# Patient Record
Sex: FEMALE | Race: BLACK OR AFRICAN AMERICAN | NOT HISPANIC OR LATINO
[De-identification: names, ages, dates, MRNs, and addresses within clinical notes are randomized per-mention and may not be internally consistent; named-entity substitution may affect disease eponyms.]

---

## 2018-07-10 ENCOUNTER — APPOINTMENT (OUTPATIENT)
Dept: VASCULAR SURGERY | Facility: CLINIC | Age: 73
End: 2018-07-10
Payer: MEDICARE

## 2018-07-10 VITALS
DIASTOLIC BLOOD PRESSURE: 86 MMHG | SYSTOLIC BLOOD PRESSURE: 132 MMHG | BODY MASS INDEX: 32.1 KG/M2 | HEART RATE: 76 BPM | WEIGHT: 188 LBS | TEMPERATURE: 98.5 F | HEIGHT: 64 IN

## 2018-07-10 DIAGNOSIS — N18.5 CHRONIC KIDNEY DISEASE, STAGE 5: ICD-10-CM

## 2018-07-10 PROCEDURE — 99203 OFFICE O/P NEW LOW 30 MIN: CPT

## 2018-08-21 ENCOUNTER — OUTPATIENT (OUTPATIENT)
Dept: OUTPATIENT SERVICES | Facility: HOSPITAL | Age: 73
LOS: 1 days | End: 2018-08-21
Payer: COMMERCIAL

## 2018-08-21 VITALS
WEIGHT: 186.07 LBS | OXYGEN SATURATION: 96 % | DIASTOLIC BLOOD PRESSURE: 75 MMHG | SYSTOLIC BLOOD PRESSURE: 140 MMHG | TEMPERATURE: 98 F | HEIGHT: 63 IN | RESPIRATION RATE: 16 BRPM | HEART RATE: 71 BPM

## 2018-08-21 DIAGNOSIS — Z98.49 CATARACT EXTRACTION STATUS, UNSPECIFIED EYE: Chronic | ICD-10-CM

## 2018-08-21 DIAGNOSIS — N18.6 END STAGE RENAL DISEASE: ICD-10-CM

## 2018-08-21 DIAGNOSIS — Z98.89 OTHER SPECIFIED POSTPROCEDURAL STATES: Chronic | ICD-10-CM

## 2018-08-21 DIAGNOSIS — Z90.710 ACQUIRED ABSENCE OF BOTH CERVIX AND UTERUS: Chronic | ICD-10-CM

## 2018-08-21 DIAGNOSIS — Z96.659 PRESENCE OF UNSPECIFIED ARTIFICIAL KNEE JOINT: Chronic | ICD-10-CM

## 2018-08-21 DIAGNOSIS — Z98.890 OTHER SPECIFIED POSTPROCEDURAL STATES: Chronic | ICD-10-CM

## 2018-08-21 DIAGNOSIS — Z01.818 ENCOUNTER FOR OTHER PREPROCEDURAL EXAMINATION: ICD-10-CM

## 2018-08-21 LAB
ALBUMIN SERPL ELPH-MCNC: 3.3 G/DL — SIGNIFICANT CHANGE UP (ref 3.3–5)
ALP SERPL-CCNC: 112 U/L — SIGNIFICANT CHANGE UP (ref 30–120)
ALT FLD-CCNC: 20 U/L DA — SIGNIFICANT CHANGE UP (ref 10–60)
ANION GAP SERPL CALC-SCNC: 7 MMOL/L — SIGNIFICANT CHANGE UP (ref 5–17)
APTT BLD: 32.3 SEC — SIGNIFICANT CHANGE UP (ref 27.5–37.4)
AST SERPL-CCNC: 20 U/L — SIGNIFICANT CHANGE UP (ref 10–40)
BILIRUB SERPL-MCNC: 0.2 MG/DL — SIGNIFICANT CHANGE UP (ref 0.2–1.2)
BLD GP AB SCN SERPL QL: SIGNIFICANT CHANGE UP
BUN SERPL-MCNC: 53 MG/DL — HIGH (ref 7–23)
CALCIUM SERPL-MCNC: 9.1 MG/DL — SIGNIFICANT CHANGE UP (ref 8.4–10.5)
CHLORIDE SERPL-SCNC: 104 MMOL/L — SIGNIFICANT CHANGE UP (ref 96–108)
CO2 SERPL-SCNC: 32 MMOL/L — HIGH (ref 22–31)
CREAT SERPL-MCNC: 4.57 MG/DL — HIGH (ref 0.5–1.3)
GLUCOSE SERPL-MCNC: 86 MG/DL — SIGNIFICANT CHANGE UP (ref 70–99)
HBA1C BLD-MCNC: 5.8 % — HIGH (ref 4–5.6)
HCT VFR BLD CALC: 22 % — LOW (ref 34.5–45)
HGB BLD-MCNC: 7 G/DL — CRITICAL LOW (ref 11.5–15.5)
INR BLD: 0.95 RATIO — SIGNIFICANT CHANGE UP (ref 0.88–1.16)
MCHC RBC-ENTMCNC: 28.6 PG — SIGNIFICANT CHANGE UP (ref 27–34)
MCHC RBC-ENTMCNC: 31.8 GM/DL — LOW (ref 32–36)
MCV RBC AUTO: 89.8 FL — SIGNIFICANT CHANGE UP (ref 80–100)
NRBC # BLD: 0 /100 WBCS — SIGNIFICANT CHANGE UP (ref 0–0)
PLATELET # BLD AUTO: 194 K/UL — SIGNIFICANT CHANGE UP (ref 150–400)
POTASSIUM SERPL-MCNC: 4 MMOL/L — SIGNIFICANT CHANGE UP (ref 3.5–5.3)
POTASSIUM SERPL-SCNC: 4 MMOL/L — SIGNIFICANT CHANGE UP (ref 3.5–5.3)
PROT SERPL-MCNC: 8.2 G/DL — SIGNIFICANT CHANGE UP (ref 6–8.3)
PROTHROM AB SERPL-ACNC: 10.3 SEC — SIGNIFICANT CHANGE UP (ref 9.8–12.7)
RBC # BLD: 2.45 M/UL — LOW (ref 3.8–5.2)
RBC # FLD: 16.2 % — HIGH (ref 10.3–14.5)
SODIUM SERPL-SCNC: 143 MMOL/L — SIGNIFICANT CHANGE UP (ref 135–145)
WBC # BLD: 7.42 K/UL — SIGNIFICANT CHANGE UP (ref 3.8–10.5)
WBC # FLD AUTO: 7.42 K/UL — SIGNIFICANT CHANGE UP (ref 3.8–10.5)

## 2018-08-21 PROCEDURE — 93010 ELECTROCARDIOGRAM REPORT: CPT

## 2018-08-21 PROCEDURE — 86850 RBC ANTIBODY SCREEN: CPT

## 2018-08-21 PROCEDURE — G0463: CPT

## 2018-08-21 PROCEDURE — 36415 COLL VENOUS BLD VENIPUNCTURE: CPT

## 2018-08-21 PROCEDURE — 80053 COMPREHEN METABOLIC PANEL: CPT

## 2018-08-21 PROCEDURE — 83036 HEMOGLOBIN GLYCOSYLATED A1C: CPT

## 2018-08-21 PROCEDURE — 85027 COMPLETE CBC AUTOMATED: CPT

## 2018-08-21 PROCEDURE — 86900 BLOOD TYPING SEROLOGIC ABO: CPT

## 2018-08-21 PROCEDURE — 85730 THROMBOPLASTIN TIME PARTIAL: CPT

## 2018-08-21 PROCEDURE — 86901 BLOOD TYPING SEROLOGIC RH(D): CPT

## 2018-08-21 PROCEDURE — 93005 ELECTROCARDIOGRAM TRACING: CPT

## 2018-08-21 PROCEDURE — 85610 PROTHROMBIN TIME: CPT

## 2018-08-21 NOTE — H&P PST ADULT - PSH
History of carpal tunnel surgery of left wrist    S/P     S/P cataract extraction  bilateral,   S/P hysterectomy    S/P rotator cuff surgery  bilateral 's  S/P total knee replacement  bilateral, left , right

## 2018-08-21 NOTE — H&P PST ADULT - PROBLEM SELECTOR PLAN 1
"Laparoscopy and placement of P.D. catherter req PA" on 8/24/18  Diagnostic testing performed   pending medical and cardiac clearance  Patient and daughter were given opportunity to ask questions and the pre op instructions were reviewed and signed  Contact info given; best wishes offered.

## 2018-08-21 NOTE — H&P PST ADULT - ADDITIONAL PE
Shiley catheter right upper chest wall, covered by dressing  received dialysis 8/20/18, scheduled 8/22/18

## 2018-08-21 NOTE — PROVIDER CONTACT NOTE (OTHER) - SITUATION
Critical lab result of Hgb 7.0 reported from lab. Notified Dr. Lyons and Dr. Martínez of result and faxed to both doctors with request for comment in clearance. Patient does state history of anemia.

## 2018-08-21 NOTE — H&P PST ADULT - PMH
Anemia    BMI 33.0-33.9,adult    Bronchial asthma    Chronic kidney disease    Diabetes mellitus    Dyslipidemia    ESRD (end stage renal disease) on dialysis    HTN (hypertension)    Hypercholesterolemia    Obesity (BMI 30.0-34.9)    Pedal edema    Sleep apnea    Trigeminal neuralgia    Vertigo

## 2018-08-21 NOTE — H&P PST ADULT - HISTORY OF PRESENT ILLNESS
74 yo black female presents to [facility] for Laparoscopy and placement of PD (peritoneal dialysis) catheter" on 8/24/18 with Orlando Lyons MD.  Accompanied by daughter today.

## 2018-08-21 NOTE — H&P PST ADULT - NEGATIVE ENMT SYMPTOMS
no nasal obstruction/no recurrent cold sores/no abnormal taste sensation/no gum bleeding/no dry mouth/no dysphagia/no throat pain/no nasal congestion/no hearing difficulty/no nose bleeds/no ear pain/no tinnitus/no sinus symptoms/no vertigo/no nasal discharge/no post-nasal discharge

## 2018-08-27 ENCOUNTER — INPATIENT (INPATIENT)
Facility: HOSPITAL | Age: 73
LOS: 3 days | Discharge: HOME HEALTH | DRG: 811 | End: 2018-08-31
Attending: INTERNAL MEDICINE | Admitting: INTERNAL MEDICINE
Payer: COMMERCIAL

## 2018-08-27 VITALS
DIASTOLIC BLOOD PRESSURE: 63 MMHG | SYSTOLIC BLOOD PRESSURE: 154 MMHG | RESPIRATION RATE: 16 BRPM | OXYGEN SATURATION: 99 % | TEMPERATURE: 99 F | HEART RATE: 71 BPM

## 2018-08-27 DIAGNOSIS — Z98.49 CATARACT EXTRACTION STATUS, UNSPECIFIED EYE: Chronic | ICD-10-CM

## 2018-08-27 DIAGNOSIS — Z98.89 OTHER SPECIFIED POSTPROCEDURAL STATES: Chronic | ICD-10-CM

## 2018-08-27 DIAGNOSIS — Z90.710 ACQUIRED ABSENCE OF BOTH CERVIX AND UTERUS: Chronic | ICD-10-CM

## 2018-08-27 DIAGNOSIS — Z96.659 PRESENCE OF UNSPECIFIED ARTIFICIAL KNEE JOINT: Chronic | ICD-10-CM

## 2018-08-27 DIAGNOSIS — Z98.890 OTHER SPECIFIED POSTPROCEDURAL STATES: Chronic | ICD-10-CM

## 2018-08-27 LAB
ALBUMIN SERPL ELPH-MCNC: 4.1 G/DL — SIGNIFICANT CHANGE UP (ref 3.3–5)
ALP SERPL-CCNC: 104 U/L — SIGNIFICANT CHANGE UP (ref 40–120)
ALT FLD-CCNC: 13 U/L — SIGNIFICANT CHANGE UP (ref 10–45)
ANION GAP SERPL CALC-SCNC: 16 MMOL/L — SIGNIFICANT CHANGE UP (ref 5–17)
APTT BLD: 29.8 SEC — SIGNIFICANT CHANGE UP (ref 27.5–37.4)
AST SERPL-CCNC: 21 U/L — SIGNIFICANT CHANGE UP (ref 10–40)
BASE EXCESS BLDV CALC-SCNC: 9.9 MMOL/L — HIGH (ref -2–2)
BASOPHILS # BLD AUTO: 0.1 K/UL — SIGNIFICANT CHANGE UP (ref 0–0.2)
BASOPHILS NFR BLD AUTO: 0.9 % — SIGNIFICANT CHANGE UP (ref 0–2)
BILIRUB SERPL-MCNC: 0.3 MG/DL — SIGNIFICANT CHANGE UP (ref 0.2–1.2)
BLD GP AB SCN SERPL QL: NEGATIVE — SIGNIFICANT CHANGE UP
BUN SERPL-MCNC: 15 MG/DL — SIGNIFICANT CHANGE UP (ref 7–23)
CALCIUM SERPL-MCNC: 9.1 MG/DL — SIGNIFICANT CHANGE UP (ref 8.4–10.5)
CHLORIDE SERPL-SCNC: 91 MMOL/L — LOW (ref 96–108)
CO2 BLDV-SCNC: 36 MMOL/L — HIGH (ref 22–30)
CO2 SERPL-SCNC: 30 MMOL/L — SIGNIFICANT CHANGE UP (ref 22–31)
CREAT SERPL-MCNC: 2.57 MG/DL — HIGH (ref 0.5–1.3)
EOSINOPHIL # BLD AUTO: 0.1 K/UL — SIGNIFICANT CHANGE UP (ref 0–0.5)
EOSINOPHIL NFR BLD AUTO: 1.5 % — SIGNIFICANT CHANGE UP (ref 0–6)
GAS PNL BLDV: SIGNIFICANT CHANGE UP
GLUCOSE SERPL-MCNC: 188 MG/DL — HIGH (ref 70–99)
HCO3 BLDV-SCNC: 35 MMOL/L — HIGH (ref 21–29)
HCT VFR BLD CALC: 23.6 % — LOW (ref 34.5–45)
HGB BLD-MCNC: 7.7 G/DL — LOW (ref 11.5–15.5)
INR BLD: 1.06 RATIO — SIGNIFICANT CHANGE UP (ref 0.88–1.16)
LYMPHOCYTES # BLD AUTO: 1.6 K/UL — SIGNIFICANT CHANGE UP (ref 1–3.3)
LYMPHOCYTES # BLD AUTO: 16 % — SIGNIFICANT CHANGE UP (ref 13–44)
MACROCYTES BLD QL: SLIGHT — SIGNIFICANT CHANGE UP
MCHC RBC-ENTMCNC: 28.6 PG — SIGNIFICANT CHANGE UP (ref 27–34)
MCHC RBC-ENTMCNC: 32.5 GM/DL — SIGNIFICANT CHANGE UP (ref 32–36)
MCV RBC AUTO: 88 FL — SIGNIFICANT CHANGE UP (ref 80–100)
MICROCYTES BLD QL: SLIGHT — SIGNIFICANT CHANGE UP
MONOCYTES # BLD AUTO: 0.9 K/UL — SIGNIFICANT CHANGE UP (ref 0–0.9)
MONOCYTES NFR BLD AUTO: 9.2 % — SIGNIFICANT CHANGE UP (ref 2–14)
NEUTROPHILS # BLD AUTO: 7.1 K/UL — SIGNIFICANT CHANGE UP (ref 1.8–7.4)
NEUTROPHILS NFR BLD AUTO: 72.4 % — SIGNIFICANT CHANGE UP (ref 43–77)
PCO2 BLDV: 53 MMHG — HIGH (ref 35–50)
PH BLDV: 7.43 — SIGNIFICANT CHANGE UP (ref 7.35–7.45)
PLAT MORPH BLD: NORMAL — SIGNIFICANT CHANGE UP
PLATELET # BLD AUTO: 192 K/UL — SIGNIFICANT CHANGE UP (ref 150–400)
PO2 BLDV: 44 MMHG — SIGNIFICANT CHANGE UP (ref 25–45)
POLYCHROMASIA BLD QL SMEAR: SLIGHT — SIGNIFICANT CHANGE UP
POTASSIUM SERPL-MCNC: 3.2 MMOL/L — LOW (ref 3.5–5.3)
POTASSIUM SERPL-SCNC: 3.2 MMOL/L — LOW (ref 3.5–5.3)
PROT SERPL-MCNC: 8.1 G/DL — SIGNIFICANT CHANGE UP (ref 6–8.3)
PROTHROM AB SERPL-ACNC: 11.5 SEC — SIGNIFICANT CHANGE UP (ref 9.8–12.7)
RBC # BLD: 2.68 M/UL — LOW (ref 3.8–5.2)
RBC # FLD: 16.5 % — HIGH (ref 10.3–14.5)
RBC BLD AUTO: ABNORMAL
RH IG SCN BLD-IMP: NEGATIVE — SIGNIFICANT CHANGE UP
RH IG SCN BLD-IMP: NEGATIVE — SIGNIFICANT CHANGE UP
SAO2 % BLDV: 76 % — SIGNIFICANT CHANGE UP (ref 67–88)
SCHISTOCYTES BLD QL AUTO: SLIGHT — SIGNIFICANT CHANGE UP
SODIUM SERPL-SCNC: 137 MMOL/L — SIGNIFICANT CHANGE UP (ref 135–145)
SPHEROCYTES BLD QL SMEAR: SLIGHT — SIGNIFICANT CHANGE UP
WBC # BLD: 9.8 K/UL — SIGNIFICANT CHANGE UP (ref 3.8–10.5)
WBC # FLD AUTO: 9.8 K/UL — SIGNIFICANT CHANGE UP (ref 3.8–10.5)

## 2018-08-27 PROCEDURE — 93010 ELECTROCARDIOGRAM REPORT: CPT

## 2018-08-27 PROCEDURE — 99285 EMERGENCY DEPT VISIT HI MDM: CPT | Mod: 25

## 2018-08-27 NOTE — ED ADULT NURSE NOTE - OBJECTIVE STATEMENT
73 year old female presented to ED from PMD with c/o of low H&H. As per daughter, pt H&H dropped from 10 to 6, referred to ED. Pt asymptomatic, denies dizziness/weakness. Pt has shiley, hectorves dialysis MWF. Last dialysis today. Pt denies CP, SOB, nausea/vomiting, numbness/tingling, fever, cough, chills, dizziness, headache, blurred vision. Pt a&ox3, lung sounds clear, heart rate regular, EKG performed handed to MD, abdomen soft nontender nondistended to palp. Skin intact. IV in left AC 20G and patent. Pt currently resting in bed with side rails up for safety, daughter at bedside. Will continue to monitor and assess while offering support and reassurance.

## 2018-08-27 NOTE — ED ADULT NURSE NOTE - CHPI ED NUR SYMPTOMS NEG
no vomiting/no pain/no tingling/no weakness/no chills/no dizziness/no decreased eating/drinking/no fever/no nausea

## 2018-08-27 NOTE — ED PROVIDER NOTE - MEDICAL DECISION MAKING DETAILS
73F with generalized weakness, fatigue, shortness of breath/ palpitations, new to HD, possible 2/2 to dialysis, will need cardiac w/u, trops stable, will admit for echo and tele monitoring

## 2018-08-27 NOTE — ED PROVIDER NOTE - PROGRESS NOTE DETAILS
Le PGY2: Patient accepted under hospitalist Dr Shailesh Galindo, given unable to reach Dr Mehta, Dr Galindo accepted and will call Dr Mehta in the morning to confirm admission of patient- unable to change admitting MD in sunrise, admitting office called to correct admitting physician.

## 2018-08-27 NOTE — ED PROVIDER NOTE - ATTENDING CONTRIBUTION TO CARE
73F ESRD with ESRD MWF dialysis, s/p right chest hemodialysis port, presents with anemia noted on dialysis bloodwork, with H&H to 6.5. +gen weakness and fatigue x 2 days. also noted cp, sob, and palpitations one week ago but since resolved. none since that time. does have hx of chronic anemia. denies f/c, n/v/d, abd pain, melena, hematochezia, dysuria, hematuria.    PE: Chronically ill appearing but in NAD, NCAT, MMM, Trachea midline, Normal conjunctiva, lungs with bibasilar rales, S1/S2 RRR, Normal perfusion, 2+ radial pulses bilat, Abdomen Soft, NTND, No rebound/guarding, + minial LE edema, non-pitting, No deformity of extremities, No rashes,  No focal motor or sensory deficits.    VS withotu sig abnormality. Pt appears comfortable. Anemia likely 2/2 worsening of chronic anemia. Is symptomatic. EKG without acute ischemic changes. Plan for labs, xr, t and s, transfuse if necessary, admit. - Guillermo Baez MD

## 2018-08-27 NOTE — ED PROVIDER NOTE - PHYSICAL EXAMINATION
Head: NCAT  ENT: Airway patent, moist mucous membranes, nasal passageways clear, no pharyngeal erythema or exudates, uvula midline, no cervical lymphadenopathy  Cardiac: Normal rate, normal rhythm, no murmurs/rubs/gallops appreciated  Respiratory: Lungs CTA B/L  Gastrointestinal: +BS, Abdomen soft, nontender, nondistended, no rebound, no guarding, no organomegaly   MSK: No gross abnormalities, FROM of all four extremities, no edema  HEME: Extremities warm, pulses intact and symmetrical in all four extremities  Skin: No rashes, no lesions  Neuro: No gross neurologic deficits, CN II-XII intact, no facial asymmetry, no dysarthria, dysdiadochokinesia, strength equal in all four extremities, no gait abnormality    *bibasilar crackles. Gen: chronically ill appearing female, well nourished, on 2L O2, NAD   Head: NCAT  ENT: Airway patent, dry mucous membranes  Cardiac: Normal rate, normal rhythm, + systolic murmur  Respiratory: *bibasilar crackles  Gastrointestinal: +BS, Abdomen soft, nontender, nondistended, no rebound, no guarding, no organomegaly   MSK: No gross abnormalities, FROM of all four extremities, + nonpitting b/l LE edema   HEME: Extremities warm, pulses intact and symmetrical in all four extremities  Skin: No rashes, no lesions  Neuro: No gross neurologic deficits

## 2018-08-27 NOTE — ED ADULT NURSE NOTE - NSIMPLEMENTINTERV_GEN_ALL_ED
Implemented All Universal Safety Interventions:  [place] to call system. Call bell, personal items and telephone within reach. Instruct patient to call for assistance. Room bathroom lighting operational. Non-slip footwear when patient is off stretcher. Physically safe environment: no spills, clutter or unnecessary equipment. Stretcher in lowest position, wheels locked, appropriate side rails in place.

## 2018-08-27 NOTE — ED PROVIDER NOTE - OBJECTIVE STATEMENT
73F ESRD with right chest hemodialysis port, dialysis MWF, presenting from dialysis with low H&H to 6.74, hx of cardiomegaly, notes palpitations, sob, chest pain one week ago, and generalized fatigue, no fevers/chills/abd pain/ hematochexia 73F ESRD with right chest hemodialysis port, dialysis MWF, presenting from dialysis with low H&H to 6.5, hx of cardiomegaly, notes palpitations, sob, chest pain one week ago, and generalized fatigue, worsened x 2 days, no fevers/chills/abd pain/ hematochezia/ dysuria/ increased frequency. New to HD 8/20/18 per outpt notes, had cardiology w/u a few weeks ago. on lasix and carvedilol at home

## 2018-08-28 DIAGNOSIS — E78.00 PURE HYPERCHOLESTEROLEMIA, UNSPECIFIED: ICD-10-CM

## 2018-08-28 DIAGNOSIS — E66.9 OBESITY, UNSPECIFIED: ICD-10-CM

## 2018-08-28 DIAGNOSIS — Z29.9 ENCOUNTER FOR PROPHYLACTIC MEASURES, UNSPECIFIED: ICD-10-CM

## 2018-08-28 DIAGNOSIS — R74.8 ABNORMAL LEVELS OF OTHER SERUM ENZYMES: ICD-10-CM

## 2018-08-28 DIAGNOSIS — D64.9 ANEMIA, UNSPECIFIED: ICD-10-CM

## 2018-08-28 DIAGNOSIS — J18.1 LOBAR PNEUMONIA, UNSPECIFIED ORGANISM: ICD-10-CM

## 2018-08-28 DIAGNOSIS — N25.0 RENAL OSTEODYSTROPHY: ICD-10-CM

## 2018-08-28 DIAGNOSIS — E11.9 TYPE 2 DIABETES MELLITUS WITHOUT COMPLICATIONS: ICD-10-CM

## 2018-08-28 DIAGNOSIS — I10 ESSENTIAL (PRIMARY) HYPERTENSION: ICD-10-CM

## 2018-08-28 DIAGNOSIS — N18.6 END STAGE RENAL DISEASE: ICD-10-CM

## 2018-08-28 DIAGNOSIS — R00.2 PALPITATIONS: ICD-10-CM

## 2018-08-28 LAB
ALBUMIN SERPL ELPH-MCNC: 3.8 G/DL — SIGNIFICANT CHANGE UP (ref 3.3–5)
ALP SERPL-CCNC: 95 U/L — SIGNIFICANT CHANGE UP (ref 40–120)
ALT FLD-CCNC: 13 U/L — SIGNIFICANT CHANGE UP (ref 10–45)
ANION GAP SERPL CALC-SCNC: 12 MMOL/L — SIGNIFICANT CHANGE UP (ref 5–17)
AST SERPL-CCNC: 19 U/L — SIGNIFICANT CHANGE UP (ref 10–40)
BASOPHILS # BLD AUTO: 0.01 K/UL — SIGNIFICANT CHANGE UP (ref 0–0.2)
BASOPHILS NFR BLD AUTO: 0.1 % — SIGNIFICANT CHANGE UP (ref 0–2)
BILIRUB SERPL-MCNC: 0.3 MG/DL — SIGNIFICANT CHANGE UP (ref 0.2–1.2)
BUN SERPL-MCNC: 18 MG/DL — SIGNIFICANT CHANGE UP (ref 7–23)
CALCIUM SERPL-MCNC: 8.9 MG/DL — SIGNIFICANT CHANGE UP (ref 8.4–10.5)
CHLORIDE SERPL-SCNC: 96 MMOL/L — SIGNIFICANT CHANGE UP (ref 96–108)
CO2 SERPL-SCNC: 30 MMOL/L — SIGNIFICANT CHANGE UP (ref 22–31)
CREAT SERPL-MCNC: 3.09 MG/DL — HIGH (ref 0.5–1.3)
EOSINOPHIL # BLD AUTO: 0.04 K/UL — SIGNIFICANT CHANGE UP (ref 0–0.5)
EOSINOPHIL NFR BLD AUTO: 0.6 % — SIGNIFICANT CHANGE UP (ref 0–6)
GLUCOSE SERPL-MCNC: 142 MG/DL — HIGH (ref 70–99)
HCT VFR BLD CALC: 19.7 % — CRITICAL LOW (ref 34.5–45)
HCT VFR BLD CALC: 28.7 % — LOW (ref 34.5–45)
HGB BLD-MCNC: 6 G/DL — CRITICAL LOW (ref 11.5–15.5)
HGB BLD-MCNC: 9.4 G/DL — LOW (ref 11.5–15.5)
IMM GRANULOCYTES NFR BLD AUTO: 1.2 % — SIGNIFICANT CHANGE UP (ref 0–1.5)
LYMPHOCYTES # BLD AUTO: 1.25 K/UL — SIGNIFICANT CHANGE UP (ref 1–3.3)
LYMPHOCYTES # BLD AUTO: 18.5 % — SIGNIFICANT CHANGE UP (ref 13–44)
MAGNESIUM SERPL-MCNC: 2.2 MG/DL — SIGNIFICANT CHANGE UP (ref 1.6–2.6)
MCHC RBC-ENTMCNC: 27.5 PG — SIGNIFICANT CHANGE UP (ref 27–34)
MCHC RBC-ENTMCNC: 29 PG — SIGNIFICANT CHANGE UP (ref 27–34)
MCHC RBC-ENTMCNC: 30.5 GM/DL — LOW (ref 32–36)
MCHC RBC-ENTMCNC: 32.9 GM/DL — SIGNIFICANT CHANGE UP (ref 32–36)
MCV RBC AUTO: 88.4 FL — SIGNIFICANT CHANGE UP (ref 80–100)
MCV RBC AUTO: 90.4 FL — SIGNIFICANT CHANGE UP (ref 80–100)
MONOCYTES # BLD AUTO: 1.01 K/UL — HIGH (ref 0–0.9)
MONOCYTES NFR BLD AUTO: 15 % — HIGH (ref 2–14)
NEUTROPHILS # BLD AUTO: 4.36 K/UL — SIGNIFICANT CHANGE UP (ref 1.8–7.4)
NEUTROPHILS NFR BLD AUTO: 64.6 % — SIGNIFICANT CHANGE UP (ref 43–77)
PHOSPHATE SERPL-MCNC: 2.9 MG/DL — SIGNIFICANT CHANGE UP (ref 2.5–4.5)
PLATELET # BLD AUTO: 148 K/UL — LOW (ref 150–400)
PLATELET # BLD AUTO: 161 K/UL — SIGNIFICANT CHANGE UP (ref 150–400)
POTASSIUM SERPL-MCNC: 3.6 MMOL/L — SIGNIFICANT CHANGE UP (ref 3.5–5.3)
POTASSIUM SERPL-SCNC: 3.6 MMOL/L — SIGNIFICANT CHANGE UP (ref 3.5–5.3)
PROT SERPL-MCNC: 7.2 G/DL — SIGNIFICANT CHANGE UP (ref 6–8.3)
RBC # BLD: 2.18 M/UL — LOW (ref 3.8–5.2)
RBC # BLD: 3.24 M/UL — LOW (ref 3.8–5.2)
RBC # FLD: 15.3 % — HIGH (ref 10.3–14.5)
RBC # FLD: 17 % — HIGH (ref 10.3–14.5)
SODIUM SERPL-SCNC: 138 MMOL/L — SIGNIFICANT CHANGE UP (ref 135–145)
TROPONIN T, HIGH SENSITIVITY RESULT: 64 NG/L — HIGH (ref 0–51)
TROPONIN T, HIGH SENSITIVITY RESULT: 64 NG/L — HIGH (ref 0–51)
WBC # BLD: 6.75 K/UL — SIGNIFICANT CHANGE UP (ref 3.8–10.5)
WBC # BLD: 8.2 K/UL — SIGNIFICANT CHANGE UP (ref 3.8–10.5)
WBC # FLD AUTO: 6.75 K/UL — SIGNIFICANT CHANGE UP (ref 3.8–10.5)
WBC # FLD AUTO: 8.2 K/UL — SIGNIFICANT CHANGE UP (ref 3.8–10.5)

## 2018-08-28 PROCEDURE — 99223 1ST HOSP IP/OBS HIGH 75: CPT

## 2018-08-28 PROCEDURE — 71046 X-RAY EXAM CHEST 2 VIEWS: CPT | Mod: 26

## 2018-08-28 RX ORDER — ATORVASTATIN CALCIUM 80 MG/1
80 TABLET, FILM COATED ORAL AT BEDTIME
Qty: 0 | Refills: 0 | Status: DISCONTINUED | OUTPATIENT
Start: 2018-08-28 | End: 2018-08-31

## 2018-08-28 RX ORDER — HYDRALAZINE HCL 50 MG
100 TABLET ORAL THREE TIMES A DAY
Qty: 0 | Refills: 0 | Status: DISCONTINUED | OUTPATIENT
Start: 2018-08-28 | End: 2018-08-31

## 2018-08-28 RX ORDER — CARVEDILOL PHOSPHATE 80 MG/1
25 CAPSULE, EXTENDED RELEASE ORAL EVERY 12 HOURS
Qty: 0 | Refills: 0 | Status: DISCONTINUED | OUTPATIENT
Start: 2018-08-28 | End: 2018-08-31

## 2018-08-28 RX ORDER — IPRATROPIUM/ALBUTEROL SULFATE 18-103MCG
3 AEROSOL WITH ADAPTER (GRAM) INHALATION ONCE
Qty: 0 | Refills: 0 | Status: COMPLETED | OUTPATIENT
Start: 2018-08-28 | End: 2018-08-28

## 2018-08-28 RX ORDER — DEXTROSE 50 % IN WATER 50 %
12.5 SYRINGE (ML) INTRAVENOUS ONCE
Qty: 0 | Refills: 0 | Status: DISCONTINUED | OUTPATIENT
Start: 2018-08-28 | End: 2018-08-31

## 2018-08-28 RX ORDER — ERYTHROPOIETIN 10000 [IU]/ML
20000 INJECTION, SOLUTION INTRAVENOUS; SUBCUTANEOUS ONCE
Qty: 0 | Refills: 0 | Status: COMPLETED | OUTPATIENT
Start: 2018-08-28 | End: 2018-08-28

## 2018-08-28 RX ORDER — DEXTROSE 50 % IN WATER 50 %
25 SYRINGE (ML) INTRAVENOUS ONCE
Qty: 0 | Refills: 0 | Status: DISCONTINUED | OUTPATIENT
Start: 2018-08-28 | End: 2018-08-31

## 2018-08-28 RX ORDER — INSULIN GLARGINE 100 [IU]/ML
15 INJECTION, SOLUTION SUBCUTANEOUS AT BEDTIME
Qty: 0 | Refills: 0 | Status: DISCONTINUED | OUTPATIENT
Start: 2018-08-28 | End: 2018-08-31

## 2018-08-28 RX ORDER — FUROSEMIDE 40 MG
1 TABLET ORAL
Qty: 0 | Refills: 0 | COMMUNITY

## 2018-08-28 RX ORDER — INSULIN GLARGINE 100 [IU]/ML
0 INJECTION, SOLUTION SUBCUTANEOUS
Qty: 0 | Refills: 0 | COMMUNITY

## 2018-08-28 RX ORDER — POTASSIUM CHLORIDE 20 MEQ
20 PACKET (EA) ORAL ONCE
Qty: 0 | Refills: 0 | Status: COMPLETED | OUTPATIENT
Start: 2018-08-28 | End: 2018-08-28

## 2018-08-28 RX ORDER — CALCITRIOL 0.5 UG/1
0.25 CAPSULE ORAL
Qty: 0 | Refills: 0 | Status: DISCONTINUED | OUTPATIENT
Start: 2018-08-28 | End: 2018-08-31

## 2018-08-28 RX ORDER — ASPIRIN/CALCIUM CARB/MAGNESIUM 324 MG
81 TABLET ORAL DAILY
Qty: 0 | Refills: 0 | Status: DISCONTINUED | OUTPATIENT
Start: 2018-08-28 | End: 2018-08-31

## 2018-08-28 RX ORDER — INSULIN LISPRO 100/ML
VIAL (ML) SUBCUTANEOUS
Qty: 0 | Refills: 0 | Status: DISCONTINUED | OUTPATIENT
Start: 2018-08-28 | End: 2018-08-31

## 2018-08-28 RX ORDER — FUROSEMIDE 40 MG
40 TABLET ORAL ONCE
Qty: 0 | Refills: 0 | Status: COMPLETED | OUTPATIENT
Start: 2018-08-28 | End: 2018-08-28

## 2018-08-28 RX ORDER — DEXTROSE 50 % IN WATER 50 %
15 SYRINGE (ML) INTRAVENOUS ONCE
Qty: 0 | Refills: 0 | Status: DISCONTINUED | OUTPATIENT
Start: 2018-08-28 | End: 2018-08-31

## 2018-08-28 RX ORDER — IRON SUCROSE 20 MG/ML
100 INJECTION, SOLUTION INTRAVENOUS
Qty: 0 | Refills: 0 | COMMUNITY

## 2018-08-28 RX ORDER — SODIUM CHLORIDE 9 MG/ML
1000 INJECTION, SOLUTION INTRAVENOUS
Qty: 0 | Refills: 0 | Status: DISCONTINUED | OUTPATIENT
Start: 2018-08-28 | End: 2018-08-31

## 2018-08-28 RX ORDER — METHOXY POLYETHYLENE GLYCOL-EPOETIN BETA 30 UG/.3ML
225 INJECTION, SOLUTION INTRAVENOUS
Qty: 0 | Refills: 0 | COMMUNITY

## 2018-08-28 RX ORDER — FUROSEMIDE 40 MG
80 TABLET ORAL DAILY
Qty: 0 | Refills: 0 | Status: DISCONTINUED | OUTPATIENT
Start: 2018-08-28 | End: 2018-08-31

## 2018-08-28 RX ORDER — GLUCAGON INJECTION, SOLUTION 0.5 MG/.1ML
1 INJECTION, SOLUTION SUBCUTANEOUS ONCE
Qty: 0 | Refills: 0 | Status: DISCONTINUED | OUTPATIENT
Start: 2018-08-28 | End: 2018-08-31

## 2018-08-28 RX ORDER — FERRIC CITRATE 210 MG/1
1 TABLET, COATED ORAL
Qty: 0 | Refills: 0 | COMMUNITY

## 2018-08-28 RX ORDER — NIFEDIPINE 30 MG
90 TABLET, EXTENDED RELEASE 24 HR ORAL DAILY
Qty: 0 | Refills: 0 | Status: DISCONTINUED | OUTPATIENT
Start: 2018-08-28 | End: 2018-08-31

## 2018-08-28 RX ORDER — BENZOYL PEROXIDE MICRONIZED 5.8 %
1 TOWELETTE (EA) TOPICAL
Qty: 0 | Refills: 0 | COMMUNITY

## 2018-08-28 RX ORDER — SIMVASTATIN 20 MG/1
1 TABLET, FILM COATED ORAL
Qty: 0 | Refills: 0 | COMMUNITY

## 2018-08-28 RX ORDER — FUROSEMIDE 40 MG
40 TABLET ORAL EVERY 24 HOURS
Qty: 0 | Refills: 0 | Status: DISCONTINUED | OUTPATIENT
Start: 2018-08-28 | End: 2018-08-31

## 2018-08-28 RX ADMIN — Medication 100 MILLIGRAM(S): at 12:59

## 2018-08-28 RX ADMIN — ERYTHROPOIETIN 20000 UNIT(S): 10000 INJECTION, SOLUTION INTRAVENOUS; SUBCUTANEOUS at 18:54

## 2018-08-28 RX ADMIN — Medication 100 MILLIGRAM(S): at 22:46

## 2018-08-28 RX ADMIN — Medication 90 MILLIGRAM(S): at 11:16

## 2018-08-28 RX ADMIN — Medication 81 MILLIGRAM(S): at 11:16

## 2018-08-28 RX ADMIN — Medication 20 MILLIEQUIVALENT(S): at 07:21

## 2018-08-28 RX ADMIN — Medication 1 TABLET(S): at 11:16

## 2018-08-28 RX ADMIN — Medication 3 MILLILITER(S): at 23:21

## 2018-08-28 RX ADMIN — INSULIN GLARGINE 15 UNIT(S): 100 INJECTION, SOLUTION SUBCUTANEOUS at 22:45

## 2018-08-28 RX ADMIN — ATORVASTATIN CALCIUM 80 MILLIGRAM(S): 80 TABLET, FILM COATED ORAL at 22:45

## 2018-08-28 RX ADMIN — Medication 1: at 12:58

## 2018-08-28 RX ADMIN — CARVEDILOL PHOSPHATE 25 MILLIGRAM(S): 80 CAPSULE, EXTENDED RELEASE ORAL at 22:45

## 2018-08-28 RX ADMIN — Medication 40 MILLIGRAM(S): at 12:57

## 2018-08-28 RX ADMIN — Medication 80 MILLIGRAM(S): at 11:16

## 2018-08-28 NOTE — H&P ADULT - PROBLEM SELECTOR PLAN 4
Decrease home lantus to 15 units nightly  - add sliding scale insulin and fingerstick monitoring gets HD MWF  - renal consult for HD  - monitor BMP daily  - continue daily lasix for now as patient still makes urine

## 2018-08-28 NOTE — CONSULT NOTE ADULT - PROBLEM SELECTOR RECOMMENDATION 2
Started Epo 20k TIW with HD  Transfusions as per primary team  Please send Ferritin, Iron and TSAT, may require Iron loading IV

## 2018-08-28 NOTE — H&P ADULT - ASSESSMENT
73f hx HTN, DM, ESRD on HD awaitign PD cathter placement,  sent in by Dr. Lyons after being found to have low Hb

## 2018-08-28 NOTE — PATIENT PROFILE ADULT. - PSH
S/P     S/P cataract extraction  both eyes  S/P hysterectomy    S/P rotator cuff surgery  bilateral  S/P total knee replacement  both knees

## 2018-08-28 NOTE — H&P ADULT - NSHPSOCIALHISTORY_GEN_ALL_CORE
nonsmoker, nondrinker, no drugs, lives at home by herself, functionally independent, has family that visits periodically

## 2018-08-28 NOTE — CONSULT NOTE ADULT - PROBLEM SELECTOR RECOMMENDATION 9
Consent in chart  Due for HD tomorrow and regular schedule MWF  Lytes OK  Avoid nephrotoxins  Dose meds for ESRD  Renal diet

## 2018-08-28 NOTE — PATIENT PROFILE ADULT. - PMH
Bronchial asthma    Chronic kidney disease    Diabetes mellitus    HTN (hypertension)    Hypercholesterolemia    Trigeminal neuralgia    Vertigo

## 2018-08-28 NOTE — H&P ADULT - PROBLEM SELECTOR PLAN 1
Likely anemia of chronic disease from ESRD vs. doubt acute blood loss anemia as no signs of bleeding on exam, pt hemodynamically stable  - monitor CBC qday, transfuse for Hb <7  - recommend renal consult for management of ESRD associated anemia

## 2018-08-28 NOTE — H&P ADULT - PROBLEM SELECTOR PLAN 6
Continue crestor to lipitor via therapeutic interchange Continue coreg 25mg po bid, hydralazine 100mg TID, nifedipine 90mg po daily with hold parameters

## 2018-08-28 NOTE — H&P ADULT - PROBLEM SELECTOR PLAN 5
Continue coreg 25mg po bid, hydralazine 100mg TID, nifedipine 90mg po daily with hold parameters Decrease home lantus to 15 units nightly  - add sliding scale insulin and fingerstick monitoring

## 2018-08-28 NOTE — H&P ADULT - HISTORY OF PRESENT ILLNESS
73f hx ESRD on HD via tunneled cath awaiting PD catheter placement, DM, HTN presenting to ED after OP labs revealed low Hb 6's. Pt reports having received aranesp in the past, which was later stopped as her Hb was in the 11-14 range, and then trialing EPO once her Hb began to drop again, but this was also stopped as it was felt to be ineffectual. Pt reports symptoms of palpitations, sob and fatigue associated with having lower hb counts. Otherwise, no aches/pains, no fevers/chills, no hematochezia/melena, no n/v or hematemesis. No significant bruising noted. She was undergoing evaluation by Dr. Lyons for PD catheter placement, which is now being held due to her anemia.     In ED: 98.7, 71, 154/63, 16, 99RA. Given KCL 20meq po x 1.

## 2018-08-28 NOTE — H&P ADULT - NSHPLABSRESULTS_GEN_ALL_CORE
.  LABS:                         6.0    6.75  )-----------( 148      ( 28 Aug 2018 07:27 )             19.7     08-28    138  |  96  |  18  ----------------------------<  142<H>  3.6   |  30  |  3.09<H>    Ca    8.9      28 Aug 2018 05:54  Phos  2.9     08-28  Mg     2.2     08-28    TPro  7.2  /  Alb  3.8  /  TBili  0.3  /  DBili  x   /  AST  19  /  ALT  13  /  AlkPhos  95  08-28    PT/INR - ( 27 Aug 2018 22:28 )   PT: 11.5 sec;   INR: 1.06 ratio         PTT - ( 27 Aug 2018 22:28 )  PTT:29.8 sec    Serum Pro-Brain Natriuretic Peptide: 2260 pg/mL (08-27 @ 22:28)        CXR personally reviewed: clear lungs, R catheter in place  EKG personally reviewed: NSR with known RBBB

## 2018-08-28 NOTE — H&P ADULT - PROBLEM SELECTOR PLAN 2
Likely 2/2 severe anemia vs. doubt primary cardiac etiology. Asymptomatic at present  - continue management of anemia as above

## 2018-08-28 NOTE — CONSULT NOTE ADULT - ATTENDING COMMENTS
Thank you for this consult    For any question, call:  Cell # [phone]  Pager # [phone]  Callback # [phone]

## 2018-08-28 NOTE — H&P ADULT - PROBLEM SELECTOR PLAN 8
- hsq  - diet: dash/tlc/consistent carb  - dispo: medical floor Recommend diet and lifestyle counseling

## 2018-08-28 NOTE — H&P ADULT - PROBLEM SELECTOR PLAN 3
gets HD MWF  - renal consult for HD  - monitor BMP daily  - continue daily lasix for now as patient still makes urine Elevated possibly 2/2 demand ischemia given anemia, all in the setting of poor clearance from ESRD. Doubt active MI  - may stop trending troponin  - would obtain stat EKG if patient experiences chest pain

## 2018-08-29 ENCOUNTER — TRANSCRIPTION ENCOUNTER (OUTPATIENT)
Age: 73
End: 2018-08-29

## 2018-08-29 DIAGNOSIS — J06.9 ACUTE UPPER RESPIRATORY INFECTION, UNSPECIFIED: ICD-10-CM

## 2018-08-29 LAB
ALBUMIN SERPL ELPH-MCNC: 4 G/DL — SIGNIFICANT CHANGE UP (ref 3.3–5)
ALP SERPL-CCNC: 101 U/L — SIGNIFICANT CHANGE UP (ref 40–120)
ALT FLD-CCNC: 14 U/L — SIGNIFICANT CHANGE UP (ref 10–45)
ANION GAP SERPL CALC-SCNC: 14 MMOL/L — SIGNIFICANT CHANGE UP (ref 5–17)
AST SERPL-CCNC: 20 U/L — SIGNIFICANT CHANGE UP (ref 10–40)
BILIRUB SERPL-MCNC: 0.3 MG/DL — SIGNIFICANT CHANGE UP (ref 0.2–1.2)
BUN SERPL-MCNC: 14 MG/DL — SIGNIFICANT CHANGE UP (ref 7–23)
CALCIUM SERPL-MCNC: 9.4 MG/DL — SIGNIFICANT CHANGE UP (ref 8.4–10.5)
CHLORIDE SERPL-SCNC: 97 MMOL/L — SIGNIFICANT CHANGE UP (ref 96–108)
CO2 SERPL-SCNC: 27 MMOL/L — SIGNIFICANT CHANGE UP (ref 22–31)
CREAT SERPL-MCNC: 2.84 MG/DL — HIGH (ref 0.5–1.3)
FERRITIN SERPL-MCNC: 314 NG/ML — HIGH (ref 15–150)
FOLATE SERPL-MCNC: 19.2 NG/ML — SIGNIFICANT CHANGE UP
GLUCOSE SERPL-MCNC: 147 MG/DL — HIGH (ref 70–99)
HCT VFR BLD CALC: 29.3 % — LOW (ref 34.5–45)
HGB BLD-MCNC: 8.8 G/DL — LOW (ref 11.5–15.5)
IRON SATN MFR SERPL: 16 % — SIGNIFICANT CHANGE UP (ref 14–50)
IRON SATN MFR SERPL: 35 UG/DL — SIGNIFICANT CHANGE UP (ref 30–160)
MAGNESIUM SERPL-MCNC: 2.1 MG/DL — SIGNIFICANT CHANGE UP (ref 1.6–2.6)
MCHC RBC-ENTMCNC: 27.6 PG — SIGNIFICANT CHANGE UP (ref 27–34)
MCHC RBC-ENTMCNC: 30 GM/DL — LOW (ref 32–36)
MCV RBC AUTO: 91.8 FL — SIGNIFICANT CHANGE UP (ref 80–100)
NRBC # BLD: 6 /100 WBCS — HIGH (ref 0–0)
PHOSPHATE SERPL-MCNC: 2.6 MG/DL — SIGNIFICANT CHANGE UP (ref 2.5–4.5)
PLATELET # BLD AUTO: 204 K/UL — SIGNIFICANT CHANGE UP (ref 150–400)
POTASSIUM SERPL-MCNC: 3.6 MMOL/L — SIGNIFICANT CHANGE UP (ref 3.5–5.3)
POTASSIUM SERPL-SCNC: 3.6 MMOL/L — SIGNIFICANT CHANGE UP (ref 3.5–5.3)
PROT SERPL-MCNC: 8.4 G/DL — HIGH (ref 6–8.3)
RBC # BLD: 3.19 M/UL — LOW (ref 3.8–5.2)
RBC # FLD: 16.6 % — HIGH (ref 10.3–14.5)
SODIUM SERPL-SCNC: 138 MMOL/L — SIGNIFICANT CHANGE UP (ref 135–145)
TIBC SERPL-MCNC: 225 UG/DL — SIGNIFICANT CHANGE UP (ref 220–430)
UIBC SERPL-MCNC: 190 UG/DL — SIGNIFICANT CHANGE UP (ref 110–370)
WBC # BLD: 8.2 K/UL — SIGNIFICANT CHANGE UP (ref 3.8–10.5)
WBC # FLD AUTO: 8.2 K/UL — SIGNIFICANT CHANGE UP (ref 3.8–10.5)

## 2018-08-29 RX ORDER — IPRATROPIUM/ALBUTEROL SULFATE 18-103MCG
3 AEROSOL WITH ADAPTER (GRAM) INHALATION ONCE
Qty: 0 | Refills: 0 | Status: COMPLETED | OUTPATIENT
Start: 2018-08-29 | End: 2018-08-29

## 2018-08-29 RX ORDER — FLUTICASONE PROPIONATE 50 MCG
1 SPRAY, SUSPENSION NASAL
Qty: 0 | Refills: 0 | Status: DISCONTINUED | OUTPATIENT
Start: 2018-08-29 | End: 2018-08-31

## 2018-08-29 RX ORDER — LORATADINE 10 MG/1
10 TABLET ORAL DAILY
Qty: 0 | Refills: 0 | Status: DISCONTINUED | OUTPATIENT
Start: 2018-08-29 | End: 2018-08-31

## 2018-08-29 RX ORDER — FUROSEMIDE 40 MG
40 TABLET ORAL ONCE
Qty: 0 | Refills: 0 | Status: COMPLETED | OUTPATIENT
Start: 2018-08-29 | End: 2018-08-29

## 2018-08-29 RX ADMIN — Medication 81 MILLIGRAM(S): at 12:35

## 2018-08-29 RX ADMIN — Medication 90 MILLIGRAM(S): at 09:07

## 2018-08-29 RX ADMIN — INSULIN GLARGINE 15 UNIT(S): 100 INJECTION, SOLUTION SUBCUTANEOUS at 21:55

## 2018-08-29 RX ADMIN — Medication 100 MILLIGRAM(S): at 14:23

## 2018-08-29 RX ADMIN — CALCITRIOL 0.25 MICROGRAM(S): 0.5 CAPSULE ORAL at 12:35

## 2018-08-29 RX ADMIN — CARVEDILOL PHOSPHATE 25 MILLIGRAM(S): 80 CAPSULE, EXTENDED RELEASE ORAL at 06:12

## 2018-08-29 RX ADMIN — ATORVASTATIN CALCIUM 80 MILLIGRAM(S): 80 TABLET, FILM COATED ORAL at 21:50

## 2018-08-29 RX ADMIN — Medication 40 MILLIGRAM(S): at 09:51

## 2018-08-29 RX ADMIN — Medication 40 MILLIGRAM(S): at 18:20

## 2018-08-29 RX ADMIN — Medication 1: at 21:50

## 2018-08-29 RX ADMIN — CARVEDILOL PHOSPHATE 25 MILLIGRAM(S): 80 CAPSULE, EXTENDED RELEASE ORAL at 18:20

## 2018-08-29 RX ADMIN — LORATADINE 10 MILLIGRAM(S): 10 TABLET ORAL at 21:50

## 2018-08-29 RX ADMIN — Medication 100 MILLIGRAM(S): at 21:50

## 2018-08-29 RX ADMIN — Medication 1 TABLET(S): at 12:35

## 2018-08-29 RX ADMIN — Medication 100 MILLIGRAM(S): at 06:11

## 2018-08-29 RX ADMIN — Medication 1: at 12:34

## 2018-08-29 RX ADMIN — Medication 3 MILLILITER(S): at 12:35

## 2018-08-29 RX ADMIN — Medication 1 SPRAY(S): at 21:49

## 2018-08-29 NOTE — DISCHARGE NOTE ADULT - HOME CARE AGENCY
[facility] [phone]. assessment .evaluation  for home P.T and hha., teaching rn to contact you in 1 to 2 days. f/u with provided number  if needed.

## 2018-08-29 NOTE — DISCHARGE NOTE ADULT - MEDICATION SUMMARY - MEDICATIONS TO STOP TAKING
I will STOP taking the medications listed below when I get home from the hospital:    Crestor 20 mg oral tablet  -- 1 tab(s) by mouth once a day (at bedtime)    Auryxia 210 mg oral tablet  -- 1 tab(s) by mouth 2 times a day    Mircera  -- 225 microgram(s) injectable every 2 weeks

## 2018-08-29 NOTE — DISCHARGE NOTE ADULT - ADDITIONAL INSTRUCTIONS
follow up with primary care physician within one week after discharge  follow up with cardiology Dr. Thomas - appointment on 9/7 @ 10:30 AM  follow up with nephrology & dialysis center (M/W/F) as directed

## 2018-08-29 NOTE — CONSULT NOTE ADULT - ASSESSMENT
73f hx ESRD on HD via tunneled cath awaiting PD catheter placement, DM, HTN presenting to ED after OP labs revealed low Hb 6's.  Receives HD at [facility]. On review of outpatient chart the patient had poor response to DEE, only last week was started on high dose Mircera. In chart review never observed patient's hemoglobin to be higher than 8, her impression must be a misunderstanding.    Problems:  ESRd on HD  Anemia of CKD  BMD of CKD  HTN  DM
73f hx ESRD on HD via tunneled cath awaiting PD catheter placement, DM, HTN presenting to ED after OP labs revealed low Hb 6's    1. anemia -- at least ACD and had not been responsive to aranesp. On high dose Mircera  -- ferritin adequate, 314  -- folate adequate, hapto neg  -- complete anemia w/u with B12, SPEP, ABRAM  -- DEE per renal  -- monitor CBC, transfuse to keep hgb >7 in the meantime while waiting for Mircera response    2. ESRD -- per renal    Plan d/w pt and primary team. Will follow, thank you for the opportunity to participate in Ms Mallory's care. Pls call with questions, [phone]

## 2018-08-29 NOTE — DISCHARGE NOTE ADULT - PATIENT PORTAL LINK FT
You can access the [facility], offered by [facility], by registering with the following website: [email or web address]

## 2018-08-29 NOTE — DISCHARGE NOTE ADULT - HOSPITAL COURSE
73f hx ESRD on HD via tunneled cath awaiting PD catheter placement, DM, HTN presenting to ED after OP labs revealed low Hb 6's. Pt reports having received aranesp in the past, which was later stopped as her Hb was in the 11-14 range, and then trialing EPO once her Hb began to drop again, but this was also stopped as it was felt to be ineffectual. Pt reports symptoms of palpitations, sob and fatigue associated with having lower hb counts. Otherwise, no aches/pains, no fevers/chills, no hematochezia/melena, no n/v or hematemesis. No significant bruising noted. She was undergoing evaluation by Dr. Lyons for PD catheter placement, which is now being held due to her anemia.  Likely anemia of chronic disease from ESRD vs. doubt acute blood loss anemia as no signs of bleeding on exam, pt hemodynamically stable. Pt received 1 unit PRBC. Pt seen by nephrology anemia much improved S/P transfusion. On high dose Epo. Regular schedule MWF. HD yesterday, will do HD Tomorrow, and Friday to regularize her HD schedule.  Elevated troponin possibly 2/2 demand ischemia given anemia, all in the setting of poor clearance from ESRD. Doubt active MI. Pt seen by hematology for anemia reports at least ACD and had not been responsive to aranesp. On high dose Mircera ferritin adequate, 314, folate adequate, hapto neg.

## 2018-08-29 NOTE — CHART NOTE - NSCHARTNOTEFT_GEN_A_CORE
Called by RN that pt had a Temp of 100.1F [Rectally] AT 0600AM. Pt was evaluated. Denies CP/SOB/Palpitations/ Diaphoresis, HA/Dizziness/ Blurry vsision/ syncope. fever/ chills, Abdominal Pain/N/V/D/C. Pt is a 73f hx ESRD on HD via tunneled cath awaiting PD catheter placement, DM, HTN presenting to ED after OP labs revealed low Hb 6's. Pt reports having received aranesp in the past, which was later stopped as her Hb was in the 11-14 range, and then trialing EPO once her Hb began to drop again, but this was also stopped as it was felt to be ineffectual. Pt reports symptoms of palpitations, sob and fatigue associated with having lower hb counts. Otherwise, no aches/pains, no fevers/chills, no hematochezia/melena, no n/v or hematemesis. No significant bruising noted. She was undergoing evaluation by Dr. Lyons for PD catheter placement, which is now being held due to her anemia. Pt admitted with Anemia 2/2 CKD vs Acute Blood Loss.     - f/u UA.   - f/u BCX x2.   - CXR; No focal consolidation.  - Monitor Temp.   - Will endorse to primary team in am.     RABIA. BING WU  # [id number]  Medicine PA.

## 2018-08-29 NOTE — DISCHARGE NOTE ADULT - REASON FOR ADMISSION
Low hb Low hb - due to end stage renal disease requiring Procrit and Venofer w/ hemodialysis - received blood transfusion and stable

## 2018-08-29 NOTE — CONSULT NOTE ADULT - SUBJECTIVE AND OBJECTIVE BOX
[facility] - Janice / Nathan S /Levy/ JACY Castroan/ S Becker/ Stephon Filemon / MALCOLM Njeru  -------------------------------------------------------------------------------------------------------  The patient seen and examined today.  HPI:  73f hx ESRD on HD via tunneled cath awaiting PD catheter placement, DM, HTN presenting to ED after OP labs revealed low Hb 6's. Pt reports having received aranesp in the past, which was later stopped as her Hb was in the 11-14 range, and then trialing EPO once her Hb began to drop again, but this was also stopped as it was felt to be ineffectual. Pt reports symptoms of palpitations, sob and fatigue associated with having lower hb counts. Otherwise, no aches/pains, no fevers/chills, no hematochezia/melena, no n/v or hematemesis. No significant bruising noted. She was undergoing evaluation by Dr. Lyons for PD catheter placement, which is now being held due to her anemia.     In ED: 98.7, 71, 154/63, 16, 99RA. Given KCL 20meq po x 1. (28 Aug 2018 09:54)    Receives HD at [facility]. On review of outpatient chart the patient had poor response to DEE, only last week was started on high dose Mircera. In chart review never observed patient's hemoglobin to be higher than 8, her impression must be a misunderstanding    PAST MEDICAL & SURGICAL HISTORY:  Obesity (BMI 30.0-34.9)  BMI 33.0-33.9,adult  Anemia  Dyslipidemia  Sleep apnea  Pedal edema  ESRD (end stage renal disease) on dialysis  Trigeminal neuralgia  Vertigo  Bronchial asthma  Chronic kidney disease  Diabetes mellitus  Hypercholesterolemia  HTN (hypertension)  History of carpal tunnel surgery of left wrist:   S/P cataract extraction: bilateral,   S/P total knee replacement: bilateral, left , right   S/P hysterectomy:   S/P :   S/P rotator cuff surgery: bilateral &#x27;s    Allergies :- IV Contrast (Anaphylaxis)  shellfish (Hives; Rash)  shrimp (Hives)  smoke; coughing (Other)    Home Medications Reviewed  Hospital Medications:   MEDICATIONS  (STANDING):  aspirin enteric coated 81 milliGRAM(s) Oral daily  atorvastatin 80 milliGRAM(s) Oral at bedtime  calcitriol   Capsule 0.25 MICROGram(s) Oral <User Schedule>  carvedilol 25 milliGRAM(s) Oral every 12 hours  dextrose 5%. 1000 milliLiter(s) (50 mL/Hr) IV Continuous <Continuous>  dextrose 50% Injectable 12.5 Gram(s) IV Push once  dextrose 50% Injectable 25 Gram(s) IV Push once  dextrose 50% Injectable 25 Gram(s) IV Push once  epoetin shannon Injectable 55554 Unit(s) IV Push once  furosemide    Tablet 80 milliGRAM(s) Oral daily  furosemide    Tablet 40 milliGRAM(s) Oral every 24 hours  hydrALAZINE 100 milliGRAM(s) Oral three times a day  insulin glargine Injectable (LANTUS) 15 Unit(s) SubCutaneous at bedtime  insulin lispro (HumaLOG) corrective regimen sliding scale   SubCutaneous Before meals and at bedtime  multivitamin 1 Tablet(s) Oral daily  NIFEdipine XL 90 milliGRAM(s) Oral daily    SOCIAL HISTORY:  Denies ETOh,Smoking,   FAMILY HISTORY:  No pertinent family history in first degree relatives      REVIEW OF SYSTEMS:  CONSTITUTIONAL: No weakness, fevers or chills  EYES/ENT: No visual changes;  No vertigo or throat pain   NECK: No pain or stiffness  RESPIRATORY: No cough, wheezing, hemoptysis; No shortness of breath  CARDIOVASCULAR: No chest pain or palpitations.  GASTROINTESTINAL: No abdominal or epigastric pain. No nausea, vomiting, or hematemesis; No diarrhea or constipation. No melena or hematochezia.  GENITOURINARY: No dysuria, frequency, foamy urine, urinary urgency, incontinence or hematuria  NEUROLOGICAL: No numbness or weakness  SKIN: No itching, burning, rashes, or lesions   VASCULAR: No bilateral lower extremity edema.   All other review of systems is negative unless indicated above.    VITALS:  T(F): 97.9 (18 @ 12:56), Max: 99.2 (18 @ 20:00)  HR: 64 (18 @ 12:56)  BP: 161/81 (18 @ 12:56)  RR: 20 (18 @ 12:56)  SpO2: 98% (18 @ 12:56)  Wt(kg): --        PHYSICAL EXAM:  Constitutional: NAD  HEENT: anicteric sclera, oropharynx clear, MMM  Neck: supple.   Respiratory: Bilateral equal breath sounds , no wheezes, no crackles  Cardiovascular: S1, S2, Regular, Murmur present.  Gastrointestinal: Bowel Sound present, soft, NT/ND  Extremities: No cyanosis or clubbing. No peripheral edema  Neurological: Alert and oriented x 3, no focal deficits  Psychiatric: Normal mood, normal affect  : No CVA tenderness. No cottrell.   Skin: No rashes    Data:      138  |  96  |  18  ----------------------------<  142<H>  3.6   |  30  |  3.09<H>    Ca    8.9      28 Aug 2018 05:54  Phos  2.9       Mg     2.2         TPro  7.2  /  Alb  3.8  /  TBili  0.3  /  DBili      /  AST  19  /  ALT  13  /  AlkPhos  95      Creatinine Trend: 3.09 <--, 2.57 <--                        6.0    6.75  )-----------( 148      ( 28 Aug 2018 07:27 )             19.7     Urine Studies:
CHIEF COMPLAINT:Patient is a 73y old  Female who presents with a chief complaint of Low hb (29 Aug 2018 19:18)      HISTORY OF PRESENT ILLNESS:HPI:  73f hx ESRD on HD via tunneled cath awaiting PD catheter placement, DM, HTN presenting to ED after OP labs revealed low Hb 6's. Pt reports having received aranesp in the past, which was later stopped as her Hb was in the 11-14 range, and then trialing EPO once her Hb began to drop again, but this was also stopped as it was felt to be ineffectual. Pt reports symptoms of palpitations, sob and fatigue associated with having lower hb counts. Otherwise, no aches/pains, no fevers/chills, no hematochezia/melena, no n/v or hematemesis. No significant bruising noted. She was undergoing evaluation by Dr. Lyons for PD catheter placement, which is now being held due to her anemia.     In ED: 98.7, 71, 154/63, 16, 99RA. Given KCL 20meq po x 1. (28 Aug 2018 09:54)      PAST MEDICAL & SURGICAL HISTORY:  Obesity (BMI 30.0-34.9)  BMI 33.0-33.9,adult  Anemia  Dyslipidemia  Sleep apnea  Pedal edema  ESRD (end stage renal disease) on dialysis  Trigeminal neuralgia  Vertigo  Bronchial asthma  Chronic kidney disease  Diabetes mellitus  Hypercholesterolemia  HTN (hypertension)  History of carpal tunnel surgery of left wrist:   S/P cataract extraction: bilateral,   S/P total knee replacement: bilateral, left 2006, right   S/P hysterectomy:   S/P :   S/P rotator cuff surgery: bilateral &#x27;s          MEDICATIONS:  aspirin enteric coated 81 milliGRAM(s) Oral daily  carvedilol 25 milliGRAM(s) Oral every 12 hours  furosemide    Tablet 80 milliGRAM(s) Oral daily  furosemide    Tablet 40 milliGRAM(s) Oral every 24 hours  hydrALAZINE 100 milliGRAM(s) Oral three times a day  NIFEdipine XL 90 milliGRAM(s) Oral daily      loratadine 10 milliGRAM(s) Oral daily        atorvastatin 80 milliGRAM(s) Oral at bedtime  dextrose 40% Gel 15 Gram(s) Oral once PRN  dextrose 50% Injectable 12.5 Gram(s) IV Push once  dextrose 50% Injectable 25 Gram(s) IV Push once  dextrose 50% Injectable 25 Gram(s) IV Push once  glucagon  Injectable 1 milliGRAM(s) IntraMuscular once PRN  insulin glargine Injectable (LANTUS) 15 Unit(s) SubCutaneous at bedtime  insulin lispro (HumaLOG) corrective regimen sliding scale   SubCutaneous Before meals and at bedtime    calcitriol   Capsule 0.25 MICROGram(s) Oral <User Schedule>  dextrose 5%. 1000 milliLiter(s) IV Continuous <Continuous>  fluticasone propionate 50 MICROgram(s)/spray Nasal Spray 1 Spray(s) Both Nostrils two times a day  multivitamin 1 Tablet(s) Oral daily      FAMILY HISTORY:  No pertinent family history in first degree relatives      Non-contributory    SOCIAL HISTORY:    not a smoker    Allergies    IV Contrast (Anaphylaxis)  shellfish (Hives; Rash)  shrimp (Hives)  smoke; coughing (Other)    Intolerances    	    REVIEW OF SYSTEMS:  CONSTITUTIONAL: No fever  EYES: No eye pain, visual disturbances, or discharge  ENMT:  No difficulty hearing, tinnitus  NECK: No pain or stiffness  RESPIRATORY: No cough, wheezing, +SOB  CARDIOVASCULAR: No chest pain, + palpitations, no passing out, dizziness, or leg swelling  GASTROINTESTINAL:  No nausea, vomiting, diarrhea or constipation. No melena.  GENITOURINARY: No dysuria, hematuria  NEUROLOGICAL: No stroke like symptoms  SKIN: No burning or lesions   LYMPH Nodes: No enlarged glands  ENDOCRINE: No heat or cold intolerance  MUSCULOSKELETAL: No joint pain or swelling  PSYCHIATRIC: No  anxiety, mood swings  HEME/LYMPH: No bleeding gums  ALLERY AND IMMUNOLOGIC: No hives or eczema	    All other ROS negative    PHYSICAL EXAM:  T(C): 37.2 (18 @ 21:11), Max: 37.9 (18 @ 05:18)  HR: 77 (18 @ 21:11) (70 - 85)  BP: 141/76 (18 @ 21:11) (119/67 - 144/78)  RR: 18 (18 @ 21:11) (18 - 18)  SpO2: 97% (18 @ 21:11) (97% - 98%)  Wt(kg): --  I&O's Summary    28 Aug 2018 07:01  -  29 Aug 2018 07:00  --------------------------------------------------------  IN: 1380 mL / OUT: 2900 mL / NET: -1520 mL    29 Aug 2018 07:  -  29 Aug 2018 22:33  --------------------------------------------------------  IN: 360 mL / OUT: 400 mL / NET: -40 mL         Physical Exam:  · Constitutional	detailed exam	  · Constitutional Details	well-nourished; no distress	  · Eyes	EOMI; PERRL; no drainage or redness	  · ENMT	No oral lesions; no gross abnormalities	  · Neck	No bruits; no thyromegaly or nodules	  · Back	No deformity or limitation of movement	  · Respiratory	detailed exam	  · Respiratory Comments	+trace bibasilar crackles	  · Cardiovascular	detailed exam	  · Cardiovascular Comments	+loud crescendo/decrescendo murmur in aortic region	  · Gastrointestinal	Soft, non-tender, no hepatosplenomegaly, normal bowel sounds	  · Extremities	detailed exam	  · Extremities Details	no cyanosis; no pedal edema	  · Neurological	detailed exam	  · Neurological Details	alert and oriented x 3	  · Cranial Nerve	grossly intact	  · Motor	nonfocal	  · Sensory	nonfocal	  · Musculoskeletal	No joint pain, swelling or deformity; no limitation of movement	  · Psychiatric	Affect and characteristics of appearance, verbalizations, behaviors are appropriate	                              8.8    8.20  )-----------( 204      ( 29 Aug 2018 09:08 )             29.3         138  |  97  |  14  ----------------------------<  147<H>  3.6   |  27  |  2.84<H>    Ca    9.4      29 Aug 2018 07:20  Phos  2.6       Mg     2.1         TPro  8.4<H>  /  Alb  4.0  /  TBili  0.3  /  DBili  x   /  AST  20  /  ALT  14  /  AlkPhos  101            EKG: nsr rbbb  Radiology:    < from: Xray Chest 2 Views PA/Lat (18 @ 01:43) >  FINDINGS:  The heart is enlarged. Right-sided IJ approach central line with the tip   in the SVC.    No focal area of consolidation. No pneumothorax or pleural effusions.   Multilevel degenerative changes of the spine.    IMPRESSION: No focal area of consolidation.      Assessment and Plan:   · Assessment		  73f hx HTN, DM, ESRD on HD awaiting PD cathter placement,  sent in for symptomatic anemia     Problem/Plan - 1:  ·  Problem: Anemia.  Plan: symptomatic anemia requiring prbc transfusion  improving h/h and improving s/s    Problem/Plan - 2:  ·  Problem: Palpitations.  Plan: improved symptoms  likely 2/2 symptomatic anemia.     Problem/Plan - 3:  ·  Problem: Elevated troponin.  Plan: likely demand ischemia 2/2 symptomatic anemia and underlying esrd.   Outpt TTE and stress test after correction of anemia    Problem/Plan - 4:  ·  Problem: ESRD (end stage renal disease) on dialysis.  Plan: c/w hd per nephro recs  social work for arranging outpt HD  renal diet.     Problem/Plan - 5:  ·  Problem: Diabetes mellitus.  Plan: ihss, lantus.     Problem/Plan - 6:  Problem: HTN (hypertension). Plan: Continue coreg 25mg po bid, hydralazine 100mg TID, nifedipine 90mg po daily with hold parameters.    Problem/Plan - 7:  ·  Problem: Hypercholesterolemia.  Plan: Continue crestor to lipitor via therapeutic interchange.     Problem/Plan - 8:  ·  Problem: Obesity (BMI 30.0-34.9).  Plan: Recommend diet and lifestyle counseling.     Problem/Plan - 9:  ·  Problem: Prophylactic measure.  Plan: protonix  scd's b/l.         Stefan Thomas DO [facility]  [phone]
Reason for consult: anemia    HPI:  73f hx ESRD on HD via tunneled cath awaiting PD catheter placement, DM, HTN presenting to ED after OP labs revealed low Hb 6's. Pt reports having received aranesp in the past, which was later stopped as her Hb was in the 11-14 range, and then trialing EPO once her Hb began to drop again, but this was also stopped as it was felt to be ineffective. She received PRBC transfusion with appropriate improvement in hgb, which has remained stable. Currently still reports SOB and GOEL, no bleeding.      PAST MEDICAL & SURGICAL HISTORY:  Obesity (BMI 30.0-34.9)  BMI 33.0-33.9,adult  Anemia  Dyslipidemia  Sleep apnea  Pedal edema  ESRD (end stage renal disease) on dialysis  Trigeminal neuralgia  Vertigo  Bronchial asthma  Chronic kidney disease  Diabetes mellitus  Hypercholesterolemia  HTN (hypertension)  History of carpal tunnel surgery of left wrist:   S/P cataract extraction: bilateral,   S/P total knee replacement: bilateral, left , right   S/P hysterectomy:   S/P :   S/P rotator cuff surgery: bilateral &#x27;s      FAMILY HISTORY:  No pertinent family history in first degree relatives      Alochol: Denied  Smoking: Nonsmoker  Drug Use: Denied  Marital Status:         Allergies    IV Contrast (Anaphylaxis)  shellfish (Hives; Rash)  shrimp (Hives)  smoke; coughing (Other)    Intolerances        MEDICATIONS  (STANDING):  aspirin enteric coated 81 milliGRAM(s) Oral daily  atorvastatin 80 milliGRAM(s) Oral at bedtime  calcitriol   Capsule 0.25 MICROGram(s) Oral <User Schedule>  carvedilol 25 milliGRAM(s) Oral every 12 hours  dextrose 5%. 1000 milliLiter(s) (50 mL/Hr) IV Continuous <Continuous>  dextrose 50% Injectable 12.5 Gram(s) IV Push once  dextrose 50% Injectable 25 Gram(s) IV Push once  dextrose 50% Injectable 25 Gram(s) IV Push once  fluticasone propionate 50 MICROgram(s)/spray Nasal Spray 1 Spray(s) Both Nostrils two times a day  furosemide    Tablet 80 milliGRAM(s) Oral daily  furosemide    Tablet 40 milliGRAM(s) Oral every 24 hours  hydrALAZINE 100 milliGRAM(s) Oral three times a day  insulin glargine Injectable (LANTUS) 15 Unit(s) SubCutaneous at bedtime  insulin lispro (HumaLOG) corrective regimen sliding scale   SubCutaneous Before meals and at bedtime  loratadine 10 milliGRAM(s) Oral daily  multivitamin 1 Tablet(s) Oral daily  NIFEdipine XL 90 milliGRAM(s) Oral daily    MEDICATIONS  (PRN):  dextrose 40% Gel 15 Gram(s) Oral once PRN Blood Glucose LESS THAN 70 milliGRAM(s)/deciliter  glucagon  Injectable 1 milliGRAM(s) IntraMuscular once PRN Glucose LESS THAN 70 milligrams/deciliter      ROS  No fever, sweats, chills  No epistaxis, HA, sore throat  No CP, cough, sputum  No n/v/d, abd pain, melena, hematochezia  No edema  No rash  No anxiety  No back pain, joint pain  No bleeding, bruising  No dysuria, hematuria    T(C): 36.8 (18 @ 11:50), Max: 37.9 (18 @ 05:18)  HR: 85 (18 @ 18:16) (70 - 85)  BP: 136/67 (18 @ 18:16) (119/67 - 156/80)  RR: 18 (18 @ 11:50) (18 - 18)  SpO2: 98% (18 @ 11:50) (97% - 98%)  Wt(kg): --    PE  NAD  Awake, alert  Anicteric, MMM  RRR  CTAB  Abd soft, NT, ND  No c/c/e  No rash grossly  FROM                          8.8    8.20  )-----------( 204      ( 29 Aug 2018 09:08 )             29.3           138  |  97  |  14  ----------------------------<  147<H>  3.6   |  27  |  2.84<H>    Ca    9.4      29 Aug 2018 07:20  Phos  2.6       Mg     2.1         TPro  8.4<H>  /  Alb  4.0  /  TBili  0.3  /  DBili  x   /  AST  20  /  ALT  14  /  AlkPhos  101

## 2018-08-29 NOTE — DISCHARGE NOTE ADULT - MEDICATION SUMMARY - MEDICATIONS TO CHANGE
I will SWITCH the dose or number of times a day I take the medications listed below when I get home from the hospital:    Lantus 100 units/mL subcutaneous solution  -- 20 unit(s) subcutaneous once a day (at bedtime)    Venofer 20 mg/mL intravenous solution  -- 100 milligram(s) intravenous , As Needed based on level

## 2018-08-29 NOTE — DISCHARGE NOTE ADULT - MEDICATION SUMMARY - MEDICATIONS TO TAKE
I will START or STAY ON the medications listed below when I get home from the hospital:    aspirin 81 mg oral tablet  -- 1 tab(s) by mouth once a day  -- Indication: For cardiac protection    insulin glargine  -- 15 unit(s) subcutaneous once a day (at bedtime)  -- Indication: For Diabetes mellitus    loratadine 10 mg oral tablet  -- 1 tab(s) by mouth once a day  -- Indication: For nasal congestion    atorvastatin 80 mg oral tablet  -- 1 tab(s) by mouth once a day (at bedtime)  -- Indication: For Hypercholesterolemia    Uloric 40 mg oral tablet  -- 1 tab(s) by mouth once a day  -- Indication: For URIc acid management    carvedilol 25 mg oral tablet  -- 1 tab(s) by mouth 2 times a day  -- Indication: For HTN (hypertension)    Proventil HFA 90 mcg/inh inhalation aerosol  -- 2 puff(s) inhaled 4 times a day, As Needed -for shortness of breath and/or wheezing   -- For inhalation only.  It is very important that you take or use this exactly as directed.  Do not skip doses or discontinue unless directed by your doctor.  Obtain medical advice before taking any non-prescription drugs as some may affect the action of this medication.  Shake well before use.    -- Indication: For bronchodilator w/ asthma    NIFEdipine 90 mg oral tablet, extended release  -- 1 tab(s) by mouth once a day  -- Indication: For HTN (hypertension)    Lasix 40 mg oral tablet  -- 2 tablets (80 mg) by mouth in the morning and 1 tablet (40 mg) in the evening  -- Indication: For Diuretic    epoetin shannon  -- 41117 unit(s) intravenous 3 times a week w/ hemodialysis  -- Indication: For ESRD (end stage renal disease) on dialysis & anemia    iron sucrose 20 mg/mL intravenous solution  -- 5 milliliter(s) = 100 mg intravenous 3 times a week with hemodialysis x 8 more doses  -- Indication: For Anemia    fluticasone 50 mcg/inh nasal spray  -- 1 spray(s) into nose 2 times a day  -- Indication: For nasal congestion    hydrALAZINE 100 mg oral tablet  -- 1 tab(s) by mouth 3 times a day  -- Indication: For HTN (hypertension)    multivitamin  -- 1 tab(s) by mouth once a day  -- Indication: For vitamin supplement    calcitriol 0.25 mcg oral capsule  -- 1 cap(s) by mouth 3 times a week on days of dialysis (Mon, Wed, Fri)  -- Indication: For ESRD (end stage renal disease) on dialysis & calcium managaement

## 2018-08-29 NOTE — DISCHARGE NOTE ADULT - CARE PLAN
Principal Discharge DX:	Symptomatic anemia  Goal:	well managed anemia  Assessment and plan of treatment:	Venofer 100 mg IV w/ hemodialysis with 8 more doses  Procrit 20,000 IV on dialysis days  monitor CBC  follow up with cardiology appointment as directed  Secondary Diagnosis:	Diabetes mellitus  Assessment and plan of treatment:	HgA1C this admission was 5.8%  Make sure you get your HgA1c checked every three months.  If you take insulin, check your blood glucose before meals and at bedtime.  It's important not to skip any meals.  Keep a log of your blood glucose results and always take it with you to your doctor appointments.  Keep a list of your current medications including injectables and over the counter medications and bring this medication list with you to all your doctor appointments.  If you have not seen your opthalmologist this year call for appointment.  Check your feet daily for redness, sores, or openings. Do not self treat. If no improvement in two days call your primary care physician for an appointment.  Low blood sugar (hypoglycemia) is a blood sugar below 70mg/dl. Check your blood sugar if you feel signs/symptoms of hypoglycemia. If your blood sugar is below 70 take 15 grams of carbohydrates (ex 4 oz of apple juice, 3-4 glucosr tablets, or 4-6 oz of regular soda) wait 15 minutes and repeat blood sugar to make sure it comes up above 70.  If your blood sugar is above 70 and you are due for a meal, have a meal.  If you are not due for a meal have a snack.  This snack helps keeps your blood sugar at a safe range.  Secondary Diagnosis:	ESRD (end stage renal disease) on dialysis  Assessment and plan of treatment:	Avoid taking (NSAIDs) - (ex: Ibuprofen, Advil, Celebrex, Naprosyn)  Avoid taking any nephrotoxic agents (can harm kidneys) - Intravenous contrast for diagnostic testing, combination cold medications.  Have all medications adjusted for your renal function by your Health Care Provider.  Blood pressure control is important.  Take all medication as prescribed.  HD on Mon/Wed/Friday - using R chest Perma catheter  Secondary Diagnosis:	URI (upper respiratory infection)  Assessment and plan of treatment:	use Claritin, Flonase & bronchodilator as directed

## 2018-08-29 NOTE — DISCHARGE NOTE ADULT - CARE PROVIDER_API CALL
Stefan Thomas (DO), Cardiovascular Disease; Internal Medicine; Nuclear Cardiology  [address] [address]  Phone: [phone]  Fax: [phone]    Jose De Jesus Howard), Internal Medicine; Nephrology  [address] [place] [address]  Phone: [phone]  Fax: [phone]

## 2018-08-29 NOTE — DISCHARGE NOTE ADULT - PLAN OF CARE
well managed anemia Venofer 100 mg IV w/ hemodialysis with 8 more doses  Procrit 20,000 IV on dialysis days  monitor CBC  follow up with cardiology appointment as directed HgA1C this admission was 5.8%  Make sure you get your HgA1c checked every three months.  If you take insulin, check your blood glucose before meals and at bedtime.  It's important not to skip any meals.  Keep a log of your blood glucose results and always take it with you to your doctor appointments.  Keep a list of your current medications including injectables and over the counter medications and bring this medication list with you to all your doctor appointments.  If you have not seen your opthalmologist this year call for appointment.  Check your feet daily for redness, sores, or openings. Do not self treat. If no improvement in two days call your primary care physician for an appointment.  Low blood sugar (hypoglycemia) is a blood sugar below 70mg/dl. Check your blood sugar if you feel signs/symptoms of hypoglycemia. If your blood sugar is below 70 take 15 grams of carbohydrates (ex 4 oz of apple juice, 3-4 glucosr tablets, or 4-6 oz of regular soda) wait 15 minutes and repeat blood sugar to make sure it comes up above 70.  If your blood sugar is above 70 and you are due for a meal, have a meal.  If you are not due for a meal have a snack.  This snack helps keeps your blood sugar at a safe range. Avoid taking (NSAIDs) - (ex: Ibuprofen, Advil, Celebrex, Naprosyn)  Avoid taking any nephrotoxic agents (can harm kidneys) - Intravenous contrast for diagnostic testing, combination cold medications.  Have all medications adjusted for your renal function by your Health Care Provider.  Blood pressure control is important.  Take all medication as prescribed.  HD on Mon/Wed/Friday - using R chest Perma catheter use Claritin, Flonase & bronchodilator as directed

## 2018-08-30 LAB
% ALBUMIN: 47.9 % — SIGNIFICANT CHANGE UP
% ALPHA 1: 6.5 % — SIGNIFICANT CHANGE UP
% ALPHA 2: 12.5 % — SIGNIFICANT CHANGE UP
% BETA: 12.8 % — SIGNIFICANT CHANGE UP
% GAMMA: 20.3 % — SIGNIFICANT CHANGE UP
ALBUMIN SERPL ELPH-MCNC: 3.4 G/DL — LOW (ref 3.6–5.5)
ALBUMIN/GLOB SERPL ELPH: 0.9 RATIO — SIGNIFICANT CHANGE UP
ALPHA1 GLOB SERPL ELPH-MCNC: 0.5 G/DL — HIGH (ref 0.1–0.4)
ALPHA2 GLOB SERPL ELPH-MCNC: 0.9 G/DL — SIGNIFICANT CHANGE UP (ref 0.5–1)
ANION GAP SERPL CALC-SCNC: 17 MMOL/L — SIGNIFICANT CHANGE UP (ref 5–17)
B-GLOBULIN SERPL ELPH-MCNC: 0.9 G/DL — SIGNIFICANT CHANGE UP (ref 0.5–1)
BUN SERPL-MCNC: 31 MG/DL — HIGH (ref 7–23)
CALCIUM SERPL-MCNC: 9.2 MG/DL — SIGNIFICANT CHANGE UP (ref 8.4–10.5)
CHLORIDE SERPL-SCNC: 94 MMOL/L — LOW (ref 96–108)
CO2 SERPL-SCNC: 23 MMOL/L — SIGNIFICANT CHANGE UP (ref 22–31)
CREAT SERPL-MCNC: 4.7 MG/DL — HIGH (ref 0.5–1.3)
GAMMA GLOBULIN: 1.4 G/DL — SIGNIFICANT CHANGE UP (ref 0.6–1.6)
GLUCOSE SERPL-MCNC: 185 MG/DL — HIGH (ref 70–99)
HCT VFR BLD CALC: 26.5 % — LOW (ref 34.5–45)
HGB BLD-MCNC: 8.1 G/DL — LOW (ref 11.5–15.5)
INTERPRETATION SERPL IFE-IMP: SIGNIFICANT CHANGE UP
MCHC RBC-ENTMCNC: 27.6 PG — SIGNIFICANT CHANGE UP (ref 27–34)
MCHC RBC-ENTMCNC: 30.6 GM/DL — LOW (ref 32–36)
MCV RBC AUTO: 90.4 FL — SIGNIFICANT CHANGE UP (ref 80–100)
NRBC # BLD: 4 /100 WBCS — HIGH (ref 0–0)
PLATELET # BLD AUTO: 187 K/UL — SIGNIFICANT CHANGE UP (ref 150–400)
POTASSIUM SERPL-MCNC: 3.4 MMOL/L — LOW (ref 3.5–5.3)
POTASSIUM SERPL-SCNC: 3.4 MMOL/L — LOW (ref 3.5–5.3)
PROT PATTERN SERPL ELPH-IMP: SIGNIFICANT CHANGE UP
PROT SERPL-MCNC: 7 G/DL — SIGNIFICANT CHANGE UP (ref 6–8.3)
PROT SERPL-MCNC: 7 G/DL — SIGNIFICANT CHANGE UP (ref 6–8.3)
RBC # BLD: 2.93 M/UL — LOW (ref 3.8–5.2)
RBC # FLD: 17 % — HIGH (ref 10.3–14.5)
SODIUM SERPL-SCNC: 134 MMOL/L — LOW (ref 135–145)
VIT B12 SERPL-MCNC: >2000 PG/ML — HIGH (ref 232–1245)
WBC # BLD: 9.78 K/UL — SIGNIFICANT CHANGE UP (ref 3.8–10.5)
WBC # FLD AUTO: 9.78 K/UL — SIGNIFICANT CHANGE UP (ref 3.8–10.5)

## 2018-08-30 PROCEDURE — 93306 TTE W/DOPPLER COMPLETE: CPT | Mod: 26

## 2018-08-30 RX ORDER — INSULIN GLARGINE 100 [IU]/ML
14 INJECTION, SOLUTION SUBCUTANEOUS
Qty: 0 | Refills: 0 | DISCHARGE
Start: 2018-08-30

## 2018-08-30 RX ORDER — LORATADINE 10 MG/1
1 TABLET ORAL
Qty: 30 | Refills: 0 | OUTPATIENT
Start: 2018-08-30 | End: 2018-09-28

## 2018-08-30 RX ORDER — IRON SUCROSE 20 MG/ML
5 INJECTION, SOLUTION INTRAVENOUS
Qty: 0 | Refills: 0 | COMMUNITY
Start: 2018-08-30

## 2018-08-30 RX ORDER — ERYTHROPOIETIN 10000 [IU]/ML
20000 INJECTION, SOLUTION INTRAVENOUS; SUBCUTANEOUS
Qty: 0 | Refills: 0 | Status: DISCONTINUED | OUTPATIENT
Start: 2018-08-30 | End: 2018-08-31

## 2018-08-30 RX ORDER — ALBUTEROL 90 UG/1
2 AEROSOL, METERED ORAL
Qty: 1 | Refills: 0 | OUTPATIENT
Start: 2018-08-30 | End: 2018-09-28

## 2018-08-30 RX ORDER — IRON SUCROSE 20 MG/ML
100 INJECTION, SOLUTION INTRAVENOUS
Qty: 0 | Refills: 0 | Status: DISCONTINUED | OUTPATIENT
Start: 2018-08-31 | End: 2018-08-31

## 2018-08-30 RX ORDER — FUROSEMIDE 40 MG
40 TABLET ORAL ONCE
Qty: 0 | Refills: 0 | Status: COMPLETED | OUTPATIENT
Start: 2018-08-30 | End: 2018-08-30

## 2018-08-30 RX ORDER — FLUTICASONE PROPIONATE 50 MCG
1 SPRAY, SUSPENSION NASAL
Qty: 1 | Refills: 0 | OUTPATIENT
Start: 2018-08-30 | End: 2018-09-28

## 2018-08-30 RX ORDER — INSULIN GLARGINE 100 [IU]/ML
20 INJECTION, SOLUTION SUBCUTANEOUS
Qty: 0 | Refills: 0 | COMMUNITY

## 2018-08-30 RX ORDER — IRON SUCROSE 20 MG/ML
100 INJECTION, SOLUTION INTRAVENOUS ONCE
Qty: 0 | Refills: 0 | Status: COMPLETED | OUTPATIENT
Start: 2018-08-30 | End: 2018-08-30

## 2018-08-30 RX ORDER — IPRATROPIUM/ALBUTEROL SULFATE 18-103MCG
3 AEROSOL WITH ADAPTER (GRAM) INHALATION EVERY 6 HOURS
Qty: 0 | Refills: 0 | Status: DISCONTINUED | OUTPATIENT
Start: 2018-08-30 | End: 2018-08-31

## 2018-08-30 RX ORDER — ATORVASTATIN CALCIUM 80 MG/1
1 TABLET, FILM COATED ORAL
Qty: 30 | Refills: 0 | OUTPATIENT
Start: 2018-08-30 | End: 2018-09-28

## 2018-08-30 RX ORDER — ERYTHROPOIETIN 10000 [IU]/ML
20000 INJECTION, SOLUTION INTRAVENOUS; SUBCUTANEOUS
Qty: 0 | Refills: 0 | DISCHARGE
Start: 2018-08-30

## 2018-08-30 RX ORDER — INSULIN GLARGINE 100 [IU]/ML
15 INJECTION, SOLUTION SUBCUTANEOUS
Qty: 0 | Refills: 0 | COMMUNITY
Start: 2018-08-30

## 2018-08-30 RX ORDER — ROSUVASTATIN CALCIUM 5 MG/1
1 TABLET ORAL
Qty: 0 | Refills: 0 | COMMUNITY

## 2018-08-30 RX ADMIN — CARVEDILOL PHOSPHATE 25 MILLIGRAM(S): 80 CAPSULE, EXTENDED RELEASE ORAL at 06:16

## 2018-08-30 RX ADMIN — LORATADINE 10 MILLIGRAM(S): 10 TABLET ORAL at 12:13

## 2018-08-30 RX ADMIN — Medication 3 MILLILITER(S): at 18:52

## 2018-08-30 RX ADMIN — Medication 3: at 21:51

## 2018-08-30 RX ADMIN — Medication 3 MILLILITER(S): at 04:31

## 2018-08-30 RX ADMIN — ATORVASTATIN CALCIUM 80 MILLIGRAM(S): 80 TABLET, FILM COATED ORAL at 21:50

## 2018-08-30 RX ADMIN — Medication 1 TABLET(S): at 12:13

## 2018-08-30 RX ADMIN — Medication 81 MILLIGRAM(S): at 12:13

## 2018-08-30 RX ADMIN — INSULIN GLARGINE 15 UNIT(S): 100 INJECTION, SOLUTION SUBCUTANEOUS at 21:50

## 2018-08-30 RX ADMIN — Medication 40 MILLIGRAM(S): at 08:31

## 2018-08-30 RX ADMIN — Medication 90 MILLIGRAM(S): at 06:16

## 2018-08-30 RX ADMIN — Medication 1: at 12:12

## 2018-08-30 RX ADMIN — Medication 1 SPRAY(S): at 18:52

## 2018-08-30 RX ADMIN — Medication 100 MILLIGRAM(S): at 06:16

## 2018-08-30 RX ADMIN — Medication 100 MILLIGRAM(S): at 21:50

## 2018-08-30 RX ADMIN — Medication 1 SPRAY(S): at 08:31

## 2018-08-30 NOTE — PHYSICAL THERAPY INITIAL EVALUATION ADULT - ADDITIONAL COMMENTS
As per chart review, Pt lives alone in a private house with 8 steps to negotiate to enter. Pt amb w/ a SC but has a RW at home. Pt lives alone in a private house with 8 steps to negotiate w/ 2 HR's to enter. Pt amb w/ a SC. Pt has a shower chair.

## 2018-08-30 NOTE — PHYSICAL THERAPY INITIAL EVALUATION ADULT - PERTINENT HX OF CURRENT PROBLEM, REHAB EVAL
73f hx ESRD on HD via tunneled cath awaiting PD catheter placement, DM, HTN presenting to ED after OP labs revealed low Hb 6's. Pt reports symptoms of palpitations, sob and fatigue associated with having lower hb counts. PD catheter placement is being held secondary to her anemia.

## 2018-08-30 NOTE — PHYSICAL THERAPY INITIAL EVALUATION ADULT - GAIT TRAINING, PT EVAL
GOAL: Patient will ambulate 100 feet independently with RW. pt will be able to negotiate 8 steps w/ 1 HR independently.

## 2018-08-31 ENCOUNTER — APPOINTMENT (OUTPATIENT)
Dept: VASCULAR SURGERY | Facility: HOSPITAL | Age: 73
End: 2018-08-31

## 2018-08-31 VITALS
DIASTOLIC BLOOD PRESSURE: 70 MMHG | HEART RATE: 84 BPM | TEMPERATURE: 98 F | RESPIRATION RATE: 18 BRPM | OXYGEN SATURATION: 97 % | SYSTOLIC BLOOD PRESSURE: 120 MMHG

## 2018-08-31 LAB
HCT VFR BLD CALC: 25.6 % — LOW (ref 34.5–45)
HGB BLD-MCNC: 8.3 G/DL — LOW (ref 11.5–15.5)
MAGNESIUM SERPL-MCNC: 2.1 MG/DL — SIGNIFICANT CHANGE UP (ref 1.6–2.6)
MCHC RBC-ENTMCNC: 28.6 PG — SIGNIFICANT CHANGE UP (ref 27–34)
MCHC RBC-ENTMCNC: 32.3 GM/DL — SIGNIFICANT CHANGE UP (ref 32–36)
MCV RBC AUTO: 88.6 FL — SIGNIFICANT CHANGE UP (ref 80–100)
PHOSPHATE SERPL-MCNC: 2.6 MG/DL — SIGNIFICANT CHANGE UP (ref 2.5–4.5)
PLATELET # BLD AUTO: 181 K/UL — SIGNIFICANT CHANGE UP (ref 150–400)
RAPID RVP RESULT: DETECTED
RBC # BLD: 2.89 M/UL — LOW (ref 3.8–5.2)
RBC # FLD: 16 % — HIGH (ref 10.3–14.5)
RSV RNA SPEC QL NAA+PROBE: DETECTED
WBC # BLD: 8.7 K/UL — SIGNIFICANT CHANGE UP (ref 3.8–10.5)
WBC # FLD AUTO: 8.7 K/UL — SIGNIFICANT CHANGE UP (ref 3.8–10.5)

## 2018-08-31 PROCEDURE — 82746 ASSAY OF FOLIC ACID SERUM: CPT

## 2018-08-31 PROCEDURE — 86923 COMPATIBILITY TEST ELECTRIC: CPT

## 2018-08-31 PROCEDURE — 85610 PROTHROMBIN TIME: CPT

## 2018-08-31 PROCEDURE — 93005 ELECTROCARDIOGRAM TRACING: CPT

## 2018-08-31 PROCEDURE — 84100 ASSAY OF PHOSPHORUS: CPT

## 2018-08-31 PROCEDURE — 85027 COMPLETE CBC AUTOMATED: CPT

## 2018-08-31 PROCEDURE — 87486 CHLMYD PNEUM DNA AMP PROBE: CPT

## 2018-08-31 PROCEDURE — 86900 BLOOD TYPING SEROLOGIC ABO: CPT

## 2018-08-31 PROCEDURE — 83010 ASSAY OF HAPTOGLOBIN QUANT: CPT

## 2018-08-31 PROCEDURE — 99261: CPT

## 2018-08-31 PROCEDURE — 97161 PT EVAL LOW COMPLEX 20 MIN: CPT

## 2018-08-31 PROCEDURE — 85652 RBC SED RATE AUTOMATED: CPT

## 2018-08-31 PROCEDURE — 36430 TRANSFUSION BLD/BLD COMPNT: CPT

## 2018-08-31 PROCEDURE — 83880 ASSAY OF NATRIURETIC PEPTIDE: CPT

## 2018-08-31 PROCEDURE — 87798 DETECT AGENT NOS DNA AMP: CPT

## 2018-08-31 PROCEDURE — 84155 ASSAY OF PROTEIN SERUM: CPT

## 2018-08-31 PROCEDURE — 82728 ASSAY OF FERRITIN: CPT

## 2018-08-31 PROCEDURE — P9016: CPT

## 2018-08-31 PROCEDURE — 36415 COLL VENOUS BLD VENIPUNCTURE: CPT

## 2018-08-31 PROCEDURE — 71046 X-RAY EXAM CHEST 2 VIEWS: CPT

## 2018-08-31 PROCEDURE — 84466 ASSAY OF TRANSFERRIN: CPT

## 2018-08-31 PROCEDURE — 94640 AIRWAY INHALATION TREATMENT: CPT

## 2018-08-31 PROCEDURE — 99285 EMERGENCY DEPT VISIT HI MDM: CPT | Mod: 25

## 2018-08-31 PROCEDURE — 86850 RBC ANTIBODY SCREEN: CPT

## 2018-08-31 PROCEDURE — 84484 ASSAY OF TROPONIN QUANT: CPT

## 2018-08-31 PROCEDURE — 82803 BLOOD GASES ANY COMBINATION: CPT

## 2018-08-31 PROCEDURE — 82607 VITAMIN B-12: CPT

## 2018-08-31 PROCEDURE — 93306 TTE W/DOPPLER COMPLETE: CPT

## 2018-08-31 PROCEDURE — 80048 BASIC METABOLIC PNL TOTAL CA: CPT

## 2018-08-31 PROCEDURE — 87633 RESP VIRUS 12-25 TARGETS: CPT

## 2018-08-31 PROCEDURE — 85730 THROMBOPLASTIN TIME PARTIAL: CPT

## 2018-08-31 PROCEDURE — 86901 BLOOD TYPING SEROLOGIC RH(D): CPT

## 2018-08-31 PROCEDURE — 83550 IRON BINDING TEST: CPT

## 2018-08-31 PROCEDURE — 86334 IMMUNOFIX E-PHORESIS SERUM: CPT

## 2018-08-31 PROCEDURE — 80053 COMPREHEN METABOLIC PANEL: CPT

## 2018-08-31 PROCEDURE — 82962 GLUCOSE BLOOD TEST: CPT

## 2018-08-31 PROCEDURE — 84165 PROTEIN E-PHORESIS SERUM: CPT

## 2018-08-31 PROCEDURE — 87040 BLOOD CULTURE FOR BACTERIA: CPT

## 2018-08-31 PROCEDURE — 87581 M.PNEUMON DNA AMP PROBE: CPT

## 2018-08-31 PROCEDURE — 83735 ASSAY OF MAGNESIUM: CPT

## 2018-08-31 PROCEDURE — 85045 AUTOMATED RETICULOCYTE COUNT: CPT

## 2018-08-31 RX ADMIN — ERYTHROPOIETIN 20000 UNIT(S): 10000 INJECTION, SOLUTION INTRAVENOUS; SUBCUTANEOUS at 13:59

## 2018-08-31 RX ADMIN — CALCITRIOL 0.25 MICROGRAM(S): 0.5 CAPSULE ORAL at 11:56

## 2018-08-31 RX ADMIN — Medication 1 SPRAY(S): at 18:15

## 2018-08-31 RX ADMIN — IRON SUCROSE 210 MILLIGRAM(S): 20 INJECTION, SOLUTION INTRAVENOUS at 14:00

## 2018-08-31 RX ADMIN — Medication 1 SPRAY(S): at 06:03

## 2018-08-31 RX ADMIN — Medication 100 MILLIGRAM(S): at 06:03

## 2018-08-31 RX ADMIN — IRON SUCROSE 210 MILLIGRAM(S): 20 INJECTION, SOLUTION INTRAVENOUS at 00:04

## 2018-08-31 RX ADMIN — CARVEDILOL PHOSPHATE 25 MILLIGRAM(S): 80 CAPSULE, EXTENDED RELEASE ORAL at 06:03

## 2018-08-31 RX ADMIN — Medication 81 MILLIGRAM(S): at 11:56

## 2018-08-31 RX ADMIN — LORATADINE 10 MILLIGRAM(S): 10 TABLET ORAL at 11:56

## 2018-08-31 RX ADMIN — Medication 1 TABLET(S): at 11:56

## 2018-08-31 NOTE — PROGRESS NOTE ADULT - PROBLEM SELECTOR PLAN 2
Much improved S/P transfusion  CBC QD  On high dose Epo
Much improved S/P transfusion  CBC QD  On high dose Epo
improved
improved symptoms  2/2 symptomatic anemia
Much improved S/P transfusion  CBC QD  On high dose Epo
improved symptoms  likely 2/2 symptomatic anemia

## 2018-08-31 NOTE — PROGRESS NOTE ADULT - PROBLEM SELECTOR PLAN 5
ihss, lanarelis
UF in HD, continue current regimen
UF in HD, continue current regimen
ihss, lanarelis
ihss, lanarelis
UF in HD, continue current regimen

## 2018-08-31 NOTE — PROGRESS NOTE ADULT - PROBLEM SELECTOR PROBLEM 5
Diabetes mellitus
HTN (hypertension)

## 2018-08-31 NOTE — PROGRESS NOTE ADULT - ASSESSMENT
73f hx ESRD on HD via tunneled cath awaiting PD catheter placement, DM, HTN presenting to ED after OP labs revealed low Hb 6's.  Receives HD at [facility]. On review of outpatient chart the patient had poor response to DEE, only last week was started on high dose Mircera. In chart review never observed patient's hemoglobin to be higher than 8, her impression must be a misunderstanding.    Problems:  ESRd on HD  Anemia of CKD  BMD of CKD  HTN  DM
73f hx ESRD on HD via tunneled cath awaiting PD catheter placement, DM, HTN presenting to ED after OP labs revealed low Hb 6's    1. anemia -- at least ACD and had not been responsive to aranesp. On high dose Mircera  -- ferritin adequate, 314  -- folate adequate, hapto neg  -- unremarkable B12, SPEP, ABRAM  -- DEE per renal  -- monitor CBC, transfuse to keep hgb >7 in the meantime while waiting for Mircera response    2. ESRD -- per renal    Plan d/w pt. Will follow, thank you for the opportunity to participate in Ms Mallory's care. Pls call with questions, [phone]
73f hx ESRD on HD via tunneled cath awaiting PD catheter placement, DM, HTN presenting to ED after OP labs revealed low Hb 6's.  Receives HD at [facility]. On review of outpatient chart the patient had poor response to DEE, only last week was started on high dose Mircera. In chart review never observed patient's hemoglobin to be higher than 8, her impression must be a misunderstanding.    Problems:  ESRd on HD  Anemia of CKD  BMD of CKD  HTN  DM
73f hx ESRD on HD via tunneled cath awaiting PD catheter placement, DM, HTN presenting to ED after OP labs revealed low Hb 6's.  Receives HD at [facility]. On review of outpatient chart the patient had poor response to DEE, only last week was started on high dose Mircera. In chart review never observed patient's hemoglobin to be higher than 8, her impression must be a misunderstanding.    Problems:  ESRd on HD  Anemia of CKD  BMD of CKD  HTN  DM
73f hx HTN, DM, ESRD on HD awaitign PD cathter placement,  sent in for symptomatic anemia

## 2018-08-31 NOTE — PROGRESS NOTE ADULT - PROBLEM SELECTOR PROBLEM 6
URI (upper respiratory infection)
HTN (hypertension)
URI (upper respiratory infection)
URI (upper respiratory infection)

## 2018-08-31 NOTE — PROGRESS NOTE ADULT - PROBLEM SELECTOR PLAN 6
Per primary team
Continue coreg 25mg po bid, hydralazine 100mg TID, nifedipine 90mg po daily with hold parameters
Per primary team
Per primary team

## 2018-08-31 NOTE — PROGRESS NOTE ADULT - PROBLEM SELECTOR PROBLEM 1
ESRD (end stage renal disease) on dialysis
Anemia
Anemia
ESRD (end stage renal disease) on dialysis
ESRD (end stage renal disease) on dialysis
Anemia

## 2018-08-31 NOTE — PROGRESS NOTE ADULT - PROBLEM SELECTOR PLAN 4
c/w hd per nephro recs  social work for arranging outpt HD  renal diet
Per primary team
Per primary team
c/w hd per nephro recs  social work for arranging outpt HD  renal diet
c/w hd per nephro recs  social work for arranging outpt HD  renal diet
Per primary team

## 2018-08-31 NOTE — PROGRESS NOTE ADULT - PROVIDER SPECIALTY LIST ADULT
Cardiology
Cardiology
Heme/Onc
Internal Medicine
Internal Medicine
Nephrology
Internal Medicine

## 2018-08-31 NOTE — PROGRESS NOTE ADULT - PROBLEM SELECTOR PROBLEM 3
Elevated troponin
Renal osteodystrophy

## 2018-08-31 NOTE — PROGRESS NOTE ADULT - PROBLEM SELECTOR PROBLEM 4
ESRD (end stage renal disease) on dialysis
Diabetes mellitus
Diabetes mellitus
ESRD (end stage renal disease) on dialysis
ESRD (end stage renal disease) on dialysis
Diabetes mellitus

## 2018-08-31 NOTE — PROGRESS NOTE ADULT - PROBLEM SELECTOR PLAN 1
Regular schedule MWF  HD today and tomorrow, SOB may be due to fluid overload  Fluid restriction of 1.2L QD for now  Per cardio patient will have Echocardiogram  Consider repeat CXR of chest though clear on exam  Renal diet  Avoid nephrotoxins  dose meds for ESRd
Regular schedule MWF  HD yesterday, will do HD Tomorrow, and Friday to regularize her HD schedule  Renal diet  Avoid nephrotoxins  dose meds for ESRd
improved h/h and s/s,   pt and patient's daughter agree to discharge today.   24 hour notice   pt is medically cleared for safe discharge home today
improved h/h and s/s, f/u cbc today, d/c home after hd if hgb > 7, outpt f/u,
Regular schedule MWF  HD today , SOB much improved and patient agreeable for DC  Fluid restriction of 1.2L QD for now  Renal diet  Avoid nephrotoxins  dose meds for ESRd
symptomatic anemia requiring prbc transfusion  improving h/h and improving s/s  f/u anemia panel  d/c planning tmrw home

## 2018-08-31 NOTE — PROGRESS NOTE ADULT - PROBLEM SELECTOR PLAN 8
Recommend diet and lifestyle counseling

## 2018-08-31 NOTE — PROGRESS NOTE ADULT - SUBJECTIVE AND OBJECTIVE BOX
[facility] - Janice / Nathan LOUIE /Levy/ JACY Taylor/ JACY Becker/ Stephon Colbert / MALCOLM Njrhiannau  ---------------------------------------------------------------------------------------------------------------  seen and examined today for ESRD on HD  Interval : Patient's SOB much improved today  VITALS:  T(F): 98.7 (08-31-18 @ 11:37), Max: 98.7 (08-31-18 @ 11:37)  HR: 68 (08-31-18 @ 11:37)  BP: 107/58 (08-31-18 @ 11:37)  RR: 18 (08-31-18 @ 11:37)  SpO2: 99% (08-31-18 @ 11:37)  Wt(kg): --    08-30 @ 07:01  -  08-31 @ 07:00  --------------------------------------------------------  IN: 0 mL / OUT: 1500 mL / NET: -1500 mL      Physical Exam :-  Constitutional: NAD  Neck: Supple.  Respiratory: Bilateral equal breath sounds,   Cardiovascular: S1, S2 normal,   Gastrointestinal: Bowel Sounds present, soft, non tender.  Extremities: no Edema Feet  Neurological: Alert and Oriented x 3, no focal deficits  Psychiatric: Normal mood, normal affect  Data:-  Allergies :   IV Contrast (Anaphylaxis)  shellfish (Hives; Rash)  shrimp (Hives)  smoke; coughing (Other)    Hospital Medications:   MEDICATIONS  (STANDING):  aspirin enteric coated 81 milliGRAM(s) Oral daily  atorvastatin 80 milliGRAM(s) Oral at bedtime  calcitriol   Capsule 0.25 MICROGram(s) Oral <User Schedule>  carvedilol 25 milliGRAM(s) Oral every 12 hours  dextrose 5%. 1000 milliLiter(s) (50 mL/Hr) IV Continuous <Continuous>  dextrose 50% Injectable 12.5 Gram(s) IV Push once  dextrose 50% Injectable 25 Gram(s) IV Push once  dextrose 50% Injectable 25 Gram(s) IV Push once  epoetin shannon Injectable 59123 Unit(s) IV Push <User Schedule>  fluticasone propionate 50 MICROgram(s)/spray Nasal Spray 1 Spray(s) Both Nostrils two times a day  furosemide    Tablet 80 milliGRAM(s) Oral daily  furosemide    Tablet 40 milliGRAM(s) Oral every 24 hours  hydrALAZINE 100 milliGRAM(s) Oral three times a day  insulin glargine Injectable (LANTUS) 15 Unit(s) SubCutaneous at bedtime  insulin lispro (HumaLOG) corrective regimen sliding scale   SubCutaneous Before meals and at bedtime  iron sucrose IVPB 100 milliGRAM(s) IV Intermittent <User Schedule>  loratadine 10 milliGRAM(s) Oral daily  multivitamin 1 Tablet(s) Oral daily  NIFEdipine XL 90 milliGRAM(s) Oral daily    08-31    134<L>  |  93<L>  |  27<H>  ----------------------------<  82  3.6   |  26  |  3.96<H>    Ca    9.3      31 Aug 2018 06:16  Phos  2.6     08-31  Mg     2.1     08-31    TPro  7.0  /  Alb  3.4<L>  /  TBili      /  DBili      /  AST      /  ALT      /  AlkPhos      08-30    Creatinine Trend: 3.96 <--, 4.70 <--, 2.84 <--, 3.09 <--, 2.57 <--                        8.3    8.7   )-----------( 181      ( 31 Aug 2018 06:16 )             25.6
[facility] [place] [facility] - Janice / Nathan LOUIE /Levy/ JACY Taylor/ JACY Becker/ Stephon Colbert / MALCOLM Koou  ---------------------------------------------------------------------------------------------------------------  seen and examined today for ESRD on HD  Interval : Feels nasal congestion, sore throat and malaise  VITALS:  T(F): 98.2 (08-29-18 @ 11:50), Max: 100.3 (08-29-18 @ 05:18)  HR: 76 (08-29-18 @ 11:50)  BP: 119/67 (08-29-18 @ 11:50)  RR: 18 (08-29-18 @ 11:50)  SpO2: 98% (08-29-18 @ 11:50)  Wt(kg): --    08-28 @ 07:01  -  08-29 @ 07:00  --------------------------------------------------------  IN: 1380 mL / OUT: 2900 mL / NET: -1520 mL      Physical Exam :-  Constitutional: NAD  Neck: Supple.  Respiratory: Bilateral equal breath sounds, no Crackles present.  Cardiovascular: S1, S2 normal,  Gastrointestinal: Bowel Sounds present, soft, non tender.  Extremities: no Edema Feet  Neurological: Alert and Oriented x 3, no focal deficits  Psychiatric: Normal mood, normal affect  Data:-  Allergies :   IV Contrast (Anaphylaxis)  shellfish (Hives; Rash)  shrimp (Hives)  smoke; coughing (Other)    Hospital Medications:   MEDICATIONS  (STANDING):  ALBUTerol/ipratropium for Nebulization. 3 milliLiter(s) Nebulizer once  aspirin enteric coated 81 milliGRAM(s) Oral daily  atorvastatin 80 milliGRAM(s) Oral at bedtime  calcitriol   Capsule 0.25 MICROGram(s) Oral <User Schedule>  carvedilol 25 milliGRAM(s) Oral every 12 hours  dextrose 5%. 1000 milliLiter(s) (50 mL/Hr) IV Continuous <Continuous>  dextrose 50% Injectable 12.5 Gram(s) IV Push once  dextrose 50% Injectable 25 Gram(s) IV Push once  dextrose 50% Injectable 25 Gram(s) IV Push once  furosemide    Tablet 80 milliGRAM(s) Oral daily  furosemide    Tablet 40 milliGRAM(s) Oral every 24 hours  hydrALAZINE 100 milliGRAM(s) Oral three times a day  insulin glargine Injectable (LANTUS) 15 Unit(s) SubCutaneous at bedtime  insulin lispro (HumaLOG) corrective regimen sliding scale   SubCutaneous Before meals and at bedtime  multivitamin 1 Tablet(s) Oral daily  NIFEdipine XL 90 milliGRAM(s) Oral daily    08-29    138  |  97  |  14  ----------------------------<  147<H>  3.6   |  27  |  2.84<H>    Ca    9.4      29 Aug 2018 07:20  Phos  2.6     08-29  Mg     2.1     08-29    TPro  8.4<H>  /  Alb  4.0  /  TBili  0.3  /  DBili      /  AST  20  /  ALT  14  /  AlkPhos  101  08-29    Creatinine Trend: 2.84 <--, 3.09 <--, 2.57 <--                        9.4    8.2   )-----------( 161      ( 28 Aug 2018 16:31 )             28.7
[facility] [place] [facility] - Janice / Nathan LOUIE /Levy/ JACY Taylor/ JACY Becker/ Stephon Colbert / MALCOLM Njrhiannau  ---------------------------------------------------------------------------------------------------------------  seen and examined today for ESRd on HD  Interval : today patient reports worsening SOB  VITALS:  T(F): 98 (08-30-18 @ 13:35), Max: 98.9 (08-29-18 @ 21:11)  HR: 95 (08-30-18 @ 13:35)  BP: 137/73 (08-30-18 @ 13:35)  RR: 18 (08-30-18 @ 13:35)  SpO2: 96% (08-30-18 @ 13:35)  Wt(kg): --    08-29 @ 07:01  -  08-30 @ 07:00  --------------------------------------------------------  IN: 600 mL / OUT: 450 mL / NET: 150 mL      Physical Exam :-  Constitutional: NAD  Neck: Supple.  Respiratory: Bilateral equal breath sounds, no Crackles present.  Cardiovascular: S1, S2 normal  Gastrointestinal: Bowel Sounds present, soft, non tender.  Extremities: no Edema Feet  Neurological: Alert and Oriented x 3, no focal deficits  Psychiatric: Normal mood, normal affect  Data:-  Allergies :   IV Contrast (Anaphylaxis)  shellfish (Hives; Rash)  shrimp (Hives)  smoke; coughing (Other)    Hospital Medications:   MEDICATIONS  (STANDING):  aspirin enteric coated 81 milliGRAM(s) Oral daily  atorvastatin 80 milliGRAM(s) Oral at bedtime  calcitriol   Capsule 0.25 MICROGram(s) Oral <User Schedule>  carvedilol 25 milliGRAM(s) Oral every 12 hours  dextrose 5%. 1000 milliLiter(s) (50 mL/Hr) IV Continuous <Continuous>  dextrose 50% Injectable 12.5 Gram(s) IV Push once  dextrose 50% Injectable 25 Gram(s) IV Push once  dextrose 50% Injectable 25 Gram(s) IV Push once  fluticasone propionate 50 MICROgram(s)/spray Nasal Spray 1 Spray(s) Both Nostrils two times a day  furosemide    Tablet 80 milliGRAM(s) Oral daily  furosemide    Tablet 40 milliGRAM(s) Oral every 24 hours  hydrALAZINE 100 milliGRAM(s) Oral three times a day  insulin glargine Injectable (LANTUS) 15 Unit(s) SubCutaneous at bedtime  insulin lispro (HumaLOG) corrective regimen sliding scale   SubCutaneous Before meals and at bedtime  loratadine 10 milliGRAM(s) Oral daily  multivitamin 1 Tablet(s) Oral daily  NIFEdipine XL 90 milliGRAM(s) Oral daily    08-30    134<L>  |  94<L>  |  31<H>  ----------------------------<  185<H>  3.4<L>   |  23  |  4.70<H>    Ca    9.2      30 Aug 2018 05:56  Phos  2.6     08-29  Mg     2.1     08-29    TPro  7.0  /  Alb      /  TBili      /  DBili      /  AST      /  ALT      /  AlkPhos      08-30    Creatinine Trend: 4.70 <--, 2.84 <--, 3.09 <--, 2.57 <--                        8.1    9.78  )-----------( 187      ( 30 Aug 2018 07:58 )             26.5
Patient is a 73y old  Female who presents with a chief complaint of Low hb - due to end stage renal disease requiring Procrit and Venofer w/ hemodialysis - received blood transfusion and stable (29 Aug 2018 19:18)    	  MEDICATIONS:  carvedilol 25 milliGRAM(s) Oral every 12 hours  furosemide    Tablet 80 milliGRAM(s) Oral daily  furosemide    Tablet 40 milliGRAM(s) Oral every 24 hours  hydrALAZINE 100 milliGRAM(s) Oral three times a day  NIFEdipine XL 90 milliGRAM(s) Oral daily        PHYSICAL EXAM:  T(C): 36.7 (08-31-18 @ 17:10), Max: 37.1 (08-31-18 @ 11:37)  HR: 65 (08-31-18 @ 17:10) (65 - 83)  BP: 136/77 (08-31-18 @ 17:10) (93/53 - 136/77)  RR: 18 (08-31-18 @ 17:10) (17 - 18)  SpO2: 98% (08-31-18 @ 17:10) (96% - 99%)  Wt(kg): --  I&O's Summary    30 Aug 2018 07:01  -  31 Aug 2018 07:00  --------------------------------------------------------  IN: 0 mL / OUT: 1500 mL / NET: -1500 mL    31 Aug 2018 07:01  -  31 Aug 2018 17:53  --------------------------------------------------------  IN: 480 mL / OUT: 1500 mL / NET: -1020 mL          Appearance: Normal	  HEENT:    PERRL, EOMI	  Cardiovascular:  S1 S2, No JVD  Respiratory: Lungs clear to auscultation	  Psychiatry: Alert  Gastrointestinal:  Soft, Non-tender, + BS	  Skin: No rashes, No cyanosis  Extremities:  No edema of LE                                8.3    8.7   )-----------( 181      ( 31 Aug 2018 06:16 )             25.6     08-31    134<L>  |  93<L>  |  27<H>  ----------------------------<  82  3.6   |  26  |  3.96<H>    Ca    9.3      31 Aug 2018 06:16  Phos  2.6     08-31  Mg     2.1     08-31    TPro  7.0  /  Alb  3.4<L>  /  TBili  x   /  DBili  x   /  AST  x   /  ALT  x   /  AlkPhos  x   08-30        Labs personally reviewed      Assessment and Plan:   · Assessment		  73f hx HTN, DM, ESRD on HD awaiting PD cathter placement,  sent in for symptomatic anemia     Problem/Plan - 1:  ·  Problem: Anemia.  Plan: symptomatic anemia requiring prbc transfusion  improving h/h and improving s/s    Problem/Plan - 2:  ·  Problem: Palpitations.  Plan: improved symptoms  likely 2/2 symptomatic anemia.     Problem/Plan - 3:  ·  Problem: Elevated troponin.  Plan: likely demand ischemia 2/2 symptomatic anemia and underlying esrd.   TTE unremarkable, explained that stress test in setting of anemia would be inaccurate and she should followup after correction of anemia in 1-2 months for outpt stress test    Problem/Plan - 4:  ·  Problem: ESRD (end stage renal disease) on dialysis.  Plan: c/w hd per nephro recs  renal diet.     Problem/Plan - 5:  ·  Problem: Diabetes mellitus.  Plan: ihss, lantus.     Problem/Plan - 6:  Problem: HTN (hypertension). Plan: Continue coreg 25mg po bid, hydralazine 100mg TID, nifedipine 90mg po daily with hold parameters.    Problem/Plan - 7:  ·  Problem: Hypercholesterolemia.  Plan: Continue crestor to lipitor via therapeutic interchange.     Problem/Plan - 8:  ·  Problem: Obesity (BMI 30.0-34.9).  Plan: Recommend diet and lifestyle counseling.     Problem/Plan - 9:  ·  Problem: Prophylactic measure.  Plan: protonix          Stefan Thomas DO [facility]  Cardiovascular Medicine  [phone]
Patient is a 73y old  Female who presents with a chief complaint of Low hb - due to end stage renal disease requiring Procrit and Venofer w/ hemodialysis - received blood transfusion and stable (29 Aug 2018 19:18)      INTERVAL HISTORY: no events  	  MEDICATIONS:  carvedilol 25 milliGRAM(s) Oral every 12 hours  furosemide    Tablet 80 milliGRAM(s) Oral daily  furosemide    Tablet 40 milliGRAM(s) Oral every 24 hours  hydrALAZINE 100 milliGRAM(s) Oral three times a day  NIFEdipine XL 90 milliGRAM(s) Oral daily        PHYSICAL EXAM:  T(C): 36.6 (08-30-18 @ 20:41), Max: 36.9 (08-30-18 @ 17:30)  HR: 78 (08-30-18 @ 20:41) (66 - 95)  BP: 128/70 (08-30-18 @ 20:41) (93/53 - 144/73)  RR: 18 (08-30-18 @ 20:41) (17 - 19)  SpO2: 96% (08-30-18 @ 20:41) (95% - 100%)  Wt(kg): --  I&O's Summary    29 Aug 2018 07:01  -  30 Aug 2018 07:00  --------------------------------------------------------  IN: 600 mL / OUT: 450 mL / NET: 150 mL    30 Aug 2018 07:01  -  30 Aug 2018 22:27  --------------------------------------------------------  IN: 0 mL / OUT: 1500 mL / NET: -1500 mL          Appearance: Normal	  HEENT:    PERRL, EOMI	  Cardiovascular:  S1 S2, No JVD  Respiratory: Lungs clear to auscultation	  Psychiatry: Alert  Gastrointestinal:  Soft, Non-tender, + BS	  Skin: No rashes, No cyanosis  Extremities:  No edema of LE                                8.1    9.78  )-----------( 187      ( 30 Aug 2018 07:58 )             26.5     08-30    134<L>  |  94<L>  |  31<H>  ----------------------------<  185<H>  3.4<L>   |  23  |  4.70<H>    Ca    9.2      30 Aug 2018 05:56  Phos  2.6     08-29  Mg     2.1     08-29    TPro  7.0  /  Alb  3.4<L>  /  TBili  x   /  DBili  x   /  AST  x   /  ALT  x   /  AlkPhos  x   08-30        Labs personally reviewed      Assessment and Plan:   · Assessment		  73f hx HTN, DM, ESRD on HD awaiting PD cathter placement,  sent in for symptomatic anemia     Problem/Plan - 1:  ·  Problem: Anemia.  Plan: symptomatic anemia requiring prbc transfusion  improving h/h and improving s/s    Problem/Plan - 2:  ·  Problem: Palpitations.  Plan: improved symptoms  likely 2/2 symptomatic anemia.     Problem/Plan - 3:  ·  Problem: Elevated troponin.  Plan: likely demand ischemia 2/2 symptomatic anemia and underlying esrd.   Pt requests inpt TTE as shell have hard time arranging as outpt, explained that stress test in setting of anemia would be inaccurate and she should followup after correction of anemia in 1-2 months for outpt stress test    Problem/Plan - 4:  ·  Problem: ESRD (end stage renal disease) on dialysis.  Plan: c/w hd per nephro recs  renal diet.     Problem/Plan - 5:  ·  Problem: Diabetes mellitus.  Plan: ihss, lantus.     Problem/Plan - 6:  Problem: HTN (hypertension). Plan: Continue coreg 25mg po bid, hydralazine 100mg TID, nifedipine 90mg po daily with hold parameters.    Problem/Plan - 7:  ·  Problem: Hypercholesterolemia.  Plan: Continue crestor to lipitor via therapeutic interchange.     Problem/Plan - 8:  ·  Problem: Obesity (BMI 30.0-34.9).  Plan: Recommend diet and lifestyle counseling.     Problem/Plan - 9:  ·  Problem: Prophylactic measure.  Plan: protonix  scd's b/l.         Stefan Thomas DO [facility]  Cardiovascular Medicine  [phone]
Patient seen and examined at bedside  No acute events noted overnight  Case discussed with medical team    HPI:  73f hx ESRD on HD via tunneled cath awaiting PD catheter placement, DM, HTN presenting to ED after OP labs revealed low Hb 6's. Pt reports having received aranesp in the past, which was later stopped as her Hb was in the 11-14 range, and then trialing EPO once her Hb began to drop again, but this was also stopped as it was felt to be ineffectual. Pt reports symptoms of palpitations, sob and fatigue associated with having lower hb counts. Otherwise, no aches/pains, no fevers/chills, no hematochezia/melena, no n/v or hematemesis. No significant bruising noted. She was undergoing evaluation by Dr. Lyons for PD catheter placement, which is now being held due to her anemia.     In ED: 98.7, 71, 154/63, 16, 99RA. Given KCL 20meq po x 1. (28 Aug 2018 09:54)      PAST MEDICAL & SURGICAL HISTORY:  Obesity (BMI 30.0-34.9)  BMI 33.0-33.9,adult  Anemia  Dyslipidemia  Sleep apnea  Pedal edema  ESRD (end stage renal disease) on dialysis  Trigeminal neuralgia  Vertigo  Bronchial asthma  Chronic kidney disease  Diabetes mellitus  Hypercholesterolemia  HTN (hypertension)  History of carpal tunnel surgery of left wrist:   S/P cataract extraction: bilateral, 2005  S/P total knee replacement: bilateral, left 2006, right   S/P hysterectomy:   S/P :   S/P rotator cuff surgery: bilateral &#x27;s      IV Contrast (Anaphylaxis)  shellfish (Hives; Rash)  shrimp (Hives)  smoke; coughing (Other)       MEDICATIONS  (STANDING):  aspirin enteric coated 81 milliGRAM(s) Oral daily  atorvastatin 80 milliGRAM(s) Oral at bedtime  calcitriol   Capsule 0.25 MICROGram(s) Oral <User Schedule>  carvedilol 25 milliGRAM(s) Oral every 12 hours  dextrose 5%. 1000 milliLiter(s) (50 mL/Hr) IV Continuous <Continuous>  dextrose 50% Injectable 12.5 Gram(s) IV Push once  dextrose 50% Injectable 25 Gram(s) IV Push once  dextrose 50% Injectable 25 Gram(s) IV Push once  epoetin shannon Injectable 62981 Unit(s) IV Push <User Schedule>  fluticasone propionate 50 MICROgram(s)/spray Nasal Spray 1 Spray(s) Both Nostrils two times a day  furosemide    Tablet 80 milliGRAM(s) Oral daily  furosemide    Tablet 40 milliGRAM(s) Oral every 24 hours  hydrALAZINE 100 milliGRAM(s) Oral three times a day  insulin glargine Injectable (LANTUS) 15 Unit(s) SubCutaneous at bedtime  insulin lispro (HumaLOG) corrective regimen sliding scale   SubCutaneous Before meals and at bedtime  iron sucrose IVPB 100 milliGRAM(s) IV Intermittent <User Schedule>  loratadine 10 milliGRAM(s) Oral daily  multivitamin 1 Tablet(s) Oral daily  NIFEdipine XL 90 milliGRAM(s) Oral daily    MEDICATIONS  (PRN):  ALBUTerol/ipratropium for Nebulization 3 milliLiter(s) Nebulizer every 6 hours PRN Shortness of Breath and/or Wheezing  dextrose 40% Gel 15 Gram(s) Oral once PRN Blood Glucose LESS THAN 70 milliGRAM(s)/deciliter  glucagon  Injectable 1 milliGRAM(s) IntraMuscular once PRN Glucose LESS THAN 70 milligrams/deciliter      REVIEW OF SYSTEMS:  CONSTITUTIONAL: (+) malaise.   EYES: No acute change in vision   ENT:  No tinnitus  NECK: No stiffness  RESPIRATORY: No hemoptysis  CARDIOVASCULAR: No chest pain, palpitations, syncope  GASTROINTESTINAL: No hematemesis, diarrhea, melena, or hematochezia.  GENITOURINARY: No hematuria  NEUROLOGICAL: No headaches  LYMPH Nodes: No enlarged glands  ENDOCRINE: No heat or cold intolerance	    T(C): 36.7 (18 @ 04:39), Max: 36.9 (18 @ 17:30)  HR: 66 (18 @ 10:18) (66 - 95)  BP: 105/62 (18 @ 10:18) (93/53 - 144/73)  RR: 18 (18 @ 04:39) (17 - 19)  SpO2: 96% (18 @ 10:18) (95% - 100%)    PHYSICAL EXAMINATION:   Constitutional: WD, NAD  HEENT: NC, AT  Neck:  Supple  Respiratory:  Adequate airflow b/l. Not using accessory muscles of respiration.  Cardiovascular:  S1 & S2 intact, no R/G, 2+ radial pulses b/l  Gastrointestinal: Soft, NT, ND, normoactive b.s., no organomegaly/RT/rigidity  Extremities: WWP  Neurological:  Alert and awake.  No acute focal motor deficits. Crude sensation intact.     Labs and imaging reviewed    LABS:                        8.3    8.7   )-----------( 181      ( 31 Aug 2018 06:16 )             25.6     08-31    134<L>  |  93<L>  |  27<H>  ----------------------------<  82  3.6   |  26  |  3.96<H>    Ca    9.3      31 Aug 2018 06:16  Phos  2.6       Mg     2.1         TPro  7.0  /  Alb  3.4<L>  /  TBili  x   /  DBili  x   /  AST  x   /  ALT  x   /  AlkPhos  x               CAPILLARY BLOOD GLUCOSE      POCT Blood Glucose.: 92 mg/dL (31 Aug 2018 07:51)  POCT Blood Glucose.: 272 mg/dL (30 Aug 2018 20:58)  POCT Blood Glucose.: 125 mg/dL (30 Aug 2018 18:33)  POCT Blood Glucose.: 176 mg/dL (30 Aug 2018 11:58)        LIVER FUNCTIONS - ( 30 Aug 2018 08:00 )  Alb: 3.4 g/dL / Pro: 7.0 g/dL / ALK PHOS: x     / ALT: x     / AST: x     / GGT: x               RADIOLOGY & ADDITIONAL STUDIES:
Patient seen and examined at bedside  No acute events noted overnight  Case discussed with medical team    HPI:  73f hx ESRD on HD via tunneled cath awaiting PD catheter placement, DM, HTN presenting to ED after OP labs revealed low Hb 6's. Pt reports having received aranesp in the past, which was later stopped as her Hb was in the 11-14 range, and then trialing EPO once her Hb began to drop again, but this was also stopped as it was felt to be ineffectual. Pt reports symptoms of palpitations, sob and fatigue associated with having lower hb counts. Otherwise, no aches/pains, no fevers/chills, no hematochezia/melena, no n/v or hematemesis. No significant bruising noted. She was undergoing evaluation by Dr. Lyons for PD catheter placement, which is now being held due to her anemia.     In ED: 98.7, 71, 154/63, 16, 99RA. Given KCL 20meq po x 1. (28 Aug 2018 09:54)      PAST MEDICAL & SURGICAL HISTORY:  Obesity (BMI 30.0-34.9)  BMI 33.0-33.9,adult  Anemia  Dyslipidemia  Sleep apnea  Pedal edema  ESRD (end stage renal disease) on dialysis  Trigeminal neuralgia  Vertigo  Bronchial asthma  Chronic kidney disease  Diabetes mellitus  Hypercholesterolemia  HTN (hypertension)  History of carpal tunnel surgery of left wrist:   S/P cataract extraction: bilateral, 2005  S/P total knee replacement: bilateral, left 2006, right   S/P hysterectomy:   S/P :   S/P rotator cuff surgery: bilateral &#x27;s      IV Contrast (Anaphylaxis)  shellfish (Hives; Rash)  shrimp (Hives)  smoke; coughing (Other)       MEDICATIONS  (STANDING):  aspirin enteric coated 81 milliGRAM(s) Oral daily  atorvastatin 80 milliGRAM(s) Oral at bedtime  calcitriol   Capsule 0.25 MICROGram(s) Oral <User Schedule>  carvedilol 25 milliGRAM(s) Oral every 12 hours  dextrose 5%. 1000 milliLiter(s) (50 mL/Hr) IV Continuous <Continuous>  dextrose 50% Injectable 12.5 Gram(s) IV Push once  dextrose 50% Injectable 25 Gram(s) IV Push once  dextrose 50% Injectable 25 Gram(s) IV Push once  fluticasone propionate 50 MICROgram(s)/spray Nasal Spray 1 Spray(s) Both Nostrils two times a day  furosemide    Tablet 80 milliGRAM(s) Oral daily  furosemide    Tablet 40 milliGRAM(s) Oral every 24 hours  hydrALAZINE 100 milliGRAM(s) Oral three times a day  insulin glargine Injectable (LANTUS) 15 Unit(s) SubCutaneous at bedtime  insulin lispro (HumaLOG) corrective regimen sliding scale   SubCutaneous Before meals and at bedtime  loratadine 10 milliGRAM(s) Oral daily  multivitamin 1 Tablet(s) Oral daily  NIFEdipine XL 90 milliGRAM(s) Oral daily    MEDICATIONS  (PRN):  ALBUTerol/ipratropium for Nebulization 3 milliLiter(s) Nebulizer every 6 hours PRN Shortness of Breath and/or Wheezing  dextrose 40% Gel 15 Gram(s) Oral once PRN Blood Glucose LESS THAN 70 milliGRAM(s)/deciliter  glucagon  Injectable 1 milliGRAM(s) IntraMuscular once PRN Glucose LESS THAN 70 milligrams/deciliter      REVIEW OF SYSTEMS:  CONSTITUTIONAL: (+) malaise.   EYES: No acute change in vision   ENT:  No tinnitus  NECK: No stiffness  RESPIRATORY: No hemoptysis  CARDIOVASCULAR: No chest pain, palpitations, syncope  GASTROINTESTINAL: No hematemesis, diarrhea, melena, or hematochezia.  GENITOURINARY: No hematuria  NEUROLOGICAL: No headaches  LYMPH Nodes: No enlarged glands  ENDOCRINE: No heat or cold intolerance	    T(C): 36.7 (18 @ 04:46), Max: 37.2 (18 @ 21:11)  HR: 78 (18 @ 08:41) (68 - 85)  BP: 110/64 (18 @ 08:29) (110/64 - 141/76)  RR: 18 (18 @ 04:46) (18 - 18)  SpO2: 96% (18 @ 08:41) (96% - 98%)    PHYSICAL EXAMINATION:   Constitutional: WD, NAD  HEENT: NC, AT  Neck:  Supple  Respiratory:  Adequate airflow b/l. Not using accessory muscles of respiration.  Cardiovascular:  S1 & S2 intact, no R/G, 2+ radial pulses b/l  Gastrointestinal: Soft, NT, ND, normoactive b.s., no organomegaly/RT/rigidity  Extremities: WWP  Neurological:  Alert and awake.  No acute focal motor deficits. Crude sensation intact.     Labs and imaging reviewed    LABS:                        8.8    8.20  )-----------( 204      ( 29 Aug 2018 09:08 )             29.3     08    134<L>  |  94<L>  |  31<H>  ----------------------------<  185<H>  3.4<L>   |  23  |  4.70<H>    Ca    9.2      30 Aug 2018 05:56  Phos  2.6       Mg     2.1         TPro  7.0  /  Alb  x   /  TBili  x   /  DBili  x   /  AST  x   /  ALT  x   /  AlkPhos  x               CAPILLARY BLOOD GLUCOSE      POCT Blood Glucose.: 142 mg/dL (30 Aug 2018 08:04)  POCT Blood Glucose.: 188 mg/dL (29 Aug 2018 21:24)  POCT Blood Glucose.: 131 mg/dL (29 Aug 2018 16:37)  POCT Blood Glucose.: 184 mg/dL (29 Aug 2018 11:58)        LIVER FUNCTIONS - ( 30 Aug 2018 08:00 )  Alb: x     / Pro: 7.0 g/dL / ALK PHOS: x     / ALT: x     / AST: x     / GGT: x               RADIOLOGY & ADDITIONAL STUDIES:
Pt seen, no new complaints.     MEDICATIONS  (STANDING):  aspirin enteric coated 81 milliGRAM(s) Oral daily  atorvastatin 80 milliGRAM(s) Oral at bedtime  calcitriol   Capsule 0.25 MICROGram(s) Oral <User Schedule>  carvedilol 25 milliGRAM(s) Oral every 12 hours  dextrose 5%. 1000 milliLiter(s) (50 mL/Hr) IV Continuous <Continuous>  dextrose 50% Injectable 12.5 Gram(s) IV Push once  dextrose 50% Injectable 25 Gram(s) IV Push once  dextrose 50% Injectable 25 Gram(s) IV Push once  epoetin shannon Injectable 48753 Unit(s) IV Push <User Schedule>  fluticasone propionate 50 MICROgram(s)/spray Nasal Spray 1 Spray(s) Both Nostrils two times a day  furosemide    Tablet 80 milliGRAM(s) Oral daily  furosemide    Tablet 40 milliGRAM(s) Oral every 24 hours  hydrALAZINE 100 milliGRAM(s) Oral three times a day  insulin glargine Injectable (LANTUS) 15 Unit(s) SubCutaneous at bedtime  insulin lispro (HumaLOG) corrective regimen sliding scale   SubCutaneous Before meals and at bedtime  iron sucrose IVPB 100 milliGRAM(s) IV Intermittent <User Schedule>  loratadine 10 milliGRAM(s) Oral daily  multivitamin 1 Tablet(s) Oral daily  NIFEdipine XL 90 milliGRAM(s) Oral daily    MEDICATIONS  (PRN):  ALBUTerol/ipratropium for Nebulization 3 milliLiter(s) Nebulizer every 6 hours PRN Shortness of Breath and/or Wheezing  dextrose 40% Gel 15 Gram(s) Oral once PRN Blood Glucose LESS THAN 70 milliGRAM(s)/deciliter  glucagon  Injectable 1 milliGRAM(s) IntraMuscular once PRN Glucose LESS THAN 70 milligrams/deciliter      ROS  limited 2/2 participation, no pain, no SOB/CP/n/v/d/abd pain. No edema.    Vital Signs Last 24 Hrs  T(C): 36.7 (31 Aug 2018 04:39), Max: 36.9 (30 Aug 2018 17:30)  T(F): 98 (31 Aug 2018 04:39), Max: 98.4 (30 Aug 2018 17:30)  HR: 70 (31 Aug 2018 09:03) (66 - 95)  BP: 99/63 (31 Aug 2018 09:03) (93/53 - 144/73)  BP(mean): --  RR: 18 (31 Aug 2018 04:39) (17 - 19)  SpO2: 98% (31 Aug 2018 04:39) (95% - 100%)    PE  NAD  Awake, alert  Anicteric, MMM  RRR  CTAB ant chest  Abd soft, NT, ND  No c/c/e  No rash grossly  FROM                          8.3    8.7   )-----------( 181      ( 31 Aug 2018 06:16 )             25.6       08-31    134<L>  |  93<L>  |  27<H>  ----------------------------<  82  3.6   |  26  |  3.96<H>    Ca    9.3      31 Aug 2018 06:16  Phos  2.6     08-31  Mg     2.1     08-31    TPro  7.0  /  Alb  3.4<L>  /  TBili  x   /  DBili  x   /  AST  x   /  ALT  x   /  AlkPhos  x   08-30
Patient seen and examined at bedside  No acute events noted overnight  Case discussed with medical team    HPI:  73f hx ESRD on HD via tunneled cath awaiting PD catheter placement, DM, HTN presenting to ED after OP labs revealed low Hb 6's. Pt reports having received aranesp in the past, which was later stopped as her Hb was in the 11-14 range, and then trialing EPO once her Hb began to drop again, but this was also stopped as it was felt to be ineffectual. Pt reports symptoms of palpitations, sob and fatigue associated with having lower hb counts. Otherwise, no aches/pains, no fevers/chills, no hematochezia/melena, no n/v or hematemesis. No significant bruising noted. She was undergoing evaluation by Dr. Lyons for PD catheter placement, which is now being held due to her anemia.     In ED: 98.7, 71, 154/63, 16, 99RA. Given KCL 20meq po x 1. (28 Aug 2018 09:54)      PAST MEDICAL & SURGICAL HISTORY:  Obesity (BMI 30.0-34.9)  BMI 33.0-33.9,adult  Anemia  Dyslipidemia  Sleep apnea  Pedal edema  ESRD (end stage renal disease) on dialysis  Trigeminal neuralgia  Vertigo  Bronchial asthma  Chronic kidney disease  Diabetes mellitus  Hypercholesterolemia  HTN (hypertension)  History of carpal tunnel surgery of left wrist:   S/P cataract extraction: bilateral, 2005  S/P total knee replacement: bilateral, left 2006, right   S/P hysterectomy:   S/P :   S/P rotator cuff surgery: bilateral &#x27;s      IV Contrast (Anaphylaxis)  shellfish (Hives; Rash)  shrimp (Hives)  smoke; coughing (Other)       MEDICATIONS  (STANDING):  aspirin enteric coated 81 milliGRAM(s) Oral daily  atorvastatin 80 milliGRAM(s) Oral at bedtime  calcitriol   Capsule 0.25 MICROGram(s) Oral <User Schedule>  carvedilol 25 milliGRAM(s) Oral every 12 hours  dextrose 5%. 1000 milliLiter(s) (50 mL/Hr) IV Continuous <Continuous>  dextrose 50% Injectable 12.5 Gram(s) IV Push once  dextrose 50% Injectable 25 Gram(s) IV Push once  dextrose 50% Injectable 25 Gram(s) IV Push once  furosemide    Tablet 80 milliGRAM(s) Oral daily  furosemide    Tablet 40 milliGRAM(s) Oral every 24 hours  hydrALAZINE 100 milliGRAM(s) Oral three times a day  insulin glargine Injectable (LANTUS) 15 Unit(s) SubCutaneous at bedtime  insulin lispro (HumaLOG) corrective regimen sliding scale   SubCutaneous Before meals and at bedtime  multivitamin 1 Tablet(s) Oral daily  NIFEdipine XL 90 milliGRAM(s) Oral daily    MEDICATIONS  (PRN):  dextrose 40% Gel 15 Gram(s) Oral once PRN Blood Glucose LESS THAN 70 milliGRAM(s)/deciliter  glucagon  Injectable 1 milliGRAM(s) IntraMuscular once PRN Glucose LESS THAN 70 milligrams/deciliter      REVIEW OF SYSTEMS:  CONSTITUTIONAL: (+) malaise.   EYES: No acute change in vision   ENT:  No tinnitus  NECK: No stiffness  RESPIRATORY: improving drew. No hemoptysis  CARDIOVASCULAR: No chest pain, palpitations, syncope  GASTROINTESTINAL: No hematemesis, diarrhea, melena, or hematochezia.  GENITOURINARY: No hematuria  NEUROLOGICAL: No headaches  LYMPH Nodes: No enlarged glands  ENDOCRINE: No heat or cold intolerance	    T(C): 36.7 (18 @ 09:05), Max: 37.9 (18 @ 05:18)  HR: 71 (18 @ 09:05) (64 - 78)  BP: 137/71 (18 @ 09:05) (137/71 - 177/85)  RR: 18 (18 @ 09:05) (18 - 20)  SpO2: 97% (18 @ 09:05) (97% - 98%)    PHYSICAL EXAMINATION:   Constitutional: WD, NAD  HEENT: NC, AT  Neck:  Supple  Respiratory:  Adequate airflow b/l. Not using accessory muscles of respiration.  Cardiovascular:  S1 & S2 intact, no R/G, 2+ radial pulses b/l  Gastrointestinal: Soft, NT, ND, normoactive b.s., no organomegaly/RT/rigidity  Extremities: WWP  Neurological:  Alert and awake.  No acute focal motor deficits. Crude sensation intact.     Labs and imaging reviewed    LABS:                        9.4    8.2   )-----------( 161      ( 28 Aug 2018 16:31 )             28.7     08-    138  |  97  |  14  ----------------------------<  147<H>  3.6   |  27  |  2.84<H>    Ca    9.4      29 Aug 2018 07:20  Phos  2.6       Mg     2.1         TPro  8.4<H>  /  Alb  4.0  /  TBili  0.3  /  DBili  x   /  AST  20  /  ALT  14  /  AlkPhos  101          PT/INR - ( 27 Aug 2018 22:28 )   PT: 11.5 sec;   INR: 1.06 ratio         PTT - ( 27 Aug 2018 22:28 )  PTT:29.8 sec    CAPILLARY BLOOD GLUCOSE      POCT Blood Glucose.: 116 mg/dL (29 Aug 2018 08:20)  POCT Blood Glucose.: 100 mg/dL (28 Aug 2018 21:50)  POCT Blood Glucose.: 126 mg/dL (28 Aug 2018 16:48)  POCT Blood Glucose.: 152 mg/dL (28 Aug 2018 12:39)        LIVER FUNCTIONS - ( 29 Aug 2018 07:20 )  Alb: 4.0 g/dL / Pro: 8.4 g/dL / ALK PHOS: 101 U/L / ALT: 14 U/L / AST: 20 U/L / GGT: x               RADIOLOGY & ADDITIONAL STUDIES:

## 2018-08-31 NOTE — PROGRESS NOTE ADULT - PROBLEM SELECTOR PLAN 7
Continue crestor to lipitor via therapeutic interchange

## 2018-08-31 NOTE — PROGRESS NOTE ADULT - ATTENDING COMMENTS
For any question, call:  Cell # [phone]  Pager # [phone]  Callback # [phone]
For any question, call:  Cell # [phone]  Pager # [phone]  Callback # [phone]
For any question, call:  Cell # [phone]  Pager # [phone]  Callback # [phone]

## 2018-09-01 NOTE — PROVIDER CONTACT NOTE (CRITICAL VALUE NOTIFICATION) - ACTION/TREATMENT ORDERED:
Will continue to monitor. PA stated he followed up with provider and OK to discharge.
NP aware. Will follow up on further orders. Will continue to monitor patient.

## 2018-09-01 NOTE — CHART NOTE - NSCHARTNOTEFT_GEN_A_CORE
Called by RN that pt had positive RSV results. Pt was evaluated. Denies SOB/Palpitations/ cough /runny nose, fever/ chills, sore throat.     Vital Signs Last 24 Hrs  T(C): 36.8 (31 Aug 2018 21:22), Max: 37.1 (31 Aug 2018 11:37)  T(F): 98.3 (31 Aug 2018 21:22), Max: 98.7 (31 Aug 2018 11:37)  HR: 84 (31 Aug 2018 21:22) (65 - 85)  BP: 120/70 (31 Aug 2018 21:22) (99/63 - 136/77)  BP(mean): --  RR: 18 (31 Aug 2018 21:22) (17 - 18)  SpO2: 97% (31 Aug 2018 21:22) (96% - 99%)    - No Leukocytosis.   - Pt remains Afebrile.   - c/w Jeannine Soto.   - Spoke to Dr. Mehta, cleared for discharge, f/u as outpatient.     BANDAR SMART PA-C.   # [id number]  Medicine PA.

## 2018-09-01 NOTE — PROVIDER CONTACT NOTE (CRITICAL VALUE NOTIFICATION) - BACKGROUND
Dx: SOB, Palpitations        symptomatic Anemia 2/2 ESRD - received 1 unit PRBC  	ESRD w/ outpatient HD on M/W/F & R sided Perma cath

## 2018-09-03 LAB
CULTURE RESULTS: SIGNIFICANT CHANGE UP
CULTURE RESULTS: SIGNIFICANT CHANGE UP
SPECIMEN SOURCE: SIGNIFICANT CHANGE UP
SPECIMEN SOURCE: SIGNIFICANT CHANGE UP

## 2018-09-06 ENCOUNTER — INPATIENT (INPATIENT)
Facility: HOSPITAL | Age: 73
LOS: 1 days | Discharge: HOME | DRG: 152 | End: 2018-09-08
Attending: INTERNAL MEDICINE | Admitting: INTERNAL MEDICINE
Payer: COMMERCIAL

## 2018-09-06 VITALS
RESPIRATION RATE: 20 BRPM | TEMPERATURE: 98 F | OXYGEN SATURATION: 100 % | SYSTOLIC BLOOD PRESSURE: 157 MMHG | DIASTOLIC BLOOD PRESSURE: 79 MMHG | HEART RATE: 65 BPM

## 2018-09-06 DIAGNOSIS — Z98.89 OTHER SPECIFIED POSTPROCEDURAL STATES: Chronic | ICD-10-CM

## 2018-09-06 DIAGNOSIS — R06.00 DYSPNEA, UNSPECIFIED: ICD-10-CM

## 2018-09-06 DIAGNOSIS — N18.6 END STAGE RENAL DISEASE: ICD-10-CM

## 2018-09-06 DIAGNOSIS — D64.9 ANEMIA, UNSPECIFIED: ICD-10-CM

## 2018-09-06 DIAGNOSIS — Z98.49 CATARACT EXTRACTION STATUS, UNSPECIFIED EYE: Chronic | ICD-10-CM

## 2018-09-06 DIAGNOSIS — R74.8 ABNORMAL LEVELS OF OTHER SERUM ENZYMES: ICD-10-CM

## 2018-09-06 DIAGNOSIS — J40 BRONCHITIS, NOT SPECIFIED AS ACUTE OR CHRONIC: ICD-10-CM

## 2018-09-06 DIAGNOSIS — E78.5 HYPERLIPIDEMIA, UNSPECIFIED: ICD-10-CM

## 2018-09-06 DIAGNOSIS — Z98.890 OTHER SPECIFIED POSTPROCEDURAL STATES: Chronic | ICD-10-CM

## 2018-09-06 DIAGNOSIS — Z29.9 ENCOUNTER FOR PROPHYLACTIC MEASURES, UNSPECIFIED: ICD-10-CM

## 2018-09-06 DIAGNOSIS — Z90.710 ACQUIRED ABSENCE OF BOTH CERVIX AND UTERUS: Chronic | ICD-10-CM

## 2018-09-06 DIAGNOSIS — E11.8 TYPE 2 DIABETES MELLITUS WITH UNSPECIFIED COMPLICATIONS: ICD-10-CM

## 2018-09-06 DIAGNOSIS — G47.30 SLEEP APNEA, UNSPECIFIED: ICD-10-CM

## 2018-09-06 DIAGNOSIS — Z96.659 PRESENCE OF UNSPECIFIED ARTIFICIAL KNEE JOINT: Chronic | ICD-10-CM

## 2018-09-06 DIAGNOSIS — I10 ESSENTIAL (PRIMARY) HYPERTENSION: ICD-10-CM

## 2018-09-06 LAB
ALBUMIN SERPL ELPH-MCNC: 4.7 G/DL — SIGNIFICANT CHANGE UP (ref 3.3–5)
ALP SERPL-CCNC: 82 U/L — SIGNIFICANT CHANGE UP (ref 40–120)
ALT FLD-CCNC: 9 U/L — LOW (ref 10–45)
ANION GAP SERPL CALC-SCNC: 13 MMOL/L — SIGNIFICANT CHANGE UP (ref 5–17)
AST SERPL-CCNC: 17 U/L — SIGNIFICANT CHANGE UP (ref 10–40)
BASOPHILS NFR BLD AUTO: 0 % — SIGNIFICANT CHANGE UP (ref 0–2)
BILIRUB SERPL-MCNC: 0.2 MG/DL — SIGNIFICANT CHANGE UP (ref 0.2–1.2)
BUN SERPL-MCNC: 18 MG/DL — SIGNIFICANT CHANGE UP (ref 7–23)
CALCIUM SERPL-MCNC: 9.3 MG/DL — SIGNIFICANT CHANGE UP (ref 8.4–10.5)
CHLORIDE SERPL-SCNC: 88 MMOL/L — LOW (ref 96–108)
CO2 SERPL-SCNC: 30 MMOL/L — SIGNIFICANT CHANGE UP (ref 22–31)
CREAT SERPL-MCNC: 3.86 MG/DL — HIGH (ref 0.5–1.3)
EOSINOPHIL NFR BLD AUTO: 0 % — SIGNIFICANT CHANGE UP (ref 0–6)
GLUCOSE SERPL-MCNC: 167 MG/DL — HIGH (ref 70–99)
HCT VFR BLD CALC: 25.8 % — LOW (ref 34.5–45)
HGB BLD-MCNC: 8.4 G/DL — LOW (ref 11.5–15.5)
LYMPHOCYTES # BLD AUTO: 25 % — SIGNIFICANT CHANGE UP (ref 13–44)
MCHC RBC-ENTMCNC: 28.7 PG — SIGNIFICANT CHANGE UP (ref 27–34)
MCHC RBC-ENTMCNC: 32.5 GM/DL — SIGNIFICANT CHANGE UP (ref 32–36)
MCV RBC AUTO: 88.3 FL — SIGNIFICANT CHANGE UP (ref 80–100)
MONOCYTES NFR BLD AUTO: 10 % — SIGNIFICANT CHANGE UP (ref 2–14)
NEUTROPHILS NFR BLD AUTO: 63 % — SIGNIFICANT CHANGE UP (ref 43–77)
NT-PROBNP SERPL-SCNC: 3595 PG/ML — HIGH (ref 0–300)
PLATELET # BLD AUTO: 241 K/UL — SIGNIFICANT CHANGE UP (ref 150–400)
POTASSIUM SERPL-MCNC: 3.4 MMOL/L — LOW (ref 3.5–5.3)
POTASSIUM SERPL-SCNC: 3.4 MMOL/L — LOW (ref 3.5–5.3)
PROT SERPL-MCNC: 7.2 G/DL — SIGNIFICANT CHANGE UP (ref 6–8.3)
RAPID RVP RESULT: SIGNIFICANT CHANGE UP
RBC # BLD: 2.92 M/UL — LOW (ref 3.8–5.2)
RBC # FLD: 17 % — HIGH (ref 10.3–14.5)
SODIUM SERPL-SCNC: 131 MMOL/L — LOW (ref 135–145)
TROPONIN T, HIGH SENSITIVITY RESULT: 74 NG/L — HIGH (ref 0–51)
TROPONIN T, HIGH SENSITIVITY RESULT: 74 NG/L — HIGH (ref 0–51)
WBC # BLD: 7.8 K/UL — SIGNIFICANT CHANGE UP (ref 3.8–10.5)
WBC # FLD AUTO: 7.8 K/UL — SIGNIFICANT CHANGE UP (ref 3.8–10.5)

## 2018-09-06 PROCEDURE — 99223 1ST HOSP IP/OBS HIGH 75: CPT

## 2018-09-06 PROCEDURE — 71250 CT THORAX DX C-: CPT | Mod: 26

## 2018-09-06 PROCEDURE — 71046 X-RAY EXAM CHEST 2 VIEWS: CPT | Mod: 26

## 2018-09-06 PROCEDURE — 99285 EMERGENCY DEPT VISIT HI MDM: CPT

## 2018-09-06 RX ORDER — CARVEDILOL PHOSPHATE 80 MG/1
25 CAPSULE, EXTENDED RELEASE ORAL EVERY 12 HOURS
Qty: 0 | Refills: 0 | Status: DISCONTINUED | OUTPATIENT
Start: 2018-09-06 | End: 2018-09-08

## 2018-09-06 RX ORDER — DEXTROSE 50 % IN WATER 50 %
15 SYRINGE (ML) INTRAVENOUS ONCE
Qty: 0 | Refills: 0 | Status: DISCONTINUED | OUTPATIENT
Start: 2018-09-06 | End: 2018-09-08

## 2018-09-06 RX ORDER — FUROSEMIDE 40 MG
80 TABLET ORAL DAILY
Qty: 0 | Refills: 0 | Status: DISCONTINUED | OUTPATIENT
Start: 2018-09-06 | End: 2018-09-08

## 2018-09-06 RX ORDER — NIFEDIPINE 30 MG
90 TABLET, EXTENDED RELEASE 24 HR ORAL DAILY
Qty: 0 | Refills: 0 | Status: DISCONTINUED | OUTPATIENT
Start: 2018-09-06 | End: 2018-09-08

## 2018-09-06 RX ORDER — FLUTICASONE PROPIONATE 50 MCG
1 SPRAY, SUSPENSION NASAL
Qty: 0 | Refills: 0 | Status: DISCONTINUED | OUTPATIENT
Start: 2018-09-06 | End: 2018-09-08

## 2018-09-06 RX ORDER — SODIUM CHLORIDE 9 MG/ML
1000 INJECTION, SOLUTION INTRAVENOUS
Qty: 0 | Refills: 0 | Status: DISCONTINUED | OUTPATIENT
Start: 2018-09-06 | End: 2018-09-08

## 2018-09-06 RX ORDER — DEXTROSE 50 % IN WATER 50 %
25 SYRINGE (ML) INTRAVENOUS ONCE
Qty: 0 | Refills: 0 | Status: DISCONTINUED | OUTPATIENT
Start: 2018-09-06 | End: 2018-09-08

## 2018-09-06 RX ORDER — GLUCAGON INJECTION, SOLUTION 0.5 MG/.1ML
1 INJECTION, SOLUTION SUBCUTANEOUS ONCE
Qty: 0 | Refills: 0 | Status: DISCONTINUED | OUTPATIENT
Start: 2018-09-06 | End: 2018-09-08

## 2018-09-06 RX ORDER — LORATADINE 10 MG/1
10 TABLET ORAL DAILY
Qty: 0 | Refills: 0 | Status: DISCONTINUED | OUTPATIENT
Start: 2018-09-06 | End: 2018-09-08

## 2018-09-06 RX ORDER — DEXTROSE 50 % IN WATER 50 %
12.5 SYRINGE (ML) INTRAVENOUS ONCE
Qty: 0 | Refills: 0 | Status: DISCONTINUED | OUTPATIENT
Start: 2018-09-06 | End: 2018-09-08

## 2018-09-06 RX ORDER — ATORVASTATIN CALCIUM 80 MG/1
80 TABLET, FILM COATED ORAL AT BEDTIME
Qty: 0 | Refills: 0 | Status: DISCONTINUED | OUTPATIENT
Start: 2018-09-06 | End: 2018-09-08

## 2018-09-06 RX ORDER — INFLUENZA VIRUS VACCINE 15; 15; 15; 15 UG/.5ML; UG/.5ML; UG/.5ML; UG/.5ML
0.5 SUSPENSION INTRAMUSCULAR ONCE
Qty: 0 | Refills: 0 | Status: DISCONTINUED | OUTPATIENT
Start: 2018-09-06 | End: 2018-09-08

## 2018-09-06 RX ORDER — IPRATROPIUM/ALBUTEROL SULFATE 18-103MCG
3 AEROSOL WITH ADAPTER (GRAM) INHALATION EVERY 6 HOURS
Qty: 0 | Refills: 0 | Status: DISCONTINUED | OUTPATIENT
Start: 2018-09-06 | End: 2018-09-08

## 2018-09-06 RX ORDER — ASPIRIN/CALCIUM CARB/MAGNESIUM 324 MG
81 TABLET ORAL DAILY
Qty: 0 | Refills: 0 | Status: DISCONTINUED | OUTPATIENT
Start: 2018-09-06 | End: 2018-09-08

## 2018-09-06 RX ORDER — INSULIN LISPRO 100/ML
VIAL (ML) SUBCUTANEOUS
Qty: 0 | Refills: 0 | Status: DISCONTINUED | OUTPATIENT
Start: 2018-09-06 | End: 2018-09-08

## 2018-09-06 RX ORDER — HYDRALAZINE HCL 50 MG
100 TABLET ORAL THREE TIMES A DAY
Qty: 0 | Refills: 0 | Status: DISCONTINUED | OUTPATIENT
Start: 2018-09-06 | End: 2018-09-08

## 2018-09-06 RX ORDER — ALBUTEROL 90 UG/1
2.5 AEROSOL, METERED ORAL ONCE
Qty: 0 | Refills: 0 | Status: COMPLETED | OUTPATIENT
Start: 2018-09-06 | End: 2018-09-06

## 2018-09-06 RX ORDER — CALCITRIOL 0.5 UG/1
0.25 CAPSULE ORAL
Qty: 0 | Refills: 0 | Status: DISCONTINUED | OUTPATIENT
Start: 2018-09-06 | End: 2018-09-08

## 2018-09-06 RX ORDER — INSULIN GLARGINE 100 [IU]/ML
15 INJECTION, SOLUTION SUBCUTANEOUS AT BEDTIME
Qty: 0 | Refills: 0 | Status: DISCONTINUED | OUTPATIENT
Start: 2018-09-06 | End: 2018-09-08

## 2018-09-06 RX ADMIN — ATORVASTATIN CALCIUM 80 MILLIGRAM(S): 80 TABLET, FILM COATED ORAL at 23:20

## 2018-09-06 RX ADMIN — CARVEDILOL PHOSPHATE 25 MILLIGRAM(S): 80 CAPSULE, EXTENDED RELEASE ORAL at 23:21

## 2018-09-06 RX ADMIN — Medication 100 MILLIGRAM(S): at 23:20

## 2018-09-06 RX ADMIN — INSULIN GLARGINE 15 UNIT(S): 100 INJECTION, SOLUTION SUBCUTANEOUS at 23:21

## 2018-09-06 RX ADMIN — ALBUTEROL 2.5 MILLIGRAM(S): 90 AEROSOL, METERED ORAL at 16:13

## 2018-09-06 RX ADMIN — Medication 81 MILLIGRAM(S): at 23:20

## 2018-09-06 RX ADMIN — Medication 1 TABLET(S): at 23:21

## 2018-09-06 NOTE — ED ADULT NURSE REASSESSMENT NOTE - NS ED NURSE REASSESS COMMENT FT1
Pt remains a&oX4, resting comfortably in bed in no apparent distress with family at bedside. Pt still reporting difficulty breathing, pt breathing spontaneously and unlabored, SpO2 of 100% RA, pt provided with albuterol treatment. Safety maintained

## 2018-09-06 NOTE — H&P ADULT - ASSESSMENT
73F with h/o  HTN, DM, ESRD on HD awaiting PD cathter placement, recent admission for symptomatic anemia , complicated by RSV bronchitis who presents with unresolved shortness of breath and URI symptoms.

## 2018-09-06 NOTE — ED PROVIDER NOTE - OBJECTIVE STATEMENT
72 y/o F w pmhx of obesity, anemia sleep apnea, dyslipidemia, pedal edema, ESRD, trigeminal neuralgia, vertigo, bronchial asthma, chronic kidney disease, DM, hypercholesterolemia, HTN, and pshx of carpal tunnel, cataract extraction, total knee replacement, hysterectomy, c- section and rotator cuff surgery presents with worsening, ongoing SOB x2 weeks with chills, runny nose, sore-throat and diaphoresis x 1 week. Also notes right sided chest heaviness, described as squeezing sensation, and lightheadedness, not related to SOB. Pt states she noted onset of SOB, went to nephrologist and found low hemoglobin, was put on dialysis on August 24th. SOB persisted, was admitted for transfusion here and dc'd 6 days ago. Pt states she was not feeling well upon dc and upon going home. Echo of heart was done, no remarkable findings. No hx of PE or leg clots. Pt gets dialysis Mon ,Wed, Friday, says she got it yesterday. Denies fever, abd pain, dysuria, constipation, nausea/ diarrhea/ emesis. No sick contact. Allergic to IV contrast- anaphylaxis. Endorses aspiring 81 mg. NKDA.   PCP: Maruicio Mejía [phone]   Nephrologist. Dr. Stephon Colbert, : [phone]

## 2018-09-06 NOTE — H&P ADULT - NSHPLABSRESULTS_GEN_ALL_CORE
Labs reviewed : no leukocytosis, anemia, mild hyponatremia, mild hypokalemia, mildly elevated bnp, elevated troponin    CXR  personally reviewed : clear lungs    EKG personally reviewed : NSR, no acute ischemic changes

## 2018-09-06 NOTE — ED ADULT NURSE NOTE - NSIMPLEMENTINTERV_GEN_ALL_ED
Implemented All Fall Risk Interventions:  [place] to call system. Call bell, personal items and telephone within reach. Instruct patient to call for assistance. Room bathroom lighting operational. Non-slip footwear when patient is off stretcher. Physically safe environment: no spills, clutter or unnecessary equipment. Stretcher in lowest position, wheels locked, appropriate side rails in place. Provide visual cue, wrist band, yellow gown, etc. Monitor gait and stability. Monitor for mental status changes and reorient to person, place, and time. Review medications for side effects contributing to fall risk. Reinforce activity limits and safety measures with patient and family.

## 2018-09-06 NOTE — H&P ADULT - PROBLEM SELECTOR PLAN 4
- with HD on M/W/F ; started 2 weeks ago  - pt had HD yesterday ; she does not appear to be volume overloaded  - no indication for emergent dialysis  - Renal consulted for routine HD  - monitor BMP

## 2018-09-06 NOTE — H&P ADULT - PROBLEM SELECTOR PLAN 5
- in the setting of poor clearance in ESRD  - doubt ACS ; no CP, no EKG changes  - trend trop x 1  - check stat EKG if pt c/o CP

## 2018-09-06 NOTE — H&P ADULT - PROBLEM SELECTOR PLAN 1
- likely 2/2 RSV infection  - pt reassured  - Duonebs prn  -supportive care  - O2 supplementation as needed

## 2018-09-06 NOTE — ED PROVIDER NOTE - PROGRESS NOTE DETAILS
Dr. Abel Howard came to see the pt. Discussed pt's management plan. Pt is symptomatic, feels SOB. No signs of bacterial pneumonia, water retention, or CHF. RSV bronchitis is likely the cause of symtoms. Pt has multiple risk factors, pt is not ready to go home. Refuses the choice of discharge due to SOB. Pt demands admission. Discussed today's our findings with Dr. Mehta. Pt has trace b/l lower leg pitting edema so dry side control (body weight) by HD might be helpful for her symptoms but main cause of the upper respiratory symtoms is due to the bronchitis (RSV). Dr. Mehta agrees with admission.

## 2018-09-06 NOTE — ED PROVIDER NOTE - MEDICAL DECISION MAKING DETAILS
72 y/o F w pmhx of obesity, anemia sleep apnea, dyslipidemia, pedal edema, ESRD, trigeminal neuralgia, vertigo, bronchial asthma, chronic kidney disease, DM, hypercholesterolemia, HTN, and pshx of carpal tunnel, cataract extraction, total knee replacement, hysterectomy, c- section and rotator cuff surgery. Likely viral illness vs pneumonia vs fluid overload vs acs, pt recently admitted for anemia, found to be rsv positive at that time, will follow up labs, ct chest to r/o hospital acquired pneumonia, reassess. most likely readmit given multiple risk factors. 74 y/o F w pmhx of obesity, anemia sleep apnea, dyslipidemia, pedal edema, ESRD, trigeminal neuralgia, vertigo, bronchial asthma, chronic kidney disease, DM, hypercholesterolemia, HTN, and pshx of carpal tunnel, cataract extraction, total knee replacement, hysterectomy, c- section and rotator cuff surgery. Likely viral illness vs pneumonia vs fluid overload vs acs, pt recently admitted for anemia, found to be rsv positive at that time, will follow up labs, ct chest to r/o hospital acquired pneumonia, reassess. most likely readmit given multiple risk factors.  PGY1/MD Esthela. Pt's given the result of today's findings and has been told outpatient management is appropriate. Risk of hospital acquired infection is explained to the pt but still requests admission.

## 2018-09-06 NOTE — H&P ADULT - NSHPOUTPATIENTPROVIDERS_GEN_ALL_CORE
PCP : Mauricio Mejía [phone]   Nephrologist. Dr. Stephon Colbert, : [phone] PCP : Mauricio Mejía [phone]   Nephrologist. Dr. Stephon Colbert, : [phone]

## 2018-09-06 NOTE — H&P ADULT - NSHPSOCIALHISTORY_GEN_ALL_CORE
Lives with alone ; family checks in on her periodically  Retired nurse  Denies tobacco/Etoh/Illicit drug use

## 2018-09-06 NOTE — H&P ADULT - HISTORY OF PRESENT ILLNESS
73F with h/o ESRD on HD via tunneled cath awaiting PD catheter placement ( HD is M/W/F), DM, HTN who presents with c/o dyspnea x 1 week. She reports sob at rest and on exertion. Pt reports associated URI symptoms ( cough, runny nose). She also c/o associated diaphoresis. She denies CP, palpitations, fever/chills, dizziness. Pt reports that dyspnea improves with albuterol nebs.   Of note pt was recently admitted to [facility] ( 8/28 - 8/31) for symptomatic anemia for she was transfused with 1 unit PRBC. Hospital course was complicated by testing RVP positve for RSV for which she received supportive care. Pt reports that she has remained dyspneic since discharge. 73F with h/o ESRD on HD via tunneled cath awaiting PD catheter placement ( HD is M/W/F), DM, HTN who presents with c/o dyspnea x 1 week. She reports sob at rest and on exertion. Pt reports associated URI symptoms ( cough, runny nose). She also c/o associated diaphoresis. She denies CP, palpitations, fever/chills, dizziness. Pt reports that dyspnea improves with albuterol nebs.   Of note pt was recently admitted to [facility] ( 8/28 - 8/31) for symptomatic anemia for she was transfused with 1 unit PRBC. Hospital course was complicated by testing RVP positve for RSV for which she received supportive care. Pt reports that she has remained dyspneic since discharge.    ED COURSE  VS : 157/79  65 20 O2 100% on room air T 98.4F  Labs : wbc 7.8  h/h 8.4/25.8  plt 241  Na 131 K 3.4  Trop 74  Cr 3.86  bnp 3595  Treatment : Albuterol neb x1

## 2018-09-06 NOTE — ED ADULT NURSE NOTE - OBJECTIVE STATEMENT
73y Female PMH htn, hld, DM, ESRD with dialysis catheter to R. upper chest wall (dialysis MWF) presents to the ED c/o SOB. Pt reports SOB/diff breathing X2 weeks associated with intermittent chills, runny nose and sore throat for the last week. Pt states she just started dialysis last month and that the dialysis catheter was placed 8/17 and that since then she has had pressure feeling to R. upper chest. Pt states that in August she was having SOB, saw nephrologist who found low hemogl          worsening, ongoing SOB x2 weeks with chills, runny nose, sore-throat and diaphoresis x 1 week. Also notes right sided chest heaviness, described as squeezing sensation, and lightheadedness, not related to SOB. Pt states she noted onset of SOB, went to nephrologist and found low hemoglobin, was put on dialysis on August 24th. SOB persisted, was admitted for transfusion here and dc'd 6 days ago. Pt states she was not feeling well upon dc and upon going home. Echo of heart was done, no remarkable findings. No hx of PE or leg clots. Pt gets dialysis Mon ,Wed, Friday, says she got it yesterday. Denies fever, abd pain, dysuria, constipation, nausea/ diarrhea/ emesis. No sick contact. Allergic to IV contrast- anaphylaxis. Endorses aspiring 81 mg. NKDA. 73y Female PMH htn, hld, DM, ESRD with dialysis catheter to R. upper chest wall (dialysis MWF) presents to the ED via EMS c/o SOB. Pt reports SOB/diff breathing X2 weeks associated with intermittent chills, runny nose and sore throat for the last week. Pt states she just started dialysis last month and that the dialysis catheter was placed 8/17 and that since then she has had pressure feeling to R. upper chest. Pt states that in August she was having SOB, saw nephrologist who found low hemoglobin and was admitted here for blood transfusion and was discharged last Friday. Pt states she did not feel like she was ready to go home and was still congested and had SOB but states she was discharged because her "lungs were clear." Pt presents a&oX3, well in appearance, non-diaphoretic, no work of breathing noted, breathing spontaneously and unlabored, lung sounds clear bilaterally, SpO2 of 100% RA, skin warm dry and intact, cap refill <2 seconds, +peripheral pulses X4, denies ha, dizziness, CP, n/v, fever, sick contact, abd pain, dysuria, hematuria. MD at bedside for eval. safety maintained. 73y Female PMH htn, hld, obesity, DM, ESRD with dialysis catheter to R. upper chest wall (dialysis MWF) presents to the ED via EMS c/o SOB. Pt reports SOB/diff breathing X2 weeks associated with intermittent chills, runny nose and sore throat for the last week. Pt states she just started dialysis last month and that the dialysis catheter was placed 8/17 and that since then she has had pressure feeling to R. upper chest. Pt states that in August she was having SOB, saw nephrologist who found low hemoglobin and was admitted here for blood transfusion and was discharged last Friday. Pt states she did not feel like she was ready to go home and was still congested and had SOB but states she was discharged because her "lungs were clear." Pt presents a&oX3, well in appearance, non-diaphoretic, no work of breathing noted, breathing spontaneously and unlabored, lung sounds clear bilaterally, SpO2 of 100% RA, swelling noted to bilateral legs (pt states that is baseline), skin warm dry and intact, cap refill <2 seconds, +peripheral pulses X4, denies ha, dizziness, CP, n/v, fever, sick contact, abd pain, dysuria, hematuria. MD at bedside for eval. safety maintained.

## 2018-09-07 DIAGNOSIS — R91.8 OTHER NONSPECIFIC ABNORMAL FINDING OF LUNG FIELD: ICD-10-CM

## 2018-09-07 DIAGNOSIS — N18.9 CHRONIC KIDNEY DISEASE, UNSPECIFIED: ICD-10-CM

## 2018-09-07 DIAGNOSIS — J06.9 ACUTE UPPER RESPIRATORY INFECTION, UNSPECIFIED: ICD-10-CM

## 2018-09-07 PROCEDURE — 93970 EXTREMITY STUDY: CPT | Mod: 26

## 2018-09-07 RX ORDER — BUDESONIDE AND FORMOTEROL FUMARATE DIHYDRATE 160; 4.5 UG/1; UG/1
2 AEROSOL RESPIRATORY (INHALATION)
Qty: 0 | Refills: 0 | Status: DISCONTINUED | OUTPATIENT
Start: 2018-09-07 | End: 2018-09-08

## 2018-09-07 RX ORDER — HEPARIN SODIUM 5000 [USP'U]/ML
5000 INJECTION INTRAVENOUS; SUBCUTANEOUS EVERY 12 HOURS
Qty: 0 | Refills: 0 | Status: DISCONTINUED | OUTPATIENT
Start: 2018-09-07 | End: 2018-09-08

## 2018-09-07 RX ORDER — OXYMETAZOLINE HYDROCHLORIDE 0.5 MG/ML
1 SPRAY NASAL EVERY 12 HOURS
Qty: 0 | Refills: 0 | Status: DISCONTINUED | OUTPATIENT
Start: 2018-09-07 | End: 2018-09-08

## 2018-09-07 RX ORDER — ERYTHROPOIETIN 10000 [IU]/ML
10000 INJECTION, SOLUTION INTRAVENOUS; SUBCUTANEOUS
Qty: 0 | Refills: 0 | Status: DISCONTINUED | OUTPATIENT
Start: 2018-09-07 | End: 2018-09-08

## 2018-09-07 RX ADMIN — LORATADINE 10 MILLIGRAM(S): 10 TABLET ORAL at 11:09

## 2018-09-07 RX ADMIN — Medication 81 MILLIGRAM(S): at 11:09

## 2018-09-07 RX ADMIN — BUDESONIDE AND FORMOTEROL FUMARATE DIHYDRATE 2 PUFF(S): 160; 4.5 AEROSOL RESPIRATORY (INHALATION) at 17:18

## 2018-09-07 RX ADMIN — Medication 1 TABLET(S): at 11:09

## 2018-09-07 RX ADMIN — INSULIN GLARGINE 15 UNIT(S): 100 INJECTION, SOLUTION SUBCUTANEOUS at 23:26

## 2018-09-07 RX ADMIN — CALCITRIOL 0.25 MICROGRAM(S): 0.5 CAPSULE ORAL at 08:41

## 2018-09-07 RX ADMIN — Medication 1 SPRAY(S): at 00:44

## 2018-09-07 RX ADMIN — ERYTHROPOIETIN 10000 UNIT(S): 10000 INJECTION, SOLUTION INTRAVENOUS; SUBCUTANEOUS at 20:20

## 2018-09-07 RX ADMIN — ATORVASTATIN CALCIUM 80 MILLIGRAM(S): 80 TABLET, FILM COATED ORAL at 23:25

## 2018-09-07 RX ADMIN — Medication 100 MILLIGRAM(S): at 05:55

## 2018-09-07 RX ADMIN — HEPARIN SODIUM 5000 UNIT(S): 5000 INJECTION INTRAVENOUS; SUBCUTANEOUS at 17:17

## 2018-09-07 RX ADMIN — Medication 80 MILLIGRAM(S): at 05:55

## 2018-09-07 RX ADMIN — Medication 90 MILLIGRAM(S): at 05:55

## 2018-09-07 RX ADMIN — CARVEDILOL PHOSPHATE 25 MILLIGRAM(S): 80 CAPSULE, EXTENDED RELEASE ORAL at 23:26

## 2018-09-07 RX ADMIN — Medication 1 SPRAY(S): at 05:54

## 2018-09-07 RX ADMIN — Medication 1 SPRAY(S): at 17:17

## 2018-09-07 RX ADMIN — Medication 100 MILLIGRAM(S): at 23:26

## 2018-09-07 NOTE — CONSULT NOTE ADULT - ASSESSMENT
73F with h/o ESRD on HD via tunneled cath awaiting PD catheter placement ( HD is M/W/F), DM, HTN who presents with c/o dyspnea with recent RSV infection

## 2018-09-07 NOTE — CONSULT NOTE ADULT - SUBJECTIVE AND OBJECTIVE BOX
Patient is a 73y old  Female who presents with a chief complaint of shortness of breath (07 Sep 2018 08:09)      HPI:  73F with h/o ESRD on HD via tunneled cath awaiting PD catheter placement ( HD is M/W/F), DM, HTN who presents with c/o dyspnea x 1 week. She reports sob at rest and on exertion. Pt reports associated URI symptoms ( cough, runny nose). She also c/o associated diaphoresis. She denies CP, palpitations, fever/chills, dizziness. Pt reports that dyspnea improves with albuterol nebs.   Of note pt was recently admitted to [facility] (  - ) for symptomatic anemia for she was transfused with 1 unit PRBC. Hospital course was complicated by testing RVP positve for RSV for which she received supportive care. Pt reports that she has remained dyspneic since discharge.   Above noted: pt has SOB as well as DUC for last two months: She was recently transfused with blood for anemia and was discharged: She says she has copd, which was diagnosed 20 years ago by a physician but she was not using any inhalers and she never smoked: She was recently diagnosed with RSV   infection too: She has nasal congestions and is unable to breath though her nose: She has sleep apnea and has been on CPAP at home: She says she also has asthma but it was not active and was not taking any medications for that!  ED COURSE  VS : 157/79  65 20 O2 100% on room air T 98.4F  Labs : wbc 7.8  h/h 8.4/25.8  plt 241  Na 131 K 3.4  Trop 74  Cr 3.86  bnp 3595  Treatment : Albuterol neb x1 (06 Sep 2018 16:59)      ?FOLLOWING PRESENT  [ x] Hx of PE/DVT, [ y] Hx COPD, [y] Hx of Asthma, [ x] Hx of Hospitalization, [ y]  Hx of BiPAP/CPAP use, [y Hx of JARVIS    Allergies    IV Contrast (Anaphylaxis)  shellfish (Hives; Rash)  shrimp (Hives)  smoke; coughing (Other)    Intolerances        PAST MEDICAL & SURGICAL HISTORY:  Obesity (BMI 30.0-34.9)  BMI 33.0-33.9,adult  Anemia  Dyslipidemia  Sleep apnea  Pedal edema  ESRD (end stage renal disease) on dialysis  Trigeminal neuralgia  Vertigo  Bronchial asthma  Chronic kidney disease  Diabetes mellitus  Hypercholesterolemia  HTN (hypertension)  History of carpal tunnel surgery of left wrist:   S/P cataract extraction: bilateral, 2005  S/P total knee replacement: bilateral, left 2006, right   S/P hysterectomy:   S/P :   S/P rotator cuff surgery: bilateral &#x27;s      FAMILY HISTORY:  No pertinent family history in first degree relatives      Social History: [ x ] TOBACCO                  [x] ETOH                                 [  x] IVDA/DRUGS    REVIEW OF SYSTEMS      General:	x    Skin/Breast:x  	  Ophthalmologic:x  	  ENMT:	nasal congestion    Respiratory and Thorax: SOB  	  Cardiovascular:	x    Gastrointestinal:	x    Genitourinary:	x    Musculoskeletal:	x    Neurological:	x    Psychiatric:	x    Hematology/Lymphatics:	x    Endocrine:	x    Allergic/Immunologic:	x    MEDICATIONS  (STANDING):  aspirin enteric coated 81 milliGRAM(s) Oral daily  atorvastatin 80 milliGRAM(s) Oral at bedtime  calcitriol   Capsule 0.25 MICROGram(s) Oral <User Schedule>  carvedilol 25 milliGRAM(s) Oral every 12 hours  dextrose 5%. 1000 milliLiter(s) (50 mL/Hr) IV Continuous <Continuous>  dextrose 50% Injectable 12.5 Gram(s) IV Push once  dextrose 50% Injectable 25 Gram(s) IV Push once  dextrose 50% Injectable 25 Gram(s) IV Push once  fluticasone propionate 50 MICROgram(s)/spray Nasal Spray 1 Spray(s) Both Nostrils two times a day  furosemide    Tablet 80 milliGRAM(s) Oral daily  hydrALAZINE 100 milliGRAM(s) Oral three times a day  influenza   Vaccine 0.5 milliLiter(s) IntraMuscular once  insulin glargine Injectable (LANTUS) 15 Unit(s) SubCutaneous at bedtime  insulin lispro (HumaLOG) corrective regimen sliding scale   SubCutaneous three times a day before meals  loratadine 10 milliGRAM(s) Oral daily  multivitamin 1 Tablet(s) Oral daily  NIFEdipine XL 90 milliGRAM(s) Oral daily    MEDICATIONS  (PRN):  ALBUTerol/ipratropium for Nebulization 3 milliLiter(s) Nebulizer every 6 hours PRN Shortness of Breath and/or Wheezing  dextrose 40% Gel 15 Gram(s) Oral once PRN Blood Glucose LESS THAN 70 milliGRAM(s)/deciliter  glucagon  Injectable 1 milliGRAM(s) IntraMuscular once PRN Glucose LESS THAN 70 milligrams/deciliter       Vital Signs Last 24 Hrs  T(C): 36.9 (07 Sep 2018 04:31), Max: 37.2 (06 Sep 2018 14:32)  T(F): 98.5 (07 Sep 2018 04:31), Max: 99 (06 Sep 2018 14:32)  HR: 68 (07 Sep 2018 08:37) (60 - 79)  BP: 114/68 (07 Sep 2018 08:37) (114/68 - 165/82)  BP(mean): 109 (06 Sep 2018 16:59) (109 - 109)  RR: 18 (07 Sep 2018 04:31) (18 - 20)  SpO2: 100% (07 Sep 2018 04:31) (98% - 100%)        I&O's Summary    07 Sep 2018 07:01  -  07 Sep 2018 09:11  --------------------------------------------------------  IN: 240 mL / OUT: 0 mL / NET: 240 mL        Physical Exam:   GENERAL: NAD, well-groomed, well-developed  HEENT: DOROTHEA/   Atraumatic, Normocephalic  ENMT: No tonsillar erythema, exudates, or enlargement; Moist mucous membranes, Good dentition, No lesions  NECK: Supple, No JVD, Normal thyroid  CHEST/LUNG: Clear to auscultation bilaterally  CVS: Regular rate and rhythm; No murmurs, rubs, or gallops  GI: : Soft, Nontender, Nondistended; Bowel sounds present  NERVOUS SYSTEM:  Alert & Oriented X3  EXTREMITIES:- edema  LYMPH: No lymphadenopathy noted  SKIN: No rashes or lesions  ENDOCRINOLOGY: No Thyromegaly  PSYCH: Appropriate    Labs:                              8.4    7.8   )-----------( 241      ( 06 Sep 2018 14:21 )             25.8     09-06    131<L>  |  88<L>  |  18  ----------------------------<  167<H>  3.4<L>   |  30  |  3.86<H>    Ca    9.3      06 Sep 2018 14:21    TPro  7.2  /  Alb  4.7  /  TBili  0.2  /  DBili  x   /  AST  17  /  ALT  9<L>  /  AlkPhos  82      CAPILLARY BLOOD GLUCOSE      POCT Blood Glucose.: 114 mg/dL (07 Sep 2018 07:50)  POCT Blood Glucose.: 187 mg/dL (06 Sep 2018 23:16)    LIVER FUNCTIONS - ( 06 Sep 2018 14:21 )  Alb: 4.7 g/dL / Pro: 7.2 g/dL / ALK PHOS: 82 U/L / ALT: 9 U/L / AST: 17 U/L / GGT: x               D DImer  Serum Pro-Brain Natriuretic Peptide: 3595 pg/mL ( @ 14:21)      Studies  Chest X-RAY  CT SCAN Chest   CT Abdomen  Venous Dopplers: LE:   Others        < from: CT Chest No Cont (18 @ 16:57) >  Aortic valve leaflet calcification. Ascending aortic aneurysm measuring   4.7 cm. The aortic arch is dilated measuring 2.8 cm. The descending aorta   is normal in caliber.    The main pulmonary artery is markedly dilated measuring 4.4 cm.    UPPER ABDOMEN: Aortic atherosclerosis. Calcified granulomas within the   liver. Nodular thickening of bilateral abdominal glands.    BONES AND SOFT TISSUES: No aggressive osseous lesion. Spinal degenerative   changes. Advanced degenerative changes of bilateral shoulders right   greater than the left.    LOWER NECK: Unremarkable.    IMPRESSION:     1.  No consolation or pulmonary edema.    2.  2-6 mm nodules within the right middle lobe and right lower lobe as   described above. Recommend a follow-up CT in 6-12 months to determine   stability.    3.  Aortic valve leaflet calcification. Ascending aortic aneurysm   measuring 4.7 cm.    4.  Coronary atherosclerosis.    5.  The main pulmonary artery is markedly dilated representing pulmonary   hypertension.          Rapid RVP Result: [employer]: The [employer] RVP Rapid uses polymerase chain reaction (PCR) and melt  curve analysis to screen for adenovirus; coronavirus HKU1, NL63, 229E,  OC43; human metapneumovirus (hMPV); human enterovirus/rhinovirus  (Entero/RV); influenza A; influenza A/H1;influenza A/H3; influenza  A/H1-2009; influenza B; parainfluenza viruses 1, 2, 3, 4; respiratory  syncytial virus; Bordetella pertussis; Mycoplasma pneumoniae; and  Chlamydophila pneumoniae. (18 @ 15:35Marcia JORGE M.D., ATTENDING RADIOLOGIST  This document has been electronically signed. Sep  7 2018  8:37AM    < end of copied text >      Rapid RVP Result: [facility]: The Film[employer] RVP Rapid uses polymerase chain reaction (PCR) and melt  curve analysis to screen for adenovirus; coronavirus HKU1, NL63, 229E,  OC43; human metapneumovirus (hMPV); human enterovirus/rhinovirus  (Entero/RV); influenza A; influenza A/H1;influenza A/H3; influenza  A/H1-2009; influenza B; parainfluenza viruses 1, 2, 3, 4; respiratory  syncytial virus; Bordetella pertussis; Mycoplasma pneumoniae; and  Chlamydophila pneumoniae. (18 @ 15:35)      < from: Transthoracic Echocardiogram (18 @ 19:18) >  Aortic Root: 3 cm.  Left Atrium: Mildly dilated left atrium.  LA volume index =  40 cc/m2.  Left Ventricle: Hyperdynamic left ventricular systolic  function. No regional wall motion abnormalities. Mild  concentric left ventricular hypertrophy. Mild diastolic  dysfunction (Stage I).  Right Heart: Normal right atrium. Normal right ventricular  size and function. Normal tricuspid valve. Mild tricuspid  regurgitation. Pulmonic valve not well visualized, probably  normal. Minimal pulmonic regurgitation.  Pericardium/Pleura: Normal pericardium with no pericardial  effusion.  Hemodynamic: Estimated right ventricular systolic pressure  equals 25.36 - 29.36 mm Hg, assuming right atrial pressure  equals 6 - 10 mm Hg, consistent with normal pulmonary  hypertension.  ------------------------------------------------------------------------  Conclusions:  1. Normal mitral valve. Minimal mitral regurgitation.  2. Calcified trileaflet aortic valve with normal opening.  Mild aortic regurgitation.  3.Mild concentric left ventricular hypertrophy.  4. Hyperdynamic left ventricular systolic function. No  regional wall motion abnormalities.  5. Mild diastolic dysfunction (Stage I).  6. Normal right ventricular size and function.  *** Compared with echocardiogram of 3/11/2016, results are  similar.  ------------------------------------------------------------------------  Confirmed on  2018 - 21:41:38 by Adis Thompson M.D.    < end of copied text >    < from: CT Chest No Cont (18 @ 16:57) >  AIRWAYS, LUNGS, PLEURA: Patent central airways. No bronchial wall   thickening or bronchiectasis.    < end of copied text >

## 2018-09-07 NOTE — CONSULT NOTE ADULT - PROBLEM SELECTOR RECOMMENDATION 9
The dyspnea seems multifactorial: She has a diagnosis of COPD, but no evidence on ct chest as well as she never smoked: She has not been wheezing: She recently had RSV infection and has significant nasal congestion: She is already on flonase: Would add afrin for a few days as well as start Symbicort for any broncho reactivity: Consider a trial of steroids if she is not improving: Her echo is noted too: She has diastolic dysfunction, but there is no evidence of active heart failure: Check her venous dopplers.

## 2018-09-07 NOTE — CONSULT NOTE ADULT - SUBJECTIVE AND OBJECTIVE BOX
[facility] Consult Note Nephrology - CONSULTATION NOTE    73F with h/o ESRD on HD via tunneled cath awaiting PD catheter placement ( HD is M/W/F), DM, HTN who presents with c/o dyspnea x 1 week. She reports sob at rest and on exertion. Pt reports associated URI symptoms ( cough, runny nose). She also c/o associated diaphoresis. She denies CP, palpitations, fever/chills, dizziness. Pt reports that dyspnea improves with albuterol nebs.   Of note pt was recently admitted to [facility] (  - ) for symptomatic anemia for she was transfused with 1 unit PRBC. Hospital course was complicated by testing RVP positve for RSV for which she received supportive care. Pt reports that she has remained dyspneic since discharge.    ED COURSE  VS : 157/79  65 20 O2 100% on room air T 98.4F  Labs : wbc 7.8  h/h 8.4/25.8  plt 241  Na 131 K 3.4  Trop 74  Cr 3.86  bnp 3595  Treatment : Albuterol neb x1    Pts HD schedule is [facility]wf via right PC. Has some shortness of breath with last hd was wednesday  denies any n/v/d/c/cp    PAST MEDICAL & SURGICAL HISTORY:  Obesity (BMI 30.0-34.9)  BMI 33.0-33.9,adult  Anemia  Dyslipidemia  Sleep apnea  Pedal edema  ESRD (end stage renal disease) on dialysis  Trigeminal neuralgia  Vertigo  Bronchial asthma  Chronic kidney disease  Diabetes mellitus  Hypercholesterolemia  HTN (hypertension)  History of carpal tunnel surgery of left wrist:   S/P cataract extraction: bilateral,   S/P total knee replacement: bilateral, left , right   S/P hysterectomy:   S/P :   S/P rotator cuff surgery: bilateral &#x27;s    IV Contrast (Anaphylaxis)  shellfish (Hives; Rash)  shrimp (Hives)  smoke; coughing (Other)    Home Medications Reviewed  Hospital Medications:   MEDICATIONS  (STANDING):  aspirin enteric coated 81 milliGRAM(s) Oral daily  atorvastatin 80 milliGRAM(s) Oral at bedtime  buDESOnide 160 MICROgram(s)/formoterol 4.5 MICROgram(s) Inhaler 2 Puff(s) Inhalation two times a day  calcitriol   Capsule 0.25 MICROGram(s) Oral <User Schedule>  carvedilol 25 milliGRAM(s) Oral every 12 hours  dextrose 5%. 1000 milliLiter(s) (50 mL/Hr) IV Continuous <Continuous>  dextrose 50% Injectable 12.5 Gram(s) IV Push once  dextrose 50% Injectable 25 Gram(s) IV Push once  dextrose 50% Injectable 25 Gram(s) IV Push once  fluticasone propionate 50 MICROgram(s)/spray Nasal Spray 1 Spray(s) Both Nostrils two times a day  furosemide    Tablet 80 milliGRAM(s) Oral daily  heparin  Injectable 5000 Unit(s) SubCutaneous every 12 hours  hydrALAZINE 100 milliGRAM(s) Oral three times a day  influenza   Vaccine 0.5 milliLiter(s) IntraMuscular once  insulin glargine Injectable (LANTUS) 15 Unit(s) SubCutaneous at bedtime  insulin lispro (HumaLOG) corrective regimen sliding scale   SubCutaneous three times a day before meals  loratadine 10 milliGRAM(s) Oral daily  multivitamin 1 Tablet(s) Oral daily  NIFEdipine XL 90 milliGRAM(s) Oral daily  oxymetazoline 0.05% Nasal Spray 1 Spray(s) Both Nostrils every 12 hours    SOCIAL HISTORY:  Denies ETOh,Smoking,   FAMILY HISTORY:  No pertinent family history in first degree relatives    REVIEW OF SYSTEMS:  CONSTITUTIONAL: No weakness, fevers or chills  EYES/ENT: No visual changes;  No vertigo or throat pain   NECK: No pain or stiffness  RESPIRATORY: +shortness of breath  CARDIOVASCULAR: No chest pain or palpitations.  GASTROINTESTINAL: No abdominal or epigastric pain. No nausea, vomiting, or hematemesis; No diarrhea or constipation. No melena or hematochezia.  GENITOURINARY: No dysuria, frequency, foamy urine, urinary urgency, incontinence or hematuria  NEUROLOGICAL: No numbness or weakness  SKIN: No itching, burning, rashes, or lesions   VASCULAR: No bilateral lower extremity edema.   All other review of systems is negative unless indicated above.    VITALS:  T(F): 98.6 (18 @ 11:22), Max: 99 (18 @ 14:32)  HR: 70 (18 @ 11:39)  BP: 142/96 (18 @ 11:22)  RR: 18 (18 @ 11:22)  SpO2: 99% (18 @ 11:39)  Wt(kg): --     @ 07:01  -   @ 12:05  --------------------------------------------------------  IN: 240 mL / OUT: 0 mL / NET: 240 mL        PHYSICAL EXAM:  Constitutional: NAD  HEENT: anicteric sclera, oropharynx clear, MMM  Neck: No JVD  Respiratory:b/l rhonchi  Cardiovascular: S1, S2, RRR  Gastrointestinal: BS+, soft, NT/ND  Extremities: No cyanosis or clubbing. No peripheral edema  Neurological: A/O x 3, no focal deficits  Psychiatric: Normal mood, normal affect  : No CVA tenderness. No cottrell.   Skin: No rashes  Vascular Access: RIght IJ PC    LABS:      131<L>  |  88<L>  |  18  ----------------------------<  167<H>  3.4<L>   |  30  |  3.86<H>    Ca    9.3      06 Sep 2018 14:21    TPro  7.2  /  Alb  4.7  /  TBili  0.2  /  DBili      /  AST  17  /  ALT  9<L>  /  AlkPhos  82      Creatinine Trend: 3.86 <--                        8.4    7.8   )-----------( 241      ( 06 Sep 2018 14:21 )             25.8     Urine Studies:      RADIOLOGY & ADDITIONAL STUDIES:

## 2018-09-07 NOTE — CONSULT NOTE ADULT - SUBJECTIVE AND OBJECTIVE BOX
CHIEF COMPLAINT:Patient is a 73y old  Female who presents with a chief complaint of shortness of breath (07 Sep 2018 12:05)      HISTORY OF PRESENT ILLNESS:HPI:  73F with h/o ESRD on HD via tunneled cath awaiting PD catheter placement ( HD is M/W/F), DM, HTN who presents with c/o dyspnea x 1 week. She reports sob at rest and on exertion. Pt reports associated URI symptoms ( cough, runny nose). She also c/o associated diaphoresis. She denies CP, palpitations, fever/chills, dizziness. Pt reports that dyspnea improves with albuterol nebs.   Of note pt was recently admitted to [facility] (  - ) for symptomatic anemia for she was transfused with 1 unit PRBC. Hospital course was complicated by testing RVP positve for RSV for which she received supportive care. Pt reports that she has remained dyspneic since discharge.    ED COURSE  VS : 157/79  65 20 O2 100% on room air T 98.4F  Labs : wbc 7.8  h/h 8.4/25.8  plt 241  Na 131 K 3.4  Trop 74  Cr 3.86  bnp 3595  Treatment : Albuterol neb x1 (06 Sep 2018 16:59)      PAST MEDICAL & SURGICAL HISTORY:  Obesity (BMI 30.0-34.9)  BMI 33.0-33.9,adult  Anemia  Dyslipidemia  Sleep apnea  Pedal edema  ESRD (end stage renal disease) on dialysis  Trigeminal neuralgia  Vertigo  Bronchial asthma  Chronic kidney disease  Diabetes mellitus  Hypercholesterolemia  HTN (hypertension)  History of carpal tunnel surgery of left wrist:   S/P cataract extraction: bilateral,   S/P total knee replacement: bilateral, left , right   S/P hysterectomy:   S/P :   S/P rotator cuff surgery: bilateral &#x27;s          MEDICATIONS:  aspirin enteric coated 81 milliGRAM(s) Oral daily  carvedilol 25 milliGRAM(s) Oral every 12 hours  furosemide    Tablet 80 milliGRAM(s) Oral daily  heparin  Injectable 5000 Unit(s) SubCutaneous every 12 hours  hydrALAZINE 100 milliGRAM(s) Oral three times a day  NIFEdipine XL 90 milliGRAM(s) Oral daily      ALBUTerol/ipratropium for Nebulization 3 milliLiter(s) Nebulizer every 6 hours PRN  buDESOnide 160 MICROgram(s)/formoterol 4.5 MICROgram(s) Inhaler 2 Puff(s) Inhalation two times a day  loratadine 10 milliGRAM(s) Oral daily        atorvastatin 80 milliGRAM(s) Oral at bedtime  dextrose 40% Gel 15 Gram(s) Oral once PRN  dextrose 50% Injectable 12.5 Gram(s) IV Push once  dextrose 50% Injectable 25 Gram(s) IV Push once  dextrose 50% Injectable 25 Gram(s) IV Push once  glucagon  Injectable 1 milliGRAM(s) IntraMuscular once PRN  insulin glargine Injectable (LANTUS) 15 Unit(s) SubCutaneous at bedtime  insulin lispro (HumaLOG) corrective regimen sliding scale   SubCutaneous three times a day before meals    calcitriol   Capsule 0.25 MICROGram(s) Oral <User Schedule>  dextrose 5%. 1000 milliLiter(s) IV Continuous <Continuous>  epoetin shannon Injectable 08200 Unit(s) IV Push <User Schedule>  fluticasone propionate 50 MICROgram(s)/spray Nasal Spray 1 Spray(s) Both Nostrils two times a day  influenza   Vaccine 0.5 milliLiter(s) IntraMuscular once  multivitamin 1 Tablet(s) Oral daily  oxymetazoline 0.05% Nasal Spray 1 Spray(s) Both Nostrils every 12 hours      FAMILY HISTORY:  No pertinent family history in first degree relatives      Non-contributory    SOCIAL HISTORY:    [ ] Tobacco  [ ] Drugs  [ ] Alcohol    Allergies    IV Contrast (Anaphylaxis)  shellfish (Hives; Rash)  shrimp (Hives)  smoke; coughing (Other)    Intolerances    	    REVIEW OF SYSTEMS:  CONSTITUTIONAL: No fever  EYES: No eye pain, visual disturbances, or discharge  ENMT:  No difficulty hearing, tinnitus  NECK: No pain or stiffness  RESPIRATORY: No cough, wheezing,  CARDIOVASCULAR: No chest pain, palpitations, passing out, dizziness, or leg swelling  GASTROINTESTINAL:  No nausea, vomiting, diarrhea or constipation. No melena.  GENITOURINARY: No dysuria, hematuria  NEUROLOGICAL: No stroke like symptoms  SKIN: No burning or lesions   LYMPH Nodes: No enlarged glands  ENDOCRINE: No heat or cold intolerance  MUSCULOSKELETAL: No joint pain or swelling  PSYCHIATRIC: No  anxiety, mood swings  HEME/LYMPH: No bleeding gums  ALLERY AND IMMUNOLOGIC: No hives or eczema	    All other ROS negative    PHYSICAL EXAM:  T(C): 37 (18 @ 11:22), Max: 37.1 (18 @ 21:42)  HR: 70 (18 @ 11:39) (64 - 79)  BP: 142/96 (18 @ 11:22) (114/68 - 161/77)  RR: 18 (18 @ 11:22) (18 - 19)  SpO2: 99% (18 @ 11:39) (98% - 100%)  Wt(kg): --  I&O's Summary    07 Sep 2018 07:  -  07 Sep 2018 18:53  --------------------------------------------------------  IN: 480 mL / OUT: 0 mL / NET: 480 mL        Appearance: Normal	  HEENT:   Normal oral mucosa, EOMI	  Cardiovascular: Normal S1 S2, No JVD, No murmurs  Respiratory: Lungs clear to auscultation	  Psychiatry: Alert, Mood & affect appropriate  Gastrointestinal:  Soft, Non-tender, + BS	  Skin: No rashes, No ecchymoses, No cyanosis	  Neurologic: Non-focal  Extremities:  No clubbing, cyanosis or edema  Vascular: Peripheral pulses palpable  bilaterally  	    	  	  CARDIAC MARKERS:                                  8.4    7.8   )-----------( 241      ( 06 Sep 2018 14:21 )             25.8         131<L>  |  88<L>  |  18  ----------------------------<  167<H>  3.4<L>   |  30  |  3.86<H>    Ca    9.3      06 Sep 2018 14:21    TPro  7.2  /  Alb  4.7  /  TBili  0.2  /  DBili  x   /  AST  17  /  ALT  9<L>  /  AlkPhos  82            EKG:  Radiology:      Assessment /Plan:         Stefan Thomas DO [facility]  [facility]  [phone] CHIEF COMPLAINT:Patient is a 73y old  Female who presents with a chief complaint of shortness of breath (07 Sep 2018 12:05)      HISTORY OF PRESENT ILLNESS:HPI:  73F with h/o ESRD on HD via tunneled cath awaiting PD catheter placement ( HD is M/W/F), DM, HTN who presents with c/o dyspnea x 1 week. She reports sob at rest and on exertion. Pt reports associated URI symptoms ( cough, runny nose). She also c/o associated diaphoresis. She denies CP, palpitations, fever/chills, dizziness. Pt reports that dyspnea improves with albuterol nebs.   Of note pt was recently admitted to [facility] (  - ) for symptomatic anemia for she was transfused with 1 unit PRBC. Hospital course was complicated by testing RVP positve for RSV for which she received supportive care. Pt reports that she has remained dyspneic since discharge.    ED COURSE  VS : 157/79  65 20 O2 100% on room air T 98.4F  Labs : wbc 7.8  h/h 8.4/25.8  plt 241  Na 131 K 3.4  Trop 74  Cr 3.86  bnp 3595  Treatment : Albuterol neb x1 (06 Sep 2018 16:59)      PAST MEDICAL & SURGICAL HISTORY:  Obesity (BMI 30.0-34.9)  BMI 33.0-33.9,adult  Anemia  Dyslipidemia  Sleep apnea  Pedal edema  ESRD (end stage renal disease) on dialysis  Trigeminal neuralgia  Vertigo  Bronchial asthma  Chronic kidney disease  Diabetes mellitus  Hypercholesterolemia  HTN (hypertension)  History of carpal tunnel surgery of left wrist:   S/P cataract extraction: bilateral,   S/P total knee replacement: bilateral, left , right   S/P hysterectomy:   S/P :   S/P rotator cuff surgery: bilateral &#x27;s          MEDICATIONS:  aspirin enteric coated 81 milliGRAM(s) Oral daily  carvedilol 25 milliGRAM(s) Oral every 12 hours  furosemide    Tablet 80 milliGRAM(s) Oral daily  heparin  Injectable 5000 Unit(s) SubCutaneous every 12 hours  hydrALAZINE 100 milliGRAM(s) Oral three times a day  NIFEdipine XL 90 milliGRAM(s) Oral daily      ALBUTerol/ipratropium for Nebulization 3 milliLiter(s) Nebulizer every 6 hours PRN  buDESOnide 160 MICROgram(s)/formoterol 4.5 MICROgram(s) Inhaler 2 Puff(s) Inhalation two times a day  loratadine 10 milliGRAM(s) Oral daily        atorvastatin 80 milliGRAM(s) Oral at bedtime  dextrose 40% Gel 15 Gram(s) Oral once PRN  dextrose 50% Injectable 12.5 Gram(s) IV Push once  dextrose 50% Injectable 25 Gram(s) IV Push once  dextrose 50% Injectable 25 Gram(s) IV Push once  glucagon  Injectable 1 milliGRAM(s) IntraMuscular once PRN  insulin glargine Injectable (LANTUS) 15 Unit(s) SubCutaneous at bedtime  insulin lispro (HumaLOG) corrective regimen sliding scale   SubCutaneous three times a day before meals    calcitriol   Capsule 0.25 MICROGram(s) Oral <User Schedule>  dextrose 5%. 1000 milliLiter(s) IV Continuous <Continuous>  epoetin shannon Injectable 40139 Unit(s) IV Push <User Schedule>  fluticasone propionate 50 MICROgram(s)/spray Nasal Spray 1 Spray(s) Both Nostrils two times a day  influenza   Vaccine 0.5 milliLiter(s) IntraMuscular once  multivitamin 1 Tablet(s) Oral daily  oxymetazoline 0.05% Nasal Spray 1 Spray(s) Both Nostrils every 12 hours      FAMILY HISTORY:  No pertinent family history in first degree relatives      Non-contributory    SOCIAL HISTORY:    not a smoker    Allergies    IV Contrast (Anaphylaxis)  shellfish (Hives; Rash)  shrimp (Hives)  smoke; coughing (Other)    Intolerances    	   Review of Systems:  · Negative General Symptoms	no fever; no chills	  · General Symptoms	malaise; fatigue	  · Negative Respiratory and Thorax Symptoms	no wheezing; no hemoptysis	  · Respiratory and Thorax Symptoms	dyspnea  cough 	  · Negative Cardiovascular Symptoms	no chest pain; no palpitations; no orthopnea; no paroxysmal nocturnal dyspnea	  · Cardiovascular Symptoms	dyspnea on exertion; peripheral edema	  · Negative Gastrointestinal Symptoms	no nausea; no vomiting; no abdominal pain	  · Negative General Genitourinary Symptoms	no hematuria; no renal colic; no urinary hesitancy	  · Negative Musculoskeletal Symptoms	no arthritis; no joint swelling	  · Negative Neurological Symptoms	no weakness; no paresthesias	  · Negative Psychiatric Symptoms	no depression; no anxiety	  · Negative Hematology Symptoms	no gum bleeding; no nose bleeding	  · Negative Endocrine Symptoms	no cold intolerance; no heat intolerance	  	    All other ROS negative    PHYSICAL EXAM:  T(C): 37 (18 @ 11:22), Max: 37.1 (18 @ 21:42)  HR: 70 (18 @ 11:39) (64 - 79)  BP: 142/96 (18 @ 11:22) (114/68 - 161/77)  RR: 18 (18 @ 11:22) (18 - 19)  SpO2: 99% (18 @ 11:39) (98% - 100%)  Wt(kg): --  I&O's Summary    07 Sep 2018 07:01  -  07 Sep 2018 18:53  --------------------------------------------------------  IN: 480 mL / OUT: 0 mL / NET: 480 mL         Physical Exam:  · Constitutional	detailed exam	  · Constitutional Details	well-nourished; no distress	  · Respiratory	detailed exam	  · Respiratory Details	clear to auscultation bilaterally; no wheezes	  · Cardiovascular	detailed exam	  · Cardiovascular Details	regular rate and rhythm  no rub  no murmur 	  · Gastrointestinal	detailed exam	  · GI Normal	soft; nontender; no distention; no masses palpable; bowel sounds normal	  · Extremities	detailed exam 	  · Extremities Details	pedal edema	  · Pedal Edema Severity	1+	  · Pedal Edema Type	pitting	  · Neurological	detailed exam	  · Neurological Details	alert and oriented x 3	  · Skin	detailed exam	  · Skin Details	warm and dry	  · Musculoskeletal	detailed exam	  · Musculoskeletal Details	ROM intact; normal strength	    	  	  CARDIAC MARKERS:                                  8.4    7.8   )-----------( 241      ( 06 Sep 2018 14:21 )             25.8         131<L>  |  88<L>  |  18  ----------------------------<  167<H>  3.4<L>   |  30  |  3.86<H>    Ca    9.3      06 Sep 2018 14:21    TPro  7.2  /  Alb  4.7  /  TBili  0.2  /  DBili  x   /  AST  17  /  ALT  9<L>  /  AlkPhos  82            EKG: nsr rbbb  Radiology:    < from: CT Chest No Cont (18 @ 16:57) >  1.  No consolation or pulmonary edema.    2.  2-6 mm nodules within the right middle lobe and right lower lobe as   described above. Recommend a follow-up CT in 6-12 months to determine   stability.    3.  Aortic valve leaflet calcification. Ascending aortic aneurysm   measuring 4.7 cm.    4.  Coronary atherosclerosis.    5.  The main pulmonary artery is markedly dilated representing pulmonary   hypertension.    < end of copied text >    Assessment and Plan:    Assessment:  · Assessment		  73F with h/o  HTN, DM, ESRD on HD t, recent admission for symptomatic anemia , complicated by RSV bronchitis who presents with unresolved shortness of breath and URI symptoms.       Problem/Plan - 1:  ·  Problem: Bronchitis.  Plan: - likely 2/2 RSV infection  - pt reassured  - Duonebs prn  -supportive care  - O2 supplementation as needed.      Problem/Plan - 2:  ·  Problem: Dyspnea, unspecified type.  Plan: - likely 2/2 RSV bronchitis  - CXR is unremarkable  - Chest CT with no pulm edema   - c/w Duonebs prn.      Problem/Plan - 3:  ·  Problem: Anemia, unspecified type.  Plan: - likely anemia of chronic dxs  - gets iron sucrose on HD days  - Renal f/up.      Problem/Plan - 4:  ·  Problem: ESRD (end stage renal disease) on dialysis.  Plan: - with HD on // ; started 2 weeks ago  - pt had HD yesterday ; she does not appear to be volume overloaded  - no indication for emergent dialysis  - Renal consulted for routine HD  - monitor BMP.      Problem/Plan - 5:  ·  Problem: Troponin level elevated.  Plan: - in the setting of poor clearance in ESRD  - doubt ACS ; no CP, no EKG changes  - trend trop x 1  - check stat EKG if pt c/o CP.      Problem/Plan - 6:  Problem: Type 2 diabetes mellitus with complication, with long-term current use of insulin. Plan: - c/w home medication Lantus 15U qbedtime  - ARTUR  - FS monitoring qac qhs.     Problem/Plan - 7:  ·  Problem: Essential hypertension.  Plan: - c/w coreg, hydralazine, nifedipine xl ( with hold parameters).      Problem/Plan - 8:  ·  Problem: Dyslipidemia.  Plan: - c/w Lipitor.      Problem/Plan - 9:  ·  Problem: Sleep apnea, unspecified type.  Plan: - c/w CPAP at night.      Problem/Plan - 10:  Problem: Coronary atherosclerosis on Chest CT . Plan; - discussed with pt will need outpt stress test once acute issues/bronchitis resolves        Stefan Thomas DO [facility]  [facility]  [phone]

## 2018-09-08 ENCOUNTER — TRANSCRIPTION ENCOUNTER (OUTPATIENT)
Age: 73
End: 2018-09-08

## 2018-09-08 VITALS
SYSTOLIC BLOOD PRESSURE: 127 MMHG | OXYGEN SATURATION: 99 % | RESPIRATION RATE: 18 BRPM | DIASTOLIC BLOOD PRESSURE: 66 MMHG | HEART RATE: 70 BPM

## 2018-09-08 LAB
ANION GAP SERPL CALC-SCNC: 14 MMOL/L — SIGNIFICANT CHANGE UP (ref 5–17)
BUN SERPL-MCNC: 18 MG/DL — SIGNIFICANT CHANGE UP (ref 7–23)
CALCIUM SERPL-MCNC: 9 MG/DL — SIGNIFICANT CHANGE UP (ref 8.4–10.5)
CHLORIDE SERPL-SCNC: 96 MMOL/L — SIGNIFICANT CHANGE UP (ref 96–108)
CO2 SERPL-SCNC: 25 MMOL/L — SIGNIFICANT CHANGE UP (ref 22–31)
CREAT SERPL-MCNC: 3.55 MG/DL — HIGH (ref 0.5–1.3)
GLUCOSE SERPL-MCNC: 185 MG/DL — HIGH (ref 70–99)
HCT VFR BLD CALC: 26.1 % — LOW (ref 34.5–45)
HGB BLD-MCNC: 8.1 G/DL — LOW (ref 11.5–15.5)
MAGNESIUM SERPL-MCNC: 2.2 MG/DL — SIGNIFICANT CHANGE UP (ref 1.6–2.6)
MCHC RBC-ENTMCNC: 27.8 PG — SIGNIFICANT CHANGE UP (ref 27–34)
MCHC RBC-ENTMCNC: 31 GM/DL — LOW (ref 32–36)
MCV RBC AUTO: 89.7 FL — SIGNIFICANT CHANGE UP (ref 80–100)
NRBC # BLD: 6 /100 WBCS — HIGH (ref 0–0)
PHOSPHATE SERPL-MCNC: 2.4 MG/DL — LOW (ref 2.5–4.5)
PLATELET # BLD AUTO: 255 K/UL — SIGNIFICANT CHANGE UP (ref 150–400)
POTASSIUM SERPL-MCNC: 4.5 MMOL/L — SIGNIFICANT CHANGE UP (ref 3.5–5.3)
POTASSIUM SERPL-SCNC: 4.5 MMOL/L — SIGNIFICANT CHANGE UP (ref 3.5–5.3)
RBC # BLD: 2.91 M/UL — LOW (ref 3.8–5.2)
RBC # FLD: 17.5 % — HIGH (ref 10.3–14.5)
SODIUM SERPL-SCNC: 135 MMOL/L — SIGNIFICANT CHANGE UP (ref 135–145)
WBC # BLD: 8.15 K/UL — SIGNIFICANT CHANGE UP (ref 3.8–10.5)
WBC # FLD AUTO: 8.15 K/UL — SIGNIFICANT CHANGE UP (ref 3.8–10.5)

## 2018-09-08 PROCEDURE — 85027 COMPLETE CBC AUTOMATED: CPT

## 2018-09-08 PROCEDURE — 80048 BASIC METABOLIC PNL TOTAL CA: CPT

## 2018-09-08 PROCEDURE — 84484 ASSAY OF TROPONIN QUANT: CPT

## 2018-09-08 PROCEDURE — 71046 X-RAY EXAM CHEST 2 VIEWS: CPT

## 2018-09-08 PROCEDURE — 93970 EXTREMITY STUDY: CPT

## 2018-09-08 PROCEDURE — 87798 DETECT AGENT NOS DNA AMP: CPT

## 2018-09-08 PROCEDURE — 94660 CPAP INITIATION&MGMT: CPT

## 2018-09-08 PROCEDURE — 97161 PT EVAL LOW COMPLEX 20 MIN: CPT

## 2018-09-08 PROCEDURE — 87486 CHLMYD PNEUM DNA AMP PROBE: CPT

## 2018-09-08 PROCEDURE — 83735 ASSAY OF MAGNESIUM: CPT

## 2018-09-08 PROCEDURE — 99261: CPT

## 2018-09-08 PROCEDURE — 82962 GLUCOSE BLOOD TEST: CPT

## 2018-09-08 PROCEDURE — 99285 EMERGENCY DEPT VISIT HI MDM: CPT | Mod: 25

## 2018-09-08 PROCEDURE — 93005 ELECTROCARDIOGRAM TRACING: CPT

## 2018-09-08 PROCEDURE — 87633 RESP VIRUS 12-25 TARGETS: CPT

## 2018-09-08 PROCEDURE — 94640 AIRWAY INHALATION TREATMENT: CPT

## 2018-09-08 PROCEDURE — 84100 ASSAY OF PHOSPHORUS: CPT

## 2018-09-08 PROCEDURE — 80053 COMPREHEN METABOLIC PANEL: CPT

## 2018-09-08 PROCEDURE — 83880 ASSAY OF NATRIURETIC PEPTIDE: CPT

## 2018-09-08 PROCEDURE — 87581 M.PNEUMON DNA AMP PROBE: CPT

## 2018-09-08 PROCEDURE — 71250 CT THORAX DX C-: CPT

## 2018-09-08 RX ORDER — OXYMETAZOLINE HYDROCHLORIDE 0.5 MG/ML
1 SPRAY NASAL
Qty: 1 | Refills: 0 | OUTPATIENT
Start: 2018-09-08 | End: 2018-09-12

## 2018-09-08 RX ORDER — BUDESONIDE AND FORMOTEROL FUMARATE DIHYDRATE 160; 4.5 UG/1; UG/1
2 AEROSOL RESPIRATORY (INHALATION)
Qty: 1 | Refills: 0 | OUTPATIENT
Start: 2018-09-08 | End: 2018-10-07

## 2018-09-08 RX ADMIN — Medication 1 SPRAY(S): at 06:36

## 2018-09-08 RX ADMIN — BUDESONIDE AND FORMOTEROL FUMARATE DIHYDRATE 2 PUFF(S): 160; 4.5 AEROSOL RESPIRATORY (INHALATION) at 06:36

## 2018-09-08 RX ADMIN — Medication 81 MILLIGRAM(S): at 11:39

## 2018-09-08 RX ADMIN — CARVEDILOL PHOSPHATE 25 MILLIGRAM(S): 80 CAPSULE, EXTENDED RELEASE ORAL at 14:38

## 2018-09-08 RX ADMIN — Medication 80 MILLIGRAM(S): at 06:37

## 2018-09-08 RX ADMIN — Medication 1 TABLET(S): at 11:39

## 2018-09-08 RX ADMIN — HEPARIN SODIUM 5000 UNIT(S): 5000 INJECTION INTRAVENOUS; SUBCUTANEOUS at 05:48

## 2018-09-08 RX ADMIN — Medication 100 MILLIGRAM(S): at 14:38

## 2018-09-08 RX ADMIN — LORATADINE 10 MILLIGRAM(S): 10 TABLET ORAL at 11:39

## 2018-09-08 NOTE — DISCHARGE NOTE ADULT - CARE PROVIDER_API CALL
Alfonzo Martínez), Internal Medicine  [address] [place] [address]  Phone: [phone]  Fax: [phone] Alfonzo Martínez), Internal Medicine  [address] [place] [address]  Phone: [phone]  Fax: [phone]    Andi Evans), Internal Medicine; Nephrology  [facility] [place] [address]  Phone: [phone]  Fax: [phone]

## 2018-09-08 NOTE — DISCHARGE NOTE ADULT - CARE PLAN
Principal Discharge DX:	Bronchitis  Secondary Diagnosis:	Anemia due to chronic kidney disease  Secondary Diagnosis:	Dyspnea, unspecified type  Secondary Diagnosis:	ESRD (end stage renal disease) on dialysis  Secondary Diagnosis:	Pulmonary nodules/lesions, multiple  Secondary Diagnosis:	Troponin level elevated  Secondary Diagnosis:	Sleep apnea, unspecified type Principal Discharge DX:	Bronchitis  Goal:	s/p course of inhalers / resolving  Assessment and plan of treatment:	Take your medications as directed  Follow up as an out-pt  Call day after discharge to make an appointment with your PMD    Bring your discharge instructions and your medication list ot all follow up appointments   Follow up with dialysis  Secondary Diagnosis:	Anemia due to chronic kidney disease  Goal:	stable  Assessment and plan of treatment:	Continue with Epogen   Follow up with nephrology  Secondary Diagnosis:	Dyspnea, unspecified type  Goal:	resolving  Assessment and plan of treatment:	Take your medications as prescribed  Secondary Diagnosis:	ESRD (end stage renal disease) on dialysis  Goal:	stable  Assessment and plan of treatment:	Follow your dialysis schedule  Secondary Diagnosis:	Pulmonary nodules/lesions, multiple  Goal:	seen and followed by pulm.  Assessment and plan of treatment:	Follow up as an out-pt  Secondary Diagnosis:	Troponin level elevated  Goal:	labs trended - poor clearance in ESRD  Assessment and plan of treatment:	Follow up as an out-pt  Secondary Diagnosis:	Sleep apnea, unspecified type  Goal:	stable Principal Discharge DX:	Bronchitis  Goal:	s/p course of inhalers / resolving  Assessment and plan of treatment:	Take your medications as directed  Follow up as an out-pt  Call day after discharge to make an appointment with your PMD    Bring your discharge instructions and your medication list ot all follow up appointments   Follow up with dialysis  Secondary Diagnosis:	Anemia due to chronic kidney disease  Goal:	stable  Assessment and plan of treatment:	Continue with Epogen   Follow up with nephrology  Secondary Diagnosis:	Dyspnea, unspecified type  Goal:	resolving  Assessment and plan of treatment:	Take your medications as prescribed  Secondary Diagnosis:	ESRD (end stage renal disease) on dialysis  Goal:	stable  Assessment and plan of treatment:	Follow your dialysis schedule  Secondary Diagnosis:	Pulmonary nodules/lesions, multiple  Goal:	seen and followed by pulm.  Assessment and plan of treatment:	Follow up as an out-pt  Secondary Diagnosis:	Troponin level elevated  Goal:	labs trended - poor clearance in ESRD  Assessment and plan of treatment:	Follow up as an out-pt  Secondary Diagnosis:	Type 2 diabetes mellitus with complication, with long-term current use of insulin  Goal:	FS q ac and hs / Insulin Principal Discharge DX:	Bronchitis  Goal:	s/p course of inhalers / resolving  Assessment and plan of treatment:	Take your medications as directed  Follow up as an out-pt  Call day after discharge to make an appointment with your PMD    Bring your discharge instructions and your medication list ot all follow up appointments   Follow up with dialysis  Secondary Diagnosis:	Anemia due to chronic kidney disease  Goal:	stable  Assessment and plan of treatment:	Continue with Epogen   Follow up with nephrology  Secondary Diagnosis:	Dyspnea, unspecified type  Goal:	resolving  Assessment and plan of treatment:	Take your medications as prescribed  Secondary Diagnosis:	ESRD (end stage renal disease) on dialysis  Goal:	stable  Assessment and plan of treatment:	Follow your dialysis schedule  Secondary Diagnosis:	Pulmonary nodules/lesions, multiple  Goal:	seen and followed by pulm.  Assessment and plan of treatment:	Follow up as an out-pt  Secondary Diagnosis:	Troponin level elevated  Goal:	labs trended - poor clearance in ESRD  Assessment and plan of treatment:	Follow up as an out-pt  Secondary Diagnosis:	Type 2 diabetes mellitus with complication, with long-term current use of insulin  Goal:	FS q ac and hs / Insulin  Assessment and plan of treatment:	Continue with your home meds   FS q ac and hs  Consistent carbohydrate diet

## 2018-09-08 NOTE — PROGRESS NOTE ADULT - PROBLEM SELECTOR PLAN 4
- with HD on M/W/F ; started 2 weeks ago  - pt had HD yesterday ; she does not appear to be volume overloaded  - no indication for emergent dialysis  - Renal consulted for routine HD  - monitor BMP
HD per nephro recs  renal diet
on HD

## 2018-09-08 NOTE — PROGRESS NOTE ADULT - PROBLEM SELECTOR PLAN 2
Hg below goal, cont lakia with HD.
improved
small: outpt follow up
ct chest (-) infection or acute pathology, (+) pulm nodules. Pt educated on her condition and agrees to repeat CT Chest as outpt in 6 months.   -c/w supportive care for viral urti   -tele to r/o cardiac arrhythmias

## 2018-09-08 NOTE — PROGRESS NOTE ADULT - PROBLEM SELECTOR PROBLEM 1
Dyspnea, unspecified type
ESRD (end stage renal disease) on dialysis
Viral upper respiratory tract infection
Viral upper respiratory tract infection

## 2018-09-08 NOTE — DISCHARGE NOTE ADULT - MEDICATION SUMMARY - MEDICATIONS TO TAKE
I will START or STAY ON the medications listed below when I get home from the hospital:    aspirin 81 mg oral tablet  -- 1 tab(s) by mouth once a day  -- Indication: For heart     insulin glargine  -- 15 unit(s) subcutaneous once a day (at bedtime)  -- Indication: For DM     loratadine 10 mg oral tablet  -- 1 tab(s) by mouth once a day  -- Indication: For Anti hisatmine  - allergy     atorvastatin 80 mg oral tablet  -- 1 tab(s) by mouth once a day (at bedtime)  -- Indication: For cholesterol     Uloric 40 mg oral tablet  -- 1 tab(s) by mouth once a day  -- Indication: For uric acid reducer     carvedilol 25 mg oral tablet  -- 1 tab(s) by mouth 2 times a day  -- Indication: For heart     budesonide-formoterol 160 mcg-4.5 mcg/inh inhalation aerosol  -- 2 puff(s) inhaled 2 times a day   -- Indication: For Bronchodilator      Proventil HFA 90 mcg/inh inhalation aerosol  -- 2 puff(s) inhaled 4 times a day, As Needed -for shortness of breath and/or wheezing   -- For inhalation only.  It is very important that you take or use this exactly as directed.  Do not skip doses or discontinue unless directed by your doctor.  Obtain medical advice before taking any non-prescription drugs as some may affect the action of this medication.  Shake well before use.    -- Indication: For Bronchodilator      NIFEdipine 90 mg oral tablet, extended release  -- 1 tab(s) by mouth once a day  -- Indication: For heart    Lasix 40 mg oral tablet  -- 2 tablets (80 mg) by mouth in the morning and 1 tablet (40 mg) in the evening  -- Indication: For Diuretic     epoetin shannon  -- 06377 unit(s) intravenous 3 times a week w/ hemodialysis  -- Indication: For Anemia     fluticasone 50 mcg/inh nasal spray  -- 1 spray(s) into nose 2 times a day  -- Indication: For nasal topical     oxymetazoline 0.05% nasal spray  -- 1 application into nose 2 times a day   -- Indication: For nasal topical     hydrALAZINE 100 mg oral tablet  -- 1 tab(s) by mouth 3 times a day  -- Indication: For HTN      multivitamin  -- 1 tab(s) by mouth once a day  -- Indication: For Supplement     calcitriol 0.25 mcg oral capsule  -- 1 cap(s) by mouth 3 times a week on days of dialysis (Mon, Wed, Fri)  -- Indication: For Supplement

## 2018-09-08 NOTE — DISCHARGE NOTE ADULT - PLAN OF CARE
s/p course of inhalers / resolving Take your medications as directed  Follow up as an out-pt  Call day after discharge to make an appointment with your PMD    Bring your discharge instructions and your medication list ot all follow up appointments   Follow up with dialysis stable Continue with Epogen   Follow up with nephrology resolving Take your medications as prescribed Follow your dialysis schedule seen and followed by pulm. Follow up as an out-pt labs trended - poor clearance in ESRD FS q ac and hs / Insulin Continue with your home meds   FS q ac and hs  Consistent carbohydrate diet

## 2018-09-08 NOTE — PROGRESS NOTE ADULT - PROBLEM SELECTOR PROBLEM 6
Type 2 diabetes mellitus with complication, with long-term current use of insulin
Essential hypertension
Type 2 diabetes mellitus with complication, with long-term current use of insulin

## 2018-09-08 NOTE — PROGRESS NOTE ADULT - ASSESSMENT
1. Normocytic Anemia sec to CKD    -- H/H stable since discharge 8/31  -- w/u last admission showed adequate Iron, No B12/Folate Def, No Hemolysis and No monoclonal gammopathy  -- Venofer given w HD  -- Mircera Q 2 weeks. Need to find out dose and date of last administration  -- transfuse PRN Hgb < 7 or < 8 if Sx    2. Dyspnea    -- CT Chest No Effusions or Infiltrate (prelim)  -- cont Duoneb  -- management per Medicine    3. ESRD    -- HD per renal    Trivictor hugo Wood MD  [phone]
73F with h/o  HTN, DM, ESRD on HD awaiting PD cathter placement, recent admission for symptomatic anemia , complicated by RSV bronchitis who presents with shortness of breath
73F with h/o  HTN, DM, ESRD on HD awaiting PD cathter placement, recent admission for symptomatic anemia , complicated by RSV bronchitis who presents with unresolved shortness of breath and URI symptoms.
73F with h/o ESRD on HD via tunneled cath awaiting PD catheter placement ( HD is M/W/F), DM, HTN who presents with c/o dyspnea with recent RSV infection
73F with h/o  HTN, DM, ESRD on HD awaiting PD cathter placement, recent admission for symptomatic anemia , complicated by RSV bronchitis who presents with shortness of breath

## 2018-09-08 NOTE — DISCHARGE NOTE ADULT - HOME CARE AGENCY
[facility] - [phone] - Registered Nurse will contact you to arrange initial visit.  Physical Therapy to follow.

## 2018-09-08 NOTE — PROGRESS NOTE ADULT - PROBLEM SELECTOR PLAN 6
- c/w home medication Lantus 15U qbedtime  - ARTUR  - FS monitoring qac qhs  - dm education
Blood pressure si stable
dm rx   dm education

## 2018-09-08 NOTE — DISCHARGE NOTE ADULT - ADDITIONAL INSTRUCTIONS
Take your medications as directed  Follow up as an out-pt  Call day after discharge to make an appointment with your PMD    Bring your discharge instructions and your medication list ot all follow up appointments   Follow up with dialysis schedule

## 2018-09-08 NOTE — PROGRESS NOTE ADULT - SUBJECTIVE AND OBJECTIVE BOX
HPI:    73F with h/o ESRD on HD via tunneled cath awaiting PD catheter placement ( HD is M/W/F), DM, HTN who presented with c/o dyspnea x 1 week. She reports sob at rest and on exertion. Pt reports associated URI symptoms ( cough, runny nose). She also c/o associated diaphoresis. She denies CP, palpitations, fever/chills, dizziness. Pt reports that dyspnea improves with albuterol nebs.   Of note pt was recently admitted to [facility] (  - ) for symptomatic anemia for she was transfused with 1 unit PRBC. Hospital course was complicated by testing RVP positve for RSV for which she received supportive care. Pt reports that she has remained dyspneic since discharge.    Pt was seen last admission by my associate Dr. Junior for anemia. Outpt labs had showen Hb 6's. Pt reports having received aranesp in the past, which was later stopped as her Hb was in the 11-14 range, and then  EPO once her Hb began to drop again, but this was also stopped as it was felt to be ineffective. She was started on high dose Mircera.     On admission Hgb was 8.4 which is stable (was 8.3 on d/c ). Anemia w/u last admission showed adequate Iron, No B12/Folate Def, No Hemolysis and No monoclonal gammopathy        PAST MEDICAL & SURGICAL HISTORY:  Obesity (BMI 30.0-34.9)  BMI 33.0-33.9,adult  Anemia  Dyslipidemia  Sleep apnea  Pedal edema  ESRD (end stage renal disease) on dialysis  Trigeminal neuralgia  Vertigo  Bronchial asthma  Chronic kidney disease  Diabetes mellitus  Hypercholesterolemia  HTN (hypertension)  History of carpal tunnel surgery of left wrist:   S/P cataract extraction: bilateral,   S/P total knee replacement: bilateral, left 2006, right   S/P hysterectomy:   S/P :   S/P rotator cuff surgery: bilateral &#x27;s      ROS:  Negative except for: SOB, Fatigue    MEDICATIONS  (STANDING):  aspirin enteric coated 81 milliGRAM(s) Oral daily  atorvastatin 80 milliGRAM(s) Oral at bedtime  calcitriol   Capsule 0.25 MICROGram(s) Oral <User Schedule>  carvedilol 25 milliGRAM(s) Oral every 12 hours  dextrose 5%. 1000 milliLiter(s) (50 mL/Hr) IV Continuous <Continuous>  dextrose 50% Injectable 12.5 Gram(s) IV Push once  dextrose 50% Injectable 25 Gram(s) IV Push once  dextrose 50% Injectable 25 Gram(s) IV Push once  fluticasone propionate 50 MICROgram(s)/spray Nasal Spray 1 Spray(s) Both Nostrils two times a day  furosemide    Tablet 80 milliGRAM(s) Oral daily  hydrALAZINE 100 milliGRAM(s) Oral three times a day  influenza   Vaccine 0.5 milliLiter(s) IntraMuscular once  insulin glargine Injectable (LANTUS) 15 Unit(s) SubCutaneous at bedtime  insulin lispro (HumaLOG) corrective regimen sliding scale   SubCutaneous three times a day before meals  loratadine 10 milliGRAM(s) Oral daily  multivitamin 1 Tablet(s) Oral daily  NIFEdipine XL 90 milliGRAM(s) Oral daily    MEDICATIONS  (PRN):  ALBUTerol/ipratropium for Nebulization 3 milliLiter(s) Nebulizer every 6 hours PRN Shortness of Breath and/or Wheezing  dextrose 40% Gel 15 Gram(s) Oral once PRN Blood Glucose LESS THAN 70 milliGRAM(s)/deciliter  glucagon  Injectable 1 milliGRAM(s) IntraMuscular once PRN Glucose LESS THAN 70 milligrams/deciliter      Allergies    IV Contrast (Anaphylaxis)  shellfish (Hives; Rash)  shrimp (Hives)  smoke; coughing (Other)    Intolerances        Vital Signs Last 24 Hrs  T(C): 36.9 (07 Sep 2018 04:31), Max: 37.2 (06 Sep 2018 14:32)  T(F): 98.5 (07 Sep 2018 04:31), Max: 99 (06 Sep 2018 14:32)  HR: 64 (07 Sep 2018 04:31) (60 - 79)  BP: 137/69 (07 Sep 2018 04:31) (137/69 - 165/82)  BP(mean): 109 (06 Sep 2018 16:59) (109 - 109)  RR: 18 (07 Sep 2018 04:31) (18 - 20)  SpO2: 100% (07 Sep 2018 04:31) (98% - 100%)    PHYSICAL EXAM  General: adult in NAD  HEENT: clear oropharynx, anicteric sclera, pink conjunctiva  Neck: supple  CV: normal S1/S2 with no murmur rubs or gallops  Lungs: positive air movement b/l ant lungs,clear to auscultation, no wheezes, no rales  Abdomen: soft non-tender non-distended, no hepatosplenomegaly  Ext: no clubbing cyanosis or edema  Skin: no rashes and no petechiae  Neuro: alert and oriented X 4, no focal deficits  LABS:                          8.4    7.8   )-----------( 241      ( 06 Sep 2018 14:21 )             25.8     Serial CBC's   @ 14:21  Hct-25.8 / Hgb-8.4 / Plat-241 / RBC-2.92 / WBC-7.8                131<L>  |  88<L>  |  18  ----------------------------<  167<H>  3.4<L>   |  30  |  3.86<H>    Ca    9.3      06 Sep 2018 14:21    TPro  7.2  /  Alb  4.7  /  TBili  0.2  /  DBili  x   /  AST  17  /  ALT  9<L>  /  AlkPhos  82            Hemoglobin: 8.4 g/dL ( @ 14:21)  Hematocrit: 25.8 % ( @ 14:21)            RADIOLOGY & ADDITIONAL STUDIES:
Patient is a 73y old  Female who presents with a chief complaint of shortness of breath (08 Sep 2018 08:42)      Any change in ROS: pt is dong better: she is walking around on the floor and says her breathing is little better today      MEDICATIONS  (STANDING):  aspirin enteric coated 81 milliGRAM(s) Oral daily  atorvastatin 80 milliGRAM(s) Oral at bedtime  buDESOnide 160 MICROgram(s)/formoterol 4.5 MICROgram(s) Inhaler 2 Puff(s) Inhalation two times a day  calcitriol   Capsule 0.25 MICROGram(s) Oral <User Schedule>  carvedilol 25 milliGRAM(s) Oral every 12 hours  dextrose 5%. 1000 milliLiter(s) (50 mL/Hr) IV Continuous <Continuous>  dextrose 50% Injectable 12.5 Gram(s) IV Push once  dextrose 50% Injectable 25 Gram(s) IV Push once  dextrose 50% Injectable 25 Gram(s) IV Push once  epoetin shannon Injectable 41741 Unit(s) IV Push <User Schedule>  fluticasone propionate 50 MICROgram(s)/spray Nasal Spray 1 Spray(s) Both Nostrils two times a day  furosemide    Tablet 80 milliGRAM(s) Oral daily  heparin  Injectable 5000 Unit(s) SubCutaneous every 12 hours  hydrALAZINE 100 milliGRAM(s) Oral three times a day  influenza   Vaccine 0.5 milliLiter(s) IntraMuscular once  insulin glargine Injectable (LANTUS) 15 Unit(s) SubCutaneous at bedtime  insulin lispro (HumaLOG) corrective regimen sliding scale   SubCutaneous three times a day before meals  loratadine 10 milliGRAM(s) Oral daily  multivitamin 1 Tablet(s) Oral daily  NIFEdipine XL 90 milliGRAM(s) Oral daily  oxymetazoline 0.05% Nasal Spray 1 Spray(s) Both Nostrils every 12 hours    MEDICATIONS  (PRN):  ALBUTerol/ipratropium for Nebulization 3 milliLiter(s) Nebulizer every 6 hours PRN Shortness of Breath and/or Wheezing  dextrose 40% Gel 15 Gram(s) Oral once PRN Blood Glucose LESS THAN 70 milliGRAM(s)/deciliter  glucagon  Injectable 1 milliGRAM(s) IntraMuscular once PRN Glucose LESS THAN 70 milligrams/deciliter    Vital Signs Last 24 Hrs  T(C): 37.2 (08 Sep 2018 11:23), Max: 37.2 (08 Sep 2018 04:52)  T(F): 98.9 (08 Sep 2018 11:23), Max: 98.9 (08 Sep 2018 04:52)  HR: 68 (08 Sep 2018 11:23) (68 - 88)  BP: 115/70 (08 Sep 2018 11:23) (98/64 - 142/85)  BP(mean): --  RR: 18 (08 Sep 2018 11:23) (18 - 18)  SpO2: 99% (08 Sep 2018 11:23) (97% - 100%)    I&O's Summary    07 Sep 2018 07:01  -  08 Sep 2018 07:00  --------------------------------------------------------  IN: 2245 mL / OUT: 3300 mL / NET: -1055 mL    08 Sep 2018 07:01  -  08 Sep 2018 11:44  --------------------------------------------------------  IN: 240 mL / OUT: 0 mL / NET: 240 mL          Physical Exam:   GENERAL: NAD, well-groomed, well-developed  HEENT: DOROTHEA/   Atraumatic, Normocephalic  ENMT: No tonsillar erythema, exudates, or enlargement; Moist mucous membranes, Good dentition, No lesions  NECK: Supple, No JVD, Normal thyroid  CHEST/LUNG: Clear to auscultaion  CVS: Regular rate and rhythm; No murmurs, rubs, or gallops  GI: : Soft, Nontender, Nondistended; Bowel sounds present  NERVOUS SYSTEM:  Alert & Oriented X3  EXTREMITIES:  2+ Peripheral Pulses, No clubbing, cyanosis, or edema  LYMPH: No lymphadenopathy noted  SKIN: No rashes or lesions  ENDOCRINOLOGY: No Thyromegaly  PSYCH: Appropriate    Labs:                              8.4    7.8   )-----------( 241      ( 06 Sep 2018 14:21 )             25.8     09-08    135  |  96  |  18  ----------------------------<  185<H>  4.5   |  25  |  3.55<H>  09-06    131<L>  |  88<L>  |  18  ----------------------------<  167<H>  3.4<L>   |  30  |  3.86<H>    Ca    9.0      08 Sep 2018 06:22  Ca    9.3      06 Sep 2018 14:21  Phos  2.4     09-08  Mg     2.2     09-08    TPro  7.2  /  Alb  4.7  /  TBili  0.2  /  DBili  x   /  AST  17  /  ALT  9<L>  /  AlkPhos  82  09-06    CAPILLARY BLOOD GLUCOSE      POCT Blood Glucose.: 147 mg/dL (08 Sep 2018 07:42)  POCT Blood Glucose.: 138 mg/dL (07 Sep 2018 23:20)  POCT Blood Glucose.: 148 mg/dL (07 Sep 2018 16:41)  POCT Blood Glucose.: 147 mg/dL (07 Sep 2018 11:54)      LIVER FUNCTIONS - ( 06 Sep 2018 14:21 )  Alb: 4.7 g/dL / Pro: 7.2 g/dL / ALK PHOS: 82 U/L / ALT: 9 U/L / AST: 17 U/L / GGT: x               Serum Pro-Brain Natriuretic Peptide: 3595 pg/mL (09-06 @ 14:21)        RECENT CULTURES:        RESPIRATORY CULTURES:      < from: [facility] Duplex Lower Ext Vein Scan, Bilat (09.07.18 @ 15:50) >  EXAM:  DUPLEX SCAN EXT VEINS LOWER BI                            PROCEDURE DATE:  09/07/2018            INTERPRETATION:  CLINICAL INFORMATION: 73-year-old with end-stage renal   disease and shortness of breath with leg swelling. Assess for DVT    COMPARISON: None available.    TECHNIQUE: Duplex sonography of the BILATERAL LOWER extremities with   color and spectral Doppler, with and without compression.      FINDINGS:    There is normal compressibility of the bilateral common femoral, femoral   and popliteal veins. No calf vein thrombosis is detected.    Doppler examination shows normal spontaneous and phasic flow.    IMPRESSION:     No evidence of bilateral lower extremity deep venous thrombosis.            < from: CT Chest No Cont (09.06.18 @ 16:57) >  HEART AND VASCULATURE: Four-chamber dilatation of the heart. No   pericardial effusion. Multivessel calcific coronary atherosclerosis.    Aortic valve leaflet calcification. Ascending aortic aneurysm measuring   4.7 cm. The aortic arch is dilated measuring 2.8 cm. The descending aorta   is normal in caliber.    The main pulmonary artery is markedly dilated measuring 4.4 cm.    UPPER ABDOMEN: Aortic atherosclerosis. Calcified granulomas within the   liver. Nodular thickening of bilateral abdominal glands.    BONES AND SOFT TISSUES: No aggressive osseous lesion. Spinal degenerative   changes. Advanced degenerative changes of bilateral shoulders right   greater than the left.    LOWER NECK: Unremarkable.    IMPRESSION:     1.  No consolation or pulmonary edema.    2.  2-6 mm nodules within the right middle lobe and right lower lobe as   described above. Recommend a follow-up CT in 6-12 months to determine   stability.    3.  Aortic valve leaflet calcification. Ascending aortic aneurysm   measuring 4.7 cm.    4.  Coronary atherosclerosis.    5.  The main pulmonary artery is markedly dilated representing pulmonary   hypertension.                    NIMA JORGE M.D., ATTENDING RADIOLOGIST  This document has been electronically signed. Sep  7 2018  8:37AM    < end of copied text >              KERVIN WATTS M.D.,ATTENDING RADIOLOGIST  This document has been electronically signed. Sep  7 2018  4:41PM    < end of copied text >      Studies  Chest X-RAY  CT SCAN Chest   Venous Dopplers: LE:   CT Abdomen  Others
Patient seen and examined at bedside  No acute events noted overnight  Case discussed with medical team    HPI:  73F with h/o ESRD on HD via tunneled cath awaiting PD catheter placement ( HD is M/W/F), DM, HTN who presents with c/o dyspnea x 1 week. She reports sob at rest and on exertion. Pt reports associated URI symptoms ( cough, runny nose). She also c/o associated diaphoresis. She denies CP, palpitations, fever/chills, dizziness. Pt reports that dyspnea improves with albuterol nebs.   Of note pt was recently admitted to [facility] (  - ) for symptomatic anemia for she was transfused with 1 unit PRBC. Hospital course was complicated by testing RVP positve for RSV for which she received supportive care. Pt reports that she has remained dyspneic since discharge.    ED COURSE  VS : 157/79  65 20 O2 100% on room air T 98.4F  Labs : wbc 7.8  h/h 8.4/25.8  plt 241  Na 131 K 3.4  Trop 74  Cr 3.86  bnp 3595  Treatment : Albuterol neb x1 (06 Sep 2018 16:59)      PAST MEDICAL & SURGICAL HISTORY:  Obesity (BMI 30.0-34.9)  BMI 33.0-33.9,adult  Anemia  Dyslipidemia  Sleep apnea  Pedal edema  ESRD (end stage renal disease) on dialysis  Trigeminal neuralgia  Vertigo  Bronchial asthma  Chronic kidney disease  Diabetes mellitus  Hypercholesterolemia  HTN (hypertension)  History of carpal tunnel surgery of left wrist:   S/P cataract extraction: bilateral,   S/P total knee replacement: bilateral, left 2006, right   S/P hysterectomy:   S/P :   S/P rotator cuff surgery: bilateral &#x27;s      IV Contrast (Anaphylaxis)  shellfish (Hives; Rash)  shrimp (Hives)  smoke; coughing (Other)       MEDICATIONS  (STANDING):  aspirin enteric coated 81 milliGRAM(s) Oral daily  atorvastatin 80 milliGRAM(s) Oral at bedtime  buDESOnide 160 MICROgram(s)/formoterol 4.5 MICROgram(s) Inhaler 2 Puff(s) Inhalation two times a day  calcitriol   Capsule 0.25 MICROGram(s) Oral <User Schedule>  carvedilol 25 milliGRAM(s) Oral every 12 hours  dextrose 5%. 1000 milliLiter(s) (50 mL/Hr) IV Continuous <Continuous>  dextrose 50% Injectable 12.5 Gram(s) IV Push once  dextrose 50% Injectable 25 Gram(s) IV Push once  dextrose 50% Injectable 25 Gram(s) IV Push once  epoetin shannon Injectable 58260 Unit(s) IV Push <User Schedule>  fluticasone propionate 50 MICROgram(s)/spray Nasal Spray 1 Spray(s) Both Nostrils two times a day  furosemide    Tablet 80 milliGRAM(s) Oral daily  heparin  Injectable 5000 Unit(s) SubCutaneous every 12 hours  hydrALAZINE 100 milliGRAM(s) Oral three times a day  influenza   Vaccine 0.5 milliLiter(s) IntraMuscular once  insulin glargine Injectable (LANTUS) 15 Unit(s) SubCutaneous at bedtime  insulin lispro (HumaLOG) corrective regimen sliding scale   SubCutaneous three times a day before meals  loratadine 10 milliGRAM(s) Oral daily  multivitamin 1 Tablet(s) Oral daily  NIFEdipine XL 90 milliGRAM(s) Oral daily  oxymetazoline 0.05% Nasal Spray 1 Spray(s) Both Nostrils every 12 hours    MEDICATIONS  (PRN):  ALBUTerol/ipratropium for Nebulization 3 milliLiter(s) Nebulizer every 6 hours PRN Shortness of Breath and/or Wheezing  dextrose 40% Gel 15 Gram(s) Oral once PRN Blood Glucose LESS THAN 70 milliGRAM(s)/deciliter  glucagon  Injectable 1 milliGRAM(s) IntraMuscular once PRN Glucose LESS THAN 70 milligrams/deciliter      REVIEW OF SYSTEMS:  CONSTITUTIONAL: (+) malaise.   EYES: No acute change in vision   ENT:  No tinnitus  NECK: No stiffness  RESPIRATORY: No hemoptysis  CARDIOVASCULAR: No chest pain, palpitations, syncope  GASTROINTESTINAL: No hematemesis, diarrhea, melena, or hematochezia.  GENITOURINARY: No hematuria  NEUROLOGICAL: No headaches  LYMPH Nodes: No enlarged glands  ENDOCRINE: No heat or cold intolerance	    T(C): 37.2 (18 @ 04:52), Max: 37.2 (18 @ 04:52)  HR: 82 (18 @ 06:34) (67 - 88)  BP: 113/71 (18 @ 06:34) (98/64 - 142/96)  RR: 18 (18 @ 04:52) (18 - 18)  SpO2: 97% (18 @ 04:52) (97% - 100%)    PHYSICAL EXAMINATION:   Constitutional: WD, NAD  HEENT: NC, AT  Neck:  Supple  Respiratory:  Adequate airflow b/l. Not using accessory muscles of respiration.  Cardiovascular:  S1 & S2 intact, no R/G, 2+ radial pulses b/l  Gastrointestinal: Soft, NT, ND, normoactive b.s., no organomegaly/RT/rigidity  Extremities: WWP  Neurological:  Alert and awake.  No acute focal motor deficits. Crude sensation intact.     Labs and imaging reviewed    LABS:                        8.4    7.8   )-----------( 241      ( 06 Sep 2018 14:21 )             25.8         135  |  96  |  18  ----------------------------<  185<H>  4.5   |  25  |  3.55<H>    Ca    9.0      08 Sep 2018 06:22  Phos  2.4       Mg     2.2         TPro  7.2  /  Alb  4.7  /  TBili  0.2  /  DBili  x   /  AST  17  /  ALT  9<L>  /  AlkPhos  82              CAPILLARY BLOOD GLUCOSE      POCT Blood Glucose.: 147 mg/dL (08 Sep 2018 07:42)  POCT Blood Glucose.: 138 mg/dL (07 Sep 2018 23:20)  POCT Blood Glucose.: 148 mg/dL (07 Sep 2018 16:41)  POCT Blood Glucose.: 147 mg/dL (07 Sep 2018 11:54)        LIVER FUNCTIONS - ( 06 Sep 2018 14:21 )  Alb: 4.7 g/dL / Pro: 7.2 g/dL / ALK PHOS: 82 U/L / ALT: 9 U/L / AST: 17 U/L / GGT: x               RADIOLOGY & ADDITIONAL STUDIES:
Pt seen and examined at bedside  Feeling better this morning. Denies cough or wheezing. Slept well.     Allergies:  IV Contrast (Anaphylaxis)  shellfish (Hives; Rash)  shrimp (Hives)  smoke; coughing (Other)    Hospital Medications:   MEDICATIONS  (STANDING):  aspirin enteric coated 81 milliGRAM(s) Oral daily  atorvastatin 80 milliGRAM(s) Oral at bedtime  buDESOnide 160 MICROgram(s)/formoterol 4.5 MICROgram(s) Inhaler 2 Puff(s) Inhalation two times a day  calcitriol   Capsule 0.25 MICROGram(s) Oral <User Schedule>  carvedilol 25 milliGRAM(s) Oral every 12 hours  dextrose 5%. 1000 milliLiter(s) (50 mL/Hr) IV Continuous <Continuous>  dextrose 50% Injectable 12.5 Gram(s) IV Push once  dextrose 50% Injectable 25 Gram(s) IV Push once  dextrose 50% Injectable 25 Gram(s) IV Push once  epoetin shannon Injectable 57806 Unit(s) IV Push <User Schedule>  fluticasone propionate 50 MICROgram(s)/spray Nasal Spray 1 Spray(s) Both Nostrils two times a day  furosemide    Tablet 80 milliGRAM(s) Oral daily  heparin  Injectable 5000 Unit(s) SubCutaneous every 12 hours  hydrALAZINE 100 milliGRAM(s) Oral three times a day  influenza   Vaccine 0.5 milliLiter(s) IntraMuscular once  insulin glargine Injectable (LANTUS) 15 Unit(s) SubCutaneous at bedtime  insulin lispro (HumaLOG) corrective regimen sliding scale   SubCutaneous three times a day before meals  loratadine 10 milliGRAM(s) Oral daily  multivitamin 1 Tablet(s) Oral daily  NIFEdipine XL 90 milliGRAM(s) Oral daily  oxymetazoline 0.05% Nasal Spray 1 Spray(s) Both Nostrils every 12 hours      VITALS:  T(F): 98.9 (09-08-18 @ 11:23), Max: 98.9 (09-08-18 @ 04:52)  HR: 68 (09-08-18 @ 11:23)  BP: 115/70 (09-08-18 @ 11:23)  RR: 18 (09-08-18 @ 11:23)  SpO2: 99% (09-08-18 @ 11:23)  Wt(kg): --    09-07 @ 07:01  -  09-08 @ 07:00  --------------------------------------------------------  IN: 2245 mL / OUT: 3300 mL / NET: -1055 mL    09-08 @ 07:01  -  09-08 @ 11:57  --------------------------------------------------------  IN: 240 mL / OUT: 0 mL / NET: 240 mL    PHYSICAL EXAM:  Constitutional: NAD  HEENT: anicteric sclera, oropharynx clear, MMM  Neck: No JVD  Respiratory: CTAB, no wheezes, rales or rhonchi  Cardiovascular: S1, S2, RRR  Gastrointestinal: BS+, soft, NT/ND  Extremities: No cyanosis or clubbing. No peripheral edema  Neurological: A/O x 3, no focal deficits  Psychiatric: Normal mood, normal affect  : No CVA tenderness. No cottrell.   Skin: No rashes  Vascular Access: RIJ Tunneled cath     LABS:  09-08    135  |  96  |  18  ----------------------------<  185<H>  4.5   |  25  |  3.55<H>    Ca    9.0      08 Sep 2018 06:22  Phos  2.4     09-08  Mg     2.2     09-08    TPro  7.2  /  Alb  4.7  /  TBili  0.2  /  DBili      /  AST  17  /  ALT  9<L>  /  AlkPhos  82  09-06    Creatinine Trend: 3.55 <--, 3.86 <--                        8.1    8.15  )-----------( 255      ( 08 Sep 2018 08:31 )             26.1     Urine Studies:      RADIOLOGY & ADDITIONAL STUDIES:
Patient seen and examined at bedside  Case discussed with medical team    HPI:  73F with h/o ESRD on HD via tunneled cath awaiting PD catheter placement ( HD is M/W/F), DM, HTN who presents with c/o dyspnea     PAST MEDICAL & SURGICAL HISTORY:  Obesity (BMI 30.0-34.9)  BMI 33.0-33.9,adult  Anemia  Dyslipidemia  Sleep apnea  Pedal edema  ESRD (end stage renal disease) on dialysis  Trigeminal neuralgia  Vertigo  Bronchial asthma  Chronic kidney disease  Diabetes mellitus  Hypercholesterolemia  HTN (hypertension)  History of carpal tunnel surgery of left wrist:   S/P cataract extraction: bilateral,   S/P total knee replacement: bilateral, left , right   S/P hysterectomy:   S/P :   S/P rotator cuff surgery: bilateral &#x27;s      IV Contrast (Anaphylaxis)  shellfish (Hives; Rash)  shrimp (Hives)  smoke; coughing (Other)       MEDICATIONS  (STANDING):  aspirin enteric coated 81 milliGRAM(s) Oral daily  atorvastatin 80 milliGRAM(s) Oral at bedtime  buDESOnide 160 MICROgram(s)/formoterol 4.5 MICROgram(s) Inhaler 2 Puff(s) Inhalation two times a day  calcitriol   Capsule 0.25 MICROGram(s) Oral <User Schedule>  carvedilol 25 milliGRAM(s) Oral every 12 hours  dextrose 5%. 1000 milliLiter(s) (50 mL/Hr) IV Continuous <Continuous>  dextrose 50% Injectable 12.5 Gram(s) IV Push once  dextrose 50% Injectable 25 Gram(s) IV Push once  dextrose 50% Injectable 25 Gram(s) IV Push once  fluticasone propionate 50 MICROgram(s)/spray Nasal Spray 1 Spray(s) Both Nostrils two times a day  furosemide    Tablet 80 milliGRAM(s) Oral daily  heparin  Injectable 5000 Unit(s) SubCutaneous every 12 hours  hydrALAZINE 100 milliGRAM(s) Oral three times a day  influenza   Vaccine 0.5 milliLiter(s) IntraMuscular once  insulin glargine Injectable (LANTUS) 15 Unit(s) SubCutaneous at bedtime  insulin lispro (HumaLOG) corrective regimen sliding scale   SubCutaneous three times a day before meals  loratadine 10 milliGRAM(s) Oral daily  multivitamin 1 Tablet(s) Oral daily  NIFEdipine XL 90 milliGRAM(s) Oral daily  oxymetazoline 0.05% Nasal Spray 1 Spray(s) Both Nostrils every 12 hours    MEDICATIONS  (PRN):  ALBUTerol/ipratropium for Nebulization 3 milliLiter(s) Nebulizer every 6 hours PRN Shortness of Breath and/or Wheezing  dextrose 40% Gel 15 Gram(s) Oral once PRN Blood Glucose LESS THAN 70 milliGRAM(s)/deciliter  glucagon  Injectable 1 milliGRAM(s) IntraMuscular once PRN Glucose LESS THAN 70 milligrams/deciliter      REVIEW OF SYSTEMS:  CONSTITUTIONAL: (+) malaise.   EYES: No acute change in vision   ENT:  No tinnitus  NECK: No stiffness  RESPIRATORY: drew. No hemoptysis  CARDIOVASCULAR: No chest pain, palpitations, syncope  GASTROINTESTINAL: No hematemesis, diarrhea, melena, or hematochezia.  GENITOURINARY: No hematuria  NEUROLOGICAL: No headaches  LYMPH Nodes: No enlarged glands  ENDOCRINE: No heat or cold intolerance	    T(C): 37 (18 @ 11:22), Max: 37.2 (18 @ 14:32)  HR: 70 (18 @ 11:39) (60 - 79)  BP: 142/96 (18 @ 11:22) (114/68 - 165/82)  RR: 18 (18 @ 11:22) (18 - 20)  SpO2: 99% (18 @ 11:39) (98% - 100%)    PHYSICAL EXAMINATION:   Constitutional: WD, NAD  HEENT: nasal congestion. NC, AT  Neck:  Supple  Respiratory:  Adequate airflow b/l. Not using accessory muscles of respiration.  Cardiovascular:  S1 & S2 intact, no R/G, 2+ radial pulses b/l  Gastrointestinal: Soft, NT, ND, normoactive b.s., no organomegaly/RT/rigidity  Extremities: pedal edema. WWP  Neurological:  Alert and awake.  No acute focal motor deficits. Crude sensation intact.     Labs and imaging reviewed    LABS:                        8.4    7.8   )-----------( 241      ( 06 Sep 2018 14:21 )             25.8     09-    131<L>  |  88<L>  |  18  ----------------------------<  167<H>  3.4<L>   |  30  |  3.86<H>    Ca    9.3      06 Sep 2018 14:21    TPro  7.2  /  Alb  4.7  /  TBili  0.2  /  DBili  x   /  AST  17  /  ALT  9<L>  /  AlkPhos  82              CAPILLARY BLOOD GLUCOSE      POCT Blood Glucose.: 147 mg/dL (07 Sep 2018 11:54)  POCT Blood Glucose.: 114 mg/dL (07 Sep 2018 07:50)  POCT Blood Glucose.: 187 mg/dL (06 Sep 2018 23:16)        LIVER FUNCTIONS - ( 06 Sep 2018 14:21 )  Alb: 4.7 g/dL / Pro: 7.2 g/dL / ALK PHOS: 82 U/L / ALT: 9 U/L / AST: 17 U/L / GGT: x               RADIOLOGY & ADDITIONAL STUDIES:

## 2018-09-08 NOTE — DISCHARGE NOTE ADULT - SECONDARY DIAGNOSIS.
Anemia due to chronic kidney disease Dyspnea, unspecified type ESRD (end stage renal disease) on dialysis Pulmonary nodules/lesions, multiple Troponin level elevated Sleep apnea, unspecified type Type 2 diabetes mellitus with complication, with long-term current use of insulin

## 2018-09-08 NOTE — PROGRESS NOTE ADULT - PROBLEM SELECTOR PLAN 1
Maintenance HD MWF.   Pt tolerated HD yesterday well, no acute issues.   Electrolytes and volume status acceptable.
improving. Pt's dyspnea improving.  Pt is medically clearaed and optimized for safe d/c.  Pt agrees to discharge home as she's feeling much improved.   all medical consultant recs/management appreciated
she says she is feeing better: Would cont current treatment: Jett BD as well as afrin
supportive symptomatic care  c/w pulm rx  all medical consultant recs/management appreciated

## 2018-09-08 NOTE — PROGRESS NOTE ADULT - PROBLEM SELECTOR PLAN 5
- in the setting of poor clearance in ESRD  - doubt ACS ; no active CP, no EKG changes  - trend trop x 1  - check stat EKG if pt c/o CP  - additional recs per cardiology
- in the setting of poor clearance in ESRD  no active symptoms
controlled

## 2018-09-08 NOTE — PROGRESS NOTE ADULT - PROBLEM SELECTOR PROBLEM 5
Troponin level elevated
Troponin level elevated
Type 2 diabetes mellitus with complication, with long-term current use of insulin

## 2018-09-08 NOTE — PROGRESS NOTE ADULT - PROBLEM SELECTOR PROBLEM 2
Anemia due to chronic kidney disease
Dyspnea, unspecified type
Pulmonary nodules/lesions, multiple
Dyspnea, unspecified type

## 2018-09-08 NOTE — DISCHARGE NOTE ADULT - HOSPITAL COURSE
This is a 73F with h/o  HTN, DM, ESRD on HD awaiting PD cathter placement, recent admission for symptomatic anemia , complicated by RSV bronchitis who presents to [facility] ED on 9/6 with shortness of breath     Viral upper respiratory tract infection - improving. Pt's dyspnea improving.  Pt is medically clearaed and optimized for safe d/c.  Pt agrees to discharge home as she's feeling much improved.     Dyspnea improved.     Anemia stable.     ESRD (end stage renal disease) on dialysis.    HD per nephro recs / renal diet.     Troponin level elevated. - in the setting of poor clearance in ESRD  no active symptoms.     Type 2 diabetes mellitus with complication, with long-term current use of insulin.  dm rx / dm education.    Essential hypertension - c/w coreg, hydralazine, nifedipine xl ( with hold parameters).     Dyslipidemia. - c/w Lipitor.     Sleep apnea, unspecified type. - c/w CPAP at night.     Hypokalemia - improved

## 2018-09-08 NOTE — DISCHARGE NOTE ADULT - CARE PROVIDERS DIRECT ADDRESSES
,[email or web address]

## 2018-11-07 NOTE — CONSULT NOTE ADULT - PROBLEM SELECTOR RECOMMENDATION 2
Ortho paged   2 mm in RML as well as 6 mm in RLL: She is not a smoker: Can be followed up as an outpatient:

## 2018-11-21 ENCOUNTER — OUTPATIENT (OUTPATIENT)
Dept: OUTPATIENT SERVICES | Facility: HOSPITAL | Age: 73
LOS: 1 days | End: 2018-11-21
Payer: COMMERCIAL

## 2018-11-21 VITALS
TEMPERATURE: 98 F | WEIGHT: 184.09 LBS | HEIGHT: 64 IN | RESPIRATION RATE: 18 BRPM | SYSTOLIC BLOOD PRESSURE: 113 MMHG | HEART RATE: 74 BPM | DIASTOLIC BLOOD PRESSURE: 62 MMHG | OXYGEN SATURATION: 96 %

## 2018-11-21 DIAGNOSIS — Z98.890 OTHER SPECIFIED POSTPROCEDURAL STATES: Chronic | ICD-10-CM

## 2018-11-21 DIAGNOSIS — Z98.89 OTHER SPECIFIED POSTPROCEDURAL STATES: Chronic | ICD-10-CM

## 2018-11-21 DIAGNOSIS — Z01.818 ENCOUNTER FOR OTHER PREPROCEDURAL EXAMINATION: ICD-10-CM

## 2018-11-21 DIAGNOSIS — Z90.710 ACQUIRED ABSENCE OF BOTH CERVIX AND UTERUS: Chronic | ICD-10-CM

## 2018-11-21 DIAGNOSIS — N18.6 END STAGE RENAL DISEASE: ICD-10-CM

## 2018-11-21 DIAGNOSIS — Z96.659 PRESENCE OF UNSPECIFIED ARTIFICIAL KNEE JOINT: Chronic | ICD-10-CM

## 2018-11-21 DIAGNOSIS — Z98.49 CATARACT EXTRACTION STATUS, UNSPECIFIED EYE: Chronic | ICD-10-CM

## 2018-11-21 PROCEDURE — G0463: CPT

## 2018-11-21 PROCEDURE — 87641 MR-STAPH DNA AMP PROBE: CPT

## 2018-11-21 PROCEDURE — 87640 STAPH A DNA AMP PROBE: CPT

## 2018-11-21 RX ORDER — CHLORHEXIDINE GLUCONATE 213 G/1000ML
1 SOLUTION TOPICAL DAILY
Qty: 0 | Refills: 0 | Status: DISCONTINUED | OUTPATIENT
Start: 2018-12-04 | End: 2018-12-12

## 2018-11-21 RX ORDER — SODIUM CHLORIDE 9 MG/ML
3 INJECTION INTRAMUSCULAR; INTRAVENOUS; SUBCUTANEOUS EVERY 8 HOURS
Qty: 0 | Refills: 0 | Status: DISCONTINUED | OUTPATIENT
Start: 2018-12-04 | End: 2018-12-12

## 2018-11-21 NOTE — H&P PST ADULT - HISTORY OF PRESENT ILLNESS
73year old female with significant pmhx presents today for presurgical testing for scheduled left arm brachiocephalic AV Fistula on 12/4/18

## 2018-11-21 NOTE — H&P PST ADULT - MUSCULOSKELETAL
details… detailed exam Right shoulder/no calf tenderness/decreased ROM due to pain/diminished strength

## 2018-11-21 NOTE — H&P PST ADULT - PMH
Anemia    BMI 33.0-33.9,adult    Bronchial asthma    Carpal tunnel syndrome    Chronic kidney disease    Diabetes mellitus    Dyslipidemia    ESRD (end stage renal disease) on dialysis    Gout    HTN (hypertension)    Hypercholesterolemia    Obesity (BMI 30.0-34.9)    Pedal edema    Sleep apnea    Trigeminal neuralgia    Vertigo

## 2018-11-22 LAB
MRSA PCR RESULT.: SIGNIFICANT CHANGE UP
S AUREUS DNA NOSE QL NAA+PROBE: SIGNIFICANT CHANGE UP

## 2018-12-03 ENCOUNTER — TRANSCRIPTION ENCOUNTER (OUTPATIENT)
Age: 73
End: 2018-12-03

## 2018-12-04 ENCOUNTER — OUTPATIENT (OUTPATIENT)
Dept: OUTPATIENT SERVICES | Facility: HOSPITAL | Age: 73
LOS: 1 days | End: 2018-12-04
Payer: COMMERCIAL

## 2018-12-04 VITALS
SYSTOLIC BLOOD PRESSURE: 128 MMHG | OXYGEN SATURATION: 100 % | RESPIRATION RATE: 14 BRPM | TEMPERATURE: 98 F | DIASTOLIC BLOOD PRESSURE: 72 MMHG | HEIGHT: 64 IN | HEART RATE: 76 BPM | WEIGHT: 184.09 LBS

## 2018-12-04 VITALS
DIASTOLIC BLOOD PRESSURE: 75 MMHG | RESPIRATION RATE: 17 BRPM | HEART RATE: 68 BPM | OXYGEN SATURATION: 98 % | SYSTOLIC BLOOD PRESSURE: 117 MMHG

## 2018-12-04 DIAGNOSIS — Z98.89 OTHER SPECIFIED POSTPROCEDURAL STATES: Chronic | ICD-10-CM

## 2018-12-04 DIAGNOSIS — N18.6 END STAGE RENAL DISEASE: ICD-10-CM

## 2018-12-04 DIAGNOSIS — Z98.49 CATARACT EXTRACTION STATUS, UNSPECIFIED EYE: Chronic | ICD-10-CM

## 2018-12-04 DIAGNOSIS — Z98.890 OTHER SPECIFIED POSTPROCEDURAL STATES: Chronic | ICD-10-CM

## 2018-12-04 DIAGNOSIS — Z01.818 ENCOUNTER FOR OTHER PREPROCEDURAL EXAMINATION: ICD-10-CM

## 2018-12-04 DIAGNOSIS — Z90.710 ACQUIRED ABSENCE OF BOTH CERVIX AND UTERUS: Chronic | ICD-10-CM

## 2018-12-04 DIAGNOSIS — Z96.659 PRESENCE OF UNSPECIFIED ARTIFICIAL KNEE JOINT: Chronic | ICD-10-CM

## 2018-12-04 LAB
ANION GAP SERPL CALC-SCNC: 11 MMOL/L — SIGNIFICANT CHANGE UP (ref 5–17)
BUN SERPL-MCNC: 25 MG/DL — HIGH (ref 7–18)
CALCIUM SERPL-MCNC: 9.2 MG/DL — SIGNIFICANT CHANGE UP (ref 8.4–10.5)
CHLORIDE SERPL-SCNC: 101 MMOL/L — SIGNIFICANT CHANGE UP (ref 96–108)
CO2 SERPL-SCNC: 26 MMOL/L — SIGNIFICANT CHANGE UP (ref 22–31)
CREAT SERPL-MCNC: 5.63 MG/DL — HIGH (ref 0.5–1.3)
GLUCOSE BLDC GLUCOMTR-MCNC: 104 MG/DL — HIGH (ref 70–99)
GLUCOSE BLDC GLUCOMTR-MCNC: 127 MG/DL — HIGH (ref 70–99)
GLUCOSE SERPL-MCNC: 105 MG/DL — HIGH (ref 70–99)
POTASSIUM SERPL-MCNC: 3.8 MMOL/L — SIGNIFICANT CHANGE UP (ref 3.5–5.3)
POTASSIUM SERPL-SCNC: 3.8 MMOL/L — SIGNIFICANT CHANGE UP (ref 3.5–5.3)
SODIUM SERPL-SCNC: 138 MMOL/L — SIGNIFICANT CHANGE UP (ref 135–145)

## 2018-12-04 PROCEDURE — 80048 BASIC METABOLIC PNL TOTAL CA: CPT

## 2018-12-04 PROCEDURE — 82962 GLUCOSE BLOOD TEST: CPT

## 2018-12-04 PROCEDURE — 36830 ARTERY-VEIN NONAUTOGRAFT: CPT | Mod: LT

## 2018-12-04 RX ORDER — SODIUM CHLORIDE 9 MG/ML
1000 INJECTION, SOLUTION INTRAVENOUS
Qty: 0 | Refills: 0 | Status: DISCONTINUED | OUTPATIENT
Start: 2018-12-04 | End: 2018-12-04

## 2018-12-04 RX ORDER — ACETAMINOPHEN 500 MG
1000 TABLET ORAL ONCE
Qty: 0 | Refills: 0 | Status: DISCONTINUED | OUTPATIENT
Start: 2018-12-04 | End: 2018-12-04

## 2018-12-04 RX ORDER — ACETAMINOPHEN 500 MG
2 TABLET ORAL
Qty: 40 | Refills: 0 | OUTPATIENT
Start: 2018-12-04 | End: 2018-12-08

## 2018-12-04 RX ORDER — TRAMADOL HYDROCHLORIDE 50 MG/1
50 TABLET ORAL EVERY 4 HOURS
Qty: 0 | Refills: 0 | Status: DISCONTINUED | OUTPATIENT
Start: 2018-12-04 | End: 2018-12-04

## 2018-12-04 RX ORDER — FENTANYL CITRATE 50 UG/ML
25 INJECTION INTRAVENOUS
Qty: 0 | Refills: 0 | Status: DISCONTINUED | OUTPATIENT
Start: 2018-12-04 | End: 2018-12-04

## 2018-12-04 RX ORDER — TRAMADOL HYDROCHLORIDE 50 MG/1
1 TABLET ORAL
Qty: 18 | Refills: 0 | OUTPATIENT
Start: 2018-12-04 | End: 2018-12-06

## 2018-12-04 RX ORDER — ACETAMINOPHEN 500 MG
650 TABLET ORAL EVERY 6 HOURS
Qty: 0 | Refills: 0 | Status: DISCONTINUED | OUTPATIENT
Start: 2018-12-04 | End: 2018-12-12

## 2018-12-04 RX ADMIN — SODIUM CHLORIDE 100 MILLILITER(S): 9 INJECTION, SOLUTION INTRAVENOUS at 14:36

## 2018-12-04 RX ADMIN — SODIUM CHLORIDE 3 MILLILITER(S): 9 INJECTION INTRAMUSCULAR; INTRAVENOUS; SUBCUTANEOUS at 14:35

## 2018-12-04 NOTE — BRIEF OPERATIVE NOTE - PROCEDURE
<<-----Click on this checkbox to enter Procedure Arteriovenous anastomosis of upper extremity  12/04/2018  left  Active  [id number]

## 2018-12-04 NOTE — ASU DISCHARGE PLAN (ADULT/PEDIATRIC). - MEDICATION SUMMARY - MEDICATIONS TO TAKE
I will START or STAY ON the medications listed below when I get home from the hospital:    aspirin 81 mg oral tablet  -- 1 tab(s) by mouth once a day  -- Indication: For As indicated by PCP    Tylenol 325 mg oral tablet  -- 2 tab(s) by mouth every 6 hours, As needed, Mild Pain (1 - 3), Moderate Pain (4 - 6) MDD:8 tablets  -- Indication: For post-operative pain    traMADol 50 mg oral tablet  -- 1 tab(s) by mouth every 4 hours, As needed, Severe Pain (7 - 10) MDD:6 tablets  -- Indication: For post-operative pain    insulin glargine  -- 15 unit(s) subcutaneous once a day (at bedtime)  -- Indication: For As indicated by PCP    simvastatin 40 mg oral tablet  -- 1 tab(s) by mouth once a day (at bedtime)  -- Indication: For As indicated by PCP    Uloric 40 mg oral tablet  -- 1 tab(s) by mouth once a day  -- Indication: For As indicated by PCP    carvedilol 25 mg oral tablet  -- 1 tab(s) by mouth 2 times a day  -- Indication: For As indicated by PCP    Lasix 40 mg oral tablet  -- 2 tablets (80 mg) by mouth in the morning and 1 tablet (40 mg) in the evening  -- Indication: For As indicated by PCP    epoetin shannon  -- 33308 unit(s) intravenous 3 times a week w/ hemodialysis  -- Indication: For As indicated by PCP    fluticasone 50 mcg/inh nasal spray  -- 1 spray(s) into nose 2 times a day  -- Indication: For As indicated by PCP    calcitriol 0.25 mcg oral capsule  -- 1 cap(s) by mouth 3 times a week on days of dialysis (Mon, Wed, Fri)  -- Indication: For As indicated by PCP

## 2018-12-04 NOTE — ASU DISCHARGE PLAN (ADULT/PEDIATRIC). - PATIENT BELONGING
patient's belongings returned
Skin normal color for race, warm, dry and intact. No evidence of rash.

## 2018-12-28 ENCOUNTER — TRANSCRIPTION ENCOUNTER (OUTPATIENT)
Age: 73
End: 2018-12-28

## 2019-03-16 PROBLEM — M10.9 GOUT, UNSPECIFIED: Chronic | Status: ACTIVE | Noted: 2018-11-21

## 2019-03-21 ENCOUNTER — OUTPATIENT (OUTPATIENT)
Dept: OUTPATIENT SERVICES | Facility: HOSPITAL | Age: 74
LOS: 1 days | End: 2019-03-21
Payer: COMMERCIAL

## 2019-03-21 VITALS
DIASTOLIC BLOOD PRESSURE: 70 MMHG | WEIGHT: 177.47 LBS | TEMPERATURE: 97 F | SYSTOLIC BLOOD PRESSURE: 112 MMHG | OXYGEN SATURATION: 99 % | HEART RATE: 91 BPM | RESPIRATION RATE: 18 BRPM | HEIGHT: 64 IN

## 2019-03-21 DIAGNOSIS — E11.9 TYPE 2 DIABETES MELLITUS WITHOUT COMPLICATIONS: ICD-10-CM

## 2019-03-21 DIAGNOSIS — G56.02 CARPAL TUNNEL SYNDROME, LEFT UPPER LIMB: ICD-10-CM

## 2019-03-21 DIAGNOSIS — G47.33 OBSTRUCTIVE SLEEP APNEA (ADULT) (PEDIATRIC): ICD-10-CM

## 2019-03-21 DIAGNOSIS — N18.6 END STAGE RENAL DISEASE: ICD-10-CM

## 2019-03-21 DIAGNOSIS — M24.232 DISORDER OF LIGAMENT, LEFT WRIST: ICD-10-CM

## 2019-03-21 DIAGNOSIS — Z98.89 OTHER SPECIFIED POSTPROCEDURAL STATES: Chronic | ICD-10-CM

## 2019-03-21 DIAGNOSIS — Z90.710 ACQUIRED ABSENCE OF BOTH CERVIX AND UTERUS: Chronic | ICD-10-CM

## 2019-03-21 DIAGNOSIS — E78.5 HYPERLIPIDEMIA, UNSPECIFIED: ICD-10-CM

## 2019-03-21 DIAGNOSIS — Z96.659 PRESENCE OF UNSPECIFIED ARTIFICIAL KNEE JOINT: Chronic | ICD-10-CM

## 2019-03-21 DIAGNOSIS — Z98.49 CATARACT EXTRACTION STATUS, UNSPECIFIED EYE: Chronic | ICD-10-CM

## 2019-03-21 DIAGNOSIS — Z01.818 ENCOUNTER FOR OTHER PREPROCEDURAL EXAMINATION: ICD-10-CM

## 2019-03-21 DIAGNOSIS — Z98.890 OTHER SPECIFIED POSTPROCEDURAL STATES: Chronic | ICD-10-CM

## 2019-03-21 DIAGNOSIS — J45.909 UNSPECIFIED ASTHMA, UNCOMPLICATED: ICD-10-CM

## 2019-03-21 DIAGNOSIS — I10 ESSENTIAL (PRIMARY) HYPERTENSION: ICD-10-CM

## 2019-03-21 LAB
MRSA PCR RESULT.: SIGNIFICANT CHANGE UP
S AUREUS DNA NOSE QL NAA+PROBE: SIGNIFICANT CHANGE UP

## 2019-03-21 PROCEDURE — 87640 STAPH A DNA AMP PROBE: CPT

## 2019-03-21 PROCEDURE — G0463: CPT

## 2019-03-21 RX ORDER — CARVEDILOL PHOSPHATE 80 MG/1
1 CAPSULE, EXTENDED RELEASE ORAL
Qty: 0 | Refills: 0 | COMMUNITY

## 2019-03-21 RX ORDER — CALCITRIOL 0.5 UG/1
1 CAPSULE ORAL
Qty: 0 | Refills: 0 | COMMUNITY

## 2019-03-21 RX ORDER — SODIUM CHLORIDE 9 MG/ML
3 INJECTION INTRAMUSCULAR; INTRAVENOUS; SUBCUTANEOUS EVERY 8 HOURS
Qty: 0 | Refills: 0 | Status: DISCONTINUED | OUTPATIENT
Start: 2019-04-02 | End: 2019-04-10

## 2019-03-21 RX ORDER — FUROSEMIDE 40 MG
0 TABLET ORAL
Qty: 0 | Refills: 0 | COMMUNITY

## 2019-03-21 RX ORDER — CHLORHEXIDINE GLUCONATE 213 G/1000ML
1 SOLUTION TOPICAL ONCE
Qty: 0 | Refills: 0 | Status: DISCONTINUED | OUTPATIENT
Start: 2019-03-21 | End: 2019-03-29

## 2019-03-21 RX ORDER — SIMVASTATIN 20 MG/1
1 TABLET, FILM COATED ORAL
Qty: 0 | Refills: 0 | COMMUNITY

## 2019-03-21 NOTE — H&P PST ADULT - HISTORY OF PRESENT ILLNESS
73 yr old female with PMH of HTN, HLD, asthma, ESRD on Hemodialysis 3 times a week via right chest Perma catheter, PSH of left arm basilic vein transposition presents with c/o fistula malfunctioning. Pt is being dialyzed presently via right chest Perm Catheter. Pt for left arm basilic vein transposition on 4/2/2019. 73 yr old female with PMH of HTN, HLD, asthma, DM, JARVIS on CPAP, gout, chronic right shoulder pain, ESRD on Hemodialysis 3 times a week via right chest Permacath, PSH of left arm brachiocephalic AV fistula creation on 12/4/2018 presents with c/o fistula malfunctioning. Pt is being dialyzed presently via right chest Perm Catheter. Pt for left arm basilic vein transposition on 4/2/2019. 73 yr old female with PMH of HTN, HLD, asthma, DM, JARVIS on CPAP, gout, chronic right shoulder pain, ESRD on Hemodialysis 3 times a week via right chest Permacath, PSH of left arm brachiocephalic AV fistula creation on 12/4/2018 presents with c/o fistula malfunctioning. Pt is being dialyzed presently via right chest Permacath. Pt for left arm basilic vein transposition on 4/2/2019.

## 2019-03-21 NOTE — H&P PST ADULT - NS MD HP INPLANTS MED DEV
right chest perm catheter/Artificial joint/Lens implant/Vascular access device Lens implant/Vascular access device/Artificial joint/right chest perm catheter; both knees replaced; bilateral cataract extracted Artificial joint/Lens implant/Vascular access device/right chest Permacath; both knees replaced; bilateral cataract extracted

## 2019-03-21 NOTE — H&P PST ADULT - NSICDXPASTSURGICALHX_GEN_ALL_CORE_FT
PAST SURGICAL HISTORY:  History of carpal tunnel surgery of left wrist 2008    S/P arteriovenous (AV) fistula creation left upper forearm cephalic vein brachial artery AV fistula creation on 2018    S/P      S/P cataract extraction bilateral,     S/P hysterectomy 1978    S/P rotator cuff surgery bilateral 's    S/P total knee replacement bilateral, left , right  PAST SURGICAL HISTORY:  History of arthroscopy of left shoulder x 3    History of arthroscopy of right shoulder x 3    History of carpal tunnel surgery of left wrist     S/P arteriovenous (AV) fistula creation left upper forearm cephalic vein brachial artery AV fistula creation on 2018    S/P  1975    S/P cataract extraction bilateral, 2005    S/P hysterectomy 1978    S/P rotator cuff surgery bilateral 's    S/P total knee replacement bilateral, left , right

## 2019-03-21 NOTE — H&P PST ADULT - RS GEN PE MLT RESP DETAILS PC
no chest wall tenderness/no intercostal retractions/clear to auscultation bilaterally/respirations non-labored/breath sounds equal/good air movement/no rhonchi/normal/no rales/airway patent/no subcutaneous emphysema/no wheezes

## 2019-03-21 NOTE — H&P PST ADULT - CARDIOVASCULAR COMMENTS
HTN, Hypotension after dialysis HTN, Hypotension after dialysis-stopped taking antihypertensive meds

## 2019-03-21 NOTE — H&P PST ADULT - VENOUS THROMBOEMBOLISM CURRENT STATUS
(1) other risk factor (includes escalating BMI, pack-years of smoking, diabetes requiring insulin, chemotherapy, female gender and length of surgery)/(2) central venous access (1) swollen legs (current)/(1) other risk factor (includes escalating BMI, pack-years of smoking, diabetes requiring insulin, chemotherapy, female gender and length of surgery)/(2) central venous access

## 2019-03-21 NOTE — H&P PST ADULT - GENITOURINARY COMMENTS
ESRD on HD 3 times a week on Mondays, Wednesdays and Fridays via right chest perm catheter ESRD on HD 3 times a week on Mondays, Wednesdays and Fridays via right chest permacath ESRD on HD 3 times a week on Mondays, Wednesdays and Fridays via right chest PermCath

## 2019-03-21 NOTE — H&P PST ADULT - ASSESSMENT
73 yr old female with PMH of HTN, HLD, asthma, DM, JARVIS on CPAP, gout, chronic right shoulder pain, ESRD on Hemodialysis 3 times a week via right chest Permacath, PSH of left arm brachiocephalic AV fistula creation on 12/4/2018 presents with c/o fistula malfunctioning. Pt is being dialyzed presently via right chest Perm Catheter. Pt for left arm basilic vein transposition on 4/2/2019.

## 2019-03-21 NOTE — H&P PST ADULT - NSICDXPASTMEDICALHX_GEN_ALL_CORE_FT
PAST MEDICAL HISTORY:  Anemia     BMI 33.0-33.9,adult     Bronchial asthma     Carpal tunnel syndrome     Chronic kidney disease     Diabetes mellitus     Dyslipidemia     ESRD (end stage renal disease) on dialysis     Gout     HTN (hypertension)     Hypercholesterolemia     Obesity (BMI 30.0-34.9)     Pedal edema     Sleep apnea     Trigeminal neuralgia     Vertigo PAST MEDICAL HISTORY:  Anemia     BMI 33.0-33.9,adult     Bronchial asthma     Carpal tunnel syndrome     Chronic kidney disease     Chronic right shoulder pain     Diabetes mellitus     Dyslipidemia     ESRD (end stage renal disease) on dialysis     Gout     HTN (hypertension)     Hypercholesterolemia     Obesity (BMI 30.0-34.9)     Pedal edema     Sleep apnea     Trigeminal neuralgia     Vertigo

## 2019-03-21 NOTE — H&P PST ADULT - LOCATION OF BACK PAIN
shoulder R h/o right shoulder arthroscopy x 3-will need shoulder replacement soon as per pt/shoulder R

## 2019-03-21 NOTE — H&P PST ADULT - NSICDXPROBLEM_GEN_ALL_CORE_FT
PROBLEM DIAGNOSES  Problem: Asthma  Assessment and Plan: continue use of inhalers and use on day of surgery. Follow-up with PCP postop for asthmatic management.    Problem: JARVIS on CPAP  Assessment and Plan: Continue use of CPAP. Perioperative pulmonary precautions.    Problem: DM (diabetes mellitus)  Assessment and Plan: Continue insulin and hold on day of surgery. Perioperative glucose monitoring and cover as needed. Follow-up with PCP postop for diabetic management.    Problem: ESRD on hemodialysis  Assessment and Plan: Pt on dialysis 3 times a week on Mondays, Wednesdays and Fridays. Stat BMP on day of surgery. Pt to be dialyzed the day before surgery.

## 2019-03-21 NOTE — H&P PST ADULT - NEGATIVE GASTROINTESTINAL SYMPTOMS
no constipation/no flatulence/no diarrhea/no change in bowel habits/no vomiting/no abdominal pain/no nausea

## 2019-03-21 NOTE — H&P PST ADULT - NEGATIVE ALLERGY TYPES
no reactions to insect bites/no indoor environmental allergies/no outdoor environmental allergies/no reactions to animals

## 2019-03-21 NOTE — H&P PST ADULT - MUSCULOSKELETAL
details… detailed exam left knee/decreased ROM due to pain decreased ROM due to pain/left knee and right shoulder

## 2019-04-01 ENCOUNTER — TRANSCRIPTION ENCOUNTER (OUTPATIENT)
Age: 74
End: 2019-04-01

## 2019-04-01 RX ORDER — CHLORHEXIDINE GLUCONATE 213 G/1000ML
1 SOLUTION TOPICAL ONCE
Qty: 0 | Refills: 0 | Status: COMPLETED | OUTPATIENT
Start: 2019-04-02 | End: 2019-04-02

## 2019-04-02 ENCOUNTER — OUTPATIENT (OUTPATIENT)
Dept: OUTPATIENT SERVICES | Facility: HOSPITAL | Age: 74
LOS: 1 days | End: 2019-04-02
Payer: COMMERCIAL

## 2019-04-02 VITALS
WEIGHT: 177.47 LBS | DIASTOLIC BLOOD PRESSURE: 67 MMHG | SYSTOLIC BLOOD PRESSURE: 122 MMHG | OXYGEN SATURATION: 98 % | HEIGHT: 64 IN | RESPIRATION RATE: 16 BRPM | TEMPERATURE: 98 F | HEART RATE: 98 BPM

## 2019-04-02 VITALS
OXYGEN SATURATION: 99 % | DIASTOLIC BLOOD PRESSURE: 73 MMHG | RESPIRATION RATE: 16 BRPM | HEART RATE: 86 BPM | TEMPERATURE: 98 F | SYSTOLIC BLOOD PRESSURE: 121 MMHG

## 2019-04-02 DIAGNOSIS — Z98.890 OTHER SPECIFIED POSTPROCEDURAL STATES: Chronic | ICD-10-CM

## 2019-04-02 DIAGNOSIS — Z98.89 OTHER SPECIFIED POSTPROCEDURAL STATES: Chronic | ICD-10-CM

## 2019-04-02 DIAGNOSIS — N18.6 END STAGE RENAL DISEASE: ICD-10-CM

## 2019-04-02 DIAGNOSIS — Z96.659 PRESENCE OF UNSPECIFIED ARTIFICIAL KNEE JOINT: Chronic | ICD-10-CM

## 2019-04-02 DIAGNOSIS — Z90.710 ACQUIRED ABSENCE OF BOTH CERVIX AND UTERUS: Chronic | ICD-10-CM

## 2019-04-02 DIAGNOSIS — Z98.49 CATARACT EXTRACTION STATUS, UNSPECIFIED EYE: Chronic | ICD-10-CM

## 2019-04-02 DIAGNOSIS — Z01.818 ENCOUNTER FOR OTHER PREPROCEDURAL EXAMINATION: ICD-10-CM

## 2019-04-02 LAB
ANION GAP SERPL CALC-SCNC: 9 MMOL/L — SIGNIFICANT CHANGE UP (ref 5–17)
BUN SERPL-MCNC: 30 MG/DL — HIGH (ref 7–18)
CALCIUM SERPL-MCNC: 8.9 MG/DL — SIGNIFICANT CHANGE UP (ref 8.4–10.5)
CHLORIDE SERPL-SCNC: 98 MMOL/L — SIGNIFICANT CHANGE UP (ref 96–108)
CO2 SERPL-SCNC: 28 MMOL/L — SIGNIFICANT CHANGE UP (ref 22–31)
CREAT SERPL-MCNC: 5.91 MG/DL — HIGH (ref 0.5–1.3)
GLUCOSE BLDC GLUCOMTR-MCNC: 115 MG/DL — HIGH (ref 70–99)
GLUCOSE BLDC GLUCOMTR-MCNC: 135 MG/DL — HIGH (ref 70–99)
GLUCOSE SERPL-MCNC: 126 MG/DL — HIGH (ref 70–99)
POTASSIUM SERPL-MCNC: 4.4 MMOL/L — SIGNIFICANT CHANGE UP (ref 3.5–5.3)
POTASSIUM SERPL-SCNC: 4.4 MMOL/L — SIGNIFICANT CHANGE UP (ref 3.5–5.3)
SODIUM SERPL-SCNC: 135 MMOL/L — SIGNIFICANT CHANGE UP (ref 135–145)

## 2019-04-02 PROCEDURE — 80048 BASIC METABOLIC PNL TOTAL CA: CPT

## 2019-04-02 PROCEDURE — 93005 ELECTROCARDIOGRAM TRACING: CPT

## 2019-04-02 PROCEDURE — 82962 GLUCOSE BLOOD TEST: CPT

## 2019-04-02 PROCEDURE — 15736 MUSCLE-SKIN GRAFT ARM: CPT | Mod: LT

## 2019-04-02 PROCEDURE — 36415 COLL VENOUS BLD VENIPUNCTURE: CPT

## 2019-04-02 PROCEDURE — 36832 AV FISTULA REVISION OPEN: CPT | Mod: LT

## 2019-04-02 RX ORDER — OXYCODONE AND ACETAMINOPHEN 5; 325 MG/1; MG/1
1 TABLET ORAL ONCE
Qty: 0 | Refills: 0 | Status: DISCONTINUED | OUTPATIENT
Start: 2019-04-02 | End: 2019-04-10

## 2019-04-02 RX ADMIN — CHLORHEXIDINE GLUCONATE 1 APPLICATION(S): 213 SOLUTION TOPICAL at 07:03

## 2019-04-02 RX ADMIN — SODIUM CHLORIDE 3 MILLILITER(S): 9 INJECTION INTRAMUSCULAR; INTRAVENOUS; SUBCUTANEOUS at 06:53

## 2019-04-02 NOTE — ASU DISCHARGE PLAN (ADULT/PEDIATRIC) - CALL YOUR DOCTOR IF YOU HAVE ANY OF THE FOLLOWING:
Wound/Surgical Site with redness, or foul smelling discharge or pus/Bleeding that does not stop/Swelling that gets worse/Fever greater than (need to indicate Fahrenheit or Celsius)/Pain not relieved by Medications Bleeding that does not stop/Wound/Surgical Site with redness, or foul smelling discharge or pus/Numbness, tingling, color or temperature change to extremity/Fever greater than (need to indicate Fahrenheit or Celsius)/Swelling that gets worse/Pain not relieved by Medications

## 2019-04-02 NOTE — ASU DISCHARGE PLAN (ADULT/PEDIATRIC) - CARE PROVIDER_API CALL
Jose C Britton (MD)  Surgery; Thoracic Surgery; Vascular Surgery  [address] [facility], [facility] [address]  [place] [address]  Phone: [phone]  Fax: [phone]  Follow Up Time: 2 weeks

## 2019-05-02 NOTE — DISCHARGE NOTE ADULT - PATIENT PORTAL LINK FT
You can access the [facility] Portal, offered by [facility], by registering with the following website: [email or web address] - Home Metoprolol and Nifedipine with hold parameters

## 2019-05-23 ENCOUNTER — EMERGENCY (EMERGENCY)
Facility: HOSPITAL | Age: 74
LOS: 1 days | Discharge: HOME | End: 2019-05-23
Attending: EMERGENCY MEDICINE
Payer: COMMERCIAL

## 2019-05-23 VITALS
SYSTOLIC BLOOD PRESSURE: 115 MMHG | RESPIRATION RATE: 18 BRPM | OXYGEN SATURATION: 98 % | TEMPERATURE: 98 F | DIASTOLIC BLOOD PRESSURE: 74 MMHG | HEART RATE: 84 BPM

## 2019-05-23 VITALS
RESPIRATION RATE: 16 BRPM | SYSTOLIC BLOOD PRESSURE: 133 MMHG | DIASTOLIC BLOOD PRESSURE: 70 MMHG | OXYGEN SATURATION: 100 % | HEART RATE: 75 BPM

## 2019-05-23 DIAGNOSIS — Z98.89 OTHER SPECIFIED POSTPROCEDURAL STATES: Chronic | ICD-10-CM

## 2019-05-23 DIAGNOSIS — Z98.49 CATARACT EXTRACTION STATUS, UNSPECIFIED EYE: Chronic | ICD-10-CM

## 2019-05-23 DIAGNOSIS — Z96.659 PRESENCE OF UNSPECIFIED ARTIFICIAL KNEE JOINT: Chronic | ICD-10-CM

## 2019-05-23 DIAGNOSIS — Z98.890 OTHER SPECIFIED POSTPROCEDURAL STATES: Chronic | ICD-10-CM

## 2019-05-23 DIAGNOSIS — Z90.710 ACQUIRED ABSENCE OF BOTH CERVIX AND UTERUS: Chronic | ICD-10-CM

## 2019-05-23 PROCEDURE — 99283 EMERGENCY DEPT VISIT LOW MDM: CPT

## 2019-05-23 PROCEDURE — 99282 EMERGENCY DEPT VISIT SF MDM: CPT | Mod: 25

## 2019-05-23 NOTE — ED PROVIDER NOTE - PSH
History of arthroscopy of left shoulder  x 3  History of arthroscopy of right shoulder  x 3  History of carpal tunnel surgery of left wrist    S/P arteriovenous (AV) fistula creation  left upper forearm cephalic vein brachial artery AV fistula creation on 2018  S/P   1975  S/P cataract extraction  bilateral, 2005  S/P hysterectomy  1978  S/P rotator cuff surgery  bilateral 's  S/P total knee replacement  bilateral, left , right

## 2019-05-23 NOTE — ED PROVIDER NOTE - PMH
Anemia    BMI 33.0-33.9,adult    Bronchial asthma    Carpal tunnel syndrome    Chronic kidney disease    Chronic right shoulder pain    Diabetes mellitus    Dyslipidemia    ESRD (end stage renal disease) on dialysis    Gout    HTN (hypertension)    Hypercholesterolemia    Obesity (BMI 30.0-34.9)    Pedal edema    Sleep apnea    Trigeminal neuralgia    Vertigo

## 2019-05-23 NOTE — ED PROVIDER NOTE - PHYSICAL EXAMINATION
*Gen: NAD, AAO*3  *HEENT: NC/AT, MMM, airway patent, trachea midline  *CV: RRR, S1/S2 present, no murmurs  *Resp: no respiratory distress, LCTAB, no wheezing  *Abd: non-distended, soft N/Tx4, no guarding or rigidity  *Neuro: no focal neuro deficits, moving all limbs appropriately  *Extremities: no gross deformity  *Skin: no rashes, + no active bleeding from Permacath site  ~ Melony Almendarez M.D. *Gen: NAD, AAO*3  *HEENT: NC/AT, MMM, airway patent, trachea midline  *CV: RRR, S1/S2 present, no murmurs  *Resp: no respiratory distress, LCTAB, no wheezing  *Abd: non-distended, soft N/Tx4, no guarding or rigidity  *Neuro: no focal neuro deficits, moving all limbs appropriately  *Extremities: no gross deformity  *Skin: no rashes, + no active bleeding from right tunneled Permacath site  ~ Melony Almendarez M.D.

## 2019-05-23 NOTE — ED PROVIDER NOTE - ATTENDING CONTRIBUTION TO CARE
Patient serially evaluated throughout emergency department course. There was no acute deterioration up to this time in the department. Patient has demonstrated marked clinical improvement, feels better at this time according to emergency department team. Agree with goals/plan of emergency department care as described in electronic medical record, including diagnostics, therapeutics and consultation as clinically warranted. Will discharge home with close outpatient followup with primary care physician/provider and specialist if necessary. Patient educated on concerning signs and features to return to the emergency department including but not limited to: repeat bleeding, fever, chills, redness at the catheter site, fatigue or any concerns at all   No immediate life threatening issues present on history or clinical exam. Patient is a safe disposition home, has capacity and insight into their condition, is ambulatory in the Emergency Department with no further questions and will follow up with their doctor(s) this week. Patient understands anticipatory guidance and was given strict return and follow up precautions. The patient has been informed of all concerning signs and symptoms to return to Emergency Department, the necessity to follow up with the PMD/Clinic/follow up provided within 2-3 days was explained, and the patient reports understanding of above with capacity and insight.

## 2019-05-23 NOTE — ED PROVIDER NOTE - NSFOLLOWUPINSTRUCTIONS_ED_ALL_ED_FT
Follow-up with your Vascular Surgeon within 1 - 3 hours.  Please return to the Emergency Department immediately for any new, worsening or concerning symptoms.

## 2019-05-23 NOTE — ED ADULT NURSE REASSESSMENT NOTE - NS ED NURSE REASSESS COMMENT FT1
0700 Report taken from change of shift RN. Pt awaiting dispo. Will watch perma cath for another 20 mins as per MD to see if bleeding remains subsided.

## 2019-05-23 NOTE — ED PROVIDER NOTE - OBJECTIVE STATEMENT
73 year-old female with history of HTN, HLD, asthma, DM, JARVIS on CPAP, gout, chronic right shoulder pain, ESRD on HD (M/W/F) via right chest Permacath, malfunctioning left arm brachiocephalic AV fistula creation on 12/4/2018 presents to the Emergency Department for bleeding from Permacath site. 73 year-old female with history of HTN, HLD, asthma, DM, JARVIS on CPAP, gout, chronic right shoulder pain, ESRD on HD (M/W/F) via right chest Permacath, left arm brachiocephalic AV fistula creation on 12/4/2018 presents to the Emergency Department for bleeding from Permacath site.  Noticed it this AM.  Patient last had dialysis yesterday; full session - takes ASA daily and heparin only with dialysis.  No fevers, chills, nausea, vomiting, chest pain, shortness of breath, lightheadedness.

## 2019-05-23 NOTE — ED PROVIDER NOTE - CLINICAL SUMMARY MEDICAL DECISION MAKING FREE TEXT BOX
73 year-old female with history of HTN, HLD, asthma, DM, JARVIS on CPAP, gout, chronic right shoulder pain, ESRD on HD (M/W/F) via right chest Permacath, left arm brachiocephalic AV fistula creation on 12/4/2018 presents to the Emergency Department for bleeding from Permacath site - dressing changed and patient stable for outpatient mangement. 73 year-old female with history of HTN, HLD, asthma, DM, JARVIS on CPAP, gout, chronic right shoulder pain, ESRD on HD (M/W/F) via right chest Permacath, left arm brachiocephalic AV fistula creation on 12/4/2018 presents to the Emergency Department for bleeding from Permacath site - dressing changed and patient stable for outpatient management.

## 2019-05-23 NOTE — ED ADULT NURSE NOTE - OBJECTIVE STATEMENT
73 year old female presents to the ED from home via EMS for bleeding from R chestwall permacath site noticed when pt. woke up and felt her shirt was wet. PMH ESRD on Hemodialysis - MWF (had full dialysis treatment yesterday), malfunctioning left arm brachiocephalic AV fistula, HTN, HLD, asthma, DM, JARVIS on CPAP, gout, chronic right shoulder pain. EMS placed abd bandages on bleeding site and wrapped it with cling to apply pressure while transporting to ED. on assessment, site is no longer bleeding. Patient denies trauma to area, dizziness, lightheadedness, SOB, LOC, nausea, vomiting, diarrhea, bleeding from anywhere else. MD at bedside plans to redress site. Patient undressed and placed into gown, call bell in hand and side rails up with bed in lowest position for safety. blanket provided. Comfort and safety provided.

## 2019-06-22 ENCOUNTER — EMERGENCY (EMERGENCY)
Facility: HOSPITAL | Age: 74
LOS: 1 days | Discharge: HOME | End: 2019-06-22
Attending: EMERGENCY MEDICINE
Payer: COMMERCIAL

## 2019-06-22 VITALS
OXYGEN SATURATION: 98 % | SYSTOLIC BLOOD PRESSURE: 125 MMHG | HEART RATE: 79 BPM | TEMPERATURE: 98 F | DIASTOLIC BLOOD PRESSURE: 79 MMHG | RESPIRATION RATE: 16 BRPM

## 2019-06-22 VITALS
SYSTOLIC BLOOD PRESSURE: 133 MMHG | WEIGHT: 149.91 LBS | OXYGEN SATURATION: 98 % | DIASTOLIC BLOOD PRESSURE: 72 MMHG | HEART RATE: 100 BPM | RESPIRATION RATE: 18 BRPM | TEMPERATURE: 98 F | HEIGHT: 64 IN

## 2019-06-22 DIAGNOSIS — Z90.710 ACQUIRED ABSENCE OF BOTH CERVIX AND UTERUS: Chronic | ICD-10-CM

## 2019-06-22 DIAGNOSIS — Z98.890 OTHER SPECIFIED POSTPROCEDURAL STATES: Chronic | ICD-10-CM

## 2019-06-22 DIAGNOSIS — Z98.89 OTHER SPECIFIED POSTPROCEDURAL STATES: Chronic | ICD-10-CM

## 2019-06-22 DIAGNOSIS — Z98.49 CATARACT EXTRACTION STATUS, UNSPECIFIED EYE: Chronic | ICD-10-CM

## 2019-06-22 DIAGNOSIS — Z96.659 PRESENCE OF UNSPECIFIED ARTIFICIAL KNEE JOINT: Chronic | ICD-10-CM

## 2019-06-22 PROCEDURE — 93005 ELECTROCARDIOGRAM TRACING: CPT

## 2019-06-22 PROCEDURE — 99283 EMERGENCY DEPT VISIT LOW MDM: CPT

## 2019-06-22 PROCEDURE — 73030 X-RAY EXAM OF SHOULDER: CPT | Mod: 26,LT

## 2019-06-22 PROCEDURE — 73030 X-RAY EXAM OF SHOULDER: CPT

## 2019-06-22 PROCEDURE — 93010 ELECTROCARDIOGRAM REPORT: CPT

## 2019-06-22 PROCEDURE — 99284 EMERGENCY DEPT VISIT MOD MDM: CPT | Mod: 25

## 2019-06-22 RX ORDER — OXYCODONE AND ACETAMINOPHEN 5; 325 MG/1; MG/1
1 TABLET ORAL ONCE
Refills: 0 | Status: DISCONTINUED | OUTPATIENT
Start: 2019-06-22 | End: 2019-06-22

## 2019-06-22 RX ORDER — OXYCODONE HYDROCHLORIDE 5 MG/1
1 TABLET ORAL
Qty: 4 | Refills: 0
Start: 2019-06-22 | End: 2019-06-23

## 2019-06-22 RX ORDER — LIDOCAINE 4 G/100G
1 CREAM TOPICAL ONCE
Refills: 0 | Status: COMPLETED | OUTPATIENT
Start: 2019-06-22 | End: 2019-06-22

## 2019-06-22 RX ORDER — LIDOCAINE 4 G/100G
1 CREAM TOPICAL
Qty: 3 | Refills: 0
Start: 2019-06-22 | End: 2019-06-24

## 2019-06-22 RX ADMIN — OXYCODONE AND ACETAMINOPHEN 1 TABLET(S): 5; 325 TABLET ORAL at 12:30

## 2019-06-22 RX ADMIN — LIDOCAINE 1 PATCH: 4 CREAM TOPICAL at 11:37

## 2019-06-22 RX ADMIN — OXYCODONE AND ACETAMINOPHEN 1 TABLET(S): 5; 325 TABLET ORAL at 11:38

## 2019-06-22 NOTE — ED PROVIDER NOTE - NSFOLLOWUPCLINICS_GEN_ALL_ED_FT
[facility]  Orthopedic Surgery  [address] [address]  Phone: [phone]  Fax:   Follow Up Time:

## 2019-06-22 NOTE — ED PROVIDER NOTE - NSFOLLOWUPINSTRUCTIONS_ED_ALL_ED_FT
Musculoskeletal Pain    WHAT YOU NEED TO KNOW:    Muscle pain can be dull, achy, or sharp. You may have pain and tenderness to the touch as well. It can occur anywhere on your body and is often brought on by exercise. Muscle pain may occur from an injury, such as a sprain, tendonitis, or bone fracture. Muscle pain can also be the result of medical conditions, such as polymyositis, fibromyalgia, and connective tissue disorders.     DISCHARGE INSTRUCTIONS:    Self care:     Rest as directed and avoid activity that causes pain. You may be able to return to normal activity when you can move without pain. Follow directions for rest and activity. You are at risk for injury for 3 weeks after your symptoms go away.       Ice your painful muscle area to decrease pain and swelling. Use an ice pack, or put ice in a plastic bag and cover it with a towel. Always put a cloth between the ice and your skin. Apply the ice as often as directed for the first 24 to 48 hours.       Compression with a splint, brace, or elastic bandage helps decrease pain and swelling. This may be needed for muscle pain in arms or legs. A splint, brace, or bandage will also help protect the painful area when you move around.       Elevate a painful arm or leg to reduce swelling and pain. Elevate your limb while you are sitting or lying down. Prop a painful leg on pillows to keep it above the level of your heart.    Medicines:     NSAIDs help decrease swelling and pain or fever. This medicine is available with or without a doctor's order. NSAIDs can cause stomach bleeding or kidney problems in certain people. If you take blood thinner medicine, always ask your healthcare provider if NSAIDs are safe for you. Always read the medicine label and follow directions.      Acetaminophen is used to decrease pain. It is available without a doctor's order. Ask your healthcare provider how much to take and when to take it. Follow directions. Acetaminophen can cause liver damage if not taken correctly. Do not take more than one medicine that contains acetaminophen unless directed.       Muscle relaxers help relax your muscles to decrease pain and muscle spasms.       Steroids may be given to decrease redness, pain, and swelling.      Take your medicine as directed. Contact your healthcare provider if you think your medicine is not helping or if you have side effects. Tell him if you are allergic to any medicine. Keep a list of the medicines, vitamins, and herbs you take. Include the amounts, and when and why you take them. Bring the list or the pill bottles to follow-up visits. Carry your medicine list with you in case of an emergency.    Follow up with your healthcare provider as directed: You may need more tests to help healthcare providers find the cause of your muscle pain. You may need physical therapy to learn muscle strengthening exercises. Write down your questions so you remember to ask them during your visits.     Contact your healthcare provider if:     You have a fever.       You have trouble sleeping because of your pain.       Your painful area becomes more tender, red, and warm to the touch.       You have decreased movement of the painful area.       You have questions or concerns about your condition or care.    Return to the emergency department if:     You have increased severe pain when you move the painful muscle area.       You lose feeling in your painful muscle area.       You have new or worse swelling in the painful area. Your skin may feel tight.       You have increased muscle pain or pain that does not improve with treatment.

## 2019-06-22 NOTE — ED PROVIDER NOTE - ATTENDING CONTRIBUTION TO CARE
I have seen and evaluated this patient with the resident.   I agree with the findings  unless other wise stated.  I have made appropriate changes in documentations where needed, After my face to face bedside evaluation, I am further  notinyo F pmhx HTN HLD DM ESRD on dialysis p/w CC L shoulder pain, ongoing x2 weeks. Worse with movement. Recently improved after using cold pack and lidocaine patch, however worsened again over the past few days. Denies fever chills CP SOB n/v/d abd pain back pain numbness/tingling/weakness.  This 75yo F pmhx htn hld dm esrd on dialysis p/w L shoulder pain - extremely c/w musculoskeletal pain - will xray shoulder, neg for acute pathology  screening EKG, pain control and reassess. I have seen and evaluated this patient with the resident.   I agree with the findings  unless other wise stated.  I have made appropriate changes in documentations where needed, After my face to face bedside evaluation, I am further  notinyo F pmhx HTN HLD DM ESRD on dialysis p/w CC L shoulder pain, ongoing x2 weeks. Worse with movement. Recently improved after using cold pack and lidocaine patch, however worsened again over the past few days. Denies fever chills CP SOB n/v/d abd pain back pain numbness/tingling/weakness.  This 75yo F pmhx htn hld dm esrd on dialysis p/w L shoulder pain - extremely c/w musculoskeletal pain - will xray shoulder, neg for acute pathology  screening EKG has RSR and RBBB unchanged , pain control and PMD f/u --Carrillo

## 2019-06-22 NOTE — ED PROVIDER NOTE - OBJECTIVE STATEMENT
75yo F pmhx HTN HLD DM ESRD on dialysis p/w CC L shoulder pain, ongoing x2 weeks. Worse with movement. Recently improved after using cold pack and lidocaine patch, however worsened again over the past few days. Denies fever chills CP SOB n/v/d abd pain back pain numbness/tingling/weakness.

## 2019-06-22 NOTE — ED PROVIDER NOTE - MUSCULOSKELETAL MINIMAL EXAM
atraumatic/L shoulder ROM limited in flexion 2/2 pain, shoulder diffusely tender to palpation over deltoid muscle.

## 2019-06-22 NOTE — ED PROVIDER NOTE - CLINICAL SUMMARY MEDICAL DECISION MAKING FREE TEXT BOX
73yo F pmhx htn hld dm esrd on dialysis p/w L shoulder pain - extremely c/w musculoskeletal pain - will xray shoulder, screening EKG, pain control and reassess.

## 2019-06-22 NOTE — ED ADULT NURSE NOTE - NSIMPLEMENTINTERV_GEN_ALL_ED
Implemented All Fall Risk Interventions:  [place] to call system. Call bell, personal items and telephone within reach. Instruct patient to call for assistance. Room bathroom lighting operational. Non-slip footwear when patient is off stretcher. Physically safe environment: no spills, clutter or unnecessary equipment. Stretcher in lowest position, wheels locked, appropriate side rails in place. Provide visual cue, wrist band, yellow gown, etc. Monitor gait and stability. Monitor for mental status changes and reorient to person, place, and time. Review medications for side effects contributing to fall risk. Reinforce activity limits and safety measures with patient and family.

## 2019-06-22 NOTE — ED ADULT NURSE NOTE - OBJECTIVE STATEMENT
74 year old A&Ox3 female presents to ED in wheelchair complaining of left shoulder pain x 3 weeks, denies trauma, falls. 74 year old A&Ox3 female presents to ED in wheelchair complaining of left shoulder pain x 3 weeks, denies trauma, falls. + fistula noted. No obvious external deformity noted. Denies CP, SOB, n/v/d, fevers, chills, abdominal pain, urinary symptoms, numbness, tingling in upper and lower extremities, HA, blurry vision. Updated on plan of care.

## 2019-06-22 NOTE — ED ADULT TRIAGE NOTE - CHIEF COMPLAINT QUOTE
l shoulder pain  c/o bilat shoulder pain increased pain upon movement. l shoulder pain  c/o bilat shoulder pain increased pain upon movement.  x 15 days On dialysis

## 2019-10-01 NOTE — ASU PATIENT PROFILE, ADULT - PRO INTERPRETER NEED 2
Patient is aware she needs an order. Patient is seeing her primary care doctor tomorrow and is going to request an order so we can get her scheduled.    ----- Message from Winsome Newton sent at 10/1/2019 10:10 AM CDT -----  Contact: self  Patient requesting to only schedule a hearing test per patient       Patient contact is [phone] (lobq)        English

## 2020-01-06 NOTE — CONSULT NOTE ADULT - PROBLEM/RECOMMENDATION-4
Next appt 12/22/20  Last appt 12/17/19    Refill request for  metoPROLOL succinate (TOPROL-XL) 100 MG 24 hr tablet 90 tablet 3 12/23/2019     Sig - Route: Take 1 tablet by mouth daily. - Oral    Sent to pharmacy as: Metoprolol Succinate  MG Oral Tablet Extended Release 24 Hour    Class: Eprescribe    E-Prescribing Status: Receipt confirmed by pharmacy (12/23/2019  2:16 PM CST)      topiramate (TOPAMAX) 50 MG tablet 180 tablet 3 12/23/2019     Sig - Route: Take 1 tablet by mouth 2 times daily. - Oral    Sent to pharmacy as: Topiramate 50 MG Oral Tablet    Class: Eprescribe    E-Prescribing Status: Receipt confirmed by pharmacy (12/23/2019  2:26 PM CST)        Scripts refilled for #90 and 3 refills to requested pharmacy.      DISPLAY PLAN FREE TEXT

## 2020-02-14 ENCOUNTER — EMERGENCY (EMERGENCY)
Facility: HOSPITAL | Age: 75
LOS: 1 days | Discharge: HOME | End: 2020-02-14
Attending: EMERGENCY MEDICINE
Payer: MEDICARE

## 2020-02-14 VITALS
HEART RATE: 90 BPM | HEIGHT: 64 IN | RESPIRATION RATE: 18 BRPM | DIASTOLIC BLOOD PRESSURE: 77 MMHG | WEIGHT: 176.37 LBS | OXYGEN SATURATION: 98 % | TEMPERATURE: 98 F | SYSTOLIC BLOOD PRESSURE: 122 MMHG

## 2020-02-14 DIAGNOSIS — Z98.890 OTHER SPECIFIED POSTPROCEDURAL STATES: Chronic | ICD-10-CM

## 2020-02-14 DIAGNOSIS — Z98.89 OTHER SPECIFIED POSTPROCEDURAL STATES: Chronic | ICD-10-CM

## 2020-02-14 DIAGNOSIS — Z90.710 ACQUIRED ABSENCE OF BOTH CERVIX AND UTERUS: Chronic | ICD-10-CM

## 2020-02-14 DIAGNOSIS — Z96.659 PRESENCE OF UNSPECIFIED ARTIFICIAL KNEE JOINT: Chronic | ICD-10-CM

## 2020-02-14 DIAGNOSIS — Z98.49 CATARACT EXTRACTION STATUS, UNSPECIFIED EYE: Chronic | ICD-10-CM

## 2020-02-14 PROCEDURE — 93010 ELECTROCARDIOGRAM REPORT: CPT

## 2020-02-14 PROCEDURE — 99285 EMERGENCY DEPT VISIT HI MDM: CPT

## 2020-02-15 ENCOUNTER — TRANSCRIPTION ENCOUNTER (OUTPATIENT)
Age: 75
End: 2020-02-15

## 2020-02-15 VITALS
DIASTOLIC BLOOD PRESSURE: 89 MMHG | HEART RATE: 80 BPM | TEMPERATURE: 98 F | RESPIRATION RATE: 18 BRPM | SYSTOLIC BLOOD PRESSURE: 104 MMHG | OXYGEN SATURATION: 100 %

## 2020-02-15 LAB
ALBUMIN SERPL ELPH-MCNC: 3.8 G/DL — SIGNIFICANT CHANGE UP (ref 3.3–5)
ALP SERPL-CCNC: 141 U/L — HIGH (ref 40–120)
ALT FLD-CCNC: 21 U/L — SIGNIFICANT CHANGE UP (ref 10–45)
ANION GAP SERPL CALC-SCNC: 13 MMOL/L — SIGNIFICANT CHANGE UP (ref 5–17)
AST SERPL-CCNC: 45 U/L — HIGH (ref 10–40)
BASE EXCESS BLDV CALC-SCNC: 4.9 MMOL/L — HIGH (ref -2–2)
BASOPHILS # BLD AUTO: 0.02 K/UL — SIGNIFICANT CHANGE UP (ref 0–0.2)
BASOPHILS NFR BLD AUTO: 0.2 % — SIGNIFICANT CHANGE UP (ref 0–2)
BILIRUB SERPL-MCNC: 0.3 MG/DL — SIGNIFICANT CHANGE UP (ref 0.2–1.2)
BUN SERPL-MCNC: 18 MG/DL — SIGNIFICANT CHANGE UP (ref 7–23)
CA-I SERPL-SCNC: 1.15 MMOL/L — SIGNIFICANT CHANGE UP (ref 1.12–1.3)
CALCIUM SERPL-MCNC: 9.7 MG/DL — SIGNIFICANT CHANGE UP (ref 8.4–10.5)
CHLORIDE BLDV-SCNC: 96 MMOL/L — SIGNIFICANT CHANGE UP (ref 96–108)
CHLORIDE SERPL-SCNC: 90 MMOL/L — LOW (ref 96–108)
CO2 BLDV-SCNC: 32 MMOL/L — HIGH (ref 22–30)
CO2 SERPL-SCNC: 25 MMOL/L — SIGNIFICANT CHANGE UP (ref 22–31)
CREAT SERPL-MCNC: 3.99 MG/DL — HIGH (ref 0.5–1.3)
EOSINOPHIL # BLD AUTO: 0.02 K/UL — SIGNIFICANT CHANGE UP (ref 0–0.5)
EOSINOPHIL NFR BLD AUTO: 0.2 % — SIGNIFICANT CHANGE UP (ref 0–6)
GAS PNL BLDV: 131 MMOL/L — LOW (ref 135–145)
GAS PNL BLDV: SIGNIFICANT CHANGE UP
GAS PNL BLDV: SIGNIFICANT CHANGE UP
GLUCOSE BLDV-MCNC: 271 MG/DL — HIGH (ref 70–99)
GLUCOSE SERPL-MCNC: 181 MG/DL — HIGH (ref 70–99)
HCO3 BLDV-SCNC: 30 MMOL/L — HIGH (ref 21–29)
HCT VFR BLD CALC: 37.8 % — SIGNIFICANT CHANGE UP (ref 34.5–45)
HCT VFR BLDA CALC: 35 % — LOW (ref 39–50)
HGB BLD CALC-MCNC: 11.2 G/DL — LOW (ref 11.5–15.5)
HGB BLD-MCNC: 11.5 G/DL — SIGNIFICANT CHANGE UP (ref 11.5–15.5)
IMM GRANULOCYTES NFR BLD AUTO: 1.2 % — SIGNIFICANT CHANGE UP (ref 0–1.5)
LACTATE BLDV-MCNC: 2.1 MMOL/L — HIGH (ref 0.7–2)
LACTATE BLDV-MCNC: 3.3 MMOL/L — HIGH (ref 0.7–2)
LYMPHOCYTES # BLD AUTO: 0.67 K/UL — LOW (ref 1–3.3)
LYMPHOCYTES # BLD AUTO: 7.9 % — LOW (ref 13–44)
MAGNESIUM SERPL-MCNC: 2.3 MG/DL — SIGNIFICANT CHANGE UP (ref 1.6–2.6)
MCHC RBC-ENTMCNC: 29.3 PG — SIGNIFICANT CHANGE UP (ref 27–34)
MCHC RBC-ENTMCNC: 30.4 GM/DL — LOW (ref 32–36)
MCV RBC AUTO: 96.4 FL — SIGNIFICANT CHANGE UP (ref 80–100)
MONOCYTES # BLD AUTO: 0.55 K/UL — SIGNIFICANT CHANGE UP (ref 0–0.9)
MONOCYTES NFR BLD AUTO: 6.4 % — SIGNIFICANT CHANGE UP (ref 2–14)
NEUTROPHILS # BLD AUTO: 7.17 K/UL — SIGNIFICANT CHANGE UP (ref 1.8–7.4)
NEUTROPHILS NFR BLD AUTO: 84.1 % — HIGH (ref 43–77)
NRBC # BLD: 0 /100 WBCS — SIGNIFICANT CHANGE UP (ref 0–0)
PCO2 BLDV: 50 MMHG — SIGNIFICANT CHANGE UP (ref 35–50)
PH BLDV: 7.4 — SIGNIFICANT CHANGE UP (ref 7.35–7.45)
PHOSPHATE SERPL-MCNC: 3 MG/DL — SIGNIFICANT CHANGE UP (ref 2.5–4.5)
PLATELET # BLD AUTO: 139 K/UL — LOW (ref 150–400)
PO2 BLDV: 46 MMHG — HIGH (ref 25–45)
POTASSIUM BLDV-SCNC: 4.7 MMOL/L — SIGNIFICANT CHANGE UP (ref 3.5–5.3)
POTASSIUM SERPL-MCNC: 4.9 MMOL/L — SIGNIFICANT CHANGE UP (ref 3.5–5.3)
POTASSIUM SERPL-MCNC: 5.6 MMOL/L — HIGH (ref 3.5–5.3)
POTASSIUM SERPL-MCNC: 6.8 MMOL/L — CRITICAL HIGH (ref 3.5–5.3)
POTASSIUM SERPL-SCNC: 4.9 MMOL/L — SIGNIFICANT CHANGE UP (ref 3.5–5.3)
POTASSIUM SERPL-SCNC: 5.6 MMOL/L — HIGH (ref 3.5–5.3)
POTASSIUM SERPL-SCNC: 6.8 MMOL/L — CRITICAL HIGH (ref 3.5–5.3)
PROT SERPL-MCNC: 8.1 G/DL — SIGNIFICANT CHANGE UP (ref 6–8.3)
RBC # BLD: 3.92 M/UL — SIGNIFICANT CHANGE UP (ref 3.8–5.2)
RBC # FLD: 19.3 % — HIGH (ref 10.3–14.5)
SAO2 % BLDV: 76 % — SIGNIFICANT CHANGE UP (ref 67–88)
SODIUM SERPL-SCNC: 128 MMOL/L — LOW (ref 135–145)
TROPONIN T, HIGH SENSITIVITY RESULT: 112 NG/L — HIGH (ref 0–51)
TROPONIN T, HIGH SENSITIVITY RESULT: 121 NG/L — HIGH (ref 0–51)
WBC # BLD: 8.53 K/UL — SIGNIFICANT CHANGE UP (ref 3.8–10.5)
WBC # FLD AUTO: 8.53 K/UL — SIGNIFICANT CHANGE UP (ref 3.8–10.5)

## 2020-02-15 PROCEDURE — 85014 HEMATOCRIT: CPT

## 2020-02-15 PROCEDURE — 84100 ASSAY OF PHOSPHORUS: CPT

## 2020-02-15 PROCEDURE — 83735 ASSAY OF MAGNESIUM: CPT

## 2020-02-15 PROCEDURE — 96360 HYDRATION IV INFUSION INIT: CPT

## 2020-02-15 PROCEDURE — 85027 COMPLETE CBC AUTOMATED: CPT

## 2020-02-15 PROCEDURE — 82947 ASSAY GLUCOSE BLOOD QUANT: CPT

## 2020-02-15 PROCEDURE — 82435 ASSAY OF BLOOD CHLORIDE: CPT

## 2020-02-15 PROCEDURE — 84132 ASSAY OF SERUM POTASSIUM: CPT

## 2020-02-15 PROCEDURE — 82803 BLOOD GASES ANY COMBINATION: CPT

## 2020-02-15 PROCEDURE — 99284 EMERGENCY DEPT VISIT MOD MDM: CPT | Mod: 25

## 2020-02-15 PROCEDURE — 84484 ASSAY OF TROPONIN QUANT: CPT

## 2020-02-15 PROCEDURE — 93005 ELECTROCARDIOGRAM TRACING: CPT

## 2020-02-15 PROCEDURE — 71045 X-RAY EXAM CHEST 1 VIEW: CPT | Mod: 26

## 2020-02-15 PROCEDURE — 80053 COMPREHEN METABOLIC PANEL: CPT

## 2020-02-15 PROCEDURE — 82330 ASSAY OF CALCIUM: CPT

## 2020-02-15 PROCEDURE — 71045 X-RAY EXAM CHEST 1 VIEW: CPT

## 2020-02-15 PROCEDURE — 83605 ASSAY OF LACTIC ACID: CPT

## 2020-02-15 PROCEDURE — 84295 ASSAY OF SERUM SODIUM: CPT

## 2020-02-15 RX ORDER — SODIUM CHLORIDE 9 MG/ML
500 INJECTION, SOLUTION INTRAVENOUS ONCE
Refills: 0 | Status: COMPLETED | OUTPATIENT
Start: 2020-02-15 | End: 2020-02-15

## 2020-02-15 RX ORDER — INFLUENZA VIRUS VACCINE 15; 15; 15; 15 UG/.5ML; UG/.5ML; UG/.5ML; UG/.5ML
0.5 SUSPENSION INTRAMUSCULAR ONCE
Refills: 0 | Status: DISCONTINUED | OUTPATIENT
Start: 2020-02-15 | End: 2020-02-15

## 2020-02-15 RX ADMIN — SODIUM CHLORIDE 500 MILLILITER(S): 9 INJECTION, SOLUTION INTRAVENOUS at 02:06

## 2020-02-15 RX ADMIN — SODIUM CHLORIDE 500 MILLILITER(S): 9 INJECTION, SOLUTION INTRAVENOUS at 03:33

## 2020-02-15 RX ADMIN — SODIUM CHLORIDE 500 MILLILITER(S): 9 INJECTION, SOLUTION INTRAVENOUS at 05:35

## 2020-02-15 NOTE — ED ADULT NURSE NOTE - OBJECTIVE STATEMENT
75 YO female with PMH ESRD on HD MWF with left AV fistula, sleep apnea on CPAP, gout, carpal tunnel syndrome, chronic right shoulder pain, obesity, HLD, EDEMA, vertigo, asthma, COPD, DM, HTN, sp multiple orthopedic surgeries, via EMS from dialysis presenting with complaints of weakness. As per patient, after HD today, pt experienced palpitations, and generalized weakness, as well as left shoulder pain. Upon arrival to [facility] ED, pt reports feeling improved. Pt states that after HD, pt reports feeling symptomatic hypotension, pt states that HD typically removes 2.5L. Upon arrival to [facility] ED, Pt denies chest pain, shortness of breath, visual disturbances, numbness/tingling, fever, chills, diaphoresis, headache, nausea, vomiting, constipation, diarrhea, or urinary symptoms.   Pt Axox4, gross neuro intact, PERRL 3 mm. Lungs clear throughout bilateral. S1S2 heard. Left upper arm AV fistula +thrills, +bruit. Abdomen soft, non-tender, non-distended. Skin warm, dry, and intact. Pt placed in position of comfort. Pt educated on call bell system and provided call bell. Bed in lowest position, wheels locked, appropriate side rails raised. Pt denies needs at this time. Fall risk precautions in place.

## 2020-02-15 NOTE — ED PROVIDER NOTE - PROGRESS NOTE DETAILS
Attending MD Fried.  Pt signed out to me in stable condition pending Trop, 73 yo female ESRD on dialysis M/W/F, during dialysis episode of SOB, palpitations and L shoulder pain.  She's had these sxs prev, took more than dry weight, hydrated, asymptomatic on arr. first 2 trops hemolyzed, eval for 20% change. Flavia Oliveira, Resident: patient now feeling well, eating and drinking, BP and HR stable. labs came back with less than 20% change in trop. will dc home with PMD follow up.

## 2020-02-15 NOTE — ED ADULT NURSE REASSESSMENT NOTE - NS ED NURSE REASSESS COMMENT FT1
repeat VBG to be sent after 500mL LR bolus completion to check for lactate level. Lights darkened, PO fluids provided, hand hygiene completed. All belongings, including cane, within patient's reach. Pt denies palpitations, chest pain, shortness of breath, visual disturbances, numbness/tingling, fever, chills, diaphoresis, headache, nausea, vomiting, constipation, diarrhea, or urinary symptoms. Pt placed in position of comfort. Pt educated on call bell system and provided call bell. Bed in lowest position, wheels locked, appropriate side rails raised. Pt denies needs at this time.

## 2020-02-15 NOTE — ED PROVIDER NOTE - CLINICAL SUMMARY MEDICAL DECISION MAKING FREE TEXT BOX
Janee PGY3: 74F hx of ESRD HD MWF last HD today, usually gets transient low BP after HD, had episode today where felt more lightheaded, dizzy after HD, a/w episode of palpitations and L shoulder pain, sx improved upon ED arrival usually takes off 2.5 L fluid, also had episode of SOB which is persisting. exam vss non toxic appearing, likely vasovagal/syncopal episode in setting of fluid loss in HD will check labs cxr reassess

## 2020-02-15 NOTE — ED PROVIDER NOTE - NSFOLLOWUPINSTRUCTIONS_ED_ALL_ED_FT
Rest, drink plenty of fluids.  Advance activity as tolerated.  Continue all previously prescribed medications as directed.  Follow up with your primary care physician in 48-72 hours- bring copies of your results.  Return to the ER for worsening or persistent symptoms, and/or ANY NEW OR CONCERNING SYMPTOMS. If you have issues obtaining follow up, please call: [phone] to obtain a doctor or specialist who takes your insurance in your area.

## 2020-02-15 NOTE — ED ADULT NURSE REASSESSMENT NOTE - NS ED NURSE REASSESS COMMENT FT1
Pt tolerated orthostatic blood pressure, Pt denies chest pain, shortness of breath, visual disturbances, numbness/tingling, fever, chills, diaphoresis, headache, nausea, vomiting, constipation, diarrhea, or urinary symptoms. Pt placed in position of comfort. Pt educated on call bell system and provided call bell. Bed in lowest position, wheels locked, appropriate side rails raised. Pt denies needs at this time.

## 2020-02-15 NOTE — ED PROVIDER NOTE - ATTENDING CONTRIBUTION TO CARE
74y F hx of HTN, ESRD on HD MWF, obesity, DM, here from HD with c/o palpitations, L shoulder pain, SOB occurring during HD. States she was at tail end of HD when started to develop sx. Has happened before. Finished full session. States took off 2.5 today. Goal dry weight 80kg. Went in at 81.9kg. Currently no L shoulder or palpitations. Still feeling sightly SOB. No hypoxia. No tachycardia.   Gen: WNWD NAD  HEENT: NCAT  EOMI normal pharynx dry mm   Neck: supple  CV: RRR, no murmur  Lung: CTA BL  Abd: +BS soft NTND  Ext: wwp, palp pulses, FROMx4, no cce, LUE AVF w palp thrill   Neuro: CN grossly intact, sensation intact, motor 5/5 throughout  Likely sx from fluid exchange in HD. Low suspicion for cardiac. Will check labs, EKG for arrhythmia, electrolyte abn. Can check orthostatics and consider gentle hydration.

## 2020-02-15 NOTE — ED ADULT NURSE REASSESSMENT NOTE - NS ED NURSE REASSESS COMMENT FT1
Potassium resulted elevated, with hemolysis. Pt placed on cardiac monitor to evaluate for heart rhythm changes related to electrolyte changes. Repeat potassium of serum to be sent after 500mL of LR completion. Pt denies palpitations, chest pain, shortness of breath, visual disturbances, numbness/tingling, fever, chills, diaphoresis, headache, nausea, vomiting, constipation, diarrhea, or urinary symptoms.

## 2020-02-15 NOTE — ED ADULT NURSE NOTE - ED STAT RN HANDOFF DETAILS
Handoff report provided to Joann AZEVEDO. Understands pmh, medications given and plan of care for patient. Patient in stable condition, vital signs updated, has no complaints at this time and has been updated on care plan. Explained to patient that it is change of shift and new nurse is taking over, pt verbalized understanding.

## 2020-02-15 NOTE — ED ADULT NURSE NOTE - NS ED NURSE DC INFO COMPLEXITY
Patient asked questions/Moderate: Comprehensive teaching/Verbalized Understanding Verbalized Understanding/Simple: Patient demonstrates quick and easy understanding

## 2020-02-15 NOTE — DISCHARGE NOTE NURSING/CASE MANAGEMENT/SOCIAL WORK - PATIENT PORTAL LINK FT
You can access the FollowMyHealth Patient Portal offered by [facility] by registering at the following website: [email or web address]. By joining [facility]’s FollowMyHealth portal, you will also be able to view your health information using other applications (apps) compatible with our system.

## 2020-02-15 NOTE — ED PROVIDER NOTE - OBJECTIVE STATEMENT
74F hx of ESRD HD MWF last HD today, usually gets transient low BP after HD, had episode today where felt more lightheaded, dizzy after HD, a/w episode of palpitations and L shoulder pain, sx improved upon ED arrival usually takes off 2.5 L fluid, also had episode of SOB which is persisting. Denies n/v/f/c/. Denies headache, Denies chest palpitations, abdominal pain. Denies hematuria, hematochezia, BRBPR, tarry stools, diarrhea, constipation.

## 2020-02-15 NOTE — ED PROVIDER NOTE - PATIENT PORTAL LINK FT
You can access the FollowMyHealth Patient Portal offered by [facility] by registering at the following website: [email or web address]. By joining [facility]’s FollowMyHealth portal, you will also be able to view your health information using other applications (apps) compatible with our system.

## 2020-02-15 NOTE — ED ADULT NURSE REASSESSMENT NOTE - NS ED NURSE REASSESS COMMENT FT1
Report received from JOLLY Escamilla   Pt is in no current distress. Comfort and safety provided. Will continue to monitor. Awaiting lab results for dispo. A&Ox4. Denies palpitations, dizziness.

## 2020-07-31 NOTE — DISCHARGE NOTE ADULT - PRINCIPAL DIAGNOSIS
----- Message from Elizabeth Cooley sent at 7/31/2020  7:20 AM CDT -----  Regarding: MAMMO ORDER  Patient is scheduled for a mammogram on 08/07/2020.    Would you please put an order in [facility] for a Mammo Screening Phillip Bilateral ([id number]) with a diagnosis of Encounter for Screening Mammo (Z12.31). Also please choose Perform Add'l Diag Tests and/or Interventional Proc Per Radiologist so we can do any further testing needed.    Thank you for your help!        Bronchitis

## 2020-08-17 ENCOUNTER — OUTPATIENT (OUTPATIENT)
Dept: OUTPATIENT SERVICES | Facility: HOSPITAL | Age: 75
LOS: 1 days | End: 2020-08-17
Payer: MEDICARE

## 2020-08-17 VITALS
WEIGHT: 181 LBS | HEART RATE: 73 BPM | RESPIRATION RATE: 16 BRPM | HEIGHT: 64 IN | SYSTOLIC BLOOD PRESSURE: 138 MMHG | DIASTOLIC BLOOD PRESSURE: 78 MMHG | OXYGEN SATURATION: 100 % | TEMPERATURE: 97 F

## 2020-08-17 DIAGNOSIS — M24.232 DISORDER OF LIGAMENT, LEFT WRIST: ICD-10-CM

## 2020-08-17 DIAGNOSIS — Z98.89 OTHER SPECIFIED POSTPROCEDURAL STATES: Chronic | ICD-10-CM

## 2020-08-17 DIAGNOSIS — Z98.49 CATARACT EXTRACTION STATUS, UNSPECIFIED EYE: Chronic | ICD-10-CM

## 2020-08-17 DIAGNOSIS — Z98.890 OTHER SPECIFIED POSTPROCEDURAL STATES: Chronic | ICD-10-CM

## 2020-08-17 DIAGNOSIS — Z96.659 PRESENCE OF UNSPECIFIED ARTIFICIAL KNEE JOINT: Chronic | ICD-10-CM

## 2020-08-17 DIAGNOSIS — G56.02 CARPAL TUNNEL SYNDROME, LEFT UPPER LIMB: ICD-10-CM

## 2020-08-17 DIAGNOSIS — Z90.710 ACQUIRED ABSENCE OF BOTH CERVIX AND UTERUS: Chronic | ICD-10-CM

## 2020-08-17 PROCEDURE — G0463: CPT

## 2020-08-17 RX ORDER — ASPIRIN/CALCIUM CARB/MAGNESIUM 324 MG
1 TABLET ORAL
Qty: 0 | Refills: 0 | DISCHARGE

## 2020-08-17 RX ORDER — FLUTICASONE PROPIONATE 50 MCG
1 SPRAY, SUSPENSION NASAL
Qty: 0 | Refills: 0 | DISCHARGE

## 2020-08-17 RX ORDER — FERRIC CITRATE 210 MG/1
1 TABLET, COATED ORAL
Qty: 0 | Refills: 0 | DISCHARGE

## 2020-08-17 RX ORDER — INSULIN ASPART 100 [IU]/ML
8 INJECTION, SOLUTION SUBCUTANEOUS
Qty: 0 | Refills: 0 | DISCHARGE

## 2020-08-17 RX ORDER — FEBUXOSTAT 40 MG/1
1 TABLET ORAL
Qty: 0 | Refills: 0 | DISCHARGE

## 2020-08-17 RX ORDER — FUROSEMIDE 40 MG
2 TABLET ORAL
Qty: 0 | Refills: 0 | DISCHARGE

## 2020-08-17 RX ORDER — ACETAMINOPHEN 500 MG
2 TABLET ORAL
Qty: 0 | Refills: 0 | DISCHARGE

## 2020-08-17 NOTE — H&P PST ADULT - MUSCULOSKELETAL COMMENTS
c/o left knee pain due to h/o left total knee replacement c/o left knee pain due to h/o left total knee replacement, left wrist carpal tunnel, disorder of ligaments left wrist

## 2020-08-17 NOTE — H&P PST ADULT - EXTREMITIES COMMENTS
do not use left arm, left arm AV fistula positive thrill, left wrist brace , tenderness to palpation to wrist and limited ROM

## 2020-08-17 NOTE — H&P PST ADULT - NEGATIVE OPHTHALMOLOGIC SYMPTOMS
no blurred vision L/no blurred vision R no blurred vision L/no blurred vision R/no lacrimation L/no lacrimation R/no photophobia/no diplopia

## 2020-08-17 NOTE — H&P PST ADULT - ASSESSMENT
75  yr old female with PMH of HTN, HLD, DM, JARVIS on CPAP, gout, chronic right shoulder pain, ESRD on Hemodialysis 3 times a week via left AV fistula PSH of left arm brachiocephalic AV fistula creation on 12/4/2018 , left arm basilic vein transposition on 4/2/2019 presents with carpal tunnel syndrome ,left upper limb and disorder of ligament ,left wrist. Patient is scheduled for left carpal tunnel release and neurectomy anterior and posterior interosseous on 8/19/2020.   Problem: End stage renal disease  Pt to have HD on Tuesday before surgery, called HD center and arranged to patient HD on Tuesday before sx instead Wednesday as usual  Problem:JARVIS on CPAP  JARVIS on CPAP 11 cm H2O , unknown place of sleep test and report, pt told to bring CPAP machine for sx , pt verbalized understanding  Problem: DMt type 2  Continue antidiabetic meds and hold on day of surgery. Night before surgery tale Lantus 26 units as usual. Perioperative glucose monitoring and cover as needed. Follow-up with PCP for diabetic management  Problem:HLD  Instructed pt to continue Atorvastatin. Follow-up with PCP postop for lipid management  Problem: HTN  Continue antihypertensive meds and take with sips of water on day of surgery.  Take Nifedipine as usual ( as per pt , she is on Nifedipine and bring Furosemide to hospital and check BP with nurse and in case if needed take it if BP still high, since after HD patient has BP tendency to have low as per pt . Cleared by PCP. Follow-up with PCP postop for management.  Problem:  carpal tunnel syndrome ,left upper limb ,  disorder of ligament ,left wrist  Patient is scheduled for left carpal tunnel release and neurectomy anterior and posterior interosseous on 8/19/2020. 75  yr old female with PMH of HTN, HLD, DM, JARVIS on CPAP, gout, chronic right shoulder pain, ESRD on Hemodialysis 3 times a week via left AV fistula PSH of left arm brachiocephalic AV fistula creation on 12/4/2018 , left arm basilic vein transposition on 4/2/2019 presents with carpal tunnel syndrome ,left upper limb and disorder of ligament ,left wrist. Patient is scheduled for left carpal tunnel release and neurectomy anterior and posterior interosseous on 8/19/2020.   Problem: End stage renal disease  Pt to have HD on Tuesday before surgery, called HD center and arranged to patient HD on Tuesday before sx instead Wednesday as usual  Problem:JAVRIS on CPAP  JARVIS on CPAP 11 cm H2O , unknown place of sleep test and report, pt told to bring CPAP machine for sx , pt verbalized understanding  Problem: DMt type 2  Continue antidiabetic meds and hold on day of surgery. Night before surgery tale Lantus 26 units as usual. Perioperative glucose monitoring and cover as needed. Follow-up with PCP for diabetic management  Problem:HLD  Instructed pt to continue Rosuvastatin. Follow-up with PCP postop for lipid management  Problem: HTN  Continue antihypertensive meds and take with sips of water on day of surgery.  Take Nifedipine as usual ( as per pt , she is on Nifedipine and bring Furosemide to hospital and check BP with nurse and in case if needed take it if BP still high, since after HD patient has BP tendency to have low as per pt . Cleared by PCP. Follow-up with PCP postop for management.  Problem:  carpal tunnel syndrome ,left upper limb ,  disorder of ligament ,left wrist  Patient is scheduled for left carpal tunnel release and neurectomy anterior and posterior interosseous on 8/19/2020.

## 2020-08-17 NOTE — H&P PST ADULT - NSICDXPASTSURGICALHX_GEN_ALL_CORE_FT
PAST SURGICAL HISTORY:  History of arthroscopy of left shoulder x 3    History of arthroscopy of right shoulder x 3    History of carpal tunnel surgery of left wrist     S/P arteriovenous (AV) fistula creation left upper forearm cephalic vein brachial artery AV fistula creation on 2018    S/P  1975    S/P cataract extraction bilateral, 2005    S/P hysterectomy 1978    S/P rotator cuff surgery bilateral 's    S/P total knee replacement bilateral, left , right  PAST SURGICAL HISTORY:  History of arthroscopy of left shoulder x 3    History of arthroscopy of right shoulder x 3    History of carpal tunnel surgery of left wrist     History of vascular access device , removed 2020    S/P arteriovenous (AV) fistula creation left upper forearm cephalic vein brachial artery AV fistula creation on 2018, left arm basilic vein transposition on 2019.      S/P      S/P cataract extraction bilateral, 2005    S/P hysterectomy 1978    S/P rotator cuff surgery bilateral 's    S/P total knee replacement bilateral, left , right

## 2020-08-17 NOTE — H&P PST ADULT - HISTORY OF PRESENT ILLNESS
73 yr old female with PMH of HTN, HLD, asthma, DM, JARVIS on CPAP, gout, chronic right shoulder pain, ESRD on Hemodialysis 3 times a week via right chest Permacath, PSH of left arm brachiocephalic AV fistula creation on 12/4/2018 presents with c/o fistula malfunctioning. Pt is being dialyzed presently via right chest Permacath. Pt for left arm basilic vein transposition on 4/2/2019. 75  yr old female with PMH of HTN, HLD, DM, JARVIS on CPAP, gout, chronic right shoulder pain, ESRD on Hemodialysis 3 times a week via left AV fistula PSH of left arm brachiocephalic AV fistula creation on 12/4/2018 , left arm basilic vein transposition on 4/2/2019 presents with carpal tunnel syndrome ,left upper limb and disorder of ligament ,left wrist. Patient is scheduled for left carpal tunnel release and neurectomy anterior and posterior interosseous on 8/19/2020.

## 2020-08-17 NOTE — H&P PST ADULT - NSICDXPASTMEDICALHX_GEN_ALL_CORE_FT
PAST MEDICAL HISTORY:  Anemia     BMI 33.0-33.9,adult     Bronchial asthma     Carpal tunnel syndrome     Chronic kidney disease     Chronic right shoulder pain     Diabetes mellitus     Dyslipidemia     ESRD (end stage renal disease) on dialysis     Gout     HTN (hypertension)     Hypercholesterolemia     Obesity (BMI 30.0-34.9)     Pedal edema     Sleep apnea     Trigeminal neuralgia     Vertigo PAST MEDICAL HISTORY:  Anemia     Bronchial asthma hx of    Carpal tunnel syndrome left    Chronic right shoulder pain     Diabetes mellitus     Disorder of ligament of wrist, left     Dyslipidemia     ESRD (end stage renal disease) on dialysis started HD 2018    Gout     HTN (hypertension)     Hypercholesterolemia     Pedal edema     Sleep apnea on CPAP    Trigeminal neuralgia hx of    Vertigo

## 2020-08-17 NOTE — H&P PST ADULT - NEGATIVE GASTROINTESTINAL SYMPTOMS
no diarrhea/no abdominal pain/no vomiting/no constipation/no nausea/no change in bowel habits/no flatulence

## 2020-08-17 NOTE — H&P PST ADULT - NEGATIVE GENERAL GENITOURINARY SYMPTOMS
normal urinary frequency/no dysuria/no urinary hesitancy no flank pain L/no dysuria/normal urinary frequency/no urinary hesitancy/no urine discoloration/no hematuria/no renal colic/no flank pain R

## 2020-08-17 NOTE — H&P PST ADULT - MUSCULOSKELETAL
detailed exam decreased ROM due to pain/left knee and right shoulder details… decreased ROM due to pain/left knee, left wrist and right shoulder

## 2020-08-17 NOTE — H&P PST ADULT - GENITOURINARY COMMENTS
ESRD on HD 3 times a week on Mondays, Wednesdays and Fridays via right chest PermCath ESRD on HD 3 times a week on Mondays, Wednesdays and Fridays via left arm  AV fistula

## 2020-08-17 NOTE — H&P PST ADULT - NEGATIVE CARDIOVASCULAR SYMPTOMS
no dyspnea on exertion/no chest pain/no orthopnea/no peripheral edema/no palpitations/no claudication/no paroxysmal nocturnal dyspnea

## 2020-08-18 ENCOUNTER — TRANSCRIPTION ENCOUNTER (OUTPATIENT)
Age: 75
End: 2020-08-18

## 2020-08-19 ENCOUNTER — OUTPATIENT (OUTPATIENT)
Dept: OUTPATIENT SERVICES | Facility: HOSPITAL | Age: 75
LOS: 1 days | End: 2020-08-19
Payer: MEDICARE

## 2020-08-19 ENCOUNTER — RESULT REVIEW (OUTPATIENT)
Age: 75
End: 2020-08-19

## 2020-08-19 VITALS
SYSTOLIC BLOOD PRESSURE: 116 MMHG | OXYGEN SATURATION: 100 % | HEART RATE: 90 BPM | DIASTOLIC BLOOD PRESSURE: 67 MMHG | TEMPERATURE: 98 F | RESPIRATION RATE: 16 BRPM

## 2020-08-19 VITALS
HEART RATE: 79 BPM | DIASTOLIC BLOOD PRESSURE: 71 MMHG | SYSTOLIC BLOOD PRESSURE: 97 MMHG | RESPIRATION RATE: 17 BRPM | HEIGHT: 63 IN | WEIGHT: 176.37 LBS | TEMPERATURE: 98 F | OXYGEN SATURATION: 100 %

## 2020-08-19 DIAGNOSIS — Z98.890 OTHER SPECIFIED POSTPROCEDURAL STATES: Chronic | ICD-10-CM

## 2020-08-19 DIAGNOSIS — Z96.659 PRESENCE OF UNSPECIFIED ARTIFICIAL KNEE JOINT: Chronic | ICD-10-CM

## 2020-08-19 DIAGNOSIS — M24.232 DISORDER OF LIGAMENT, LEFT WRIST: ICD-10-CM

## 2020-08-19 DIAGNOSIS — Z98.89 OTHER SPECIFIED POSTPROCEDURAL STATES: Chronic | ICD-10-CM

## 2020-08-19 DIAGNOSIS — G56.02 CARPAL TUNNEL SYNDROME, LEFT UPPER LIMB: ICD-10-CM

## 2020-08-19 DIAGNOSIS — Z90.710 ACQUIRED ABSENCE OF BOTH CERVIX AND UTERUS: Chronic | ICD-10-CM

## 2020-08-19 DIAGNOSIS — Z98.49 CATARACT EXTRACTION STATUS, UNSPECIFIED EYE: Chronic | ICD-10-CM

## 2020-08-19 LAB
ANION GAP SERPL CALC-SCNC: 6 MMOL/L — SIGNIFICANT CHANGE UP (ref 5–17)
BUN SERPL-MCNC: 17 MG/DL — SIGNIFICANT CHANGE UP (ref 7–18)
CALCIUM SERPL-MCNC: 10.1 MG/DL — SIGNIFICANT CHANGE UP (ref 8.4–10.5)
CHLORIDE SERPL-SCNC: 98 MMOL/L — SIGNIFICANT CHANGE UP (ref 96–108)
CO2 SERPL-SCNC: 29 MMOL/L — SIGNIFICANT CHANGE UP (ref 22–31)
CREAT SERPL-MCNC: 4.28 MG/DL — HIGH (ref 0.5–1.3)
GLUCOSE BLDC GLUCOMTR-MCNC: 149 MG/DL — HIGH (ref 70–99)
GLUCOSE SERPL-MCNC: 151 MG/DL — HIGH (ref 70–99)
POTASSIUM SERPL-MCNC: 3.8 MMOL/L — SIGNIFICANT CHANGE UP (ref 3.5–5.3)
POTASSIUM SERPL-SCNC: 3.8 MMOL/L — SIGNIFICANT CHANGE UP (ref 3.5–5.3)
SODIUM SERPL-SCNC: 133 MMOL/L — LOW (ref 135–145)

## 2020-08-19 PROCEDURE — 80048 BASIC METABOLIC PNL TOTAL CA: CPT

## 2020-08-19 PROCEDURE — 82962 GLUCOSE BLOOD TEST: CPT

## 2020-08-19 PROCEDURE — 36415 COLL VENOUS BLD VENIPUNCTURE: CPT

## 2020-08-19 PROCEDURE — 88305 TISSUE EXAM BY PATHOLOGIST: CPT | Mod: 26

## 2020-08-19 PROCEDURE — 88305 TISSUE EXAM BY PATHOLOGIST: CPT

## 2020-08-19 PROCEDURE — 64772 INCISION OF SPINAL NERVE: CPT | Mod: LT

## 2020-08-19 PROCEDURE — 64721 CARPAL TUNNEL SURGERY: CPT | Mod: LT

## 2020-08-19 PROCEDURE — 25109 EXCISE TENDON FOREARM/WRIST: CPT | Mod: LT

## 2020-08-19 RX ORDER — CHLORHEXIDINE GLUCONATE 213 G/1000ML
1 SOLUTION TOPICAL ONCE
Refills: 0 | Status: COMPLETED | OUTPATIENT
Start: 2020-08-19 | End: 2020-08-19

## 2020-08-19 RX ORDER — ACETAMINOPHEN 500 MG
975 TABLET ORAL ONCE
Refills: 0 | Status: COMPLETED | OUTPATIENT
Start: 2020-08-19 | End: 2020-08-19

## 2020-08-19 RX ORDER — FENTANYL CITRATE 50 UG/ML
25 INJECTION INTRAVENOUS
Refills: 0 | Status: DISCONTINUED | OUTPATIENT
Start: 2020-08-19 | End: 2020-08-19

## 2020-08-19 RX ORDER — ONDANSETRON 8 MG/1
4 TABLET, FILM COATED ORAL ONCE
Refills: 0 | Status: DISCONTINUED | OUTPATIENT
Start: 2020-08-19 | End: 2020-08-19

## 2020-08-19 RX ORDER — SODIUM CHLORIDE 9 MG/ML
3 INJECTION INTRAMUSCULAR; INTRAVENOUS; SUBCUTANEOUS EVERY 8 HOURS
Refills: 0 | Status: DISCONTINUED | OUTPATIENT
Start: 2020-08-19 | End: 2020-08-19

## 2020-08-19 RX ORDER — ACETAMINOPHEN WITH CODEINE 300MG-30MG
2 TABLET ORAL
Qty: 10 | Refills: 0
Start: 2020-08-19

## 2020-08-19 RX ORDER — FENTANYL CITRATE 50 UG/ML
50 INJECTION INTRAVENOUS
Refills: 0 | Status: DISCONTINUED | OUTPATIENT
Start: 2020-08-19 | End: 2020-08-19

## 2020-08-19 RX ORDER — OXYCODONE AND ACETAMINOPHEN 5; 325 MG/1; MG/1
1 TABLET ORAL EVERY 4 HOURS
Refills: 0 | Status: DISCONTINUED | OUTPATIENT
Start: 2020-08-19 | End: 2020-08-19

## 2020-08-19 RX ORDER — SODIUM CHLORIDE 9 MG/ML
1000 INJECTION INTRAMUSCULAR; INTRAVENOUS; SUBCUTANEOUS
Refills: 0 | Status: DISCONTINUED | OUTPATIENT
Start: 2020-08-19 | End: 2020-08-27

## 2020-08-19 RX ADMIN — SODIUM CHLORIDE 100 MILLILITER(S): 9 INJECTION INTRAMUSCULAR; INTRAVENOUS; SUBCUTANEOUS at 10:43

## 2020-08-19 RX ADMIN — Medication 975 MILLIGRAM(S): at 07:44

## 2020-08-19 RX ADMIN — CHLORHEXIDINE GLUCONATE 1 APPLICATION(S): 213 SOLUTION TOPICAL at 07:45

## 2020-08-19 NOTE — ASU DISCHARGE PLAN (ADULT/PEDIATRIC) - CARE PROVIDER_API CALL
Jacques Becker  ORTHOPAEDIC SURGERY  [address] [address] [place] [id number]  Phone: [phone]  Fax: [phone]  Established Patient  Follow Up Time: 1 week

## 2020-08-19 NOTE — ASU PREOP CHECKLIST - BOWEL PREP
2019 Qualified Clinical Data Registry (for Quality Improvement)     Postoperative nausea/vomiting risk protocol (Adult = 18 yrs and Pediatric 3-17 yrs)- (430 and 463)  General inhalation anesthetic (NOT TIVA) with PONV risk factors: Yes  Provision of anti-emetic therapy with at least 2 different classes of agents: Yes   Patient DID NOT receive anti-emetic therapy and reason is documented in Medical Record:  N/A    Multimodal Pain Management- (AQI59)  Patient undergoing Elective Surgery (i.e. Outpatient, or ASC, or Prescheduled Surgery prior to Hospital Admission): No  Use of Multimodal Pain Management, two or more drugs and/or interventions, NOT including systemic opioids: N/A  Exception: Documented allergy to multiple classes of analgesics: N/A    PACU assessment of acute postoperative pain prior to Anesthesia Care End- Applies to Patients Age = 18- (ABG7)  Initial PACU pain score is which of the following: < 7/10  Patient unable to report pain score: N/A    Post-anesthetic transfer of care checklist/protocol to PACU/ICU- (426 and 427)  Upon conclusion of case, patient transferred to which of the following locations: PACU/Non-ICU  Use of transfer checklist/protocol: Yes  Exclusion: Service Performed in Patient Hospital Room (and thus did not require transfer): N/A    PACU Reintubation- (AQI31)  General anesthesia requiring endotracheal intubation (ETT) along with subsequent extubation in OR or PACU: No  Required reintubation in the PACU: N/A  Extubation was a planned trial documented in the medical record prior to removal of the original airway device: N/A    Unplanned admission to ICU related to anesthesia service up through end of PACU care- (MD51)  Unplanned admission to ICU (not initially anticipated at anesthesia start time): Yes         
n/a

## 2020-08-21 LAB — SURGICAL PATHOLOGY STUDY: SIGNIFICANT CHANGE UP

## 2020-09-06 ENCOUNTER — APPOINTMENT (OUTPATIENT)
Dept: DISASTER EMERGENCY | Facility: CLINIC | Age: 75
End: 2020-09-06

## 2020-09-06 DIAGNOSIS — Z01.818 ENCOUNTER FOR OTHER PREPROCEDURAL EXAMINATION: ICD-10-CM

## 2020-09-07 ENCOUNTER — TRANSCRIPTION ENCOUNTER (OUTPATIENT)
Age: 75
End: 2020-09-07

## 2020-09-07 LAB — SARS-COV-2 N GENE NPH QL NAA+PROBE: NOT DETECTED

## 2020-09-08 ENCOUNTER — TRANSCRIPTION ENCOUNTER (OUTPATIENT)
Age: 75
End: 2020-09-08

## 2020-09-08 RX ORDER — SODIUM CHLORIDE 9 MG/ML
3 INJECTION INTRAMUSCULAR; INTRAVENOUS; SUBCUTANEOUS EVERY 8 HOURS
Refills: 0 | Status: DISCONTINUED | OUTPATIENT
Start: 2020-09-09 | End: 2020-09-16

## 2020-09-09 ENCOUNTER — OUTPATIENT (OUTPATIENT)
Dept: OUTPATIENT SERVICES | Facility: HOSPITAL | Age: 75
LOS: 1 days | End: 2020-09-09
Payer: MEDICARE

## 2020-09-09 VITALS
TEMPERATURE: 98 F | WEIGHT: 179.9 LBS | HEART RATE: 83 BPM | OXYGEN SATURATION: 100 % | DIASTOLIC BLOOD PRESSURE: 85 MMHG | HEIGHT: 64 IN | SYSTOLIC BLOOD PRESSURE: 113 MMHG | RESPIRATION RATE: 19 BRPM

## 2020-09-09 VITALS
SYSTOLIC BLOOD PRESSURE: 128 MMHG | RESPIRATION RATE: 16 BRPM | HEART RATE: 67 BPM | TEMPERATURE: 98 F | OXYGEN SATURATION: 100 % | DIASTOLIC BLOOD PRESSURE: 43 MMHG

## 2020-09-09 DIAGNOSIS — Z98.890 OTHER SPECIFIED POSTPROCEDURAL STATES: Chronic | ICD-10-CM

## 2020-09-09 DIAGNOSIS — Z98.89 OTHER SPECIFIED POSTPROCEDURAL STATES: Chronic | ICD-10-CM

## 2020-09-09 DIAGNOSIS — Z98.49 CATARACT EXTRACTION STATUS, UNSPECIFIED EYE: Chronic | ICD-10-CM

## 2020-09-09 DIAGNOSIS — M24.231 DISORDER OF LIGAMENT, RIGHT WRIST: ICD-10-CM

## 2020-09-09 DIAGNOSIS — Z90.710 ACQUIRED ABSENCE OF BOTH CERVIX AND UTERUS: Chronic | ICD-10-CM

## 2020-09-09 DIAGNOSIS — Z96.659 PRESENCE OF UNSPECIFIED ARTIFICIAL KNEE JOINT: Chronic | ICD-10-CM

## 2020-09-09 DIAGNOSIS — M24.232 DISORDER OF LIGAMENT, LEFT WRIST: ICD-10-CM

## 2020-09-09 LAB
ANION GAP SERPL CALC-SCNC: 5 MMOL/L — SIGNIFICANT CHANGE UP (ref 5–17)
BUN SERPL-MCNC: 12 MG/DL — SIGNIFICANT CHANGE UP (ref 7–18)
CALCIUM SERPL-MCNC: 9.8 MG/DL — SIGNIFICANT CHANGE UP (ref 8.4–10.5)
CHLORIDE SERPL-SCNC: 100 MMOL/L — SIGNIFICANT CHANGE UP (ref 96–108)
CO2 SERPL-SCNC: 30 MMOL/L — SIGNIFICANT CHANGE UP (ref 22–31)
CREAT SERPL-MCNC: 4.17 MG/DL — HIGH (ref 0.5–1.3)
GLUCOSE BLDC GLUCOMTR-MCNC: 103 MG/DL — HIGH (ref 70–99)
GLUCOSE SERPL-MCNC: 99 MG/DL — SIGNIFICANT CHANGE UP (ref 70–99)
POTASSIUM SERPL-MCNC: 5.1 MMOL/L — SIGNIFICANT CHANGE UP (ref 3.5–5.3)
POTASSIUM SERPL-SCNC: 5.1 MMOL/L — SIGNIFICANT CHANGE UP (ref 3.5–5.3)
SODIUM SERPL-SCNC: 135 MMOL/L — SIGNIFICANT CHANGE UP (ref 135–145)

## 2020-09-09 PROCEDURE — 80048 BASIC METABOLIC PNL TOTAL CA: CPT

## 2020-09-09 PROCEDURE — 36415 COLL VENOUS BLD VENIPUNCTURE: CPT

## 2020-09-09 PROCEDURE — 64721 CARPAL TUNNEL SURGERY: CPT | Mod: RT

## 2020-09-09 PROCEDURE — 82962 GLUCOSE BLOOD TEST: CPT

## 2020-09-09 RX ORDER — ACETAMINOPHEN 500 MG
975 TABLET ORAL ONCE
Refills: 0 | Status: COMPLETED | OUTPATIENT
Start: 2020-09-09 | End: 2020-09-09

## 2020-09-09 RX ORDER — ONDANSETRON 8 MG/1
4 TABLET, FILM COATED ORAL EVERY 6 HOURS
Refills: 0 | Status: DISCONTINUED | OUTPATIENT
Start: 2020-09-09 | End: 2020-09-16

## 2020-09-09 RX ORDER — ACETAMINOPHEN 500 MG
975 TABLET ORAL EVERY 6 HOURS
Refills: 0 | Status: DISCONTINUED | OUTPATIENT
Start: 2020-09-09 | End: 2020-09-16

## 2020-09-09 RX ORDER — ACETAMINOPHEN WITH CODEINE 300MG-30MG
2 TABLET ORAL
Qty: 10 | Refills: 0
Start: 2020-09-09

## 2020-09-09 RX ORDER — OXYCODONE AND ACETAMINOPHEN 5; 325 MG/1; MG/1
1 TABLET ORAL EVERY 4 HOURS
Refills: 0 | Status: DISCONTINUED | OUTPATIENT
Start: 2020-09-09 | End: 2020-09-09

## 2020-09-09 RX ADMIN — Medication 975 MILLIGRAM(S): at 09:14

## 2020-09-09 RX ADMIN — SODIUM CHLORIDE 3 MILLILITER(S): 9 INJECTION INTRAMUSCULAR; INTRAVENOUS; SUBCUTANEOUS at 09:12

## 2020-09-09 NOTE — H&P PST ADULT - MUSCULOSKELETAL COMMENTS
c/o left knee pain due to h/o left total knee replacement, left wrist carpal tunnel, disorder of ligaments left wrist right wrist, left knee due to pain

## 2020-09-09 NOTE — ASU DISCHARGE PLAN (ADULT/PEDIATRIC) - CALL YOUR DOCTOR IF YOU HAVE ANY OF THE FOLLOWING:
Wound/Surgical Site with redness, or foul smelling discharge or pus/Swelling that gets worse/Fever greater than (need to indicate Fahrenheit or Celsius)/Numbness, tingling, color or temperature change to extremity/Pain not relieved by Medications/Bleeding that does not stop/Nausea and vomiting that does not stop

## 2020-09-09 NOTE — H&P PST ADULT - NSICDXPASTMEDICALHX_GEN_ALL_CORE_FT
PAST MEDICAL HISTORY:  Anemia     Bronchial asthma hx of    Carpal tunnel syndrome left    Chronic right shoulder pain     Diabetes mellitus     Disorder of ligament of wrist, left     Dyslipidemia     ESRD (end stage renal disease) on dialysis started HD 2018    Gout     HTN (hypertension)     Hypercholesterolemia     Pedal edema     Sleep apnea on CPAP    Trigeminal neuralgia hx of    Vertigo

## 2020-09-09 NOTE — H&P PST ADULT - NSICDXPASTSURGICALHX_GEN_ALL_CORE_FT
PAST SURGICAL HISTORY:  History of arthroscopy of left shoulder x 3    History of arthroscopy of right shoulder x 3    History of carpal tunnel surgery of left wrist     History of vascular access device , removed 2020    S/P arteriovenous (AV) fistula creation left upper forearm cephalic vein brachial artery AV fistula creation on 2018, left arm basilic vein transposition on 2019.      S/P      S/P cataract extraction bilateral, 2005    S/P hysterectomy 1978    S/P rotator cuff surgery bilateral 's    S/P total knee replacement bilateral, left , right

## 2020-09-09 NOTE — ASU DISCHARGE PLAN (ADULT/PEDIATRIC) - CARE PROVIDER_API CALL
Jacques Becker  ORTHOPAEDIC SURGERY  [address] [place] [address] [address]  [place] [address]  Phone: [phone]  Fax: [phone]  Follow Up Time:

## 2020-09-09 NOTE — H&P PST ADULT - NSICDXPROBLEM_GEN_ALL_CORE_FT
PROBLEM DIAGNOSES  Problem: Disorder of ligament, right wrist  Assessment and Plan: patient is scheduled for right carpal tunnel release on 9/9/2020

## 2020-09-09 NOTE — H&P PST ADULT - MUSCULOSKELETAL
details… detailed exam normal strength/no joint warmth/no calf tenderness/decreased ROM/ROM intact/normal/no joint swelling/no joint erythema

## 2020-09-09 NOTE — H&P PST ADULT - ASSESSMENT
75  yr old female with PMH of HTN, HLD, DM, JARVIS on CPAP, gout, chronic right shoulder pain, ESRD on Hemodialysis 3 times a week via left AV fistula PSH of left arm brachiocephalic AV fistula creation on 12/4/2018 , left arm basilic vein transposition on 4/2/2019 presented for right carpal tunnel release on 9/9/2020

## 2020-09-09 NOTE — H&P PST ADULT - NEGATIVE CARDIOVASCULAR SYMPTOMS
no orthopnea/no dyspnea on exertion/no palpitations/no paroxysmal nocturnal dyspnea/no claudication/no peripheral edema/no chest pain

## 2020-09-09 NOTE — H&P PST ADULT - NEGATIVE GASTROINTESTINAL SYMPTOMS
no nausea/no vomiting/no constipation/no change in bowel habits/no flatulence/no abdominal pain/no diarrhea

## 2020-09-09 NOTE — H&P PST ADULT - NEGATIVE BREAST SYMPTOMS
no breast tenderness L/no breast lump L/no breast lump R/no breast tenderness R/no nipple discharge R/no nipple discharge L

## 2020-09-09 NOTE — H&P PST ADULT - NEUROLOGICAL DETAILS
sensation intact/normal strength/responds to pain/alert and oriented x 3/responds to verbal commands/deep reflexes intact

## 2020-09-09 NOTE — ASU PATIENT PROFILE, ADULT - PMH
Anemia    Bronchial asthma  hx of  Carpal tunnel syndrome  left  Chronic right shoulder pain    Diabetes mellitus    Disorder of ligament of wrist, left    Dyslipidemia    ESRD (end stage renal disease) on dialysis  started HD 2018  Gout    HTN (hypertension)    Hypercholesterolemia    Pedal edema    Sleep apnea  on CPAP  Trigeminal neuralgia  hx of  Vertigo

## 2020-09-09 NOTE — H&P PST ADULT - NEGATIVE OPHTHALMOLOGIC SYMPTOMS
no diplopia/no blurred vision L/no lacrimation L/no lacrimation R/no blurred vision R/no photophobia

## 2020-09-09 NOTE — H&P PST ADULT - NEGATIVE GENERAL GENITOURINARY SYMPTOMS
no flank pain L/normal urinary frequency/no hematuria/no urine discoloration/no dysuria/no urinary hesitancy/no renal colic/no flank pain R

## 2020-09-09 NOTE — ASU PATIENT PROFILE, ADULT - PSH
History of arthroscopy of left shoulder  x 3  History of arthroscopy of right shoulder  x 3  History of carpal tunnel surgery of left wrist    History of vascular access device  , removed 2020  S/P arteriovenous (AV) fistula creation  left upper forearm cephalic vein brachial artery AV fistula creation on 2018, left arm basilic vein transposition on 2019.    S/P     S/P cataract extraction  bilateral,   S/P hysterectomy    S/P rotator cuff surgery  bilateral 's  S/P total knee replacement  bilateral, left , right

## 2020-09-10 ENCOUNTER — EMERGENCY (EMERGENCY)
Facility: HOSPITAL | Age: 75
LOS: 1 days | Discharge: HOME | End: 2020-09-10
Attending: EMERGENCY MEDICINE
Payer: MEDICARE

## 2020-09-10 VITALS
OXYGEN SATURATION: 97 % | RESPIRATION RATE: 18 BRPM | TEMPERATURE: 98 F | DIASTOLIC BLOOD PRESSURE: 83 MMHG | HEIGHT: 63 IN | SYSTOLIC BLOOD PRESSURE: 129 MMHG | HEART RATE: 92 BPM

## 2020-09-10 DIAGNOSIS — Z98.89 OTHER SPECIFIED POSTPROCEDURAL STATES: Chronic | ICD-10-CM

## 2020-09-10 DIAGNOSIS — Z98.890 OTHER SPECIFIED POSTPROCEDURAL STATES: Chronic | ICD-10-CM

## 2020-09-10 DIAGNOSIS — Z96.659 PRESENCE OF UNSPECIFIED ARTIFICIAL KNEE JOINT: Chronic | ICD-10-CM

## 2020-09-10 DIAGNOSIS — Z90.710 ACQUIRED ABSENCE OF BOTH CERVIX AND UTERUS: Chronic | ICD-10-CM

## 2020-09-10 DIAGNOSIS — Z98.49 CATARACT EXTRACTION STATUS, UNSPECIFIED EYE: Chronic | ICD-10-CM

## 2020-09-10 PROCEDURE — 99284 EMERGENCY DEPT VISIT MOD MDM: CPT

## 2020-09-10 NOTE — ED ADULT TRIAGE NOTE - CHIEF COMPLAINT QUOTE
abd pain nausea sp rt carpal tunner repair  lft arm shunt  daughter vik [phone] abd pain nausea sp rt carpal tunner repair per ems took 2 tylenol w/ codeine on empty stomache  lft arm shunt  daughter vik [phone]

## 2020-09-11 VITALS
SYSTOLIC BLOOD PRESSURE: 133 MMHG | OXYGEN SATURATION: 100 % | HEART RATE: 80 BPM | DIASTOLIC BLOOD PRESSURE: 72 MMHG | TEMPERATURE: 98 F | RESPIRATION RATE: 18 BRPM

## 2020-09-11 LAB
ALBUMIN SERPL ELPH-MCNC: 4.3 G/DL — SIGNIFICANT CHANGE UP (ref 3.3–5)
ALP SERPL-CCNC: 170 U/L — HIGH (ref 40–120)
ALT FLD-CCNC: <5 U/L — LOW (ref 10–45)
ANION GAP SERPL CALC-SCNC: 17 MMOL/L — SIGNIFICANT CHANGE UP (ref 5–17)
AST SERPL-CCNC: 24 U/L — SIGNIFICANT CHANGE UP (ref 10–40)
BASE EXCESS BLDV CALC-SCNC: 6.1 MMOL/L — HIGH (ref -2–2)
BILIRUB SERPL-MCNC: 0.2 MG/DL — SIGNIFICANT CHANGE UP (ref 0.2–1.2)
BLD GP AB SCN SERPL QL: NEGATIVE — SIGNIFICANT CHANGE UP
BUN SERPL-MCNC: 32 MG/DL — HIGH (ref 7–23)
CA-I SERPL-SCNC: 1.23 MMOL/L — SIGNIFICANT CHANGE UP (ref 1.12–1.3)
CALCIUM SERPL-MCNC: 10.3 MG/DL — SIGNIFICANT CHANGE UP (ref 8.4–10.5)
CHLORIDE BLDV-SCNC: 97 MMOL/L — SIGNIFICANT CHANGE UP (ref 96–108)
CHLORIDE SERPL-SCNC: 94 MMOL/L — LOW (ref 96–108)
CO2 BLDV-SCNC: 33 MMOL/L — HIGH (ref 22–30)
CO2 SERPL-SCNC: 26 MMOL/L — SIGNIFICANT CHANGE UP (ref 22–31)
CREAT SERPL-MCNC: 7.53 MG/DL — HIGH (ref 0.5–1.3)
GAS PNL BLDV: 138 MMOL/L — SIGNIFICANT CHANGE UP (ref 135–145)
GAS PNL BLDV: SIGNIFICANT CHANGE UP
GAS PNL BLDV: SIGNIFICANT CHANGE UP
GLUCOSE BLDC GLUCOMTR-MCNC: 89 MG/DL — SIGNIFICANT CHANGE UP (ref 70–99)
GLUCOSE BLDV-MCNC: 127 MG/DL — HIGH (ref 70–99)
GLUCOSE SERPL-MCNC: 123 MG/DL — HIGH (ref 70–99)
HCO3 BLDV-SCNC: 32 MMOL/L — HIGH (ref 21–29)
HCT VFR BLD CALC: 33.8 % — LOW (ref 34.5–45)
HCT VFR BLDA CALC: 33 % — LOW (ref 39–50)
HGB BLD CALC-MCNC: 10.8 G/DL — LOW (ref 11.5–15.5)
HGB BLD-MCNC: 10.4 G/DL — LOW (ref 11.5–15.5)
LACTATE BLDV-MCNC: 1.2 MMOL/L — SIGNIFICANT CHANGE UP (ref 0.7–2)
MCHC RBC-ENTMCNC: 29.3 PG — SIGNIFICANT CHANGE UP (ref 27–34)
MCHC RBC-ENTMCNC: 30.8 GM/DL — LOW (ref 32–36)
MCV RBC AUTO: 95.2 FL — SIGNIFICANT CHANGE UP (ref 80–100)
NRBC # BLD: 0 /100 WBCS — SIGNIFICANT CHANGE UP (ref 0–0)
OTHER CELLS CSF MANUAL: 13 ML/DL — LOW (ref 16–22)
PCO2 BLDV: 52 MMHG — HIGH (ref 35–50)
PH BLDV: 7.4 — SIGNIFICANT CHANGE UP (ref 7.35–7.45)
PLATELET # BLD AUTO: 187 K/UL — SIGNIFICANT CHANGE UP (ref 150–400)
PO2 BLDV: 54 MMHG — HIGH (ref 25–45)
POTASSIUM BLDV-SCNC: 4.3 MMOL/L — SIGNIFICANT CHANGE UP (ref 3.5–5.3)
POTASSIUM SERPL-MCNC: 4.4 MMOL/L — SIGNIFICANT CHANGE UP (ref 3.5–5.3)
POTASSIUM SERPL-SCNC: 4.4 MMOL/L — SIGNIFICANT CHANGE UP (ref 3.5–5.3)
PROT SERPL-MCNC: 7.8 G/DL — SIGNIFICANT CHANGE UP (ref 6–8.3)
RBC # BLD: 3.55 M/UL — LOW (ref 3.8–5.2)
RBC # FLD: 20 % — HIGH (ref 10.3–14.5)
RH IG SCN BLD-IMP: NEGATIVE — SIGNIFICANT CHANGE UP
SAO2 % BLDV: 86 % — SIGNIFICANT CHANGE UP (ref 67–88)
SODIUM SERPL-SCNC: 137 MMOL/L — SIGNIFICANT CHANGE UP (ref 135–145)
WBC # BLD: 8.19 K/UL — SIGNIFICANT CHANGE UP (ref 3.8–10.5)
WBC # FLD AUTO: 8.19 K/UL — SIGNIFICANT CHANGE UP (ref 3.8–10.5)

## 2020-09-11 PROCEDURE — 82435 ASSAY OF BLOOD CHLORIDE: CPT

## 2020-09-11 PROCEDURE — 84295 ASSAY OF SERUM SODIUM: CPT

## 2020-09-11 PROCEDURE — 85027 COMPLETE CBC AUTOMATED: CPT

## 2020-09-11 PROCEDURE — 83605 ASSAY OF LACTIC ACID: CPT

## 2020-09-11 PROCEDURE — 82947 ASSAY GLUCOSE BLOOD QUANT: CPT

## 2020-09-11 PROCEDURE — 82330 ASSAY OF CALCIUM: CPT

## 2020-09-11 PROCEDURE — 84132 ASSAY OF SERUM POTASSIUM: CPT

## 2020-09-11 PROCEDURE — 80053 COMPREHEN METABOLIC PANEL: CPT

## 2020-09-11 PROCEDURE — 85018 HEMOGLOBIN: CPT

## 2020-09-11 PROCEDURE — 99284 EMERGENCY DEPT VISIT MOD MDM: CPT

## 2020-09-11 PROCEDURE — 82803 BLOOD GASES ANY COMBINATION: CPT

## 2020-09-11 PROCEDURE — 85014 HEMATOCRIT: CPT

## 2020-09-11 RX ORDER — OXYCODONE HYDROCHLORIDE 5 MG/1
1 TABLET ORAL
Qty: 12 | Refills: 0
Start: 2020-09-11 | End: 2020-09-13

## 2020-09-11 NOTE — ED PROVIDER NOTE - NSFOLLOWUPINSTRUCTIONS_ED_ALL_ED_FT
You were seen and evaluated today for nausea and vomiting likely caused by the use of aceaminophen-codeine. This medication is known to cause nausea and vomiting, especially in patients with Chronic Kidney Disease. Our work-up today included Blood work, the results of which have been explained to you and provided separately. Please keep a copy of these records to present to your doctor in future visits.   - Please contact your surgeon within the next 24-48hrs for a follow-up appointment  - You were prescribed Oxycodone to your pharmacy. Please take as directed on bottle and only as needed, if Tylenol is not sufficient.   - Please refrain from taking any more of your prescribed Acetaminophen-Codeine as this medication can worsen your nausea  - DO NOT TAKE BOTH ACETAMINOPHEN-CODEINE AND OXYCODONE as the interaction can be potentially life threatening.     Please return to the Emergency Department should you experience any of the following:  - Persistent or intractable nausea and vomiting  - New or worsening abdominal pain  - Fever, unexplained weight loss, night sweats  - Any new concerning symptoms

## 2020-09-11 NOTE — ED ADULT NURSE NOTE - OBJECTIVE STATEMENT
PT 75 year old female, A/O x3. PT came in through triage due to N/V. PMH- HTN, DM, PAULINE. PSH- Carpel Tunnel surgery. PT states she received carpel tunnel surgery yesterday at [facility] and complaining of arm pain. PT stated she came into ED today due to abdominal pain, nausea and one episode of non-bloody vomiting. Symptoms resolved and PT states "my children wanted me to come". Fistula left upper arm- receives dialysis MWF. Skin- surgical incision on left palm and on lateral side of left lower arm. PT 75 year old female, A/O x3. PT came in through triage due to N/V. PMH- HTN, DM, PAULINE. PSH- Carpel Tunnel surgery. PT states she received carpel tunnel surgery yesterday at [facility] and complaining of arm pain. PT stated she came into ED today due to abdominal pain, nausea, SOB and one episode of non-bloody vomiting. Symptoms resolved and PT states "my children wanted me to come". Fistula left upper arm- receives dialysis MWF. Skin- surgical incision on left palm and on lateral side of left lower arm, right lower arm in ace bandages from carpel tunnel surgery from yesterday. ABd. soft, nontender, nondistended. Denies SOB, chest pain, fevers, chills, cough, numbness/ tingling, dizziness/ lightheadedness, dysuria. Interventions- side rails up, blankets provided.

## 2020-09-11 NOTE — ED PROVIDER NOTE - ATTENDING CONTRIBUTION TO CARE
Attending Statement (LAURA Brannon MD):    HPI: 74y/o F h/o ESRD on HD (MWF), HTN, HLD, DM, and s/p R carpal tunnel release surgery yesterday; presenting after episode of vomiting x 1 at home following taking tylenol/codine prescription; nausea since resolved; began near immediately after taking pain medication; no abdominal pain, no diarrhea, no fever/chills.    Review of Systems:  -General: no fever or chills  -ENT: no congestion, no difficulty swallowing  -Pulmonary: no cough, no shortness of breath  -Cardiac: no chest pain, no palpitations  -Gastrointestinal: no abdominal pain, + nausea, +  vomiting, and no diarrhea.  -Genitourinary: no blood or pain with urination  -Musculoskeletal: no back or neck pain; R hand/wrist pain  -Skin: no rashes  -Endocrine: + h/o diabetes  -Neurologic: No focal weakness or numbness    All else negative unless otherwise specified elsewhere in this note.    PSH/PMH as noted above    On Physical Exam:  General: well appearing, in NAD, speaking clearly in full sentences and without difficulty; cooperative with exam  HEENT: PERRL, MMM  Neck: no neck tenderness, no nuchal rigidity  Cardiac: normal s1, s2; RRR; no MGR  Lungs: CTABL  Abdomen: soft nontender/nondistended  : no bladder tenderness or distension  Skin: intact, no rash  Extremities: RUE: in cast/postsurgical bandage; no surrounding erythema; fingers warm/well-perfused with intact sensation and cap refill <2sec, able to fully flex/extend fingers.  Neuro: no gross neurologic deficits    MDM: vomiting likely 2/2 medications; labs reviewed: cbc no significant leukocytosis or anemia, cmp with no acute actionable findings; is tolerating PO and feels well; stable for dc.

## 2020-09-11 NOTE — ED PROVIDER NOTE - PROGRESS NOTE DETAILS
Fernie Loredo MD: Pt reports no nausea. Tolerated PO challenge well. We explained that the nausea is likely 2/2 the codeine ingestion and advised patient to refrain from taking any more. Alternative pain medication has been prescribed and we gave thorough instruction to patient on how to take the oxy. Pt voiced her understanding as well as her desire to return home. Pt also voiced agreement to not take any more codeine and to return to the ED should she develop new or worsening nausea as well as any abd pain.

## 2020-09-11 NOTE — ED ADULT NURSE NOTE - NSIMPLEMENTINTERV_GEN_ALL_ED
Implemented All Universal Safety Interventions:  [place] to call system. Call bell, personal items and telephone within reach. Instruct patient to call for assistance. Room bathroom lighting operational. Non-slip footwear when patient is off stretcher. Physically safe environment: no spills, clutter or unnecessary equipment. Stretcher in lowest position, wheels locked, appropriate side rails in place.

## 2020-09-11 NOTE — ED PROVIDER NOTE - CLINICAL SUMMARY MEDICAL DECISION MAKING FREE TEXT BOX
[age]yo F w ESRD on HD MWF presents to ED POD 1 for carpal tunnel release with nausea and vomiting after taking tylenol/codeine. N/V has since resolved and pt has no other complaints. We will do basic labs, emesis likely 2/2 codeine in the setting of ESRD. If labs normal and patient can tolerate PO challenge, will D/C home for normal follow up with surgery.

## 2020-09-11 NOTE — ED PROVIDER NOTE - PATIENT PORTAL LINK FT
You can access the FollowMyHealth Patient Portal offered by [facility] by registering at the following website: [email or web address]. By joining [employer]’s FollowMyHealth portal, you will also be able to view your health information using other applications (apps) compatible with our system.

## 2020-09-11 NOTE — ED PROVIDER NOTE - MUSCULOSKELETAL, MLM
Surgical wound on R wrist/forearm, dressing clean and dry. neurovascularly intact distally. Spine appears normal, range of motion is not limited, no muscle or joint tenderness

## 2020-09-11 NOTE — ED ADULT NURSE REASSESSMENT NOTE - NS ED NURSE REASSESS COMMENT FT1
PT provided viky mckenna and ginger ale for PO challenge- will reassess.
PT states "I am ready to go home". Nausea resolved and not complaining of right arm pain at surgical site.

## 2020-09-11 NOTE — ED PROVIDER NOTE - OBJECTIVE STATEMENT
74yo F h/o HTN, HLD, DMII, JARVIS on CPAP, gout, chronic right shoulder pain, ESRD on HD MWF via left AV fistula presents to ED for nausea with 3 episodes of vomiting at home. Pt recently underwent carpal tunnel release surgery by Dr. Jacques Becker with carpal tunnel syndrome 9/9/20, afterwhich the pt has had poor pain control at home. Was prescribed Acetaminophen-codeine 300mg which provides only moderate relief. Today patient was at home with pain when she became nauseous had 3 episodes of NB/NB vomiting after which nausea resolved and has not recurred. Currently the pt complains of only moderate pain in R hand at surgical wound site.

## 2020-09-11 NOTE — ED ADULT NURSE NOTE - CHIEF COMPLAINT QUOTE
abd pain nausea sp rt carpal tunner repair per ems took 2 tylenol w/ codeine on empty stomache  lft arm shunt  daughter vik [phone]

## 2020-12-10 ENCOUNTER — EMERGENCY (EMERGENCY)
Facility: HOSPITAL | Age: 75
LOS: 1 days | Discharge: HOME | End: 2020-12-10
Attending: EMERGENCY MEDICINE
Payer: MEDICARE

## 2020-12-10 VITALS
OXYGEN SATURATION: 99 % | HEART RATE: 76 BPM | HEIGHT: 63 IN | TEMPERATURE: 97 F | SYSTOLIC BLOOD PRESSURE: 138 MMHG | DIASTOLIC BLOOD PRESSURE: 82 MMHG

## 2020-12-10 VITALS
DIASTOLIC BLOOD PRESSURE: 74 MMHG | RESPIRATION RATE: 18 BRPM | SYSTOLIC BLOOD PRESSURE: 139 MMHG | HEART RATE: 72 BPM | TEMPERATURE: 98 F | OXYGEN SATURATION: 99 %

## 2020-12-10 DIAGNOSIS — Z98.890 OTHER SPECIFIED POSTPROCEDURAL STATES: Chronic | ICD-10-CM

## 2020-12-10 DIAGNOSIS — Z96.659 PRESENCE OF UNSPECIFIED ARTIFICIAL KNEE JOINT: Chronic | ICD-10-CM

## 2020-12-10 DIAGNOSIS — Z98.89 OTHER SPECIFIED POSTPROCEDURAL STATES: Chronic | ICD-10-CM

## 2020-12-10 DIAGNOSIS — Z98.49 CATARACT EXTRACTION STATUS, UNSPECIFIED EYE: Chronic | ICD-10-CM

## 2020-12-10 DIAGNOSIS — Z90.710 ACQUIRED ABSENCE OF BOTH CERVIX AND UTERUS: Chronic | ICD-10-CM

## 2020-12-10 LAB
ALBUMIN SERPL ELPH-MCNC: 4 G/DL — SIGNIFICANT CHANGE UP (ref 3.3–5)
ALP SERPL-CCNC: 154 U/L — HIGH (ref 40–120)
ALT FLD-CCNC: 20 U/L — SIGNIFICANT CHANGE UP (ref 10–45)
ANION GAP SERPL CALC-SCNC: 19 MMOL/L — HIGH (ref 5–17)
APTT BLD: 27.3 SEC — LOW (ref 27.5–35.5)
AST SERPL-CCNC: 33 U/L — SIGNIFICANT CHANGE UP (ref 10–40)
BASOPHILS # BLD AUTO: 0.02 K/UL — SIGNIFICANT CHANGE UP (ref 0–0.2)
BASOPHILS NFR BLD AUTO: 0.2 % — SIGNIFICANT CHANGE UP (ref 0–2)
BILIRUB SERPL-MCNC: 0.3 MG/DL — SIGNIFICANT CHANGE UP (ref 0.2–1.2)
BUN SERPL-MCNC: 21 MG/DL — SIGNIFICANT CHANGE UP (ref 7–23)
CALCIUM SERPL-MCNC: 9.5 MG/DL — SIGNIFICANT CHANGE UP (ref 8.4–10.5)
CHLORIDE SERPL-SCNC: 99 MMOL/L — SIGNIFICANT CHANGE UP (ref 96–108)
CO2 SERPL-SCNC: 18 MMOL/L — LOW (ref 22–31)
CREAT SERPL-MCNC: 4.4 MG/DL — HIGH (ref 0.5–1.3)
EOSINOPHIL # BLD AUTO: 0.01 K/UL — SIGNIFICANT CHANGE UP (ref 0–0.5)
EOSINOPHIL NFR BLD AUTO: 0.1 % — SIGNIFICANT CHANGE UP (ref 0–6)
GLUCOSE SERPL-MCNC: 175 MG/DL — HIGH (ref 70–99)
HCT VFR BLD CALC: 34.4 % — LOW (ref 34.5–45)
HGB BLD-MCNC: 10.7 G/DL — LOW (ref 11.5–15.5)
IMM GRANULOCYTES NFR BLD AUTO: 0.9 % — SIGNIFICANT CHANGE UP (ref 0–1.5)
INR BLD: 0.97 RATIO — SIGNIFICANT CHANGE UP (ref 0.88–1.16)
LYMPHOCYTES # BLD AUTO: 0.93 K/UL — LOW (ref 1–3.3)
LYMPHOCYTES # BLD AUTO: 11 % — LOW (ref 13–44)
MCHC RBC-ENTMCNC: 30.6 PG — SIGNIFICANT CHANGE UP (ref 27–34)
MCHC RBC-ENTMCNC: 31.1 GM/DL — LOW (ref 32–36)
MCV RBC AUTO: 98.3 FL — SIGNIFICANT CHANGE UP (ref 80–100)
MONOCYTES # BLD AUTO: 0.65 K/UL — SIGNIFICANT CHANGE UP (ref 0–0.9)
MONOCYTES NFR BLD AUTO: 7.7 % — SIGNIFICANT CHANGE UP (ref 2–14)
NEUTROPHILS # BLD AUTO: 6.76 K/UL — SIGNIFICANT CHANGE UP (ref 1.8–7.4)
NEUTROPHILS NFR BLD AUTO: 80.1 % — HIGH (ref 43–77)
NRBC # BLD: 0 /100 WBCS — SIGNIFICANT CHANGE UP (ref 0–0)
PLATELET # BLD AUTO: 124 K/UL — LOW (ref 150–400)
POTASSIUM SERPL-MCNC: 5.6 MMOL/L — HIGH (ref 3.5–5.3)
POTASSIUM SERPL-SCNC: 5.6 MMOL/L — HIGH (ref 3.5–5.3)
PROT SERPL-MCNC: 7.9 G/DL — SIGNIFICANT CHANGE UP (ref 6–8.3)
PROTHROM AB SERPL-ACNC: 11.6 SEC — SIGNIFICANT CHANGE UP (ref 10.6–13.6)
RBC # BLD: 3.5 M/UL — LOW (ref 3.8–5.2)
RBC # FLD: 17.4 % — HIGH (ref 10.3–14.5)
SODIUM SERPL-SCNC: 136 MMOL/L — SIGNIFICANT CHANGE UP (ref 135–145)
TROPONIN T, HIGH SENSITIVITY RESULT: 83 NG/L — HIGH (ref 0–51)
WBC # BLD: 8.45 K/UL — SIGNIFICANT CHANGE UP (ref 3.8–10.5)
WBC # FLD AUTO: 8.45 K/UL — SIGNIFICANT CHANGE UP (ref 3.8–10.5)

## 2020-12-10 PROCEDURE — 99284 EMERGENCY DEPT VISIT MOD MDM: CPT | Mod: 25

## 2020-12-10 PROCEDURE — 85610 PROTHROMBIN TIME: CPT

## 2020-12-10 PROCEDURE — 71045 X-RAY EXAM CHEST 1 VIEW: CPT | Mod: 26

## 2020-12-10 PROCEDURE — 82550 ASSAY OF CK (CPK): CPT

## 2020-12-10 PROCEDURE — 80053 COMPREHEN METABOLIC PANEL: CPT

## 2020-12-10 PROCEDURE — 93005 ELECTROCARDIOGRAM TRACING: CPT

## 2020-12-10 PROCEDURE — 82553 CREATINE MB FRACTION: CPT

## 2020-12-10 PROCEDURE — 71045 X-RAY EXAM CHEST 1 VIEW: CPT

## 2020-12-10 PROCEDURE — 85025 COMPLETE CBC W/AUTO DIFF WBC: CPT

## 2020-12-10 PROCEDURE — 93010 ELECTROCARDIOGRAM REPORT: CPT

## 2020-12-10 PROCEDURE — 85730 THROMBOPLASTIN TIME PARTIAL: CPT

## 2020-12-10 PROCEDURE — 99285 EMERGENCY DEPT VISIT HI MDM: CPT

## 2020-12-10 PROCEDURE — 84484 ASSAY OF TROPONIN QUANT: CPT

## 2020-12-10 RX ORDER — ONDANSETRON 8 MG/1
4 TABLET, FILM COATED ORAL ONCE
Refills: 0 | Status: COMPLETED | OUTPATIENT
Start: 2020-12-10 | End: 2020-12-10

## 2020-12-10 NOTE — ED ADULT NURSE NOTE - NSIMPLEMENTINTERV_GEN_ALL_ED
Implemented All Fall Risk Interventions:  [place] to call system. Call bell, personal items and telephone within reach. Instruct patient to call for assistance. Room bathroom lighting operational. Non-slip footwear when patient is off stretcher. Physically safe environment: no spills, clutter or unnecessary equipment. Stretcher in lowest position, wheels locked, appropriate side rails in place. Provide visual cue, wrist band, yellow gown, etc. Monitor gait and stability. Monitor for mental status changes and reorient to person, place, and time. Review medications for side effects contributing to fall risk. Reinforce activity limits and safety measures with patient and family.

## 2020-12-10 NOTE — ED PROVIDER NOTE - ATTENDING CONTRIBUTION TO CARE
Pt presents from dialysis after experiencing dizziness, sob, sharp cp, nausea, in the setting of drop of BP during dialysis. symptoms much improved by arrival in ED, only slightly nauseous. pt is well appearing here, normal vitals other than slightly elevated BP, no signs of fluid overload clinically. labs done to r/o acute metabolic derrangment, acute cardiac or pulmonary pathology. low suspcion for ACS - ekg, enzymes at pt's baseline. no infectious symptoms or findings. Multiple diagnoses were considered during patient's evaluation today. While exact etiology of symptoms is unclear, there appears to be no emergent process today that would require further emergent or inpatient management. Pt is safe for dc with outpt f/u and return instructions if symptoms worsen.

## 2020-12-10 NOTE — ED PROVIDER NOTE - OBJECTIVE STATEMENT
74 yo F with ESRD on HD M,W, Fr, htn, hld and dm presents with lightheadedness, palpitations, sob, and nausea while getting dyalisis 4 hrs ago. Reports getting 3 hrs of HD (removed 2.5L)  when her BP dropped, felt those symptoms, was given fluids and ox and her symptoms mostly improved, but still feels nauseous. No abdominal pain, chest pain or diaphoresis. Denies fever or cough.

## 2020-12-10 NOTE — ED PROVIDER NOTE - CLINICAL SUMMARY MEDICAL DECISION MAKING FREE TEXT BOX
74 yo F with ESRD on HD M,W, Fr, htn, hld and dm presents with lightheadedness, palpitations, sob, and nausea while getting dyalisis 4 hrs ago. VS WNL. Likely related to hypotension 2.2 to excessive fluid removal. However, given risk factors, will evaluate for ACS. Will do cardiac work up, anti-emetics and reassess

## 2020-12-10 NOTE — ED ADULT NURSE NOTE - OBJECTIVE STATEMENT
75y F arrived to the ED BIBEMS c/o dizziness, nauseous s/p dialysis. Pt PMH end stage renal disease receives dialysis MWF, Pt states " I was receiving dialysis around 6:30 and alittle while into it I started to get dizzy and nauseous. They continued the diaylsis and stopped when there was 45 min left. this has happened in the past and I usually get better with oxygen but I still feel dizzy." Pt endorses dizziness at present. Pt denies chest pain, SOB, HA, fever/chills, urinary symptoms, hematuria, numbness, tinging. Peripheral pulses present b/l. Skin warm, dry and pink. Pt placed in position of comfort. Pt educated on call bell system and provided call bell. Bed in lowest position, wheels locked, appropriate side rails raised. Pt denies needs at this time. 75y F arrived to the ED via EMS c/o dizziness, nauseous s/p dialysis. Pt PMH end stage renal disease receives dialysis MWF, Pt states " I was receiving dialysis around 6:30 and alittle while into it I started to get dizzy and nauseous. They continued the diaylsis and stopped when there was 45 min left. this has happened in the past and I usually get better with oxygen but I still feel dizzy." Pt endorses dizziness at present. Pt denies chest pain, SOB, HA, fever/chills, urinary symptoms, hematuria, numbness, tinging. Peripheral pulses present b/l. Skin warm, dry and pink. Pt placed in position of comfort. Pt educated on call bell system and provided call bell. Bed in lowest position, wheels locked, appropriate side rails raised. Pt denies needs at this time. 75y F arrived to the ED via EMS c/o dizziness, nauseous s/p dialysis. Pt PMH end stage renal disease receives dialysis M/W/F , Pt states " I was receiving dialysis around 6:30 and alittle while into it I started to get dizzy and nauseous. They continued the dialysis and stopped when there was 45 min left. this has happened in the past and I usually get better with oxygen but I still feel dizzy." Pt endorses dizziness at present. Pt denies chest pain, SOB, HA, fever/chills, urinary symptoms, hematuria, numbness, tinging. Peripheral pulses present b/l. Skin warm, dry and pink. Pt placed in position of comfort. Pt educated on call bell system and provided call bell. Bed in lowest position, wheels locked, appropriate side rails raised. Pt denies needs at this time.

## 2020-12-10 NOTE — ED PROVIDER NOTE - PATIENT PORTAL LINK FT
You can access the FollowMyHealth Patient Portal offered by [facility] by registering at the following website: [email or web address]. By joining [employer]’s FollowMyHealth portal, you will also be able to view your health information using other applications (apps) compatible with our system.

## 2020-12-10 NOTE — ED PROVIDER NOTE - PROGRESS NOTE DETAILS
Patient reassessed. Reports symptoms have been improved. Denies any chest pain or SOB. CKBM WNL. Patient agrees to follow up with PCP and nephrologist.

## 2020-12-10 NOTE — ED PROVIDER NOTE - NS ED ROS FT
GENERAL: No fever or chills  EYES: no change in vision  HEENT: no trouble swallowing or speaking  CARDIAC: see hpi  PULMONARY: see hpi  GI: no abdominal pain, nausea, vomiting, diarrhea, or constipation   : No changes in urination  SKIN: no rashes  NEURO: no headache, numbness, or weakness.  MSK: No joint pain

## 2021-02-27 NOTE — PATIENT PROFILE ADULT. - AS SC BRADEN MOBILITY
Full labs as per orders, pt transferred to CT and then transferred to ICU.
No interventions aniket.
(3) slightly limited

## 2021-04-27 NOTE — ASU DISCHARGE PLAN (ADULT/PEDIATRIC) - DISCHARGE PLAN IS COMPLETE AND GIVEN TO PATIENT
Called patient at home number no answer no voice mail to leave message. Call cell phone no answer and voice mail is full. Unable to inform patient of scheduled CT. : Yes

## 2021-06-24 ENCOUNTER — EMERGENCY (EMERGENCY)
Facility: HOSPITAL | Age: 76
LOS: 0 days | Discharge: HOME | End: 2021-06-24
Attending: STUDENT IN AN ORGANIZED HEALTH CARE EDUCATION/TRAINING PROGRAM
Payer: MEDICARE

## 2021-06-24 VITALS
OXYGEN SATURATION: 100 % | RESPIRATION RATE: 15 BRPM | HEART RATE: 86 BPM | DIASTOLIC BLOOD PRESSURE: 60 MMHG | SYSTOLIC BLOOD PRESSURE: 120 MMHG

## 2021-06-24 VITALS — WEIGHT: 174.17 LBS | HEIGHT: 63 IN

## 2021-06-24 DIAGNOSIS — Z98.49 CATARACT EXTRACTION STATUS, UNSPECIFIED EYE: Chronic | ICD-10-CM

## 2021-06-24 DIAGNOSIS — R11.10 VOMITING, UNSPECIFIED: ICD-10-CM

## 2021-06-24 DIAGNOSIS — Z90.710 ACQUIRED ABSENCE OF BOTH CERVIX AND UTERUS: Chronic | ICD-10-CM

## 2021-06-24 DIAGNOSIS — Z98.890 OTHER SPECIFIED POSTPROCEDURAL STATES: Chronic | ICD-10-CM

## 2021-06-24 DIAGNOSIS — E11.9 TYPE 2 DIABETES MELLITUS WITHOUT COMPLICATIONS: ICD-10-CM

## 2021-06-24 DIAGNOSIS — R55 SYNCOPE AND COLLAPSE: ICD-10-CM

## 2021-06-24 DIAGNOSIS — Z91.041 RADIOGRAPHIC DYE ALLERGY STATUS: ICD-10-CM

## 2021-06-24 DIAGNOSIS — Z96.659 PRESENCE OF UNSPECIFIED ARTIFICIAL KNEE JOINT: Chronic | ICD-10-CM

## 2021-06-24 DIAGNOSIS — Z99.89 DEPENDENCE ON OTHER ENABLING MACHINES AND DEVICES: ICD-10-CM

## 2021-06-24 DIAGNOSIS — D64.9 ANEMIA, UNSPECIFIED: ICD-10-CM

## 2021-06-24 DIAGNOSIS — Z98.89 OTHER SPECIFIED POSTPROCEDURAL STATES: Chronic | ICD-10-CM

## 2021-06-24 DIAGNOSIS — I95.89 OTHER HYPOTENSION: ICD-10-CM

## 2021-06-24 DIAGNOSIS — G47.30 SLEEP APNEA, UNSPECIFIED: ICD-10-CM

## 2021-06-24 DIAGNOSIS — N18.6 END STAGE RENAL DISEASE: ICD-10-CM

## 2021-06-24 DIAGNOSIS — J45.909 UNSPECIFIED ASTHMA, UNCOMPLICATED: ICD-10-CM

## 2021-06-24 DIAGNOSIS — Z79.4 LONG TERM (CURRENT) USE OF INSULIN: ICD-10-CM

## 2021-06-24 DIAGNOSIS — Z91.013 ALLERGY TO SEAFOOD: ICD-10-CM

## 2021-06-24 DIAGNOSIS — Z91.09 OTHER ALLERGY STATUS, OTHER THAN TO DRUGS AND BIOLOGICAL SUBSTANCES: ICD-10-CM

## 2021-06-24 DIAGNOSIS — I12.0 HYPERTENSIVE CHRONIC KIDNEY DISEASE WITH STAGE 5 CHRONIC KIDNEY DISEASE OR END STAGE RENAL DISEASE: ICD-10-CM

## 2021-06-24 DIAGNOSIS — I95.9 HYPOTENSION, UNSPECIFIED: ICD-10-CM

## 2021-06-24 DIAGNOSIS — E78.00 PURE HYPERCHOLESTEROLEMIA, UNSPECIFIED: ICD-10-CM

## 2021-06-24 DIAGNOSIS — Z96.653 PRESENCE OF ARTIFICIAL KNEE JOINT, BILATERAL: ICD-10-CM

## 2021-06-24 DIAGNOSIS — Z99.2 DEPENDENCE ON RENAL DIALYSIS: ICD-10-CM

## 2021-06-24 DIAGNOSIS — Z90.710 ACQUIRED ABSENCE OF BOTH CERVIX AND UTERUS: ICD-10-CM

## 2021-06-24 DIAGNOSIS — E11.22 TYPE 2 DIABETES MELLITUS WITH DIABETIC CHRONIC KIDNEY DISEASE: ICD-10-CM

## 2021-06-24 DIAGNOSIS — G56.02 CARPAL TUNNEL SYNDROME, LEFT UPPER LIMB: ICD-10-CM

## 2021-06-24 LAB
ALBUMIN SERPL ELPH-MCNC: 3.5 G/DL — SIGNIFICANT CHANGE UP (ref 3.3–5)
ALP SERPL-CCNC: 171 U/L — HIGH (ref 40–120)
ALT FLD-CCNC: 21 U/L — SIGNIFICANT CHANGE UP (ref 12–78)
ANION GAP SERPL CALC-SCNC: 10 MMOL/L — SIGNIFICANT CHANGE UP (ref 5–17)
ANISOCYTOSIS BLD QL: SLIGHT — SIGNIFICANT CHANGE UP
AST SERPL-CCNC: 33 U/L — SIGNIFICANT CHANGE UP (ref 15–37)
BASOPHILS # BLD AUTO: 0 K/UL — SIGNIFICANT CHANGE UP (ref 0–0.2)
BASOPHILS NFR BLD AUTO: 0 % — SIGNIFICANT CHANGE UP (ref 0–2)
BILIRUB SERPL-MCNC: 0.4 MG/DL — SIGNIFICANT CHANGE UP (ref 0.2–1.2)
BUN SERPL-MCNC: 21 MG/DL — SIGNIFICANT CHANGE UP (ref 7–23)
CALCIUM SERPL-MCNC: 9.5 MG/DL — SIGNIFICANT CHANGE UP (ref 8.5–10.1)
CHLORIDE SERPL-SCNC: 98 MMOL/L — SIGNIFICANT CHANGE UP (ref 96–108)
CO2 SERPL-SCNC: 27 MMOL/L — SIGNIFICANT CHANGE UP (ref 22–31)
CREAT SERPL-MCNC: 3.91 MG/DL — HIGH (ref 0.5–1.3)
EOSINOPHIL # BLD AUTO: 0 K/UL — SIGNIFICANT CHANGE UP (ref 0–0.5)
EOSINOPHIL NFR BLD AUTO: 0 % — SIGNIFICANT CHANGE UP (ref 0–6)
GLUCOSE SERPL-MCNC: 173 MG/DL — HIGH (ref 70–99)
HCT VFR BLD CALC: 35.1 % — SIGNIFICANT CHANGE UP (ref 34.5–45)
HGB BLD-MCNC: 10.8 G/DL — LOW (ref 11.5–15.5)
HYPOCHROMIA BLD QL: SLIGHT — SIGNIFICANT CHANGE UP
LACTATE SERPL-SCNC: 2 MMOL/L — SIGNIFICANT CHANGE UP (ref 0.7–2)
LG PLATELETS BLD QL AUTO: SLIGHT — SIGNIFICANT CHANGE UP
LYMPHOCYTES # BLD AUTO: 2.32 K/UL — SIGNIFICANT CHANGE UP (ref 1–3.3)
LYMPHOCYTES # BLD AUTO: 23 % — SIGNIFICANT CHANGE UP (ref 13–44)
MACROCYTES BLD QL: SLIGHT — SIGNIFICANT CHANGE UP
MANUAL SMEAR VERIFICATION: SIGNIFICANT CHANGE UP
MCHC RBC-ENTMCNC: 29.5 PG — SIGNIFICANT CHANGE UP (ref 27–34)
MCHC RBC-ENTMCNC: 30.8 GM/DL — LOW (ref 32–36)
MCV RBC AUTO: 95.9 FL — SIGNIFICANT CHANGE UP (ref 80–100)
MICROCYTES BLD QL: SLIGHT — SIGNIFICANT CHANGE UP
MONOCYTES # BLD AUTO: 0.81 K/UL — SIGNIFICANT CHANGE UP (ref 0–0.9)
MONOCYTES NFR BLD AUTO: 8 % — SIGNIFICANT CHANGE UP (ref 2–14)
NEUTROPHILS # BLD AUTO: 6.87 K/UL — SIGNIFICANT CHANGE UP (ref 1.8–7.4)
NEUTROPHILS NFR BLD AUTO: 66 % — SIGNIFICANT CHANGE UP (ref 43–77)
NEUTS BAND # BLD: 2 % — SIGNIFICANT CHANGE UP (ref 0–8)
NRBC # BLD: 0 /100 — SIGNIFICANT CHANGE UP (ref 0–0)
NRBC # BLD: SIGNIFICANT CHANGE UP /100 WBCS (ref 0–0)
NT-PROBNP SERPL-SCNC: 1777 PG/ML — HIGH (ref 0–450)
OVALOCYTES BLD QL SMEAR: SLIGHT — SIGNIFICANT CHANGE UP
PLAT MORPH BLD: NORMAL — SIGNIFICANT CHANGE UP
PLATELET # BLD AUTO: 188 K/UL — SIGNIFICANT CHANGE UP (ref 150–400)
POTASSIUM SERPL-MCNC: 3.7 MMOL/L — SIGNIFICANT CHANGE UP (ref 3.5–5.3)
POTASSIUM SERPL-SCNC: 3.7 MMOL/L — SIGNIFICANT CHANGE UP (ref 3.5–5.3)
PROT SERPL-MCNC: 9.1 GM/DL — HIGH (ref 6–8.3)
RBC # BLD: 3.66 M/UL — LOW (ref 3.8–5.2)
RBC # FLD: 18.6 % — HIGH (ref 10.3–14.5)
RBC BLD AUTO: SIGNIFICANT CHANGE UP
SODIUM SERPL-SCNC: 135 MMOL/L — SIGNIFICANT CHANGE UP (ref 135–145)
STOMATOCYTES BLD QL SMEAR: SLIGHT — SIGNIFICANT CHANGE UP
TROPONIN I SERPL-MCNC: 0.02 NG/ML — SIGNIFICANT CHANGE UP (ref 0.01–0.04)
VARIANT LYMPHS # BLD: 1 % — SIGNIFICANT CHANGE UP (ref 0–6)
WBC # BLD: 10.1 K/UL — SIGNIFICANT CHANGE UP (ref 3.8–10.5)
WBC # FLD AUTO: 10.1 K/UL — SIGNIFICANT CHANGE UP (ref 3.8–10.5)

## 2021-06-24 PROCEDURE — 70450 CT HEAD/BRAIN W/O DYE: CPT | Mod: 26,MC

## 2021-06-24 PROCEDURE — 71045 X-RAY EXAM CHEST 1 VIEW: CPT | Mod: 26

## 2021-06-24 PROCEDURE — 99285 EMERGENCY DEPT VISIT HI MDM: CPT

## 2021-06-24 PROCEDURE — 93010 ELECTROCARDIOGRAM REPORT: CPT

## 2021-06-24 RX ORDER — SODIUM CHLORIDE 9 MG/ML
500 INJECTION INTRAMUSCULAR; INTRAVENOUS; SUBCUTANEOUS ONCE
Refills: 0 | Status: COMPLETED | OUTPATIENT
Start: 2021-06-24 | End: 2021-06-24

## 2021-06-24 RX ADMIN — SODIUM CHLORIDE 500 MILLILITER(S): 9 INJECTION INTRAMUSCULAR; INTRAVENOUS; SUBCUTANEOUS at 00:48

## 2021-06-24 NOTE — ED PROVIDER NOTE - OBJECTIVE STATEMENT
77 y/o F with a PMHx of DM, HTN, Gout, Anemia, Asthma, Hypercholesterolemia and ESRD was BIBEMS to the ED c/o syncope and 1 episode of emesis s/p completing dialysis today. Patient was leaving access a ride when he synopsized. EMS noted Patient's BP to be 70/40 in the RT arm. Left arm with fistula. Currently in the ED, Patient is A&Ox3 without any complaints. 77 y/o F with a PMHx of DM, HTN, Gout, Anemia, Asthma, Hypercholesterolemia and ESRD (M,W,F) was BIBEMS to the ED c/o syncope and 1 episode of emesis s/p completing dialysis today. Patient reports she was feeling dizzy after dialysis today. Patient noted that she was feeling cold and asked to increase temperature of fluids. Patient reports that she typhically needs oxygen 2 hours into dialysis. On leaving dialysis today Patient's oral temperature was 93-94F.     PMH: As above. PSH: Bilateral knee and shoulder. Meds as listed. Allergies as listed.        Patient was leaving access a ride when he synopsized. EMS noted Patient's BP to be 70/40 in the RT arm. Left arm with fistula. Currently in the ED, Patient is A&Ox3 without any complaints. 77 y/o F with a PMHx of DM, HTN, Gout, Anemia, Asthma, Hypercholesterolemia and ESRD (M,W,F) was BIBEMS to the ED c/o syncope and 1 episode of emesis s/p completing dialysis today. Patient was leaving access-a-ride when she synopsized. EMS noted Patient's BP to be 70/40 in the RT arm. Left arm with fistula. Currently in the ED, Patient is A&Ox3 without any complaints. Patient reports she was feeling dizzy after completing dialysis today. Patient noted that she was feeling cold during treatment and had asked to increase temperature of fluids. On leaving dialysis today patient reports her oral temperature was 93-94F. Patient reports that she typically needs oxygen 2 hours into dialysis. ROS otherwise neg: Denies fever, CP, SOB, cough, abd pain, back pain, nausea, diarrhea, LE pain / swelling.     PMH: As above. PSH: Bilateral knee and shoulder. Meds as listed. Allergies as listed.

## 2021-06-24 NOTE — ED ADULT NURSE NOTE - OBJECTIVE STATEMENT
BIBA for syncopal episode post dialysis, pt fouind down at home hypotensive. Pt is now axo4, BP in the 90;s, NSR on montir. Denies lightheadness and dizziness, denies pain.

## 2021-06-24 NOTE — ED PROVIDER NOTE - PHYSICAL EXAMINATION
GEN: Awake, alert, interactive, NAD.  HEAD AND NECK: NC/AT. Airway patent. Neck supple.   EYES:  Clear b/l. EOMI. PERRL.   ENT: Moist mucus membranes.   CARDIAC: Regular rate, regular rhythm. No evident pedal edema.    RESP/CHEST: Normal respiratory effort with no use of accessory muscles or retractions. Clear throughout on auscultation.  ABD: soft, non-distended, non-tender. No rebound, no guarding.   BACK: No midline spinal TTP. No CVAT.   EXTREMITIES: Moving all extremities with no apparent deformities. +fistula on LT arm  SKIN: Warm, dry, intact normal color. No rash.   NEURO: AOx3, CN II-XII grossly intact, no focal deficits.   PSYCH: Appropriate mood and affect. GEN: Awake, alert, interactive, NAD.  HEAD AND NECK: NC/AT. Airway patent. Neck supple.   EYES:  Clear b/l. EOMI. PERRL.   ENT: Moist mucus membranes.   CARDIAC: Regular rate, regular rhythm. No evident pedal edema.    RESP/CHEST: Normal respiratory effort with no use of accessory muscles or retractions. Clear throughout on auscultation.  ABD: Soft, non-distended, non-tender. No rebound, no guarding.   BACK: No midline spinal TTP. No CVAT.   EXTREMITIES: Moving all extremities with no apparent deformities. +fistula on LT arm.  SKIN: Warm, dry, intact normal color. No rash.   NEURO: AOx3, CN II-XII grossly intact, no focal deficits.   PSYCH: Appropriate mood and affect.

## 2021-06-24 NOTE — ED PROVIDER NOTE - CLINICAL SUMMARY MEDICAL DECISION MAKING FREE TEXT BOX
75 y/o F with a PMH of DM and ESRD presenting with syncope  after completing dialysis. Patient hypotensive on ED arrival. Give IVF, obtain CBC, CMP, Troponin, UA, UC, CXR, BMP, Mag, CT brain and reevaluate. 75 y/o F with a PMH of DM and ESRD on HD presenting with syncope after completing dialysis this evening. Patient hypotensive on ED arrival. Give gentle IVF, obtain CBC, CMP, lactate, Troponin, BNP, CXR, ECG, CT brain and reevaluate. CBC, CMP, lactate w/o significant abnormalities, c/w pt hx ESRD. CT brain w/o acute pathology. ECG w/ RBBB, LVH, + PVCs. Trop neg, BNP + elevated, but decreased from prior values. CXR w/o acute pathology. On re-eval, BP WNL, NC d/c'd, PVCs decreased on monitor. Clinical presentation most c/w post-dialysis reaction. Pt well appearing, asymptomatic. Shared decision making with patient regarding admission versus discharge home with close outpatient follow up. Patient requests discharge home. Pt is stable for discharge home. Signs and symptoms that should prompt immediate return to ED discussed with patient.  She expresses understanding and is in agreement with this plan.

## 2021-06-24 NOTE — ED PROVIDER NOTE - PATIENT PORTAL LINK FT
You can access the FollowMyHealth Patient Portal offered by [facility] by registering at the following website: [email or web address]. By joining [employer]’s FollowMyHealth portal, you will also be able to view your health information using other applications (apps) compatible with our system.

## 2021-09-21 ENCOUNTER — EMERGENCY (EMERGENCY)
Facility: HOSPITAL | Age: 76
LOS: 1 days | Discharge: HOME | End: 2021-09-21
Attending: EMERGENCY MEDICINE | Admitting: EMERGENCY MEDICINE
Payer: MEDICARE

## 2021-09-21 VITALS
SYSTOLIC BLOOD PRESSURE: 146 MMHG | TEMPERATURE: 98 F | HEIGHT: 63 IN | RESPIRATION RATE: 18 BRPM | DIASTOLIC BLOOD PRESSURE: 78 MMHG | OXYGEN SATURATION: 100 % | HEART RATE: 96 BPM

## 2021-09-21 VITALS
RESPIRATION RATE: 20 BRPM | DIASTOLIC BLOOD PRESSURE: 100 MMHG | OXYGEN SATURATION: 100 % | HEART RATE: 85 BPM | SYSTOLIC BLOOD PRESSURE: 141 MMHG | TEMPERATURE: 98 F

## 2021-09-21 DIAGNOSIS — Z98.49 CATARACT EXTRACTION STATUS, UNSPECIFIED EYE: Chronic | ICD-10-CM

## 2021-09-21 DIAGNOSIS — Z98.890 OTHER SPECIFIED POSTPROCEDURAL STATES: Chronic | ICD-10-CM

## 2021-09-21 DIAGNOSIS — Z96.659 PRESENCE OF UNSPECIFIED ARTIFICIAL KNEE JOINT: Chronic | ICD-10-CM

## 2021-09-21 DIAGNOSIS — Z98.89 OTHER SPECIFIED POSTPROCEDURAL STATES: Chronic | ICD-10-CM

## 2021-09-21 DIAGNOSIS — Z90.710 ACQUIRED ABSENCE OF BOTH CERVIX AND UTERUS: Chronic | ICD-10-CM

## 2021-09-21 LAB
ALBUMIN SERPL ELPH-MCNC: 4 G/DL — SIGNIFICANT CHANGE UP (ref 3.3–5)
ALP SERPL-CCNC: 128 U/L — HIGH (ref 40–120)
ALT FLD-CCNC: 25 U/L — SIGNIFICANT CHANGE UP (ref 4–33)
ANION GAP SERPL CALC-SCNC: 14 MMOL/L — SIGNIFICANT CHANGE UP (ref 7–14)
AST SERPL-CCNC: 43 U/L — HIGH (ref 4–32)
BASE EXCESS BLDV CALC-SCNC: 2.9 MMOL/L — SIGNIFICANT CHANGE UP (ref -2–3)
BASOPHILS # BLD AUTO: 0.03 K/UL — SIGNIFICANT CHANGE UP (ref 0–0.2)
BASOPHILS NFR BLD AUTO: 0.3 % — SIGNIFICANT CHANGE UP (ref 0–2)
BILIRUB SERPL-MCNC: <0.2 MG/DL — SIGNIFICANT CHANGE UP (ref 0.2–1.2)
BUN SERPL-MCNC: 27 MG/DL — HIGH (ref 7–23)
CA-I SERPL-SCNC: 1.28 MMOL/L — SIGNIFICANT CHANGE UP (ref 1.15–1.33)
CALCIUM SERPL-MCNC: 10.3 MG/DL — SIGNIFICANT CHANGE UP (ref 8.4–10.5)
CHLORIDE BLDV-SCNC: 95 MMOL/L — LOW (ref 96–108)
CHLORIDE SERPL-SCNC: 93 MMOL/L — LOW (ref 98–107)
CO2 BLDV-SCNC: 30.4 MMOL/L — HIGH (ref 22–26)
CO2 SERPL-SCNC: 25 MMOL/L — SIGNIFICANT CHANGE UP (ref 22–31)
CREAT SERPL-MCNC: 5.58 MG/DL — HIGH (ref 0.5–1.3)
EOSINOPHIL # BLD AUTO: 0.03 K/UL — SIGNIFICANT CHANGE UP (ref 0–0.5)
EOSINOPHIL NFR BLD AUTO: 0.3 % — SIGNIFICANT CHANGE UP (ref 0–6)
GAS PNL BLDV: 131 MMOL/L — LOW (ref 136–145)
GAS PNL BLDV: SIGNIFICANT CHANGE UP
GLUCOSE BLDV-MCNC: 124 MG/DL — HIGH (ref 70–99)
GLUCOSE SERPL-MCNC: 136 MG/DL — HIGH (ref 70–99)
HCO3 BLDV-SCNC: 29 MMOL/L — SIGNIFICANT CHANGE UP (ref 22–29)
HCT VFR BLD CALC: 34.1 % — LOW (ref 34.5–45)
HCT VFR BLDA CALC: 33 % — LOW (ref 34.5–46.5)
HGB BLD CALC-MCNC: 10.9 G/DL — LOW (ref 11.5–15.5)
HGB BLD-MCNC: 10.7 G/DL — LOW (ref 11.5–15.5)
IANC: 6.06 K/UL — SIGNIFICANT CHANGE UP (ref 1.5–8.5)
IMM GRANULOCYTES NFR BLD AUTO: 3.4 % — HIGH (ref 0–1.5)
LACTATE BLDV-MCNC: 2.2 MMOL/L — HIGH (ref 0.5–2)
LYMPHOCYTES # BLD AUTO: 1.61 K/UL — SIGNIFICANT CHANGE UP (ref 1–3.3)
LYMPHOCYTES # BLD AUTO: 18.3 % — SIGNIFICANT CHANGE UP (ref 13–44)
MAGNESIUM SERPL-MCNC: 2.6 MG/DL — SIGNIFICANT CHANGE UP (ref 1.6–2.6)
MCHC RBC-ENTMCNC: 29.3 PG — SIGNIFICANT CHANGE UP (ref 27–34)
MCHC RBC-ENTMCNC: 31.4 GM/DL — LOW (ref 32–36)
MCV RBC AUTO: 93.4 FL — SIGNIFICANT CHANGE UP (ref 80–100)
MONOCYTES # BLD AUTO: 0.77 K/UL — SIGNIFICANT CHANGE UP (ref 0–0.9)
MONOCYTES NFR BLD AUTO: 8.8 % — SIGNIFICANT CHANGE UP (ref 2–14)
NEUTROPHILS # BLD AUTO: 6.06 K/UL — SIGNIFICANT CHANGE UP (ref 1.8–7.4)
NEUTROPHILS NFR BLD AUTO: 68.9 % — SIGNIFICANT CHANGE UP (ref 43–77)
NRBC # BLD: 0 /100 WBCS — SIGNIFICANT CHANGE UP
NRBC # FLD: 0 K/UL — SIGNIFICANT CHANGE UP
PCO2 BLDV: 50 MMHG — HIGH (ref 39–42)
PH BLDV: 7.37 — SIGNIFICANT CHANGE UP (ref 7.32–7.43)
PHOSPHATE SERPL-MCNC: 4.1 MG/DL — SIGNIFICANT CHANGE UP (ref 2.5–4.5)
PLATELET # BLD AUTO: 188 K/UL — SIGNIFICANT CHANGE UP (ref 150–400)
PO2 BLDV: 51 MMHG — SIGNIFICANT CHANGE UP
POTASSIUM BLDV-SCNC: 4.1 MMOL/L — SIGNIFICANT CHANGE UP (ref 3.5–5.1)
POTASSIUM SERPL-MCNC: 5 MMOL/L — SIGNIFICANT CHANGE UP (ref 3.5–5.3)
POTASSIUM SERPL-SCNC: 5 MMOL/L — SIGNIFICANT CHANGE UP (ref 3.5–5.3)
PROT SERPL-MCNC: 8 G/DL — SIGNIFICANT CHANGE UP (ref 6–8.3)
RBC # BLD: 3.65 M/UL — LOW (ref 3.8–5.2)
RBC # FLD: 19.9 % — HIGH (ref 10.3–14.5)
SAO2 % BLDV: 82.7 % — SIGNIFICANT CHANGE UP
SARS-COV-2 RNA SPEC QL NAA+PROBE: SIGNIFICANT CHANGE UP
SODIUM SERPL-SCNC: 132 MMOL/L — LOW (ref 135–145)
WBC # BLD: 8.8 K/UL — SIGNIFICANT CHANGE UP (ref 3.8–10.5)
WBC # FLD AUTO: 8.8 K/UL — SIGNIFICANT CHANGE UP (ref 3.8–10.5)

## 2021-09-21 PROCEDURE — 99285 EMERGENCY DEPT VISIT HI MDM: CPT

## 2021-09-21 RX ORDER — ACETAMINOPHEN 500 MG
650 TABLET ORAL ONCE
Refills: 0 | Status: COMPLETED | OUTPATIENT
Start: 2021-09-21 | End: 2021-09-21

## 2021-09-21 RX ORDER — KETOROLAC TROMETHAMINE 30 MG/ML
15 SYRINGE (ML) INJECTION ONCE
Refills: 0 | Status: DISCONTINUED | OUTPATIENT
Start: 2021-09-21 | End: 2021-09-21

## 2021-09-21 RX ORDER — LIDOCAINE 4 G/100G
1 CREAM TOPICAL ONCE
Refills: 0 | Status: COMPLETED | OUTPATIENT
Start: 2021-09-21 | End: 2021-09-21

## 2021-09-21 RX ORDER — DIAZEPAM 5 MG
2 TABLET ORAL ONCE
Refills: 0 | Status: DISCONTINUED | OUTPATIENT
Start: 2021-09-21 | End: 2021-09-21

## 2021-09-21 RX ADMIN — Medication 650 MILLIGRAM(S): at 17:43

## 2021-09-21 RX ADMIN — Medication 2 MILLIGRAM(S): at 17:43

## 2021-09-21 RX ADMIN — LIDOCAINE 1 PATCH: 4 CREAM TOPICAL at 17:40

## 2021-09-21 RX ADMIN — Medication 15 MILLIGRAM(S): at 17:43

## 2021-09-21 NOTE — ED PROVIDER NOTE - PATIENT PORTAL LINK FT
You can access the FollowMyHealth Patient Portal offered by [facility] by registering at the following website: [email or web address]. By joining [employer]’s FollowMyHealth portal, you will also be able to view your health information using other applications (apps) compatible with our system.

## 2021-09-21 NOTE — ED PROVIDER NOTE - OBJECTIVE STATEMENT
77 yo F with a pmhx of ESRD on HD MWF, DM, HTN, Gout, Anemia, Asthma, Hypercholesterolemia BIBEMS presents to the ED with neck pain and generalized weakness that started yesterday. Her neck pain is localized over her R neck and radiates to her occiput. Her pain is worsen with movement of the head and palpation. She admits to associated nausea. She denies any photophobia. No FND, no numbness or tingling. no vision changes. She states that she started feeling symptoms after her dialysis session yesterday. She denies any other symptoms at bedside. 77 yo F with a pmhx of ESRD on HD MWF, DM, HTN, Gout, Anemia, Asthma, Hypercholesterolemia BIBEMS presents to the ED with neck pain and generalized weakness that started yesterday. Her neck pain is localized over her R neck and radiates to her occiput. Her pain is worsen with movement of the head and palpation. She admits to associated nausea. She denies any photophobia. No focal neuro deficits, no numbness or tingling. no vision changes. She states that she started feeling symptoms after her dialysis session yesterday. She denies any other symptoms at bedside.

## 2021-09-21 NOTE — ED ADULT NURSE NOTE - OBJECTIVE STATEMENT
Chase RN  Pt received to room 11 AAO x 3 c/o right side neck pain since June and nausea and weakness x 2 weeks. IV placed and pt breathing with ease on room air. Pt has left AV fistula noted and received dialysis yesterday. Report given to primary RN.

## 2021-09-21 NOTE — CONSULT NOTE ADULT - ASSESSMENT
76F with h/o ESRD on HD MWF at [facility] HD center via left upper arm avf  , DM, HTN here for neck pain      1) ESRD on HD  Routine HD days->MWF  Access: Left upper ext avf  Last HD yesterday  Next HD planned for Wednesday unless bmp suggests otherwise ( inpt vs outpt) --> depending if pt gets admitted  Consent OBtained  f/u BMP      2)Anemia in CKD  CHeck cbc  trend hgb  lakia with HD as needed      Dr Taylor  [phone]

## 2021-09-21 NOTE — CONSULT NOTE ADULT - SUBJECTIVE AND OBJECTIVE BOX
[facility] Consult Note Nephrology - CONSULTATION NOTE    76F with h/o ESRD on HD MWF at [facility] via left upper arm avf  , DM, HTN here for neck pain.  Pt was last dialyzed last night  During the end of her session complained of neck pain so she decided to come to the ED  Neck pain investigation underway by ED  Denies any fevers or sob or blurry vision    PAST MEDICAL & SURGICAL HISTORY:  HTN (hypertension)    Hypercholesterolemia    Diabetes mellitus    Bronchial asthma  hx of    Vertigo    Trigeminal neuralgia  hx of    ESRD (end stage renal disease) on dialysis  started HD     Pedal edema    Sleep apnea  on CPAP    Dyslipidemia    Anemia    Gout    Carpal tunnel syndrome  left    Chronic right shoulder pain    Disorder of ligament of wrist, left    S/P rotator cuff surgery  bilateral &#x27;s    S/P       S/P hysterectomy      S/P total knee replacement  bilateral, left , right     S/P cataract extraction  bilateral, 2005    History of carpal tunnel surgery of left wrist      S/P arteriovenous (AV) fistula creation  left upper forearm cephalic vein brachial artery AV fistula creation on 2018, left arm basilic vein transposition on 2019.      History of arthroscopy of left shoulder  x 3    History of arthroscopy of right shoulder  x 3    History of vascular access device  , removed 2020      IV Contrast (Anaphylaxis)  shellfish (Hives; Rash)  shrimp (Hives)  smoke; coughing (Other)    Home Medications Reviewed  Hospital Medications:   MEDICATIONS  (STANDING):    SOCIAL HISTORY:  Denies ETOh,Smoking,   FAMILY HISTORY:  No pertinent family history in first degree relatives      REVIEW OF SYSTEMS:  CONSTITUTIONAL: + neck pain  EYES/ENT: No visual changes;  No vertigo or throat pain   NECK: No pain or stiffness  RESPIRATORY: No cough, wheezing, hemoptysis; No shortness of breath  CARDIOVASCULAR: No chest pain or palpitations.  GASTROINTESTINAL: No abdominal or epigastric pain. No nausea, vomiting, or hematemesis; No diarrhea or constipation. No melena or hematochezia.  GENITOURINARY: No dysuria, frequency, foamy urine, urinary urgency, incontinence or hematuria  NEUROLOGICAL: No numbness or weakness  SKIN: No itching, burning, rashes, or lesions   VASCULAR: No bilateral lower extremity edema.   All other review of systems is negative unless indicated above.    VITALS:  T(F): 98.1 (21 @ 13:34), Max: 98.1 (21 @ 13:34)  HR: 96 (21 @ 13:34)  BP: 146/78 (21 @ 13:34)  RR: 18 (21 @ 13:34)  SpO2: 100% (21 @ 13:34)  Wt(kg): --    Height (cm): 160 ( @ 13:34)  PHYSICAL EXAM:  Constitutional: distress 2/2 neck pain  HEENT: anicteric sclera, oropharynx clear, MMM  Neck: No JVD  Respiratory: CTAB, no wheezes, rales or rhonchi  Cardiovascular: S1, S2, RRR  Gastrointestinal: BS+, soft, NT/ND  Extremities: No cyanosis or clubbing. No peripheral edema  Neurological: A/O x 3, no focal deficits  Psychiatric: Normal mood, normal affect  : No CVA tenderness. No cottrell.   Skin: No rashes  Vascular Access:left upper ext avf + thrill    LABS:    pending    Creatinine Trend:     Urine Studies:      RADIOLOGY & ADDITIONAL STUDIES:

## 2021-09-21 NOTE — ED PROVIDER NOTE - CLINICAL SUMMARY MEDICAL DECISION MAKING FREE TEXT BOX
77 yo F with multiple pmhx presents to the ED with neck pain. Neck pain is over R paraspinal region with radiation of pain. reproducible tenderness over the R paraspinal region. neuro exam is benign. VSS. pain is likely MSK in nature. admits to generalized weakness, will order labs to r/o lyte abnormality. will order labs, med, reassess 77 yo F with multiple pmhx presents to the ED with neck pain. Neck pain is over R paraspinal region with radiation of pain. reproducible tenderness over the R paraspinal region. neuro exam is benign. VSS. pain is likely MSK in nature. admits to generalized weakness, will order labs to r/o lyte abnormality. will order labs, med, reassess; low utility for imaging in this case, as low suspicion for fracture or vascular catastrophe

## 2021-09-21 NOTE — ED PROVIDER NOTE - CONDITION AT DISCHARGE:
Subjective   Mannie Valdivia Christen Mclaughlin DO  Authorized by: Christen Mclaughlin DO   Comparison: not compared with previous ECG   Rhythm: sinus rhythm  Rate: normal  Conduction: conduction normal  ST Segments: ST segments normal  QRS axis: normal    Clinical impression: normal ECG              Satisfactory

## 2021-09-21 NOTE — ED ADULT TRIAGE NOTE - CHIEF COMPLAINT QUOTE
pt coming from home, pt c/o nausea, weakness and malaise x 3 weeks.  pt also reports head and neck pain.  pt hypertensive on scene

## 2021-09-21 NOTE — ED PROVIDER NOTE - ATTENDING CONTRIBUTION TO CARE
Dr. Blackman: 77 yo female with ESRD on HD MWF (completed all but last 30 minutes of session yesterday, due to neck pain), DM, HTN, gout, anemia, asthma, HLD, in ED with complaint of right-sided neck pain and fatigue with nausea.  Symptoms began yesterday at end of pt's HD session.  No CP/SOB, focal weakness or abdominal pain.  On exam pt chronically-ill appearing and complaining of neck pain, heart RRR, lungs CTAB, abd NTND, extremities without swelling, strength 5/5 in all extremities and skin without rash.  Midline cervical/thoracic/lumbosacral spine without bony TTP or step off.  +right paracervical neck muscle (?SCM) with spasm and associated TTP, worsening with neck movement

## 2021-09-21 NOTE — ED PROVIDER NOTE - NSFOLLOWUPINSTRUCTIONS_ED_ALL_ED_FT
-You were seen in the Emergency Department (ED) for neck pain. Lab and imaging results, if performed, were discussed with you along with your discharge diagnosis.    FOLLOW-UP:  -Please follow up with your PMD if symptoms return or for any concerning matter pertaining to your Neck pain.  -If you do not have a PMD, please call [phone] to find one convenient for you or call our clinic at [phone].    MEDICATIONS:  -Continue all other prescribed medicine, IF ANY, as per your primary care doctor's (PMD) recommendations.    PAIN CONTROL:  -Please take over the counter Tylenol (also known as acetaminophen) 650mg every 6 hours or Ibuprofen (also known as motrin, advil) 600mg every 8 hour for your pain, IF ANY, unless you are not supposed to for any reason.  -Rest, stay hydrated with plenty of fluids (drink at least 2 Liters or 64 Ounces of water each day UNLESS you are supposed to restrict fluids or ANY reason.    RETURN PRECAUTIONS:  -Please return to the Emergency Department if you experience ANY new or concerning symptoms, such as, but not limited to: worsening pain, large amount of bleeding, passing out, fever >100.F, shaking chills, inability to see or new double vision, chest pain, difficulty breathing, diffuse abdominal pain, unable to eat or drink, continuous vomiting or diarrhea, unable to move or feel part of your body

## 2021-09-21 NOTE — ED PROVIDER NOTE - PROGRESS NOTE DETAILS
Patient reassess at this time., pain has improved from arrival. mildly tender to palpation over R paraspinal region. patient is no longer nauseous. Lab work demonstrated elevated creatinine with mildly high K, consistent with ESRD. no increased wbc. other labs unremarkable. will d/c patient and have follow up with pmd.

## 2021-09-21 NOTE — ED PROVIDER NOTE - PHYSICAL EXAMINATION
GENERAL: no acute distress, non-toxic appearing  HEAD: normocephalic, atraumatic    HEENT: normal conjunctiva, oral mucosa moist, neck supple, tenderness to palpation over the R paraspinal region    CARDIAC: regular rate and rhythm, normal S1 and S2,  no appreciable murmurs  PULM: clear to ascultation bilaterally, no crackles, rales, rhonchi, or wheezing  GI: abdomen nondistended, soft, nontender, no guarding or rebound tenderness  : no CVA tenderness, no suprapubic tenderness    NEURO: CN2-12 grossly intact, A&Ox4, MS +5/5 in UE and LE BL, finger to nose smooth and rapid, gross sensation intact in UE and LE BL, negative pronator drift    MSK: no visible deformities, no peripheral edema, calf tenderness/redness/swelling  SKIN: no visible rashes, dry, well-perfused  PSYCH: appropriate mood and affect GENERAL: no acute distress, non-toxic appearing  HEAD: normocephalic, atraumatic    HEENT: normal conjunctiva, oral mucosa moist, neck supple, tenderness to palpation over the R paraspinal region    CARDIAC: regular rate and rhythm, normal S1 and S2,  no appreciable murmurs  PULM: clear to ascultation bilaterally, no crackles, rales, rhonchi, or wheezing  GI: abdomen nondistended, soft, nontender, no guarding or rebound tenderness  : no CVA tenderness, no suprapubic tenderness    NEURO: CN2-12 grossly intact, A&Ox4, MS +5/5 in UE and LE BL, finger to nose smooth and rapid, gross sensation intact in UE and LE BL, negative pronator drift    MSK: no visible deformities, no peripheral edema, or calf tenderness/redness/swelling  SKIN: no visible rashes, dry, well-perfused  PSYCH: appropriate mood and affect    +right paracervical neck muscle (?SCM) with spasm and associated TTP, worsening with neck movement

## 2021-09-21 NOTE — ED ADULT NURSE REASSESSMENT NOTE - NS ED NURSE REASSESS COMMENT FT1
Er pt AOX 3 with right side neck spasm and body aches x few days got worse today, denies dizziness, no SOB, medicated with some relief. pt had some dinner tolerated well.   pt is D/c home. with instructions.  Claudette Brooks RN

## 2021-09-22 ENCOUNTER — INPATIENT (INPATIENT)
Facility: HOSPITAL | Age: 76
LOS: 15 days | Discharge: HOME | End: 2021-10-08
Attending: INTERNAL MEDICINE | Admitting: INTERNAL MEDICINE
Payer: MEDICARE

## 2021-09-22 VITALS
DIASTOLIC BLOOD PRESSURE: 72 MMHG | OXYGEN SATURATION: 100 % | HEART RATE: 98 BPM | SYSTOLIC BLOOD PRESSURE: 129 MMHG | RESPIRATION RATE: 18 BRPM | TEMPERATURE: 98 F | HEIGHT: 63 IN

## 2021-09-22 DIAGNOSIS — Z98.890 OTHER SPECIFIED POSTPROCEDURAL STATES: Chronic | ICD-10-CM

## 2021-09-22 DIAGNOSIS — Z96.659 PRESENCE OF UNSPECIFIED ARTIFICIAL KNEE JOINT: Chronic | ICD-10-CM

## 2021-09-22 DIAGNOSIS — R53.1 WEAKNESS: ICD-10-CM

## 2021-09-22 DIAGNOSIS — Z98.89 OTHER SPECIFIED POSTPROCEDURAL STATES: Chronic | ICD-10-CM

## 2021-09-22 DIAGNOSIS — N18.6 END STAGE RENAL DISEASE: ICD-10-CM

## 2021-09-22 DIAGNOSIS — Z90.710 ACQUIRED ABSENCE OF BOTH CERVIX AND UTERUS: Chronic | ICD-10-CM

## 2021-09-22 DIAGNOSIS — Z98.49 CATARACT EXTRACTION STATUS, UNSPECIFIED EYE: Chronic | ICD-10-CM

## 2021-09-22 LAB
ALBUMIN SERPL ELPH-MCNC: 3.9 G/DL — SIGNIFICANT CHANGE UP (ref 3.3–5)
ALP SERPL-CCNC: 124 U/L — HIGH (ref 40–120)
ALT FLD-CCNC: 26 U/L — SIGNIFICANT CHANGE UP (ref 4–33)
ANION GAP SERPL CALC-SCNC: 18 MMOL/L — HIGH (ref 7–14)
AST SERPL-CCNC: 47 U/L — HIGH (ref 4–32)
BASOPHILS # BLD AUTO: 0.04 K/UL — SIGNIFICANT CHANGE UP (ref 0–0.2)
BASOPHILS NFR BLD AUTO: 0.5 % — SIGNIFICANT CHANGE UP (ref 0–2)
BILIRUB SERPL-MCNC: 0.2 MG/DL — SIGNIFICANT CHANGE UP (ref 0.2–1.2)
BLOOD GAS VENOUS COMPREHENSIVE RESULT: SIGNIFICANT CHANGE UP
BUN SERPL-MCNC: 41 MG/DL — HIGH (ref 7–23)
CALCIUM SERPL-MCNC: 10 MG/DL — SIGNIFICANT CHANGE UP (ref 8.4–10.5)
CHLORIDE SERPL-SCNC: 94 MMOL/L — LOW (ref 98–107)
CO2 SERPL-SCNC: 20 MMOL/L — LOW (ref 22–31)
CREAT SERPL-MCNC: 7.99 MG/DL — HIGH (ref 0.5–1.3)
EOSINOPHIL # BLD AUTO: 0.04 K/UL — SIGNIFICANT CHANGE UP (ref 0–0.5)
EOSINOPHIL NFR BLD AUTO: 0.5 % — SIGNIFICANT CHANGE UP (ref 0–6)
GLUCOSE SERPL-MCNC: 125 MG/DL — HIGH (ref 70–99)
HCT VFR BLD CALC: 35.3 % — SIGNIFICANT CHANGE UP (ref 34.5–45)
HGB BLD-MCNC: 11 G/DL — LOW (ref 11.5–15.5)
IANC: 5.42 K/UL — SIGNIFICANT CHANGE UP (ref 1.5–8.5)
IMM GRANULOCYTES NFR BLD AUTO: 3.2 % — HIGH (ref 0–1.5)
LYMPHOCYTES # BLD AUTO: 1.8 K/UL — SIGNIFICANT CHANGE UP (ref 1–3.3)
LYMPHOCYTES # BLD AUTO: 21.2 % — SIGNIFICANT CHANGE UP (ref 13–44)
MAGNESIUM SERPL-MCNC: 2.8 MG/DL — HIGH (ref 1.6–2.6)
MCHC RBC-ENTMCNC: 29.2 PG — SIGNIFICANT CHANGE UP (ref 27–34)
MCHC RBC-ENTMCNC: 31.2 GM/DL — LOW (ref 32–36)
MCV RBC AUTO: 93.6 FL — SIGNIFICANT CHANGE UP (ref 80–100)
MONOCYTES # BLD AUTO: 0.93 K/UL — HIGH (ref 0–0.9)
MONOCYTES NFR BLD AUTO: 10.9 % — SIGNIFICANT CHANGE UP (ref 2–14)
NEUTROPHILS # BLD AUTO: 5.42 K/UL — SIGNIFICANT CHANGE UP (ref 1.8–7.4)
NEUTROPHILS NFR BLD AUTO: 63.7 % — SIGNIFICANT CHANGE UP (ref 43–77)
NRBC # BLD: 0 /100 WBCS — SIGNIFICANT CHANGE UP
NRBC # FLD: 0.03 K/UL — HIGH
PHOSPHATE SERPL-MCNC: 4.3 MG/DL — SIGNIFICANT CHANGE UP (ref 2.5–4.5)
PLATELET # BLD AUTO: 197 K/UL — SIGNIFICANT CHANGE UP (ref 150–400)
POTASSIUM SERPL-MCNC: 5.3 MMOL/L — SIGNIFICANT CHANGE UP (ref 3.5–5.3)
POTASSIUM SERPL-SCNC: 5.3 MMOL/L — SIGNIFICANT CHANGE UP (ref 3.5–5.3)
PROT SERPL-MCNC: 8.2 G/DL — SIGNIFICANT CHANGE UP (ref 6–8.3)
RBC # BLD: 3.77 M/UL — LOW (ref 3.8–5.2)
RBC # FLD: 19.9 % — HIGH (ref 10.3–14.5)
SARS-COV-2 RNA SPEC QL NAA+PROBE: SIGNIFICANT CHANGE UP
SODIUM SERPL-SCNC: 132 MMOL/L — LOW (ref 135–145)
TROPONIN T, HIGH SENSITIVITY RESULT: 110 NG/L — CRITICAL HIGH
TROPONIN T, HIGH SENSITIVITY RESULT: 124 NG/L — CRITICAL HIGH
TSH SERPL-MCNC: 1.8 UIU/ML — SIGNIFICANT CHANGE UP (ref 0.27–4.2)
WBC # BLD: 8.5 K/UL — SIGNIFICANT CHANGE UP (ref 3.8–10.5)
WBC # FLD AUTO: 8.5 K/UL — SIGNIFICANT CHANGE UP (ref 3.8–10.5)

## 2021-09-22 PROCEDURE — 93010 ELECTROCARDIOGRAM REPORT: CPT

## 2021-09-22 PROCEDURE — 99285 EMERGENCY DEPT VISIT HI MDM: CPT | Mod: 25

## 2021-09-22 PROCEDURE — 71045 X-RAY EXAM CHEST 1 VIEW: CPT | Mod: 26

## 2021-09-22 PROCEDURE — 99223 1ST HOSP IP/OBS HIGH 75: CPT

## 2021-09-22 RX ORDER — ONDANSETRON 8 MG/1
4 TABLET, FILM COATED ORAL EVERY 8 HOURS
Refills: 0 | Status: DISCONTINUED | OUTPATIENT
Start: 2021-09-22 | End: 2021-10-08

## 2021-09-22 RX ORDER — ACETAMINOPHEN 500 MG
650 TABLET ORAL ONCE
Refills: 0 | Status: COMPLETED | OUTPATIENT
Start: 2021-09-22 | End: 2021-09-22

## 2021-09-22 RX ORDER — DIAZEPAM 5 MG
5 TABLET ORAL ONCE
Refills: 0 | Status: DISCONTINUED | OUTPATIENT
Start: 2021-09-22 | End: 2021-09-22

## 2021-09-22 RX ADMIN — Medication 5 MILLIGRAM(S): at 16:34

## 2021-09-22 RX ADMIN — Medication 650 MILLIGRAM(S): at 16:34

## 2021-09-22 NOTE — H&P ADULT - PROBLEM SELECTOR PLAN 1
- periodic, and chiefly associated with hemodialysis therapy.  Has nausea without vomiting.  Not requiring antihypertensive meds since starting HD therapy  - also with nausea and decreased oral intake related to same  - electrolytes without any severe dyscrasia   - TSH = 1.80 (within acceptable range)  - ECG = NSR at 87 bpm, QTc = 498, Trop = 124 --> 110.  CXR w/o overt findings, COVID-19 PCR negative  - lactate initially 2.2 (now 1.1)  - f/u repeat lab-work  - antiemetic.  Encourage oral intake ( PO intake due to nausea over the recent days)  - fall precautions

## 2021-09-22 NOTE — H&P ADULT - PROBLEM SELECTOR PLAN 6
- glucose = 125 on CMP  - on Lantus 26 units HS  - inadequate oral intake for over recent days and has not taken insulin x 2 days  - prescribing Lantus 15 units HS for now; evaluate for increase back to baseline  - L-ISS per FS for now  - consistent carb diet  - f/u fructosamine level in the AM (ordered) - sodium = 132  - in the setting of kidney disease (on HD therapy)  - also with sign of being fluid overloaded (D/L LE edema)  - f/u trend w/ repeat lab-work

## 2021-09-22 NOTE — CONSULT NOTE ADULT - SUBJECTIVE AND OBJECTIVE BOX
[facility] - S Janice / Nathan S /D Levy/ JACY Taylor/ JACY Becker/ Stephon Colbert / M Hananeu/ O Anita  service -[phone], office [phone]  ---------------------------------------------------------------------------------------------------------------    Patient is a 76y Female whom presented to the hospital with   Denied recent NSAID use/Abx use/iv contrast studies.    Patient seen and examined    PAST MEDICAL & SURGICAL HISTORY:  HTN (hypertension)    Hypercholesterolemia    Diabetes mellitus    Bronchial asthma  hx of    Vertigo    Trigeminal neuralgia  hx of    ESRD (end stage renal disease) on dialysis  started HD     Pedal edema    Sleep apnea  on CPAP    Dyslipidemia    Anemia    Gout    Carpal tunnel syndrome  left    Chronic right shoulder pain    Disorder of ligament of wrist, left    S/P rotator cuff surgery  bilateral &#x27;s    S/P       S/P hysterectomy      S/P total knee replacement  bilateral, left 2006, right 2008    S/P cataract extraction  bilateral, 2005    History of carpal tunnel surgery of left wrist      S/P arteriovenous (AV) fistula creation  left upper forearm cephalic vein brachial artery AV fistula creation on 2018, left arm basilic vein transposition on 2019.      History of arthroscopy of left shoulder  x 3    History of arthroscopy of right shoulder  x 3    History of vascular access device  , removed 2020        Allergies: IV Contrast (Anaphylaxis)  shellfish (Hives; Rash)  shrimp (Hives)  smoke; coughing (Other)    Home Medications Reviewed  Hospital Medications:   MEDICATIONS  (STANDING):    SOCIAL HISTORY:  Denies ETOh,Smoking, illicit drug use  FAMILY HISTORY:  No pertinent family history in first degree relatives        REVIEW OF SYSTEMS:  CONSTITUTIONAL: No weakness, fevers or chills  EYES/ENT: No visual changes;  No vertigo or throat pain   NECK: No pain or stiffness  RESPIRATORY: No cough, wheezing, hemoptysis; No shortness of breath  CARDIOVASCULAR: No chest pain or palpitations.  GASTROINTESTINAL: No abdominal or epigastric pain. No nausea, vomiting, or hematemesis; No diarrhea or constipation. No melena or hematochezia.  GENITOURINARY: No dysuria, frequency, foamy urine, urinary urgency, incontinence or hematuria  NEUROLOGICAL: No numbness or weakness  SKIN: No itching, burning, rashes, or lesions   VASCULAR: No bilateral lower extremity edema.   All other review of systems is negative unless indicated above.    VITALS:  T(F): 98 (21 @ 17:21), Max: 98.1 (21 @ 19:34)  HR: 90 (21 @ 17:21)  BP: 124/70 (21 @ 17:21)  RR: 17 (21 @ :21)  SpO2: 100% (21 @ :21)  Wt(kg): --    Height (cm): 160 ( @ 13:35)    PHYSICAL EXAM:  Constitutional: NAD  HEENT: anicteric sclera, oropharynx clear, MMM  Neck: No JVD  Respiratory: CTAB, no wheezes, rales or rhonchi  Cardiovascular: S1, S2, RRR  Gastrointestinal: BS+, soft, NT/ND  Extremities: No cyanosis or clubbing. No peripheral edema  Neurological: A/O x 3, no focal deficits  Psychiatric: Normal mood, normal affect  : No CVA tenderness. No cottrell.   Skin: No rashes  Vascular Access:    LABS:      132<L>  |  94<L>  |  41<H>  ----------------------------<  125<H>  5.3   |  20<L>  |  7.99<H>    Ca    10.0      22 Sep 2021 16:47  Phos  4.3       Mg     2.80         TPro  8.2  /  Alb  3.9  /  TBili  0.2  /  DBili      /  AST  47<H>  /  ALT  26  /  AlkPhos  124<H>      Creatinine Trend: 7.99 <--, 5.58 <--                        11.0   8.50  )-----------( 197      ( 22 Sep 2021 16:47 )             35.3     Urine Studies:        RADIOLOGY & ADDITIONAL STUDIES:                 [facility] - S Janice / Nathan S /D Levy/ S Claudia/ S Rosita/ Stephon Colbert / M Hananeu/ O Anita  service -[phone], office [phone]  ---------------------------------------------------------------------------------------------------------------  Patient seen and examined in ER    76yF with a pmhx of ESRD on HD MWF (from [facility], well known to me), DM, HTN, Gout, Anemia, Asthma, HLD presenting with neck pain and generalized weakness. Pt reports generalized weakness for the past few days with associated nausea. Pt reports right sided neck pain x months, states she had tried multiple medications without relief. Pt reports palpitations this morning. Denies chest pain, shortness of breath, numbness/tingling, abd pain, vomiting, diarrhea, headache, dizziness, near syncope, fever/chills or any other concerns. Renal consulted for ESRD Mx. Pt was evaluated in the ED yesterday with similar complaints. Pt had last dialysis Monday, cut short 20min 2/2 pain, due for dialysis today. denied sob/cp.     PAST MEDICAL & SURGICAL HISTORY:  HTN (hypertension)    Hypercholesterolemia    Diabetes mellitus    Bronchial asthma  hx of    Vertigo    Trigeminal neuralgia  hx of    ESRD (end stage renal disease) on dialysis  started HD     Pedal edema    Sleep apnea  on CPAP    Dyslipidemia    Anemia    Gout    Carpal tunnel syndrome  left    Chronic right shoulder pain    Disorder of ligament of wrist, left    S/P rotator cuff surgery  bilateral &#x27;s    S/P       S/P hysterectomy      S/P total knee replacement  bilateral, left 2006, right 2008    S/P cataract extraction  bilateral, 2005    History of carpal tunnel surgery of left wrist      S/P arteriovenous (AV) fistula creation  left upper forearm cephalic vein brachial artery AV fistula creation on 2018, left arm basilic vein transposition on 2019.      History of arthroscopy of left shoulder  x 3    History of arthroscopy of right shoulder  x 3    History of vascular access device  2018, removed 2020    Allergies: IV Contrast (Anaphylaxis)  shellfish (Hives; Rash)  shrimp (Hives)  smoke; coughing (Other)    Home Medications Reviewed  Hospital Medications:   MEDICATIONS  (STANDING):    SOCIAL HISTORY:  Denies ETOh,Smoking, illicit drug use  FAMILY HISTORY:  No pertinent family history in first degree relatives    REVIEW OF SYSTEMS:  CONSTITUTIONAL: gen weakness +, no fevers or chills  EYES/ENT: No visual changes;  No vertigo or throat pain   NECK: + pain   RESPIRATORY: No cough, wheezing, hemoptysis; No shortness of breath  CARDIOVASCULAR: No chest pain or palpitations.  GASTROINTESTINAL: No abdominal or epigastric pain. No nausea, vomiting, or hematemesis; No diarrhea or constipation. No melena or hematochezia.  NEUROLOGICAL: No numbness or weakness  SKIN: No itching, burning, rashes, or lesions   VASCULAR: No bilateral lower extremity edema.   All other review of systems is negative unless indicated above.    VITALS:  T(F): 98 (21 @ 17:21), Max: 98.1 (21 @ 19:34)  HR: 90 (21 @ 17:21)  BP: 124/70 (21 @ 17:21)  RR: 17 (21 @ 17:21)  SpO2: 100% (21 @ 17:21)  Wt(kg): --    Height (cm): 160 ( @ 13:35)    PHYSICAL EXAM:  Constitutional: NAD  HEENT: anicteric sclera  Neck: No JVD  Respiratory: CTAB, no wheezes, rales or rhonchi  Cardiovascular: S1, S2, RRR  Gastrointestinal: BS+, soft, NT/ND  Extremities: No peripheral edema  Neurological: A/O x 3  Psychiatric: Normal mood, normal affect  : no cottrell.   Vascular Access: AVF+thrill     LABS:      132<L>  |  94<L>  |  41<H>  ----------------------------<  125<H>  5.3   |  20<L>  |  7.99<H>    Ca    10.0      22 Sep 2021 16:47  Phos  4.3       Mg     2.80         TPro  8.2  /  Alb  3.9  /  TBili  0.2  /  DBili      /  AST  47<H>  /  ALT  26  /  AlkPhos  124<H>      Creatinine Trend: 7.99 <--, 5.58 <--                        11.0   8.50  )-----------( 197      ( 22 Sep 2021 16:47 )             35.3     Urine Studies:    RADIOLOGY & ADDITIONAL STUDIES:    < from: Xray Chest 1 View- PORTABLE-Urgent (Xray Chest 1 View- PORTABLE-Urgent .) (21 @ 17:33) >  INTERPRETATION:  no focal consolidation      < end of copied text >

## 2021-09-22 NOTE — CONSULT NOTE ADULT - ASSESSMENT
76yF with a pmhx of ESRD on HD MWF (from [facility], well known to me), DM, HTN, Gout, Anemia, Asthma, HLD presenting with neck pain and generalized weakness. Renal consulted for ESRD Mx.    1) ESRD on HD-  Routine HD days-- MWF at [facility]  Access: Left upper ext AVF  K, vol- acceptable    Informed consent for hd obtained from pt  no indication for HD tonight. pt prefers routine hd tomorrow  scheduled for HD tomorrow AM w/2k bath, net UF 1kg as tolerated by bp, low bath temp   s/p HD earlier today, Rx sheet reviewed, net UF 428ml, tolerated well. uneventful  dose all meds for ESRD  renal diet. fluid restriction 1.2L/day    2) Anemia in CKD-  Hgb at goal  no DEE for now    3) Hypertension-controlled  bp stable    plan for admit for pain control, monitoring and hd  will closely follow up   poc d/w ER attending, pt, HD RN  labs, rad, chart reviewed  pl call for q's  Nephrology   cell [phone]  office [phone]  ans serv- [phone]

## 2021-09-22 NOTE — ED PROVIDER NOTE - ATTENDING CONTRIBUTION TO CARE
Attending Statement: I have reviewed and agree with all pertinent clinical information, including history and physical exam and agree with treatment plan of the PA, except as noted.  76yF with a pmhx of ESRD on HD MWF (last dialysis Monday), DM, HTN, Gout, Anemia, Asthma, HLD  from home co neck pain since June. Located on the left side of neck, "it hurts" nonradiating, no trauma. Denies any chest pain or SOB. Denies nausea or vomit. States "feel very weak" "cant move"  Missed HD today due to "too much weakness" Seen in ED a day ago for same complaint.  Vital signs noted. sitting up AO3 no distress. supple neck. no rash no vesicles. nl  bl hands. nl sensation. left hand w splint. soft nontender abdomen. no  rebound. no guarding. no sign of trauma. no CVAT no calf tenderness   plan labs, ekg, cxr, tele, pain med, tba

## 2021-09-22 NOTE — ED PROVIDER NOTE - OBJECTIVE STATEMENT
76yF with a pmhx of ESRD on HD MWF (last dialysis Monday), DM, HTN, Gout, Anemia, Asthma, Hypercholesterolemia presenting with neck pain and generalized weakness. Pt reports generalized weakness for the past few days with associated nausea, states she feels she can't tolerate more than an hour of dialysis? Pt reports right sided neck pain x months. 76yF with a pmhx of ESRD on HD MWF (last dialysis Monday), DM, HTN, Gout, Anemia, Asthma, HLD presenting with neck pain and generalized weakness. Pt reports generalized weakness for the past few days with associated nausea, states she feels she can't tolerate more than an hour of dialysis? Pt reports right sided neck pain x months. 76yF with a pmhx of ESRD on HD MWF (last dialysis Monday), DM, HTN, Gout, Anemia, Asthma, HLD presenting with neck pain and generalized weakness. Pt reports generalized weakness for the past few days with associated nausea, states she feels she can't tolerate more than an hour of dialysis? Pt reports right sided neck pain x months, states she had tried multiple medications without relief. Pt reports palpitations this morning. Denies chest pain, shortness of breath, numbness/tingling, abd pain, vomiting, diarrhea, headache, dizziness, near syncope, fever/chills or any other concerns. Pt was seen in the ED yesterday with similar complaints. Pt due for dialysis today.

## 2021-09-22 NOTE — H&P ADULT - NSHPSOCIALHISTORY_GEN_ALL_CORE
SOCIAL HISTORY:    Marital Status:  (  )    (  ) Single        (  )        (  )        (  )   Occupation:   Lives with:        (  ) Alone       (  ) Spouse      (  ) Children        (  ) Parents           (  ) Other    No history of smoking  No history of alcohol abuse  No history of illegal drug use SOCIAL HISTORY:    Marital Status:  (  )    (  ) Single        (  )        (  )        ( x )   Lives with:        ( x ) Alone       (  ) Spouse      (  ) Children        (  ) Parents           (  ) Other  Has 3 children  Occupation:     Retired Registered Nurse/Midwife    No history of smoking  No history of alcohol abuse  No history of illegal drug use

## 2021-09-22 NOTE — ED PROVIDER NOTE - CLINICAL SUMMARY MEDICAL DECISION MAKING FREE TEXT BOX
Anal fistula    History of cataract surgery    S/P partial lobectomy of lung  Right
76yF with a pmhx of ESRD on HD MWF (last dialysis Monday), DM, HTN, Gout, Anemia, Asthma, HLD presenting with neck pain, generalized weakness, nausea and palpitations. On exam pt is well appearing, +tenderness to right paracervical region, no c-spine tenderness. Concern for muscle spasm, electrolyte abnormality, lower suspicion ACS (given weakness and nausea). Plan: cbc/cmp/trop/tsh/vbg, CXR, EKG, trial valium/tylenol.

## 2021-09-22 NOTE — H&P ADULT - PROBLEM SELECTOR PLAN 4
- in the setting of obesity  - requiring CPAP  - CPAP of 4, 30% FiO2 prescribed  - HOB elevation  - follow pulse oximetry  - f/u TSH level - in the setting of obesity  - requiring CPAP  - CPAP of 4, 30% FiO2 prescribed  - HOB elevation  - follow pulse oximetry  - TSH = 1.80

## 2021-09-22 NOTE — H&P ADULT - PROBLEM SELECTOR PLAN 3
- present since ~ 06/2021  - has had X-ray and was seen by PMD and neurologist, and reports diagnosis of multiple spasms of the area  - compounded by intermittent trigeminal neuralgia; on Tegretol 200 mg BID PRN  - may benefit from muscle relaxant - ?Flexeril  - may benefit from further Neurology eval  - warm compress to the R-neck area (20 minutes on  and 40 minutes off)

## 2021-09-22 NOTE — H&P ADULT - ASSESSMENT
[ x ]  Lab studies reviewed  [ x ]  Radiology reviewed  [ x ]  Old records reviewed    76 year old female, with past history significant for Anemia, Type-2 DM, ESRD (HD M/W/F), HTN, HLD, Sleep apnea (CPAP), Trigeminal neuralgia, Carpal tunnel syndrome and Gout, presented to he ED secondary to neck pain and generalized weakness.  Vital signs upon ED presentation as follows: BP = 129/72, HR = 98, RR = 19, T = 36.6 C (97.9 F), O2 Sat = 100% on RA.  Diagnosed with Generalized Weakness, Neck Pain, and Nausea.  Admitted for further evaluation/management.

## 2021-09-22 NOTE — H&P ADULT - NSHPPHYSICALEXAM_GEN_ALL_CORE
Vital Signs Last 24 Hrs  T(C): 36.9 (23 Sep 2021 01:32), Max: 36.9 (23 Sep 2021 01:32)  T(F): 98.4 (23 Sep 2021 01:32), Max: 98.4 (23 Sep 2021 01:32)  HR: 77 (23 Sep 2021 01:32) (76 - 98)  BP: 127/63 (23 Sep 2021 01:32) (122/68 - 129/72)  BP(mean): --  RR: 16 (23 Sep 2021 01:32) (16 - 18)  SpO2: 100% (23 Sep 2021 01:32) (100% - 100%)    ================================================================= Vital Signs Last 24 Hrs  T(C): 36.9 (23 Sep 2021 01:32), Max: 36.9 (23 Sep 2021 01:32)  T(F): 98.4 (23 Sep 2021 01:32), Max: 98.4 (23 Sep 2021 01:32)  HR: 77 (23 Sep 2021 01:32) (76 - 98)  BP: 127/63 (23 Sep 2021 01:32) (122/68 - 129/72)  BP(mean): --  RR: 16 (23 Sep 2021 01:32) (16 - 18)  SpO2: 100% (23 Sep 2021 01:32) (100% - 100%)    =================================================================    PHYSICAL EXAMINATION:    APPEARANCE: Adequately groomed, adequately nourished.  NAD	  HEENT: Moist oral mucosa, PERRL, EOMI	  LYMPHATIC: No lymphadenopathy appreciated  CARDIOVASCULAR: (+) S1 S2.  No JVD.  No murmurs.  No edema  RESPIRATORY: No wheezing, rhonchi, crackles appreciated  GASTROINTESTINAL:  Soft, Non-tender, (+) BS  GENITOURINARY: No suprapubic tenderness.  No CVA tenderness B/L  SKIN: No rashes. No ecchymoses.  No cyanosis  PSYCHIATRIC: A&O x 3.  Mood & affect appropriate to situation  NEUROLOGICAL: Non-focal, BLUE x 4 against gravity  EXTREMITIES: Normal range of motion.  No clubbing, cyanosis or edema  VASCULAR: Peripheral pulses palpable Vital Signs Last 24 Hrs  T(C): 36.9 (23 Sep 2021 01:32), Max: 36.9 (23 Sep 2021 01:32)  T(F): 98.4 (23 Sep 2021 01:32), Max: 98.4 (23 Sep 2021 01:32)  HR: 77 (23 Sep 2021 01:32) (76 - 98)  BP: 127/63 (23 Sep 2021 01:32) (122/68 - 129/72)  BP(mean): --  RR: 16 (23 Sep 2021 01:32) (16 - 18)  SpO2: 100% (23 Sep 2021 01:32) (100% - 100%)    =================================================================    PHYSICAL EXAMINATION:    APPEARANCE: Adequately groomed, overweight female, lying propped up in stretcher in NAD	  HEENT: Moist oral mucosa, PERRL, EOMI	  LYMPHATIC: No lymphadenopathy appreciated  CARDIOVASCULAR: (+) S1 S2.  No JVD.  (+) systolic murmur.  (+) edema of B/L legs  RESPIRATORY: Decreased sounds at the bases.  No wheezing, rhonchi, crackles appreciated  GASTROINTESTINAL:  Soft, Non-tender, (+) BS  GENITOURINARY: No suprapubic tenderness.  No CVA tenderness B/L  SKIN: No rashes. No ecchymoses.  No cyanosis  PSYCHIATRIC: A&O x 3.  Mood & affect appropriate to situation  NEUROLOGICAL: Non-focal, BLUE x 4 against gravity  EXTREMITIES: Normal range of motion.  No clubbing, cyanosis or edema  VASCULAR: (+) thrill and bruit at L-arm AV fistula area.  Radial pulses palpable

## 2021-09-22 NOTE — H&P ADULT - PROBLEM SELECTOR PLAN 7
- glucose = 125 on CMP  - on Lantus 26 units HS  - inadequate oral intake for over recent days and has not taken insulin x 2 days  - prescribing Lantus 15 units HS for now; evaluate for increase back to baseline  - L-ISS per FS for now  - consistent carb diet  - f/u fructosamine level in the AM (ordered)

## 2021-09-22 NOTE — ED PROVIDER NOTE - CARE PLAN
1 Principal Discharge DX:	Generalized weakness  Secondary Diagnosis:	Neck pain  Secondary Diagnosis:	Nausea

## 2021-09-22 NOTE — ED PROVIDER NOTE - PROGRESS NOTE DETAILS
MONICO Lynch: Pts daughter Elba called, states today her mother had palpitations which was new for her. Concerns mothers hx of nausea, weakness, and neck pain x months. MONICO Lynch: Spoke with pts nephrologist, likely admission MONICO Lynch: Pt seen by nephrology in ED, will admit for dialysis tomorrow, repeat trop sent, pt does continue to have neck pain, minimal relief. Spoke with  who accepts pt (no need for tele, discussed trop 124, pt has some palpitations this AM), text paged MAR, pt agrees with admission

## 2021-09-22 NOTE — H&P ADULT - NSICDXPASTMEDICALHX_GEN_ALL_CORE_FT
PAST MEDICAL HISTORY:  Anemia     Bronchial asthma hx of    Carpal tunnel syndrome left    Chronic right shoulder pain     Diabetes mellitus     Disorder of ligament of wrist, left     Dyslipidemia     ESRD (end stage renal disease) on dialysis started HD 2018 ~ M/W/F    Gout     HTN (hypertension)     Hypercholesterolemia     Mild diastolic dysfunction ~ Stage I    Pedal edema     Sleep apnea on CPAP    Trigeminal neuralgia hx of    Vertigo      PAST MEDICAL HISTORY:  Anemia     Bronchial asthma hx of    Carpal tunnel syndrome left    Chronic right shoulder pain     Diabetes mellitus     Disorder of ligament of wrist, left     Dyslipidemia     ESRD (end stage renal disease) on dialysis started HD 2018 ~ M/W/F    Gout     HTN (hypertension)     Hypercholesterolemia     Mild diastolic dysfunction ~ Stage I    Pedal edema     RSV bronchitis     Sleep apnea on CPAP    Trigeminal neuralgia hx of    Vertigo

## 2021-09-22 NOTE — H&P ADULT - HISTORY OF PRESENT ILLNESS
76 year old female, with past history significant for Anemia, Type-2 DM, ESRD (HD M/W/F), HTN, HLD, Sleep apnea (CPAP), Trigeminal neuralgia, Carpal tunnel syndrome and Gout, presented to he ED secondary to neck pain and generalized weakness.  Seen and evaluated at bedside;    Vital signs upon ED presentation as follows: BP = 129/72, HR = 98, RR = 19, T = 36.6 C (97.9 F), O2 Sat = 100% on RA.  Diagnosed with Generalized Weakness, Neck Pain, and Nausea.  Prescribed Tylenol 650 mg and Valium 5 mg PO in the ED. 76 year old female, with past history significant for Anemia, Type-2 DM, ESRD (HD M/W/F), HTN, HLD, Sleep apnea (CPAP), Trigeminal neuralgia, Carpal tunnel syndrome and Gout, presented to he ED secondary to neck pain and generalized weakness.  Seen and evaluated at bedside; NAD.  Patient relates persistent pain of the right neck since June 2021.  Underwent X-ray of the area.  Reports being told that this is due to "multiple spasms." Seen by PMD, and Neurologist and underwent "advanced" physical therapy - without relief.  Not adequately palliated with analgesics.  Comments on severe headache associated with trigeminal neuralgia; takes Tegretol infrequently.  Experiences palpitations and elevated blood pressures during severe pain periods.  Recalls that during recent severe pain approximately 3 weeks ago eyes were significantly erythematous.  Has suffered with nausea for the past ~ 2 weeks, which has resulted in trepidation relative to eating; thinks that this may be medication s/e (especially Sevelamer, which is taken after meals TID).  No associated epigastric or abdominal pain.  Has had some abdominal discomfort at times, which is relieved after bowel movement.  Generalized weakness appears to be related to the hemodialysis therapy, per patient's reports.  States no longer being hypertensive since starting hemodialysis; SBP ranging 90 at times.      Vital signs upon ED presentation as follows: BP = 129/72, HR = 98, RR = 19, T = 36.6 C (97.9 F), O2 Sat = 100% on RA.  Diagnosed with Generalized Weakness, Neck Pain, and Nausea.  Prescribed Tylenol 650 mg and Valium 5 mg PO in the ED. 76 year old female, with past history significant for Anemia, Type-2 DM, ESRD (HD M/W/F), HTN, HLD, Sleep apnea (CPAP), Trigeminal neuralgia, Carpal tunnel syndrome and Gout, presented to he ED secondary to neck pain and generalized weakness.  Seen and evaluated at bedside; NAD.  Patient relates persistent pain of the right neck since June 2021.  Underwent X-ray of the area.  Reports being told that this is due to "multiple spasms." Seen by PMD, and Neurologist and underwent "advanced" physical therapy - without relief.  Not adequately palliated with analgesics.  Comments on severe headache associated with trigeminal neuralgia; takes Tegretol infrequently.  Experiences palpitations and elevated blood pressures during severe pain periods.  Recalls that during recent severe pain approximately 3 weeks ago eyes were significantly erythematous.  Has suffered with nausea for the past ~ 2 weeks, which has resulted in trepidation relative to eating; thinks that this may be medication s/e (especially Sevelamer, which is taken after meals TID).  No associated epigastric or abdominal pain.  Has had some abdominal discomfort at times, which is relieved after bowel movement.  Generalized weakness appears to be related to the hemodialysis therapy, per patient's reports.  States no longer being hypertensive since starting hemodialysis; SBP ranging 90 at times.  No report of fever, chills, dizziness, chest pain, shortness of breath.  Has B/L lower extremities edema,     Vital signs upon ED presentation as follows: BP = 129/72, HR = 98, RR = 19, T = 36.6 C (97.9 F), O2 Sat = 100% on RA.  Diagnosed with Generalized Weakness, Neck Pain, and Nausea.  Prescribed Tylenol 650 mg and Valium 5 mg PO in the ED.

## 2021-09-22 NOTE — H&P ADULT - PROBLEM SELECTOR PLAN 2
- currently w/ B/L LE edema.  No shortness of breath or signs of distress.  No crackles  - trop = 124 --> 110.  ECG as above.  Potassium = 5.3 (moderately hemolyzed)  - patient reports intolerance to > 2 hours of HD therapy  - already seen by Nephrology team (appreciated); for HD therapy today.  Pt known to current nephrologist  - continues on Sevelamer 1200 mg TID, Nephro-neeraj   - f/u post HD lab-work (scheduled 4 hours after therapy)  - HOB elevated  - on renal diet

## 2021-09-22 NOTE — ED ADULT TRIAGE NOTE - CHIEF COMPLAINT QUOTE
Arrives from home reports neck pain. Was seen here yesterday for same complaint but states pain has not gone away.

## 2021-09-22 NOTE — H&P ADULT - NSHPREVIEWOFSYSTEMS_GEN_ALL_CORE
REVIEW OF SYSTEMS:    CONSTITUTIONAL: No weakness, fever, chills.  (+) sweating at the neck areas periodically.  Generalized weakness at times related to HD therapy  EYES/ENT: No visual changes.  No dysphagia  NECK: Pain of the right neck - intense at times.  No pain or stiffness  RESPIRATORY: No cough or hemoptysis.  No shortness of breath  CARDIOVASCULAR: No chest pain or palpitation.  Swelling of the lower extremities due to need for hemodialysis.  GASTROINTESTINAL: No epigastric or abdominal pain. (+) nausea w/o vomiting.  No diarrhea or constipation. No melena or hematochezia.  GENITOURINARY: No dysuria, frequency or hematuria  MUSCULOSKELETAL: No joint pain, swelling, decreased ROM, erythema, warmth  NEUROLOGICAL: No numbness or weakness  PSYCHIATRY: No anxiety, or depression.  SKIN: No itching, burning, rashes, or lesions   All other review of systems is negative unless indicated above. REVIEW OF SYSTEMS:    CONSTITUTIONAL: No weakness, fever, chills.  (+) sweating at the neck areas periodically.  Generalized weakness at times - chiefly related to HD therapy  EYES/ENT: No visual changes.  No dysphagia  NECK: Pain of the right neck - intense at times.  No pain or stiffness  RESPIRATORY: No cough or hemoptysis.  No shortness of breath  CARDIOVASCULAR: No chest pain or palpitation.  Swelling of the lower extremities due to need for hemodialysis.  GASTROINTESTINAL: No epigastric or abdominal pain. (+) nausea w/o vomiting.  No diarrhea or constipation presently, but has intermittent constipation. No melena or hematochezia.  GENITOURINARY: Anuric.  No associated painful discomfort  MUSCULOSKELETAL: Swelling of B/L lower extremities.  Pain of R-neck w some decreased ROM, erythema, warmth  NEUROLOGICAL: No numbness or weakness  PSYCHIATRY: No anxiety, or depression.  SKIN: No issues with AV fistula.  No itching, burning, rashes, or lesions   All other review of systems is negative unless indicated above.

## 2021-09-22 NOTE — H&P ADULT - NSHPLABSRESULTS_GEN_ALL_CORE
LAB-WORK/STUDIES:                          11.0   8.50  )-----------( 197      ( 22 Sep 2021 16:47 )             35.3     22 Sep 2021 16:47    132    |  94     |  41     ----------------------------<  125    5.3     |  20     |  7.99     Ca    10.0       22 Sep 2021 16:47  Phos  4.3       22 Sep 2021 16:47  Mg     2.80      22 Sep 2021 16:47    TPro  8.2    /  Alb  3.9    /  TBili  0.2    /  DBili  x      /  AST  47     /  ALT  26     /  AlkPhos  124    22 Sep 2021 16:47    LIVER FUNCTIONS - ( 22 Sep 2021 16:47 )  Alb: 3.9 g/dL / Pro: 8.2 g/dL / ALK PHOS: 124 U/L / ALT: 26 U/L / AST: 47 U/L / GGT: x             CAPILLARY BLOOD GLUCOSE    ========================================================        ======================================================== LAB-WORK/STUDIES:                          11.0   8.50  )-----------( 197      ( 22 Sep 2021 16:47 )             35.3     22 Sep 2021 16:47    132    |  94     |  41     ----------------------------<  125    5.3     |  20     |  7.99     Ca    10.0       22 Sep 2021 16:47  Phos  4.3       22 Sep 2021 16:47  Mg     2.80      22 Sep 2021 16:47    TPro  8.2    /  Alb  3.9    /  TBili  0.2    /  DBili  x      /  AST  47     /  ALT  26     /  AlkPhos  124    22 Sep 2021 16:47    LIVER FUNCTIONS - ( 22 Sep 2021 16:47 )  Alb: 3.9 g/dL / Pro: 8.2 g/dL / ALK PHOS: 124 U/L / ALT: 26 U/L / AST: 47 U/L / GGT: x             CAPILLARY BLOOD GLUCOSE    ========================================================    - ECG = NSR at 87 bpm, QTc = 498, RBB, LAD (no sig changes from prior study)    ========================================================

## 2021-09-23 DIAGNOSIS — E87.1 HYPO-OSMOLALITY AND HYPONATREMIA: ICD-10-CM

## 2021-09-23 DIAGNOSIS — G47.30 SLEEP APNEA, UNSPECIFIED: ICD-10-CM

## 2021-09-23 DIAGNOSIS — R60.0 LOCALIZED EDEMA: ICD-10-CM

## 2021-09-23 DIAGNOSIS — E11.9 TYPE 2 DIABETES MELLITUS WITHOUT COMPLICATIONS: ICD-10-CM

## 2021-09-23 DIAGNOSIS — M54.2 CERVICALGIA: ICD-10-CM

## 2021-09-23 LAB
24R-OH-CALCIDIOL SERPL-MCNC: 27.2 NG/ML — LOW (ref 30–80)
ALBUMIN SERPL ELPH-MCNC: 3.9 G/DL — SIGNIFICANT CHANGE UP (ref 3.3–5)
ALP SERPL-CCNC: 118 U/L — SIGNIFICANT CHANGE UP (ref 40–120)
ALT FLD-CCNC: 18 U/L — SIGNIFICANT CHANGE UP (ref 4–33)
ANION GAP SERPL CALC-SCNC: 17 MMOL/L — HIGH (ref 7–14)
AST SERPL-CCNC: 20 U/L — SIGNIFICANT CHANGE UP (ref 4–32)
BILIRUB SERPL-MCNC: 0.2 MG/DL — SIGNIFICANT CHANGE UP (ref 0.2–1.2)
BUN SERPL-MCNC: 52 MG/DL — HIGH (ref 7–23)
CALCIUM SERPL-MCNC: 9.9 MG/DL — SIGNIFICANT CHANGE UP (ref 8.4–10.5)
CHLORIDE SERPL-SCNC: 89 MMOL/L — LOW (ref 98–107)
CO2 SERPL-SCNC: 25 MMOL/L — SIGNIFICANT CHANGE UP (ref 22–31)
COVID-19 SPIKE DOMAIN AB INTERP: POSITIVE
COVID-19 SPIKE DOMAIN ANTIBODY RESULT: 119 U/ML — HIGH
CREAT SERPL-MCNC: 9.21 MG/DL — HIGH (ref 0.5–1.3)
DIALYSIS INSTRUMENT RESULT - HEPATITIS B SURFACE ANTIGEN: NEGATIVE — SIGNIFICANT CHANGE UP
FOLATE SERPL-MCNC: 20 NG/ML — HIGH (ref 3.1–17.5)
FRUCTOSAMINE SERPL-MCNC: 358 UMOL/L — HIGH (ref 205–285)
GLUCOSE BLDC GLUCOMTR-MCNC: 103 MG/DL — HIGH (ref 70–99)
GLUCOSE BLDC GLUCOMTR-MCNC: 161 MG/DL — HIGH (ref 70–99)
GLUCOSE BLDC GLUCOMTR-MCNC: 199 MG/DL — HIGH (ref 70–99)
GLUCOSE BLDC GLUCOMTR-MCNC: 216 MG/DL — HIGH (ref 70–99)
GLUCOSE SERPL-MCNC: 187 MG/DL — HIGH (ref 70–99)
HCT VFR BLD CALC: 30.9 % — LOW (ref 34.5–45)
HGB BLD-MCNC: 10.1 G/DL — LOW (ref 11.5–15.5)
MAGNESIUM SERPL-MCNC: 2.7 MG/DL — HIGH (ref 1.6–2.6)
MCHC RBC-ENTMCNC: 30 PG — SIGNIFICANT CHANGE UP (ref 27–34)
MCHC RBC-ENTMCNC: 32.7 GM/DL — SIGNIFICANT CHANGE UP (ref 32–36)
MCV RBC AUTO: 91.7 FL — SIGNIFICANT CHANGE UP (ref 80–100)
MRSA PCR RESULT.: SIGNIFICANT CHANGE UP
NRBC # BLD: 0 /100 WBCS — SIGNIFICANT CHANGE UP
NRBC # FLD: 0.03 K/UL — HIGH
NT-PROBNP SERPL-SCNC: 3331 PG/ML — HIGH
PHOSPHATE SERPL-MCNC: 4.7 MG/DL — HIGH (ref 2.5–4.5)
PLATELET # BLD AUTO: 186 K/UL — SIGNIFICANT CHANGE UP (ref 150–400)
POTASSIUM SERPL-MCNC: 4.1 MMOL/L — SIGNIFICANT CHANGE UP (ref 3.5–5.3)
POTASSIUM SERPL-SCNC: 4.1 MMOL/L — SIGNIFICANT CHANGE UP (ref 3.5–5.3)
PROT SERPL-MCNC: 7.3 G/DL — SIGNIFICANT CHANGE UP (ref 6–8.3)
RBC # BLD: 3.37 M/UL — LOW (ref 3.8–5.2)
RBC # FLD: 20 % — HIGH (ref 10.3–14.5)
S AUREUS DNA NOSE QL NAA+PROBE: SIGNIFICANT CHANGE UP
SARS-COV-2 IGG+IGM SERPL QL IA: 119 U/ML — HIGH
SARS-COV-2 IGG+IGM SERPL QL IA: POSITIVE
SODIUM SERPL-SCNC: 131 MMOL/L — LOW (ref 135–145)
TROPONIN T, HIGH SENSITIVITY RESULT: 119 NG/L — CRITICAL HIGH
VIT B12 SERPL-MCNC: 2000 PG/ML — HIGH (ref 200–900)
WBC # BLD: 7.05 K/UL — SIGNIFICANT CHANGE UP (ref 3.8–10.5)
WBC # FLD AUTO: 7.05 K/UL — SIGNIFICANT CHANGE UP (ref 3.8–10.5)

## 2021-09-23 RX ORDER — HEPARIN SODIUM 5000 [USP'U]/ML
5000 INJECTION INTRAVENOUS; SUBCUTANEOUS EVERY 8 HOURS
Refills: 0 | Status: DISCONTINUED | OUTPATIENT
Start: 2021-09-23 | End: 2021-10-08

## 2021-09-23 RX ORDER — SODIUM CHLORIDE 9 MG/ML
1000 INJECTION, SOLUTION INTRAVENOUS
Refills: 0 | Status: DISCONTINUED | OUTPATIENT
Start: 2021-09-23 | End: 2021-10-08

## 2021-09-23 RX ORDER — DEXTROSE 50 % IN WATER 50 %
15 SYRINGE (ML) INTRAVENOUS ONCE
Refills: 0 | Status: DISCONTINUED | OUTPATIENT
Start: 2021-09-23 | End: 2021-10-08

## 2021-09-23 RX ORDER — OXYCODONE HYDROCHLORIDE 5 MG/1
5 TABLET ORAL EVERY 8 HOURS
Refills: 0 | Status: DISCONTINUED | OUTPATIENT
Start: 2021-09-23 | End: 2021-09-30

## 2021-09-23 RX ORDER — GLUCAGON INJECTION, SOLUTION 0.5 MG/.1ML
1 INJECTION, SOLUTION SUBCUTANEOUS ONCE
Refills: 0 | Status: DISCONTINUED | OUTPATIENT
Start: 2021-09-23 | End: 2021-10-08

## 2021-09-23 RX ORDER — ACETAMINOPHEN 500 MG
650 TABLET ORAL EVERY 6 HOURS
Refills: 0 | Status: DISCONTINUED | OUTPATIENT
Start: 2021-09-23 | End: 2021-10-08

## 2021-09-23 RX ORDER — CHLORHEXIDINE GLUCONATE 213 G/1000ML
1 SOLUTION TOPICAL DAILY
Refills: 0 | Status: DISCONTINUED | OUTPATIENT
Start: 2021-09-23 | End: 2021-10-08

## 2021-09-23 RX ORDER — INSULIN GLARGINE 100 [IU]/ML
15 INJECTION, SOLUTION SUBCUTANEOUS AT BEDTIME
Refills: 0 | Status: DISCONTINUED | OUTPATIENT
Start: 2021-09-23 | End: 2021-10-08

## 2021-09-23 RX ORDER — ALLOPURINOL 300 MG
100 TABLET ORAL DAILY
Refills: 0 | Status: DISCONTINUED | OUTPATIENT
Start: 2021-09-23 | End: 2021-10-08

## 2021-09-23 RX ORDER — INFLUENZA VIRUS VACCINE 15; 15; 15; 15 UG/.5ML; UG/.5ML; UG/.5ML; UG/.5ML
0.5 SUSPENSION INTRAMUSCULAR ONCE
Refills: 0 | Status: DISCONTINUED | OUTPATIENT
Start: 2021-09-23 | End: 2021-09-23

## 2021-09-23 RX ORDER — CYCLOBENZAPRINE HYDROCHLORIDE 10 MG/1
10 TABLET, FILM COATED ORAL THREE TIMES A DAY
Refills: 0 | Status: DISCONTINUED | OUTPATIENT
Start: 2021-09-23 | End: 2021-10-03

## 2021-09-23 RX ORDER — INSULIN LISPRO 100/ML
VIAL (ML) SUBCUTANEOUS AT BEDTIME
Refills: 0 | Status: DISCONTINUED | OUTPATIENT
Start: 2021-09-23 | End: 2021-10-08

## 2021-09-23 RX ORDER — FUROSEMIDE 40 MG
2 TABLET ORAL
Qty: 0 | Refills: 0 | DISCHARGE

## 2021-09-23 RX ORDER — DEXTROSE 50 % IN WATER 50 %
12.5 SYRINGE (ML) INTRAVENOUS ONCE
Refills: 0 | Status: DISCONTINUED | OUTPATIENT
Start: 2021-09-23 | End: 2021-10-08

## 2021-09-23 RX ORDER — ROSUVASTATIN CALCIUM 5 MG/1
1 TABLET ORAL
Qty: 0 | Refills: 0 | DISCHARGE

## 2021-09-23 RX ORDER — CARBAMAZEPINE 200 MG
200 TABLET ORAL
Refills: 0 | Status: DISCONTINUED | OUTPATIENT
Start: 2021-09-23 | End: 2021-09-26

## 2021-09-23 RX ORDER — ATORVASTATIN CALCIUM 80 MG/1
20 TABLET, FILM COATED ORAL AT BEDTIME
Refills: 0 | Status: DISCONTINUED | OUTPATIENT
Start: 2021-09-23 | End: 2021-10-08

## 2021-09-23 RX ORDER — DEXTROSE 50 % IN WATER 50 %
25 SYRINGE (ML) INTRAVENOUS ONCE
Refills: 0 | Status: DISCONTINUED | OUTPATIENT
Start: 2021-09-23 | End: 2021-10-08

## 2021-09-23 RX ORDER — CYCLOBENZAPRINE HYDROCHLORIDE 10 MG/1
10 TABLET, FILM COATED ORAL ONCE
Refills: 0 | Status: COMPLETED | OUTPATIENT
Start: 2021-09-23 | End: 2021-09-23

## 2021-09-23 RX ORDER — SEVELAMER CARBONATE 2400 MG/1
1600 POWDER, FOR SUSPENSION ORAL
Refills: 0 | Status: DISCONTINUED | OUTPATIENT
Start: 2021-09-23 | End: 2021-10-08

## 2021-09-23 RX ORDER — INSULIN LISPRO 100/ML
VIAL (ML) SUBCUTANEOUS
Refills: 0 | Status: DISCONTINUED | OUTPATIENT
Start: 2021-09-23 | End: 2021-10-08

## 2021-09-23 RX ORDER — INFLUENZA VIRUS VACCINE 15; 15; 15; 15 UG/.5ML; UG/.5ML; UG/.5ML; UG/.5ML
0.7 SUSPENSION INTRAMUSCULAR ONCE
Refills: 0 | Status: DISCONTINUED | OUTPATIENT
Start: 2021-09-23 | End: 2021-10-08

## 2021-09-23 RX ORDER — NIFEDIPINE 30 MG
1 TABLET, EXTENDED RELEASE 24 HR ORAL
Qty: 0 | Refills: 0 | DISCHARGE

## 2021-09-23 RX ADMIN — Medication 1 DROP(S): at 21:23

## 2021-09-23 RX ADMIN — Medication 2: at 16:36

## 2021-09-23 RX ADMIN — HEPARIN SODIUM 5000 UNIT(S): 5000 INJECTION INTRAVENOUS; SUBCUTANEOUS at 21:22

## 2021-09-23 RX ADMIN — Medication 650 MILLIGRAM(S): at 11:44

## 2021-09-23 RX ADMIN — HEPARIN SODIUM 5000 UNIT(S): 5000 INJECTION INTRAVENOUS; SUBCUTANEOUS at 15:14

## 2021-09-23 RX ADMIN — Medication 650 MILLIGRAM(S): at 12:16

## 2021-09-23 RX ADMIN — OXYCODONE HYDROCHLORIDE 5 MILLIGRAM(S): 5 TABLET ORAL at 16:20

## 2021-09-23 RX ADMIN — CHLORHEXIDINE GLUCONATE 1 APPLICATION(S): 213 SOLUTION TOPICAL at 07:02

## 2021-09-23 RX ADMIN — Medication 1 DROP(S): at 07:02

## 2021-09-23 RX ADMIN — Medication 1 TABLET(S): at 13:43

## 2021-09-23 RX ADMIN — HEPARIN SODIUM 5000 UNIT(S): 5000 INJECTION INTRAVENOUS; SUBCUTANEOUS at 07:02

## 2021-09-23 RX ADMIN — Medication 1: at 07:37

## 2021-09-23 RX ADMIN — INSULIN GLARGINE 15 UNIT(S): 100 INJECTION, SOLUTION SUBCUTANEOUS at 21:23

## 2021-09-23 RX ADMIN — OXYCODONE HYDROCHLORIDE 5 MILLIGRAM(S): 5 TABLET ORAL at 15:27

## 2021-09-23 RX ADMIN — SEVELAMER CARBONATE 1600 MILLIGRAM(S): 2400 POWDER, FOR SUSPENSION ORAL at 07:03

## 2021-09-23 RX ADMIN — ATORVASTATIN CALCIUM 20 MILLIGRAM(S): 80 TABLET, FILM COATED ORAL at 21:22

## 2021-09-23 RX ADMIN — SEVELAMER CARBONATE 1600 MILLIGRAM(S): 2400 POWDER, FOR SUSPENSION ORAL at 17:28

## 2021-09-23 RX ADMIN — CYCLOBENZAPRINE HYDROCHLORIDE 10 MILLIGRAM(S): 10 TABLET, FILM COATED ORAL at 15:12

## 2021-09-23 RX ADMIN — SEVELAMER CARBONATE 1600 MILLIGRAM(S): 2400 POWDER, FOR SUSPENSION ORAL at 13:44

## 2021-09-23 NOTE — PROGRESS NOTE ADULT - SUBJECTIVE AND OBJECTIVE BOX
[facility] - DALY Camacho / DALY Evans / FREIDA Lockett/ DALY Taylor/ DALY Becker/ YELENA Colbert / MARGARITA Kearns / JAY Howard  ---------------------------------------------------------------------------------------------------------------  seen and examined today for ESRD  Interval : NAD  VITALS:  T(F): 97.9 (09-23-21 @ 13:01), Max: 98.8 (09-23-21 @ 08:45)  HR: 80 (09-23-21 @ 13:01)  BP: 140/70 (09-23-21 @ 13:01)  RR: 16 (09-23-21 @ 13:01)  SpO2: 97% (09-23-21 @ 08:45)  Wt(kg): --    09-23 @ 07:01  -  09-23 @ 14:06  --------------------------------------------------------  IN: 900 mL / OUT: 2400 mL / NET: -1500 mL      Physical Exam :-  Constitutional: NAD  Neck: muscle cramp noted at base of skull and right side of neck  Respiratory: Bilateral equal breath sounds,  Cardiovascular: S1, S2 normal,  Gastrointestinal: Bowel Sounds present, soft, non tender.  Extremities: No edema  Neurological: Alert and Oriented x 3, no focal deficits  Psychiatric: Normal mood, normal affect  Data:-  Allergies :   IV Contrast (Anaphylaxis)  shellfish (Hives; Rash)  shrimp (Hives)  smoke; coughing (Other)    Hospital Medications:   MEDICATIONS  (STANDING):  allopurinol 100 milliGRAM(s) Oral daily  artificial tears (preservative free) Ophthalmic Solution 1 Drop(s) Both EYES three times a day  atorvastatin 20 milliGRAM(s) Oral at bedtime  chlorhexidine 2% Cloths 1 Application(s) Topical daily  cyclobenzaprine 10 milliGRAM(s) Oral once  dextrose 40% Gel 15 Gram(s) Oral once  dextrose 5%. 1000 milliLiter(s) (50 mL/Hr) IV Continuous <Continuous>  dextrose 5%. 1000 milliLiter(s) (100 mL/Hr) IV Continuous <Continuous>  dextrose 50% Injectable 25 Gram(s) IV Push once  dextrose 50% Injectable 12.5 Gram(s) IV Push once  dextrose 50% Injectable 25 Gram(s) IV Push once  glucagon  Injectable 1 milliGRAM(s) IntraMuscular once  heparin   Injectable 5000 Unit(s) SubCutaneous every 8 hours  influenza  Vaccine (HIGH DOSE) 0.7 milliLiter(s) IntraMuscular once  insulin glargine Injectable (LANTUS) 15 Unit(s) SubCutaneous at bedtime  insulin lispro (ADMELOG) corrective regimen sliding scale   SubCutaneous three times a day before meals  insulin lispro (ADMELOG) corrective regimen sliding scale   SubCutaneous at bedtime  Nephro-neeraj 1 Tablet(s) Oral daily  sevelamer carbonate 1600 milliGRAM(s) Oral three times a day with meals    09-23    131<L>  |  89<L>  |  52<H>  ----------------------------<  187<H>  4.1   |  25  |  9.21<H>    Ca    9.9      23 Sep 2021 09:42  Phos  4.7     09-23  Mg     2.70     09-23    TPro  7.3  /  Alb  3.9  /  TBili  0.2  /  DBili      /  AST  20  /  ALT  18  /  AlkPhos  118  09-23    Creatinine Trend: 9.21 <--, 7.99 <--, 5.58 <--                        10.1   7.05  )-----------( 186      ( 23 Sep 2021 09:42 )             30.9

## 2021-09-23 NOTE — PROGRESS NOTE ADULT - SUBJECTIVE AND OBJECTIVE BOX
Patient is a 76y old  Female who presents with a chief complaint of Generalized weakness, Neck pain, and Nausea (23 Sep 2021 14:05)      SUBJECTIVE / OVERNIGHT EVENTS:    Events noted.  CONSTITUTIONAL: No fever,  or fatigue  RESPIRATORY: No cough, wheezing,  No shortness of breath  CARDIOVASCULAR: No chest pain, palpitations  GASTROINTESTINAL: No abdominal or epigastric pain.   NEUROLOGICAL: No headaches,     MEDICATIONS  (STANDING):  allopurinol 100 milliGRAM(s) Oral daily  artificial tears (preservative free) Ophthalmic Solution 1 Drop(s) Both EYES three times a day  atorvastatin 20 milliGRAM(s) Oral at bedtime  chlorhexidine 2% Cloths 1 Application(s) Topical daily  dextrose 40% Gel 15 Gram(s) Oral once  dextrose 5%. 1000 milliLiter(s) (50 mL/Hr) IV Continuous <Continuous>  dextrose 5%. 1000 milliLiter(s) (100 mL/Hr) IV Continuous <Continuous>  dextrose 50% Injectable 25 Gram(s) IV Push once  dextrose 50% Injectable 12.5 Gram(s) IV Push once  dextrose 50% Injectable 25 Gram(s) IV Push once  glucagon  Injectable 1 milliGRAM(s) IntraMuscular once  heparin   Injectable 5000 Unit(s) SubCutaneous every 8 hours  influenza  Vaccine (HIGH DOSE) 0.7 milliLiter(s) IntraMuscular once  insulin glargine Injectable (LANTUS) 15 Unit(s) SubCutaneous at bedtime  insulin lispro (ADMELOG) corrective regimen sliding scale   SubCutaneous three times a day before meals  insulin lispro (ADMELOG) corrective regimen sliding scale   SubCutaneous at bedtime  Nephro-neeraj 1 Tablet(s) Oral daily  sevelamer carbonate 1600 milliGRAM(s) Oral three times a day with meals    MEDICATIONS  (PRN):  acetaminophen   Tablet .. 650 milliGRAM(s) Oral every 6 hours PRN Temp greater or equal to 38C (100.4F), Mild Pain (1 - 3), Moderate Pain (4 - 6)  carBAMazepine 200 milliGRAM(s) Oral two times a day PRN Pain related to Trigeminal Neuralgia  cyclobenzaprine 10 milliGRAM(s) Oral three times a day PRN Muscle Spasm  ondansetron Injectable 4 milliGRAM(s) IV Push every 8 hours PRN Nausea and/or Vomiting  oxyCODONE    IR 5 milliGRAM(s) Oral every 8 hours PRN Severe Pain (7 - 10)        CAPILLARY BLOOD GLUCOSE      POCT Blood Glucose.: 199 mg/dL (23 Sep 2021 21:12)  POCT Blood Glucose.: 216 mg/dL (23 Sep 2021 16:30)  POCT Blood Glucose.: 103 mg/dL (23 Sep 2021 12:13)  POCT Blood Glucose.: 161 mg/dL (23 Sep 2021 07:19)    I&O's Summary    23 Sep 2021 07:01  -  23 Sep 2021 23:42  --------------------------------------------------------  IN: 900 mL / OUT: 2400 mL / NET: -1500 mL        T(C): 36.5 (09-23-21 @ 20:49), Max: 37.1 (09-23-21 @ 08:45)  HR: 84 (09-23-21 @ 20:49) (71 - 95)  BP: 108/52 (09-23-21 @ 20:49) (108/52 - 142/71)  RR: 18 (09-23-21 @ 20:49) (16 - 18)  SpO2: 100% (09-23-21 @ 20:49) (96% - 100%)    PHYSICAL EXAM:    NECK: Supple, No JVD  CHEST/LUNG: Clear to auscultation bilaterally; No wheezing.  HEART: Regular rate and rhythm; No murmurs, rubs, or gallops  ABDOMEN: Soft, Nontender, Nondistended; Bowel sounds present  EXTREMITIES:   No edema  NEUROLOGY: AAO X 3      LABS:                        10.1   7.05  )-----------( 186      ( 23 Sep 2021 09:42 )             30.9     09-23    131<L>  |  89<L>  |  52<H>  ----------------------------<  187<H>  4.1   |  25  |  9.21<H>    Ca    9.9      23 Sep 2021 09:42  Phos  4.7     09-23  Mg     2.70     09-23    TPro  7.3  /  Alb  3.9  /  TBili  0.2  /  DBili  x   /  AST  20  /  ALT  18  /  AlkPhos  118  09-23            CAPILLARY BLOOD GLUCOSE      POCT Blood Glucose.: 199 mg/dL (23 Sep 2021 21:12)  POCT Blood Glucose.: 216 mg/dL (23 Sep 2021 16:30)  POCT Blood Glucose.: 103 mg/dL (23 Sep 2021 12:13)  POCT Blood Glucose.: 161 mg/dL (23 Sep 2021 07:19)        RADIOLOGY & ADDITIONAL TESTS:    Imaging Personally Reviewed:    Consultant(s) Notes Reviewed:      Care Discussed with Consultants/Other Providers:    Mike Sanchez MD, CMD, FACP    [address] [place] [address]  Office Tel: [phone]  Cell: [phone]

## 2021-09-24 LAB
ALBUMIN SERPL ELPH-MCNC: 3.7 G/DL — SIGNIFICANT CHANGE UP (ref 3.3–5)
ALP SERPL-CCNC: 114 U/L — SIGNIFICANT CHANGE UP (ref 40–120)
ALT FLD-CCNC: 20 U/L — SIGNIFICANT CHANGE UP (ref 4–33)
ANION GAP SERPL CALC-SCNC: 15 MMOL/L — HIGH (ref 7–14)
AST SERPL-CCNC: 20 U/L — SIGNIFICANT CHANGE UP (ref 4–32)
BASOPHILS # BLD AUTO: 0.04 K/UL — SIGNIFICANT CHANGE UP (ref 0–0.2)
BASOPHILS NFR BLD AUTO: 0.8 % — SIGNIFICANT CHANGE UP (ref 0–2)
BILIRUB SERPL-MCNC: 0.2 MG/DL — SIGNIFICANT CHANGE UP (ref 0.2–1.2)
BUN SERPL-MCNC: 26 MG/DL — HIGH (ref 7–23)
CALCIUM SERPL-MCNC: 9.6 MG/DL — SIGNIFICANT CHANGE UP (ref 8.4–10.5)
CHLORIDE SERPL-SCNC: 94 MMOL/L — LOW (ref 98–107)
CO2 SERPL-SCNC: 24 MMOL/L — SIGNIFICANT CHANGE UP (ref 22–31)
CREAT SERPL-MCNC: 6.55 MG/DL — HIGH (ref 0.5–1.3)
EOSINOPHIL # BLD AUTO: 0.09 K/UL — SIGNIFICANT CHANGE UP (ref 0–0.5)
EOSINOPHIL NFR BLD AUTO: 1.7 % — SIGNIFICANT CHANGE UP (ref 0–6)
GLUCOSE BLDC GLUCOMTR-MCNC: 108 MG/DL — HIGH (ref 70–99)
GLUCOSE BLDC GLUCOMTR-MCNC: 137 MG/DL — HIGH (ref 70–99)
GLUCOSE BLDC GLUCOMTR-MCNC: 159 MG/DL — HIGH (ref 70–99)
GLUCOSE BLDC GLUCOMTR-MCNC: 167 MG/DL — HIGH (ref 70–99)
GLUCOSE BLDC GLUCOMTR-MCNC: 178 MG/DL — HIGH (ref 70–99)
GLUCOSE BLDC GLUCOMTR-MCNC: 96 MG/DL — SIGNIFICANT CHANGE UP (ref 70–99)
GLUCOSE BLDC GLUCOMTR-MCNC: 98 MG/DL — SIGNIFICANT CHANGE UP (ref 70–99)
GLUCOSE SERPL-MCNC: 110 MG/DL — HIGH (ref 70–99)
HCT VFR BLD CALC: 33.9 % — LOW (ref 34.5–45)
HGB BLD-MCNC: 10.6 G/DL — LOW (ref 11.5–15.5)
IANC: 2.76 K/UL — SIGNIFICANT CHANGE UP (ref 1.5–8.5)
IMM GRANULOCYTES NFR BLD AUTO: 2.3 % — HIGH (ref 0–1.5)
LYMPHOCYTES # BLD AUTO: 1.52 K/UL — SIGNIFICANT CHANGE UP (ref 1–3.3)
LYMPHOCYTES # BLD AUTO: 28.7 % — SIGNIFICANT CHANGE UP (ref 13–44)
MAGNESIUM SERPL-MCNC: 2.4 MG/DL — SIGNIFICANT CHANGE UP (ref 1.6–2.6)
MCHC RBC-ENTMCNC: 29.8 PG — SIGNIFICANT CHANGE UP (ref 27–34)
MCHC RBC-ENTMCNC: 31.3 GM/DL — LOW (ref 32–36)
MCV RBC AUTO: 95.2 FL — SIGNIFICANT CHANGE UP (ref 80–100)
MONOCYTES # BLD AUTO: 0.77 K/UL — SIGNIFICANT CHANGE UP (ref 0–0.9)
MONOCYTES NFR BLD AUTO: 14.5 % — HIGH (ref 2–14)
NEUTROPHILS # BLD AUTO: 2.76 K/UL — SIGNIFICANT CHANGE UP (ref 1.8–7.4)
NEUTROPHILS NFR BLD AUTO: 52 % — SIGNIFICANT CHANGE UP (ref 43–77)
NRBC # BLD: 0 /100 WBCS — SIGNIFICANT CHANGE UP
NRBC # FLD: 0.03 K/UL — HIGH
PHOSPHATE SERPL-MCNC: 4.1 MG/DL — SIGNIFICANT CHANGE UP (ref 2.5–4.5)
PLATELET # BLD AUTO: 204 K/UL — SIGNIFICANT CHANGE UP (ref 150–400)
POTASSIUM SERPL-MCNC: 4.2 MMOL/L — SIGNIFICANT CHANGE UP (ref 3.5–5.3)
POTASSIUM SERPL-SCNC: 4.2 MMOL/L — SIGNIFICANT CHANGE UP (ref 3.5–5.3)
PROT SERPL-MCNC: 7.5 G/DL — SIGNIFICANT CHANGE UP (ref 6–8.3)
RBC # BLD: 3.56 M/UL — LOW (ref 3.8–5.2)
RBC # FLD: 20.2 % — HIGH (ref 10.3–14.5)
SODIUM SERPL-SCNC: 133 MMOL/L — LOW (ref 135–145)
WBC # BLD: 5.3 K/UL — SIGNIFICANT CHANGE UP (ref 3.8–10.5)
WBC # FLD AUTO: 5.3 K/UL — SIGNIFICANT CHANGE UP (ref 3.8–10.5)

## 2021-09-24 RX ORDER — KETOROLAC TROMETHAMINE 30 MG/ML
30 SYRINGE (ML) INJECTION ONCE
Refills: 0 | Status: DISCONTINUED | OUTPATIENT
Start: 2021-09-24 | End: 2021-09-24

## 2021-09-24 RX ORDER — KETOROLAC TROMETHAMINE 30 MG/ML
30 SYRINGE (ML) INJECTION EVERY 6 HOURS
Refills: 0 | Status: DISCONTINUED | OUTPATIENT
Start: 2021-09-24 | End: 2021-09-29

## 2021-09-24 RX ADMIN — HEPARIN SODIUM 5000 UNIT(S): 5000 INJECTION INTRAVENOUS; SUBCUTANEOUS at 05:32

## 2021-09-24 RX ADMIN — OXYCODONE HYDROCHLORIDE 5 MILLIGRAM(S): 5 TABLET ORAL at 23:25

## 2021-09-24 RX ADMIN — Medication 30 MILLIGRAM(S): at 17:27

## 2021-09-24 RX ADMIN — Medication 30 MILLIGRAM(S): at 18:00

## 2021-09-24 RX ADMIN — SEVELAMER CARBONATE 1600 MILLIGRAM(S): 2400 POWDER, FOR SUSPENSION ORAL at 17:27

## 2021-09-24 RX ADMIN — Medication 100 MILLIGRAM(S): at 12:10

## 2021-09-24 RX ADMIN — INSULIN GLARGINE 15 UNIT(S): 100 INJECTION, SOLUTION SUBCUTANEOUS at 23:03

## 2021-09-24 RX ADMIN — ATORVASTATIN CALCIUM 20 MILLIGRAM(S): 80 TABLET, FILM COATED ORAL at 22:35

## 2021-09-24 RX ADMIN — Medication 1 DROP(S): at 13:26

## 2021-09-24 RX ADMIN — Medication 1: at 12:08

## 2021-09-24 RX ADMIN — CHLORHEXIDINE GLUCONATE 1 APPLICATION(S): 213 SOLUTION TOPICAL at 12:08

## 2021-09-24 RX ADMIN — OXYCODONE HYDROCHLORIDE 5 MILLIGRAM(S): 5 TABLET ORAL at 08:57

## 2021-09-24 RX ADMIN — SEVELAMER CARBONATE 1600 MILLIGRAM(S): 2400 POWDER, FOR SUSPENSION ORAL at 07:47

## 2021-09-24 RX ADMIN — Medication 1 TABLET(S): at 12:09

## 2021-09-24 RX ADMIN — Medication 1 DROP(S): at 22:36

## 2021-09-24 RX ADMIN — SEVELAMER CARBONATE 1600 MILLIGRAM(S): 2400 POWDER, FOR SUSPENSION ORAL at 12:09

## 2021-09-24 RX ADMIN — OXYCODONE HYDROCHLORIDE 5 MILLIGRAM(S): 5 TABLET ORAL at 09:57

## 2021-09-24 RX ADMIN — HEPARIN SODIUM 5000 UNIT(S): 5000 INJECTION INTRAVENOUS; SUBCUTANEOUS at 13:26

## 2021-09-24 RX ADMIN — HEPARIN SODIUM 5000 UNIT(S): 5000 INJECTION INTRAVENOUS; SUBCUTANEOUS at 22:36

## 2021-09-24 RX ADMIN — Medication 1 DROP(S): at 05:33

## 2021-09-24 RX ADMIN — OXYCODONE HYDROCHLORIDE 5 MILLIGRAM(S): 5 TABLET ORAL at 22:35

## 2021-09-24 RX ADMIN — Medication 200 MILLIGRAM(S): at 12:10

## 2021-09-24 RX ADMIN — CYCLOBENZAPRINE HYDROCHLORIDE 10 MILLIGRAM(S): 10 TABLET, FILM COATED ORAL at 13:26

## 2021-09-24 NOTE — CONSULT NOTE ADULT - ASSESSMENT
Assessment: 76 year old female, with past history significant for Anemia, Type-2 DM, ESRD (HD M/W/F), HTN, HLD, Sleep apnea (CPAP), Trigeminal neuralgia (diagnosed in [place] 30yrs ago), Carpal tunnel syndrome and Gout, presented to he ED secondary to right sided neck pain and generalized weakness. Pt with prev diagnosis of trigeminal neuralgia currently complains of sharp burning pain radiating superiorly and associated with nausea, vomiting and flashing lights. Pt's exams significant for tenderness to palpation in occiput and CTH w/ no evidence of hemorrhage  or stroke.     Impression: Neck pain of unknown etiology. Possible etiologies include cervicogenic headache vs migraine vs less likely fibromyalgia     Plan:  [] Anesthetic blockade  [] MRI not indicated   [] Continue PRN pain medications  [] PT/OT  [] F/u with neurologist outpatient  [] Rest of care as of primary team    Patient seen by resident. recommendations will be finalized once reviewed and signed by attending. Assessment: 76 year old female, with past history significant for Anemia, Type-2 DM, ESRD (HD M/W/F), HTN, HLD, Sleep apnea (CPAP), Trigeminal neuralgia (diagnosed in [place] 30yrs ago), Carpal tunnel syndrome and Gout, presented to he ED secondary to right sided neck pain and generalized weakness. Pt with prev diagnosis of trigeminal neuralgia currently complains of sharp burning pain radiating superiorly and associated with nausea, vomiting and flashing lights. Pt's exams significant for tenderness to palpation in occiput and CTH w/ no evidence of hemorrhage  or stroke.     Impression: Neck pain of unknown etiology. Possible etiologies include cervicogenic headache vs occipital neuralgia vs migraine vs less likely fibromyalgia     Plan:  [] Anesthetic blockade  [] MRI not indicated   [] Continue PRN pain medications  [] PT/OT  [] F/u with neurologist outpatient  [] Rest of care as of primary team    Patient seen by resident. recommendations will be finalized once reviewed and signed by attending. Assessment: 76 year old female, with past history significant for Anemia, Type-2 DM, ESRD (HD M/W/F), HTN, HLD, Sleep apnea (CPAP), Trigeminal neuralgia (diagnosed in [place] 30yrs ago), Carpal tunnel syndrome and Gout, presented to he ED secondary to right sided neck pain and generalized weakness. Pt with prev diagnosis of trigeminal neuralgia currently complains of sharp burning pain radiating superiorly and associated with nausea, vomiting and flashing lights. Pt's exams significant for tenderness to palpation in occiput and CTH w/ no evidence of hemorrhage  or stroke.     Impression: Neck pain of unknown etiology. Possible etiologies include cervicogenic headache vs occipital neuralgia vs migraine vs less likely fibromyalgia     Plan:  [] Anesthetic blockade  [] Toradol 30mg IV q6 PRN  [] MRI not indicated   [] Continue PRN pain medications  [] PT/OT  [] F/u with neurologist outpatient  [] Rest of care as of primary team    Patient seen by resident. recommendations will be finalized once reviewed and signed by attending.

## 2021-09-24 NOTE — CONSULT NOTE ADULT - SUBJECTIVE AND OBJECTIVE BOX
Neurology Consultation    HPI:  76 year old female, with past history significant for Anemia, Type-2 DM, ESRD (HD M/W/F), HTN, HLD, Sleep apnea (CPAP), Trigeminal neuralgia (diagnosed in [place] 30yrs ago), Carpal tunnel syndrome and Gout, presented to he ED secondary to right sided neck pain and generalized weakness. Patient reports receiving the diagnosis of TGN 30 year ago in [place] and taking a medication that helped but is unable to recall the name. The pain at that time was said to be present on the sides of her face bilaterally. Patient currently relates persistent pain in the right occiput since 2021. The pain is described as being stabbed with a burning knife, present through out the day and rated a 7/10 at b/l abd 10/10 when exacerbated. The pain radiates frontally towards her temporal region and is associated with pain right sided jaw pain on mastication and a fullness / redness in both eyes. For the past 4 days the pain has been associated with nausea, vomiting, flashing lights and generalized weakness. The pain is only better when she lays down in bed while "looking at the ceiling" or after 5mg percocet per her report. Patient has b/l torn rotator cuffs and carpal tunnel but reports no new onset numbness, tingling or focal weakness out of baseline. Pt reports undergoing an xray and being told that the pain is 2/2 muscle spasm. Per her report the pain has been diagnosed as migraine and cluster headache. Seen by PMD, and Neurologist and underwent "advanced" physical therapy - without relief. Not adequately palliated with analgesics. Generalized weakness appears to be related to the hemodialysis therapy, per patient's reports.   No report of fever, chills, dizziness, chest pain, shortness of breath.     Vital signs upon ED presentation as follows: BP = 129/72, HR = 98, RR = 19, T = 36.6 C (97.9 F), O2 Sat = 100% on RA.  Diagnosed with Generalized Weakness, Neck Pain, and Nausea.  Prescribed Tylenol 650 mg and Valium 5 mg PO in the ED. (22 Sep 2021 21:27)      REVIEW OF SYSTEMS:  10-system ROS was performed and is negative except for those items noted above and/or in the HPI.    PAST MEDICAL & SURGICAL HISTORY:  HTN (hypertension)    Hypercholesterolemia    Diabetes mellitus    Bronchial asthma  hx of    Vertigo    Trigeminal neuralgia  hx of    ESRD (end stage renal disease) on dialysis  started HD  ~ M/W/F    Pedal edema    Sleep apnea  on CPAP    Dyslipidemia    Anemia    Gout    Carpal tunnel syndrome  left    Chronic right shoulder pain    Disorder of ligament of wrist, left    Mild diastolic dysfunction  ~ Stage I    RSV bronchitis    S/P rotator cuff surgery  bilateral &#x27;s    S/P       S/P hysterectomy      S/P total knee replacement  bilateral, left , right     S/P cataract extraction  bilateral, 2005    History of carpal tunnel surgery of left wrist      S/P arteriovenous (AV) fistula creation  left upper forearm cephalic vein brachial artery AV fistula creation on 2018, left arm basilic vein transposition on 2019.      History of arthroscopy of left shoulder  x 3    History of arthroscopy of right shoulder  x 3    History of vascular access device  , removed 2020      FAMILY HISTORY:  No pertinent family history in first degree relatives      SOCIAL HISTORY: no smoking, alcohol, drug use hx    MEDICATIONS (HOME):  Home Medications:  allopurinol 100 mg oral tablet: 1 tab(s) orally once a day (2021 01:03)  Artificial Tears ophthalmic solution: 1 drop(s) to each affected eye 2 times a day (2021 01:03)  atorvastatin 20 mg oral tablet: 1 tab(s) orally once a day (23 Sep 2021 01:12)  Lantus Solostar Pen 100 units/mL subcutaneous solution: 26 unit(s) subcutaneous once a day (at bedtime) (2021 01:03)  Amina-Neeraj oral tablet: 1 tab(s) orally once a day (23 Sep 2021 01:12)  sevelamer hydrochloride 800 mg oral tablet: 2 tab(s) orally 3 times a day (19 Aug 2020 07:43)  TEGretol 200 mg oral tablet: 1 tab(s) orally 2 times a day, As Needed (23 Sep 2021 01:12)    MEDICATIONS  (STANDING):  allopurinol 100 milliGRAM(s) Oral daily  artificial tears (preservative free) Ophthalmic Solution 1 Drop(s) Both EYES three times a day  atorvastatin 20 milliGRAM(s) Oral at bedtime  chlorhexidine 2% Cloths 1 Application(s) Topical daily  dextrose 40% Gel 15 Gram(s) Oral once  dextrose 5%. 1000 milliLiter(s) (50 mL/Hr) IV Continuous <Continuous>  dextrose 5%. 1000 milliLiter(s) (100 mL/Hr) IV Continuous <Continuous>  dextrose 50% Injectable 25 Gram(s) IV Push once  dextrose 50% Injectable 12.5 Gram(s) IV Push once  dextrose 50% Injectable 25 Gram(s) IV Push once  glucagon  Injectable 1 milliGRAM(s) IntraMuscular once  heparin   Injectable 5000 Unit(s) SubCutaneous every 8 hours  influenza  Vaccine (HIGH DOSE) 0.7 milliLiter(s) IntraMuscular once  insulin glargine Injectable (LANTUS) 15 Unit(s) SubCutaneous at bedtime  insulin lispro (ADMELOG) corrective regimen sliding scale   SubCutaneous three times a day before meals  insulin lispro (ADMELOG) corrective regimen sliding scale   SubCutaneous at bedtime  Nephro-neeraj 1 Tablet(s) Oral daily  sevelamer carbonate 1600 milliGRAM(s) Oral three times a day with meals    MEDICATIONS  (PRN):  acetaminophen   Tablet .. 650 milliGRAM(s) Oral every 6 hours PRN Temp greater or equal to 38C (100.4F), Mild Pain (1 - 3), Moderate Pain (4 - 6)  carBAMazepine 200 milliGRAM(s) Oral two times a day PRN Pain related to Trigeminal Neuralgia  cyclobenzaprine 10 milliGRAM(s) Oral three times a day PRN Muscle Spasm  ondansetron Injectable 4 milliGRAM(s) IV Push every 8 hours PRN Nausea and/or Vomiting  oxyCODONE    IR 5 milliGRAM(s) Oral every 8 hours PRN Severe Pain (7 - 10)    ALLERGIES/INTOLERANCES:  Allergies  IV Contrast (Anaphylaxis)  shellfish (Hives; Rash)  shrimp (Hives)  smoke; coughing (Other)    Intolerances    VITALS & EXAMINATION:  Vital Signs Last 24 Hrs  T(C): 36.6 (24 Sep 2021 09:18), Max: 36.8 (24 Sep 2021 05:43)  T(F): 97.9 (24 Sep 2021 09:18), Max: 98.2 (24 Sep 2021 05:43)  HR: 80 (24 Sep 2021 11:14) (79 - 95)  BP: 124/69 (24 Sep 2021 09:18) (108/52 - 140/66)  BP(mean): --  RR: 17 (24 Sep 2021 09:18) (16 - 18)  SpO2: 99% (24 Sep 2021 11:14) (96% - 100%)    General:  Constitutional: Female, appears stated age, in no apparent distress including pain  Head: Normocephalic & atraumatic; Eyes: clear sclera; Neck: supple  Extremities: No cyanosis, clubbing, or edema; Skin: No rashes, bruising, or discoloration.  Resp: breathing comfortably, normal rate    Neurological (>12):  MS: Awake, alert, oriented to person, place, situation, time. Normal affect. Follows all commands. Cooperative.   Language: Speech is clear, fluent, intact with normal tone/rate/ volume and good repetition,  comprehension, registration of words. No perseverance.     CNs: PERRL (R = 2mm, L = 2mm). VFF. EOMI no nystagmus. V1-3 intact to LT, well developed masseter muscles b/l. No facial asymmetry b/l, full eye closure strength b/l. Hearing grossly decreased on the left to LT.  Gag reflex deferred.     Motor: Normal muscle bulk & tone. Exam limited by orthopedic problems. No pronator drift.              Deltoid	Biceps	Triceps	   R	4	4+	4	5		 	  L	5	5	5	5			  	H-Flex	K-Ext	D-Flex	P-Flex  R	5	5	5	5			 	   L	5	5	5	5		     Sensation: Grossly intact to LT, Cortical: Extinction on DSS (neglect): none  Reflexes:              Biceps(C5)       BR(C6)     Triceps(C7)               Patellar(L4)        Plantar Resp  R	0	          0	             -		        0		    	-   L	0	          0             -		        0		 	-      Gait: Deferred  Other: Tender to palpation in right temporal region. Tender to palpation of right occiput region. no tenderness to palpation of including the sternum.     LABORATORY:  CBC                       10.6   5.30  )-----------( 204      ( 24 Sep 2021 06:58 )             33.9     Chem -    133<L>  |  94<L>  |  26<H>  ----------------------------<  110<H>  4.2   |  24  |  6.55<H>    Ca    9.6      24 Sep 2021 06:58  Phos  4.1       Mg     2.40     09-24    TPro  7.5  /  Alb  3.7  /  TBili  0.2  /  DBili  x   /  AST  20  /  ALT  20  /  AlkPhos  114  09-24    LFTs LIVER FUNCTIONS - ( 24 Sep 2021 06:58 )  Alb: 3.7 g/dL / Pro: 7.5 g/dL / ALK PHOS: 114 U/L / ALT: 20 U/L / AST: 20 U/L / GGT: x           Coagulopathy   Lipid Panel   A1c   Cardiac enzymes     U/A   CSF  Other    STUDIES & IMAGING: (EEG, CT, MR, U/S, TTE/VAISHALI):    COMPARISON: CT head of 2014.  IMPRESSION:    No acute intracranial hemorrhage, mass effect, or evidence of acute vascular territorial infarction. No calvarial fracture.

## 2021-09-24 NOTE — PROGRESS NOTE ADULT - SUBJECTIVE AND OBJECTIVE BOX
Patient is a 76y old  Female who presents with a chief complaint of Generalized weakness, Neck pain, and Nausea (24 Sep 2021 14:44)      SUBJECTIVE / OVERNIGHT EVENTS:    Events noted.  CONSTITUTIONAL: No fever,  or fatigue  RESPIRATORY: No cough, wheezing,  No shortness of breath  CARDIOVASCULAR: No chest pain, palpitations, dizziness, or leg swelling  GASTROINTESTINAL: No abdominal or epigastric pain. No nausea, vomiting.  NEUROLOGICAL: No dizziness    MEDICATIONS  (STANDING):  allopurinol 100 milliGRAM(s) Oral daily  artificial tears (preservative free) Ophthalmic Solution 1 Drop(s) Both EYES three times a day  atorvastatin 20 milliGRAM(s) Oral at bedtime  chlorhexidine 2% Cloths 1 Application(s) Topical daily  dextrose 40% Gel 15 Gram(s) Oral once  dextrose 5%. 1000 milliLiter(s) (50 mL/Hr) IV Continuous <Continuous>  dextrose 5%. 1000 milliLiter(s) (100 mL/Hr) IV Continuous <Continuous>  dextrose 50% Injectable 25 Gram(s) IV Push once  dextrose 50% Injectable 12.5 Gram(s) IV Push once  dextrose 50% Injectable 25 Gram(s) IV Push once  glucagon  Injectable 1 milliGRAM(s) IntraMuscular once  heparin   Injectable 5000 Unit(s) SubCutaneous every 8 hours  influenza  Vaccine (HIGH DOSE) 0.7 milliLiter(s) IntraMuscular once  insulin glargine Injectable (LANTUS) 15 Unit(s) SubCutaneous at bedtime  insulin lispro (ADMELOG) corrective regimen sliding scale   SubCutaneous three times a day before meals  insulin lispro (ADMELOG) corrective regimen sliding scale   SubCutaneous at bedtime  Nephro-neeraj 1 Tablet(s) Oral daily  sevelamer carbonate 1600 milliGRAM(s) Oral three times a day with meals    MEDICATIONS  (PRN):  acetaminophen   Tablet .. 650 milliGRAM(s) Oral every 6 hours PRN Temp greater or equal to 38C (100.4F), Mild Pain (1 - 3), Moderate Pain (4 - 6)  carBAMazepine 200 milliGRAM(s) Oral two times a day PRN Pain related to Trigeminal Neuralgia  cyclobenzaprine 10 milliGRAM(s) Oral three times a day PRN Muscle Spasm  ketorolac   Injectable 30 milliGRAM(s) IV Push every 6 hours PRN Severe Pain (7 - 10)  ondansetron Injectable 4 milliGRAM(s) IV Push every 8 hours PRN Nausea and/or Vomiting  oxyCODONE    IR 5 milliGRAM(s) Oral every 8 hours PRN Severe Pain (7 - 10)        CAPILLARY BLOOD GLUCOSE      POCT Blood Glucose.: 167 mg/dL (24 Sep 2021 22:53)  POCT Blood Glucose.: 96 mg/dL (24 Sep 2021 21:35)  POCT Blood Glucose.: 98 mg/dL (24 Sep 2021 21:04)  POCT Blood Glucose.: 137 mg/dL (24 Sep 2021 16:34)  POCT Blood Glucose.: 159 mg/dL (24 Sep 2021 15:02)  POCT Blood Glucose.: 178 mg/dL (24 Sep 2021 11:28)  POCT Blood Glucose.: 108 mg/dL (24 Sep 2021 07:37)    I&O's Summary    23 Sep 2021 07:01  -  24 Sep 2021 07:00  --------------------------------------------------------  IN: 900 mL / OUT: 2400 mL / NET: -1500 mL    24 Sep 2021 07:01  -  25 Sep 2021 00:03  --------------------------------------------------------  IN: 1320 mL / OUT: 2100 mL / NET: -780 mL        T(C): 36.6 (09-24-21 @ 22:30), Max: 36.8 (09-24-21 @ 05:43)  HR: 88 (09-24-21 @ 22:30) (80 - 89)  BP: 112/67 (09-24-21 @ 22:30) (112/67 - 124/69)  RR: 17 (09-24-21 @ 22:30) (16 - 17)  SpO2: 97% (09-24-21 @ 23:05) (97% - 100%)    PHYSICAL EXAM:  GENERAL: NAD  NECK: Supple, No JVD  CHEST/LUNG: Clear to auscultation bilaterally; No wheezing.  HEART: Regular rate and rhythm; No murmurs, rubs, or gallops  ABDOMEN: Soft, Nontender, Nondistended; Bowel sounds present  EXTREMITIES:   No edema  NEUROLOGY: AAO X 3      LABS:                        10.6   5.30  )-----------( 204      ( 24 Sep 2021 06:58 )             33.9     09-24    133<L>  |  94<L>  |  26<H>  ----------------------------<  110<H>  4.2   |  24  |  6.55<H>    Ca    9.6      24 Sep 2021 06:58  Phos  4.1     09-24  Mg     2.40     09-24    TPro  7.5  /  Alb  3.7  /  TBili  0.2  /  DBili  x   /  AST  20  /  ALT  20  /  AlkPhos  114  09-24            CAPILLARY BLOOD GLUCOSE      POCT Blood Glucose.: 167 mg/dL (24 Sep 2021 22:53)  POCT Blood Glucose.: 96 mg/dL (24 Sep 2021 21:35)  POCT Blood Glucose.: 98 mg/dL (24 Sep 2021 21:04)  POCT Blood Glucose.: 137 mg/dL (24 Sep 2021 16:34)  POCT Blood Glucose.: 159 mg/dL (24 Sep 2021 15:02)  POCT Blood Glucose.: 178 mg/dL (24 Sep 2021 11:28)  POCT Blood Glucose.: 108 mg/dL (24 Sep 2021 07:37)        RADIOLOGY & ADDITIONAL TESTS:    Imaging Personally Reviewed:    Consultant(s) Notes Reviewed:      Care Discussed with Consultants/Other Providers:    Mike Sanchez MD, CMD, FACP    [address] [place] [address]  Office Tel: [phone]  Cell: [phone]

## 2021-09-24 NOTE — CONSULT NOTE ADULT - ATTENDING COMMENTS
76 year old female, with past history significant for Anemia, Type-2 DM, ESRD (HD M/W/F), HTN, HLD, Sleep apnea (CPAP), Trigeminal neuralgia (diagnosed in [place] 30yrs ago), p/w sharp lancinating pain and tenderness in the left side occipital region and radiating to the front of the head. Pt on Tegretol 200mg BID for many years.   There is tenderness on the left side tracking up over the occipital groove.   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   Pt given occipital nerve block with 1% lidocaine and 30mg/ml toradol. One ml toradol combines with 4ml lidocaine. and injected in four locations along the greater and lesser occipital nerve.   She was also given lidocaine alone trigger point injections two in the left trapezius and one on the right. Pt tolerated well. No complications.   Pt can follow up with me as outpatient [phone]  Check carbamazepine level.  Increase dose to 400mg BID if level not elevated. 76 year old female, with past history significant for Anemia, Type-2 DM, ESRD (HD M/W/F), HTN, HLD, Sleep apnea (CPAP), Trigeminal neuralgia (diagnosed in [place] 30yrs ago), p/w sharp lancinating pain and tenderness in the right occipital region and radiating to the front of the head. Pt on Tegretol 200mg BID for many years.   There is tenderness on the right side tracking up over the occipital groove.   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   Pt given occipital nerve block with 1% lidocaine and 30mg/ml toradol. One ml toradol combines with 4ml lidocaine. and injected in four locations along the greater and lesser occipital nerve on the right side.   She was also given lidocaine alone trigger point injections two in the left trapezius and one on the right. Pt tolerated well. No complications.   Pt can follow up with me as outpatient [phone]  Check carbamazepine level.  Increase dose to 400mg BID if level not elevated.

## 2021-09-24 NOTE — PROGRESS NOTE ADULT - ASSESSMENT
76 year old female, with past history significant for Anemia, Type-2 DM, ESRD (HD M/W/F), HTN, HLD, Sleep apnea (CPAP), Trigeminal neuralgia, Carpal tunnel syndrome and Gout, presented to he ED secondary to neck pain and generalized weakness.  Vital signs upon ED presentation as follows: BP = 129/72, HR = 98, RR = 19, T = 36.6 C (97.9 F), O2 Sat = 100% on RA.  Diagnosed with Generalized Weakness, Neck Pain, and Nausea.  Admitted for further evaluation/management.

## 2021-09-24 NOTE — PROGRESS NOTE ADULT - SUBJECTIVE AND OBJECTIVE BOX
[facility] - S Janice / Nathan S /D Levy/ S Claudia/ S Rosita/ Stephon Colbert / MALCOLM Koou/ O Anita  service -[phone], office [phone]  ---------------------------------------------------------------------------------------------------------------    Patient seen and examined bedside    Subjective and Objective: No overnight events denied cp/ sob. No new complaints today. neck pain betetr w/percocet but flares up    Allergies: IV Contrast (Anaphylaxis)  shellfish (Hives; Rash)  shrimp (Hives)  smoke; coughing (Other)      Hospital Medications:   MEDICATIONS  (STANDING):  allopurinol 100 milliGRAM(s) Oral daily  artificial tears (preservative free) Ophthalmic Solution 1 Drop(s) Both EYES three times a day  atorvastatin 20 milliGRAM(s) Oral at bedtime  chlorhexidine 2% Cloths 1 Application(s) Topical daily  dextrose 40% Gel 15 Gram(s) Oral once  dextrose 5%. 1000 milliLiter(s) (50 mL/Hr) IV Continuous <Continuous>  dextrose 5%. 1000 milliLiter(s) (100 mL/Hr) IV Continuous <Continuous>  dextrose 50% Injectable 25 Gram(s) IV Push once  dextrose 50% Injectable 12.5 Gram(s) IV Push once  dextrose 50% Injectable 25 Gram(s) IV Push once  glucagon  Injectable 1 milliGRAM(s) IntraMuscular once  heparin   Injectable 5000 Unit(s) SubCutaneous every 8 hours  influenza  Vaccine (HIGH DOSE) 0.7 milliLiter(s) IntraMuscular once  insulin glargine Injectable (LANTUS) 15 Unit(s) SubCutaneous at bedtime  insulin lispro (ADMELOG) corrective regimen sliding scale   SubCutaneous three times a day before meals  insulin lispro (ADMELOG) corrective regimen sliding scale   SubCutaneous at bedtime  Nephro-neeraj 1 Tablet(s) Oral daily  sevelamer carbonate 1600 milliGRAM(s) Oral three times a day with meals      VITALS:  T(F): 97.9 (09-24-21 @ 09:18), Max: 98.2 (09-24-21 @ 05:43)  HR: 80 (09-24-21 @ 11:14)  BP: 124/69 (09-24-21 @ 09:18)  RR: 17 (09-24-21 @ 09:18)  SpO2: 99% (09-24-21 @ 11:14)  Wt(kg): --    09-23 @ 07:01  -  09-24 @ 07:00  --------------------------------------------------------  IN: 900 mL / OUT: 2400 mL / NET: -1500 mL      Weight (kg): 78.7 (09-24 @ 08:57)    PHYSICAL EXAM:  Constitutional: NAD  HEENT: anicteric sclera  Neck: No JVD  Respiratory: CTAB, no wheezes, rales or rhonchi  Cardiovascular: S1, S2, RRR  Gastrointestinal: BS+, soft, NT/ND  Extremities:  No peripheral edema  Neurological: A/O x 3, no focal deficits  Psychiatric: Normal mood, normal affect  : No cottrell.   Vascular Access: avf+thrill    LABS:  09-24    133<L>  |  94<L>  |  26<H>  ----------------------------<  110<H>  4.2   |  24  |  6.55<H>    Ca    9.6      24 Sep 2021 06:58  Phos  4.1     09-24  Mg     2.40     09-24    TPro  7.5  /  Alb  3.7  /  TBili  0.2  /  DBili      /  AST  20  /  ALT  20  /  AlkPhos  114  09-24    Creatinine Trend: 6.55 <--, 9.21 <--, 7.99 <--, 5.58 <--                        10.6   5.30  )-----------( 204      ( 24 Sep 2021 06:58 )             33.9     Urine Studies:        RADIOLOGY & ADDITIONAL STUDIES:

## 2021-09-24 NOTE — PROGRESS NOTE ADULT - ASSESSMENT
73F with h/o ESRD on HD via tunneled cath awaiting PD catheter placement ( HD is M/W/F), DM, HTN who presents with c/o dyspnea with recent RSV infection. RENAL following for ESRD Mx.    poc d/w pt  labs, chart reviewed  pl call for q's  Nephrology   cell [phone]  office [phone]  [facility] [phone]

## 2021-09-25 LAB
ANION GAP SERPL CALC-SCNC: 15 MMOL/L — HIGH (ref 7–14)
BUN SERPL-MCNC: 16 MG/DL — SIGNIFICANT CHANGE UP (ref 7–23)
CALCIUM SERPL-MCNC: 10.3 MG/DL — SIGNIFICANT CHANGE UP (ref 8.4–10.5)
CHLORIDE SERPL-SCNC: 91 MMOL/L — LOW (ref 98–107)
CO2 SERPL-SCNC: 26 MMOL/L — SIGNIFICANT CHANGE UP (ref 22–31)
CREAT SERPL-MCNC: 4.78 MG/DL — HIGH (ref 0.5–1.3)
GLUCOSE BLDC GLUCOMTR-MCNC: 127 MG/DL — HIGH (ref 70–99)
GLUCOSE BLDC GLUCOMTR-MCNC: 147 MG/DL — HIGH (ref 70–99)
GLUCOSE BLDC GLUCOMTR-MCNC: 156 MG/DL — HIGH (ref 70–99)
GLUCOSE BLDC GLUCOMTR-MCNC: 163 MG/DL — HIGH (ref 70–99)
GLUCOSE BLDC GLUCOMTR-MCNC: 184 MG/DL — HIGH (ref 70–99)
GLUCOSE SERPL-MCNC: 134 MG/DL — HIGH (ref 70–99)
HCT VFR BLD CALC: 35.4 % — SIGNIFICANT CHANGE UP (ref 34.5–45)
HGB BLD-MCNC: 11 G/DL — LOW (ref 11.5–15.5)
MAGNESIUM SERPL-MCNC: 2.3 MG/DL — SIGNIFICANT CHANGE UP (ref 1.6–2.6)
MCHC RBC-ENTMCNC: 29.3 PG — SIGNIFICANT CHANGE UP (ref 27–34)
MCHC RBC-ENTMCNC: 31.1 GM/DL — LOW (ref 32–36)
MCV RBC AUTO: 94.1 FL — SIGNIFICANT CHANGE UP (ref 80–100)
NRBC # BLD: 0 /100 WBCS — SIGNIFICANT CHANGE UP
NRBC # FLD: 0.02 K/UL — HIGH
PHOSPHATE SERPL-MCNC: 4 MG/DL — SIGNIFICANT CHANGE UP (ref 2.5–4.5)
PLATELET # BLD AUTO: 240 K/UL — SIGNIFICANT CHANGE UP (ref 150–400)
POTASSIUM SERPL-MCNC: 4.1 MMOL/L — SIGNIFICANT CHANGE UP (ref 3.5–5.3)
POTASSIUM SERPL-SCNC: 4.1 MMOL/L — SIGNIFICANT CHANGE UP (ref 3.5–5.3)
RBC # BLD: 3.76 M/UL — LOW (ref 3.8–5.2)
RBC # FLD: 20.5 % — HIGH (ref 10.3–14.5)
SODIUM SERPL-SCNC: 132 MMOL/L — LOW (ref 135–145)
WBC # BLD: 5.42 K/UL — SIGNIFICANT CHANGE UP (ref 3.8–10.5)
WBC # FLD AUTO: 5.42 K/UL — SIGNIFICANT CHANGE UP (ref 3.8–10.5)

## 2021-09-25 PROCEDURE — 99222 1ST HOSP IP/OBS MODERATE 55: CPT | Mod: GC

## 2021-09-25 RX ORDER — POLYETHYLENE GLYCOL 3350 17 G/17G
17 POWDER, FOR SOLUTION ORAL DAILY
Refills: 0 | Status: DISCONTINUED | OUTPATIENT
Start: 2021-09-25 | End: 2021-10-08

## 2021-09-25 RX ADMIN — ATORVASTATIN CALCIUM 20 MILLIGRAM(S): 80 TABLET, FILM COATED ORAL at 22:04

## 2021-09-25 RX ADMIN — Medication 1 DROP(S): at 14:42

## 2021-09-25 RX ADMIN — HEPARIN SODIUM 5000 UNIT(S): 5000 INJECTION INTRAVENOUS; SUBCUTANEOUS at 06:19

## 2021-09-25 RX ADMIN — Medication 1 DROP(S): at 06:19

## 2021-09-25 RX ADMIN — HEPARIN SODIUM 5000 UNIT(S): 5000 INJECTION INTRAVENOUS; SUBCUTANEOUS at 22:04

## 2021-09-25 RX ADMIN — HEPARIN SODIUM 5000 UNIT(S): 5000 INJECTION INTRAVENOUS; SUBCUTANEOUS at 14:42

## 2021-09-25 RX ADMIN — CHLORHEXIDINE GLUCONATE 1 APPLICATION(S): 213 SOLUTION TOPICAL at 12:16

## 2021-09-25 RX ADMIN — Medication 1: at 07:50

## 2021-09-25 RX ADMIN — Medication 1 TABLET(S): at 12:16

## 2021-09-25 RX ADMIN — INSULIN GLARGINE 15 UNIT(S): 100 INJECTION, SOLUTION SUBCUTANEOUS at 23:51

## 2021-09-25 RX ADMIN — SEVELAMER CARBONATE 1600 MILLIGRAM(S): 2400 POWDER, FOR SUSPENSION ORAL at 12:16

## 2021-09-25 RX ADMIN — Medication 100 MILLIGRAM(S): at 12:16

## 2021-09-25 RX ADMIN — Medication 1: at 12:16

## 2021-09-25 RX ADMIN — SEVELAMER CARBONATE 1600 MILLIGRAM(S): 2400 POWDER, FOR SUSPENSION ORAL at 07:50

## 2021-09-25 RX ADMIN — Medication 1: at 17:05

## 2021-09-25 RX ADMIN — SEVELAMER CARBONATE 1600 MILLIGRAM(S): 2400 POWDER, FOR SUSPENSION ORAL at 17:05

## 2021-09-25 RX ADMIN — POLYETHYLENE GLYCOL 3350 17 GRAM(S): 17 POWDER, FOR SOLUTION ORAL at 12:16

## 2021-09-25 RX ADMIN — Medication 30 MILLIGRAM(S): at 13:08

## 2021-09-25 RX ADMIN — Medication 30 MILLIGRAM(S): at 12:25

## 2021-09-25 NOTE — PROGRESS NOTE ADULT - PROBLEM SELECTOR PLAN 4
- in the setting of obesity  - On CPAP of 4, 30% FiO2 prescribed  - HOB elevation

## 2021-09-25 NOTE — PROGRESS NOTE ADULT - SUBJECTIVE AND OBJECTIVE BOX
[facility] - S Janice / Nathan S /D Levy/ S Claudia/ S Rosita/ Stephon Colbert / MALCOLM Koou/ O Anita  service -[phone], office [phone]  ---------------------------------------------------------------------------------------------------------------    Patient seen and examined bedside    Subjective and Objective: No overnight events, sob resolved. No complaints today. feeling better    Allergies: IV Contrast (Anaphylaxis)  shellfish (Hives; Rash)  shrimp (Hives)  smoke; coughing (Other)      Hospital Medications:   MEDICATIONS  (STANDING):  allopurinol 100 milliGRAM(s) Oral daily  artificial tears (preservative free) Ophthalmic Solution 1 Drop(s) Both EYES three times a day  atorvastatin 20 milliGRAM(s) Oral at bedtime  chlorhexidine 2% Cloths 1 Application(s) Topical daily  dextrose 40% Gel 15 Gram(s) Oral once  dextrose 5%. 1000 milliLiter(s) (50 mL/Hr) IV Continuous <Continuous>  dextrose 5%. 1000 milliLiter(s) (100 mL/Hr) IV Continuous <Continuous>  dextrose 50% Injectable 25 Gram(s) IV Push once  dextrose 50% Injectable 12.5 Gram(s) IV Push once  dextrose 50% Injectable 25 Gram(s) IV Push once  glucagon  Injectable 1 milliGRAM(s) IntraMuscular once  heparin   Injectable 5000 Unit(s) SubCutaneous every 8 hours  influenza  Vaccine (HIGH DOSE) 0.7 milliLiter(s) IntraMuscular once  insulin glargine Injectable (LANTUS) 15 Unit(s) SubCutaneous at bedtime  insulin lispro (ADMELOG) corrective regimen sliding scale   SubCutaneous three times a day before meals  insulin lispro (ADMELOG) corrective regimen sliding scale   SubCutaneous at bedtime  Nephro-neeraj 1 Tablet(s) Oral daily  polyethylene glycol 3350 17 Gram(s) Oral daily  sevelamer carbonate 1600 milliGRAM(s) Oral three times a day with meals      REVIEW OF SYSTEMS:  CONSTITUTIONAL: No weakness, fevers or chills  EYES/ENT: No visual changes;  No vertigo or throat pain   NECK: No pain or stiffness  RESPIRATORY: No cough, wheezing, hemoptysis; No shortness of breath  CARDIOVASCULAR: No chest pain or palpitations.  GASTROINTESTINAL: No abdominal or epigastric pain. No nausea, vomiting, or hematemesis; No diarrhea or constipation. No melena or hematochezia.  GENITOURINARY: No dysuria, frequency, foamy urine, urinary urgency, incontinence or hematuria  NEUROLOGICAL: No numbness or weakness  SKIN: No itching, burning, rashes, or lesions   VASCULAR: No bilateral lower extremity edema.   All other review of systems is negative unless indicated above.    VITALS:  T(F): 98 (09-25-21 @ 12:39), Max: 98 (09-25-21 @ 12:39)  HR: 90 (09-25-21 @ 12:39)  BP: 108/67 (09-25-21 @ 12:39)  RR: 16 (09-25-21 @ 12:39)  SpO2: 100% (09-25-21 @ 12:39)  Wt(kg): --    09-24 @ 07:01  -  09-25 @ 07:00  --------------------------------------------------------  IN: 1320 mL / OUT: 2100 mL / NET: -780 mL          PHYSICAL EXAM:  Constitutional: NAD  HEENT: anicteric sclera, oropharynx clear  Neck: No JVD  Respiratory: CTAB, no wheezes, rales or rhonchi  Cardiovascular: S1, S2, RRR  Gastrointestinal: BS+, soft, NT/ND  Extremities: No cyanosis or clubbing. No peripheral edema  Neurological: A/O x 3, no focal deficits  Psychiatric: Normal mood, normal affect  : No CVA tenderness. No cottrell.   Skin: No rashes  Vascular Access:    LABS:  09-25    132<L>  |  91<L>  |  16  ----------------------------<  134<H>  4.1   |  26  |  4.78<H>    Ca    10.3      25 Sep 2021 07:04  Phos  4.0     09-25  Mg     2.30     09-25    TPro  7.5  /  Alb  3.7  /  TBili  0.2  /  DBili      /  AST  20  /  ALT  20  /  AlkPhos  114  09-24    Creatinine Trend: 4.78 <--, 6.55 <--, 9.21 <--, 7.99 <--, 5.58 <--                        11.0   5.42  )-----------( 240      ( 25 Sep 2021 07:04 )             35.4     Urine Studies:        RADIOLOGY & ADDITIONAL STUDIES:   [facility] - S Janice / Nathan S /D Levy/ S Claudia/ S Rosita/ Stephon Colbert / MALCOLM Koou/ O Anita  service -[phone], office [phone]  ---------------------------------------------------------------------------------------------------------------    Patient seen and examined bedside    Subjective and Objective: No overnight events, denied sob. No new complaints today.     Allergies: IV Contrast (Anaphylaxis)  shellfish (Hives; Rash)  shrimp (Hives)  smoke; coughing (Other)      Hospital Medications:   MEDICATIONS  (STANDING):  allopurinol 100 milliGRAM(s) Oral daily  artificial tears (preservative free) Ophthalmic Solution 1 Drop(s) Both EYES three times a day  atorvastatin 20 milliGRAM(s) Oral at bedtime  chlorhexidine 2% Cloths 1 Application(s) Topical daily  dextrose 40% Gel 15 Gram(s) Oral once  dextrose 5%. 1000 milliLiter(s) (50 mL/Hr) IV Continuous <Continuous>  dextrose 5%. 1000 milliLiter(s) (100 mL/Hr) IV Continuous <Continuous>  dextrose 50% Injectable 25 Gram(s) IV Push once  dextrose 50% Injectable 12.5 Gram(s) IV Push once  dextrose 50% Injectable 25 Gram(s) IV Push once  glucagon  Injectable 1 milliGRAM(s) IntraMuscular once  heparin   Injectable 5000 Unit(s) SubCutaneous every 8 hours  influenza  Vaccine (HIGH DOSE) 0.7 milliLiter(s) IntraMuscular once  insulin glargine Injectable (LANTUS) 15 Unit(s) SubCutaneous at bedtime  insulin lispro (ADMELOG) corrective regimen sliding scale   SubCutaneous three times a day before meals  insulin lispro (ADMELOG) corrective regimen sliding scale   SubCutaneous at bedtime  Nephro-neeraj 1 Tablet(s) Oral daily  polyethylene glycol 3350 17 Gram(s) Oral daily  sevelamer carbonate 1600 milliGRAM(s) Oral three times a day with meals    VITALS:  T(F): 98 (09-25-21 @ 12:39), Max: 98 (09-25-21 @ 12:39)  HR: 90 (09-25-21 @ 12:39)  BP: 108/67 (09-25-21 @ 12:39)  RR: 16 (09-25-21 @ 12:39)  SpO2: 100% (09-25-21 @ 12:39)  Wt(kg): --    09-24 @ 07:01  -  09-25 @ 07:00  --------------------------------------------------------  IN: 1320 mL / OUT: 2100 mL / NET: -780 mL      PHYSICAL EXAM:  Constitutional: NAD  HEENT: anicteric sclera  Neck: No JVD  Respiratory: CTAB, no wheezes, rales or rhonchi  Cardiovascular: S1, S2, RRR  Gastrointestinal: BS+, soft, NT/ND  Extremities:  No peripheral edema  Neurological: A/O x 3, no focal deficits  Psychiatric: Normal mood, normal affect  : No cottrell.   Vascular Access: avf+thrill    LABS:  09-25    132<L>  |  91<L>  |  16  ----------------------------<  134<H>  4.1   |  26  |  4.78<H>    Ca    10.3      25 Sep 2021 07:04  Phos  4.0     09-25  Mg     2.30     09-25    TPro  7.5  /  Alb  3.7  /  TBili  0.2  /  DBili      /  AST  20  /  ALT  20  /  AlkPhos  114  09-24    Creatinine Trend: 4.78 <--, 6.55 <--, 9.21 <--, 7.99 <--, 5.58 <--                        11.0   5.42  )-----------( 240      ( 25 Sep 2021 07:04 )             35.4     Urine Studies:        RADIOLOGY & ADDITIONAL STUDIES:

## 2021-09-25 NOTE — PROGRESS NOTE ADULT - SUBJECTIVE AND OBJECTIVE BOX
Patient is a 76y old  Female who presents with a chief complaint of Generalized weakness, Neck pain, and Nausea (24 Sep 2021 14:44)      SUBJECTIVE / OVERNIGHT EVENTS:    Events noted.  CONSTITUTIONAL: No fever,  or fatigue  RESPIRATORY: No cough, wheezing,  No shortness of breath  CARDIOVASCULAR: No chest pain, palpitations, dizziness, or leg swelling  GASTROINTESTINAL: No abdominal or epigastric pain. No nausea, vomiting.  NEUROLOGICAL: No dizziness    MEDICATIONS  (STANDING):  allopurinol 100 milliGRAM(s) Oral daily  artificial tears (preservative free) Ophthalmic Solution 1 Drop(s) Both EYES three times a day  atorvastatin 20 milliGRAM(s) Oral at bedtime  chlorhexidine 2% Cloths 1 Application(s) Topical daily  dextrose 40% Gel 15 Gram(s) Oral once  dextrose 5%. 1000 milliLiter(s) (50 mL/Hr) IV Continuous <Continuous>  dextrose 5%. 1000 milliLiter(s) (100 mL/Hr) IV Continuous <Continuous>  dextrose 50% Injectable 25 Gram(s) IV Push once  dextrose 50% Injectable 12.5 Gram(s) IV Push once  dextrose 50% Injectable 25 Gram(s) IV Push once  glucagon  Injectable 1 milliGRAM(s) IntraMuscular once  heparin   Injectable 5000 Unit(s) SubCutaneous every 8 hours  influenza  Vaccine (HIGH DOSE) 0.7 milliLiter(s) IntraMuscular once  insulin glargine Injectable (LANTUS) 15 Unit(s) SubCutaneous at bedtime  insulin lispro (ADMELOG) corrective regimen sliding scale   SubCutaneous three times a day before meals  insulin lispro (ADMELOG) corrective regimen sliding scale   SubCutaneous at bedtime  Nephro-neeraj 1 Tablet(s) Oral daily  sevelamer carbonate 1600 milliGRAM(s) Oral three times a day with meals    MEDICATIONS  (PRN):  acetaminophen   Tablet .. 650 milliGRAM(s) Oral every 6 hours PRN Temp greater or equal to 38C (100.4F), Mild Pain (1 - 3), Moderate Pain (4 - 6)  carBAMazepine 200 milliGRAM(s) Oral two times a day PRN Pain related to Trigeminal Neuralgia  cyclobenzaprine 10 milliGRAM(s) Oral three times a day PRN Muscle Spasm  ketorolac   Injectable 30 milliGRAM(s) IV Push every 6 hours PRN Severe Pain (7 - 10)  ondansetron Injectable 4 milliGRAM(s) IV Push every 8 hours PRN Nausea and/or Vomiting  oxyCODONE    IR 5 milliGRAM(s) Oral every 8 hours PRN Severe Pain (7 - 10)        CAPILLARY BLOOD GLUCOSE      POCT Blood Glucose.: 167 mg/dL (24 Sep 2021 22:53)  POCT Blood Glucose.: 96 mg/dL (24 Sep 2021 21:35)  POCT Blood Glucose.: 98 mg/dL (24 Sep 2021 21:04)  POCT Blood Glucose.: 137 mg/dL (24 Sep 2021 16:34)  POCT Blood Glucose.: 159 mg/dL (24 Sep 2021 15:02)  POCT Blood Glucose.: 178 mg/dL (24 Sep 2021 11:28)  POCT Blood Glucose.: 108 mg/dL (24 Sep 2021 07:37)    I&O's Summary    23 Sep 2021 07:01  -  24 Sep 2021 07:00  --------------------------------------------------------  IN: 900 mL / OUT: 2400 mL / NET: -1500 mL    24 Sep 2021 07:01  -  25 Sep 2021 00:03  --------------------------------------------------------  IN: 1320 mL / OUT: 2100 mL / NET: -780 mL        T(C): 36.6 (09-24-21 @ 22:30), Max: 36.8 (09-24-21 @ 05:43)  HR: 88 (09-24-21 @ 22:30) (80 - 89)  BP: 112/67 (09-24-21 @ 22:30) (112/67 - 124/69)  RR: 17 (09-24-21 @ 22:30) (16 - 17)  SpO2: 97% (09-24-21 @ 23:05) (97% - 100%)    PHYSICAL EXAM:  GENERAL: NAD  NECK: Supple, No JVD  CHEST/LUNG: Clear to auscultation bilaterally; No wheezing.  HEART: Regular rate and rhythm; No murmurs, rubs, or gallops  ABDOMEN: Soft, Nontender, Nondistended; Bowel sounds present  EXTREMITIES:   No edema  NEUROLOGY: AAO X 3      LABS:                        10.6   5.30  )-----------( 204      ( 24 Sep 2021 06:58 )             33.9     09-24    133<L>  |  94<L>  |  26<H>  ----------------------------<  110<H>  4.2   |  24  |  6.55<H>    Ca    9.6      24 Sep 2021 06:58  Phos  4.1     09-24  Mg     2.40     09-24    TPro  7.5  /  Alb  3.7  /  TBili  0.2  /  DBili  x   /  AST  20  /  ALT  20  /  AlkPhos  114  09-24            CAPILLARY BLOOD GLUCOSE      POCT Blood Glucose.: 167 mg/dL (24 Sep 2021 22:53)  POCT Blood Glucose.: 96 mg/dL (24 Sep 2021 21:35)  POCT Blood Glucose.: 98 mg/dL (24 Sep 2021 21:04)  POCT Blood Glucose.: 137 mg/dL (24 Sep 2021 16:34)  POCT Blood Glucose.: 159 mg/dL (24 Sep 2021 15:02)  POCT Blood Glucose.: 178 mg/dL (24 Sep 2021 11:28)  POCT Blood Glucose.: 108 mg/dL (24 Sep 2021 07:37)        RADIOLOGY & ADDITIONAL TESTS:    Imaging Personally Reviewed:    Consultant(s) Notes Reviewed:      Care Discussed with Consultants/Other Providers:    Mike Sanchez MD, CMD, FACP    [address] [place] [address]  Office Tel: [phone]  Cell: [phone]

## 2021-09-25 NOTE — PROGRESS NOTE ADULT - PROBLEM SELECTOR PROBLEM 7
Type 2 diabetes mellitus with insulin therapy

## 2021-09-25 NOTE — PROGRESS NOTE ADULT - PROBLEM SELECTOR PROBLEM 5
Bilateral edema of lower extremity

## 2021-09-25 NOTE — PROGRESS NOTE ADULT - PROBLEM SELECTOR PLAN 6
- sodium = 132  - in the setting of kidney disease (on HD therapy)  - also with sign of being fluid overloaded (D/L LE edema)

## 2021-09-25 NOTE — PROGRESS NOTE ADULT - PROBLEM SELECTOR PLAN 3
- present since ~ 06/2021  - has had X-ray and was seen by PMD and neurologist, and reports diagnosis of multiple spasms of the area  - compounded by intermittent trigeminal neuralgia; on Tegretol 200 mg BID PRN  -Tizanidine  - warm compress to the R-neck area (20 minutes on  and 40 minutes off)
- present since ~ 06/2021  - has had X-ray and was seen by PMD and neurologist, and reports diagnosis of multiple spasms of the area  - compounded by intermittent trigeminal neuralgia; on Tegretol 200 mg BID PRN  -Tizanidine  - warm compress to the R-neck area
- present since ~ 06/2021  - has had X-ray and was seen by PMD and neurologist, and reports diagnosis of multiple spasms of the area  - compounded by intermittent trigeminal neuralgia; on Tegretol 200 mg BID PRN  -Tizanidine  - warm compress to the R-neck area

## 2021-09-25 NOTE — PROGRESS NOTE ADULT - PROBLEM SELECTOR PLAN 5
- in the setting of need for hemodialysis therapy  - Cont with HD

## 2021-09-25 NOTE — PROGRESS NOTE ADULT - ASSESSMENT
73F with h/o ESRD on HD via tunneled cath awaiting PD catheter placement ( HD is M/W/F), DM, HTN who presents with c/o dyspnea with recent RSV infection. RENAL following for ESRD Mx.    poc d/w pt  labs, chart reviewed  pl call for q's  Nephrology   cell [phone]  office [phone]  [facility] [phone]

## 2021-09-26 LAB
ANION GAP SERPL CALC-SCNC: 18 MMOL/L — HIGH (ref 7–14)
BUN SERPL-MCNC: 30 MG/DL — HIGH (ref 7–23)
CALCIUM SERPL-MCNC: 10.2 MG/DL — SIGNIFICANT CHANGE UP (ref 8.4–10.5)
CARBAMAZEPINE SERPL-MCNC: 2.2 UG/ML — LOW (ref 4–12)
CHLORIDE SERPL-SCNC: 88 MMOL/L — LOW (ref 98–107)
CO2 SERPL-SCNC: 24 MMOL/L — SIGNIFICANT CHANGE UP (ref 22–31)
CREAT SERPL-MCNC: 7.01 MG/DL — HIGH (ref 0.5–1.3)
GLUCOSE BLDC GLUCOMTR-MCNC: 117 MG/DL — HIGH (ref 70–99)
GLUCOSE BLDC GLUCOMTR-MCNC: 135 MG/DL — HIGH (ref 70–99)
GLUCOSE BLDC GLUCOMTR-MCNC: 154 MG/DL — HIGH (ref 70–99)
GLUCOSE BLDC GLUCOMTR-MCNC: 178 MG/DL — HIGH (ref 70–99)
GLUCOSE SERPL-MCNC: 125 MG/DL — HIGH (ref 70–99)
HCT VFR BLD CALC: 35.2 % — SIGNIFICANT CHANGE UP (ref 34.5–45)
HGB BLD-MCNC: 11.1 G/DL — LOW (ref 11.5–15.5)
MAGNESIUM SERPL-MCNC: 2.6 MG/DL — SIGNIFICANT CHANGE UP (ref 1.6–2.6)
MCHC RBC-ENTMCNC: 29.8 PG — SIGNIFICANT CHANGE UP (ref 27–34)
MCHC RBC-ENTMCNC: 31.5 GM/DL — LOW (ref 32–36)
MCV RBC AUTO: 94.4 FL — SIGNIFICANT CHANGE UP (ref 80–100)
NRBC # BLD: 0 /100 WBCS — SIGNIFICANT CHANGE UP
NRBC # FLD: 0.02 K/UL — HIGH
PHOSPHATE SERPL-MCNC: 4.4 MG/DL — SIGNIFICANT CHANGE UP (ref 2.5–4.5)
PLATELET # BLD AUTO: 241 K/UL — SIGNIFICANT CHANGE UP (ref 150–400)
POTASSIUM SERPL-MCNC: 4.4 MMOL/L — SIGNIFICANT CHANGE UP (ref 3.5–5.3)
POTASSIUM SERPL-SCNC: 4.4 MMOL/L — SIGNIFICANT CHANGE UP (ref 3.5–5.3)
RBC # BLD: 3.73 M/UL — LOW (ref 3.8–5.2)
RBC # FLD: 19.9 % — HIGH (ref 10.3–14.5)
SODIUM SERPL-SCNC: 130 MMOL/L — LOW (ref 135–145)
WBC # BLD: 6.34 K/UL — SIGNIFICANT CHANGE UP (ref 3.8–10.5)
WBC # FLD AUTO: 6.34 K/UL — SIGNIFICANT CHANGE UP (ref 3.8–10.5)

## 2021-09-26 RX ORDER — CARBAMAZEPINE 200 MG
200 TABLET ORAL
Refills: 0 | Status: DISCONTINUED | OUTPATIENT
Start: 2021-09-26 | End: 2021-09-27

## 2021-09-26 RX ORDER — SENNA PLUS 8.6 MG/1
2 TABLET ORAL AT BEDTIME
Refills: 0 | Status: DISCONTINUED | OUTPATIENT
Start: 2021-09-26 | End: 2021-10-08

## 2021-09-26 RX ORDER — SENNA PLUS 8.6 MG/1
2 TABLET ORAL ONCE
Refills: 0 | Status: COMPLETED | OUTPATIENT
Start: 2021-09-26 | End: 2021-09-26

## 2021-09-26 RX ADMIN — SEVELAMER CARBONATE 1600 MILLIGRAM(S): 2400 POWDER, FOR SUSPENSION ORAL at 17:15

## 2021-09-26 RX ADMIN — Medication 1: at 17:16

## 2021-09-26 RX ADMIN — HEPARIN SODIUM 5000 UNIT(S): 5000 INJECTION INTRAVENOUS; SUBCUTANEOUS at 21:37

## 2021-09-26 RX ADMIN — Medication 1 TABLET(S): at 11:52

## 2021-09-26 RX ADMIN — Medication 100 MILLIGRAM(S): at 11:52

## 2021-09-26 RX ADMIN — SEVELAMER CARBONATE 1600 MILLIGRAM(S): 2400 POWDER, FOR SUSPENSION ORAL at 08:04

## 2021-09-26 RX ADMIN — SENNA PLUS 2 TABLET(S): 8.6 TABLET ORAL at 21:38

## 2021-09-26 RX ADMIN — INSULIN GLARGINE 15 UNIT(S): 100 INJECTION, SOLUTION SUBCUTANEOUS at 21:40

## 2021-09-26 RX ADMIN — HEPARIN SODIUM 5000 UNIT(S): 5000 INJECTION INTRAVENOUS; SUBCUTANEOUS at 15:02

## 2021-09-26 RX ADMIN — Medication 1 DROP(S): at 15:02

## 2021-09-26 RX ADMIN — Medication 1: at 11:30

## 2021-09-26 RX ADMIN — ATORVASTATIN CALCIUM 20 MILLIGRAM(S): 80 TABLET, FILM COATED ORAL at 21:38

## 2021-09-26 RX ADMIN — Medication 1 DROP(S): at 21:38

## 2021-09-26 RX ADMIN — Medication 200 MILLIGRAM(S): at 17:15

## 2021-09-26 RX ADMIN — POLYETHYLENE GLYCOL 3350 17 GRAM(S): 17 POWDER, FOR SOLUTION ORAL at 11:52

## 2021-09-26 RX ADMIN — CHLORHEXIDINE GLUCONATE 1 APPLICATION(S): 213 SOLUTION TOPICAL at 11:27

## 2021-09-26 RX ADMIN — SENNA PLUS 2 TABLET(S): 8.6 TABLET ORAL at 15:15

## 2021-09-26 RX ADMIN — ONDANSETRON 4 MILLIGRAM(S): 8 TABLET, FILM COATED ORAL at 15:01

## 2021-09-26 RX ADMIN — HEPARIN SODIUM 5000 UNIT(S): 5000 INJECTION INTRAVENOUS; SUBCUTANEOUS at 05:52

## 2021-09-26 RX ADMIN — Medication 1 DROP(S): at 05:53

## 2021-09-26 RX ADMIN — SEVELAMER CARBONATE 1600 MILLIGRAM(S): 2400 POWDER, FOR SUSPENSION ORAL at 11:52

## 2021-09-26 NOTE — PROGRESS NOTE ADULT - SUBJECTIVE AND OBJECTIVE BOX
Patient is a 76y old  Female who presents with a chief complaint of Generalized weakness, Neck pain, and Nausea (25 Sep 2021 17:51)      SUBJECTIVE / OVERNIGHT EVENTS:    Events noted.  CONSTITUTIONAL: No fever,  or fatigue  RESPIRATORY: No cough, wheezing,  No shortness of breath  CARDIOVASCULAR: No chest pain, palpitations, dizziness, or leg swelling  GASTROINTESTINAL: No abdominal or epigastric pain. No nausea, vomiting.  NEUROLOGICAL: No headaches,     MEDICATIONS  (STANDING):  allopurinol 100 milliGRAM(s) Oral daily  artificial tears (preservative free) Ophthalmic Solution 1 Drop(s) Both EYES three times a day  atorvastatin 20 milliGRAM(s) Oral at bedtime  carBAMazepine 200 milliGRAM(s) Oral two times a day  chlorhexidine 2% Cloths 1 Application(s) Topical daily  dextrose 40% Gel 15 Gram(s) Oral once  dextrose 5%. 1000 milliLiter(s) (50 mL/Hr) IV Continuous <Continuous>  dextrose 5%. 1000 milliLiter(s) (100 mL/Hr) IV Continuous <Continuous>  dextrose 50% Injectable 25 Gram(s) IV Push once  dextrose 50% Injectable 12.5 Gram(s) IV Push once  dextrose 50% Injectable 25 Gram(s) IV Push once  glucagon  Injectable 1 milliGRAM(s) IntraMuscular once  heparin   Injectable 5000 Unit(s) SubCutaneous every 8 hours  influenza  Vaccine (HIGH DOSE) 0.7 milliLiter(s) IntraMuscular once  insulin glargine Injectable (LANTUS) 15 Unit(s) SubCutaneous at bedtime  insulin lispro (ADMELOG) corrective regimen sliding scale   SubCutaneous three times a day before meals  insulin lispro (ADMELOG) corrective regimen sliding scale   SubCutaneous at bedtime  Nephro-neeraj 1 Tablet(s) Oral daily  polyethylene glycol 3350 17 Gram(s) Oral daily  senna 2 Tablet(s) Oral at bedtime  sevelamer carbonate 1600 milliGRAM(s) Oral three times a day with meals    MEDICATIONS  (PRN):  acetaminophen   Tablet .. 650 milliGRAM(s) Oral every 6 hours PRN Temp greater or equal to 38C (100.4F), Mild Pain (1 - 3), Moderate Pain (4 - 6)  cyclobenzaprine 10 milliGRAM(s) Oral three times a day PRN Muscle Spasm  ketorolac   Injectable 30 milliGRAM(s) IV Push every 6 hours PRN Severe Pain (7 - 10)  ondansetron Injectable 4 milliGRAM(s) IV Push every 8 hours PRN Nausea and/or Vomiting  oxyCODONE    IR 5 milliGRAM(s) Oral every 8 hours PRN Severe Pain (7 - 10)        CAPILLARY BLOOD GLUCOSE      POCT Blood Glucose.: 135 mg/dL (26 Sep 2021 21:39)  POCT Blood Glucose.: 178 mg/dL (26 Sep 2021 16:58)  POCT Blood Glucose.: 154 mg/dL (26 Sep 2021 11:16)  POCT Blood Glucose.: 117 mg/dL (26 Sep 2021 07:27)  POCT Blood Glucose.: 127 mg/dL (25 Sep 2021 23:46)    I&O's Summary      T(C): 36.4 (09-26-21 @ 21:00), Max: 36.6 (09-26-21 @ 05:32)  HR: 84 (09-26-21 @ 21:00) (80 - 90)  BP: 125/62 (09-26-21 @ 21:00) (106/51 - 133/63)  RR: 18 (09-26-21 @ 21:00) (18 - 19)  SpO2: 98% (09-26-21 @ 21:00) (96% - 100%)    PHYSICAL EXAM:  GENERAL: NAD  NECK: Supple, No JVD  CHEST/LUNG: Clear to auscultation bilaterally; No wheezing.  HEART: Regular rate and rhythm; No murmurs, rubs, or gallops  ABDOMEN: Soft, Nontender, Nondistended; Bowel sounds present  EXTREMITIES:   No edema  NEUROLOGY: AAO X 3      LABS:                        11.1   6.34  )-----------( 241      ( 26 Sep 2021 06:57 )             35.2     09-26    130<L>  |  88<L>  |  30<H>  ----------------------------<  125<H>  4.4   |  24  |  7.01<H>    Ca    10.2      26 Sep 2021 06:57  Phos  4.4     09-26  Mg     2.60     09-26              CAPILLARY BLOOD GLUCOSE      POCT Blood Glucose.: 135 mg/dL (26 Sep 2021 21:39)  POCT Blood Glucose.: 178 mg/dL (26 Sep 2021 16:58)  POCT Blood Glucose.: 154 mg/dL (26 Sep 2021 11:16)  POCT Blood Glucose.: 117 mg/dL (26 Sep 2021 07:27)  POCT Blood Glucose.: 127 mg/dL (25 Sep 2021 23:46)        RADIOLOGY & ADDITIONAL TESTS:    Imaging Personally Reviewed:    Consultant(s) Notes Reviewed:      Care Discussed with Consultants/Other Providers:    Mike Sanchez MD, CMD, FACP    [address] [place] [address]  Office Tel: [phone]  Cell: [phone]

## 2021-09-26 NOTE — PROGRESS NOTE ADULT - ASSESSMENT
76 year old female, with past history significant for Anemia, Type-2 DM, ESRD (HD M/W/F), HTN, HLD, Sleep apnea (CPAP), Trigeminal neuralgia, Carpal tunnel syndrome and Gout, presented to he ED secondary to neck pain and generalized weakness.  Vital signs upon ED presentation as follows: BP = 129/72, HR = 98, RR = 19, T = 36.6 C (97.9 F), O2 Sat = 100% on RA.  Diagnosed with Generalized Weakness, Neck Pain, and Nausea.  Admitted for further evaluation/management.    Occipital neuralgia:    Tegretol  Oxy IR    Dw pt's daughter.

## 2021-09-27 ENCOUNTER — TRANSCRIPTION ENCOUNTER (OUTPATIENT)
Age: 76
End: 2021-09-27

## 2021-09-27 LAB
ANION GAP SERPL CALC-SCNC: 17 MMOL/L — HIGH (ref 7–14)
BUN SERPL-MCNC: 39 MG/DL — HIGH (ref 7–23)
CALCIUM SERPL-MCNC: 9.8 MG/DL — SIGNIFICANT CHANGE UP (ref 8.4–10.5)
CHLORIDE SERPL-SCNC: 88 MMOL/L — LOW (ref 98–107)
CO2 SERPL-SCNC: 22 MMOL/L — SIGNIFICANT CHANGE UP (ref 22–31)
CREAT SERPL-MCNC: 8.91 MG/DL — HIGH (ref 0.5–1.3)
GLUCOSE BLDC GLUCOMTR-MCNC: 110 MG/DL — HIGH (ref 70–99)
GLUCOSE BLDC GLUCOMTR-MCNC: 118 MG/DL — HIGH (ref 70–99)
GLUCOSE BLDC GLUCOMTR-MCNC: 144 MG/DL — HIGH (ref 70–99)
GLUCOSE BLDC GLUCOMTR-MCNC: 147 MG/DL — HIGH (ref 70–99)
GLUCOSE BLDC GLUCOMTR-MCNC: 152 MG/DL — HIGH (ref 70–99)
GLUCOSE SERPL-MCNC: 133 MG/DL — HIGH (ref 70–99)
HCT VFR BLD CALC: 31.1 % — LOW (ref 34.5–45)
HGB BLD-MCNC: 9.9 G/DL — LOW (ref 11.5–15.5)
MAGNESIUM SERPL-MCNC: 2.7 MG/DL — HIGH (ref 1.6–2.6)
MCHC RBC-ENTMCNC: 29.8 PG — SIGNIFICANT CHANGE UP (ref 27–34)
MCHC RBC-ENTMCNC: 31.8 GM/DL — LOW (ref 32–36)
MCV RBC AUTO: 93.7 FL — SIGNIFICANT CHANGE UP (ref 80–100)
NRBC # BLD: 0 /100 WBCS — SIGNIFICANT CHANGE UP
NRBC # FLD: 0 K/UL — SIGNIFICANT CHANGE UP
PHOSPHATE SERPL-MCNC: 4.9 MG/DL — HIGH (ref 2.5–4.5)
PLATELET # BLD AUTO: 230 K/UL — SIGNIFICANT CHANGE UP (ref 150–400)
POTASSIUM SERPL-MCNC: 4.7 MMOL/L — SIGNIFICANT CHANGE UP (ref 3.5–5.3)
POTASSIUM SERPL-SCNC: 4.7 MMOL/L — SIGNIFICANT CHANGE UP (ref 3.5–5.3)
RBC # BLD: 3.32 M/UL — LOW (ref 3.8–5.2)
RBC # FLD: 19.7 % — HIGH (ref 10.3–14.5)
SODIUM SERPL-SCNC: 127 MMOL/L — LOW (ref 135–145)
WBC # BLD: 6.54 K/UL — SIGNIFICANT CHANGE UP (ref 3.8–10.5)
WBC # FLD AUTO: 6.54 K/UL — SIGNIFICANT CHANGE UP (ref 3.8–10.5)

## 2021-09-27 RX ORDER — CARBAMAZEPINE 200 MG
2 TABLET ORAL
Qty: 120 | Refills: 0
Start: 2021-09-27 | End: 2021-10-26

## 2021-09-27 RX ORDER — CYCLOBENZAPRINE HYDROCHLORIDE 10 MG/1
1 TABLET, FILM COATED ORAL
Qty: 21 | Refills: 0
Start: 2021-09-27 | End: 2021-10-03

## 2021-09-27 RX ORDER — ALLOPURINOL 300 MG
1 TABLET ORAL
Qty: 0 | Refills: 0 | DISCHARGE

## 2021-09-27 RX ORDER — SENNA PLUS 8.6 MG/1
2 TABLET ORAL
Qty: 0 | Refills: 0 | DISCHARGE
Start: 2021-09-27

## 2021-09-27 RX ORDER — ACETAMINOPHEN 500 MG
2 TABLET ORAL
Qty: 0 | Refills: 0 | DISCHARGE
Start: 2021-09-27

## 2021-09-27 RX ORDER — CARBAMAZEPINE 200 MG
1 TABLET ORAL
Qty: 0 | Refills: 0 | DISCHARGE

## 2021-09-27 RX ORDER — POLYETHYLENE GLYCOL 3350 17 G/17G
17 POWDER, FOR SOLUTION ORAL
Qty: 0 | Refills: 0 | DISCHARGE
Start: 2021-09-27

## 2021-09-27 RX ORDER — CARBAMAZEPINE 200 MG
400 TABLET ORAL
Refills: 0 | Status: DISCONTINUED | OUTPATIENT
Start: 2021-09-27 | End: 2021-10-03

## 2021-09-27 RX ORDER — CARBAMAZEPINE 200 MG
1 TABLET ORAL
Qty: 60 | Refills: 0
Start: 2021-09-27 | End: 2021-10-26

## 2021-09-27 RX ORDER — SEVELAMER CARBONATE 2400 MG/1
2 POWDER, FOR SUSPENSION ORAL
Qty: 0 | Refills: 0 | DISCHARGE
Start: 2021-09-27

## 2021-09-27 RX ORDER — ALLOPURINOL 300 MG
1 TABLET ORAL
Qty: 0 | Refills: 0 | DISCHARGE
Start: 2021-09-27

## 2021-09-27 RX ORDER — ATORVASTATIN CALCIUM 80 MG/1
1 TABLET, FILM COATED ORAL
Qty: 0 | Refills: 0 | DISCHARGE
Start: 2021-09-27

## 2021-09-27 RX ORDER — ATORVASTATIN CALCIUM 80 MG/1
1 TABLET, FILM COATED ORAL
Qty: 0 | Refills: 0 | DISCHARGE

## 2021-09-27 RX ORDER — SEVELAMER CARBONATE 2400 MG/1
2 POWDER, FOR SUSPENSION ORAL
Qty: 0 | Refills: 0 | DISCHARGE

## 2021-09-27 RX ADMIN — Medication 400 MILLIGRAM(S): at 17:48

## 2021-09-27 RX ADMIN — OXYCODONE HYDROCHLORIDE 5 MILLIGRAM(S): 5 TABLET ORAL at 09:08

## 2021-09-27 RX ADMIN — Medication 1 TABLET(S): at 12:13

## 2021-09-27 RX ADMIN — SENNA PLUS 2 TABLET(S): 8.6 TABLET ORAL at 21:37

## 2021-09-27 RX ADMIN — POLYETHYLENE GLYCOL 3350 17 GRAM(S): 17 POWDER, FOR SOLUTION ORAL at 12:13

## 2021-09-27 RX ADMIN — INSULIN GLARGINE 15 UNIT(S): 100 INJECTION, SOLUTION SUBCUTANEOUS at 21:38

## 2021-09-27 RX ADMIN — Medication 1 DROP(S): at 06:06

## 2021-09-27 RX ADMIN — HEPARIN SODIUM 5000 UNIT(S): 5000 INJECTION INTRAVENOUS; SUBCUTANEOUS at 21:37

## 2021-09-27 RX ADMIN — Medication 1 DROP(S): at 21:37

## 2021-09-27 RX ADMIN — Medication 30 MILLIGRAM(S): at 13:26

## 2021-09-27 RX ADMIN — Medication 1 DROP(S): at 13:25

## 2021-09-27 RX ADMIN — HEPARIN SODIUM 5000 UNIT(S): 5000 INJECTION INTRAVENOUS; SUBCUTANEOUS at 06:05

## 2021-09-27 RX ADMIN — Medication 10 MILLIGRAM(S): at 17:52

## 2021-09-27 RX ADMIN — ATORVASTATIN CALCIUM 20 MILLIGRAM(S): 80 TABLET, FILM COATED ORAL at 21:37

## 2021-09-27 RX ADMIN — Medication 200 MILLIGRAM(S): at 06:08

## 2021-09-27 RX ADMIN — OXYCODONE HYDROCHLORIDE 5 MILLIGRAM(S): 5 TABLET ORAL at 09:48

## 2021-09-27 RX ADMIN — SEVELAMER CARBONATE 1600 MILLIGRAM(S): 2400 POWDER, FOR SUSPENSION ORAL at 17:48

## 2021-09-27 RX ADMIN — HEPARIN SODIUM 5000 UNIT(S): 5000 INJECTION INTRAVENOUS; SUBCUTANEOUS at 13:25

## 2021-09-27 RX ADMIN — CYCLOBENZAPRINE HYDROCHLORIDE 10 MILLIGRAM(S): 10 TABLET, FILM COATED ORAL at 19:48

## 2021-09-27 RX ADMIN — SEVELAMER CARBONATE 1600 MILLIGRAM(S): 2400 POWDER, FOR SUSPENSION ORAL at 12:13

## 2021-09-27 RX ADMIN — CYCLOBENZAPRINE HYDROCHLORIDE 10 MILLIGRAM(S): 10 TABLET, FILM COATED ORAL at 12:12

## 2021-09-27 RX ADMIN — Medication 100 MILLIGRAM(S): at 12:12

## 2021-09-27 RX ADMIN — CHLORHEXIDINE GLUCONATE 1 APPLICATION(S): 213 SOLUTION TOPICAL at 12:13

## 2021-09-27 RX ADMIN — Medication 30 MILLIGRAM(S): at 14:25

## 2021-09-27 NOTE — PROGRESS NOTE ADULT - ASSESSMENT
73F with h/o ESRD on HD via tunneled cath awaiting PD catheter placement ( HD is M/W/F), DM, HTN who presents with c/o dyspnea with recent RSV infection. RENAL following for ESRD Mx.    poc d/w pt  labs, chart reviewed  pl call for q's  Nephrology   cell [phone]  office [phone]  [facility] [phone]

## 2021-09-27 NOTE — CHART NOTE - NSCHARTNOTEFT_GEN_A_CORE
Patient reporting neck pain, unrelieved by medication. Dr. Sanchez made aware, DC cancelled for today. Carbamazepine increased to 400mg BID,  will monitor overnight.

## 2021-09-27 NOTE — DISCHARGE NOTE NURSING/CASE MANAGEMENT/SOCIAL WORK - PATIENT PORTAL LINK FT
You can access the FollowMyHealth Patient Portal offered by [facility] by registering at the following website: [email or web address]. By joining [employer]’s FollowMyHealth portal, you will also be able to view your health information using other applications (apps) compatible with our system.

## 2021-09-27 NOTE — DISCHARGE NOTE NURSING/CASE MANAGEMENT/SOCIAL WORK - NSDCVIVACCINE_GEN_ALL_CORE_FT
influenza, injectable, quadrivalent, preservative free; 11-Mar-2016 15:41; Yi Baker (RN); [employer]; [id number]; IntraMuscular; Dorsogluteal Right.; 0.5 milliLiter(s); VIS (VIS Published: 07-Aug-2015, VIS Presented: 11-Mar-2016);

## 2021-09-27 NOTE — DISCHARGE NOTE PROVIDER - NSDCMRMEDTOKEN_GEN_ALL_CORE_FT
acetaminophen 325 mg oral tablet: 2 tab(s) orally every 6 hours, As needed, Temp greater or equal to 38C (100.4F), Mild Pain (1 - 3), Moderate Pain (4 - 6)  allopurinol 100 mg oral tablet: 1 tab(s) orally once a day  atorvastatin 20 mg oral tablet: 1 tab(s) orally once a day (at bedtime)  carBAMazepine 200 mg oral tablet: 1 tab(s) orally 2 times a day  cyclobenzaprine 10 mg oral tablet: 1 tab(s) orally 3 times a day, As needed, Muscle Spasm  Lantus Solostar Pen 100 units/mL subcutaneous solution: 26 unit(s) subcutaneous once a day (at bedtime)  ocular lubricant ophthalmic solution: 1 drop(s) to each affected eye 3 times a day  polyethylene glycol 3350 oral powder for reconstitution: 17 gram(s) orally once a day  Amina-Lefty oral tablet: 1 tab(s) orally once a day  senna oral tablet: 2 tab(s) orally once a day (at bedtime)  sevelamer carbonate 800 mg oral tablet: 2 tab(s) orally 3 times a day (with meals)   acetaminophen 325 mg oral tablet: 2 tab(s) orally every 6 hours, As needed, Temp greater or equal to 38C (100.4F), Mild Pain (1 - 3), Moderate Pain (4 - 6)  allopurinol 100 mg oral tablet: 1 tab(s) orally once a day  atorvastatin 20 mg oral tablet: 1 tab(s) orally once a day (at bedtime)  carBAMazepine 200 mg oral tablet: 2 tab(s) orally 2 times a day  cyclobenzaprine 10 mg oral tablet: 1 tab(s) orally 3 times a day, As needed, Muscle Spasm  Lantus Solostar Pen 100 units/mL subcutaneous solution: 26 unit(s) subcutaneous once a day (at bedtime)  ocular lubricant ophthalmic solution: 1 drop(s) to each affected eye 3 times a day  polyethylene glycol 3350 oral powder for reconstitution: 17 gram(s) orally once a day  Amina-Lefty oral tablet: 1 tab(s) orally once a day  senna oral tablet: 2 tab(s) orally once a day (at bedtime)  sevelamer carbonate 800 mg oral tablet: 2 tab(s) orally 3 times a day (with meals)   acetaminophen 325 mg oral tablet: 2 tab(s) orally every 6 hours, As needed, Temp greater or equal to 38C (100.4F), Mild Pain (1 - 3), Moderate Pain (4 - 6)  allopurinol 100 mg oral tablet: 1 tab(s) orally once a day  atorvastatin 20 mg oral tablet: 1 tab(s) orally once a day (at bedtime)  cyclobenzaprine 10 mg oral tablet: 1 tab(s) orally 3 times a day, As needed, Muscle Spasm  Lanarelis Solostar Pen 100 units/mL subcutaneous solution: 15 unit(s) subcutaneous once a day (at bedtime)  mupirocin 2% topical ointment: 1 application topically 2 times a day  ocular lubricant ophthalmic solution: 1 drop(s) to each affected eye 3 times a day  polyethylene glycol 3350 oral powder for reconstitution: 17 gram(s) orally once a day  Amina-Lefty oral tablet: 1 tab(s) orally once a day  senna oral tablet: 2 tab(s) orally once a day (at bedtime)  sevelamer carbonate 800 mg oral tablet: 2 tab(s) orally 3 times a day (with meals)

## 2021-09-27 NOTE — PROGRESS NOTE ADULT - SUBJECTIVE AND OBJECTIVE BOX
Patient is a 76y old  Female who presents with a chief complaint of Generalized weakness, Neck pain, and Nausea (27 Sep 2021 18:58)      SUBJECTIVE / OVERNIGHT EVENTS:    Events noted.  CONSTITUTIONAL: Pain with chewing  RESPIRATORY: No cough, wheezing,  No shortness of breath  CARDIOVASCULAR: No chest pain, palpitations, dizziness, or leg swelling  GASTROINTESTINAL: No abdominal or epigastric pain. No nausea, vomiting.  NEUROLOGICAL: No headaches,     MEDICATIONS  (STANDING):  allopurinol 100 milliGRAM(s) Oral daily  artificial tears (preservative free) Ophthalmic Solution 1 Drop(s) Both EYES three times a day  atorvastatin 20 milliGRAM(s) Oral at bedtime  carBAMazepine 400 milliGRAM(s) Oral two times a day  chlorhexidine 2% Cloths 1 Application(s) Topical daily  dextrose 40% Gel 15 Gram(s) Oral once  dextrose 5%. 1000 milliLiter(s) (50 mL/Hr) IV Continuous <Continuous>  dextrose 5%. 1000 milliLiter(s) (100 mL/Hr) IV Continuous <Continuous>  dextrose 50% Injectable 25 Gram(s) IV Push once  dextrose 50% Injectable 12.5 Gram(s) IV Push once  dextrose 50% Injectable 25 Gram(s) IV Push once  glucagon  Injectable 1 milliGRAM(s) IntraMuscular once  heparin   Injectable 5000 Unit(s) SubCutaneous every 8 hours  influenza  Vaccine (HIGH DOSE) 0.7 milliLiter(s) IntraMuscular once  insulin glargine Injectable (LANTUS) 15 Unit(s) SubCutaneous at bedtime  insulin lispro (ADMELOG) corrective regimen sliding scale   SubCutaneous three times a day before meals  insulin lispro (ADMELOG) corrective regimen sliding scale   SubCutaneous at bedtime  Nephro-neeraj 1 Tablet(s) Oral daily  polyethylene glycol 3350 17 Gram(s) Oral daily  senna 2 Tablet(s) Oral at bedtime  sevelamer carbonate 1600 milliGRAM(s) Oral three times a day with meals    MEDICATIONS  (PRN):  acetaminophen   Tablet .. 650 milliGRAM(s) Oral every 6 hours PRN Temp greater or equal to 38C (100.4F), Mild Pain (1 - 3), Moderate Pain (4 - 6)  cyclobenzaprine 10 milliGRAM(s) Oral three times a day PRN Muscle Spasm  ketorolac   Injectable 30 milliGRAM(s) IV Push every 6 hours PRN Severe Pain (7 - 10)  ondansetron Injectable 4 milliGRAM(s) IV Push every 8 hours PRN Nausea and/or Vomiting  oxyCODONE    IR 5 milliGRAM(s) Oral every 8 hours PRN Severe Pain (7 - 10)        CAPILLARY BLOOD GLUCOSE      POCT Blood Glucose.: 152 mg/dL (27 Sep 2021 21:16)  POCT Blood Glucose.: 144 mg/dL (27 Sep 2021 17:30)  POCT Blood Glucose.: 110 mg/dL (27 Sep 2021 11:41)  POCT Blood Glucose.: 118 mg/dL (27 Sep 2021 10:22)  POCT Blood Glucose.: 147 mg/dL (27 Sep 2021 06:32)    I&O's Summary    27 Sep 2021 07:01  -  27 Sep 2021 23:56  --------------------------------------------------------  IN: 800 mL / OUT: 2700 mL / NET: -1900 mL        T(C): 36.5 (09-27-21 @ 21:36), Max: 37.2 (09-27-21 @ 13:51)  HR: 81 (09-27-21 @ 21:36) (77 - 99)  BP: 101/52 (09-27-21 @ 21:36) (101/52 - 153/92)  RR: 17 (09-27-21 @ 21:36) (16 - 17)  SpO2: 100% (09-27-21 @ 21:36) (99% - 100%)    PHYSICAL EXAM:  GENERAL: NAD  NECK: Supple, No JVD  CHEST/LUNG: Clear to auscultation bilaterally; No wheezing.  HEART: Regular rate and rhythm; No murmurs, rubs, or gallops  ABDOMEN: Soft, Nontender, Nondistended; Bowel sounds present  EXTREMITIES:   No edema  NEUROLOGY: AAO X 3      LABS:                        9.9    6.54  )-----------( 230      ( 27 Sep 2021 07:55 )             31.1     09-27    127<L>  |  88<L>  |  39<H>  ----------------------------<  133<H>  4.7   |  22  |  8.91<H>    Ca    9.8      27 Sep 2021 07:55  Phos  4.9     09-27  Mg     2.70     09-27              CAPILLARY BLOOD GLUCOSE      POCT Blood Glucose.: 152 mg/dL (27 Sep 2021 21:16)  POCT Blood Glucose.: 144 mg/dL (27 Sep 2021 17:30)  POCT Blood Glucose.: 110 mg/dL (27 Sep 2021 11:41)  POCT Blood Glucose.: 118 mg/dL (27 Sep 2021 10:22)  POCT Blood Glucose.: 147 mg/dL (27 Sep 2021 06:32)        RADIOLOGY & ADDITIONAL TESTS:    Imaging Personally Reviewed:    Consultant(s) Notes Reviewed:      Care Discussed with Consultants/Other Providers:    Mike Sanchez MD, CMD, FACP    [address] [place] [address]  Office Tel: [phone]  Cell: [phone]

## 2021-09-27 NOTE — DISCHARGE NOTE NURSING/CASE MANAGEMENT/SOCIAL WORK - NSDCPNINST_GEN_ALL_CORE
Notify Dr Sanchez if you experience any increase in pain not relieved with medication, continue dialysis

## 2021-09-27 NOTE — DISCHARGE NOTE PROVIDER - CARE PROVIDER_API CALL
Ramya Camacho  INTERNAL MEDICINE  [address] [place] [id number]  Phone: [phone]  Fax: [phone]  Established Patient  Follow Up Time: Routine

## 2021-09-27 NOTE — PROGRESS NOTE ADULT - SUBJECTIVE AND OBJECTIVE BOX
[facility] - S Janice / Nathan S /D Levy/ S Claudia/ S Rosita/ Stephon Colbert / MALCOLM Koou/ O Anita  service -[phone], office [phone]  ---------------------------------------------------------------------------------------------------------------    Patient seen and examined bedside    Subjective and Objective: No overnight events, sob resolved. No complaints today. feeling better    Allergies: IV Contrast (Anaphylaxis)  shellfish (Hives; Rash)  shrimp (Hives)  smoke; coughing (Other)      Hospital Medications:   MEDICATIONS  (STANDING):  allopurinol 100 milliGRAM(s) Oral daily  artificial tears (preservative free) Ophthalmic Solution 1 Drop(s) Both EYES three times a day  atorvastatin 20 milliGRAM(s) Oral at bedtime  carBAMazepine 400 milliGRAM(s) Oral two times a day  chlorhexidine 2% Cloths 1 Application(s) Topical daily  dextrose 40% Gel 15 Gram(s) Oral once  dextrose 5%. 1000 milliLiter(s) (50 mL/Hr) IV Continuous <Continuous>  dextrose 5%. 1000 milliLiter(s) (100 mL/Hr) IV Continuous <Continuous>  dextrose 50% Injectable 25 Gram(s) IV Push once  dextrose 50% Injectable 12.5 Gram(s) IV Push once  dextrose 50% Injectable 25 Gram(s) IV Push once  glucagon  Injectable 1 milliGRAM(s) IntraMuscular once  heparin   Injectable 5000 Unit(s) SubCutaneous every 8 hours  influenza  Vaccine (HIGH DOSE) 0.7 milliLiter(s) IntraMuscular once  insulin glargine Injectable (LANTUS) 15 Unit(s) SubCutaneous at bedtime  insulin lispro (ADMELOG) corrective regimen sliding scale   SubCutaneous three times a day before meals  insulin lispro (ADMELOG) corrective regimen sliding scale   SubCutaneous at bedtime  Nephro-neeraj 1 Tablet(s) Oral daily  polyethylene glycol 3350 17 Gram(s) Oral daily  senna 2 Tablet(s) Oral at bedtime  sevelamer carbonate 1600 milliGRAM(s) Oral three times a day with meals      REVIEW OF SYSTEMS:  CONSTITUTIONAL: No weakness, fevers or chills  EYES/ENT: No visual changes;  No vertigo or throat pain   NECK: No pain or stiffness  RESPIRATORY: No cough, wheezing, hemoptysis; No shortness of breath  CARDIOVASCULAR: No chest pain or palpitations.  GASTROINTESTINAL: No abdominal or epigastric pain. No nausea, vomiting, or hematemesis; No diarrhea or constipation. No melena or hematochezia.  GENITOURINARY: No dysuria, frequency, foamy urine, urinary urgency, incontinence or hematuria  NEUROLOGICAL: No numbness or weakness  SKIN: No itching, burning, rashes, or lesions   VASCULAR: No bilateral lower extremity edema.   All other review of systems is negative unless indicated above.    VITALS:  T(F): 99 (09-27-21 @ 13:51), Max: 99 (09-27-21 @ 13:51)  HR: 99 (09-27-21 @ 13:51)  BP: 113/66 (09-27-21 @ 13:51)  RR: 16 (09-27-21 @ 13:51)  SpO2: 99% (09-27-21 @ 13:51)  Wt(kg): --    09-27 @ 07:01  -  09-27 @ 18:58  --------------------------------------------------------  IN: 800 mL / OUT: 2700 mL / NET: -1900 mL          PHYSICAL EXAM:  Constitutional: NAD  HEENT: anicteric sclera, oropharynx clear  Neck: No JVD  Respiratory: CTAB, no wheezes, rales or rhonchi  Cardiovascular: S1, S2, RRR  Gastrointestinal: BS+, soft, NT/ND  Extremities: No cyanosis or clubbing. No peripheral edema  Neurological: A/O x 3, no focal deficits  Psychiatric: Normal mood, normal affect  : No CVA tenderness. No cottrell.   Skin: No rashes  Vascular Access:    LABS:  09-27    127<L>  |  88<L>  |  39<H>  ----------------------------<  133<H>  4.7   |  22  |  8.91<H>    Ca    9.8      27 Sep 2021 07:55  Phos  4.9     09-27  Mg     2.70     09-27      Creatinine Trend: 8.91 <--, 7.01 <--, 4.78 <--, 6.55 <--, 9.21 <--, 7.99 <--, 5.58 <--                        9.9    6.54  )-----------( 230      ( 27 Sep 2021 07:55 )             31.1     Urine Studies:        RADIOLOGY & ADDITIONAL STUDIES:   [facility] - S Janice / Nathan S /D Levy/ S Claudia/ S Rosita/ Stephon Colbert / MALCOLM Koou/ O Anita  service -[phone], office [phone]  ---------------------------------------------------------------------------------------------------------------    Patient seen and examined bedside    Subjective and Objective: No overnight events, denied sob. No complaints today. feeling better. had a BM after 4 days    Allergies: IV Contrast (Anaphylaxis)  shellfish (Hives; Rash)  shrimp (Hives)  smoke; coughing (Other)      Hospital Medications:   MEDICATIONS  (STANDING):  allopurinol 100 milliGRAM(s) Oral daily  artificial tears (preservative free) Ophthalmic Solution 1 Drop(s) Both EYES three times a day  atorvastatin 20 milliGRAM(s) Oral at bedtime  carBAMazepine 400 milliGRAM(s) Oral two times a day  chlorhexidine 2% Cloths 1 Application(s) Topical daily  dextrose 40% Gel 15 Gram(s) Oral once  dextrose 5%. 1000 milliLiter(s) (50 mL/Hr) IV Continuous <Continuous>  dextrose 5%. 1000 milliLiter(s) (100 mL/Hr) IV Continuous <Continuous>  dextrose 50% Injectable 25 Gram(s) IV Push once  dextrose 50% Injectable 12.5 Gram(s) IV Push once  dextrose 50% Injectable 25 Gram(s) IV Push once  glucagon  Injectable 1 milliGRAM(s) IntraMuscular once  heparin   Injectable 5000 Unit(s) SubCutaneous every 8 hours  influenza  Vaccine (HIGH DOSE) 0.7 milliLiter(s) IntraMuscular once  insulin glargine Injectable (LANTUS) 15 Unit(s) SubCutaneous at bedtime  insulin lispro (ADMELOG) corrective regimen sliding scale   SubCutaneous three times a day before meals  insulin lispro (ADMELOG) corrective regimen sliding scale   SubCutaneous at bedtime  Nephro-neeraj 1 Tablet(s) Oral daily  polyethylene glycol 3350 17 Gram(s) Oral daily  senna 2 Tablet(s) Oral at bedtime  sevelamer carbonate 1600 milliGRAM(s) Oral three times a day with meals    VITALS:  T(F): 99 (09-27-21 @ 13:51), Max: 99 (09-27-21 @ 13:51)  HR: 99 (09-27-21 @ 13:51)  BP: 113/66 (09-27-21 @ 13:51)  RR: 16 (09-27-21 @ 13:51)  SpO2: 99% (09-27-21 @ 13:51)  Wt(kg): --    09-27 @ 07:01  -  09-27 @ 18:58  --------------------------------------------------------  IN: 800 mL / OUT: 2700 mL / NET: -1900 mL      PHYSICAL EXAM:  Constitutional: NAD  HEENT: anicteric sclera  Neck: No JVD  Respiratory: CTAB, no wheezes, rales or rhonchi  Cardiovascular: S1, S2, RRR  Gastrointestinal: BS+, soft, NT/ND  Extremities:  No peripheral edema  Neurological: A/O x 3, no focal deficits  Psychiatric: Normal mood, normal affect  : No cottrell.   Vascular Access: dieter avf+thrill    LABS:  09-27    127<L>  |  88<L>  |  39<H>  ----------------------------<  133<H>  4.7   |  22  |  8.91<H>    Ca    9.8      27 Sep 2021 07:55  Phos  4.9     09-27  Mg     2.70     09-27      Creatinine Trend: 8.91 <--, 7.01 <--, 4.78 <--, 6.55 <--, 9.21 <--, 7.99 <--, 5.58 <--                        9.9    6.54  )-----------( 230      ( 27 Sep 2021 07:55 )             31.1     Urine Studies:        RADIOLOGY & ADDITIONAL STUDIES:

## 2021-09-27 NOTE — DISCHARGE NOTE PROVIDER - NSDCCPCAREPLAN_GEN_ALL_CORE_FT
PRINCIPAL DISCHARGE DIAGNOSIS  Diagnosis: Generalized weakness  Assessment and Plan of Treatment: Generalized weakness is likely due to hemodialysis therapy.  TSH is in normal range (1.80). Lactate normal range (1.1)      SECONDARY DISCHARGE DIAGNOSES  Diagnosis: Neck pain  Assessment and Plan of Treatment:     Diagnosis: Nausea  Assessment and Plan of Treatment:      PRINCIPAL DISCHARGE DIAGNOSIS  Diagnosis: Generalized weakness  Assessment and Plan of Treatment: Generalized weakness is likely due to hemodialysis therapy.  TSH is in normal range (1.80). Lactate normal range (1.1)      SECONDARY DISCHARGE DIAGNOSES  Diagnosis: Neck pain  Assessment and Plan of Treatment: You reported neck pain and multiple spasms  present since ~ 06/2021.  You have intermittent trigeminal neuralgia, continue Tegretol 200 mg BID PRN. Neurology consulted for  tenderness on the right side tracking up over the occipital groove, likely caused by occipital neurologia. You had an occipital nerve block which you tolerated well/no complications. Your carbamazepine level prior to discharge is  2.2(low). Take Carbamazepine 400mg twice a day. Follow up with a neurologist outpatient at  [phone].       Diagnosis: ESRD (end stage renal disease) on dialysis  Assessment and Plan of Treatment: Please follow up with your nephrologist for management, and continue your schedule hemodialysis.

## 2021-09-27 NOTE — DISCHARGE NOTE PROVIDER - DISCHARGE DIET
Low Sodium Diet/Renal Diets (for dialysis)/Other Diet Instructions Low Sodium Diet/Consistent Carbohydrate Diabetic Diets/Renal Diets (for dialysis)/Other Diet Instructions

## 2021-09-27 NOTE — PROGRESS NOTE ADULT - ASSESSMENT
76 year old female, with past history significant for Anemia, Type-2 DM, ESRD (HD M/W/F), HTN, HLD, Sleep apnea (CPAP), Trigeminal neuralgia, Carpal tunnel syndrome and Gout, presented to he ED secondary to neck pain and generalized weakness.  Vital signs upon ED presentation as follows: BP = 129/72, HR = 98, RR = 19, T = 36.6 C (97.9 F), O2 Sat = 100% on RA.  Diagnosed with Generalized Weakness, Neck Pain, and Nausea.  Admitted for further evaluation/management.    Occipital neuralgia:    Increased Tegretol 400 mg po twice a day  Oxy IR    Dc planning tomorrow

## 2021-09-27 NOTE — DISCHARGE NOTE PROVIDER - HOSPITAL COURSE
76 year old female, with past history significant for Anemia, Type-2 DM, ESRD (HD M/W/F), HTN, HLD, Sleep apnea (CPAP), Trigeminal neuralgia, Carpal tunnel syndrome and Gout, presented to he ED secondary to neck pain and generalized weakness.  Vital signs upon ED presentation as follows: BP = 129/72, HR = 98, RR = 19, T = 36.6 C (97.9 F), O2 Sat = 100% on RA.  Diagnosed with Generalized Weakness, Neck Pain, and Nausea.  Admitted for further evaluation/management.     Generalized weakness.   - periodic, and chiefly associated with hemodialysis therapy.  Has nausea without vomiting.  Not requiring antihypertensive meds since starting HD therapy  - also with nausea and decreased oral intake related to same  - electrolytes without any severe dyscrasia   - TSH = 1.80 (within acceptable range)  - ECG = NSR at 87 bpm, QTc = 498, Trop = 124 --> 110.  CXR w/o overt findings, COVID-19 PCR negative  - lactate initially 2.2 (now 1.1)  - antiemetic.  Encourage oral intake ( PO intake due to nausea over the recent days)       ESRD (end stage renal disease) on dialysis.   - currently w/ B/L LE edema.  No shortness of breath or signs of distress.  No crackles  - trop = 124 --> 110.  ECG as above.  Potassium = 5.3 (moderately hemolyzed)  - patient reports intolerance to > 2 hours of HD therapy  - already seen by Nephrology team (appreciated); for HD therapy today.  Pt known to current nephrologist  - continues on Sevelamer 1200 mg TID, Nephro-neeraj       Neck pain on right side.   - present since ~ 2021  - has had X-ray and was seen by PMD and neurologist, and reports diagnosis of multiple spasms of the area  - compounded by intermittent trigeminal neuralgia; on Tegretol 200 mg BID PRN  - Neurology consulted:  tenderness on the right side tracking up over the occipital groove. 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 Pt given occipital nerve block with 1% lidocaine and 30mg/ml toradol. One ml toradol combines with 4ml lidocaine. and injected in four locations along the greater and lesser occipital nerve on the right side. She was also given lidocaine alone trigger point injections two in the left trapezius and one on the right. Pt tolerated well. No complications.   -Pt can follow up with me as outpatient [phone]         Sleep apnea.   ·  Plan: - in the setting of obesity  - requiring CPAP  - CPAP of 4, 30% FiO2 prescribed  - TSH = 1.80.           76 year old female, with past history significant for Anemia, Type-2 DM, ESRD (HD M/W/F), HTN, HLD, Sleep apnea (CPAP), Trigeminal neuralgia, Carpal tunnel syndrome and Gout, presented to he ED secondary to neck pain and generalized weakness.  Vital signs upon ED presentation as follows: BP = 129/72, HR = 98, RR = 19, T = 36.6 C (97.9 F), O2 Sat = 100% on RA.  Diagnosed with Generalized Weakness, Neck Pain, and Nausea.  Admitted for further evaluation/management.     Generalized weakness.   - periodic, and chiefly associated with hemodialysis therapy.  Has nausea without vomiting.  Not requiring antihypertensive meds since starting HD therapy  - also with nausea and decreased oral intake related to same  - electrolytes without any severe dyscrasia   - TSH = 1.80 (within acceptable range)  - ECG = NSR at 87 bpm, QTc = 498, Trop = 124 --> 110.  CXR w/o overt findings, COVID-19 PCR negative  - lactate initially 2.2 (now 1.1)  - antiemetic.  Encourage oral intake ( PO intake due to nausea over the recent days)       ESRD (end stage renal disease) on dialysis.   - currently w/ B/L LE edema.  No shortness of breath or signs of distress.  No crackles  - trop = 124 --> 110.  ECG as above.  Potassium = 5.3 (moderately hemolyzed)  - patient reports intolerance to > 2 hours of HD therapy  - already seen by Nephrology team (appreciated); for HD therapy today.  Pt known to current nephrologist  - continues on Sevelamer 1200 mg TID, Nephro-neeraj       Neck pain on right side.   - present since ~ 2021  - has had X-ray and was seen by PMD and neurologist, and reports diagnosis of multiple spasms of the area  - compounded by intermittent trigeminal neuralgia; on Tegretol 200 mg BID PRN  - Neurology consulted:  tenderness on the right side tracking up over the occipital groove. 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 Pt given occipital nerve block with 1% lidocaine and 30mg/ml toradol. One ml toradol combines with 4ml lidocaine. and injected in four locations along the greater and lesser occipital nerve on the right side. She was also given lidocaine alone trigger point injections two in the left trapezius and one on the right. Pt tolerated well. No complications.   -Carbamazepine level 2.2(low), increased carbamazepine to 400mg BID  -Pt can follow up with me as outpatient [phone]         Sleep apnea.   ·  Plan: - in the setting of obesity  - requiring CPAP  - CPAP of 4, 30% FiO2 prescribed  - TSH = 1.80.    Discussed with Dr. Sanchez , cleared for DC on  21.  Medications reviewed with patient. Medications sent to patient's preferred pharmacy.            76 year old female, with past history significant for Anemia, Type-2 DM, ESRD (HD M/W/F), HTN, HLD, Sleep apnea (CPAP), Trigeminal neuralgia, Carpal tunnel syndrome and Gout, presented to he ED secondary to neck pain and generalized weakness.  Vital signs upon ED presentation as follows: BP = 129/72, HR = 98, RR = 19, T = 36.6 C (97.9 F), O2 Sat = 100% on RA.  Diagnosed with Generalized Weakness, Neck Pain, and Nausea.  Admitted for further evaluation/management.     Generalized weakness.   - periodic, and chiefly associated with hemodialysis therapy.  Has nausea without vomiting.  Not requiring antihypertensive meds since starting HD therapy  - also with nausea and decreased oral intake related to same  - electrolytes without any severe dyscrasia   - TSH = 1.80 (within acceptable range)  - ECG = NSR at 87 bpm, QTc = 498, Trop = 124 --> 110.  CXR w/o overt findings, COVID-19 PCR negative  - lactate initially 2.2 (now 1.1)  - antiemetic.  Encourage oral intake ( PO intake due to nausea over the recent days)       ESRD (end stage renal disease) on dialysis.   - currently w/ B/L LE edema.  No shortness of breath or signs of distress.  No crackles  - trop = 124 --> 110.  ECG as above.  Potassium = 5.3 (moderately hemolyzed)  - patient reports intolerance to > 2 hours of HD therapy  - already seen by Nephrology team (appreciated); for HD therapy today.  Pt known to current nephrologist  - continues on Sevelamer 1200 mg TID, Nephro-neeraj       Neck pain on right side.   - present since ~ 2021  - has had X-ray and was seen by PMD and neurologist, and reports diagnosis of multiple spasms of the area  - compounded by intermittent trigeminal neuralgia; on Tegretol 200 mg BID PRN  - Neurology consulted:  tenderness on the right side tracking up over the occipital groove. 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 Pt given occipital nerve block with 1% lidocaine and 30mg/ml toradol. One ml toradol combines with 4ml lidocaine. and injected in four locations along the greater and lesser occipital nerve on the right side. She was also given lidocaine alone trigger point injections two in the left trapezius and one on the right. Pt tolerated well. No complications.   -Carbamazepine level 2.2(low), increased carbamazepine to 400mg BID  -Pt can follow up with me as outpatient [phone]         Sleep apnea.   ·  Plan: - in the setting of obesity  - requiring CPAP  - CPAP of 4, 30% FiO2 prescribed  - TSH = 1.80.    Discussed with Dr. Sanchez , cleared for DC on  10/8/21.  Medications reviewed with patient. Medications sent to patient's preferred pharmacy.

## 2021-09-28 LAB
ANION GAP SERPL CALC-SCNC: 15 MMOL/L — HIGH (ref 7–14)
BUN SERPL-MCNC: 26 MG/DL — HIGH (ref 7–23)
CALCIUM SERPL-MCNC: 10.3 MG/DL — SIGNIFICANT CHANGE UP (ref 8.4–10.5)
CHLORIDE SERPL-SCNC: 88 MMOL/L — LOW (ref 98–107)
CO2 SERPL-SCNC: 26 MMOL/L — SIGNIFICANT CHANGE UP (ref 22–31)
CREAT SERPL-MCNC: 6.88 MG/DL — HIGH (ref 0.5–1.3)
GLUCOSE BLDC GLUCOMTR-MCNC: 131 MG/DL — HIGH (ref 70–99)
GLUCOSE BLDC GLUCOMTR-MCNC: 158 MG/DL — HIGH (ref 70–99)
GLUCOSE BLDC GLUCOMTR-MCNC: 193 MG/DL — HIGH (ref 70–99)
GLUCOSE BLDC GLUCOMTR-MCNC: 80 MG/DL — SIGNIFICANT CHANGE UP (ref 70–99)
GLUCOSE SERPL-MCNC: 85 MG/DL — SIGNIFICANT CHANGE UP (ref 70–99)
HCT VFR BLD CALC: 31.6 % — LOW (ref 34.5–45)
HGB BLD-MCNC: 9.9 G/DL — LOW (ref 11.5–15.5)
MAGNESIUM SERPL-MCNC: 2.6 MG/DL — SIGNIFICANT CHANGE UP (ref 1.6–2.6)
MCHC RBC-ENTMCNC: 29.7 PG — SIGNIFICANT CHANGE UP (ref 27–34)
MCHC RBC-ENTMCNC: 31.3 GM/DL — LOW (ref 32–36)
MCV RBC AUTO: 94.9 FL — SIGNIFICANT CHANGE UP (ref 80–100)
NRBC # BLD: 0 /100 WBCS — SIGNIFICANT CHANGE UP
NRBC # FLD: 0 K/UL — SIGNIFICANT CHANGE UP
PHOSPHATE SERPL-MCNC: 5.4 MG/DL — HIGH (ref 2.5–4.5)
PLATELET # BLD AUTO: 199 K/UL — SIGNIFICANT CHANGE UP (ref 150–400)
POTASSIUM SERPL-MCNC: 4.8 MMOL/L — SIGNIFICANT CHANGE UP (ref 3.5–5.3)
POTASSIUM SERPL-SCNC: 4.8 MMOL/L — SIGNIFICANT CHANGE UP (ref 3.5–5.3)
RBC # BLD: 3.33 M/UL — LOW (ref 3.8–5.2)
RBC # FLD: 19.9 % — HIGH (ref 10.3–14.5)
SODIUM SERPL-SCNC: 129 MMOL/L — LOW (ref 135–145)
WBC # BLD: 5.78 K/UL — SIGNIFICANT CHANGE UP (ref 3.8–10.5)
WBC # FLD AUTO: 5.78 K/UL — SIGNIFICANT CHANGE UP (ref 3.8–10.5)

## 2021-09-28 RX ORDER — GABAPENTIN 400 MG/1
100 CAPSULE ORAL
Refills: 0 | Status: DISCONTINUED | OUTPATIENT
Start: 2021-09-28 | End: 2021-09-28

## 2021-09-28 RX ORDER — GABAPENTIN 400 MG/1
100 CAPSULE ORAL DAILY
Refills: 0 | Status: DISCONTINUED | OUTPATIENT
Start: 2021-09-28 | End: 2021-10-03

## 2021-09-28 RX ORDER — ERYTHROPOIETIN 10000 [IU]/ML
10000 INJECTION, SOLUTION INTRAVENOUS; SUBCUTANEOUS
Refills: 0 | Status: DISCONTINUED | OUTPATIENT
Start: 2021-09-28 | End: 2021-10-08

## 2021-09-28 RX ADMIN — Medication 1 TABLET(S): at 11:58

## 2021-09-28 RX ADMIN — CHLORHEXIDINE GLUCONATE 1 APPLICATION(S): 213 SOLUTION TOPICAL at 11:58

## 2021-09-28 RX ADMIN — Medication 100 MILLIGRAM(S): at 11:58

## 2021-09-28 RX ADMIN — Medication 400 MILLIGRAM(S): at 17:27

## 2021-09-28 RX ADMIN — ATORVASTATIN CALCIUM 20 MILLIGRAM(S): 80 TABLET, FILM COATED ORAL at 21:47

## 2021-09-28 RX ADMIN — SEVELAMER CARBONATE 1600 MILLIGRAM(S): 2400 POWDER, FOR SUSPENSION ORAL at 17:27

## 2021-09-28 RX ADMIN — Medication 650 MILLIGRAM(S): at 07:43

## 2021-09-28 RX ADMIN — CYCLOBENZAPRINE HYDROCHLORIDE 10 MILLIGRAM(S): 10 TABLET, FILM COATED ORAL at 15:21

## 2021-09-28 RX ADMIN — Medication 1: at 17:27

## 2021-09-28 RX ADMIN — Medication 1 DROP(S): at 14:46

## 2021-09-28 RX ADMIN — HEPARIN SODIUM 5000 UNIT(S): 5000 INJECTION INTRAVENOUS; SUBCUTANEOUS at 21:47

## 2021-09-28 RX ADMIN — OXYCODONE HYDROCHLORIDE 5 MILLIGRAM(S): 5 TABLET ORAL at 20:05

## 2021-09-28 RX ADMIN — CYCLOBENZAPRINE HYDROCHLORIDE 10 MILLIGRAM(S): 10 TABLET, FILM COATED ORAL at 09:16

## 2021-09-28 RX ADMIN — Medication 400 MILLIGRAM(S): at 05:28

## 2021-09-28 RX ADMIN — OXYCODONE HYDROCHLORIDE 5 MILLIGRAM(S): 5 TABLET ORAL at 10:18

## 2021-09-28 RX ADMIN — INSULIN GLARGINE 15 UNIT(S): 100 INJECTION, SOLUTION SUBCUTANEOUS at 21:47

## 2021-09-28 RX ADMIN — GABAPENTIN 100 MILLIGRAM(S): 400 CAPSULE ORAL at 14:46

## 2021-09-28 RX ADMIN — OXYCODONE HYDROCHLORIDE 5 MILLIGRAM(S): 5 TABLET ORAL at 19:08

## 2021-09-28 RX ADMIN — OXYCODONE HYDROCHLORIDE 5 MILLIGRAM(S): 5 TABLET ORAL at 11:15

## 2021-09-28 RX ADMIN — Medication 650 MILLIGRAM(S): at 14:46

## 2021-09-28 RX ADMIN — Medication 650 MILLIGRAM(S): at 08:40

## 2021-09-28 RX ADMIN — Medication 650 MILLIGRAM(S): at 15:45

## 2021-09-28 RX ADMIN — HEPARIN SODIUM 5000 UNIT(S): 5000 INJECTION INTRAVENOUS; SUBCUTANEOUS at 14:47

## 2021-09-28 RX ADMIN — HEPARIN SODIUM 5000 UNIT(S): 5000 INJECTION INTRAVENOUS; SUBCUTANEOUS at 05:28

## 2021-09-28 RX ADMIN — Medication 1 DROP(S): at 05:27

## 2021-09-28 RX ADMIN — SEVELAMER CARBONATE 1600 MILLIGRAM(S): 2400 POWDER, FOR SUSPENSION ORAL at 11:58

## 2021-09-28 RX ADMIN — POLYETHYLENE GLYCOL 3350 17 GRAM(S): 17 POWDER, FOR SOLUTION ORAL at 11:58

## 2021-09-28 RX ADMIN — SEVELAMER CARBONATE 1600 MILLIGRAM(S): 2400 POWDER, FOR SUSPENSION ORAL at 09:17

## 2021-09-28 RX ADMIN — Medication 1 DROP(S): at 21:48

## 2021-09-28 NOTE — PROGRESS NOTE ADULT - SUBJECTIVE AND OBJECTIVE BOX
Patient is a 76y old  Female who presents with a chief complaint of Generalized weakness, Neck pain, and Nausea (27 Sep 2021 18:58)      SUBJECTIVE / OVERNIGHT EVENTS:    Events noted.  CONSTITUTIONAL: Pain with chewing  RESPIRATORY: No cough, wheezing,  No shortness of breath  CARDIOVASCULAR: No chest pain, palpitations, dizziness, or leg swelling  GASTROINTESTINAL: No abdominal or epigastric pain. No nausea, vomiting.  NEUROLOGICAL: No headaches,     MEDICATIONS  (STANDING):  allopurinol 100 milliGRAM(s) Oral daily  artificial tears (preservative free) Ophthalmic Solution 1 Drop(s) Both EYES three times a day  atorvastatin 20 milliGRAM(s) Oral at bedtime  carBAMazepine 400 milliGRAM(s) Oral two times a day  chlorhexidine 2% Cloths 1 Application(s) Topical daily  dextrose 40% Gel 15 Gram(s) Oral once  dextrose 5%. 1000 milliLiter(s) (50 mL/Hr) IV Continuous <Continuous>  dextrose 5%. 1000 milliLiter(s) (100 mL/Hr) IV Continuous <Continuous>  dextrose 50% Injectable 25 Gram(s) IV Push once  dextrose 50% Injectable 12.5 Gram(s) IV Push once  dextrose 50% Injectable 25 Gram(s) IV Push once  glucagon  Injectable 1 milliGRAM(s) IntraMuscular once  heparin   Injectable 5000 Unit(s) SubCutaneous every 8 hours  influenza  Vaccine (HIGH DOSE) 0.7 milliLiter(s) IntraMuscular once  insulin glargine Injectable (LANTUS) 15 Unit(s) SubCutaneous at bedtime  insulin lispro (ADMELOG) corrective regimen sliding scale   SubCutaneous three times a day before meals  insulin lispro (ADMELOG) corrective regimen sliding scale   SubCutaneous at bedtime  Nephro-neeraj 1 Tablet(s) Oral daily  polyethylene glycol 3350 17 Gram(s) Oral daily  senna 2 Tablet(s) Oral at bedtime  sevelamer carbonate 1600 milliGRAM(s) Oral three times a day with meals    MEDICATIONS  (PRN):  acetaminophen   Tablet .. 650 milliGRAM(s) Oral every 6 hours PRN Temp greater or equal to 38C (100.4F), Mild Pain (1 - 3), Moderate Pain (4 - 6)  cyclobenzaprine 10 milliGRAM(s) Oral three times a day PRN Muscle Spasm  ketorolac   Injectable 30 milliGRAM(s) IV Push every 6 hours PRN Severe Pain (7 - 10)  ondansetron Injectable 4 milliGRAM(s) IV Push every 8 hours PRN Nausea and/or Vomiting  oxyCODONE    IR 5 milliGRAM(s) Oral every 8 hours PRN Severe Pain (7 - 10)        CAPILLARY BLOOD GLUCOSE      POCT Blood Glucose.: 152 mg/dL (27 Sep 2021 21:16)  POCT Blood Glucose.: 144 mg/dL (27 Sep 2021 17:30)  POCT Blood Glucose.: 110 mg/dL (27 Sep 2021 11:41)  POCT Blood Glucose.: 118 mg/dL (27 Sep 2021 10:22)  POCT Blood Glucose.: 147 mg/dL (27 Sep 2021 06:32)    I&O's Summary    27 Sep 2021 07:01  -  27 Sep 2021 23:56  --------------------------------------------------------  IN: 800 mL / OUT: 2700 mL / NET: -1900 mL        T(C): 36.5 (09-27-21 @ 21:36), Max: 37.2 (09-27-21 @ 13:51)  HR: 81 (09-27-21 @ 21:36) (77 - 99)  BP: 101/52 (09-27-21 @ 21:36) (101/52 - 153/92)  RR: 17 (09-27-21 @ 21:36) (16 - 17)  SpO2: 100% (09-27-21 @ 21:36) (99% - 100%)    PHYSICAL EXAM:  GENERAL: NAD  NECK: Supple, No JVD  CHEST/LUNG: Clear to auscultation bilaterally; No wheezing.  HEART: Regular rate and rhythm; No murmurs, rubs, or gallops  ABDOMEN: Soft, Nontender, Nondistended; Bowel sounds present  EXTREMITIES:   No edema  NEUROLOGY: AAO X 3      LABS:                        9.9    6.54  )-----------( 230      ( 27 Sep 2021 07:55 )             31.1     09-27    127<L>  |  88<L>  |  39<H>  ----------------------------<  133<H>  4.7   |  22  |  8.91<H>    Ca    9.8      27 Sep 2021 07:55  Phos  4.9     09-27  Mg     2.70     09-27              CAPILLARY BLOOD GLUCOSE      POCT Blood Glucose.: 152 mg/dL (27 Sep 2021 21:16)  POCT Blood Glucose.: 144 mg/dL (27 Sep 2021 17:30)  POCT Blood Glucose.: 110 mg/dL (27 Sep 2021 11:41)  POCT Blood Glucose.: 118 mg/dL (27 Sep 2021 10:22)  POCT Blood Glucose.: 147 mg/dL (27 Sep 2021 06:32)        RADIOLOGY & ADDITIONAL TESTS:    Imaging Personally Reviewed:    Consultant(s) Notes Reviewed:      Care Discussed with Consultants/Other Providers:    Mike Sanchez MD, CMD, FACP    [address] [place] [address]  Office Tel: [phone]  Cell: [phone]

## 2021-09-28 NOTE — CHART NOTE - NSCHARTNOTEFT_GEN_A_CORE
Patient reporting neck pain despite medications.   Neuro reconsulted [id number], will eval patient tomorrow 9/29/21 in am.

## 2021-09-28 NOTE — PROGRESS NOTE ADULT - SUBJECTIVE AND OBJECTIVE BOX
[facility] : Ans Serv [phone], Office [phone]  Dr Lockett/Dr Camacho  /Dr Garry davenport /Dr JACY Taylor/Dr Del Becker/Dr Stephon Colbert /Dr MALCOLM Kearns  _______________________________________________________________________________________________    seen and examined today for End Stage Renal Disease on Dialysis   Interval : plan for hemodialysis tomorrow   VITALS:  T(F): 97.7 (09-28-21 @ 09:18), Max: 99 (09-27-21 @ 13:51)  HR: 86 (09-28-21 @ 09:18)  BP: 134/63 (09-28-21 @ 09:18)  RR: 18 (09-28-21 @ 09:18)  SpO2: 98% (09-28-21 @ 09:18)    09-27 @ 07:01  -  09-28 @ 07:00  --------------------------------------------------------  IN: 800 mL / OUT: 2700 mL / NET: -1900 mL    Physical Exam :-  Constitutional: NAD  Neck: Supple.  Respiratory: Bilateral equal breath sounds, few basal Crackles present.  Cardiovascular: S1, S2 normal, positive Murmur  Gastrointestinal: Bowel Sounds present, soft, non tender.  Neurological: Alert and Oriented x 3  Psychiatric: Normal mood, normal affect  Data:-  Allergies :   IV Contrast (Anaphylaxis)  shellfish (Hives; Rash)  shrimp (Hives)  smoke; coughing (Other)    Hospital Medications:   MEDICATIONS  (STANDING):  allopurinol 100 milliGRAM(s) Oral daily  artificial tears (preservative free) Ophthalmic Solution 1 Drop(s) Both EYES three times a day  atorvastatin 20 milliGRAM(s) Oral at bedtime  carBAMazepine 400 milliGRAM(s) Oral two times a day  chlorhexidine 2% Cloths 1 Application(s) Topical daily  dextrose 40% Gel 15 Gram(s) Oral once  dextrose 5%. 1000 milliLiter(s) (50 mL/Hr) IV Continuous <Continuous>  dextrose 5%. 1000 milliLiter(s) (100 mL/Hr) IV Continuous <Continuous>  dextrose 50% Injectable 25 Gram(s) IV Push once  dextrose 50% Injectable 12.5 Gram(s) IV Push once  dextrose 50% Injectable 25 Gram(s) IV Push once  glucagon  Injectable 1 milliGRAM(s) IntraMuscular once  heparin   Injectable 5000 Unit(s) SubCutaneous every 8 hours  influenza  Vaccine (HIGH DOSE) 0.7 milliLiter(s) IntraMuscular once  insulin glargine Injectable (LANTUS) 15 Unit(s) SubCutaneous at bedtime  insulin lispro (ADMELOG) corrective regimen sliding scale   SubCutaneous three times a day before meals  insulin lispro (ADMELOG) corrective regimen sliding scale   SubCutaneous at bedtime  Nephro-neeraj 1 Tablet(s) Oral daily  polyethylene glycol 3350 17 Gram(s) Oral daily  senna 2 Tablet(s) Oral at bedtime  sevelamer carbonate 1600 milliGRAM(s) Oral three times a day with meals    09-28    129<L>  |  88<L>  |  26<H>  ----------------------------<  85  4.8   |  26  |  6.88<H>    Ca    10.3      28 Sep 2021 07:23  Phos  5.4     09-28  Mg     2.60     09-28      Creatinine Trend: 6.88 <--, 8.91 <--, 7.01 <--, 4.78 <--, 6.55 <--, 9.21 <--, 7.99 <--, 5.58 <--                        9.9    5.78  )-----------( 199      ( 28 Sep 2021 07:23 )             31.6

## 2021-09-28 NOTE — CHART NOTE - NSCHARTNOTEFT_GEN_A_CORE
Discussed w/ Dr. Sanchez, recommending to start on Gabapentin 100mg daily for neck pain ( renal dose).

## 2021-09-28 NOTE — PROGRESS NOTE ADULT - ASSESSMENT
73F with h/o ESRD on HD via tunneled cath awaiting PD catheter placement ( HD is M/W/F), DM, HTN who presents with c/o dyspnea with recent RSV infection. RENAL following for ESRD Mx.    End Stage Renal Disease on Dialysis - Monday, Wednesday and Friday

## 2021-09-29 LAB
ANION GAP SERPL CALC-SCNC: 17 MMOL/L — HIGH (ref 7–14)
ANION GAP SERPL CALC-SCNC: 18 MMOL/L — HIGH (ref 7–14)
BUN SERPL-MCNC: 14 MG/DL — SIGNIFICANT CHANGE UP (ref 7–23)
BUN SERPL-MCNC: 30 MG/DL — HIGH (ref 7–23)
CALCIUM SERPL-MCNC: 9.2 MG/DL — SIGNIFICANT CHANGE UP (ref 8.4–10.5)
CALCIUM SERPL-MCNC: 9.2 MG/DL — SIGNIFICANT CHANGE UP (ref 8.4–10.5)
CHLORIDE SERPL-SCNC: 88 MMOL/L — LOW (ref 98–107)
CHLORIDE SERPL-SCNC: 95 MMOL/L — LOW (ref 98–107)
CO2 SERPL-SCNC: 19 MMOL/L — LOW (ref 22–31)
CO2 SERPL-SCNC: 24 MMOL/L — SIGNIFICANT CHANGE UP (ref 22–31)
CREAT SERPL-MCNC: 4.59 MG/DL — HIGH (ref 0.5–1.3)
CREAT SERPL-MCNC: 7.44 MG/DL — HIGH (ref 0.5–1.3)
GLUCOSE BLDC GLUCOMTR-MCNC: 107 MG/DL — HIGH (ref 70–99)
GLUCOSE BLDC GLUCOMTR-MCNC: 107 MG/DL — HIGH (ref 70–99)
GLUCOSE BLDC GLUCOMTR-MCNC: 109 MG/DL — HIGH (ref 70–99)
GLUCOSE BLDC GLUCOMTR-MCNC: 119 MG/DL — HIGH (ref 70–99)
GLUCOSE BLDC GLUCOMTR-MCNC: 189 MG/DL — HIGH (ref 70–99)
GLUCOSE SERPL-MCNC: 109 MG/DL — HIGH (ref 70–99)
GLUCOSE SERPL-MCNC: 125 MG/DL — HIGH (ref 70–99)
HCT VFR BLD CALC: 30.3 % — LOW (ref 34.5–45)
HCT VFR BLD CALC: 33 % — LOW (ref 34.5–45)
HGB BLD-MCNC: 10.3 G/DL — LOW (ref 11.5–15.5)
HGB BLD-MCNC: 9.6 G/DL — LOW (ref 11.5–15.5)
MAGNESIUM SERPL-MCNC: 2.7 MG/DL — HIGH (ref 1.6–2.6)
MCHC RBC-ENTMCNC: 29.2 PG — SIGNIFICANT CHANGE UP (ref 27–34)
MCHC RBC-ENTMCNC: 29.3 PG — SIGNIFICANT CHANGE UP (ref 27–34)
MCHC RBC-ENTMCNC: 31.2 GM/DL — LOW (ref 32–36)
MCHC RBC-ENTMCNC: 31.7 GM/DL — LOW (ref 32–36)
MCV RBC AUTO: 92.4 FL — SIGNIFICANT CHANGE UP (ref 80–100)
MCV RBC AUTO: 93.5 FL — SIGNIFICANT CHANGE UP (ref 80–100)
NRBC # BLD: 0 /100 WBCS — SIGNIFICANT CHANGE UP
NRBC # BLD: 0 /100 WBCS — SIGNIFICANT CHANGE UP
NRBC # FLD: 0 K/UL — SIGNIFICANT CHANGE UP
NRBC # FLD: 0 K/UL — SIGNIFICANT CHANGE UP
PHOSPHATE SERPL-MCNC: 4.8 MG/DL — HIGH (ref 2.5–4.5)
PLATELET # BLD AUTO: 184 K/UL — SIGNIFICANT CHANGE UP (ref 150–400)
PLATELET # BLD AUTO: 226 K/UL — SIGNIFICANT CHANGE UP (ref 150–400)
POTASSIUM SERPL-MCNC: 4.6 MMOL/L — SIGNIFICANT CHANGE UP (ref 3.5–5.3)
POTASSIUM SERPL-MCNC: 4.9 MMOL/L — SIGNIFICANT CHANGE UP (ref 3.5–5.3)
POTASSIUM SERPL-SCNC: 4.6 MMOL/L — SIGNIFICANT CHANGE UP (ref 3.5–5.3)
POTASSIUM SERPL-SCNC: 4.9 MMOL/L — SIGNIFICANT CHANGE UP (ref 3.5–5.3)
RBC # BLD: 3.28 M/UL — LOW (ref 3.8–5.2)
RBC # BLD: 3.53 M/UL — LOW (ref 3.8–5.2)
RBC # FLD: 19.5 % — HIGH (ref 10.3–14.5)
RBC # FLD: 19.5 % — HIGH (ref 10.3–14.5)
SODIUM SERPL-SCNC: 129 MMOL/L — LOW (ref 135–145)
SODIUM SERPL-SCNC: 132 MMOL/L — LOW (ref 135–145)
WBC # BLD: 5.8 K/UL — SIGNIFICANT CHANGE UP (ref 3.8–10.5)
WBC # BLD: 7.29 K/UL — SIGNIFICANT CHANGE UP (ref 3.8–10.5)
WBC # FLD AUTO: 5.8 K/UL — SIGNIFICANT CHANGE UP (ref 3.8–10.5)
WBC # FLD AUTO: 7.29 K/UL — SIGNIFICANT CHANGE UP (ref 3.8–10.5)

## 2021-09-29 RX ORDER — IPRATROPIUM/ALBUTEROL SULFATE 18-103MCG
3 AEROSOL WITH ADAPTER (GRAM) INHALATION ONCE
Refills: 0 | Status: COMPLETED | OUTPATIENT
Start: 2021-09-29 | End: 2021-09-29

## 2021-09-29 RX ORDER — SODIUM CHLORIDE 0.65 %
1 AEROSOL, SPRAY (ML) NASAL THREE TIMES A DAY
Refills: 0 | Status: DISCONTINUED | OUTPATIENT
Start: 2021-09-29 | End: 2021-10-08

## 2021-09-29 RX ORDER — DIPHENHYDRAMINE HCL 50 MG
25 CAPSULE ORAL ONCE
Refills: 0 | Status: DISCONTINUED | OUTPATIENT
Start: 2021-09-29 | End: 2021-10-01

## 2021-09-29 RX ADMIN — SEVELAMER CARBONATE 1600 MILLIGRAM(S): 2400 POWDER, FOR SUSPENSION ORAL at 08:03

## 2021-09-29 RX ADMIN — Medication 1 DROP(S): at 15:39

## 2021-09-29 RX ADMIN — Medication 3 MILLILITER(S): at 22:45

## 2021-09-29 RX ADMIN — HEPARIN SODIUM 5000 UNIT(S): 5000 INJECTION INTRAVENOUS; SUBCUTANEOUS at 15:39

## 2021-09-29 RX ADMIN — Medication 100 MILLIGRAM(S): at 18:18

## 2021-09-29 RX ADMIN — Medication 30 MILLIGRAM(S): at 21:42

## 2021-09-29 RX ADMIN — HEPARIN SODIUM 5000 UNIT(S): 5000 INJECTION INTRAVENOUS; SUBCUTANEOUS at 06:30

## 2021-09-29 RX ADMIN — Medication 650 MILLIGRAM(S): at 15:39

## 2021-09-29 RX ADMIN — HEPARIN SODIUM 5000 UNIT(S): 5000 INJECTION INTRAVENOUS; SUBCUTANEOUS at 21:42

## 2021-09-29 RX ADMIN — INSULIN GLARGINE 15 UNIT(S): 100 INJECTION, SOLUTION SUBCUTANEOUS at 22:45

## 2021-09-29 RX ADMIN — Medication 1 DROP(S): at 21:42

## 2021-09-29 RX ADMIN — Medication 400 MILLIGRAM(S): at 06:30

## 2021-09-29 RX ADMIN — CYCLOBENZAPRINE HYDROCHLORIDE 10 MILLIGRAM(S): 10 TABLET, FILM COATED ORAL at 09:52

## 2021-09-29 RX ADMIN — GABAPENTIN 100 MILLIGRAM(S): 400 CAPSULE ORAL at 15:40

## 2021-09-29 RX ADMIN — Medication 400 MILLIGRAM(S): at 17:23

## 2021-09-29 RX ADMIN — ATORVASTATIN CALCIUM 20 MILLIGRAM(S): 80 TABLET, FILM COATED ORAL at 21:42

## 2021-09-29 RX ADMIN — Medication 1 DROP(S): at 06:30

## 2021-09-29 RX ADMIN — Medication 1 TABLET(S): at 15:40

## 2021-09-29 RX ADMIN — Medication 650 MILLIGRAM(S): at 08:09

## 2021-09-29 RX ADMIN — POLYETHYLENE GLYCOL 3350 17 GRAM(S): 17 POWDER, FOR SOLUTION ORAL at 15:39

## 2021-09-29 RX ADMIN — Medication 100 MILLIGRAM(S): at 15:39

## 2021-09-29 RX ADMIN — Medication 650 MILLIGRAM(S): at 16:35

## 2021-09-29 RX ADMIN — ERYTHROPOIETIN 10000 UNIT(S): 10000 INJECTION, SOLUTION INTRAVENOUS; SUBCUTANEOUS at 12:26

## 2021-09-29 RX ADMIN — CHLORHEXIDINE GLUCONATE 1 APPLICATION(S): 213 SOLUTION TOPICAL at 15:40

## 2021-09-29 RX ADMIN — Medication 30 MILLIGRAM(S): at 22:10

## 2021-09-29 RX ADMIN — SEVELAMER CARBONATE 1600 MILLIGRAM(S): 2400 POWDER, FOR SUSPENSION ORAL at 17:23

## 2021-09-29 RX ADMIN — SENNA PLUS 2 TABLET(S): 8.6 TABLET ORAL at 21:42

## 2021-09-29 NOTE — PROGRESS NOTE ADULT - SUBJECTIVE AND OBJECTIVE BOX
[facility] - S Janice / Nathan S /D Levy/ S Claudia/ S Rosita/ Stephon Colbert / MALCOLM Koou/ O Anita  service -[phone], office [phone]  ---------------------------------------------------------------------------------------------------------------    Patient seen and examined bedside    Subjective and Objective: No overnight events, sob resolved. No complaints today. feeling better    Allergies: IV Contrast (Anaphylaxis)  shellfish (Hives; Rash)  shrimp (Hives)  smoke; coughing (Other)      Hospital Medications:   MEDICATIONS  (STANDING):  allopurinol 100 milliGRAM(s) Oral daily  artificial tears (preservative free) Ophthalmic Solution 1 Drop(s) Both EYES three times a day  atorvastatin 20 milliGRAM(s) Oral at bedtime  carBAMazepine 400 milliGRAM(s) Oral two times a day  chlorhexidine 2% Cloths 1 Application(s) Topical daily  dextrose 40% Gel 15 Gram(s) Oral once  dextrose 5%. 1000 milliLiter(s) (50 mL/Hr) IV Continuous <Continuous>  dextrose 5%. 1000 milliLiter(s) (100 mL/Hr) IV Continuous <Continuous>  dextrose 50% Injectable 25 Gram(s) IV Push once  dextrose 50% Injectable 12.5 Gram(s) IV Push once  dextrose 50% Injectable 25 Gram(s) IV Push once  epoetin shannon-epbx (RETACRIT) Injectable 62814 Unit(s) IV Push <User Schedule>  gabapentin 100 milliGRAM(s) Oral daily  glucagon  Injectable 1 milliGRAM(s) IntraMuscular once  heparin   Injectable 5000 Unit(s) SubCutaneous every 8 hours  influenza  Vaccine (HIGH DOSE) 0.7 milliLiter(s) IntraMuscular once  insulin glargine Injectable (LANTUS) 15 Unit(s) SubCutaneous at bedtime  insulin lispro (ADMELOG) corrective regimen sliding scale   SubCutaneous three times a day before meals  insulin lispro (ADMELOG) corrective regimen sliding scale   SubCutaneous at bedtime  Nephro-neeraj 1 Tablet(s) Oral daily  polyethylene glycol 3350 17 Gram(s) Oral daily  senna 2 Tablet(s) Oral at bedtime  sevelamer carbonate 1600 milliGRAM(s) Oral three times a day with meals      REVIEW OF SYSTEMS:  CONSTITUTIONAL: No weakness, fevers or chills  EYES/ENT: No visual changes;  No vertigo or throat pain   NECK: No pain or stiffness  RESPIRATORY: No cough, wheezing, hemoptysis; No shortness of breath  CARDIOVASCULAR: No chest pain or palpitations.  GASTROINTESTINAL: No abdominal or epigastric pain. No nausea, vomiting, or hematemesis; No diarrhea or constipation. No melena or hematochezia.  GENITOURINARY: No dysuria, frequency, foamy urine, urinary urgency, incontinence or hematuria  NEUROLOGICAL: No numbness or weakness  SKIN: No itching, burning, rashes, or lesions   VASCULAR: No bilateral lower extremity edema.   All other review of systems is negative unless indicated above.    VITALS:  T(F): 97.3 (09-29-21 @ 15:53), Max: 98.7 (09-28-21 @ 21:46)  HR: 100 (09-29-21 @ 15:53)  BP: 133/71 (09-29-21 @ 15:53)  RR: 16 (09-29-21 @ 15:53)  SpO2: 95% (09-29-21 @ 15:53)  Wt(kg): --    09-29 @ 07:01  -  09-29 @ 17:39  --------------------------------------------------------  IN: 750 mL / OUT: 1650 mL / NET: -900 mL          PHYSICAL EXAM:  Constitutional: NAD  HEENT: anicteric sclera, oropharynx clear  Neck: No JVD  Respiratory: CTAB, no wheezes, rales or rhonchi  Cardiovascular: S1, S2, RRR  Gastrointestinal: BS+, soft, NT/ND  Extremities: No cyanosis or clubbing. No peripheral edema  Neurological: A/O x 3, no focal deficits  Psychiatric: Normal mood, normal affect  : No CVA tenderness. No cottrell.   Skin: No rashes  Vascular Access:    LABS:  09-29    129<L>  |  88<L>  |  30<H>  ----------------------------<  125<H>  4.9   |  24  |  7.44<H>    Ca    9.2      29 Sep 2021 12:55  Phos  4.8     09-29  Mg     2.70     09-29      Creatinine Trend: 7.44 <--, 6.88 <--, 8.91 <--, 7.01 <--, 4.78 <--, 6.55 <--, 9.21 <--                        10.3   7.29  )-----------( 184      ( 29 Sep 2021 17:28 )             33.0     Urine Studies:        RADIOLOGY & ADDITIONAL STUDIES:   [facility] - S Janice / Nathan S /D Levy/ S Claudia/ JACY Becker/ Stephon Colbert / MALCOLM Koou/ O Anita  service -[phone], office [phone]  ---------------------------------------------------------------------------------------------------------------    Patient seen and examined bedside    Subjective and Objective: No overnight events, denied sob. No complaints today. feeling better    Allergies: IV Contrast (Anaphylaxis)  shellfish (Hives; Rash)  shrimp (Hives)  smoke; coughing (Other)      Hospital Medications:   MEDICATIONS  (STANDING):  allopurinol 100 milliGRAM(s) Oral daily  artificial tears (preservative free) Ophthalmic Solution 1 Drop(s) Both EYES three times a day  atorvastatin 20 milliGRAM(s) Oral at bedtime  carBAMazepine 400 milliGRAM(s) Oral two times a day  chlorhexidine 2% Cloths 1 Application(s) Topical daily  dextrose 40% Gel 15 Gram(s) Oral once  dextrose 5%. 1000 milliLiter(s) (50 mL/Hr) IV Continuous <Continuous>  dextrose 5%. 1000 milliLiter(s) (100 mL/Hr) IV Continuous <Continuous>  dextrose 50% Injectable 25 Gram(s) IV Push once  dextrose 50% Injectable 12.5 Gram(s) IV Push once  dextrose 50% Injectable 25 Gram(s) IV Push once  epoetin shannon-epbx (RETACRIT) Injectable 11703 Unit(s) IV Push <User Schedule>  gabapentin 100 milliGRAM(s) Oral daily  glucagon  Injectable 1 milliGRAM(s) IntraMuscular once  heparin   Injectable 5000 Unit(s) SubCutaneous every 8 hours  influenza  Vaccine (HIGH DOSE) 0.7 milliLiter(s) IntraMuscular once  insulin glargine Injectable (LANTUS) 15 Unit(s) SubCutaneous at bedtime  insulin lispro (ADMELOG) corrective regimen sliding scale   SubCutaneous three times a day before meals  insulin lispro (ADMELOG) corrective regimen sliding scale   SubCutaneous at bedtime  Nephro-neeraj 1 Tablet(s) Oral daily  polyethylene glycol 3350 17 Gram(s) Oral daily  senna 2 Tablet(s) Oral at bedtime  sevelamer carbonate 1600 milliGRAM(s) Oral three times a day with meals      VITALS:  T(F): 97.3 (09-29-21 @ 15:53), Max: 98.7 (09-28-21 @ 21:46)  HR: 100 (09-29-21 @ 15:53)  BP: 133/71 (09-29-21 @ 15:53)  RR: 16 (09-29-21 @ 15:53)  SpO2: 95% (09-29-21 @ 15:53)  Wt(kg): --    09-29 @ 07:01  -  09-29 @ 17:39  --------------------------------------------------------  IN: 750 mL / OUT: 1650 mL / NET: -900 mL      PHYSICAL EXAM:  Constitutional: NAD  HEENT: anicteric sclera  Neck: No JVD  Respiratory: CTAB, no wheezes, rales or rhonchi  Cardiovascular: S1, S2, RRR  Gastrointestinal: BS+, soft, NT/ND  Extremities:  No peripheral edema  Neurological: A/O x 3, no focal deficits  Psychiatric: Normal mood, normal affect  : No cottrell.   Vascular Access: dieter avf+thrill    LABS:  09-29    129<L>  |  88<L>  |  30<H>  ----------------------------<  125<H>  4.9   |  24  |  7.44<H>    Ca    9.2      29 Sep 2021 12:55  Phos  4.8     09-29  Mg     2.70     09-29      Creatinine Trend: 7.44 <--, 6.88 <--, 8.91 <--, 7.01 <--, 4.78 <--, 6.55 <--, 9.21 <--                        10.3   7.29  )-----------( 184      ( 29 Sep 2021 17:28 )             33.0     Urine Studies:        RADIOLOGY & ADDITIONAL STUDIES:

## 2021-09-29 NOTE — PROGRESS NOTE ADULT - SUBJECTIVE AND OBJECTIVE BOX
Patient is a 76y old  Female who presents with a chief complaint of Generalized weakness, Neck pain, and Nausea (29 Sep 2021 17:39)      SUBJECTIVE / OVERNIGHT EVENTS:    Events noted.  CONSTITUTIONAL: Still c/o of neck pain  RESPIRATORY: No cough, wheezing,  No shortness of breath  CARDIOVASCULAR: No chest pain, palpitations, dizziness, or leg swelling  GASTROINTESTINAL: No abdominal or epigastric pain. No nausea, vomiting.  NEUROLOGICAL: No headaches,     MEDICATIONS  (STANDING):  allopurinol 100 milliGRAM(s) Oral daily  artificial tears (preservative free) Ophthalmic Solution 1 Drop(s) Both EYES three times a day  atorvastatin 20 milliGRAM(s) Oral at bedtime  carBAMazepine 400 milliGRAM(s) Oral two times a day  chlorhexidine 2% Cloths 1 Application(s) Topical daily  dextrose 40% Gel 15 Gram(s) Oral once  dextrose 5%. 1000 milliLiter(s) (50 mL/Hr) IV Continuous <Continuous>  dextrose 5%. 1000 milliLiter(s) (100 mL/Hr) IV Continuous <Continuous>  dextrose 50% Injectable 25 Gram(s) IV Push once  dextrose 50% Injectable 12.5 Gram(s) IV Push once  dextrose 50% Injectable 25 Gram(s) IV Push once  epoetin shannon-epbx (RETACRIT) Injectable 71376 Unit(s) IV Push <User Schedule>  gabapentin 100 milliGRAM(s) Oral daily  glucagon  Injectable 1 milliGRAM(s) IntraMuscular once  heparin   Injectable 5000 Unit(s) SubCutaneous every 8 hours  influenza  Vaccine (HIGH DOSE) 0.7 milliLiter(s) IntraMuscular once  insulin glargine Injectable (LANTUS) 15 Unit(s) SubCutaneous at bedtime  insulin lispro (ADMELOG) corrective regimen sliding scale   SubCutaneous three times a day before meals  insulin lispro (ADMELOG) corrective regimen sliding scale   SubCutaneous at bedtime  Nephro-neeraj 1 Tablet(s) Oral daily  polyethylene glycol 3350 17 Gram(s) Oral daily  senna 2 Tablet(s) Oral at bedtime  sevelamer carbonate 1600 milliGRAM(s) Oral three times a day with meals    MEDICATIONS  (PRN):  acetaminophen   Tablet .. 650 milliGRAM(s) Oral every 6 hours PRN Temp greater or equal to 38C (100.4F), Mild Pain (1 - 3), Moderate Pain (4 - 6)  cyclobenzaprine 10 milliGRAM(s) Oral three times a day PRN Muscle Spasm  diphenhydrAMINE 25 milliGRAM(s) Oral once PRN Rash and/or Itching  guaiFENesin Oral Liquid (Sugar-Free) 100 milliGRAM(s) Oral every 6 hours PRN Cough  ketorolac   Injectable 30 milliGRAM(s) IV Push every 6 hours PRN Severe Pain (7 - 10)  ondansetron Injectable 4 milliGRAM(s) IV Push every 8 hours PRN Nausea and/or Vomiting  oxyCODONE    IR 5 milliGRAM(s) Oral every 8 hours PRN Severe Pain (7 - 10)        CAPILLARY BLOOD GLUCOSE      POCT Blood Glucose.: 107 mg/dL (29 Sep 2021 16:38)  POCT Blood Glucose.: 109 mg/dL (29 Sep 2021 14:08)  POCT Blood Glucose.: 119 mg/dL (29 Sep 2021 10:45)  POCT Blood Glucose.: 107 mg/dL (29 Sep 2021 07:09)  POCT Blood Glucose.: 193 mg/dL (28 Sep 2021 21:18)    I&O's Summary    29 Sep 2021 07:01  -  29 Sep 2021 21:06  --------------------------------------------------------  IN: 750 mL / OUT: 1650 mL / NET: -900 mL        T(C): 36.3 (09-29-21 @ 15:53), Max: 37.1 (09-28-21 @ 21:46)  HR: 80 (09-29-21 @ 19:28) (69 - 102)  BP: 133/71 (09-29-21 @ 15:53) (130/71 - 153/77)  RR: 16 (09-29-21 @ 15:53) (16 - 18)  SpO2: 97% (09-29-21 @ 19:28) (95% - 99%)    PHYSICAL EXAM:  GENERAL: NAD  NECK: Supple, No JVD  CHEST/LUNG: Clear to auscultation bilaterally; No wheezing.  HEART: Regular rate and rhythm; No murmurs, rubs, or gallops  ABDOMEN: Soft, Nontender, Nondistended; Bowel sounds present  EXTREMITIES:   No edema  NEUROLOGY: AAO X 3      LABS:                        10.3   7.29  )-----------( 184      ( 29 Sep 2021 17:28 )             33.0     09-29    132<L>  |  95<L>  |  14  ----------------------------<  109<H>  4.6   |  19<L>  |  4.59<H>    Ca    9.2      29 Sep 2021 17:28  Phos  4.8     09-29  Mg     2.70     09-29              CAPILLARY BLOOD GLUCOSE      POCT Blood Glucose.: 107 mg/dL (29 Sep 2021 16:38)  POCT Blood Glucose.: 109 mg/dL (29 Sep 2021 14:08)  POCT Blood Glucose.: 119 mg/dL (29 Sep 2021 10:45)  POCT Blood Glucose.: 107 mg/dL (29 Sep 2021 07:09)  POCT Blood Glucose.: 193 mg/dL (28 Sep 2021 21:18)        RADIOLOGY & ADDITIONAL TESTS:    Imaging Personally Reviewed:    Consultant(s) Notes Reviewed:      Care Discussed with Consultants/Other Providers:    Mike Sanchez MD, CMD, FACP    [id number] [id number]  Office Tel: [phone]  Cell: [phone]

## 2021-09-29 NOTE — PROGRESS NOTE ADULT - ASSESSMENT
73F with h/o ESRD on HD via tunneled cath awaiting PD catheter placement ( HD is M/W/F), DM, HTN who presents with c/o dyspnea with recent RSV infection. RENAL following for ESRD Mx.    poc d/w pt  labs, chart reviewed  pl call for q's  Nephrology   cell [phone]  office [phone]  [facility] [phone]

## 2021-09-29 NOTE — PROGRESS NOTE ADULT - ASSESSMENT
76 year old female, with past history significant for Anemia, Type-2 DM, ESRD (HD M/W/F), HTN, HLD, Sleep apnea (CPAP), Trigeminal neuralgia, Carpal tunnel syndrome and Gout, presented to he ED secondary to neck pain and generalized weakness.  Vital signs upon ED presentation as follows: BP = 129/72, HR = 98, RR = 19, T = 36.6 C (97.9 F), O2 Sat = 100% on RA.  Diagnosed with Generalized Weakness, Neck Pain, and Nausea.  Admitted for further evaluation/management.    Occipital neuralgia:    On Tegretol 400 mg po twice a day  Oxy IR    Neuro f/up and Dc planning

## 2021-09-30 LAB
ANION GAP SERPL CALC-SCNC: 14 MMOL/L — SIGNIFICANT CHANGE UP (ref 7–14)
BUN SERPL-MCNC: 20 MG/DL — SIGNIFICANT CHANGE UP (ref 7–23)
CALCIUM SERPL-MCNC: 9.7 MG/DL — SIGNIFICANT CHANGE UP (ref 8.4–10.5)
CHLORIDE SERPL-SCNC: 91 MMOL/L — LOW (ref 98–107)
CO2 SERPL-SCNC: 24 MMOL/L — SIGNIFICANT CHANGE UP (ref 22–31)
CREAT SERPL-MCNC: 5.89 MG/DL — HIGH (ref 0.5–1.3)
GLUCOSE BLDC GLUCOMTR-MCNC: 132 MG/DL — HIGH (ref 70–99)
GLUCOSE BLDC GLUCOMTR-MCNC: 176 MG/DL — HIGH (ref 70–99)
GLUCOSE BLDC GLUCOMTR-MCNC: 182 MG/DL — HIGH (ref 70–99)
GLUCOSE BLDC GLUCOMTR-MCNC: 201 MG/DL — HIGH (ref 70–99)
GLUCOSE SERPL-MCNC: 101 MG/DL — HIGH (ref 70–99)
HCT VFR BLD CALC: 32.3 % — LOW (ref 34.5–45)
HGB BLD-MCNC: 10.2 G/DL — LOW (ref 11.5–15.5)
MCHC RBC-ENTMCNC: 30.1 PG — SIGNIFICANT CHANGE UP (ref 27–34)
MCHC RBC-ENTMCNC: 31.6 GM/DL — LOW (ref 32–36)
MCV RBC AUTO: 95.3 FL — SIGNIFICANT CHANGE UP (ref 80–100)
NRBC # BLD: 0 /100 WBCS — SIGNIFICANT CHANGE UP
NRBC # FLD: 0.04 K/UL — HIGH
PLATELET # BLD AUTO: 206 K/UL — SIGNIFICANT CHANGE UP (ref 150–400)
POTASSIUM SERPL-MCNC: 4.8 MMOL/L — SIGNIFICANT CHANGE UP (ref 3.5–5.3)
POTASSIUM SERPL-SCNC: 4.8 MMOL/L — SIGNIFICANT CHANGE UP (ref 3.5–5.3)
RBC # BLD: 3.39 M/UL — LOW (ref 3.8–5.2)
RBC # FLD: 19.9 % — HIGH (ref 10.3–14.5)
SODIUM SERPL-SCNC: 129 MMOL/L — LOW (ref 135–145)
WBC # BLD: 7.68 K/UL — SIGNIFICANT CHANGE UP (ref 3.8–10.5)
WBC # FLD AUTO: 7.68 K/UL — SIGNIFICANT CHANGE UP (ref 3.8–10.5)

## 2021-09-30 PROCEDURE — 71045 X-RAY EXAM CHEST 1 VIEW: CPT | Mod: 26

## 2021-09-30 PROCEDURE — 99232 SBSQ HOSP IP/OBS MODERATE 35: CPT

## 2021-09-30 RX ADMIN — INSULIN GLARGINE 15 UNIT(S): 100 INJECTION, SOLUTION SUBCUTANEOUS at 22:37

## 2021-09-30 RX ADMIN — Medication 1 DROP(S): at 13:49

## 2021-09-30 RX ADMIN — Medication 1 SPRAY(S): at 13:44

## 2021-09-30 RX ADMIN — Medication 100 MILLIGRAM(S): at 13:44

## 2021-09-30 RX ADMIN — Medication 1: at 07:43

## 2021-09-30 RX ADMIN — Medication 1 DROP(S): at 22:37

## 2021-09-30 RX ADMIN — POLYETHYLENE GLYCOL 3350 17 GRAM(S): 17 POWDER, FOR SOLUTION ORAL at 11:44

## 2021-09-30 RX ADMIN — HEPARIN SODIUM 5000 UNIT(S): 5000 INJECTION INTRAVENOUS; SUBCUTANEOUS at 22:37

## 2021-09-30 RX ADMIN — OXYCODONE HYDROCHLORIDE 5 MILLIGRAM(S): 5 TABLET ORAL at 19:45

## 2021-09-30 RX ADMIN — Medication 1 TABLET(S): at 11:44

## 2021-09-30 RX ADMIN — Medication 1 DROP(S): at 06:27

## 2021-09-30 RX ADMIN — Medication 100 MILLIGRAM(S): at 11:44

## 2021-09-30 RX ADMIN — SEVELAMER CARBONATE 1600 MILLIGRAM(S): 2400 POWDER, FOR SUSPENSION ORAL at 11:59

## 2021-09-30 RX ADMIN — Medication 1: at 11:42

## 2021-09-30 RX ADMIN — HEPARIN SODIUM 5000 UNIT(S): 5000 INJECTION INTRAVENOUS; SUBCUTANEOUS at 13:49

## 2021-09-30 RX ADMIN — ATORVASTATIN CALCIUM 20 MILLIGRAM(S): 80 TABLET, FILM COATED ORAL at 22:37

## 2021-09-30 RX ADMIN — HEPARIN SODIUM 5000 UNIT(S): 5000 INJECTION INTRAVENOUS; SUBCUTANEOUS at 06:27

## 2021-09-30 RX ADMIN — Medication 400 MILLIGRAM(S): at 17:41

## 2021-09-30 RX ADMIN — CHLORHEXIDINE GLUCONATE 1 APPLICATION(S): 213 SOLUTION TOPICAL at 11:44

## 2021-09-30 RX ADMIN — SEVELAMER CARBONATE 1600 MILLIGRAM(S): 2400 POWDER, FOR SUSPENSION ORAL at 17:41

## 2021-09-30 RX ADMIN — Medication 400 MILLIGRAM(S): at 06:27

## 2021-09-30 RX ADMIN — SEVELAMER CARBONATE 1600 MILLIGRAM(S): 2400 POWDER, FOR SUSPENSION ORAL at 07:44

## 2021-09-30 RX ADMIN — SENNA PLUS 2 TABLET(S): 8.6 TABLET ORAL at 22:37

## 2021-09-30 RX ADMIN — OXYCODONE HYDROCHLORIDE 5 MILLIGRAM(S): 5 TABLET ORAL at 19:15

## 2021-09-30 NOTE — PROGRESS NOTE ADULT - SUBJECTIVE AND OBJECTIVE BOX
Neurology  Progress Note  09-30-21    Name:  SUN FRAUSTO; 76y    Interval History: Patient seen and examined at bedside. No overnight events. Patient reports diffuse pain.    Medications:  acetaminophen   Tablet .. 650 milliGRAM(s) Oral every 6 hours PRN  allopurinol 100 milliGRAM(s) Oral daily  artificial tears (preservative free) Ophthalmic Solution 1 Drop(s) Both EYES three times a day  atorvastatin 20 milliGRAM(s) Oral at bedtime  carBAMazepine 400 milliGRAM(s) Oral two times a day  chlorhexidine 2% Cloths 1 Application(s) Topical daily  cyclobenzaprine 10 milliGRAM(s) Oral three times a day PRN  dextrose 5%. 1000 milliLiter(s) IV Continuous <Continuous>  dextrose 5%. 1000 milliLiter(s) IV Continuous <Continuous>  epoetin shannon-epbx (RETACRIT) Injectable 10577 Unit(s) IV Push <User Schedule>  gabapentin 100 milliGRAM(s) Oral daily  guaiFENesin Oral Liquid (Sugar-Free) 100 milliGRAM(s) Oral every 6 hours PRN  heparin   Injectable 5000 Unit(s) SubCutaneous every 8 hours  insulin glargine Injectable (LANTUS) 15 Unit(s) SubCutaneous at bedtime  insulin lispro (ADMELOG) corrective regimen sliding scale   SubCutaneous three times a day before meals  insulin lispro (ADMELOG) corrective regimen sliding scale   SubCutaneous at bedtime  ketorolac   Injectable 30 milliGRAM(s) IV Push every 6 hours PRN  Nephro-neeraj 1 Tablet(s) Oral daily  ondansetron Injectable 4 milliGRAM(s) IV Push every 8 hours PRN  oxyCODONE    IR 5 milliGRAM(s) Oral every 8 hours PRN  polyethylene glycol 3350 17 Gram(s) Oral daily  senna 2 Tablet(s) Oral at bedtime  sevelamer carbonate 1600 milliGRAM(s) Oral three times a day with meals  sodium chloride 0.65% Nasal 1 Spray(s) Both Nostrils three times a day PRN      Vitals:  T(C): 36.8 (09-30-21 @ 14:32), Max: 36.8 (09-29-21 @ 21:50)  HR: 66 (09-30-21 @ 14:32) (66 - 100)  BP: 128/70 (09-30-21 @ 14:32) (124/63 - 133/71)  RR: 16 (09-30-21 @ 14:32) (16 - 18)  SpO2: 97% (09-30-21 @ 14:32) (95% - 98%)    Physical Examination:  General: Appears stated age, obese, in no apparent distress including pain  Neurologic:  - Mental Status: Alert, awake; Speech is fluent with intact comprehension. Follows all commands.  - Cranial Nerves: Pupils are equal, round, and reactive to light; Extraocular movements are intact without nystagmus. Face appears grossly symmetric.   - Motor: Moving all extremities antigravity but pain-limited, particularly with external shoulder rotation R>L, pressure application at the R  >L, internal rotation of the hip b/l, and pressure at the 2nd MTPs R>L.  - Reflexes: 2+ and symmetric at the biceps, triceps, brachioradialis, knees, and ankles. Plant responses down bilaterally.  - Sensory: Intact throughout to light touch. Withdraws to noxious stimuli as above.  - Gait: Deferred    Labs:                        10.2   7.68  )-----------( 206      ( 30 Sep 2021 07:29 )             32.3     09-30    129<L>  |  91<L>  |  20  ----------------------------<  101<H>  4.8   |  24  |  5.89<H>    Ca    9.7      30 Sep 2021 07:29  Phos  4.8     09-29  Mg     2.70     09-29      CAPILLARY BLOOD GLUCOSE  POCT Blood Glucose.: 176 mg/dL (30 Sep 2021 11:23)          Radiology:  CT Head No Cont (06.24.21 @ 01:17)  IMPRESSION:  No acute intracranial hemorrhage, mass effect, or evidence of acute vascular territorial infarction. No calvarial fracture. Neurology  Progress Note  09-30-21    Name:  SUN FRAUSTO; 76y    Interval History: Patient seen and examined at bedside. No overnight events. Patient reports diffuse pain.    Medications:  acetaminophen   Tablet .. 650 milliGRAM(s) Oral every 6 hours PRN  allopurinol 100 milliGRAM(s) Oral daily  artificial tears (preservative free) Ophthalmic Solution 1 Drop(s) Both EYES three times a day  atorvastatin 20 milliGRAM(s) Oral at bedtime  carBAMazepine 400 milliGRAM(s) Oral two times a day  chlorhexidine 2% Cloths 1 Application(s) Topical daily  cyclobenzaprine 10 milliGRAM(s) Oral three times a day PRN  dextrose 5%. 1000 milliLiter(s) IV Continuous <Continuous>  dextrose 5%. 1000 milliLiter(s) IV Continuous <Continuous>  epoetin shannon-epbx (RETACRIT) Injectable 66855 Unit(s) IV Push <User Schedule>  gabapentin 100 milliGRAM(s) Oral daily  guaiFENesin Oral Liquid (Sugar-Free) 100 milliGRAM(s) Oral every 6 hours PRN  heparin   Injectable 5000 Unit(s) SubCutaneous every 8 hours  insulin glargine Injectable (LANTUS) 15 Unit(s) SubCutaneous at bedtime  insulin lispro (ADMELOG) corrective regimen sliding scale   SubCutaneous three times a day before meals  insulin lispro (ADMELOG) corrective regimen sliding scale   SubCutaneous at bedtime  ketorolac   Injectable 30 milliGRAM(s) IV Push every 6 hours PRN  Nephro-neeraj 1 Tablet(s) Oral daily  ondansetron Injectable 4 milliGRAM(s) IV Push every 8 hours PRN  oxyCODONE    IR 5 milliGRAM(s) Oral every 8 hours PRN  polyethylene glycol 3350 17 Gram(s) Oral daily  senna 2 Tablet(s) Oral at bedtime  sevelamer carbonate 1600 milliGRAM(s) Oral three times a day with meals  sodium chloride 0.65% Nasal 1 Spray(s) Both Nostrils three times a day PRN      Vitals:  T(C): 36.8 (09-30-21 @ 14:32), Max: 36.8 (09-29-21 @ 21:50)  HR: 66 (09-30-21 @ 14:32) (66 - 100)  BP: 128/70 (09-30-21 @ 14:32) (124/63 - 133/71)  RR: 16 (09-30-21 @ 14:32) (16 - 18)  SpO2: 97% (09-30-21 @ 14:32) (95% - 98%)    Physical Examination:  General: Appears stated age, obese, in no apparent distress including pain  Neurologic:  - Mental Status: Alert, awake; Speech is fluent with intact comprehension. Follows all commands.  - Cranial Nerves: Pupils are equal, round, and reactive to light; Extraocular movements are intact without nystagmus. Face appears grossly symmetric.   - Motor: Moving all extremities antigravity but pain-limited, particularly with shoulder rotation R>L, forced passive flexion of IP joints (R hand worse than L), internal rotation of the hip b/l, and palpation of the 2nd MTPs R>L.  - Reflexes: 2+ and symmetric at the biceps, triceps, brachioradialis, knees, and ankles. Plant responses down bilaterally.  - Sensory: Intact throughout to light touch. Withdraws to noxious stimuli as above.  - Gait: Deferred    Labs:                        10.2   7.68  )-----------( 206      ( 30 Sep 2021 07:29 )             32.3     09-30    129<L>  |  91<L>  |  20  ----------------------------<  101<H>  4.8   |  24  |  5.89<H>    Ca    9.7      30 Sep 2021 07:29  Phos  4.8     09-29  Mg     2.70     09-29      CAPILLARY BLOOD GLUCOSE  POCT Blood Glucose.: 176 mg/dL (30 Sep 2021 11:23)          Radiology:  CT Head No Cont (06.24.21 @ 01:17)  IMPRESSION:  No acute intracranial hemorrhage, mass effect, or evidence of acute vascular territorial infarction. No calvarial fracture.

## 2021-09-30 NOTE — OCCUPATIONAL THERAPY INITIAL EVALUATION ADULT - GENERAL OBSERVATIONS, REHAB EVAL
+left pre-john wrist splint, Patient received semisupine in bed in NAD and agreeable to participate in OT evaluation. Patient presents with deficits in ADL performance and functional mobility as well as decreased endurance. Patient will benefit from inpatient rehab to improve functional independence. Patient left in semisupine position with HOB elevated above 30 degrees, in NAD. All lines intact.

## 2021-09-30 NOTE — OCCUPATIONAL THERAPY INITIAL EVALUATION ADULT - PERTINENT HX OF CURRENT PROBLEM, REHAB EVAL
76 year old female, with past history significant for Anemia, Type-2 DM, ESRD (HD M/W/F), HTN, HLD, Sleep apnea (CPAP), Trigeminal neuralgia (diagnosed in [place] 30yrs ago), Carpal tunnel syndrome and Gout, presented to the ED secondary to right sided neck pain and generalized weakness. (cont below)

## 2021-09-30 NOTE — PHYSICAL THERAPY INITIAL EVALUATION ADULT - PERTINENT HX OF CURRENT PROBLEM, REHAB EVAL
76 year old female, with past history significant for Anemia, Type-2 DM, ESRD (HD M/W/F), HTN, HLD, Sleep apnea (CPAP), Trigeminal neuralgia, Carpal tunnel syndrome and Gout, presented to he ED secondary to neck pain and generalized weakness

## 2021-09-30 NOTE — PROGRESS NOTE ADULT - ASSESSMENT
76 year old female, with past history significant for Anemia, Type-2 DM, ESRD (HD M/W/F), HTN, HLD, Sleep apnea (CPAP), Trigeminal neuralgia, Carpal tunnel syndrome and Gout, presented to he ED secondary to neck pain and generalized weakness.  Vital signs upon ED presentation as follows: BP = 129/72, HR = 98, RR = 19, T = 36.6 C (97.9 F), O2 Sat = 100% on RA.  Diagnosed with Generalized Weakness, Neck Pain, and Nausea.  Admitted for further evaluation/management.    Occipital neuralgia:    On Tegretol 400 mg po twice a day  Oxy IR    Neuro f/up and Dc planning 76 year old female, with past history significant for Anemia, Type-2 DM, ESRD (HD M/W/F), HTN, HLD, Sleep apnea (CPAP), Trigeminal neuralgia, Carpal tunnel syndrome and Gout, presented to he ED secondary to neck pain and generalized weakness.  Vital signs upon ED presentation as follows: BP = 129/72, HR = 98, RR = 19, T = 36.6 C (97.9 F), O2 Sat = 100% on RA.  Diagnosed with Generalized Weakness, Neck Pain, and Nausea.  Admitted for further evaluation/management.    Occipital neuralgia:    On Tegretol 400 mg po twice a day  Oxy IR  Neuro f/up appreciated  Pain mx eval  Rheumatologic labs

## 2021-09-30 NOTE — PROGRESS NOTE ADULT - SUBJECTIVE AND OBJECTIVE BOX
[facility] - S Janice / Nathan S /D Levy/ S Claudia/ S Rosita/ Stephon Colbert / MALCOLM Koou/ O Anita  service -[phone], office [phone]  ---------------------------------------------------------------------------------------------------------------    Patient seen and examined bedside    Subjective and Objective: No overnight events, denied sob. No complaints today. feeling better    Allergies: IV Contrast (Anaphylaxis)  shellfish (Hives; Rash)  shrimp (Hives)  smoke; coughing (Other)      Hospital Medications:   MEDICATIONS  (STANDING):  allopurinol 100 milliGRAM(s) Oral daily  artificial tears (preservative free) Ophthalmic Solution 1 Drop(s) Both EYES three times a day  atorvastatin 20 milliGRAM(s) Oral at bedtime  benzonatate 100 milliGRAM(s) Oral once  carBAMazepine 400 milliGRAM(s) Oral two times a day  chlorhexidine 2% Cloths 1 Application(s) Topical daily  dextrose 40% Gel 15 Gram(s) Oral once  dextrose 5%. 1000 milliLiter(s) (50 mL/Hr) IV Continuous <Continuous>  dextrose 5%. 1000 milliLiter(s) (100 mL/Hr) IV Continuous <Continuous>  dextrose 50% Injectable 25 Gram(s) IV Push once  dextrose 50% Injectable 12.5 Gram(s) IV Push once  dextrose 50% Injectable 25 Gram(s) IV Push once  epoetin shannon-epbx (RETACRIT) Injectable 78797 Unit(s) IV Push <User Schedule>  gabapentin 100 milliGRAM(s) Oral daily  glucagon  Injectable 1 milliGRAM(s) IntraMuscular once  heparin   Injectable 5000 Unit(s) SubCutaneous every 8 hours  influenza  Vaccine (HIGH DOSE) 0.7 milliLiter(s) IntraMuscular once  insulin glargine Injectable (LANTUS) 15 Unit(s) SubCutaneous at bedtime  insulin lispro (ADMELOG) corrective regimen sliding scale   SubCutaneous three times a day before meals  insulin lispro (ADMELOG) corrective regimen sliding scale   SubCutaneous at bedtime  Nephro-neeraj 1 Tablet(s) Oral daily  polyethylene glycol 3350 17 Gram(s) Oral daily  senna 2 Tablet(s) Oral at bedtime  sevelamer carbonate 1600 milliGRAM(s) Oral three times a day with meals      VITALS:  T(F): 97.7 (09-30-21 @ 17:44), Max: 98.3 (09-29-21 @ 21:50)  HR: 80 (09-30-21 @ 17:44)  BP: 140/53 (09-30-21 @ 17:44)  RR: 16 (09-30-21 @ 17:44)  SpO2: 94% (09-30-21 @ 17:44)  Wt(kg): --    09-29 @ 07:01  -  09-30 @ 07:00  --------------------------------------------------------  IN: 750 mL / OUT: 1650 mL / NET: -900 mL      PHYSICAL EXAM:  Constitutional: NAD  HEENT: anicteric sclera  Neck: No JVD  Respiratory: CTAB, no wheezes, rales or rhonchi  Cardiovascular: S1, S2, RRR  Gastrointestinal: BS+, soft, NT/ND  Extremities:  No peripheral edema  Neurological: A/O x 3, no focal deficits  Psychiatric: Normal mood, normal affect  : No cottrell.   Vascular Access: dieter avf+thrill    LABS:  09-30    129<L>  |  91<L>  |  20  ----------------------------<  101<H>  4.8   |  24  |  5.89<H>    Ca    9.7      30 Sep 2021 07:29  Phos  4.8     09-29  Mg     2.70     09-29      Creatinine Trend: 5.89 <--, 4.59 <--, 7.44 <--, 6.88 <--, 8.91 <--, 7.01 <--, 4.78 <--, 6.55 <--                        10.2   7.68  )-----------( 206      ( 30 Sep 2021 07:29 )             32.3     Urine Studies:        RADIOLOGY & ADDITIONAL STUDIES:

## 2021-09-30 NOTE — OCCUPATIONAL THERAPY INITIAL EVALUATION ADULT - PLANNED THERAPY INTERVENTIONS, OT EVAL
ADL retraining/IADL retraining/balance training/bed mobility training/strengthening/transfer training

## 2021-09-30 NOTE — OCCUPATIONAL THERAPY INITIAL EVALUATION ADULT - ADDITIONAL COMMENTS
Pt poor historian, history obtained through medical record. As per care coordination assessment, pt lives alone in a house with stairs. Pt ambulated with a cane prior and was independent in ADLs. Upon initial evaluation, patient is oriented x2 and demonstrated deficits in ADL performance.    (cont from above...)  Pt with prev diagnosis of trigeminal neuralgia who complained of sharp burning pain radiating superiorly and associated with nausea, vomiting and flashing lights. Pt's exams significant for tenderness to palpation in occiput in addition to multiple joints as noted above. CTH with no evidence of hemorrhage or stroke.

## 2021-09-30 NOTE — PROGRESS NOTE ADULT - ASSESSMENT
73F with h/o ESRD on HD via tunneled cath awaiting PD catheter placement ( HD is M/W/F), DM, HTN who presents with c/o dyspnea with recent RSV infection. RENAL following for ESRD Mx.    poc d/w pt  labs, chart reviewed  pl call for q's  Nephrology   cell [phone]  office [phone]  [facility] [phone]

## 2021-09-30 NOTE — PROGRESS NOTE ADULT - ASSESSMENT
76 year old female, with past history significant for Anemia, Type-2 DM, ESRD (HD M/W/F), HTN, HLD, Sleep apnea (CPAP), Trigeminal neuralgia (diagnosed in [place] 30yrs ago), Carpal tunnel syndrome and Gout, presented to he ED secondary to right sided neck pain and generalized weakness. Pt with prev diagnosis of trigeminal neuralgia who complained of sharp burning pain radiating superiorly and associated with nausea, vomiting and flashing lights. Pt's exams significant for tenderness to palpation in occiput in addition to multiple joints as noted above. CTH with no evidence of hemorrhage or stroke.     Impression: Neck pain in the setting of multiple arthralgias of unknown etiology. Possible etiologies include osteoarthritis vs. cervicogenic headache vs occipital neuralgia vs migraine vs less likely fibromyalgia.    Plan:  [] Pain management consult  [] Rheumatologic labs including ESR/CRP, RF, MOHINI, protein electrophoresis, CCP  [] MRI not indicated  [] Continue PRN pain medications  [] PT/OT  [] F/u with neurology outpatient at [address] [place] [address] ([phone])  [] Rest of care as of primary team  Neurology team signing off at this time.    Case seen and discussed with neurology attending Dr. Wong. 76 year old female, with past history significant for Anemia, Type-2 DM, ESRD (HD M/W/F), HTN, HLD, Sleep apnea (CPAP), Trigeminal neuralgia (diagnosed in [place] 30yrs ago), Carpal tunnel syndrome and Gout, presented to he ED secondary to right sided neck pain and generalized weakness. Pt with prev diagnosis of trigeminal neuralgia who complained of sharp burning pain radiating superiorly and associated with nausea, vomiting and flashing lights. Pt's exams significant for tenderness to palpation in occiput in addition to multiple joints as noted above. CTH with no evidence of hemorrhage or stroke.     Impression: Neck pain in the setting of multiple arthralgias of unknown etiology. Likely has widespread osteoarthritis, R/O seropositive arthropathy.  Cervicogenic headache.  Possible occipital neuralgia.  Migrainous symptoms may be secondary  rather than due to  primary migraine  (see below).      Plan:  [] Pain management consult  [] Rheumatologic labs including ESR/CRP, RF, MOHINI, protein electrophoresis, CCP  [] MRI not indicated  [] Continue PRN pain medications  [] PT/OT  [] F/u with neurology outpatient at [address] [place] [address] ([phone])  [] Rest of care as of primary team  Neurology team signing off at this time.    Case seen and discussed with neurology attending Dr. Wong.      NEUROLOGY ATTENDING ADDENDUM    I personally interviewed, examined, and participated in the care of this patient, on rounds 9/30/21 with the neurology consult service team.  Reviewed findings and management plan with the team.  I have made edits to the above where indicated.  In addition to or instead of the Hx and findings and plan reported above, or in particular, I note as follows:    The history is incompatible with the Dx of trigeminal neuralgia that was reportedly made in the past.  She has widespread articular disease and pain.  She has cervicogenic pain and headaches.  Per the admission H&P she has TMJ pain when chewing.  Some recent headaches have been associated with N, V, flashing lights.  The question is whether these migrainous features are due to a primary migraine headache or whether, considering her age and cervicogenic problems, those features are due to secondary migraine (cervicogenic migraine is a valid ICD 10 diagnosis).      I agree with the above recommendations.  In addition, consider referral fo rheumatologic evaluation.

## 2021-09-30 NOTE — PROGRESS NOTE ADULT - SUBJECTIVE AND OBJECTIVE BOX
Patient is a 76y old  Female who presents with a chief complaint of Generalized weakness, Neck pain, and Nausea (29 Sep 2021 17:39)      SUBJECTIVE / OVERNIGHT EVENTS:    Events noted.  CONSTITUTIONAL: Still c/o of neck pain  RESPIRATORY: No cough, wheezing,  No shortness of breath  CARDIOVASCULAR: No chest pain, palpitations, dizziness, or leg swelling  GASTROINTESTINAL: No abdominal or epigastric pain. No nausea, vomiting.  NEUROLOGICAL: No headaches,     MEDICATIONS  (STANDING):  allopurinol 100 milliGRAM(s) Oral daily  artificial tears (preservative free) Ophthalmic Solution 1 Drop(s) Both EYES three times a day  atorvastatin 20 milliGRAM(s) Oral at bedtime  carBAMazepine 400 milliGRAM(s) Oral two times a day  chlorhexidine 2% Cloths 1 Application(s) Topical daily  dextrose 40% Gel 15 Gram(s) Oral once  dextrose 5%. 1000 milliLiter(s) (50 mL/Hr) IV Continuous <Continuous>  dextrose 5%. 1000 milliLiter(s) (100 mL/Hr) IV Continuous <Continuous>  dextrose 50% Injectable 25 Gram(s) IV Push once  dextrose 50% Injectable 12.5 Gram(s) IV Push once  dextrose 50% Injectable 25 Gram(s) IV Push once  epoetin shannon-epbx (RETACRIT) Injectable 31545 Unit(s) IV Push <User Schedule>  gabapentin 100 milliGRAM(s) Oral daily  glucagon  Injectable 1 milliGRAM(s) IntraMuscular once  heparin   Injectable 5000 Unit(s) SubCutaneous every 8 hours  influenza  Vaccine (HIGH DOSE) 0.7 milliLiter(s) IntraMuscular once  insulin glargine Injectable (LANTUS) 15 Unit(s) SubCutaneous at bedtime  insulin lispro (ADMELOG) corrective regimen sliding scale   SubCutaneous three times a day before meals  insulin lispro (ADMELOG) corrective regimen sliding scale   SubCutaneous at bedtime  Nephro-neeraj 1 Tablet(s) Oral daily  polyethylene glycol 3350 17 Gram(s) Oral daily  senna 2 Tablet(s) Oral at bedtime  sevelamer carbonate 1600 milliGRAM(s) Oral three times a day with meals    MEDICATIONS  (PRN):  acetaminophen   Tablet .. 650 milliGRAM(s) Oral every 6 hours PRN Temp greater or equal to 38C (100.4F), Mild Pain (1 - 3), Moderate Pain (4 - 6)  cyclobenzaprine 10 milliGRAM(s) Oral three times a day PRN Muscle Spasm  diphenhydrAMINE 25 milliGRAM(s) Oral once PRN Rash and/or Itching  guaiFENesin Oral Liquid (Sugar-Free) 100 milliGRAM(s) Oral every 6 hours PRN Cough  ketorolac   Injectable 30 milliGRAM(s) IV Push every 6 hours PRN Severe Pain (7 - 10)  ondansetron Injectable 4 milliGRAM(s) IV Push every 8 hours PRN Nausea and/or Vomiting  oxyCODONE    IR 5 milliGRAM(s) Oral every 8 hours PRN Severe Pain (7 - 10)        CAPILLARY BLOOD GLUCOSE      POCT Blood Glucose.: 107 mg/dL (29 Sep 2021 16:38)  POCT Blood Glucose.: 109 mg/dL (29 Sep 2021 14:08)  POCT Blood Glucose.: 119 mg/dL (29 Sep 2021 10:45)  POCT Blood Glucose.: 107 mg/dL (29 Sep 2021 07:09)  POCT Blood Glucose.: 193 mg/dL (28 Sep 2021 21:18)    I&O's Summary    29 Sep 2021 07:01  -  29 Sep 2021 21:06  --------------------------------------------------------  IN: 750 mL / OUT: 1650 mL / NET: -900 mL        T(C): 36.3 (09-29-21 @ 15:53), Max: 37.1 (09-28-21 @ 21:46)  HR: 80 (09-29-21 @ 19:28) (69 - 102)  BP: 133/71 (09-29-21 @ 15:53) (130/71 - 153/77)  RR: 16 (09-29-21 @ 15:53) (16 - 18)  SpO2: 97% (09-29-21 @ 19:28) (95% - 99%)    PHYSICAL EXAM:  GENERAL: NAD  NECK: Supple, No JVD  CHEST/LUNG: Clear to auscultation bilaterally; No wheezing.  HEART: Regular rate and rhythm; No murmurs, rubs, or gallops  ABDOMEN: Soft, Nontender, Nondistended; Bowel sounds present  EXTREMITIES:   No edema  NEUROLOGY: AAO X 3      LABS:                        10.3   7.29  )-----------( 184      ( 29 Sep 2021 17:28 )             33.0     09-29    132<L>  |  95<L>  |  14  ----------------------------<  109<H>  4.6   |  19<L>  |  4.59<H>    Ca    9.2      29 Sep 2021 17:28  Phos  4.8     09-29  Mg     2.70     09-29              CAPILLARY BLOOD GLUCOSE      POCT Blood Glucose.: 107 mg/dL (29 Sep 2021 16:38)  POCT Blood Glucose.: 109 mg/dL (29 Sep 2021 14:08)  POCT Blood Glucose.: 119 mg/dL (29 Sep 2021 10:45)  POCT Blood Glucose.: 107 mg/dL (29 Sep 2021 07:09)  POCT Blood Glucose.: 193 mg/dL (28 Sep 2021 21:18)        RADIOLOGY & ADDITIONAL TESTS:    Imaging Personally Reviewed:    Consultant(s) Notes Reviewed:      Care Discussed with Consultants/Other Providers:    Mike Sanchez MD, CMD, FACP    [address] [address]  Office Tel: [phone]  Cell: [phone]

## 2021-10-01 LAB
ANION GAP SERPL CALC-SCNC: 14 MMOL/L — SIGNIFICANT CHANGE UP (ref 7–14)
BUN SERPL-MCNC: 36 MG/DL — HIGH (ref 7–23)
CALCIUM SERPL-MCNC: 9.6 MG/DL — SIGNIFICANT CHANGE UP (ref 8.4–10.5)
CHLORIDE SERPL-SCNC: 89 MMOL/L — LOW (ref 98–107)
CO2 SERPL-SCNC: 26 MMOL/L — SIGNIFICANT CHANGE UP (ref 22–31)
CREAT SERPL-MCNC: 7.74 MG/DL — HIGH (ref 0.5–1.3)
CRP SERPL-MCNC: 61.8 MG/L — HIGH
ERYTHROCYTE [SEDIMENTATION RATE] IN BLOOD: 81 MM/HR — HIGH (ref 4–25)
GLUCOSE BLDC GLUCOMTR-MCNC: 107 MG/DL — HIGH (ref 70–99)
GLUCOSE BLDC GLUCOMTR-MCNC: 110 MG/DL — HIGH (ref 70–99)
GLUCOSE BLDC GLUCOMTR-MCNC: 126 MG/DL — HIGH (ref 70–99)
GLUCOSE BLDC GLUCOMTR-MCNC: 127 MG/DL — HIGH (ref 70–99)
GLUCOSE BLDC GLUCOMTR-MCNC: 159 MG/DL — HIGH (ref 70–99)
GLUCOSE BLDC GLUCOMTR-MCNC: 81 MG/DL — SIGNIFICANT CHANGE UP (ref 70–99)
GLUCOSE SERPL-MCNC: 116 MG/DL — HIGH (ref 70–99)
HCT VFR BLD CALC: 30.8 % — LOW (ref 34.5–45)
HGB BLD-MCNC: 9.6 G/DL — LOW (ref 11.5–15.5)
MCHC RBC-ENTMCNC: 29.5 PG — SIGNIFICANT CHANGE UP (ref 27–34)
MCHC RBC-ENTMCNC: 31.2 GM/DL — LOW (ref 32–36)
MCV RBC AUTO: 94.8 FL — SIGNIFICANT CHANGE UP (ref 80–100)
NRBC # BLD: 0 /100 WBCS — SIGNIFICANT CHANGE UP
NRBC # FLD: 0.03 K/UL — HIGH
PLATELET # BLD AUTO: 212 K/UL — SIGNIFICANT CHANGE UP (ref 150–400)
POTASSIUM SERPL-MCNC: 5.2 MMOL/L — SIGNIFICANT CHANGE UP (ref 3.5–5.3)
POTASSIUM SERPL-SCNC: 5.2 MMOL/L — SIGNIFICANT CHANGE UP (ref 3.5–5.3)
PROT SERPL-MCNC: 7.2 G/DL — SIGNIFICANT CHANGE UP (ref 6–8.3)
RBC # BLD: 3.25 M/UL — LOW (ref 3.8–5.2)
RBC # FLD: 19.9 % — HIGH (ref 10.3–14.5)
RHEUMATOID FACT SERPL-ACNC: 12 IU/ML — SIGNIFICANT CHANGE UP (ref 0–13)
SODIUM SERPL-SCNC: 129 MMOL/L — LOW (ref 135–145)
WBC # BLD: 7.44 K/UL — SIGNIFICANT CHANGE UP (ref 3.8–10.5)
WBC # FLD AUTO: 7.44 K/UL — SIGNIFICANT CHANGE UP (ref 3.8–10.5)

## 2021-10-01 PROCEDURE — 84165 PROTEIN E-PHORESIS SERUM: CPT | Mod: 26

## 2021-10-01 RX ADMIN — INSULIN GLARGINE 15 UNIT(S): 100 INJECTION, SOLUTION SUBCUTANEOUS at 22:09

## 2021-10-01 RX ADMIN — HEPARIN SODIUM 5000 UNIT(S): 5000 INJECTION INTRAVENOUS; SUBCUTANEOUS at 21:57

## 2021-10-01 RX ADMIN — Medication 400 MILLIGRAM(S): at 07:08

## 2021-10-01 RX ADMIN — Medication 1 DROP(S): at 21:57

## 2021-10-01 RX ADMIN — Medication 1 DROP(S): at 07:08

## 2021-10-01 RX ADMIN — ERYTHROPOIETIN 10000 UNIT(S): 10000 INJECTION, SOLUTION INTRAVENOUS; SUBCUTANEOUS at 13:36

## 2021-10-01 RX ADMIN — HEPARIN SODIUM 5000 UNIT(S): 5000 INJECTION INTRAVENOUS; SUBCUTANEOUS at 15:01

## 2021-10-01 RX ADMIN — Medication 100 MILLIGRAM(S): at 16:20

## 2021-10-01 RX ADMIN — HEPARIN SODIUM 5000 UNIT(S): 5000 INJECTION INTRAVENOUS; SUBCUTANEOUS at 07:08

## 2021-10-01 RX ADMIN — Medication 100 MILLIGRAM(S): at 07:08

## 2021-10-01 RX ADMIN — Medication 100 MILLIGRAM(S): at 18:19

## 2021-10-01 RX ADMIN — Medication 1 DROP(S): at 14:59

## 2021-10-01 RX ADMIN — SENNA PLUS 2 TABLET(S): 8.6 TABLET ORAL at 21:57

## 2021-10-01 RX ADMIN — SEVELAMER CARBONATE 1600 MILLIGRAM(S): 2400 POWDER, FOR SUSPENSION ORAL at 18:19

## 2021-10-01 RX ADMIN — ATORVASTATIN CALCIUM 20 MILLIGRAM(S): 80 TABLET, FILM COATED ORAL at 21:57

## 2021-10-01 RX ADMIN — CHLORHEXIDINE GLUCONATE 1 APPLICATION(S): 213 SOLUTION TOPICAL at 18:19

## 2021-10-01 NOTE — H&P PST ADULT - DOES PATIENT MEET CRITERIA FOR SEPSIS
Any Headache: No Show Text Field For Brand Names Of Contraception?: Yes Any Hypertriglyceridemia?: Yes - Monitoring Use Therapeutic Ranged Or Therapeutic Target: please select Range or Target Xerosis Aggressive Treatment: I recommended application of Cetaphil or CeraVe numerous times a day and before going to bed to all dry areas. I also prescribed a topical steroid for twice daily use. Most Recent Beta Hcg (Optional): negative Patient Weight (Optional But Required For Cumulative Dose-Numbers And Decimals Only): 110 Myalgia Monitoring: I explained this is common when taking isotretinoin. If this worsens they will contact us. Xerosis Normal Treatment: I recommended application of Cetaphil or CeraVe numerous times a day and before going to bed to all dry areas. Female Completion Statement: After discussing her treatment course we decided to discontinue isotretinoin therapy at this time. I explained that she would need to continue her birth control methods for at least one month after the last dosage. She should also get a pregnancy test one month after the last dose. She shouldn't donate blood for one month after the last dose. She should call with any new symptoms of depression. Myalgia Treatment: I explained this is common when taking isotretinoin. If this worsens they will contact us. They may try OTC ibuprofen. Hypercholesterolemia Monitoring: I explained this is common when taking isotretinoin. We will monitor closely. Nosebleeds Normal Treatment: I explained this is common when taking isotretinoin. I recommended saline mist in each nostril multiple times a day. If this worsens they will contact us. Cheilitis Normal Treatment: I recommended application of Vaseline or Aquaphor numerous times a day (as often as every hour) and before going to bed. Upper Range (In Mg/Kg): 150 Months Of Therapy Completed: 1 Xerosis Aggressive Treatment: I recommended application of Cetaphil or CeraVe numerous times a day going to bed to all dry areas. I also prescribed a topical steroid for twice daily use. Ipledge Number (Optional): [id number] Headache Monitoring: I recommended monitoring the headaches for now. There is no evidence of increased intracranial pressure. They were instructed to call if the headaches are worsening. Kilograms Preamble Statement (Weight Entered In Details Tab): Reported Weight in kilograms: Weight Units: pounds Retinoid Dermatitis Normal Treatment: I recommended more frequent application of Cetaphil or CeraVe to the areas of dermatitis. Most Recent Lfts (Optional): normal Cheilitis Aggressive Treatment: I recommended application of Vaseline or Aquaphor numerous times a day (as often as every hour) and before going to bed. I also prescribed a topical steroid for twice daily use. Target Cumulative Dosage (In Mg/Kg): 135 Retinoid Dermatitis Aggressive Treatment: I recommended more frequent application of Cetaphil or CeraVe to the areas of dermatitis. I also prescribed a topical steroid for twice daily use until the dermatitis resolves. Lower Range (In Mg/Kg): 120 Is Cheilitis Present?: Yes - Normal Treatment Dosing Month 1 (Required For Cumulative Dosing): 24mg BID No What Is The Patient's Gender: Female Xerosis Normal Treatment: I recommended application of Cetaphil or CeraVe numerous times a day going to bed to all dry areas. Male Completion Statement: After discussing his treatment course we decided to discontinue isotretinoin therapy at this time. He shouldn't donate blood for one month after the last dose. He should call with any new symptoms of depression. Female Pregnancy Counseling Text: Female patients should also be on two forms of birth control while taking this medication and for one month after their last dose. Counseling Text: I reviewed the side effect in detail. Patient should get monthly blood tests, not donate blood, not drive at night if vision affected, and not share medication. Detail Level: Zone Most Recent Triglycerides (Optional): 165 H Next Month's Dosage: Continue Current Dosage Pounds Preamble Statement (Weight Entered In Details Tab): Reported Weight in pounds:

## 2021-10-01 NOTE — PROGRESS NOTE ADULT - ASSESSMENT
73F with h/o ESRD on HD via tunneled cath awaiting PD catheter placement ( HD is M/W/F), DM, HTN who presents with c/o dyspnea with recent RSV infection. RENAL following for ESRD Mx.    poc d/w pt, HD RN  labs, chart reviewed  pl call for q's  Nephrology   cell [phone]  office [phone]  [facility] [phone]

## 2021-10-01 NOTE — PROGRESS NOTE ADULT - SUBJECTIVE AND OBJECTIVE BOX
Patient is a 76y old  Female who presents with a chief complaint of Generalized weakness, Neck pain, and Nausea (01 Oct 2021 14:57)      SUBJECTIVE / OVERNIGHT EVENTS:    Events noted.  CONSTITUTIONAL: No fever,  or fatigue  RESPIRATORY: No cough, wheezing,  No shortness of breath  CARDIOVASCULAR: No chest pain, palpitations, dizziness, or leg swelling  GASTROINTESTINAL: No abdominal or epigastric pain.        MEDICATIONS  (STANDING):  allopurinol 100 milliGRAM(s) Oral daily  artificial tears (preservative free) Ophthalmic Solution 1 Drop(s) Both EYES three times a day  atorvastatin 20 milliGRAM(s) Oral at bedtime  carBAMazepine 400 milliGRAM(s) Oral two times a day  chlorhexidine 2% Cloths 1 Application(s) Topical daily  dextrose 40% Gel 15 Gram(s) Oral once  dextrose 5%. 1000 milliLiter(s) (50 mL/Hr) IV Continuous <Continuous>  dextrose 5%. 1000 milliLiter(s) (100 mL/Hr) IV Continuous <Continuous>  dextrose 50% Injectable 25 Gram(s) IV Push once  dextrose 50% Injectable 12.5 Gram(s) IV Push once  dextrose 50% Injectable 25 Gram(s) IV Push once  epoetin shannon-epbx (RETACRIT) Injectable 34569 Unit(s) IV Push <User Schedule>  gabapentin 100 milliGRAM(s) Oral daily  glucagon  Injectable 1 milliGRAM(s) IntraMuscular once  heparin   Injectable 5000 Unit(s) SubCutaneous every 8 hours  influenza  Vaccine (HIGH DOSE) 0.7 milliLiter(s) IntraMuscular once  insulin glargine Injectable (LANTUS) 15 Unit(s) SubCutaneous at bedtime  insulin lispro (ADMELOG) corrective regimen sliding scale   SubCutaneous three times a day before meals  insulin lispro (ADMELOG) corrective regimen sliding scale   SubCutaneous at bedtime  Nephro-neeraj 1 Tablet(s) Oral daily  polyethylene glycol 3350 17 Gram(s) Oral daily  senna 2 Tablet(s) Oral at bedtime  sevelamer carbonate 1600 milliGRAM(s) Oral three times a day with meals    MEDICATIONS  (PRN):  acetaminophen   Tablet .. 650 milliGRAM(s) Oral every 6 hours PRN Temp greater or equal to 38C (100.4F), Mild Pain (1 - 3), Moderate Pain (4 - 6)  cyclobenzaprine 10 milliGRAM(s) Oral three times a day PRN Muscle Spasm  guaiFENesin Oral Liquid (Sugar-Free) 100 milliGRAM(s) Oral every 6 hours PRN Cough  ondansetron Injectable 4 milliGRAM(s) IV Push every 8 hours PRN Nausea and/or Vomiting  sodium chloride 0.65% Nasal 1 Spray(s) Both Nostrils three times a day PRN Nasal Congestion        CAPILLARY BLOOD GLUCOSE      POCT Blood Glucose.: 159 mg/dL (01 Oct 2021 22:08)  POCT Blood Glucose.: 126 mg/dL (01 Oct 2021 16:52)  POCT Blood Glucose.: 107 mg/dL (01 Oct 2021 14:46)  POCT Blood Glucose.: 110 mg/dL (01 Oct 2021 13:40)  POCT Blood Glucose.: 127 mg/dL (01 Oct 2021 10:08)  POCT Blood Glucose.: 81 mg/dL (01 Oct 2021 07:15)    I&O's Summary    01 Oct 2021 07:01  -  02 Oct 2021 00:55  --------------------------------------------------------  IN: 900 mL / OUT: 2900 mL / NET: -2000 mL        T(C): 36.8 (10-01-21 @ 21:29), Max: 37.1 (10-01-21 @ 15:05)  HR: 70 (10-01-21 @ 22:50) (70 - 92)  BP: 140/55 (10-01-21 @ 21:29) (122/71 - 153/62)  RR: 16 (10-01-21 @ 21:29) (16 - 18)  SpO2: 99% (10-01-21 @ 22:50) (95% - 99%)    PHYSICAL EXAM:  GENERAL: NAD  NECK: Supple, No JVD  CHEST/LUNG: Clear to auscultation bilaterally; No wheezing.  HEART: Regular rate and rhythm; No murmurs, rubs, or gallops  ABDOMEN: Soft, Nontender, Nondistended; Bowel sounds present  EXTREMITIES:   No edema  NEUROLOGY: AAO X 3      LABS:                        9.6    7.44  )-----------( 212      ( 01 Oct 2021 12:53 )             30.8     10-01    129<L>  |  89<L>  |  36<H>  ----------------------------<  116<H>  5.2   |  26  |  7.74<H>    Ca    9.6      01 Oct 2021 12:53    TPro  7.2  /  Alb  x   /  TBili  x   /  DBili  x   /  AST  x   /  ALT  x   /  AlkPhos  x   10-01            CAPILLARY BLOOD GLUCOSE      POCT Blood Glucose.: 159 mg/dL (01 Oct 2021 22:08)  POCT Blood Glucose.: 126 mg/dL (01 Oct 2021 16:52)  POCT Blood Glucose.: 107 mg/dL (01 Oct 2021 14:46)  POCT Blood Glucose.: 110 mg/dL (01 Oct 2021 13:40)  POCT Blood Glucose.: 127 mg/dL (01 Oct 2021 10:08)  POCT Blood Glucose.: 81 mg/dL (01 Oct 2021 07:15)        RADIOLOGY & ADDITIONAL TESTS:    Imaging Personally Reviewed:    Consultant(s) Notes Reviewed:      Care Discussed with Consultants/Other Providers:    Mike Sanchez MD, CMD, FACP    [address] [address]  Office Tel: [phone]  Cell: [phone]

## 2021-10-01 NOTE — PROGRESS NOTE ADULT - ASSESSMENT
76 year old female, with past history significant for Anemia, Type-2 DM, ESRD (HD M/W/F), HTN, HLD, Sleep apnea (CPAP), Trigeminal neuralgia, Carpal tunnel syndrome and Gout, presented to he ED secondary to neck pain and generalized weakness.  Vital signs upon ED presentation as follows: BP = 129/72, HR = 98, RR = 19, T = 36.6 C (97.9 F), O2 Sat = 100% on RA.  Diagnosed with Generalized Weakness, Neck Pain, and Nausea.  Admitted for further evaluation/management.    Occipital neuralgia:    On Tegretol 400 mg po twice a day  Oxy IR dced due to lethargy

## 2021-10-01 NOTE — PROGRESS NOTE ADULT - SUBJECTIVE AND OBJECTIVE BOX
[facility] - S Janice / Nathan S /D Levy/ S Claudia/ S Rosita/ Stephon Colbert / MALCOLM Koou/ O Anita  service -[phone], office [phone]  ---------------------------------------------------------------------------------------------------------------    Patient seen and examined bedside    Subjective and Objective: No overnight events, sob resolved. No complaints today. feeling better    Allergies: IV Contrast (Anaphylaxis)  shellfish (Hives; Rash)  shrimp (Hives)  smoke; coughing (Other)      Hospital Medications:   MEDICATIONS  (STANDING):  allopurinol 100 milliGRAM(s) Oral daily  artificial tears (preservative free) Ophthalmic Solution 1 Drop(s) Both EYES three times a day  atorvastatin 20 milliGRAM(s) Oral at bedtime  carBAMazepine 400 milliGRAM(s) Oral two times a day  chlorhexidine 2% Cloths 1 Application(s) Topical daily  dextrose 40% Gel 15 Gram(s) Oral once  dextrose 5%. 1000 milliLiter(s) (50 mL/Hr) IV Continuous <Continuous>  dextrose 5%. 1000 milliLiter(s) (100 mL/Hr) IV Continuous <Continuous>  dextrose 50% Injectable 25 Gram(s) IV Push once  dextrose 50% Injectable 12.5 Gram(s) IV Push once  dextrose 50% Injectable 25 Gram(s) IV Push once  epoetin shannon-epbx (RETACRIT) Injectable 80771 Unit(s) IV Push <User Schedule>  gabapentin 100 milliGRAM(s) Oral daily  glucagon  Injectable 1 milliGRAM(s) IntraMuscular once  heparin   Injectable 5000 Unit(s) SubCutaneous every 8 hours  influenza  Vaccine (HIGH DOSE) 0.7 milliLiter(s) IntraMuscular once  insulin glargine Injectable (LANTUS) 15 Unit(s) SubCutaneous at bedtime  insulin lispro (ADMELOG) corrective regimen sliding scale   SubCutaneous three times a day before meals  insulin lispro (ADMELOG) corrective regimen sliding scale   SubCutaneous at bedtime  Nephro-neeraj 1 Tablet(s) Oral daily  polyethylene glycol 3350 17 Gram(s) Oral daily  senna 2 Tablet(s) Oral at bedtime  sevelamer carbonate 1600 milliGRAM(s) Oral three times a day with meals      REVIEW OF SYSTEMS:  CONSTITUTIONAL: No weakness, fevers or chills  EYES/ENT: No visual changes;  No vertigo or throat pain   NECK: No pain or stiffness  RESPIRATORY: No cough, wheezing, hemoptysis; No shortness of breath  CARDIOVASCULAR: No chest pain or palpitations.  GASTROINTESTINAL: No abdominal or epigastric pain. No nausea, vomiting, or hematemesis; No diarrhea or constipation. No melena or hematochezia.  GENITOURINARY: No dysuria, frequency, foamy urine, urinary urgency, incontinence or hematuria  NEUROLOGICAL: No numbness or weakness  SKIN: No itching, burning, rashes, or lesions   VASCULAR: No bilateral lower extremity edema.   All other review of systems is negative unless indicated above.    VITALS:  T(F): 98.1 (10-01-21 @ 10:40), Max: 98.6 (10-01-21 @ 09:15)  HR: 84 (10-01-21 @ 10:40)  BP: 136/72 (10-01-21 @ 10:40)  RR: 18 (10-01-21 @ 10:40)  SpO2: 95% (10-01-21 @ 09:15)  Wt(kg): --        PHYSICAL EXAM:  Constitutional: NAD  HEENT: anicteric sclera, oropharynx clear  Neck: No JVD  Respiratory: CTAB, no wheezes, rales or rhonchi  Cardiovascular: S1, S2, RRR  Gastrointestinal: BS+, soft, NT/ND  Extremities: No cyanosis or clubbing. No peripheral edema  Neurological: A/O x 3, no focal deficits  Psychiatric: Normal mood, normal affect  : No CVA tenderness. No cottrell.   Skin: No rashes  Vascular Access:    LABS:  10-01    129<L>  |  89<L>  |  36<H>  ----------------------------<  116<H>  5.2   |  26  |  7.74<H>    Ca    9.6      01 Oct 2021 12:53    TPro  7.2  /  Alb      /  TBili      /  DBili      /  AST      /  ALT      /  AlkPhos      10-01    Creatinine Trend: 7.74 <--, 5.89 <--, 4.59 <--, 7.44 <--, 6.88 <--, 8.91 <--, 7.01 <--, 4.78 <--                        9.6    7.44  )-----------( 212      ( 01 Oct 2021 12:53 )             30.8     Urine Studies:        RADIOLOGY & ADDITIONAL STUDIES:   [facility] - S Janice / Nathan S /D Levy/ S Claudia/ JACY Becker/ Stephon Colbert / MALCOLM Koou/ O Anita  service -[phone], office [phone]  ---------------------------------------------------------------------------------------------------------------    Patient seen and examined bedside  in hd unit, during HD    Subjective and Objective: No overnight events, denied cp/sob. feeling sleepy    Allergies: IV Contrast (Anaphylaxis)  shellfish (Hives; Rash)  shrimp (Hives)  smoke; coughing (Other)      Hospital Medications:   MEDICATIONS  (STANDING):  allopurinol 100 milliGRAM(s) Oral daily  artificial tears (preservative free) Ophthalmic Solution 1 Drop(s) Both EYES three times a day  atorvastatin 20 milliGRAM(s) Oral at bedtime  carBAMazepine 400 milliGRAM(s) Oral two times a day  chlorhexidine 2% Cloths 1 Application(s) Topical daily  dextrose 40% Gel 15 Gram(s) Oral once  dextrose 5%. 1000 milliLiter(s) (50 mL/Hr) IV Continuous <Continuous>  dextrose 5%. 1000 milliLiter(s) (100 mL/Hr) IV Continuous <Continuous>  dextrose 50% Injectable 25 Gram(s) IV Push once  dextrose 50% Injectable 12.5 Gram(s) IV Push once  dextrose 50% Injectable 25 Gram(s) IV Push once  epoetin shannon-epbx (RETACRIT) Injectable 22880 Unit(s) IV Push <User Schedule>  gabapentin 100 milliGRAM(s) Oral daily  glucagon  Injectable 1 milliGRAM(s) IntraMuscular once  heparin   Injectable 5000 Unit(s) SubCutaneous every 8 hours  influenza  Vaccine (HIGH DOSE) 0.7 milliLiter(s) IntraMuscular once  insulin glargine Injectable (LANTUS) 15 Unit(s) SubCutaneous at bedtime  insulin lispro (ADMELOG) corrective regimen sliding scale   SubCutaneous three times a day before meals  insulin lispro (ADMELOG) corrective regimen sliding scale   SubCutaneous at bedtime  Nephro-neeraj 1 Tablet(s) Oral daily  polyethylene glycol 3350 17 Gram(s) Oral daily  senna 2 Tablet(s) Oral at bedtime  sevelamer carbonate 1600 milliGRAM(s) Oral three times a day with meals      VITALS:  T(F): 98.1 (10-01-21 @ 10:40), Max: 98.6 (10-01-21 @ 09:15)  HR: 84 (10-01-21 @ 10:40)  BP: 136/72 (10-01-21 @ 10:40)  RR: 18 (10-01-21 @ 10:40)  SpO2: 95% (10-01-21 @ 09:15)  Wt(kg): --    PHYSICAL EXAM:  Constitutional: NAD  HEENT: anicteric sclera  Neck: No JVD  Respiratory: CTAB, no wheezes, rales or rhonchi  Cardiovascular: S1, S2, RRR  Gastrointestinal: BS+, soft, NT/ND  Extremities:  No peripheral edema  Neurological: A/O x 3, no focal deficits  Psychiatric: Normal mood, normal affect  : No cottrell.   Vascular Access: dieter avf+thrill    LABS:  10-01    129<L>  |  89<L>  |  36<H>  ----------------------------<  116<H>  5.2   |  26  |  7.74<H>    Ca    9.6      01 Oct 2021 12:53    TPro  7.2  /  Alb      /  TBili      /  DBili      /  AST      /  ALT      /  AlkPhos      10-01    Creatinine Trend: 7.74 <--, 5.89 <--, 4.59 <--, 7.44 <--, 6.88 <--, 8.91 <--, 7.01 <--, 4.78 <--                        9.6    7.44  )-----------( 212      ( 01 Oct 2021 12:53 )             30.8     Urine Studies:        RADIOLOGY & ADDITIONAL STUDIES:

## 2021-10-02 LAB
ANION GAP SERPL CALC-SCNC: 14 MMOL/L — SIGNIFICANT CHANGE UP (ref 7–14)
BUN SERPL-MCNC: 18 MG/DL — SIGNIFICANT CHANGE UP (ref 7–23)
CALCIUM SERPL-MCNC: 9.8 MG/DL — SIGNIFICANT CHANGE UP (ref 8.4–10.5)
CCP IGG SERPL-ACNC: <8 UNITS — SIGNIFICANT CHANGE UP
CHLORIDE SERPL-SCNC: 93 MMOL/L — LOW (ref 98–107)
CO2 SERPL-SCNC: 24 MMOL/L — SIGNIFICANT CHANGE UP (ref 22–31)
CREAT SERPL-MCNC: 5.3 MG/DL — HIGH (ref 0.5–1.3)
GLUCOSE BLDC GLUCOMTR-MCNC: 122 MG/DL — HIGH (ref 70–99)
GLUCOSE BLDC GLUCOMTR-MCNC: 153 MG/DL — HIGH (ref 70–99)
GLUCOSE BLDC GLUCOMTR-MCNC: 211 MG/DL — HIGH (ref 70–99)
GLUCOSE BLDC GLUCOMTR-MCNC: 212 MG/DL — HIGH (ref 70–99)
GLUCOSE SERPL-MCNC: 152 MG/DL — HIGH (ref 70–99)
HCT VFR BLD CALC: 32.1 % — LOW (ref 34.5–45)
HGB BLD-MCNC: 9.9 G/DL — LOW (ref 11.5–15.5)
MAGNESIUM SERPL-MCNC: 2.5 MG/DL — SIGNIFICANT CHANGE UP (ref 1.6–2.6)
MCHC RBC-ENTMCNC: 29.2 PG — SIGNIFICANT CHANGE UP (ref 27–34)
MCHC RBC-ENTMCNC: 30.8 GM/DL — LOW (ref 32–36)
MCV RBC AUTO: 94.7 FL — SIGNIFICANT CHANGE UP (ref 80–100)
NRBC # BLD: 1 /100 WBCS — SIGNIFICANT CHANGE UP
NRBC # FLD: 0.08 K/UL — HIGH
PHOSPHATE SERPL-MCNC: 3.7 MG/DL — SIGNIFICANT CHANGE UP (ref 2.5–4.5)
PLATELET # BLD AUTO: 196 K/UL — SIGNIFICANT CHANGE UP (ref 150–400)
POTASSIUM SERPL-MCNC: 5.1 MMOL/L — SIGNIFICANT CHANGE UP (ref 3.5–5.3)
POTASSIUM SERPL-SCNC: 5.1 MMOL/L — SIGNIFICANT CHANGE UP (ref 3.5–5.3)
RBC # BLD: 3.39 M/UL — LOW (ref 3.8–5.2)
RBC # FLD: 20.4 % — HIGH (ref 10.3–14.5)
RF+CCP IGG SER-IMP: NEGATIVE — SIGNIFICANT CHANGE UP
SODIUM SERPL-SCNC: 131 MMOL/L — LOW (ref 135–145)
WBC # BLD: 7.55 K/UL — SIGNIFICANT CHANGE UP (ref 3.8–10.5)
WBC # FLD AUTO: 7.55 K/UL — SIGNIFICANT CHANGE UP (ref 3.8–10.5)

## 2021-10-02 RX ADMIN — HEPARIN SODIUM 5000 UNIT(S): 5000 INJECTION INTRAVENOUS; SUBCUTANEOUS at 13:34

## 2021-10-02 RX ADMIN — HEPARIN SODIUM 5000 UNIT(S): 5000 INJECTION INTRAVENOUS; SUBCUTANEOUS at 21:42

## 2021-10-02 RX ADMIN — SEVELAMER CARBONATE 1600 MILLIGRAM(S): 2400 POWDER, FOR SUSPENSION ORAL at 07:48

## 2021-10-02 RX ADMIN — Medication 1 DROP(S): at 21:43

## 2021-10-02 RX ADMIN — SEVELAMER CARBONATE 1600 MILLIGRAM(S): 2400 POWDER, FOR SUSPENSION ORAL at 11:50

## 2021-10-02 RX ADMIN — CHLORHEXIDINE GLUCONATE 1 APPLICATION(S): 213 SOLUTION TOPICAL at 11:51

## 2021-10-02 RX ADMIN — HEPARIN SODIUM 5000 UNIT(S): 5000 INJECTION INTRAVENOUS; SUBCUTANEOUS at 06:45

## 2021-10-02 RX ADMIN — Medication 400 MILLIGRAM(S): at 17:24

## 2021-10-02 RX ADMIN — Medication 1 DROP(S): at 13:33

## 2021-10-02 RX ADMIN — GABAPENTIN 100 MILLIGRAM(S): 400 CAPSULE ORAL at 11:50

## 2021-10-02 RX ADMIN — INSULIN GLARGINE 15 UNIT(S): 100 INJECTION, SOLUTION SUBCUTANEOUS at 21:44

## 2021-10-02 RX ADMIN — Medication 100 MILLIGRAM(S): at 11:50

## 2021-10-02 RX ADMIN — Medication 1: at 11:49

## 2021-10-02 RX ADMIN — Medication 400 MILLIGRAM(S): at 08:21

## 2021-10-02 RX ADMIN — ATORVASTATIN CALCIUM 20 MILLIGRAM(S): 80 TABLET, FILM COATED ORAL at 21:42

## 2021-10-02 RX ADMIN — SEVELAMER CARBONATE 1600 MILLIGRAM(S): 2400 POWDER, FOR SUSPENSION ORAL at 17:24

## 2021-10-02 RX ADMIN — Medication 2: at 07:47

## 2021-10-02 NOTE — PROGRESS NOTE ADULT - SUBJECTIVE AND OBJECTIVE BOX
Patient is a 76y old  Female who presents with a chief complaint of Generalized weakness, Neck pain, and Nausea (02 Oct 2021 15:46)      SUBJECTIVE / OVERNIGHT EVENTS:    Events noted.  CONSTITUTIONAL: No fever,  or fatigue  RESPIRATORY: No cough, wheezing,  No shortness of breath  CARDIOVASCULAR: No chest pain, palpitations, dizziness, or leg swelling  GASTROINTESTINAL: No abdominal or epigastric pain.   NEUROLOGICAL: No headaches,     MEDICATIONS  (STANDING):  allopurinol 100 milliGRAM(s) Oral daily  artificial tears (preservative free) Ophthalmic Solution 1 Drop(s) Both EYES three times a day  atorvastatin 20 milliGRAM(s) Oral at bedtime  carBAMazepine 400 milliGRAM(s) Oral two times a day  chlorhexidine 2% Cloths 1 Application(s) Topical daily  dextrose 40% Gel 15 Gram(s) Oral once  dextrose 5%. 1000 milliLiter(s) (50 mL/Hr) IV Continuous <Continuous>  dextrose 5%. 1000 milliLiter(s) (100 mL/Hr) IV Continuous <Continuous>  dextrose 50% Injectable 25 Gram(s) IV Push once  dextrose 50% Injectable 12.5 Gram(s) IV Push once  dextrose 50% Injectable 25 Gram(s) IV Push once  epoetin shannon-epbx (RETACRIT) Injectable 87992 Unit(s) IV Push <User Schedule>  gabapentin 100 milliGRAM(s) Oral daily  glucagon  Injectable 1 milliGRAM(s) IntraMuscular once  heparin   Injectable 5000 Unit(s) SubCutaneous every 8 hours  influenza  Vaccine (HIGH DOSE) 0.7 milliLiter(s) IntraMuscular once  insulin glargine Injectable (LANTUS) 15 Unit(s) SubCutaneous at bedtime  insulin lispro (ADMELOG) corrective regimen sliding scale   SubCutaneous three times a day before meals  insulin lispro (ADMELOG) corrective regimen sliding scale   SubCutaneous at bedtime  Nephro-neeraj 1 Tablet(s) Oral daily  polyethylene glycol 3350 17 Gram(s) Oral daily  senna 2 Tablet(s) Oral at bedtime  sevelamer carbonate 1600 milliGRAM(s) Oral three times a day with meals    MEDICATIONS  (PRN):  acetaminophen   Tablet .. 650 milliGRAM(s) Oral every 6 hours PRN Temp greater or equal to 38C (100.4F), Mild Pain (1 - 3), Moderate Pain (4 - 6)  cyclobenzaprine 10 milliGRAM(s) Oral three times a day PRN Muscle Spasm  guaiFENesin Oral Liquid (Sugar-Free) 100 milliGRAM(s) Oral every 6 hours PRN Cough  ondansetron Injectable 4 milliGRAM(s) IV Push every 8 hours PRN Nausea and/or Vomiting  sodium chloride 0.65% Nasal 1 Spray(s) Both Nostrils three times a day PRN Nasal Congestion        CAPILLARY BLOOD GLUCOSE      POCT Blood Glucose.: 122 mg/dL (02 Oct 2021 16:38)  POCT Blood Glucose.: 153 mg/dL (02 Oct 2021 11:41)  POCT Blood Glucose.: 212 mg/dL (02 Oct 2021 07:17)  POCT Blood Glucose.: 159 mg/dL (01 Oct 2021 22:08)    I&O's Summary    01 Oct 2021 07:01  -  02 Oct 2021 07:00  --------------------------------------------------------  IN: 900 mL / OUT: 2900 mL / NET: -2000 mL    02 Oct 2021 07:01  -  02 Oct 2021 19:15  --------------------------------------------------------  IN: 650 mL / OUT: 0 mL / NET: 650 mL        T(C): 36.6 (10-02-21 @ 18:14), Max: 36.8 (10-01-21 @ 21:29)  HR: 74 (10-02-21 @ 18:14) (70 - 88)  BP: 131/61 (10-02-21 @ 18:14) (120/70 - 148/84)  RR: 16 (10-02-21 @ 18:14) (16 - 18)  SpO2: 99% (10-02-21 @ 18:14) (95% - 99%)    PHYSICAL EXAM:    NECK: Supple, No JVD  CHEST/LUNG: Clear to auscultation bilaterally; No wheezing.  HEART: Regular rate and rhythm; No murmurs, rubs, or gallops  ABDOMEN: Soft, Nontender, Nondistended; Bowel sounds present  EXTREMITIES:   No edema  NEUROLOGY: AAO      LABS:                        9.9    7.55  )-----------( 196      ( 02 Oct 2021 06:47 )             32.1     10-02    131<L>  |  93<L>  |  18  ----------------------------<  152<H>  5.1   |  24  |  5.30<H>    Ca    9.8      02 Oct 2021 06:47  Phos  3.7     10-02  Mg     2.50     10-02    TPro  7.2  /  Alb  x   /  TBili  x   /  DBili  x   /  AST  x   /  ALT  x   /  AlkPhos  x   10-01            CAPILLARY BLOOD GLUCOSE      POCT Blood Glucose.: 122 mg/dL (02 Oct 2021 16:38)  POCT Blood Glucose.: 153 mg/dL (02 Oct 2021 11:41)  POCT Blood Glucose.: 212 mg/dL (02 Oct 2021 07:17)  POCT Blood Glucose.: 159 mg/dL (01 Oct 2021 22:08)        RADIOLOGY & ADDITIONAL TESTS:    Imaging Personally Reviewed:    Consultant(s) Notes Reviewed:      Care Discussed with Consultants/Other Providers:    Mike Sanchez MD, CMD, FACP    [address] [place] [address]  Office Tel: [phone]  Cell: [phone]

## 2021-10-02 NOTE — PROVIDER CONTACT NOTE (OTHER) - SITUATION
Pt refusing to take morning dose of tegretol due to undesired drowsiness. Pt educated on risks versus benefits of medication.
newly found right foot wound, under toes
fourth and fifth metatarsals on R foot bleeding

## 2021-10-02 NOTE — PROGRESS NOTE ADULT - ASSESSMENT
73F with h/o ESRD on HD via tunneled cath awaiting PD catheter placement ( HD is M/W/F), DM, HTN who presents with c/o dyspnea with recent RSV infection. RENAL following for ESRD Mx.

## 2021-10-02 NOTE — PROVIDER CONTACT NOTE (OTHER) - ACTION/TREATMENT ORDERED:
Wound care nurse/team order in place. Will continue to monitor.
Wound consult ordered
MD to call back with further instruction.

## 2021-10-02 NOTE — PROGRESS NOTE ADULT - SUBJECTIVE AND OBJECTIVE BOX
[facility] : Ans Serv [phone], Office [phone]  Dr Lockett/Dr Camacho  /Dr Garry davenport /Dr JACY Taylor/Dr Del Becker/Dr Stephon Colbert /Dr MALCOLM Kearns  _______________________________________________________________________________________________    seen and examined today for End Stage Renal Disease on Dialysis   Interval :  VITALS:  T(F): 98 (10-02-21 @ 13:41), Max: 98.3 (10-01-21 @ 21:29)  HR: 80 (10-02-21 @ 13:41)  BP: 120/70 (10-02-21 @ 13:41)  RR: 16 (10-02-21 @ 13:41)  SpO2: 97% (10-02-21 @ 13:41)    10-01 @ 07:01  -  10-02 @ 07:00  --------------------------------------------------------  IN: 900 mL / OUT: 2900 mL / NET: -2000 mL    10-02 @ 07:01  -  10-02 @ 15:46  --------------------------------------------------------  IN: 450 mL / OUT: 0 mL / NET: 450 mL    Physical Exam :-  Constitutional: NAD  Neck: Supple.  Respiratory: Bilateral equal breath sounds, few Crackles present.  Cardiovascular: S1, S2 normal, positive Murmur  Gastrointestinal: Bowel Sounds present, soft, non tender.    Allergies :   IV Contrast (Anaphylaxis)  shellfish (Hives; Rash)  shrimp (Hives)  smoke; coughing (Other)    Hospital Medications:   MEDICATIONS  (STANDING):  allopurinol 100 milliGRAM(s) Oral daily  artificial tears (preservative free) Ophthalmic Solution 1 Drop(s) Both EYES three times a day  atorvastatin 20 milliGRAM(s) Oral at bedtime  carBAMazepine 400 milliGRAM(s) Oral two times a day  chlorhexidine 2% Cloths 1 Application(s) Topical daily  dextrose 40% Gel 15 Gram(s) Oral once  dextrose 5%. 1000 milliLiter(s) (50 mL/Hr) IV Continuous <Continuous>  dextrose 5%. 1000 milliLiter(s) (100 mL/Hr) IV Continuous <Continuous>  dextrose 50% Injectable 25 Gram(s) IV Push once  dextrose 50% Injectable 25 Gram(s) IV Push once  dextrose 50% Injectable 12.5 Gram(s) IV Push once  epoetin shannon-epbx (RETACRIT) Injectable 09798 Unit(s) IV Push <User Schedule>  gabapentin 100 milliGRAM(s) Oral daily  glucagon  Injectable 1 milliGRAM(s) IntraMuscular once  heparin   Injectable 5000 Unit(s) SubCutaneous every 8 hours  influenza  Vaccine (HIGH DOSE) 0.7 milliLiter(s) IntraMuscular once  insulin glargine Injectable (LANTUS) 15 Unit(s) SubCutaneous at bedtime  insulin lispro (ADMELOG) corrective regimen sliding scale   SubCutaneous three times a day before meals  insulin lispro (ADMELOG) corrective regimen sliding scale   SubCutaneous at bedtime  Nephro-neeraj 1 Tablet(s) Oral daily  polyethylene glycol 3350 17 Gram(s) Oral daily  senna 2 Tablet(s) Oral at bedtime  sevelamer carbonate 1600 milliGRAM(s) Oral three times a day with meals    10-02    131<L>  |  93<L>  |  18  ----------------------------<  152<H>  5.1   |  24  |  5.30<H>    Ca    9.8      02 Oct 2021 06:47  Phos  3.7     10-02  Mg     2.50     10-02    TPro  7.2  /  Alb      /  TBili      /  DBili      /  AST      /  ALT      /  AlkPhos      10-01    Creatinine Trend: 5.30 <--, 7.74 <--, 5.89 <--, 4.59 <--, 7.44 <--, 6.88 <--, 8.91 <--, 7.01 <--                        9.9    7.55  )-----------( 196      ( 02 Oct 2021 06:47 )             32.1

## 2021-10-03 LAB
ANION GAP SERPL CALC-SCNC: 16 MMOL/L — HIGH (ref 7–14)
ANION GAP SERPL CALC-SCNC: 17 MMOL/L — HIGH (ref 7–14)
APPEARANCE UR: ABNORMAL
BACTERIA # UR AUTO: ABNORMAL
BILIRUB UR-MCNC: NEGATIVE — SIGNIFICANT CHANGE UP
BUN SERPL-MCNC: 29 MG/DL — HIGH (ref 7–23)
BUN SERPL-MCNC: 30 MG/DL — HIGH (ref 7–23)
CALCIUM SERPL-MCNC: 9.6 MG/DL — SIGNIFICANT CHANGE UP (ref 8.4–10.5)
CALCIUM SERPL-MCNC: 9.6 MG/DL — SIGNIFICANT CHANGE UP (ref 8.4–10.5)
CHLORIDE SERPL-SCNC: 90 MMOL/L — LOW (ref 98–107)
CHLORIDE SERPL-SCNC: 91 MMOL/L — LOW (ref 98–107)
CO2 SERPL-SCNC: 21 MMOL/L — LOW (ref 22–31)
CO2 SERPL-SCNC: 25 MMOL/L — SIGNIFICANT CHANGE UP (ref 22–31)
COLOR SPEC: SIGNIFICANT CHANGE UP
CREAT SERPL-MCNC: 6.92 MG/DL — HIGH (ref 0.5–1.3)
CREAT SERPL-MCNC: 7.16 MG/DL — HIGH (ref 0.5–1.3)
DIFF PNL FLD: NEGATIVE — SIGNIFICANT CHANGE UP
EPI CELLS # UR: 21 /HPF — HIGH (ref 0–5)
GLUCOSE BLDC GLUCOMTR-MCNC: 126 MG/DL — HIGH (ref 70–99)
GLUCOSE BLDC GLUCOMTR-MCNC: 152 MG/DL — HIGH (ref 70–99)
GLUCOSE BLDC GLUCOMTR-MCNC: 154 MG/DL — HIGH (ref 70–99)
GLUCOSE BLDC GLUCOMTR-MCNC: 79 MG/DL — SIGNIFICANT CHANGE UP (ref 70–99)
GLUCOSE SERPL-MCNC: 101 MG/DL — HIGH (ref 70–99)
GLUCOSE SERPL-MCNC: 79 MG/DL — SIGNIFICANT CHANGE UP (ref 70–99)
GLUCOSE UR QL: ABNORMAL
HCT VFR BLD CALC: 30.7 % — LOW (ref 34.5–45)
HCT VFR BLD CALC: 31.6 % — LOW (ref 34.5–45)
HGB BLD-MCNC: 9.7 G/DL — LOW (ref 11.5–15.5)
HGB BLD-MCNC: 9.8 G/DL — LOW (ref 11.5–15.5)
HYALINE CASTS # UR AUTO: 2 /LPF — SIGNIFICANT CHANGE UP (ref 0–7)
KETONES UR-MCNC: NEGATIVE — SIGNIFICANT CHANGE UP
LEUKOCYTE ESTERASE UR-ACNC: ABNORMAL
MAGNESIUM SERPL-MCNC: 2.8 MG/DL — HIGH (ref 1.6–2.6)
MCHC RBC-ENTMCNC: 29.8 PG — SIGNIFICANT CHANGE UP (ref 27–34)
MCHC RBC-ENTMCNC: 29.8 PG — SIGNIFICANT CHANGE UP (ref 27–34)
MCHC RBC-ENTMCNC: 30.7 GM/DL — LOW (ref 32–36)
MCHC RBC-ENTMCNC: 31.9 GM/DL — LOW (ref 32–36)
MCV RBC AUTO: 93.3 FL — SIGNIFICANT CHANGE UP (ref 80–100)
MCV RBC AUTO: 97.2 FL — SIGNIFICANT CHANGE UP (ref 80–100)
NITRITE UR-MCNC: NEGATIVE — SIGNIFICANT CHANGE UP
NRBC # BLD: 0 /100 WBCS — SIGNIFICANT CHANGE UP
NRBC # BLD: 0 /100 WBCS — SIGNIFICANT CHANGE UP
NRBC # FLD: 0 K/UL — SIGNIFICANT CHANGE UP
NRBC # FLD: 0.02 K/UL — HIGH
PH UR: 8.5 — HIGH (ref 5–8)
PHOSPHATE SERPL-MCNC: 4.6 MG/DL — HIGH (ref 2.5–4.5)
PLATELET # BLD AUTO: 121 K/UL — LOW (ref 150–400)
PLATELET # BLD AUTO: 205 K/UL — SIGNIFICANT CHANGE UP (ref 150–400)
POTASSIUM SERPL-MCNC: 4.9 MMOL/L — SIGNIFICANT CHANGE UP (ref 3.5–5.3)
POTASSIUM SERPL-MCNC: 5.9 MMOL/L — HIGH (ref 3.5–5.3)
POTASSIUM SERPL-SCNC: 4.9 MMOL/L — SIGNIFICANT CHANGE UP (ref 3.5–5.3)
POTASSIUM SERPL-SCNC: 5.9 MMOL/L — HIGH (ref 3.5–5.3)
PROT UR-MCNC: ABNORMAL
RBC # BLD: 3.25 M/UL — LOW (ref 3.8–5.2)
RBC # BLD: 3.29 M/UL — LOW (ref 3.8–5.2)
RBC # FLD: 19.9 % — HIGH (ref 10.3–14.5)
RBC # FLD: 20.2 % — HIGH (ref 10.3–14.5)
RBC CASTS # UR COMP ASSIST: 1 /HPF — SIGNIFICANT CHANGE UP (ref 0–4)
SARS-COV-2 RNA SPEC QL NAA+PROBE: SIGNIFICANT CHANGE UP
SODIUM SERPL-SCNC: 128 MMOL/L — LOW (ref 135–145)
SODIUM SERPL-SCNC: 132 MMOL/L — LOW (ref 135–145)
SP GR SPEC: 1.01 — SIGNIFICANT CHANGE UP (ref 1–1.05)
UROBILINOGEN FLD QL: SIGNIFICANT CHANGE UP
WBC # BLD: 4.98 K/UL — SIGNIFICANT CHANGE UP (ref 3.8–10.5)
WBC # BLD: 7.55 K/UL — SIGNIFICANT CHANGE UP (ref 3.8–10.5)
WBC # FLD AUTO: 4.98 K/UL — SIGNIFICANT CHANGE UP (ref 3.8–10.5)
WBC # FLD AUTO: 7.55 K/UL — SIGNIFICANT CHANGE UP (ref 3.8–10.5)
WBC UR QL: 13 /HPF — HIGH (ref 0–5)

## 2021-10-03 PROCEDURE — 93010 ELECTROCARDIOGRAM REPORT: CPT

## 2021-10-03 RX ORDER — CARBAMAZEPINE 200 MG
100 TABLET ORAL
Refills: 0 | Status: DISCONTINUED | OUTPATIENT
Start: 2021-10-07 | End: 2021-10-08

## 2021-10-03 RX ORDER — ACETAMINOPHEN 500 MG
1000 TABLET ORAL ONCE
Refills: 0 | Status: COMPLETED | OUTPATIENT
Start: 2021-10-03 | End: 2021-10-04

## 2021-10-03 RX ORDER — CARBAMAZEPINE 200 MG
200 TABLET ORAL
Refills: 0 | Status: COMPLETED | OUTPATIENT
Start: 2021-10-03 | End: 2021-10-06

## 2021-10-03 RX ADMIN — SEVELAMER CARBONATE 1600 MILLIGRAM(S): 2400 POWDER, FOR SUSPENSION ORAL at 08:22

## 2021-10-03 RX ADMIN — SEVELAMER CARBONATE 1600 MILLIGRAM(S): 2400 POWDER, FOR SUSPENSION ORAL at 12:03

## 2021-10-03 RX ADMIN — SENNA PLUS 2 TABLET(S): 8.6 TABLET ORAL at 22:04

## 2021-10-03 RX ADMIN — HEPARIN SODIUM 5000 UNIT(S): 5000 INJECTION INTRAVENOUS; SUBCUTANEOUS at 22:05

## 2021-10-03 RX ADMIN — Medication 100 MILLIGRAM(S): at 12:04

## 2021-10-03 RX ADMIN — SEVELAMER CARBONATE 1600 MILLIGRAM(S): 2400 POWDER, FOR SUSPENSION ORAL at 17:05

## 2021-10-03 RX ADMIN — HEPARIN SODIUM 5000 UNIT(S): 5000 INJECTION INTRAVENOUS; SUBCUTANEOUS at 05:13

## 2021-10-03 RX ADMIN — CHLORHEXIDINE GLUCONATE 1 APPLICATION(S): 213 SOLUTION TOPICAL at 12:04

## 2021-10-03 RX ADMIN — Medication 1 TABLET(S): at 12:03

## 2021-10-03 RX ADMIN — Medication 400 MILLIGRAM(S): at 05:13

## 2021-10-03 RX ADMIN — Medication 1 DROP(S): at 13:26

## 2021-10-03 RX ADMIN — Medication 1: at 12:03

## 2021-10-03 RX ADMIN — Medication 400 MILLIGRAM(S): at 17:05

## 2021-10-03 RX ADMIN — Medication 1 DROP(S): at 05:13

## 2021-10-03 RX ADMIN — GABAPENTIN 100 MILLIGRAM(S): 400 CAPSULE ORAL at 12:03

## 2021-10-03 RX ADMIN — ATORVASTATIN CALCIUM 20 MILLIGRAM(S): 80 TABLET, FILM COATED ORAL at 22:05

## 2021-10-03 RX ADMIN — HEPARIN SODIUM 5000 UNIT(S): 5000 INJECTION INTRAVENOUS; SUBCUTANEOUS at 13:26

## 2021-10-03 NOTE — ADVANCED PRACTICE NURSE CONSULT - ASSESSMENT
General: A&Ox4, turns independently, continent of  stool. Skin warm, dry with increased moisture in intertriginous folds, poor skin turgor, scattered areas of hyperpigmentation and hypopigmentation, Scars to bilateral knees, Blanchable erythema on bilateral heels.     Sacral fold extending to bilateral buttocks with moisture associated dermatitis presenting with an area of hypopigmentation as evidence of previous skin impairment, with increased moisture and scattered areas of denuded epidermis. Within folds unable to be seen in natural anatomical position.     Vascular: Bilateral lower extremities with scattered areas of hyper and hypopigmentation. Dry, flaky skin.  Thickened toenails. No temperature changes noted. + 2  Edema. Capillary refill <3 seconds.         (+) 2 DP/PT pulses.     Right foot under fourth great toe with moisture associated dermatis with suspected fungal infection (athletes foot?) presenting with a fissure 0.2cmx1.5cmx0.4cm exposing adipose tissue with small serous drainage. Periwound skin with maceration. No s/s of soft tissue infection. Goals of care: minimize moisture, treat suspected fungal infection protect periwound skin.

## 2021-10-03 NOTE — CHART NOTE - NSCHARTNOTEFT_GEN_A_CORE
Pt seen earlier for weakness.  RN reported walking with pt in the hallway and pts knees buckled and she almost fell.  Pt was returned to bed and then RN noticed her being very lethargic.  FS was 154, VSS.  Then pt c/o chest pain.  EKG was done that did not show ST segment or T wave inversions but had SR, RBBB and a new LAFB.  Within about 10 minutes CP went away without intervention.    At this time, daughter expressed concern that pt was getting weaker and although she was ambulatory on admission, pt is too weak to get out of bed now.  DaughterElba also noted that pts pain in her eye and neck have not been controlled with increased dose of Tegretol, Flexoril and Gabapentin.  She was concerned that these medications were causing weakness and lethargy but no pian control.  Hyponatremia also noted.     Vital Signs Last 24 Hrs  T(C): 36.8 (03 Oct 2021 17:32), Max: 37.2 (03 Oct 2021 05:21)  T(F): 98.2 (03 Oct 2021 17:32), Max: 98.9 (03 Oct 2021 05:21)  HR: 74 (03 Oct 2021 22:50) (67 - 80)  BP: 160/78 (03 Oct 2021 20:28) (140/70 - 175/76)  BP(mean): --  RR: 16 (03 Oct 2021 20:28) (15 - 17)  SpO2: 97% (03 Oct 2021 22:50) (96% - 100%)    A/P:  Pt ordered for Tele monitoring  Cardiac evaluation as er Dr. Sanchez  Will check Trop and repeat EKG as needed  Tegretol ordered to be tapered off, Flexoril and Gabapentin stopped   Tegretol level ordered  UA if pt has urine to eval for infection  Procalcitonin, CBC, CMP, Ammonia, Trop  IV Tylenol trial for pain management  Pain management consult in am, previously recommended during this admission  Jaydon Arreola would like to be notified if Narcotics will be recommended.  She feels pt does not have capacity to agree to this treatment.  Capacity for making decisions will need to be made on an ongoing basis given pts lethargy  CTH if mental status worsens Pt seen earlier for weakness.  RN reported walking with pt in the hallway and pts knees buckled and she almost fell.  Pt was returned to bed and then RN noticed her being very lethargic.  FS was 154, VSS.  Then pt c/o chest pain.  EKG was done that did not show ST segment or T wave inversions but had SR, RBBB and a new LAFB.  Within about 10 minutes CP went away without intervention.    At this time, daughter expressed concern that pt was getting weaker and although she was ambulatory on admission, pt is too weak to get out of bed now.  DaughterElba also noted that pts pain in her eye and neck have not been controlled with increased dose of Tegretol, Flexoril and Gabapentin.  She was concerned that these medications were causing weakness and lethargy but no pian control.  Hyponatremia also noted.     Vital Signs Last 24 Hrs  T(C): 36.8 (03 Oct 2021 17:32), Max: 37.2 (03 Oct 2021 05:21)  T(F): 98.2 (03 Oct 2021 17:32), Max: 98.9 (03 Oct 2021 05:21)  HR: 74 (03 Oct 2021 22:50) (67 - 80)  BP: 160/78 (03 Oct 2021 20:28) (140/70 - 175/76)  BP(mean): --  RR: 16 (03 Oct 2021 20:28) (15 - 17)  SpO2: 97% (03 Oct 2021 22:50) (96% - 100%)    Pt lying in bed, lethargic but responds up to verbal stimuli  Card: RRR  Lungs: no wheezing  Neuro: motor grossly intact, Cranial nerves 2-12 grossly intact      A/P:  Pt ordered for Tele monitoring  Cardiac evaluation as er Dr. Sanchez  Will check Trop and repeat EKG as needed  Tegretol ordered to be tapered off, Flexoril and Gabapentin stopped   Tegretol level ordered  UA if pt has urine to eval for infection  Procalcitonin, CBC, CMP, Ammonia, Trop  IV Tylenol trial for pain management  Pain management consult in am, previously recommended during this admission  Jaydon Arreola would like to be notified if Narcotics will be recommended.  She feels pt does not have capacity to agree to this treatment.  Capacity for making decisions will need to be made on an ongoing basis given pts lethargy  CTH if mental status worsens

## 2021-10-03 NOTE — PROVIDER CONTACT NOTE (CHANGE IN STATUS NOTIFICATION) - SITUATION
Pt walking with RN and began to c/o weakness, knees buckled 2x and caught by nurse and sat down.  Pt states dizzy and weak. Laid back in bed and began to c/o chest pain and seemed lethargic and not as alert as earlier today.

## 2021-10-03 NOTE — ADVANCED PRACTICE NURSE CONSULT - REASON FOR CONSULT
Patient seen on skin care rounds after wound care referral received for assessment of skin impairment and recommendations of topical management. Chart reviewed: WBC 4.98, H/H 9.7/31.6, platelets 121, BMI 30.7, Reji 20. Patient H/O of Anemia, Type-2 DM, ESRD (HD M/W/F), HTN, HLD, Sleep apnea (CPAP), Trigeminal neuralgia (diagnosed in [place] 30yrs ago), Carpal tunnel syndrome and Gout, presented to he ED secondary to right sided neck pain and generalized weakness. Patient seen by Neurology for neck pain and Nephrology for ESRD. 
Attempted to see patient on Wound Care Rounds, patient off unit at this time at dialysis. Will follow up with patient for wound care recommendations.     Please contact Wound/Ostomy Care Service Line if we can be of further assistance (ext [phone]).

## 2021-10-03 NOTE — PROVIDER CONTACT NOTE (CHANGE IN STATUS NOTIFICATION) - ASSESSMENT
VSS, pt hypertensive 170s, , o2 sat WDL. Able to answer orientation questions but slower than usual. Falling asleep in between care.

## 2021-10-03 NOTE — PROGRESS NOTE ADULT - SUBJECTIVE AND OBJECTIVE BOX
[facility] : Ans Serv [phone], Office [phone]  Dr Lockett/Dr Camacho  /Dr Garry davenport /Dr JACY Taylor/Dr Del Becker/Dr Stephon Colbert /Dr MALCOLM Kearns  _______________________________________________________________________________________________    seen and examined today for End Stage Renal Disease on Dialysis   Interval :  VITALS:  T(F): 98.7 (10-03-21 @ 09:57), Max: 98.9 (10-03-21 @ 05:21)  HR: 78 (10-03-21 @ 10:30)  BP: 145/67 (10-03-21 @ 09:57)  RR: 17 (10-03-21 @ 09:57)  SpO2: 96% (10-03-21 @ 10:30)    10-02 @ 07:01  -  10-03 @ 07:00  --------------------------------------------------------  IN: 650 mL / OUT: 0 mL / NET: 650 mL    10-03 @ 07:01  -  10-03 @ 14:08  --------------------------------------------------------  IN: 450 mL / OUT: 0 mL / NET: 450 mL    Physical Exam :-  Constitutional: NAD  Neck: Supple.  Respiratory: Bilateral equal breath sounds,few  Crackles present.  Cardiovascular: S1, S2 normal, positive Murmur  Gastrointestinal: Bowel Sounds present, soft, non tender.  Extremities: no Edema Feet  Neurological: Alert   Data:-  Allergies :   IV Contrast (Anaphylaxis)  shellfish (Hives; Rash)  shrimp (Hives)  smoke; coughing (Other)    Hospital Medications:   MEDICATIONS  (STANDING):  allopurinol 100 milliGRAM(s) Oral daily  artificial tears (preservative free) Ophthalmic Solution 1 Drop(s) Both EYES three times a day  atorvastatin 20 milliGRAM(s) Oral at bedtime  carBAMazepine 400 milliGRAM(s) Oral two times a day  chlorhexidine 2% Cloths 1 Application(s) Topical daily  dextrose 40% Gel 15 Gram(s) Oral once  dextrose 5%. 1000 milliLiter(s) (50 mL/Hr) IV Continuous <Continuous>  dextrose 5%. 1000 milliLiter(s) (100 mL/Hr) IV Continuous <Continuous>  dextrose 50% Injectable 25 Gram(s) IV Push once  dextrose 50% Injectable 12.5 Gram(s) IV Push once  dextrose 50% Injectable 25 Gram(s) IV Push once  epoetin shannon-epbx (RETACRIT) Injectable 65369 Unit(s) IV Push <User Schedule>  gabapentin 100 milliGRAM(s) Oral daily  glucagon  Injectable 1 milliGRAM(s) IntraMuscular once  heparin   Injectable 5000 Unit(s) SubCutaneous every 8 hours  influenza  Vaccine (HIGH DOSE) 0.7 milliLiter(s) IntraMuscular once  insulin glargine Injectable (LANTUS) 15 Unit(s) SubCutaneous at bedtime  insulin lispro (ADMELOG) corrective regimen sliding scale   SubCutaneous three times a day before meals  insulin lispro (ADMELOG) corrective regimen sliding scale   SubCutaneous at bedtime  Nephro-neeraj 1 Tablet(s) Oral daily  polyethylene glycol 3350 17 Gram(s) Oral daily  senna 2 Tablet(s) Oral at bedtime  sevelamer carbonate 1600 milliGRAM(s) Oral three times a day with meals    10-03    132<L>  |  91<L>  |  30<H>  ----------------------------<  101<H>  4.9   |  25  |  7.16<H>    Ca    9.6      03 Oct 2021 09:18  Phos  4.6     10-03  Mg     2.80     10-03      Creatinine Trend: 7.16 <--, 6.92 <--, 5.30 <--, 7.74 <--, 5.89 <--, 4.59 <--, 7.44 <--, 6.88 <--, 8.91 <--                        9.7    4.98  )-----------( 121      ( 03 Oct 2021 06:49 )             31.6

## 2021-10-03 NOTE — ADVANCED PRACTICE NURSE CONSULT - RECOMMEDATIONS
Recommend Podiatry consult for assessment of right fourth toe wound (Pat with hx of DM)     Please obtain HgA1C with next blood draw.     Topical recommendations:     Sacral/gluteal fold extending to bilateral buttocks- Apply [employer] moisture barrier cream twice a day.     Right foot under fourth toe- Cleanse with NS, pat dry. Apply Liquid barrier film to periwound skin (allow to dry). Apply antifungal powder, apply Interdry textile sheeting, leaving 2 inches exposed at ends to wick, remove to wash & dry affected area, then replace. Individual sheeting may be used for up to 5 days unless soiled.     Continue low air loss bed therapy,  heel elevation with offloading boots while in bed, encourage to turn & reposition q2h with Z-flow fluidized pillow, continue moisture management with barrier creams as specified above & single breathable pad, continue measures to decrease friction/shear.   Plan discussed with patient- educated on topical wound therapy to optimize wound healing. Questions answered.     Please contact Wound/Ostomy Care Service Line if we can be of further assistance (ext [phone]).

## 2021-10-03 NOTE — PROGRESS NOTE ADULT - SUBJECTIVE AND OBJECTIVE BOX
Patient is a 76y old  Female who presents with a chief complaint of Generalized weakness, Neck pain, and Nausea (02 Oct 2021 16:15)      SUBJECTIVE / OVERNIGHT EVENTS:    Events noted.  CONSTITUTIONAL: No fever,  or fatigue  RESPIRATORY: No cough, wheezing,  No shortness of breath  CARDIOVASCULAR: No chest pain, palpitations, dizziness, or leg swelling  GASTROINTESTINAL: No abdominal or epigastric pain. No nausea, vomiting.  NEUROLOGICAL: No headaches,     MEDICATIONS  (STANDING):  allopurinol 100 milliGRAM(s) Oral daily  artificial tears (preservative free) Ophthalmic Solution 1 Drop(s) Both EYES three times a day  atorvastatin 20 milliGRAM(s) Oral at bedtime  carBAMazepine 400 milliGRAM(s) Oral two times a day  chlorhexidine 2% Cloths 1 Application(s) Topical daily  dextrose 40% Gel 15 Gram(s) Oral once  dextrose 5%. 1000 milliLiter(s) (50 mL/Hr) IV Continuous <Continuous>  dextrose 5%. 1000 milliLiter(s) (100 mL/Hr) IV Continuous <Continuous>  dextrose 50% Injectable 25 Gram(s) IV Push once  dextrose 50% Injectable 12.5 Gram(s) IV Push once  dextrose 50% Injectable 25 Gram(s) IV Push once  epoetin shannon-epbx (RETACRIT) Injectable 89277 Unit(s) IV Push <User Schedule>  gabapentin 100 milliGRAM(s) Oral daily  glucagon  Injectable 1 milliGRAM(s) IntraMuscular once  heparin   Injectable 5000 Unit(s) SubCutaneous every 8 hours  influenza  Vaccine (HIGH DOSE) 0.7 milliLiter(s) IntraMuscular once  insulin glargine Injectable (LANTUS) 15 Unit(s) SubCutaneous at bedtime  insulin lispro (ADMELOG) corrective regimen sliding scale   SubCutaneous three times a day before meals  insulin lispro (ADMELOG) corrective regimen sliding scale   SubCutaneous at bedtime  Nephro-neeraj 1 Tablet(s) Oral daily  polyethylene glycol 3350 17 Gram(s) Oral daily  senna 2 Tablet(s) Oral at bedtime  sevelamer carbonate 1600 milliGRAM(s) Oral three times a day with meals    MEDICATIONS  (PRN):  acetaminophen   Tablet .. 650 milliGRAM(s) Oral every 6 hours PRN Temp greater or equal to 38C (100.4F), Mild Pain (1 - 3), Moderate Pain (4 - 6)  cyclobenzaprine 10 milliGRAM(s) Oral three times a day PRN Muscle Spasm  guaiFENesin Oral Liquid (Sugar-Free) 100 milliGRAM(s) Oral every 6 hours PRN Cough  ondansetron Injectable 4 milliGRAM(s) IV Push every 8 hours PRN Nausea and/or Vomiting  sodium chloride 0.65% Nasal 1 Spray(s) Both Nostrils three times a day PRN Nasal Congestion        CAPILLARY BLOOD GLUCOSE      POCT Blood Glucose.: 152 mg/dL (03 Oct 2021 11:33)  POCT Blood Glucose.: 79 mg/dL (03 Oct 2021 07:29)  POCT Blood Glucose.: 211 mg/dL (02 Oct 2021 21:36)  POCT Blood Glucose.: 122 mg/dL (02 Oct 2021 16:38)    I&O's Summary    02 Oct 2021 07:01  -  03 Oct 2021 07:00  --------------------------------------------------------  IN: 650 mL / OUT: 0 mL / NET: 650 mL    03 Oct 2021 07:01  -  03 Oct 2021 11:46  --------------------------------------------------------  IN: 200 mL / OUT: 0 mL / NET: 200 mL        T(C): 37.1 (10-03-21 @ 09:57), Max: 37.2 (10-03-21 @ 05:21)  HR: 78 (10-03-21 @ 10:30) (67 - 81)  BP: 145/67 (10-03-21 @ 09:57) (120/70 - 148/75)  RR: 17 (10-03-21 @ 09:57) (15 - 17)  SpO2: 96% (10-03-21 @ 10:30) (95% - 100%)    PHYSICAL EXAM:  GENERAL: NAD  NECK: Supple, No JVD  CHEST/LUNG: Clear to auscultation bilaterally; No wheezing.  HEART: Regular rate and rhythm; No murmurs, rubs, or gallops  ABDOMEN: Soft, Nontender, Nondistended; Bowel sounds present  EXTREMITIES:   No edema  NEUROLOGY: AAO X 3      LABS:                        9.7    4.98  )-----------( 121      ( 03 Oct 2021 06:49 )             31.6     10-03    132<L>  |  91<L>  |  30<H>  ----------------------------<  101<H>  4.9   |  25  |  7.16<H>    Ca    9.6      03 Oct 2021 09:18  Phos  4.6     10-03  Mg     2.80     10-03    TPro  7.2  /  Alb  x   /  TBili  x   /  DBili  x   /  AST  x   /  ALT  x   /  AlkPhos  x   10-01            CAPILLARY BLOOD GLUCOSE      POCT Blood Glucose.: 152 mg/dL (03 Oct 2021 11:33)  POCT Blood Glucose.: 79 mg/dL (03 Oct 2021 07:29)  POCT Blood Glucose.: 211 mg/dL (02 Oct 2021 21:36)  POCT Blood Glucose.: 122 mg/dL (02 Oct 2021 16:38)        RADIOLOGY & ADDITIONAL TESTS:    Imaging Personally Reviewed:    Consultant(s) Notes Reviewed:      Care Discussed with Consultants/Other Providers:    Mike Sanchez MD, CMD, FACP    [address] [place] [address]  Office Tel: [phone]  Cell: [phone]

## 2021-10-04 LAB
A1C WITH ESTIMATED AVERAGE GLUCOSE RESULT: 6 % — HIGH (ref 4–5.6)
ALBUMIN SERPL ELPH-MCNC: 4.2 G/DL — SIGNIFICANT CHANGE UP (ref 3.3–5)
ALP SERPL-CCNC: 108 U/L — SIGNIFICANT CHANGE UP (ref 40–120)
ALT FLD-CCNC: 66 U/L — HIGH (ref 4–33)
AMMONIA BLD-MCNC: 28 UMOL/L — SIGNIFICANT CHANGE UP (ref 11–55)
ANA PAT FLD IF-IMP: ABNORMAL
ANA TITR SER: ABNORMAL
ANION GAP SERPL CALC-SCNC: 11 MMOL/L — SIGNIFICANT CHANGE UP (ref 7–14)
ANION GAP SERPL CALC-SCNC: 14 MMOL/L — SIGNIFICANT CHANGE UP (ref 7–14)
AST SERPL-CCNC: 32 U/L — SIGNIFICANT CHANGE UP (ref 4–32)
BILIRUB SERPL-MCNC: 0.2 MG/DL — SIGNIFICANT CHANGE UP (ref 0.2–1.2)
BUN SERPL-MCNC: 35 MG/DL — HIGH (ref 7–23)
BUN SERPL-MCNC: 36 MG/DL — HIGH (ref 7–23)
CALCIUM SERPL-MCNC: 10 MG/DL — SIGNIFICANT CHANGE UP (ref 8.4–10.5)
CALCIUM SERPL-MCNC: 7.3 MG/DL — LOW (ref 8.4–10.5)
CARBAMAZEPINE SERPL-MCNC: 7.8 UG/ML — SIGNIFICANT CHANGE UP (ref 4–12)
CHLORIDE SERPL-SCNC: 84 MMOL/L — LOW (ref 98–107)
CHLORIDE SERPL-SCNC: 85 MMOL/L — LOW (ref 98–107)
CK MB BLD-MCNC: 3.6 % — HIGH (ref 0–2.5)
CK MB CFR SERPL CALC: 2 NG/ML — SIGNIFICANT CHANGE UP
CK SERPL-CCNC: 55 U/L — SIGNIFICANT CHANGE UP (ref 25–170)
CO2 SERPL-SCNC: 27 MMOL/L — SIGNIFICANT CHANGE UP (ref 22–31)
CO2 SERPL-SCNC: 31 MMOL/L — SIGNIFICANT CHANGE UP (ref 22–31)
CREAT SERPL-MCNC: 8.45 MG/DL — HIGH (ref 0.5–1.3)
CREAT SERPL-MCNC: 9.19 MG/DL — HIGH (ref 0.5–1.3)
ESTIMATED AVERAGE GLUCOSE: 126 — SIGNIFICANT CHANGE UP
GLUCOSE BLDC GLUCOMTR-MCNC: 102 MG/DL — HIGH (ref 70–99)
GLUCOSE BLDC GLUCOMTR-MCNC: 105 MG/DL — HIGH (ref 70–99)
GLUCOSE BLDC GLUCOMTR-MCNC: 141 MG/DL — HIGH (ref 70–99)
GLUCOSE BLDC GLUCOMTR-MCNC: 145 MG/DL — HIGH (ref 70–99)
GLUCOSE BLDC GLUCOMTR-MCNC: 185 MG/DL — HIGH (ref 70–99)
GLUCOSE BLDC GLUCOMTR-MCNC: 190 MG/DL — HIGH (ref 70–99)
GLUCOSE BLDC GLUCOMTR-MCNC: 190 MG/DL — HIGH (ref 70–99)
GLUCOSE SERPL-MCNC: 113 MG/DL — HIGH (ref 70–99)
GLUCOSE SERPL-MCNC: <5 MG/DL — CRITICAL LOW (ref 70–99)
HCT VFR BLD CALC: 30.6 % — LOW (ref 34.5–45)
HGB BLD-MCNC: 9.7 G/DL — LOW (ref 11.5–15.5)
MAGNESIUM SERPL-MCNC: 3 MG/DL — HIGH (ref 1.6–2.6)
MCHC RBC-ENTMCNC: 29.6 PG — SIGNIFICANT CHANGE UP (ref 27–34)
MCHC RBC-ENTMCNC: 31.7 GM/DL — LOW (ref 32–36)
MCV RBC AUTO: 93.3 FL — SIGNIFICANT CHANGE UP (ref 80–100)
NRBC # BLD: 0 /100 WBCS — SIGNIFICANT CHANGE UP
NRBC # FLD: 0 K/UL — SIGNIFICANT CHANGE UP
PHOSPHATE SERPL-MCNC: 5.2 MG/DL — HIGH (ref 2.5–4.5)
PLATELET # BLD AUTO: 211 K/UL — SIGNIFICANT CHANGE UP (ref 150–400)
POTASSIUM SERPL-MCNC: 5.5 MMOL/L — HIGH (ref 3.5–5.3)
POTASSIUM SERPL-MCNC: 5.6 MMOL/L — HIGH (ref 3.5–5.3)
POTASSIUM SERPL-SCNC: 5.5 MMOL/L — HIGH (ref 3.5–5.3)
POTASSIUM SERPL-SCNC: 5.6 MMOL/L — HIGH (ref 3.5–5.3)
PROCALCITONIN SERPL-MCNC: 1.35 NG/ML — HIGH (ref 0.02–0.1)
PROT SERPL-MCNC: 7.2 G/DL — SIGNIFICANT CHANGE UP (ref 6–8.3)
RBC # BLD: 3.28 M/UL — LOW (ref 3.8–5.2)
RBC # FLD: 19.8 % — HIGH (ref 10.3–14.5)
SODIUM SERPL-SCNC: 126 MMOL/L — LOW (ref 135–145)
SODIUM SERPL-SCNC: 126 MMOL/L — LOW (ref 135–145)
TROPONIN T, HIGH SENSITIVITY RESULT: 88 NG/L — CRITICAL HIGH
TROPONIN T, HIGH SENSITIVITY RESULT: 89 NG/L — CRITICAL HIGH
WBC # BLD: 6.48 K/UL — SIGNIFICANT CHANGE UP (ref 3.8–10.5)
WBC # FLD AUTO: 6.48 K/UL — SIGNIFICANT CHANGE UP (ref 3.8–10.5)

## 2021-10-04 RX ADMIN — SEVELAMER CARBONATE 1600 MILLIGRAM(S): 2400 POWDER, FOR SUSPENSION ORAL at 17:30

## 2021-10-04 RX ADMIN — CHLORHEXIDINE GLUCONATE 1 APPLICATION(S): 213 SOLUTION TOPICAL at 13:51

## 2021-10-04 RX ADMIN — HEPARIN SODIUM 5000 UNIT(S): 5000 INJECTION INTRAVENOUS; SUBCUTANEOUS at 13:50

## 2021-10-04 RX ADMIN — Medication 100 MILLIGRAM(S): at 13:49

## 2021-10-04 RX ADMIN — Medication 1 TABLET(S): at 13:49

## 2021-10-04 RX ADMIN — Medication 1 DROP(S): at 05:46

## 2021-10-04 RX ADMIN — ATORVASTATIN CALCIUM 20 MILLIGRAM(S): 80 TABLET, FILM COATED ORAL at 22:34

## 2021-10-04 RX ADMIN — Medication 1 DROP(S): at 22:34

## 2021-10-04 RX ADMIN — INSULIN GLARGINE 15 UNIT(S): 100 INJECTION, SOLUTION SUBCUTANEOUS at 00:33

## 2021-10-04 RX ADMIN — POLYETHYLENE GLYCOL 3350 17 GRAM(S): 17 POWDER, FOR SOLUTION ORAL at 13:51

## 2021-10-04 RX ADMIN — HEPARIN SODIUM 5000 UNIT(S): 5000 INJECTION INTRAVENOUS; SUBCUTANEOUS at 22:34

## 2021-10-04 RX ADMIN — Medication 200 MILLIGRAM(S): at 17:30

## 2021-10-04 RX ADMIN — SENNA PLUS 2 TABLET(S): 8.6 TABLET ORAL at 22:33

## 2021-10-04 RX ADMIN — SEVELAMER CARBONATE 1600 MILLIGRAM(S): 2400 POWDER, FOR SUSPENSION ORAL at 13:50

## 2021-10-04 RX ADMIN — ERYTHROPOIETIN 10000 UNIT(S): 10000 INJECTION, SOLUTION INTRAVENOUS; SUBCUTANEOUS at 07:59

## 2021-10-04 RX ADMIN — Medication 1: at 18:24

## 2021-10-04 RX ADMIN — INSULIN GLARGINE 15 UNIT(S): 100 INJECTION, SOLUTION SUBCUTANEOUS at 22:34

## 2021-10-04 RX ADMIN — Medication 1 DROP(S): at 13:51

## 2021-10-04 NOTE — PROGRESS NOTE ADULT - ASSESSMENT
73F with h/o ESRD on HD via tunneled cath awaiting PD catheter placement ( HD is M/W/F), DM, HTN who presents with c/o dyspnea with recent RSV infection. RENAL following for ESRD Mx.    poc d/w pt  labs, chart reviewed  pl call for q's  Nephrology   cell [phone]  office [phone]  [facility] [phone]

## 2021-10-04 NOTE — PROVIDER CONTACT NOTE (CRITICAL VALUE NOTIFICATION) - SITUATION
Pt Transferred to [address] [place] [address] for tele after abnormal EKG. Labs sent Trop level is 89.

## 2021-10-04 NOTE — DIETITIAN INITIAL EVALUATION ADULT. - OTHER INFO
Pt has a history of Anemia, DM2, ESRD with HD 3x/week, HTN, HLD, JARVIS, Trigeminal Neuralgia, Carpel Tunnel syndrome, and gout. Pt presented with neck pain, generalized weakness and nausea with decreased po intake for about 2 weeks PTA.   Pt states that her appetite has improved and her po intake has increased. Pt reports eating about 50% of her meals. Pt had no complaints of GI distress nor difficulty chewing or swallowing. Pt is allergic to shellfish.  Pt states she follows a consistent carb, renal replacement diet at home. She states she sees a Renal Dietitian frequently.   Pt's current weight (10/4/21) is 76 kg/167.5 lbs. As per HIE, Pt's weight 9/26/20 was 81.6 kg/179.5 lbs. Pt has had a 12 lb weight loss over I year.  Pt is possibly at risk for malnutrition with decreased po intake and weight loss of 6.68% in one year.  Pt made aware that the RDN remains available.

## 2021-10-04 NOTE — DIETITIAN INITIAL EVALUATION ADULT. - ORAL INTAKE PTA/DIET HISTORY
Pt states her appetite and po intake had been good until approximately 2 weeks PTA when Pt experienced  nausea, and decreased po intake.

## 2021-10-04 NOTE — PROGRESS NOTE ADULT - SUBJECTIVE AND OBJECTIVE BOX
ANTIONELAURASUN  76y  Female      Patient is a 76y old  Female who presents with a chief complaint of Generalized weakness, Neck pain, and Nausea (04 Oct 2021 15:10)  Patient was seen and examined.chart reviewed.covering .spoke with Daughter at bedside,concern for  small wound on rt.foot on planter area of 4th toe.Daughter states,she noticed it for the first time    REVIEW OF SYSTEMS:  CONSTITUTIONAL: No fever  RESPIRATORY: No cough, hemoptysis or shortness of breath  CARDIOVASCULAR: No chest pain, palpitations, dizziness, or leg swelling  GASTROINTESTINAL: No abdominal pain. nausea, vomiting, hematemesis  rt.foot wound near 4th toe  INTERVAL HPI/OVERNIGHT EVENTS:  T(C): 36.8 (10-04-21 @ 21:19), Max: 37.3 (10-04-21 @ 17:03)  HR: 89 (10-04-21 @ 21:19) (72 - 97)  BP: 161/74 (10-04-21 @ 21:19) (136/64 - 166/86)  RR: 18 (10-04-21 @ 21:19) (17 - 18)  SpO2: 96% (10-04-21 @ 21:19) (96% - 100%)  Wt(kg): --  I&O's Summary    03 Oct 2021 07:01  -  04 Oct 2021 07:00  --------------------------------------------------------  IN: 650 mL / OUT: 100 mL / NET: 550 mL    04 Oct 2021 07:01  -  04 Oct 2021 22:01  --------------------------------------------------------  IN: 600 mL / OUT: 2600 mL / NET: -2000 mL      T(C): 36.8 (10-04-21 @ 21:19), Max: 37.3 (10-04-21 @ 17:03)  HR: 89 (10-04-21 @ 21:19) (72 - 97)  BP: 161/74 (10-04-21 @ 21:19) (136/64 - 166/86)  RR: 18 (10-04-21 @ 21:19) (17 - 18)  SpO2: 96% (10-04-21 @ 21:19) (96% - 100%)  Wt(kg): --Vital Signs Last 24 Hrs  T(C): 36.8 (04 Oct 2021 21:19), Max: 37.3 (04 Oct 2021 17:03)  T(F): 98.2 (04 Oct 2021 21:19), Max: 99.1 (04 Oct 2021 17:03)  HR: 89 (04 Oct 2021 21:19) (72 - 97)  BP: 161/74 (04 Oct 2021 21:19) (136/64 - 166/86)  BP(mean): --  RR: 18 (04 Oct 2021 21:19) (17 - 18)  SpO2: 96% (04 Oct 2021 21:19) (96% - 100%)    LABS:                        9.7    6.48  )-----------( 211      ( 04 Oct 2021 07:44 )             30.6     10-04    126<L>  |  84<L>  |  36<H>  ----------------------------<  113<H>  5.6<H>   |  31  |  9.19<H>    Ca    7.3<L>      04 Oct 2021 07:44  Phos  5.2     10-04  Mg     3.00     10-    TPro  7.2  /  Alb  4.2  /  TBili  0.2  /  DBili  x   /  AST  32  /  ALT  66<H>  /  AlkPhos  108  10-03      Urinalysis Basic - ( 03 Oct 2021 23:42 )    Color: Light Yellow / Appearance: Slightly Turbid / S.008 / pH: x  Gluc: x / Ketone: Negative  / Bili: Negative / Urobili: <2 mg/dL   Blood: x / Protein: 30 mg/dL / Nitrite: Negative   Leuk Esterase: Moderate / RBC: 1 /HPF / WBC 13 /HPF   Sq Epi: x / Non Sq Epi: 21 /HPF / Bacteria: Many      CAPILLARY BLOOD GLUCOSE      POCT Blood Glucose.: 190 mg/dL (04 Oct 2021 17:35)  POCT Blood Glucose.: 141 mg/dL (04 Oct 2021 12:22)  POCT Blood Glucose.: 105 mg/dL (04 Oct 2021 09:59)  POCT Blood Glucose.: 102 mg/dL (04 Oct 2021 08:29)  POCT Blood Glucose.: 145 mg/dL (04 Oct 2021 05:58)  POCT Blood Glucose.: 185 mg/dL (04 Oct 2021 00:26)        Urinalysis Basic - ( 03 Oct 2021 23:42 )    Color: Light Yellow / Appearance: Slightly Turbid / S.008 / pH: x  Gluc: x / Ketone: Negative  / Bili: Negative / Urobili: <2 mg/dL   Blood: x / Protein: 30 mg/dL / Nitrite: Negative   Leuk Esterase: Moderate / RBC: 1 /HPF / WBC 13 /HPF   Sq Epi: x / Non Sq Epi: 21 /HPF / Bacteria: Many        PAST MEDICAL & SURGICAL HISTORY:  HTN (hypertension)    Hypercholesterolemia    Diabetes mellitus    Bronchial asthma  hx of    Vertigo    Trigeminal neuralgia  hx of    ESRD (end stage renal disease) on dialysis  started HD  ~ M/W/F    Pedal edema    Sleep apnea  on CPAP    Dyslipidemia    Anemia    Gout    Carpal tunnel syndrome  left    Chronic right shoulder pain    Disorder of ligament of wrist, left    Mild diastolic dysfunction  ~ Stage I    RSV bronchitis    S/P rotator cuff surgery  bilateral &#x27;s    S/P       S/P hysterectomy      S/P total knee replacement  bilateral, left , right     S/P cataract extraction  bilateral, 2005    History of carpal tunnel surgery of left wrist      S/P arteriovenous (AV) fistula creation  left upper forearm cephalic vein brachial artery AV fistula creation on 2018, left arm basilic vein transposition on 2019.      History of arthroscopy of left shoulder  x 3    History of arthroscopy of right shoulder  x 3    History of vascular access device  , removed 2020        MEDICATIONS  (STANDING):  allopurinol 100 milliGRAM(s) Oral daily  artificial tears (preservative free) Ophthalmic Solution 1 Drop(s) Both EYES three times a day  atorvastatin 20 milliGRAM(s) Oral at bedtime  carBAMazepine 200 milliGRAM(s) Oral two times a day  chlorhexidine 2% Cloths 1 Application(s) Topical daily  dextrose 40% Gel 15 Gram(s) Oral once  dextrose 5%. 1000 milliLiter(s) (50 mL/Hr) IV Continuous <Continuous>  dextrose 5%. 1000 milliLiter(s) (100 mL/Hr) IV Continuous <Continuous>  dextrose 50% Injectable 25 Gram(s) IV Push once  dextrose 50% Injectable 12.5 Gram(s) IV Push once  dextrose 50% Injectable 25 Gram(s) IV Push once  epoetin shannon-epbx (RETACRIT) Injectable 48130 Unit(s) IV Push <User Schedule>  glucagon  Injectable 1 milliGRAM(s) IntraMuscular once  heparin   Injectable 5000 Unit(s) SubCutaneous every 8 hours  influenza  Vaccine (HIGH DOSE) 0.7 milliLiter(s) IntraMuscular once  insulin glargine Injectable (LANTUS) 15 Unit(s) SubCutaneous at bedtime  insulin lispro (ADMELOG) corrective regimen sliding scale   SubCutaneous three times a day before meals  insulin lispro (ADMELOG) corrective regimen sliding scale   SubCutaneous at bedtime  Nephro-neeraj 1 Tablet(s) Oral daily  polyethylene glycol 3350 17 Gram(s) Oral daily  senna 2 Tablet(s) Oral at bedtime  sevelamer carbonate 1600 milliGRAM(s) Oral three times a day with meals    MEDICATIONS  (PRN):  acetaminophen   Tablet .. 650 milliGRAM(s) Oral every 6 hours PRN Temp greater or equal to 38C (100.4F), Mild Pain (1 - 3), Moderate Pain (4 - 6)  guaiFENesin Oral Liquid (Sugar-Free) 100 milliGRAM(s) Oral every 6 hours PRN Cough  ondansetron Injectable 4 milliGRAM(s) IV Push every 8 hours PRN Nausea and/or Vomiting  sodium chloride 0.65% Nasal 1 Spray(s) Both Nostrils three times a day PRN Nasal Congestion        RADIOLOGY & ADDITIONAL TESTS:    Imaging Personally Reviewed:  [ ] YES  [ ] NO    Consultant(s) Notes Reviewed:  [ ] YES  [ ] NO    PHYSICAL EXAM:  GENERAL: Alert and awake lying in bed in no distress  HEAD:  Atraumatic, Normocephalic  EYES: EOMI, DOROTHEA, conjunctiva and sclera clear  NECK: Supple, No JVD, Normal thyroid  NERVOUS SYSTEM:  Alert & Oriented X3, Motor and sensory systems are intact,   CHEST/LUNG: Bilateral clear breath sounds, no rhochi, no wheezing, no crepitations,  HEART: Regular rate and rhythm; No murmurs, rubs, or gallops  ABDOMEN: Soft, Nontender, Nondistended; Bowel sounds present  EXTREMITIES:   Peripheral Pulses are palpable, no  edema.Rt-foot-4th toe-small wound on planter area-no drainage,no surrounfing erethema        Care Discussed with Consultants/Other Providers [ x] YES  [ ] NO      Code Status: [] Full Code [] DNR [] DNI [] Goals of Care:   Disposition: [] ICU [] Stroke Unit [] RCU []PCU []Floor [] Discharge Home         MAKENNA Merchant.FACP

## 2021-10-04 NOTE — PROGRESS NOTE ADULT - SUBJECTIVE AND OBJECTIVE BOX
[facility] - S Janice / Nathan S /D Levy/ S Claudia/ JACY Becker/ Stephon Colbert / MALCOLM Koou/ O Anita  service -[phone], office [phone]  ---------------------------------------------------------------------------------------------------------------    Patient seen and examined bedside    Subjective and Objective: No overnight events, denied cp/sob. No new complaints today. feeling better    Allergies: IV Contrast (Anaphylaxis)  shellfish (Hives; Rash)  shrimp (Hives)  smoke; coughing (Other)      Hospital Medications:   MEDICATIONS  (STANDING):  allopurinol 100 milliGRAM(s) Oral daily  artificial tears (preservative free) Ophthalmic Solution 1 Drop(s) Both EYES three times a day  atorvastatin 20 milliGRAM(s) Oral at bedtime  carBAMazepine 200 milliGRAM(s) Oral two times a day  chlorhexidine 2% Cloths 1 Application(s) Topical daily  dextrose 40% Gel 15 Gram(s) Oral once  dextrose 5%. 1000 milliLiter(s) (50 mL/Hr) IV Continuous <Continuous>  dextrose 5%. 1000 milliLiter(s) (100 mL/Hr) IV Continuous <Continuous>  dextrose 50% Injectable 25 Gram(s) IV Push once  dextrose 50% Injectable 12.5 Gram(s) IV Push once  dextrose 50% Injectable 25 Gram(s) IV Push once  epoetin shannon-epbx (RETACRIT) Injectable 99958 Unit(s) IV Push <User Schedule>  glucagon  Injectable 1 milliGRAM(s) IntraMuscular once  heparin   Injectable 5000 Unit(s) SubCutaneous every 8 hours  influenza  Vaccine (HIGH DOSE) 0.7 milliLiter(s) IntraMuscular once  insulin glargine Injectable (LANTUS) 15 Unit(s) SubCutaneous at bedtime  insulin lispro (ADMELOG) corrective regimen sliding scale   SubCutaneous three times a day before meals  insulin lispro (ADMELOG) corrective regimen sliding scale   SubCutaneous at bedtime  Nephro-neeraj 1 Tablet(s) Oral daily  polyethylene glycol 3350 17 Gram(s) Oral daily  senna 2 Tablet(s) Oral at bedtime  sevelamer carbonate 1600 milliGRAM(s) Oral three times a day with meals    VITALS:  T(F): 98 (10-04-21 @ 11:25), Max: 98.6 (10-04-21 @ 05:36)  HR: 90 (10-04-21 @ 11:25)  BP: 156/78 (10-04-21 @ 11:25)  RR: 18 (10-04-21 @ 11:25)  SpO2: 97% (10-04-21 @ 11:25)  Wt(kg): --    10-03 @ 07:01  -  10-04 @ 07:00  --------------------------------------------------------  IN: 650 mL / OUT: 100 mL / NET: 550 mL    10-04 @ 07:01  -  10-04 @ 15:11  --------------------------------------------------------  IN: 600 mL / OUT: 2600 mL / NET: -2000 mL      PHYSICAL EXAM:  Constitutional: NAD  HEENT: anicteric sclera, oropharynx clear  Neck: No JVD  Respiratory: CTAB, no wheezes, rales or rhonchi  Cardiovascular: S1, S2, RRR  Gastrointestinal: BS+, soft, NT/ND  Extremities: No cyanosis or clubbing. No peripheral edema  Neurological: A/O x 3, no focal deficits  Psychiatric: Normal mood, normal affect  : No CVA tenderness. No cottrell.   Skin: No rashes  Vascular Access:    LABS:  10-04    126<L>  |  84<L>  |  36<H>  ----------------------------<  113<H>  5.6<H>   |  31  |  9.19<H>    Ca    7.3<L>      04 Oct 2021 07:44  Phos  5.2     10-  Mg     3.00     10-    TPro  7.2  /  Alb  4.2  /  TBili  0.2  /  DBili      /  AST  32  /  ALT  66<H>  /  AlkPhos  108  10    Creatinine Trend: 9.19 <--, 8.45 <--, 7.16 <--, 6.92 <--, 5.30 <--, 7.74 <--, 5.89 <--, 4.59 <--, 7.44 <--, 6.88 <--                        9.7    6.48  )-----------( 211      ( 04 Oct 2021 07:44 )             30.6     Urine Studies:  Urinalysis Basic - ( 03 Oct 2021 23:42 )    Color: Light Yellow / Appearance: Slightly Turbid / S.008 / pH:   Gluc:  / Ketone: Negative  / Bili: Negative / Urobili: <2 mg/dL   Blood:  / Protein: 30 mg/dL / Nitrite: Negative   Leuk Esterase: Moderate / RBC: 1 /HPF / WBC 13 /HPF   Sq Epi:  / Non Sq Epi: 21 /HPF / Bacteria: Many          RADIOLOGY & ADDITIONAL STUDIES:

## 2021-10-04 NOTE — DIETITIAN INITIAL EVALUATION ADULT. - PERTINENT LABORATORY DATA
POCT-105, 102, 145, 185, Na-126, K-5.6, BUN-36, Cr-9.19, Glu-113, Ca-7.3, Mg-3, Phos-5.2 Hgb A1c-6.1

## 2021-10-05 LAB
A1C WITH ESTIMATED AVERAGE GLUCOSE RESULT: 6.3 % — HIGH (ref 4–5.6)
ANION GAP SERPL CALC-SCNC: 13 MMOL/L — SIGNIFICANT CHANGE UP (ref 7–14)
BUN SERPL-MCNC: 26 MG/DL — HIGH (ref 7–23)
CALCIUM SERPL-MCNC: 9.8 MG/DL — SIGNIFICANT CHANGE UP (ref 8.4–10.5)
CHLORIDE SERPL-SCNC: 89 MMOL/L — LOW (ref 98–107)
CO2 SERPL-SCNC: 27 MMOL/L — SIGNIFICANT CHANGE UP (ref 22–31)
CREAT SERPL-MCNC: 6.2 MG/DL — HIGH (ref 0.5–1.3)
ESTIMATED AVERAGE GLUCOSE: 134 — SIGNIFICANT CHANGE UP
GLUCOSE BLDC GLUCOMTR-MCNC: 128 MG/DL — HIGH (ref 70–99)
GLUCOSE BLDC GLUCOMTR-MCNC: 164 MG/DL — HIGH (ref 70–99)
GLUCOSE BLDC GLUCOMTR-MCNC: 191 MG/DL — HIGH (ref 70–99)
GLUCOSE BLDC GLUCOMTR-MCNC: 195 MG/DL — HIGH (ref 70–99)
GLUCOSE SERPL-MCNC: 168 MG/DL — HIGH (ref 70–99)
HCT VFR BLD CALC: 31 % — LOW (ref 34.5–45)
HGB BLD-MCNC: 9.9 G/DL — LOW (ref 11.5–15.5)
MAGNESIUM SERPL-MCNC: 2.8 MG/DL — HIGH (ref 1.6–2.6)
MCHC RBC-ENTMCNC: 29.7 PG — SIGNIFICANT CHANGE UP (ref 27–34)
MCHC RBC-ENTMCNC: 31.9 GM/DL — LOW (ref 32–36)
MCV RBC AUTO: 93.1 FL — SIGNIFICANT CHANGE UP (ref 80–100)
NRBC # BLD: 0 /100 WBCS — SIGNIFICANT CHANGE UP
NRBC # FLD: 0.02 K/UL — HIGH
PHOSPHATE SERPL-MCNC: 3.5 MG/DL — SIGNIFICANT CHANGE UP (ref 2.5–4.5)
PLATELET # BLD AUTO: 206 K/UL — SIGNIFICANT CHANGE UP (ref 150–400)
POTASSIUM SERPL-MCNC: 4.3 MMOL/L — SIGNIFICANT CHANGE UP (ref 3.5–5.3)
POTASSIUM SERPL-SCNC: 4.3 MMOL/L — SIGNIFICANT CHANGE UP (ref 3.5–5.3)
RBC # BLD: 3.33 M/UL — LOW (ref 3.8–5.2)
RBC # FLD: 20.1 % — HIGH (ref 10.3–14.5)
SODIUM SERPL-SCNC: 129 MMOL/L — LOW (ref 135–145)
WBC # BLD: 7.09 K/UL — SIGNIFICANT CHANGE UP (ref 3.8–10.5)
WBC # FLD AUTO: 7.09 K/UL — SIGNIFICANT CHANGE UP (ref 3.8–10.5)

## 2021-10-05 RX ORDER — MUPIROCIN 20 MG/G
1 OINTMENT TOPICAL
Refills: 0 | Status: DISCONTINUED | OUTPATIENT
Start: 2021-10-05 | End: 2021-10-08

## 2021-10-05 RX ADMIN — Medication 100 MILLIGRAM(S): at 12:44

## 2021-10-05 RX ADMIN — Medication 1: at 18:47

## 2021-10-05 RX ADMIN — MUPIROCIN 1 APPLICATION(S): 20 OINTMENT TOPICAL at 18:48

## 2021-10-05 RX ADMIN — Medication 1 DROP(S): at 05:22

## 2021-10-05 RX ADMIN — INSULIN GLARGINE 15 UNIT(S): 100 INJECTION, SOLUTION SUBCUTANEOUS at 22:12

## 2021-10-05 RX ADMIN — Medication 1 TABLET(S): at 12:44

## 2021-10-05 RX ADMIN — SEVELAMER CARBONATE 1600 MILLIGRAM(S): 2400 POWDER, FOR SUSPENSION ORAL at 08:53

## 2021-10-05 RX ADMIN — CHLORHEXIDINE GLUCONATE 1 APPLICATION(S): 213 SOLUTION TOPICAL at 13:07

## 2021-10-05 RX ADMIN — HEPARIN SODIUM 5000 UNIT(S): 5000 INJECTION INTRAVENOUS; SUBCUTANEOUS at 13:07

## 2021-10-05 RX ADMIN — Medication 1 DROP(S): at 13:08

## 2021-10-05 RX ADMIN — HEPARIN SODIUM 5000 UNIT(S): 5000 INJECTION INTRAVENOUS; SUBCUTANEOUS at 05:22

## 2021-10-05 RX ADMIN — Medication 1: at 08:47

## 2021-10-05 RX ADMIN — Medication 1 DROP(S): at 22:13

## 2021-10-05 RX ADMIN — HEPARIN SODIUM 5000 UNIT(S): 5000 INJECTION INTRAVENOUS; SUBCUTANEOUS at 22:14

## 2021-10-05 RX ADMIN — Medication 1: at 12:43

## 2021-10-05 RX ADMIN — SENNA PLUS 2 TABLET(S): 8.6 TABLET ORAL at 22:13

## 2021-10-05 RX ADMIN — Medication 200 MILLIGRAM(S): at 05:22

## 2021-10-05 RX ADMIN — ATORVASTATIN CALCIUM 20 MILLIGRAM(S): 80 TABLET, FILM COATED ORAL at 22:14

## 2021-10-05 RX ADMIN — SEVELAMER CARBONATE 1600 MILLIGRAM(S): 2400 POWDER, FOR SUSPENSION ORAL at 12:45

## 2021-10-05 NOTE — PROGRESS NOTE ADULT - SUBJECTIVE AND OBJECTIVE BOX
SUN FRAUSTO  76y  Female      Patient is a 76y old  Female who presents with a chief complaint of Generalized weakness, Neck pain, and Nausea (05 Oct 2021 13:13)  feels ok.no sob,no cp,no fever.seen by podiatrist for rt.foot wound    REVIEW OF SYSTEMS:  CONSTITUTIONAL: No fever  RESPIRATORY: No cough, hemoptysis or shortness of breath  CARDIOVASCULAR: No chest pain, palpitations, dizziness, or leg swelling  GASTROINTESTINAL: No abdominal pain. nausea, vomiting, hematemesis  INTERVAL HPI/OVERNIGHT EVENTS:  T(C): 36.9 (10-05-21 @ 16:53), Max: 36.9 (10-05-21 @ 16:53)  HR: 80 (10-05-21 @ 16:53) (80 - 97)  BP: 153/65 (10-05-21 @ 16:53) (141/79 - 169/86)  RR: 18 (10-05-21 @ 16:53) (17 - 18)  SpO2: 98% (10-05-21 @ 16:53) (94% - 99%)  Wt(kg): --  I&O's Summary    04 Oct 2021 07:01  -  05 Oct 2021 07:00  --------------------------------------------------------  IN: 600 mL / OUT: 2600 mL / NET: -2000 mL      T(C): 36.9 (10-05-21 @ 16:53), Max: 36.9 (10-05-21 @ 16:53)  HR: 80 (10-05-21 @ 16:53) (80 - 97)  BP: 153/65 (10-05-21 @ 16:53) (141/79 - 169/86)  RR: 18 (10-05-21 @ 16:53) (17 - 18)  SpO2: 98% (10-05-21 @ 16:53) (94% - 99%)  Wt(kg): --Vital Signs Last 24 Hrs  T(C): 36.9 (05 Oct 2021 16:53), Max: 36.9 (05 Oct 2021 16:53)  T(F): 98.4 (05 Oct 2021 16:53), Max: 98.4 (05 Oct 2021 16:53)  HR: 80 (05 Oct 2021 16:53) (80 - 97)  BP: 153/65 (05 Oct 2021 16:53) (141/79 - 169/86)  BP(mean): --  RR: 18 (05 Oct 2021 16:53) (17 - 18)  SpO2: 98% (05 Oct 2021 16:53) (94% - 99%)    LABS:                        9.9    7.09  )-----------( 206      ( 05 Oct 2021 08:05 )             31.0     10-05    129<L>  |  89<L>  |  26<H>  ----------------------------<  168<H>  4.3   |  27  |  6.20<H>    Ca    9.8      05 Oct 2021 08:05  Phos  3.5     10-05  Mg     2.80     10-05    TPro  7.2  /  Alb  4.2  /  TBili  0.2  /  DBili  x   /  AST  32  /  ALT  66<H>  /  AlkPhos  108  10-03      Urinalysis Basic - ( 03 Oct 2021 23:42 )    Color: Light Yellow / Appearance: Slightly Turbid / S.008 / pH: x  Gluc: x / Ketone: Negative  / Bili: Negative / Urobili: <2 mg/dL   Blood: x / Protein: 30 mg/dL / Nitrite: Negative   Leuk Esterase: Moderate / RBC: 1 /HPF / WBC 13 /HPF   Sq Epi: x / Non Sq Epi: 21 /HPF / Bacteria: Many      CAPILLARY BLOOD GLUCOSE      POCT Blood Glucose.: 191 mg/dL (05 Oct 2021 18:00)  POCT Blood Glucose.: 195 mg/dL (05 Oct 2021 12:10)  POCT Blood Glucose.: 164 mg/dL (05 Oct 2021 08:09)  POCT Blood Glucose.: 190 mg/dL (04 Oct 2021 21:59)        Urinalysis Basic - ( 03 Oct 2021 23:42 )    Color: Light Yellow / Appearance: Slightly Turbid / S.008 / pH: x  Gluc: x / Ketone: Negative  / Bili: Negative / Urobili: <2 mg/dL   Blood: x / Protein: 30 mg/dL / Nitrite: Negative   Leuk Esterase: Moderate / RBC: 1 /HPF / WBC 13 /HPF   Sq Epi: x / Non Sq Epi: 21 /HPF / Bacteria: Many        PAST MEDICAL & SURGICAL HISTORY:  HTN (hypertension)    Hypercholesterolemia    Diabetes mellitus    Bronchial asthma  hx of    Vertigo    Trigeminal neuralgia  hx of    ESRD (end stage renal disease) on dialysis  started HD  ~ M/W/F    Pedal edema    Sleep apnea  on CPAP    Dyslipidemia    Anemia    Gout    Carpal tunnel syndrome  left    Chronic right shoulder pain    Disorder of ligament of wrist, left    Mild diastolic dysfunction  ~ Stage I    RSV bronchitis    S/P rotator cuff surgery  bilateral &#x27;s    S/P       S/P hysterectomy      S/P total knee replacement  bilateral, left , right     S/P cataract extraction  bilateral,     History of carpal tunnel surgery of left wrist      S/P arteriovenous (AV) fistula creation  left upper forearm cephalic vein brachial artery AV fistula creation on 2018, left arm basilic vein transposition on 2019.      History of arthroscopy of left shoulder  x 3    History of arthroscopy of right shoulder  x 3    History of vascular access device  , removed 2020        MEDICATIONS  (STANDING):  allopurinol 100 milliGRAM(s) Oral daily  artificial tears (preservative free) Ophthalmic Solution 1 Drop(s) Both EYES three times a day  atorvastatin 20 milliGRAM(s) Oral at bedtime  carBAMazepine 200 milliGRAM(s) Oral two times a day  chlorhexidine 2% Cloths 1 Application(s) Topical daily  dextrose 40% Gel 15 Gram(s) Oral once  dextrose 5%. 1000 milliLiter(s) (50 mL/Hr) IV Continuous <Continuous>  dextrose 5%. 1000 milliLiter(s) (100 mL/Hr) IV Continuous <Continuous>  dextrose 50% Injectable 25 Gram(s) IV Push once  dextrose 50% Injectable 12.5 Gram(s) IV Push once  dextrose 50% Injectable 25 Gram(s) IV Push once  epoetin shannon-epbx (RETACRIT) Injectable 57327 Unit(s) IV Push <User Schedule>  glucagon  Injectable 1 milliGRAM(s) IntraMuscular once  heparin   Injectable 5000 Unit(s) SubCutaneous every 8 hours  influenza  Vaccine (HIGH DOSE) 0.7 milliLiter(s) IntraMuscular once  insulin glargine Injectable (LANTUS) 15 Unit(s) SubCutaneous at bedtime  insulin lispro (ADMELOG) corrective regimen sliding scale   SubCutaneous three times a day before meals  insulin lispro (ADMELOG) corrective regimen sliding scale   SubCutaneous at bedtime  mupirocin 2% Ointment 1 Application(s) Topical two times a day  Nephro-neeraj 1 Tablet(s) Oral daily  polyethylene glycol 3350 17 Gram(s) Oral daily  senna 2 Tablet(s) Oral at bedtime  sevelamer carbonate 1600 milliGRAM(s) Oral three times a day with meals    MEDICATIONS  (PRN):  acetaminophen   Tablet .. 650 milliGRAM(s) Oral every 6 hours PRN Temp greater or equal to 38C (100.4F), Mild Pain (1 - 3), Moderate Pain (4 - 6)  guaiFENesin Oral Liquid (Sugar-Free) 100 milliGRAM(s) Oral every 6 hours PRN Cough  ondansetron Injectable 4 milliGRAM(s) IV Push every 8 hours PRN Nausea and/or Vomiting  sodium chloride 0.65% Nasal 1 Spray(s) Both Nostrils three times a day PRN Nasal Congestion        RADIOLOGY & ADDITIONAL TESTS:    Imaging Personally Reviewed:  [ ] YES  [ ] NO    Consultant(s) Notes Reviewed:  [ x] YES  [ ] NO    PHYSICAL EXAM:  GENERAL: Alert and awake lying in bed in no distress  HEAD:  Atraumatic, Normocephalic  EYES: EOMI, DOROTHEA, conjunctiva and sclera clear  NECK: Supple, No JVD, Normal thyroid  NERVOUS SYSTEM:  Alert & Oriented X3, Motor and sensory systems are intact,   CHEST/LUNG: Bilateral clear breath sounds, no rhochi, no wheezing, no crepitations,  HEART: Regular rate and rhythm; No murmurs, rubs, or gallops  ABDOMEN: Soft, Nontender, Nondistended; Bowel sounds present  EXTREMITIES:   Peripheral Pulses are palpable, no  edema rt.foot-superficial small wound on planter areaa of 3rd,4th toes        Care Discussed with Consultants/Other Providers [ x] YES  [ ] NO      Code Status: [] Full Code [] DNR [] DNI [] Goals of Care:   Disposition: [] ICU [] Stroke Unit [] RCU []PCU []Floor [] Discharge Home         ORVILLE MerchantFACP

## 2021-10-05 NOTE — CONSULT NOTE ADULT - SUBJECTIVE AND OBJECTIVE BOX
Podiatry pager #: [phone]/ [phone]    Patient is a 76y old  Female who presents with a chief complaint of Generalized weakness, Neck pain, and Nausea (05 Oct 2021 10:44)      HPI:  76 year old female, with past history significant for Anemia, Type-2 DM, ESRD (HD M/W/F), HTN, HLD, Sleep apnea (CPAP), Trigeminal neuralgia, Carpal tunnel syndrome and Gout, presented to he ED secondary to neck pain and generalized weakness.  Seen and evaluated at bedside; NAD.  Patient relates persistent pain of the right neck since 2021.  Underwent X-ray of the area.  Reports being told that this is due to "multiple spasms." Seen by PMD, and Neurologist and underwent "advanced" physical therapy - without relief.  Not adequately palliated with analgesics.  Comments on severe headache associated with trigeminal neuralgia; takes Tegretol infrequently.  Experiences palpitations and elevated blood pressures during severe pain periods.  Recalls that during recent severe pain approximately 3 weeks ago eyes were significantly erythematous.  Has suffered with nausea for the past ~ 2 weeks, which has resulted in trepidation relative to eating; thinks that this may be medication s/e (especially Sevelamer, which is taken after meals TID).  No associated epigastric or abdominal pain.  Has had some abdominal discomfort at times, which is relieved after bowel movement.  Generalized weakness appears to be related to the hemodialysis therapy, per patient's reports.  States no longer being hypertensive since starting hemodialysis; SBP ranging 90 at times.  No report of fever, chills, dizziness, chest pain, shortness of breath.  Has B/L lower extremities edema,     Vital signs upon ED presentation as follows: BP = 129/72, HR = 98, RR = 19, T = 36.6 C (97.9 F), O2 Sat = 100% on RA.  Diagnosed with Generalized Weakness, Neck Pain, and Nausea.  Prescribed Tylenol 650 mg and Valium 5 mg PO in the ED. (22 Sep 2021 21:27)      PAST MEDICAL & SURGICAL HISTORY:  HTN (hypertension)    Hypercholesterolemia    Diabetes mellitus    Bronchial asthma  hx of    Vertigo    Trigeminal neuralgia  hx of    ESRD (end stage renal disease) on dialysis  started HD  ~ M/W/F    Pedal edema    Sleep apnea  on CPAP    Dyslipidemia    Anemia    Gout    Carpal tunnel syndrome  left    Chronic right shoulder pain    Disorder of ligament of wrist, left    Mild diastolic dysfunction  ~ Stage I    RSV bronchitis    S/P rotator cuff surgery  bilateral &#x27;s    S/P       S/P hysterectomy      S/P total knee replacement  bilateral, left , right     S/P cataract extraction  bilateral, 2005    History of carpal tunnel surgery of left wrist      S/P arteriovenous (AV) fistula creation  left upper forearm cephalic vein brachial artery AV fistula creation on 2018, left arm basilic vein transposition on 2019.      History of arthroscopy of left shoulder  x 3    History of arthroscopy of right shoulder  x 3    History of vascular access device  , removed 2020        MEDICATIONS  (STANDING):  allopurinol 100 milliGRAM(s) Oral daily  artificial tears (preservative free) Ophthalmic Solution 1 Drop(s) Both EYES three times a day  atorvastatin 20 milliGRAM(s) Oral at bedtime  carBAMazepine 200 milliGRAM(s) Oral two times a day  chlorhexidine 2% Cloths 1 Application(s) Topical daily  dextrose 40% Gel 15 Gram(s) Oral once  dextrose 5%. 1000 milliLiter(s) (50 mL/Hr) IV Continuous <Continuous>  dextrose 5%. 1000 milliLiter(s) (100 mL/Hr) IV Continuous <Continuous>  dextrose 50% Injectable 25 Gram(s) IV Push once  dextrose 50% Injectable 12.5 Gram(s) IV Push once  dextrose 50% Injectable 25 Gram(s) IV Push once  epoetin shannon-epbx (RETACRIT) Injectable 62600 Unit(s) IV Push <User Schedule>  glucagon  Injectable 1 milliGRAM(s) IntraMuscular once  heparin   Injectable 5000 Unit(s) SubCutaneous every 8 hours  influenza  Vaccine (HIGH DOSE) 0.7 milliLiter(s) IntraMuscular once  insulin glargine Injectable (LANTUS) 15 Unit(s) SubCutaneous at bedtime  insulin lispro (ADMELOG) corrective regimen sliding scale   SubCutaneous three times a day before meals  insulin lispro (ADMELOG) corrective regimen sliding scale   SubCutaneous at bedtime  mupirocin 2% Ointment 1 Application(s) Topical two times a day  Nephro-neeraj 1 Tablet(s) Oral daily  polyethylene glycol 3350 17 Gram(s) Oral daily  senna 2 Tablet(s) Oral at bedtime  sevelamer carbonate 1600 milliGRAM(s) Oral three times a day with meals    MEDICATIONS  (PRN):  acetaminophen   Tablet .. 650 milliGRAM(s) Oral every 6 hours PRN Temp greater or equal to 38C (100.4F), Mild Pain (1 - 3), Moderate Pain (4 - 6)  guaiFENesin Oral Liquid (Sugar-Free) 100 milliGRAM(s) Oral every 6 hours PRN Cough  ondansetron Injectable 4 milliGRAM(s) IV Push every 8 hours PRN Nausea and/or Vomiting  sodium chloride 0.65% Nasal 1 Spray(s) Both Nostrils three times a day PRN Nasal Congestion      Allergies    IV Contrast (Anaphylaxis)  shellfish (Hives; Rash)  shrimp (Hives)  smoke; coughing (Other)    Intolerances        VITALS:    Vital Signs Last 24 Hrs  T(C): 36.6 (05 Oct 2021 12:29), Max: 37.3 (04 Oct 2021 17:03)  T(F): 97.8 (05 Oct 2021 12:29), Max: 99.1 (04 Oct 2021 17:03)  HR: 83 (05 Oct 2021 12:29) (83 - 97)  BP: 156/74 (05 Oct 2021 12:29) (141/79 - 169/86)  BP(mean): --  RR: 17 (05 Oct 2021 12:29) (17 - 18)  SpO2: 97% (05 Oct 2021 12:29) (94% - 99%)    LABS:                          9.9    7.09  )-----------( 206      ( 05 Oct 2021 08:05 )             31.0       10-05    129<L>  |  89<L>  |  26<H>  ----------------------------<  168<H>  4.3   |  27  |  6.20<H>    Ca    9.8      05 Oct 2021 08:05  Phos  3.5     10-05  Mg     2.80     10-05    TPro  7.2  /  Alb  4.2  /  TBili  0.2  /  DBili  x   /  AST  32  /  ALT  66<H>  /  AlkPhos  108  10-03      CAPILLARY BLOOD GLUCOSE      POCT Blood Glucose.: 195 mg/dL (05 Oct 2021 12:10)  POCT Blood Glucose.: 164 mg/dL (05 Oct 2021 08:09)  POCT Blood Glucose.: 190 mg/dL (04 Oct 2021 21:59)  POCT Blood Glucose.: 190 mg/dL (04 Oct 2021 17:35)          LOWER EXTREMITY PHYSICAL EXAM:    Vasular: DP/PT 1/4, B/L, CFT < 3 seconds B/L, Temperature gradient warm to cool, B/L.   Neuro: Epicritic sensation intact to the level of the digits, B/L.  Musculoskeletal/Ortho: unremarkable  Skin: plantar right 3rd toe wound to subq with granular base, no malodor, no active drainage, no acute SOI      RADIOLOGY & ADDITIONAL STUDIES:     Podiatry pager #: [phone]/ [phone]    Patient is a 76y old  Female who presents with a chief complaint of Generalized weakness, Neck pain, and Nausea (05 Oct 2021 10:44)      HPI:  76 year old female, with past history significant for Anemia, Type-2 DM, ESRD (HD M/W/F), HTN, HLD, Sleep apnea (CPAP), Trigeminal neuralgia, Carpal tunnel syndrome and Gout, presented to he ED secondary to neck pain and generalized weakness.  Seen and evaluated at bedside; NAD.  Patient relates persistent pain of the right neck since 2021.  Underwent X-ray of the area.  Reports being told that this is due to "multiple spasms." Seen by PMD, and Neurologist and underwent "advanced" physical therapy - without relief.  Not adequately palliated with analgesics.  Comments on severe headache associated with trigeminal neuralgia; takes Tegretol infrequently.  Experiences palpitations and elevated blood pressures during severe pain periods.  Recalls that during recent severe pain approximately 3 weeks ago eyes were significantly erythematous.  Has suffered with nausea for the past ~ 2 weeks, which has resulted in trepidation relative to eating; thinks that this may be medication s/e (especially Sevelamer, which is taken after meals TID).  No associated epigastric or abdominal pain.  Has had some abdominal discomfort at times, which is relieved after bowel movement.  Generalized weakness appears to be related to the hemodialysis therapy, per patient's reports.  States no longer being hypertensive since starting hemodialysis; SBP ranging 90 at times.  No report of fever, chills, dizziness, chest pain, shortness of breath.  Has B/L lower extremities edema,     Vital signs upon ED presentation as follows: BP = 129/72, HR = 98, RR = 19, T = 36.6 C (97.9 F), O2 Sat = 100% on RA.  Diagnosed with Generalized Weakness, Neck Pain, and Nausea.  Prescribed Tylenol 650 mg and Valium 5 mg PO in the ED. (22 Sep 2021 21:27)      PAST MEDICAL & SURGICAL HISTORY:  HTN (hypertension)    Hypercholesterolemia    Diabetes mellitus    Bronchial asthma  hx of    Vertigo    Trigeminal neuralgia  hx of    ESRD (end stage renal disease) on dialysis  started HD  ~ M/W/F    Pedal edema    Sleep apnea  on CPAP    Dyslipidemia    Anemia    Gout    Carpal tunnel syndrome  left    Chronic right shoulder pain    Disorder of ligament of wrist, left    Mild diastolic dysfunction  ~ Stage I    RSV bronchitis    S/P rotator cuff surgery  bilateral &#x27;s    S/P       S/P hysterectomy      S/P total knee replacement  bilateral, left , right     S/P cataract extraction  bilateral, 2005    History of carpal tunnel surgery of left wrist      S/P arteriovenous (AV) fistula creation  left upper forearm cephalic vein brachial artery AV fistula creation on 2018, left arm basilic vein transposition on 2019.      History of arthroscopy of left shoulder  x 3    History of arthroscopy of right shoulder  x 3    History of vascular access device  , removed 2020        MEDICATIONS  (STANDING):  allopurinol 100 milliGRAM(s) Oral daily  artificial tears (preservative free) Ophthalmic Solution 1 Drop(s) Both EYES three times a day  atorvastatin 20 milliGRAM(s) Oral at bedtime  carBAMazepine 200 milliGRAM(s) Oral two times a day  chlorhexidine 2% Cloths 1 Application(s) Topical daily  dextrose 40% Gel 15 Gram(s) Oral once  dextrose 5%. 1000 milliLiter(s) (50 mL/Hr) IV Continuous <Continuous>  dextrose 5%. 1000 milliLiter(s) (100 mL/Hr) IV Continuous <Continuous>  dextrose 50% Injectable 25 Gram(s) IV Push once  dextrose 50% Injectable 12.5 Gram(s) IV Push once  dextrose 50% Injectable 25 Gram(s) IV Push once  epoetin shannon-epbx (RETACRIT) Injectable 78680 Unit(s) IV Push <User Schedule>  glucagon  Injectable 1 milliGRAM(s) IntraMuscular once  heparin   Injectable 5000 Unit(s) SubCutaneous every 8 hours  influenza  Vaccine (HIGH DOSE) 0.7 milliLiter(s) IntraMuscular once  insulin glargine Injectable (LANTUS) 15 Unit(s) SubCutaneous at bedtime  insulin lispro (ADMELOG) corrective regimen sliding scale   SubCutaneous three times a day before meals  insulin lispro (ADMELOG) corrective regimen sliding scale   SubCutaneous at bedtime  mupirocin 2% Ointment 1 Application(s) Topical two times a day  Nephro-neeraj 1 Tablet(s) Oral daily  polyethylene glycol 3350 17 Gram(s) Oral daily  senna 2 Tablet(s) Oral at bedtime  sevelamer carbonate 1600 milliGRAM(s) Oral three times a day with meals    MEDICATIONS  (PRN):  acetaminophen   Tablet .. 650 milliGRAM(s) Oral every 6 hours PRN Temp greater or equal to 38C (100.4F), Mild Pain (1 - 3), Moderate Pain (4 - 6)  guaiFENesin Oral Liquid (Sugar-Free) 100 milliGRAM(s) Oral every 6 hours PRN Cough  ondansetron Injectable 4 milliGRAM(s) IV Push every 8 hours PRN Nausea and/or Vomiting  sodium chloride 0.65% Nasal 1 Spray(s) Both Nostrils three times a day PRN Nasal Congestion      Allergies    IV Contrast (Anaphylaxis)  shellfish (Hives; Rash)  shrimp (Hives)  smoke; coughing (Other)    Intolerances        VITALS:    Vital Signs Last 24 Hrs  T(C): 36.6 (05 Oct 2021 12:29), Max: 37.3 (04 Oct 2021 17:03)  T(F): 97.8 (05 Oct 2021 12:29), Max: 99.1 (04 Oct 2021 17:03)  HR: 83 (05 Oct 2021 12:29) (83 - 97)  BP: 156/74 (05 Oct 2021 12:29) (141/79 - 169/86)  BP(mean): --  RR: 17 (05 Oct 2021 12:29) (17 - 18)  SpO2: 97% (05 Oct 2021 12:29) (94% - 99%)    LABS:                          9.9    7.09  )-----------( 206      ( 05 Oct 2021 08:05 )             31.0       10-05    129<L>  |  89<L>  |  26<H>  ----------------------------<  168<H>  4.3   |  27  |  6.20<H>    Ca    9.8      05 Oct 2021 08:05  Phos  3.5     10-05  Mg     2.80     10-05    TPro  7.2  /  Alb  4.2  /  TBili  0.2  /  DBili  x   /  AST  32  /  ALT  66<H>  /  AlkPhos  108  10-03      CAPILLARY BLOOD GLUCOSE      POCT Blood Glucose.: 195 mg/dL (05 Oct 2021 12:10)  POCT Blood Glucose.: 164 mg/dL (05 Oct 2021 08:09)  POCT Blood Glucose.: 190 mg/dL (04 Oct 2021 21:59)  POCT Blood Glucose.: 190 mg/dL (04 Oct 2021 17:35)          LOWER EXTREMITY PHYSICAL EXAM:    Vasular: DP/PT 1/4, B/L, CFT < 3 seconds B/L, Temperature gradient warm to cool, B/L.   Neuro: Epicritic sensation intact to the level of the digits, B/L.  Musculoskeletal/Ortho: unremarkable  Skin: plantar right 4th toe wound to subq with granular base, no malodor, no active drainage, no acute SOI      RADIOLOGY & ADDITIONAL STUDIES:

## 2021-10-05 NOTE — PROGRESS NOTE ADULT - TIME BILLING
-RT.foot-small wound on planter area-of 4th toe.h/o DM-WOUND CARE,PODIATRIST Consult.d/w PA.Explained to Daughter at bedtime.Answered all questions.
-RT.foot-small wound on planter area-of 3,rd,4th toe.h/o DM-WOUND CARE,PODIATRIST Consult.appreciated d/w PA.Explained to Daughter at bedtime.Answered all questions.

## 2021-10-05 NOTE — PROGRESS NOTE ADULT - SUBJECTIVE AND OBJECTIVE BOX
[facility] : Ans Serv [phone], Office [phone]  Dr Lockett/Dr Camacho  /Dr Garry davenport /Dr JACY Taylor/Dr Del Becker/Dr Stephon Colbert /Dr MALCOLM Kearns  _______________________________________________________________________________________________    seen and examined today for End Stage Renal Disease on Dialysis   Interval :  VITALS:  T(F): 97.9 (10-05-21 @ 08:35), Max: 99.1 (10-04-21 @ 17:03)  HR: 89 (10-05-21 @ 08:35)  BP: 141/79 (10-05-21 @ 08:35)  RR: 18 (10-05-21 @ 08:35)  SpO2: 96% (10-05-21 @ 08:35)    10-04 @ 07:01  -  10-05 @ 07:00  --------------------------------------------------------  IN: 600 mL / OUT: 2600 mL / NET: -2000 mL    Physical Exam :-  Constitutional: NAD  Neck: Supple.  Respiratory: Bilateral equal breath sounds, few Crackles present.  Cardiovascular: S1, S2 normal, positive Murmur  Gastrointestinal: Bowel Sounds present, soft, non tender.  Extremities: + Edema Feet  Neurological: Alert and Oriented x 3  Psychiatric: Normal mood, normal affect  Data:-  Allergies :   IV Contrast (Anaphylaxis)  shellfish (Hives; Rash)  shrimp (Hives)  smoke; coughing (Other)    Hospital Medications:   MEDICATIONS  (STANDING):  allopurinol 100 milliGRAM(s) Oral daily  artificial tears (preservative free) Ophthalmic Solution 1 Drop(s) Both EYES three times a day  atorvastatin 20 milliGRAM(s) Oral at bedtime  carBAMazepine 200 milliGRAM(s) Oral two times a day  chlorhexidine 2% Cloths 1 Application(s) Topical daily  dextrose 40% Gel 15 Gram(s) Oral once  dextrose 5%. 1000 milliLiter(s) (50 mL/Hr) IV Continuous <Continuous>  dextrose 5%. 1000 milliLiter(s) (100 mL/Hr) IV Continuous <Continuous>  dextrose 50% Injectable 25 Gram(s) IV Push once  dextrose 50% Injectable 12.5 Gram(s) IV Push once  dextrose 50% Injectable 25 Gram(s) IV Push once  epoetin shannon-epbx (RETACRIT) Injectable 19594 Unit(s) IV Push <User Schedule>  glucagon  Injectable 1 milliGRAM(s) IntraMuscular once  heparin   Injectable 5000 Unit(s) SubCutaneous every 8 hours  influenza  Vaccine (HIGH DOSE) 0.7 milliLiter(s) IntraMuscular once  insulin glargine Injectable (LANTUS) 15 Unit(s) SubCutaneous at bedtime  insulin lispro (ADMELOG) corrective regimen sliding scale   SubCutaneous three times a day before meals  insulin lispro (ADMELOG) corrective regimen sliding scale   SubCutaneous at bedtime  Nephro-neeraj 1 Tablet(s) Oral daily  polyethylene glycol 3350 17 Gram(s) Oral daily  senna 2 Tablet(s) Oral at bedtime  sevelamer carbonate 1600 milliGRAM(s) Oral three times a day with meals    10-05    129<L>  |  89<L>  |  26<H>  ----------------------------<  168<H>  4.3   |  27  |  6.20<H>    Ca    9.8      05 Oct 2021 08:05  Phos  3.5     10-05  Mg     2.80     10-05    TPro  7.2  /  Alb  4.2  /  TBili  0.2  /  DBili      /  AST  32  /  ALT  66<H>  /  AlkPhos  108  10-03    Creatinine Trend: 6.20 <--, 9.19 <--, 8.45 <--, 7.16 <--, 6.92 <--, 5.30 <--, 7.74 <--, 5.89 <--, 4.59 <--, 7.44 <--                        9.9    7.09  )-----------( 206      ( 05 Oct 2021 08:05 )             31.0

## 2021-10-05 NOTE — CONSULT NOTE ADULT - ASSESSMENT
75 y/o F with right 3rd digit wound  - Afebrile, no leukocytosis  - plantar right 3rd toe wound to subq with granular base, no malodor, no active drainage, no acute SOI, negative probe to bone, likely 2/2 maceration  - Recommend mupirocin ointment to be applied to wound to prevent soft tissue infection  - Pod plan LWC while in-house  - Discussed with attending   75 y/o F with right 3rd digit wound  - Afebrile, no leukocytosis  - plantar right 4th toe wound to subq with granular base, no malodor, no active drainage, no acute SOI, negative probe to bone, likely 2/2 maceration  - Recommend mupirocin ointment to be applied to wound with regular dressing changes to prevent soft tissue infection  - Pod plan LWC while in-house  - Discussed with attending

## 2021-10-06 LAB
ANION GAP SERPL CALC-SCNC: 17 MMOL/L — HIGH (ref 7–14)
BUN SERPL-MCNC: 33 MG/DL — HIGH (ref 7–23)
CALCIUM SERPL-MCNC: 9.4 MG/DL — SIGNIFICANT CHANGE UP (ref 8.4–10.5)
CHLORIDE SERPL-SCNC: 86 MMOL/L — LOW (ref 98–107)
CO2 SERPL-SCNC: 24 MMOL/L — SIGNIFICANT CHANGE UP (ref 22–31)
CREAT SERPL-MCNC: 7.7 MG/DL — HIGH (ref 0.5–1.3)
GLUCOSE BLDC GLUCOMTR-MCNC: 106 MG/DL — HIGH (ref 70–99)
GLUCOSE BLDC GLUCOMTR-MCNC: 107 MG/DL — HIGH (ref 70–99)
GLUCOSE BLDC GLUCOMTR-MCNC: 112 MG/DL — HIGH (ref 70–99)
GLUCOSE BLDC GLUCOMTR-MCNC: 165 MG/DL — HIGH (ref 70–99)
GLUCOSE BLDC GLUCOMTR-MCNC: 215 MG/DL — HIGH (ref 70–99)
GLUCOSE BLDC GLUCOMTR-MCNC: 70 MG/DL — SIGNIFICANT CHANGE UP (ref 70–99)
GLUCOSE BLDC GLUCOMTR-MCNC: 70 MG/DL — SIGNIFICANT CHANGE UP (ref 70–99)
GLUCOSE BLDC GLUCOMTR-MCNC: 93 MG/DL — SIGNIFICANT CHANGE UP (ref 70–99)
GLUCOSE SERPL-MCNC: 184 MG/DL — HIGH (ref 70–99)
HCT VFR BLD CALC: 28.6 % — LOW (ref 34.5–45)
HGB BLD-MCNC: 9.2 G/DL — LOW (ref 11.5–15.5)
MAGNESIUM SERPL-MCNC: 2.8 MG/DL — HIGH (ref 1.6–2.6)
MCHC RBC-ENTMCNC: 29.8 PG — SIGNIFICANT CHANGE UP (ref 27–34)
MCHC RBC-ENTMCNC: 32.2 GM/DL — SIGNIFICANT CHANGE UP (ref 32–36)
MCV RBC AUTO: 92.6 FL — SIGNIFICANT CHANGE UP (ref 80–100)
NRBC # BLD: 0 /100 WBCS — SIGNIFICANT CHANGE UP
NRBC # FLD: 0 K/UL — SIGNIFICANT CHANGE UP
PHOSPHATE SERPL-MCNC: 4.4 MG/DL — SIGNIFICANT CHANGE UP (ref 2.5–4.5)
PLATELET # BLD AUTO: 193 K/UL — SIGNIFICANT CHANGE UP (ref 150–400)
POTASSIUM SERPL-MCNC: 4 MMOL/L — SIGNIFICANT CHANGE UP (ref 3.5–5.3)
POTASSIUM SERPL-SCNC: 4 MMOL/L — SIGNIFICANT CHANGE UP (ref 3.5–5.3)
RBC # BLD: 3.09 M/UL — LOW (ref 3.8–5.2)
RBC # FLD: 19.8 % — HIGH (ref 10.3–14.5)
SODIUM SERPL-SCNC: 127 MMOL/L — LOW (ref 135–145)
WBC # BLD: 6.77 K/UL — SIGNIFICANT CHANGE UP (ref 3.8–10.5)
WBC # FLD AUTO: 6.77 K/UL — SIGNIFICANT CHANGE UP (ref 3.8–10.5)

## 2021-10-06 RX ADMIN — HEPARIN SODIUM 5000 UNIT(S): 5000 INJECTION INTRAVENOUS; SUBCUTANEOUS at 05:48

## 2021-10-06 RX ADMIN — MUPIROCIN 1 APPLICATION(S): 20 OINTMENT TOPICAL at 18:18

## 2021-10-06 RX ADMIN — Medication 1 DROP(S): at 21:57

## 2021-10-06 RX ADMIN — HEPARIN SODIUM 5000 UNIT(S): 5000 INJECTION INTRAVENOUS; SUBCUTANEOUS at 21:58

## 2021-10-06 RX ADMIN — Medication 1 DROP(S): at 05:48

## 2021-10-06 RX ADMIN — ATORVASTATIN CALCIUM 20 MILLIGRAM(S): 80 TABLET, FILM COATED ORAL at 21:58

## 2021-10-06 RX ADMIN — SENNA PLUS 2 TABLET(S): 8.6 TABLET ORAL at 21:58

## 2021-10-06 RX ADMIN — Medication 1: at 13:08

## 2021-10-06 RX ADMIN — CHLORHEXIDINE GLUCONATE 1 APPLICATION(S): 213 SOLUTION TOPICAL at 12:23

## 2021-10-06 RX ADMIN — MUPIROCIN 1 APPLICATION(S): 20 OINTMENT TOPICAL at 05:59

## 2021-10-06 RX ADMIN — Medication 2: at 18:19

## 2021-10-06 RX ADMIN — ERYTHROPOIETIN 10000 UNIT(S): 10000 INJECTION, SOLUTION INTRAVENOUS; SUBCUTANEOUS at 07:42

## 2021-10-06 RX ADMIN — INSULIN GLARGINE 15 UNIT(S): 100 INJECTION, SOLUTION SUBCUTANEOUS at 21:56

## 2021-10-06 RX ADMIN — SEVELAMER CARBONATE 1600 MILLIGRAM(S): 2400 POWDER, FOR SUSPENSION ORAL at 18:20

## 2021-10-06 RX ADMIN — Medication 200 MILLIGRAM(S): at 05:48

## 2021-10-06 RX ADMIN — Medication 200 MILLIGRAM(S): at 18:20

## 2021-10-06 NOTE — PROGRESS NOTE ADULT - ASSESSMENT
73F with h/o ESRD on HD via tunneled cath awaiting PD catheter placement ( HD is M/W/F), DM, HTN who presents with c/o dyspnea with recent RSV infection. RENAL following for ESRD Mx.    poc d/w pt  labs, chart reviewed  pl call for q's  Nephrology   cell [phone]  office [phone]  [facility] [phone]

## 2021-10-06 NOTE — PROGRESS NOTE ADULT - ASSESSMENT
75 y/o F with right 4rd digit wound, stable noninfected  - Afebrile, no leukocytosis  - plantar right 4th toe wound to subq with granular base, no malodor, no active drainage, no acute SOI, negative probe to bone, likely 2/2 maceration  - Recommend mupirocin ointment to be applied to wound with regular dressing changes to prevent soft tissue infection  - Pod plan LWC while in-house  - Remains stable, will follow

## 2021-10-06 NOTE — PROGRESS NOTE ADULT - SUBJECTIVE AND OBJECTIVE BOX
Patient is a 76y old  Female who presents with a chief complaint of Generalized weakness, Neck pain, and Nausea (06 Oct 2021 19:24)      SUBJECTIVE / OVERNIGHT EVENTS:    Events noted.  CONSTITUTIONAL: No fever,  or fatigue  RESPIRATORY: No cough, wheezing,  No shortness of breath  CARDIOVASCULAR: No chest pain, palpitations, dizziness, or leg swelling  GASTROINTESTINAL: No abdominal or epigastric pain.   NEUROLOGICAL: No headaches,     MEDICATIONS  (STANDING):  allopurinol 100 milliGRAM(s) Oral daily  artificial tears (preservative free) Ophthalmic Solution 1 Drop(s) Both EYES three times a day  atorvastatin 20 milliGRAM(s) Oral at bedtime  chlorhexidine 2% Cloths 1 Application(s) Topical daily  dextrose 40% Gel 15 Gram(s) Oral once  dextrose 5%. 1000 milliLiter(s) (50 mL/Hr) IV Continuous <Continuous>  dextrose 5%. 1000 milliLiter(s) (100 mL/Hr) IV Continuous <Continuous>  dextrose 50% Injectable 25 Gram(s) IV Push once  dextrose 50% Injectable 12.5 Gram(s) IV Push once  dextrose 50% Injectable 25 Gram(s) IV Push once  epoetin shannon-epbx (RETACRIT) Injectable 65506 Unit(s) IV Push <User Schedule>  glucagon  Injectable 1 milliGRAM(s) IntraMuscular once  heparin   Injectable 5000 Unit(s) SubCutaneous every 8 hours  influenza  Vaccine (HIGH DOSE) 0.7 milliLiter(s) IntraMuscular once  insulin glargine Injectable (LANTUS) 15 Unit(s) SubCutaneous at bedtime  insulin lispro (ADMELOG) corrective regimen sliding scale   SubCutaneous three times a day before meals  insulin lispro (ADMELOG) corrective regimen sliding scale   SubCutaneous at bedtime  mupirocin 2% Ointment 1 Application(s) Topical two times a day  Nephro-neeraj 1 Tablet(s) Oral daily  polyethylene glycol 3350 17 Gram(s) Oral daily  senna 2 Tablet(s) Oral at bedtime  sevelamer carbonate 1600 milliGRAM(s) Oral three times a day with meals    MEDICATIONS  (PRN):  acetaminophen   Tablet .. 650 milliGRAM(s) Oral every 6 hours PRN Temp greater or equal to 38C (100.4F), Mild Pain (1 - 3), Moderate Pain (4 - 6)  guaiFENesin Oral Liquid (Sugar-Free) 100 milliGRAM(s) Oral every 6 hours PRN Cough  ondansetron Injectable 4 milliGRAM(s) IV Push every 8 hours PRN Nausea and/or Vomiting  sodium chloride 0.65% Nasal 1 Spray(s) Both Nostrils three times a day PRN Nasal Congestion        CAPILLARY BLOOD GLUCOSE      POCT Blood Glucose.: 93 mg/dL (06 Oct 2021 21:22)  POCT Blood Glucose.: 215 mg/dL (06 Oct 2021 17:49)  POCT Blood Glucose.: 165 mg/dL (06 Oct 2021 12:10)  POCT Blood Glucose.: 112 mg/dL (06 Oct 2021 08:59)  POCT Blood Glucose.: 106 mg/dL (06 Oct 2021 06:31)  POCT Blood Glucose.: 107 mg/dL (06 Oct 2021 06:11)  POCT Blood Glucose.: 70 mg/dL (06 Oct 2021 05:41)  POCT Blood Glucose.: 70 mg/dL (06 Oct 2021 05:37)    I&O's Summary    06 Oct 2021 07:01  -  06 Oct 2021 23:34  --------------------------------------------------------  IN: 600 mL / OUT: 2900 mL / NET: -2300 mL        T(C): 37.3 (10-06-21 @ 18:25), Max: 37.6 (10-06-21 @ 14:46)  HR: 93 (10-06-21 @ 18:25) (77 - 99)  BP: 131/80 (10-06-21 @ 18:25) (129/60 - 160/84)  RR: 16 (10-06-21 @ 18:25) (16 - 18)  SpO2: 100% (10-06-21 @ 18:25) (95% - 100%)    PHYSICAL EXAM:  GENERAL: NAD  NECK: Supple, No JVD  CHEST/LUNG: Clear to auscultation bilaterally; No wheezing.  HEART: Regular rate and rhythm; No murmurs, rubs, or gallops  ABDOMEN: Soft, Nontender, Nondistended; Bowel sounds present  EXTREMITIES:   No edema  NEUROLOGY: AAO X 3      LABS:                        9.2    6.77  )-----------( 193      ( 06 Oct 2021 07:26 )             28.6     10-06    127<L>  |  86<L>  |  33<H>  ----------------------------<  184<H>  4.0   |  24  |  7.70<H>    Ca    9.4      06 Oct 2021 07:26  Phos  4.4     10-06  Mg     2.80     10-06              CAPILLARY BLOOD GLUCOSE      POCT Blood Glucose.: 93 mg/dL (06 Oct 2021 21:22)  POCT Blood Glucose.: 215 mg/dL (06 Oct 2021 17:49)  POCT Blood Glucose.: 165 mg/dL (06 Oct 2021 12:10)  POCT Blood Glucose.: 112 mg/dL (06 Oct 2021 08:59)  POCT Blood Glucose.: 106 mg/dL (06 Oct 2021 06:31)  POCT Blood Glucose.: 107 mg/dL (06 Oct 2021 06:11)  POCT Blood Glucose.: 70 mg/dL (06 Oct 2021 05:41)  POCT Blood Glucose.: 70 mg/dL (06 Oct 2021 05:37)        RADIOLOGY & ADDITIONAL TESTS:    Imaging Personally Reviewed:    Consultant(s) Notes Reviewed:      Care Discussed with Consultants/Other Providers:    Mike Sanchez MD, CMD, FACP    [id number] [place] [address]  Office Tel: [phone]  Cell: [phone]

## 2021-10-06 NOTE — PROGRESS NOTE ADULT - SUBJECTIVE AND OBJECTIVE BOX
Patient is a 76y old  Female who presents with a chief complaint of Generalized weakness, Neck pain, and Nausea (06 Oct 2021 18:16)       INTERVAL HPI/OVERNIGHT EVENTS:  Patient seen and evaluated at bedside.  Pt is resting comfortable in NAD. Denies N/V/F/C.  Pain rated at X/10    Allergies    IV Contrast (Anaphylaxis)  shellfish (Hives; Rash)  shrimp (Hives)  smoke; coughing (Other)    Intolerances        Vital Signs Last 24 Hrs  T(C): 37.3 (06 Oct 2021 18:25), Max: 37.6 (06 Oct 2021 14:46)  T(F): 99.1 (06 Oct 2021 18:25), Max: 99.7 (06 Oct 2021 14:46)  HR: 93 (06 Oct 2021 18:25) (77 - 99)  BP: 131/80 (06 Oct 2021 18:25) (129/60 - 160/84)  BP(mean): --  RR: 16 (06 Oct 2021 18:25) (16 - 18)  SpO2: 100% (06 Oct 2021 18:25) (95% - 100%)    LABS:                        9.2    6.77  )-----------( 193      ( 06 Oct 2021 07:26 )             28.6     10-06    127<L>  |  86<L>  |  33<H>  ----------------------------<  184<H>  4.0   |  24  |  7.70<H>    Ca    9.4      06 Oct 2021 07:26  Phos  4.4     10-06  Mg     2.80     10-06          CAPILLARY BLOOD GLUCOSE      POCT Blood Glucose.: 215 mg/dL (06 Oct 2021 17:49)  POCT Blood Glucose.: 165 mg/dL (06 Oct 2021 12:10)  POCT Blood Glucose.: 112 mg/dL (06 Oct 2021 08:59)  POCT Blood Glucose.: 106 mg/dL (06 Oct 2021 06:31)  POCT Blood Glucose.: 107 mg/dL (06 Oct 2021 06:11)  POCT Blood Glucose.: 70 mg/dL (06 Oct 2021 05:41)  POCT Blood Glucose.: 70 mg/dL (06 Oct 2021 05:37)  POCT Blood Glucose.: 128 mg/dL (05 Oct 2021 21:37)      Lower Extremity Physical Exam:    Vasular: DP/PT 1/4, B/L, CFT < 3 seconds B/L, Temperature gradient warm to cool, B/L.   Neuro: Epicritic sensation intact to the level of the digits, B/L.  Musculoskeletal/Ortho: unremarkable  Skin: plantar right 4th toe wound to subq with granular base, no malodor, no active drainage, no acute SOI      RADIOLOGY & ADDITIONAL TESTS:

## 2021-10-06 NOTE — PROGRESS NOTE ADULT - ASSESSMENT
76 year old female, with past history significant for Anemia, Type-2 DM, ESRD (HD M/W/F), HTN, HLD, Sleep apnea (CPAP), Trigeminal neuralgia, Carpal tunnel syndrome and Gout, presented to he ED secondary to neck pain and generalized weakness.  Vital signs upon ED presentation as follows: BP = 129/72, HR = 98, RR = 19, T = 36.6 C (97.9 F), O2 Sat = 100% on RA.  Diagnosed with Generalized Weakness, Neck Pain, and Nausea.  Admitted for further evaluation/management.    Occipital neuralgia:    On Tegretol 400 mg po twice a day    DC planning.

## 2021-10-06 NOTE — PROGRESS NOTE ADULT - SUBJECTIVE AND OBJECTIVE BOX
[facility] - S Janice / Nathan S /D Levy/ S Claudia/ S Rosita/ Stephon Colbert / MALCOLM Koou/ O Anita  service -[phone], office [phone]  ---------------------------------------------------------------------------------------------------------------    Patient seen and examined bedside    Subjective and Objective: No overnight events, sob resolved. No complaints today. feeling better    Allergies: IV Contrast (Anaphylaxis)  shellfish (Hives; Rash)  shrimp (Hives)  smoke; coughing (Other)      Hospital Medications:   MEDICATIONS  (STANDING):  allopurinol 100 milliGRAM(s) Oral daily  artificial tears (preservative free) Ophthalmic Solution 1 Drop(s) Both EYES three times a day  atorvastatin 20 milliGRAM(s) Oral at bedtime  carBAMazepine 200 milliGRAM(s) Oral two times a day  chlorhexidine 2% Cloths 1 Application(s) Topical daily  dextrose 40% Gel 15 Gram(s) Oral once  dextrose 5%. 1000 milliLiter(s) (50 mL/Hr) IV Continuous <Continuous>  dextrose 5%. 1000 milliLiter(s) (100 mL/Hr) IV Continuous <Continuous>  dextrose 50% Injectable 25 Gram(s) IV Push once  dextrose 50% Injectable 12.5 Gram(s) IV Push once  dextrose 50% Injectable 25 Gram(s) IV Push once  epoetin shannon-epbx (RETACRIT) Injectable 10100 Unit(s) IV Push <User Schedule>  glucagon  Injectable 1 milliGRAM(s) IntraMuscular once  heparin   Injectable 5000 Unit(s) SubCutaneous every 8 hours  influenza  Vaccine (HIGH DOSE) 0.7 milliLiter(s) IntraMuscular once  insulin glargine Injectable (LANTUS) 15 Unit(s) SubCutaneous at bedtime  insulin lispro (ADMELOG) corrective regimen sliding scale   SubCutaneous three times a day before meals  insulin lispro (ADMELOG) corrective regimen sliding scale   SubCutaneous at bedtime  mupirocin 2% Ointment 1 Application(s) Topical two times a day  Nephro-neeraj 1 Tablet(s) Oral daily  polyethylene glycol 3350 17 Gram(s) Oral daily  senna 2 Tablet(s) Oral at bedtime  sevelamer carbonate 1600 milliGRAM(s) Oral three times a day with meals      REVIEW OF SYSTEMS:  CONSTITUTIONAL: No weakness, fevers or chills  EYES/ENT: No visual changes;  No vertigo or throat pain   NECK: No pain or stiffness  RESPIRATORY: No cough, wheezing, hemoptysis; No shortness of breath  CARDIOVASCULAR: No chest pain or palpitations.  GASTROINTESTINAL: No abdominal or epigastric pain. No nausea, vomiting, or hematemesis; No diarrhea or constipation. No melena or hematochezia.  GENITOURINARY: No dysuria, frequency, foamy urine, urinary urgency, incontinence or hematuria  NEUROLOGICAL: No numbness or weakness  SKIN: No itching, burning, rashes, or lesions   VASCULAR: No bilateral lower extremity edema.   All other review of systems is negative unless indicated above.    VITALS:  T(F): 99.7 (10-06-21 @ 14:46), Max: 99.7 (10-06-21 @ 14:46)  HR: 94 (10-06-21 @ 15:59)  BP: 152/78 (10-06-21 @ 14:46)  RR: 18 (10-06-21 @ 14:46)  SpO2: 95% (10-06-21 @ 15:59)  Wt(kg): --    10-06 @ 07:01  -  10-06 @ 18:16  --------------------------------------------------------  IN: 600 mL / OUT: 2900 mL / NET: -2300 mL          PHYSICAL EXAM:  Constitutional: NAD  HEENT: anicteric sclera, oropharynx clear  Neck: No JVD  Respiratory: CTAB, no wheezes, rales or rhonchi  Cardiovascular: S1, S2, RRR  Gastrointestinal: BS+, soft, NT/ND  Extremities: No cyanosis or clubbing. No peripheral edema  Neurological: A/O x 3, no focal deficits  Psychiatric: Normal mood, normal affect  : No CVA tenderness. No cottrell.   Skin: No rashes  Vascular Access:    LABS:  10-06    127<L>  |  86<L>  |  33<H>  ----------------------------<  184<H>  4.0   |  24  |  7.70<H>    Ca    9.4      06 Oct 2021 07:26  Phos  4.4     10-06  Mg     2.80     10-06      Creatinine Trend: 7.70 <--, 6.20 <--, 9.19 <--, 8.45 <--, 7.16 <--, 6.92 <--, 5.30 <--, 7.74 <--, 5.89 <--                        9.2    6.77  )-----------( 193      ( 06 Oct 2021 07:26 )             28.6     Urine Studies:  Urinalysis Basic - ( 03 Oct 2021 23:42 )    Color: Light Yellow / Appearance: Slightly Turbid / S.008 / pH:   Gluc:  / Ketone: Negative  / Bili: Negative / Urobili: <2 mg/dL   Blood:  / Protein: 30 mg/dL / Nitrite: Negative   Leuk Esterase: Moderate / RBC: 1 /HPF / WBC 13 /HPF   Sq Epi:  / Non Sq Epi: 21 /HPF / Bacteria: Many          RADIOLOGY & ADDITIONAL STUDIES:   [facility] - S Janice / Nathan S /D Levy/ S Claudia/ JACY Becker/ Stephon Colbert / MALCOLM Koou/ O Anita  service -[phone], office [phone]  ---------------------------------------------------------------------------------------------------------------    Patient seen and examined bedside    Subjective and Objective: No overnight events, denied sob. No complaints today. feeling better    Allergies: IV Contrast (Anaphylaxis)  shellfish (Hives; Rash)  shrimp (Hives)  smoke; coughing (Other)      Hospital Medications:   MEDICATIONS  (STANDING):  allopurinol 100 milliGRAM(s) Oral daily  artificial tears (preservative free) Ophthalmic Solution 1 Drop(s) Both EYES three times a day  atorvastatin 20 milliGRAM(s) Oral at bedtime  carBAMazepine 200 milliGRAM(s) Oral two times a day  chlorhexidine 2% Cloths 1 Application(s) Topical daily  dextrose 40% Gel 15 Gram(s) Oral once  dextrose 5%. 1000 milliLiter(s) (50 mL/Hr) IV Continuous <Continuous>  dextrose 5%. 1000 milliLiter(s) (100 mL/Hr) IV Continuous <Continuous>  dextrose 50% Injectable 25 Gram(s) IV Push once  dextrose 50% Injectable 12.5 Gram(s) IV Push once  dextrose 50% Injectable 25 Gram(s) IV Push once  epoetin shannon-epbx (RETACRIT) Injectable 63472 Unit(s) IV Push <User Schedule>  glucagon  Injectable 1 milliGRAM(s) IntraMuscular once  heparin   Injectable 5000 Unit(s) SubCutaneous every 8 hours  influenza  Vaccine (HIGH DOSE) 0.7 milliLiter(s) IntraMuscular once  insulin glargine Injectable (LANTUS) 15 Unit(s) SubCutaneous at bedtime  insulin lispro (ADMELOG) corrective regimen sliding scale   SubCutaneous three times a day before meals  insulin lispro (ADMELOG) corrective regimen sliding scale   SubCutaneous at bedtime  mupirocin 2% Ointment 1 Application(s) Topical two times a day  Nephro-neeraj 1 Tablet(s) Oral daily  polyethylene glycol 3350 17 Gram(s) Oral daily  senna 2 Tablet(s) Oral at bedtime  sevelamer carbonate 1600 milliGRAM(s) Oral three times a day with meals      VITALS:  T(F): 99.7 (10-06-21 @ 14:46), Max: 99.7 (10-06-21 @ 14:46)  HR: 94 (10-06-21 @ 15:59)  BP: 152/78 (10-06-21 @ 14:46)  RR: 18 (10-06-21 @ 14:46)  SpO2: 95% (10-06-21 @ 15:59)  Wt(kg): --    10-06 @ 07:01  -  10-06 @ 18:16  --------------------------------------------------------  IN: 600 mL / OUT: 2900 mL / NET: -2300 mL      PHYSICAL EXAM:  Constitutional: NAD  HEENT: anicteric sclera  Neck: No JVD  Respiratory: CTAB, no wheezes, rales or rhonchi  Cardiovascular: S1, S2, RRR  Gastrointestinal: BS+, soft, NT/ND  Extremities: No peripheral edema  Neurological: A/O x 3  Psychiatric: Normal mood, normal affect  : No cottrell.   avf+    LABS:  10-06    127<L>  |  86<L>  |  33<H>  ----------------------------<  184<H>  4.0   |  24  |  7.70<H>    Ca    9.4      06 Oct 2021 07:26  Phos  4.4     10-06  Mg     2.80     10-06      Creatinine Trend: 7.70 <--, 6.20 <--, 9.19 <--, 8.45 <--, 7.16 <--, 6.92 <--, 5.30 <--, 7.74 <--, 5.89 <--                        9.2    6.77  )-----------( 193      ( 06 Oct 2021 07:26 )             28.6     Urine Studies:  Urinalysis Basic - ( 03 Oct 2021 23:42 )    Color: Light Yellow / Appearance: Slightly Turbid / S.008 / pH:   Gluc:  / Ketone: Negative  / Bili: Negative / Urobili: <2 mg/dL   Blood:  / Protein: 30 mg/dL / Nitrite: Negative   Leuk Esterase: Moderate / RBC: 1 /HPF / WBC 13 /HPF   Sq Epi:  / Non Sq Epi: 21 /HPF / Bacteria: Many          RADIOLOGY & ADDITIONAL STUDIES:

## 2021-10-07 LAB
GLUCOSE BLDC GLUCOMTR-MCNC: 154 MG/DL — HIGH (ref 70–99)
GLUCOSE BLDC GLUCOMTR-MCNC: 170 MG/DL — HIGH (ref 70–99)
GLUCOSE BLDC GLUCOMTR-MCNC: 172 MG/DL — HIGH (ref 70–99)
GLUCOSE BLDC GLUCOMTR-MCNC: 194 MG/DL — HIGH (ref 70–99)
GLUCOSE BLDC GLUCOMTR-MCNC: 98 MG/DL — SIGNIFICANT CHANGE UP (ref 70–99)

## 2021-10-07 RX ADMIN — ATORVASTATIN CALCIUM 20 MILLIGRAM(S): 80 TABLET, FILM COATED ORAL at 22:50

## 2021-10-07 RX ADMIN — HEPARIN SODIUM 5000 UNIT(S): 5000 INJECTION INTRAVENOUS; SUBCUTANEOUS at 06:14

## 2021-10-07 RX ADMIN — SEVELAMER CARBONATE 1600 MILLIGRAM(S): 2400 POWDER, FOR SUSPENSION ORAL at 18:07

## 2021-10-07 RX ADMIN — MUPIROCIN 1 APPLICATION(S): 20 OINTMENT TOPICAL at 06:14

## 2021-10-07 RX ADMIN — HEPARIN SODIUM 5000 UNIT(S): 5000 INJECTION INTRAVENOUS; SUBCUTANEOUS at 22:50

## 2021-10-07 RX ADMIN — INSULIN GLARGINE 15 UNIT(S): 100 INJECTION, SOLUTION SUBCUTANEOUS at 22:49

## 2021-10-07 RX ADMIN — Medication 1 DROP(S): at 22:50

## 2021-10-07 RX ADMIN — SEVELAMER CARBONATE 1600 MILLIGRAM(S): 2400 POWDER, FOR SUSPENSION ORAL at 12:49

## 2021-10-07 RX ADMIN — Medication 1 DROP(S): at 06:15

## 2021-10-07 RX ADMIN — Medication 1 DROP(S): at 14:16

## 2021-10-07 RX ADMIN — SENNA PLUS 2 TABLET(S): 8.6 TABLET ORAL at 22:51

## 2021-10-07 RX ADMIN — MUPIROCIN 1 APPLICATION(S): 20 OINTMENT TOPICAL at 18:11

## 2021-10-07 RX ADMIN — Medication 1: at 10:30

## 2021-10-07 RX ADMIN — SEVELAMER CARBONATE 1600 MILLIGRAM(S): 2400 POWDER, FOR SUSPENSION ORAL at 10:40

## 2021-10-07 RX ADMIN — Medication 1 TABLET(S): at 12:50

## 2021-10-07 RX ADMIN — CHLORHEXIDINE GLUCONATE 1 APPLICATION(S): 213 SOLUTION TOPICAL at 12:17

## 2021-10-07 RX ADMIN — Medication 100 MILLIGRAM(S): at 18:07

## 2021-10-07 RX ADMIN — POLYETHYLENE GLYCOL 3350 17 GRAM(S): 17 POWDER, FOR SOLUTION ORAL at 12:49

## 2021-10-07 RX ADMIN — Medication 100 MILLIGRAM(S): at 12:50

## 2021-10-07 RX ADMIN — Medication 1: at 12:51

## 2021-10-07 RX ADMIN — Medication 0: at 18:09

## 2021-10-07 RX ADMIN — HEPARIN SODIUM 5000 UNIT(S): 5000 INJECTION INTRAVENOUS; SUBCUTANEOUS at 14:20

## 2021-10-07 NOTE — PROGRESS NOTE ADULT - SUBJECTIVE AND OBJECTIVE BOX
Patient is a 76y old  Female who presents with a chief complaint of Generalized weakness, Neck pain, and Nausea (06 Oct 2021 19:24)      SUBJECTIVE / OVERNIGHT EVENTS:    Events noted.  CONSTITUTIONAL: No fever,  or fatigue  RESPIRATORY: No cough, wheezing,  No shortness of breath  CARDIOVASCULAR: No chest pain, palpitations, dizziness, or leg swelling  GASTROINTESTINAL: No abdominal or epigastric pain.   NEUROLOGICAL: No headaches,     MEDICATIONS  (STANDING):  allopurinol 100 milliGRAM(s) Oral daily  artificial tears (preservative free) Ophthalmic Solution 1 Drop(s) Both EYES three times a day  atorvastatin 20 milliGRAM(s) Oral at bedtime  chlorhexidine 2% Cloths 1 Application(s) Topical daily  dextrose 40% Gel 15 Gram(s) Oral once  dextrose 5%. 1000 milliLiter(s) (50 mL/Hr) IV Continuous <Continuous>  dextrose 5%. 1000 milliLiter(s) (100 mL/Hr) IV Continuous <Continuous>  dextrose 50% Injectable 25 Gram(s) IV Push once  dextrose 50% Injectable 12.5 Gram(s) IV Push once  dextrose 50% Injectable 25 Gram(s) IV Push once  epoetin shannon-epbx (RETACRIT) Injectable 49858 Unit(s) IV Push <User Schedule>  glucagon  Injectable 1 milliGRAM(s) IntraMuscular once  heparin   Injectable 5000 Unit(s) SubCutaneous every 8 hours  influenza  Vaccine (HIGH DOSE) 0.7 milliLiter(s) IntraMuscular once  insulin glargine Injectable (LANTUS) 15 Unit(s) SubCutaneous at bedtime  insulin lispro (ADMELOG) corrective regimen sliding scale   SubCutaneous three times a day before meals  insulin lispro (ADMELOG) corrective regimen sliding scale   SubCutaneous at bedtime  mupirocin 2% Ointment 1 Application(s) Topical two times a day  Nephro-neeraj 1 Tablet(s) Oral daily  polyethylene glycol 3350 17 Gram(s) Oral daily  senna 2 Tablet(s) Oral at bedtime  sevelamer carbonate 1600 milliGRAM(s) Oral three times a day with meals    MEDICATIONS  (PRN):  acetaminophen   Tablet .. 650 milliGRAM(s) Oral every 6 hours PRN Temp greater or equal to 38C (100.4F), Mild Pain (1 - 3), Moderate Pain (4 - 6)  guaiFENesin Oral Liquid (Sugar-Free) 100 milliGRAM(s) Oral every 6 hours PRN Cough  ondansetron Injectable 4 milliGRAM(s) IV Push every 8 hours PRN Nausea and/or Vomiting  sodium chloride 0.65% Nasal 1 Spray(s) Both Nostrils three times a day PRN Nasal Congestion        CAPILLARY BLOOD GLUCOSE      POCT Blood Glucose.: 93 mg/dL (06 Oct 2021 21:22)  POCT Blood Glucose.: 215 mg/dL (06 Oct 2021 17:49)  POCT Blood Glucose.: 165 mg/dL (06 Oct 2021 12:10)  POCT Blood Glucose.: 112 mg/dL (06 Oct 2021 08:59)  POCT Blood Glucose.: 106 mg/dL (06 Oct 2021 06:31)  POCT Blood Glucose.: 107 mg/dL (06 Oct 2021 06:11)  POCT Blood Glucose.: 70 mg/dL (06 Oct 2021 05:41)  POCT Blood Glucose.: 70 mg/dL (06 Oct 2021 05:37)    I&O's Summary    06 Oct 2021 07:01  -  06 Oct 2021 23:34  --------------------------------------------------------  IN: 600 mL / OUT: 2900 mL / NET: -2300 mL        T(C): 37.3 (10-06-21 @ 18:25), Max: 37.6 (10-06-21 @ 14:46)  HR: 93 (10-06-21 @ 18:25) (77 - 99)  BP: 131/80 (10-06-21 @ 18:25) (129/60 - 160/84)  RR: 16 (10-06-21 @ 18:25) (16 - 18)  SpO2: 100% (10-06-21 @ 18:25) (95% - 100%)    PHYSICAL EXAM:  GENERAL: NAD  NECK: Supple, No JVD  CHEST/LUNG: Clear to auscultation bilaterally; No wheezing.  HEART: Regular rate and rhythm; No murmurs, rubs, or gallops  ABDOMEN: Soft, Nontender, Nondistended; Bowel sounds present  EXTREMITIES:   No edema  NEUROLOGY: AAO X 3      LABS:                        9.2    6.77  )-----------( 193      ( 06 Oct 2021 07:26 )             28.6     10-06    127<L>  |  86<L>  |  33<H>  ----------------------------<  184<H>  4.0   |  24  |  7.70<H>    Ca    9.4      06 Oct 2021 07:26  Phos  4.4     10-06  Mg     2.80     10-06              CAPILLARY BLOOD GLUCOSE      POCT Blood Glucose.: 93 mg/dL (06 Oct 2021 21:22)  POCT Blood Glucose.: 215 mg/dL (06 Oct 2021 17:49)  POCT Blood Glucose.: 165 mg/dL (06 Oct 2021 12:10)  POCT Blood Glucose.: 112 mg/dL (06 Oct 2021 08:59)  POCT Blood Glucose.: 106 mg/dL (06 Oct 2021 06:31)  POCT Blood Glucose.: 107 mg/dL (06 Oct 2021 06:11)  POCT Blood Glucose.: 70 mg/dL (06 Oct 2021 05:41)  POCT Blood Glucose.: 70 mg/dL (06 Oct 2021 05:37)        RADIOLOGY & ADDITIONAL TESTS:    Imaging Personally Reviewed:    Consultant(s) Notes Reviewed:      Care Discussed with Consultants/Other Providers:    Mike Sanchez MD, CMD, FACP    [address] [place] [address]  Office Tel: [phone]  Cell: [phone]

## 2021-10-07 NOTE — PROGRESS NOTE ADULT - SUBJECTIVE AND OBJECTIVE BOX
[facility] - DALY Camacho / DALY Evans / FREIDA Lockett/ DALY Taylor/ DALY Becker/ YELENA Colbert / MARGARITA Kearns / JAY Howard  ---------------------------------------------------------------------------------------------------------------  seen and examined today for   Interval :  VITALS:  T(F): 97.9 (10-07-21 @ 06:10), Max: 99.7 (10-06-21 @ 14:46)  HR: 83 (10-07-21 @ 07:00)  BP: 136/83 (10-07-21 @ 06:10)  RR: 18 (10-07-21 @ 06:10)  SpO2: 98% (10-07-21 @ 07:00)  Wt(kg): --    10-06 @ 07:01  -  10-07 @ 07:00  --------------------------------------------------------  IN: 600 mL / OUT: 2900 mL / NET: -2300 mL      Physical Exam :-  Constitutional: NAD  Neck: Supple.  Respiratory: Bilateral equal breath sounds,  Cardiovascular: S1, S2 normal,  Gastrointestinal: Bowel Sounds present, soft, non tender.  Extremities: No edema  Neurological: Alert and Oriented x 3, no focal deficits  Psychiatric: Normal mood, normal affect  Data:-  Allergies :   IV Contrast (Anaphylaxis)  shellfish (Hives; Rash)  shrimp (Hives)  smoke; coughing (Other)    Hospital Medications:   MEDICATIONS  (STANDING):  allopurinol 100 milliGRAM(s) Oral daily  artificial tears (preservative free) Ophthalmic Solution 1 Drop(s) Both EYES three times a day  atorvastatin 20 milliGRAM(s) Oral at bedtime  carBAMazepine 100 milliGRAM(s) Oral two times a day  chlorhexidine 2% Cloths 1 Application(s) Topical daily  dextrose 40% Gel 15 Gram(s) Oral once  dextrose 5%. 1000 milliLiter(s) (50 mL/Hr) IV Continuous <Continuous>  dextrose 5%. 1000 milliLiter(s) (100 mL/Hr) IV Continuous <Continuous>  dextrose 50% Injectable 25 Gram(s) IV Push once  dextrose 50% Injectable 12.5 Gram(s) IV Push once  dextrose 50% Injectable 25 Gram(s) IV Push once  epoetin shannon-epbx (RETACRIT) Injectable 82703 Unit(s) IV Push <User Schedule>  glucagon  Injectable 1 milliGRAM(s) IntraMuscular once  heparin   Injectable 5000 Unit(s) SubCutaneous every 8 hours  influenza  Vaccine (HIGH DOSE) 0.7 milliLiter(s) IntraMuscular once  insulin glargine Injectable (LANTUS) 15 Unit(s) SubCutaneous at bedtime  insulin lispro (ADMELOG) corrective regimen sliding scale   SubCutaneous three times a day before meals  insulin lispro (ADMELOG) corrective regimen sliding scale   SubCutaneous at bedtime  mupirocin 2% Ointment 1 Application(s) Topical two times a day  Nephro-neeraj 1 Tablet(s) Oral daily  polyethylene glycol 3350 17 Gram(s) Oral daily  senna 2 Tablet(s) Oral at bedtime  sevelamer carbonate 1600 milliGRAM(s) Oral three times a day with meals    10-06    127<L>  |  86<L>  |  33<H>  ----------------------------<  184<H>  4.0   |  24  |  7.70<H>    Ca    9.4      06 Oct 2021 07:26  Phos  4.4     10-06  Mg     2.80     10-06      Creatinine Trend: 7.70 <--, 6.20 <--, 9.19 <--, 8.45 <--, 7.16 <--, 6.92 <--, 5.30 <--, 7.74 <--                        9.2    6.77  )-----------( 193      ( 06 Oct 2021 07:26 )             28.6    [facility] - DALY Camacho / DALY Evans / FREIDA Lockett/ DALY Taylor/ DALY Becker/ YELENA Colbert / MARGARITA Kearns / JAY Howard  ---------------------------------------------------------------------------------------------------------------  seen and examined today for ESRD  Interval : continues to have neck pain  VITALS:  T(F): 97.9 (10-07-21 @ 06:10), Max: 99.7 (10-06-21 @ 14:46)  HR: 83 (10-07-21 @ 07:00)  BP: 136/83 (10-07-21 @ 06:10)  RR: 18 (10-07-21 @ 06:10)  SpO2: 98% (10-07-21 @ 07:00)  Wt(kg): --    10-06 @ 07:01  -  10-07 @ 07:00  --------------------------------------------------------  IN: 600 mL / OUT: 2900 mL / NET: -2300 mL      Physical Exam :-  Constitutional: NAD  Neck: Supple.  Respiratory: Bilateral equal breath sounds,  Cardiovascular: S1, S2 normal,  Gastrointestinal: Bowel Sounds present, soft, non tender.  Extremities: trace edema  Neurological: Alert and Oriented x 3, no focal deficits  Psychiatric: Normal mood, normal affect  Data:-  Allergies :   IV Contrast (Anaphylaxis)  shellfish (Hives; Rash)  shrimp (Hives)  smoke; coughing (Other)    Hospital Medications:   MEDICATIONS  (STANDING):  allopurinol 100 milliGRAM(s) Oral daily  artificial tears (preservative free) Ophthalmic Solution 1 Drop(s) Both EYES three times a day  atorvastatin 20 milliGRAM(s) Oral at bedtime  carBAMazepine 100 milliGRAM(s) Oral two times a day  chlorhexidine 2% Cloths 1 Application(s) Topical daily  dextrose 40% Gel 15 Gram(s) Oral once  dextrose 5%. 1000 milliLiter(s) (50 mL/Hr) IV Continuous <Continuous>  dextrose 5%. 1000 milliLiter(s) (100 mL/Hr) IV Continuous <Continuous>  dextrose 50% Injectable 25 Gram(s) IV Push once  dextrose 50% Injectable 12.5 Gram(s) IV Push once  dextrose 50% Injectable 25 Gram(s) IV Push once  epoetin shannon-epbx (RETACRIT) Injectable 38112 Unit(s) IV Push <User Schedule>  glucagon  Injectable 1 milliGRAM(s) IntraMuscular once  heparin   Injectable 5000 Unit(s) SubCutaneous every 8 hours  influenza  Vaccine (HIGH DOSE) 0.7 milliLiter(s) IntraMuscular once  insulin glargine Injectable (LANTUS) 15 Unit(s) SubCutaneous at bedtime  insulin lispro (ADMELOG) corrective regimen sliding scale   SubCutaneous three times a day before meals  insulin lispro (ADMELOG) corrective regimen sliding scale   SubCutaneous at bedtime  mupirocin 2% Ointment 1 Application(s) Topical two times a day  Nephro-neeraj 1 Tablet(s) Oral daily  polyethylene glycol 3350 17 Gram(s) Oral daily  senna 2 Tablet(s) Oral at bedtime  sevelamer carbonate 1600 milliGRAM(s) Oral three times a day with meals    10-06    127<L>  |  86<L>  |  33<H>  ----------------------------<  184<H>  4.0   |  24  |  7.70<H>    Ca    9.4      06 Oct 2021 07:26  Phos  4.4     10-06  Mg     2.80     10-06      Creatinine Trend: 7.70 <--, 6.20 <--, 9.19 <--, 8.45 <--, 7.16 <--, 6.92 <--, 5.30 <--, 7.74 <--                        9.2    6.77  )-----------( 193      ( 06 Oct 2021 07:26 )             28.6

## 2021-10-07 NOTE — PROGRESS NOTE ADULT - ASSESSMENT
76 year old female, with past history significant for Anemia, Type-2 DM, ESRD (HD M/W/F), HTN, HLD, Sleep apnea (CPAP), Trigeminal neuralgia, Carpal tunnel syndrome and Gout, presented to he ED secondary to neck pain and generalized weakness.  Vital signs upon ED presentation as follows: BP = 129/72, HR = 98, RR = 19, T = 36.6 C (97.9 F), O2 Sat = 100% on RA.  Diagnosed with Generalized Weakness, Neck Pain, and Nausea.  Admitted for further evaluation/management.    Occipital neuralgia:    On Tegretol 400 mg po twice a day    DC planning. Medically stable for DC.

## 2021-10-08 VITALS
SYSTOLIC BLOOD PRESSURE: 128 MMHG | TEMPERATURE: 98 F | OXYGEN SATURATION: 100 % | RESPIRATION RATE: 18 BRPM | HEART RATE: 100 BPM | DIASTOLIC BLOOD PRESSURE: 75 MMHG

## 2021-10-08 LAB
GLUCOSE BLDC GLUCOMTR-MCNC: 133 MG/DL — HIGH (ref 70–99)
GLUCOSE BLDC GLUCOMTR-MCNC: 148 MG/DL — HIGH (ref 70–99)
GLUCOSE BLDC GLUCOMTR-MCNC: 160 MG/DL — HIGH (ref 70–99)
GLUCOSE BLDC GLUCOMTR-MCNC: 236 MG/DL — HIGH (ref 70–99)
GLUCOSE BLDC GLUCOMTR-MCNC: 92 MG/DL — SIGNIFICANT CHANGE UP (ref 70–99)
HBV CORE AB SER-ACNC: REACTIVE
HBV SURFACE AB SER-ACNC: 123 MIU/ML — SIGNIFICANT CHANGE UP
HBV SURFACE AG SER-ACNC: SIGNIFICANT CHANGE UP
HCV AB S/CO SERPL IA: 0.33 S/CO — SIGNIFICANT CHANGE UP (ref 0–0.99)
HCV AB SERPL-IMP: SIGNIFICANT CHANGE UP
SARS-COV-2 RNA SPEC QL NAA+PROBE: SIGNIFICANT CHANGE UP

## 2021-10-08 RX ORDER — ENOXAPARIN SODIUM 100 MG/ML
26 INJECTION SUBCUTANEOUS
Qty: 0 | Refills: 0 | DISCHARGE

## 2021-10-08 RX ORDER — MUPIROCIN 20 MG/G
1 OINTMENT TOPICAL
Qty: 60 | Refills: 0
Start: 2021-10-08 | End: 2021-11-06

## 2021-10-08 RX ADMIN — Medication 1 TABLET(S): at 11:27

## 2021-10-08 RX ADMIN — HEPARIN SODIUM 5000 UNIT(S): 5000 INJECTION INTRAVENOUS; SUBCUTANEOUS at 06:25

## 2021-10-08 RX ADMIN — Medication 1 DROP(S): at 06:24

## 2021-10-08 RX ADMIN — HEPARIN SODIUM 5000 UNIT(S): 5000 INJECTION INTRAVENOUS; SUBCUTANEOUS at 13:32

## 2021-10-08 RX ADMIN — SEVELAMER CARBONATE 1600 MILLIGRAM(S): 2400 POWDER, FOR SUSPENSION ORAL at 18:50

## 2021-10-08 RX ADMIN — MUPIROCIN 1 APPLICATION(S): 20 OINTMENT TOPICAL at 18:49

## 2021-10-08 RX ADMIN — ERYTHROPOIETIN 10000 UNIT(S): 10000 INJECTION, SOLUTION INTRAVENOUS; SUBCUTANEOUS at 08:57

## 2021-10-08 RX ADMIN — CHLORHEXIDINE GLUCONATE 1 APPLICATION(S): 213 SOLUTION TOPICAL at 11:27

## 2021-10-08 RX ADMIN — Medication 100 MILLIGRAM(S): at 11:27

## 2021-10-08 RX ADMIN — Medication 100 MILLIGRAM(S): at 18:50

## 2021-10-08 RX ADMIN — MUPIROCIN 1 APPLICATION(S): 20 OINTMENT TOPICAL at 06:23

## 2021-10-08 RX ADMIN — Medication 100 MILLIGRAM(S): at 06:24

## 2021-10-08 RX ADMIN — Medication 2: at 12:57

## 2021-10-08 RX ADMIN — SEVELAMER CARBONATE 1600 MILLIGRAM(S): 2400 POWDER, FOR SUSPENSION ORAL at 11:30

## 2021-10-08 RX ADMIN — Medication 1 DROP(S): at 13:37

## 2021-10-08 NOTE — PROGRESS NOTE ADULT - PROBLEM SELECTOR PLAN 1
HD per Renal.
Maintenance HD MWF.   CAPRICE walker. bp stable  hyponatremia -low Na noted, likely 2/2 dilutional   not in chf     next hemodialysis Monday  2 k bath, Ultrafiltration on Dialysis as tolerated
- currently w/ B/L LE edema.  No shortness of breath or signs of distress.  No crackles  - patient reports intolerance to > 2 hours of HD therapy  - continue on Sevelamer 1200 mg TID  - HOB elevated  - on renal diet
Maintenance HD MWF.   mild hyperkalemia prehd noted. bp stable  hyponatremia -low Na noted, likely 2/2 dilutional     s/p HD earlier today w/2k bath, net uf 2kg achieved, tolerated well. on Na variation  dose all meds for ESRD  c/w renal diet, fluid restriction 1L/day, d/w pt
Maintenance HD MWF.   CAPRICE walker. bp stable  hyponatremia -low Na noted, likely 2/2 dilutional   not in chf     next hemodialysis monday  2 k bath, Ultrafiltration on Dialysis as tolerated
Maintenance HD MWF.   K, vol ok   tolerated HD well today, plan for HD today to return to MWF schedule, w/2k bath, w/2k bath, uf 1.5kg as tolearted  dose all meds for ESRD  renal diet
Maintenance HD MWF.   hyperkalemia resolved  bp stable  hyponatremia -low Na noted, likely 2/2 dilutional   not in chf     toelrated HD well today  dose all meds for ESRD  c/w renal diet, fluid restriction 1L/day, d/w pt
Maintenance HD MWF.   tolerated HD well today, plan for HD tomorrow to return to MWF schedule  noted muscle cramp in neck -> cyclobenzaprine 10mg once and PRn afterwards
- currently w/ B/L LE edema.  No shortness of breath or signs of distress.  No crackles  - patient reports intolerance to > 2 hours of HD therapy  - continue on Sevelamer 1200 mg TID  - HOB elevated  - on renal diet
Maintenance HD MWF.   hyperkalemia resolved  bp stable  hyponatremia -low Na noted, likely 2/2 dilutional   not in chf     s/p HD earlier today w/2k bath, net uf 2.3kg removed, tolerated well. on Na variation  dose all meds for ESRD  c/w renal diet, fluid restriction 1L/day, d/w pt
- currently w/ B/L LE edema.  No shortness of breath or signs of distress.  No crackles  - patient reports intolerance to > 2 hours of HD therapy  - continue on Sevelamer 1200 mg TID  - HOB elevated  - on renal diet
HD per Renal.
HD per Renal.
Maintenance HD MWF.   K ok bp stable  hyponatremia low Na prehd noted, likely 2/2 dilutional     s/p HD earlier today, Rx sheet reviewed, net UF only 0.9kg, uf limited by muscle cramping, otherwise uneventful.   dose all meds for ESRD  c/w renal diet, added fluid restriction 1L/day  rpt BMP in am
- currently w/ B/L LE edema.  No shortness of breath or signs of distress.  No crackles  - patient reports intolerance to > 2 hours of HD therapy  - continue on Sevelamer 1200 mg TID  - HOB elevated  - on renal diet
HD per Renal.Renal f/u noted
Maintenance HD MWF.   K, vol ok   hyponatremia low Na prehd noted, likely 2/2 dilutional   s/p HD earlier today, Rx sheet reviewed, net UF 1.9kg, tolerated well. uneventful.   dose all meds for ESRD  renal diet, fluid restriction 1.2L/day
Maintenance HD MWF.   K, vol ok   s/p HD yesterday, Rx sheet reviewed, net UF 1.5kg, tolerated well. uneventful.   plan for next routine HD Monday  dose all meds for ESRD  renal diet
Maintenance HD MWF.   mild hyperkalemia K wnl today  bp stable  hyponatremia -low Na noted, likely 2/2 dilutional   not in chf     s/p HD earlier today w/2k bath, net uf 2.3kg removed, tolerated well. on Na variation  dose all meds for ESRD  c/w renal diet, fluid restriction 1L/day, d/w pt
- currently w/ B/L LE edema.  No shortness of breath or signs of distress.  No crackles  - patient reports intolerance to > 2 hours of HD therapy  - continue on Sevelamer 1200 mg TID  - HOB elevated  - on renal diet
next hemodialysis tomorrow  2 k bath,   Ultrafiltration on Dialysis as tolerated  dose all meds for ESRD  renal diet, fluid restriction 1.2L/day
- currently w/ B/L LE edema.  No shortness of breath or signs of distress.  No crackles  - patient reports intolerance to > 2 hours of HD therapy  - continue on Sevelamer 1200 mg TID  - HOB elevated  - on renal diet
Maintenance HD MWF.   K ok bp stable  hyponatremia -low Na noted, likely 2/2 dilutional   not in chf     s/p HD yesterday, Rx sheet reviewed, net UF only 0.9kg, uf limited by muscle cramping, otherwise uneventful.   plan for next hd tomorrow w/2k bath, uf 1.5-2kg as tolerated, added Na variation  dose all meds for ESRD  c/w renal diet, added fluid restriction 1L/day, d/w pt
Maintenance HD MWF.   K ok. bp stable  hyponatremia -low Na noted, likely 2/2 dilutional   not in chf     c/w HD w/2k bath, inc net uf 2kg as tolerated, Na variation  dose all meds for ESRD  c/w renal diet, added fluid restriction 1L/day, d/w pt
Maintenance HD MWF.   mild hyperkalemia prehd noted. bp stable  hyponatremia -low Na noted, likely 2/2 dilutional   not in chf     s/p HD earlier today w/2k bath, net uf 2kg achieved, tolerated well. on Na variation  dose all meds for ESRD  c/w renal diet, fluid restriction 1L/day, d/w pt
PT  OOB  Feels better
HD per Renal.Renal f/u noted
PT  OOB  Feels better
PT  OOB  Feels better

## 2021-10-08 NOTE — PROGRESS NOTE ADULT - NSPROGADDITIONALINFOA_GEN_ALL_CORE
For any question, call:  Cell # [phone]  Pager # [phone]  Callback # [phone]
For any question, call:  Cell # [phone]  Pager # [phone]  Callback # [phone]
For any question, call:  Cell # [phone]  Pager # [phone]  Callback # [phone]

## 2021-10-08 NOTE — PROGRESS NOTE ADULT - SUBJECTIVE AND OBJECTIVE BOX
[facility] - DALY Camacho / DALY Evans / FREIDA Lockett/ DALY Taylor/ DALY Becker/ YELENA Colbert / MARGARITA Kearns / JAY Howard  ---------------------------------------------------------------------------------------------------------------  seen and examined today for ESRD  Interval : NAD  VITALS:  T(F): 97 (10-08-21 @ 10:10), Max: 98.2 (10-07-21 @ 18:10)  HR: 84 (10-08-21 @ 10:10)  BP: 150/80 (10-08-21 @ 10:10)  RR: 17 (10-08-21 @ 10:10)  SpO2: 98% (10-08-21 @ 10:10)  Wt(kg): --    10-08 @ 07:01  -  10-08 @ 10:58  --------------------------------------------------------  IN: 500 mL / OUT: 2500 mL / NET: -2000 mL      Physical Exam :-  Constitutional: NAD  Neck: Supple.  Respiratory: Bilateral equal breath sounds,  Cardiovascular: S1, S2 normal,  Gastrointestinal: Bowel Sounds present, soft, non tender.  Extremities: trace edema  Neurological: Alert and Oriented x 3, no focal deficits  Psychiatric: Normal mood, normal affect  Data:-  Allergies :   IV Contrast (Anaphylaxis)  shellfish (Hives; Rash)  shrimp (Hives)  smoke; coughing (Other)    Hospital Medications:   MEDICATIONS  (STANDING):  allopurinol 100 milliGRAM(s) Oral daily  artificial tears (preservative free) Ophthalmic Solution 1 Drop(s) Both EYES three times a day  atorvastatin 20 milliGRAM(s) Oral at bedtime  carBAMazepine 100 milliGRAM(s) Oral two times a day  chlorhexidine 2% Cloths 1 Application(s) Topical daily  dextrose 40% Gel 15 Gram(s) Oral once  dextrose 5%. 1000 milliLiter(s) (50 mL/Hr) IV Continuous <Continuous>  dextrose 5%. 1000 milliLiter(s) (100 mL/Hr) IV Continuous <Continuous>  dextrose 50% Injectable 25 Gram(s) IV Push once  dextrose 50% Injectable 12.5 Gram(s) IV Push once  dextrose 50% Injectable 25 Gram(s) IV Push once  epoetin shannon-epbx (RETACRIT) Injectable 09469 Unit(s) IV Push <User Schedule>  glucagon  Injectable 1 milliGRAM(s) IntraMuscular once  heparin   Injectable 5000 Unit(s) SubCutaneous every 8 hours  influenza  Vaccine (HIGH DOSE) 0.7 milliLiter(s) IntraMuscular once  insulin glargine Injectable (LANTUS) 15 Unit(s) SubCutaneous at bedtime  insulin lispro (ADMELOG) corrective regimen sliding scale   SubCutaneous three times a day before meals  insulin lispro (ADMELOG) corrective regimen sliding scale   SubCutaneous at bedtime  mupirocin 2% Ointment 1 Application(s) Topical two times a day  Nephro-neeraj 1 Tablet(s) Oral daily  polyethylene glycol 3350 17 Gram(s) Oral daily  senna 2 Tablet(s) Oral at bedtime  sevelamer carbonate 1600 milliGRAM(s) Oral three times a day with meals          Creatinine Trend: 7.70 <--, 6.20 <--, 9.19 <--, 8.45 <--, 7.16 <--, 6.92 <--, 5.30 <--, 7.74 <--

## 2021-10-08 NOTE — PROVIDER CONTACT NOTE (OTHER) - ASSESSMENT
Pt ambulated from bathroom & requested that her socks and dressing are changed because it feels "wet."
Pt is from [facility] ([facility]) [address] [place] [address] Phone: [phone] Fax: [phone], Faxed Covid Result, confirmed with Letty.
Blood oozing from under toes in right foot. Bleeding stopped. Covered with sterile gauze and tape.
Pt is resting in bed, A&Ox4.

## 2021-10-08 NOTE — PROVIDER CONTACT NOTE (OTHER) - REASON
Covid result
fourth and fifth metatarsals on R foot bleeding
right foot wound
Pt refusing to take morning dose of tegretol due to undesired drowsiness

## 2021-10-08 NOTE — PROGRESS NOTE ADULT - PROVIDER SPECIALTY LIST ADULT
Internal Medicine
Nephrology
Nephrology
Podiatry
Internal Medicine
Nephrology
Neurology
Internal Medicine
Nephrology
Nephrology
Internal Medicine
Nephrology
Internal Medicine
Internal Medicine
Nephrology
Internal Medicine
Nephrology
Internal Medicine
Nephrology
Internal Medicine

## 2021-10-08 NOTE — PROGRESS NOTE ADULT - PROBLEM SELECTOR PROBLEM 2
Anemia due to chronic kidney disease
Anemia due to chronic kidney disease
Sleep apnea
Sleep apnea
Anemia due to chronic kidney disease
Sleep apnea
Anemia due to chronic kidney disease
ESRD (end stage renal disease) on dialysis
Sleep apnea
Anemia due to chronic kidney disease
Sleep apnea
Sleep apnea
Anemia due to chronic kidney disease
ESRD (end stage renal disease) on dialysis
Sleep apnea
Anemia due to chronic kidney disease
ESRD (end stage renal disease) on dialysis

## 2021-10-08 NOTE — PROGRESS NOTE ADULT - SUBJECTIVE AND OBJECTIVE BOX
Patient is a 76y old  Female who presents with a chief complaint of Generalized weakness, Neck pain, and Nausea (08 Oct 2021 10:58)      SUBJECTIVE / OVERNIGHT EVENTS:    Events noted.  CONSTITUTIONAL: No fever,  or fatigue  RESPIRATORY: No cough, wheezing,  No shortness of breath  CARDIOVASCULAR: No chest pain, palpitations  GASTROINTESTINAL: No abdominal or epigastric pain.   NEUROLOGICAL: No headaches,     MEDICATIONS  (STANDING):  allopurinol 100 milliGRAM(s) Oral daily  artificial tears (preservative free) Ophthalmic Solution 1 Drop(s) Both EYES three times a day  atorvastatin 20 milliGRAM(s) Oral at bedtime  carBAMazepine 100 milliGRAM(s) Oral two times a day  chlorhexidine 2% Cloths 1 Application(s) Topical daily  dextrose 40% Gel 15 Gram(s) Oral once  dextrose 5%. 1000 milliLiter(s) (50 mL/Hr) IV Continuous <Continuous>  dextrose 5%. 1000 milliLiter(s) (100 mL/Hr) IV Continuous <Continuous>  dextrose 50% Injectable 25 Gram(s) IV Push once  dextrose 50% Injectable 12.5 Gram(s) IV Push once  dextrose 50% Injectable 25 Gram(s) IV Push once  epoetin shannon-epbx (RETACRIT) Injectable 16093 Unit(s) IV Push <User Schedule>  glucagon  Injectable 1 milliGRAM(s) IntraMuscular once  heparin   Injectable 5000 Unit(s) SubCutaneous every 8 hours  influenza  Vaccine (HIGH DOSE) 0.7 milliLiter(s) IntraMuscular once  insulin glargine Injectable (LANTUS) 15 Unit(s) SubCutaneous at bedtime  insulin lispro (ADMELOG) corrective regimen sliding scale   SubCutaneous three times a day before meals  insulin lispro (ADMELOG) corrective regimen sliding scale   SubCutaneous at bedtime  mupirocin 2% Ointment 1 Application(s) Topical two times a day  Nephro-neeraj 1 Tablet(s) Oral daily  polyethylene glycol 3350 17 Gram(s) Oral daily  senna 2 Tablet(s) Oral at bedtime  sevelamer carbonate 1600 milliGRAM(s) Oral three times a day with meals    MEDICATIONS  (PRN):  acetaminophen   Tablet .. 650 milliGRAM(s) Oral every 6 hours PRN Temp greater or equal to 38C (100.4F), Mild Pain (1 - 3), Moderate Pain (4 - 6)  guaiFENesin Oral Liquid (Sugar-Free) 100 milliGRAM(s) Oral every 6 hours PRN Cough  ondansetron Injectable 4 milliGRAM(s) IV Push every 8 hours PRN Nausea and/or Vomiting  sodium chloride 0.65% Nasal 1 Spray(s) Both Nostrils three times a day PRN Nasal Congestion        CAPILLARY BLOOD GLUCOSE      POCT Blood Glucose.: 133 mg/dL (08 Oct 2021 17:16)  POCT Blood Glucose.: 236 mg/dL (08 Oct 2021 12:06)  POCT Blood Glucose.: 148 mg/dL (08 Oct 2021 10:08)  POCT Blood Glucose.: 92 mg/dL (08 Oct 2021 09:03)  POCT Blood Glucose.: 160 mg/dL (08 Oct 2021 06:15)    I&O's Summary    08 Oct 2021 07:01  -  08 Oct 2021 23:30  --------------------------------------------------------  IN: 500 mL / OUT: 2500 mL / NET: -2000 mL        T(C): 36.7 (10-08-21 @ 18:49), Max: 37.1 (10-08-21 @ 14:49)  HR: 100 (10-08-21 @ 18:49) (78 - 105)  BP: 128/75 (10-08-21 @ 18:49) (128/75 - 151/99)  RR: 18 (10-08-21 @ 18:49) (16 - 18)  SpO2: 100% (10-08-21 @ 18:49) (97% - 100%)    PHYSICAL EXAM:  GENERAL: NAD  NECK: Supple, No JVD  CHEST/LUNG: Clear to auscultation bilaterally; No wheezing.  HEART: Regular rate and rhythm; No murmurs, rubs, or gallops  ABDOMEN: Soft, Nontender, Nondistended; Bowel sounds present  EXTREMITIES:   No edema  NEUROLOGY: AAO X 3      LABS:                  CAPILLARY BLOOD GLUCOSE      POCT Blood Glucose.: 133 mg/dL (08 Oct 2021 17:16)  POCT Blood Glucose.: 236 mg/dL (08 Oct 2021 12:06)  POCT Blood Glucose.: 148 mg/dL (08 Oct 2021 10:08)  POCT Blood Glucose.: 92 mg/dL (08 Oct 2021 09:03)  POCT Blood Glucose.: 160 mg/dL (08 Oct 2021 06:15)        RADIOLOGY & ADDITIONAL TESTS:    Imaging Personally Reviewed:    Consultant(s) Notes Reviewed:      Care Discussed with Consultants/Other Providers:    Mike Sanchez MD, CMD, FACP    [address] [place] [address]  Office Tel: [phone]  Cell: [phone]

## 2021-10-08 NOTE — PROGRESS NOTE ADULT - REASON FOR ADMISSION
Generalized weakness, Neck pain, and Nausea

## 2021-10-08 NOTE — PROGRESS NOTE ADULT - PROBLEM SELECTOR PROBLEM 1
ESRD (end stage renal disease) on dialysis
Generalized weakness
ESRD (end stage renal disease) on dialysis
ESRD (end stage renal disease) on dialysis
Generalized weakness
Generalized weakness

## 2021-10-08 NOTE — PROGRESS NOTE ADULT - PROBLEM SELECTOR PLAN 2
- in the setting of obesity  - On CPAP of 4, 30% FiO2  - HOB elevation
- currently w/ B/L LE edema.  No shortness of breath or signs of distress.  No crackles  - patient reports intolerance to > 2 hours of HD therapy  - continue on Sevelamer 1200 mg TID  - HOB elevated  - on renal diet
- in the setting of obesity  - On CPAP   - HOB elevation
- in the setting of obesity  - On CPAP   - HOB elevation
- in the setting of obesity  - On CPAP of 4, 30% FiO2  - HOB elevation
Hg now <goal  watch H/H  c/w epo 10k tiw w/hd
Hg now at goal  watch H/H  c/w epo 10k tiw w/hd
Hg now at goal  watch H/H  c/w epo 10k tiw w/hd
- in the setting of obesity  - On CPAP of 4, 30% FiO2  - HOB elevation
Hg now <goal  watch H/H  c/w epo 10k tiw w/hd
Hg now at goal  watch H/H  c/w epo 10k tiw w/hd
Hg below goal, cont lakia with HD tiw
Hg below goal, cont lakia with HD.
Hg now <goal  watch H/H  c/w epo 10k tiw w/hd
Hg now <goal  watch H/H  c/w epo 10k tiw w/hd
- in the setting of obesity  - On CPAP of 4, 30% FiO2  - HOB elevation
Hg now at goal  watch H/H  c/w epo 10k tiw w/hd
- in the setting of obesity  - On CPAP   - HOB elevation
- in the setting of obesity  - On CPAP of 4, 30% FiO2  - HOB elevation
Hg at goal  watch H/H
- in the setting of obesity  - On CPAP of 4, 30% FiO2  - HOB elevation
Hg now <goal  watch H/H  c/w epo 10k tiw w/hd
Hg now <goal  watch H/H  will add lakia w/next hd
Hg now <goal  watch H/H  will add lakia w/next hd
Hg now at goal  watch H/H  c/w epo 10k tiw w/hd
- currently w/ B/L LE edema.  No shortness of breath or signs of distress.  No crackles  - patient reports intolerance to > 2 hours of HD therapy  - continue on Sevelamer 1200 mg TID  - HOB elevated  - on renal diet
- currently w/ B/L LE edema.  No shortness of breath or signs of distress.  No crackles  - patient reports intolerance to > 2 hours of HD therapy  - continue on Sevelamer 1200 mg TID  - HOB elevated  - on renal diet

## 2021-10-12 LAB
% ALBUMIN: 46.2 % — SIGNIFICANT CHANGE UP
% ALPHA 1: 5.1 % — SIGNIFICANT CHANGE UP
% ALPHA 2: 15.4 % — SIGNIFICANT CHANGE UP
% BETA: 14.5 % — SIGNIFICANT CHANGE UP
% GAMMA: 18.8 % — SIGNIFICANT CHANGE UP
ALBUMIN SERPL ELPH-MCNC: 3.33 G/DL — SIGNIFICANT CHANGE UP (ref 3.3–4.4)
ALBUMIN/GLOB SERPL ELPH: 0.9 RATIO — SIGNIFICANT CHANGE UP
ALPHA1 GLOB SERPL ELPH-MCNC: 0.37 G/DL — HIGH (ref 0.1–0.3)
ALPHA2 GLOB SERPL ELPH-MCNC: 1.1 G/DL — HIGH (ref 0.6–1)
B-GLOBULIN SERPL ELPH-MCNC: 1.04 G/DL — SIGNIFICANT CHANGE UP (ref 0.6–1.1)
GAMMA GLOBULIN: 1.35 G/DL — SIGNIFICANT CHANGE UP (ref 0.7–1.7)
PROT PATTERN SERPL ELPH-IMP: SIGNIFICANT CHANGE UP
PROT SERPL-MCNC: 7.2 G/DL — SIGNIFICANT CHANGE UP

## 2021-11-12 NOTE — ED PROVIDER NOTE - NSICDXPASTMEDICALHX_GEN_ALL_CORE_FT
PAST MEDICAL HISTORY:  Anemia     Bronchial asthma hx of    Carpal tunnel syndrome left    Chronic right shoulder pain     Diabetes mellitus     Disorder of ligament of wrist, left     Dyslipidemia     ESRD (end stage renal disease) on dialysis started HD 2018    Gout     HTN (hypertension)     Hypercholesterolemia     Pedal edema     Sleep apnea on CPAP    Trigeminal neuralgia hx of    Vertigo      GOAL: In 2 weeks pt MMT will improve by 1/2 grade.

## 2021-11-30 NOTE — ASU PREOP CHECKLIST - DENTURES
Antepartum  Discharge Summary     Patient ID:  Yuli Stahl  [id number]  89 y.o.  1988    Admit date: 11/5/2021    Discharge date: 11/30/2021    Admission Diagnoses:    Patient Active Problem List   Diagnosis Code    Hypertension affecting pregnancy O16.9    29 weeks gestation of pregnancy Z3A.29    Chronic hypertension affecting pregnancy O10.919    Chronic hypertension with superimposed pre-eclampsia O11.9       Discharge Diagnoses: There are no discharge diagnoses documented for the most recent discharge. Patient Active Problem List   Diagnosis Code    Hypertension affecting pregnancy O16.9    29 weeks gestation of pregnancy Z3A.29    Chronic hypertension affecting pregnancy O10.919    Chronic hypertension with superimposed pre-eclampsia O11.9       Procedures for this admission:   Daily NST x 2  Weekly BPP with MFM  Monthly growth ultrasound  Placement of PICC line  Removal of PICC line at bedside (by me)    Hospital Course:  Patient was admitted on 11/6 after having elevated BPs at home and was diagnosed with SI Pre E likely with SF given need for additional BP medication with labetalol 200mg po bid being added to her already prescribed Procardia XL 90mg daily. Her urine pr/cr ratio increased from 0.44 -> 0.7. Remainder of labs were normal.  She was not given magnesium. She was admitted for inpatient observation. She continue with twice daily NSTs and weekly BPP/monthly growth with MFM while in house. She also had weekly labs. After demonstrating 3 weeks of stability with no evidence of severe range BPs since admission and no worsening symptoms with BPPs 8/8 and normal growth and no evidence of severe features her diagnosis was reassessed by MFM. Her diagnosis is most consistent with SI Pre eclampsia w/out SF and Dr. Pavan Garcia and I are in agreement with this. She desires outpatient management with bedrest at home and close f/u in office (twice weekly BPPs and weekly labs).   She will perform bid kick counts and BP checks at home as well. Her central line was pulled by me at the bedside and pt tolerated well without complication. She was discharged home in stable condition and will f/u in the office on Friday. Precautions were reviewed and questions answered. Discussed plan for delivery will now be at 37 wks unless deemed necessary prior to that. She will review this with her primary doctor of record. Disposition: Home or self care    Discharged Condition: stable            Patient Instructions:   Current Discharge Medication List      START taking these medications    Details   labetaloL (NORMODYNE) 200 mg tablet Take 1 Tablet by mouth every twelve (12) hours. Qty: 60 Tablet, Refills: 2         CONTINUE these medications which have CHANGED    Details   NIFEdipine ER (PROCARDIA XL) 90 mg ER tablet Take 1 Tablet by mouth daily. Qty: 30 Tablet, Refills: 2         CONTINUE these medications which have NOT CHANGED    Details   loratadine-pseudoephedrine (Claritin-D 24 Hour)  mg per tablet Take 1 Tablet by mouth daily. Indications: seasonal runny nose      aspirin 81 mg chewable tablet Take 81 mg by mouth daily. Indications: treatment to prevent peripheral artery thromboembolism      PNV Comb #2-Iron-FA-Omega 3 29-1-400 mg cmpk Take  by mouth.          STOP taking these medications       acetaminophen (TylenoL) 325 mg tablet Comments:   Reason for Stopping:         clindamycin-benzoyl peroxide (BENZACLIN) 1-5 % topical gel Comments:   Reason for Stopping:             Activity: Bedrest  Diet: Diabetic Diet    Follow-up with   Follow-up Appointments   Procedures    FOLLOW UP VISIT Appointment in: 3 - 5 Days     Standing Status:   Standing     Number of Occurrences:   1     Order Specific Question:   Appointment in     Answer:   3 - 5 Days        Signed:  Yevgeniy Alvares MD  11/30/2021  1:29 PM no

## 2022-01-09 ENCOUNTER — INPATIENT (INPATIENT)
Facility: HOSPITAL | Age: 77
LOS: 2 days | Discharge: HOME | DRG: 189 | End: 2022-01-12
Attending: INTERNAL MEDICINE | Admitting: INTERNAL MEDICINE
Payer: MEDICARE

## 2022-01-09 VITALS
OXYGEN SATURATION: 100 % | TEMPERATURE: 99 F | SYSTOLIC BLOOD PRESSURE: 124 MMHG | RESPIRATION RATE: 16 BRPM | HEART RATE: 94 BPM | DIASTOLIC BLOOD PRESSURE: 70 MMHG

## 2022-01-09 DIAGNOSIS — Z98.49 CATARACT EXTRACTION STATUS, UNSPECIFIED EYE: Chronic | ICD-10-CM

## 2022-01-09 DIAGNOSIS — Z90.710 ACQUIRED ABSENCE OF BOTH CERVIX AND UTERUS: Chronic | ICD-10-CM

## 2022-01-09 DIAGNOSIS — R09.89 OTHER SPECIFIED SYMPTOMS AND SIGNS INVOLVING THE CIRCULATORY AND RESPIRATORY SYSTEMS: ICD-10-CM

## 2022-01-09 DIAGNOSIS — N18.6 END STAGE RENAL DISEASE: ICD-10-CM

## 2022-01-09 DIAGNOSIS — Z98.890 OTHER SPECIFIED POSTPROCEDURAL STATES: Chronic | ICD-10-CM

## 2022-01-09 DIAGNOSIS — M10.9 GOUT, UNSPECIFIED: ICD-10-CM

## 2022-01-09 DIAGNOSIS — Z29.9 ENCOUNTER FOR PROPHYLACTIC MEASURES, UNSPECIFIED: ICD-10-CM

## 2022-01-09 DIAGNOSIS — M25.519 PAIN IN UNSPECIFIED SHOULDER: ICD-10-CM

## 2022-01-09 DIAGNOSIS — Z98.89 OTHER SPECIFIED POSTPROCEDURAL STATES: Chronic | ICD-10-CM

## 2022-01-09 DIAGNOSIS — Z96.659 PRESENCE OF UNSPECIFIED ARTIFICIAL KNEE JOINT: Chronic | ICD-10-CM

## 2022-01-09 DIAGNOSIS — G47.33 OBSTRUCTIVE SLEEP APNEA (ADULT) (PEDIATRIC): ICD-10-CM

## 2022-01-09 DIAGNOSIS — R06.00 DYSPNEA, UNSPECIFIED: ICD-10-CM

## 2022-01-09 DIAGNOSIS — R06.02 SHORTNESS OF BREATH: ICD-10-CM

## 2022-01-09 DIAGNOSIS — E11.9 TYPE 2 DIABETES MELLITUS WITHOUT COMPLICATIONS: ICD-10-CM

## 2022-01-09 LAB
ALBUMIN SERPL ELPH-MCNC: 3.9 G/DL — SIGNIFICANT CHANGE UP (ref 3.3–5)
ALP SERPL-CCNC: 216 U/L — HIGH (ref 40–120)
ALT FLD-CCNC: 15 U/L — SIGNIFICANT CHANGE UP (ref 10–45)
ANION GAP SERPL CALC-SCNC: 15 MMOL/L — SIGNIFICANT CHANGE UP (ref 5–17)
ANION GAP SERPL CALC-SCNC: 18 MMOL/L — HIGH (ref 5–17)
APTT BLD: 25.9 SEC — LOW (ref 27.5–35.5)
AST SERPL-CCNC: 44 U/L — HIGH (ref 10–40)
BASE EXCESS BLDV CALC-SCNC: -1 MMOL/L — SIGNIFICANT CHANGE UP (ref -2–2)
BASOPHILS # BLD AUTO: 0.02 K/UL — SIGNIFICANT CHANGE UP (ref 0–0.2)
BASOPHILS NFR BLD AUTO: 0.3 % — SIGNIFICANT CHANGE UP (ref 0–2)
BILIRUB SERPL-MCNC: 0.3 MG/DL — SIGNIFICANT CHANGE UP (ref 0.2–1.2)
BLOOD GAS VENOUS - CREATININE: SIGNIFICANT CHANGE UP MG/DL (ref 0.5–1.3)
BUN SERPL-MCNC: 44 MG/DL — HIGH (ref 7–23)
BUN SERPL-MCNC: 45 MG/DL — HIGH (ref 7–23)
CA-I SERPL-SCNC: 1.28 MMOL/L — SIGNIFICANT CHANGE UP (ref 1.15–1.33)
CALCIUM SERPL-MCNC: 9.6 MG/DL — SIGNIFICANT CHANGE UP (ref 8.4–10.5)
CALCIUM SERPL-MCNC: 9.7 MG/DL — SIGNIFICANT CHANGE UP (ref 8.4–10.5)
CHLORIDE BLDV-SCNC: 96 MMOL/L — SIGNIFICANT CHANGE UP (ref 96–108)
CHLORIDE SERPL-SCNC: 91 MMOL/L — LOW (ref 96–108)
CHLORIDE SERPL-SCNC: 93 MMOL/L — LOW (ref 96–108)
CO2 BLDV-SCNC: 26 MMOL/L — SIGNIFICANT CHANGE UP (ref 22–26)
CO2 SERPL-SCNC: 18 MMOL/L — LOW (ref 22–31)
CO2 SERPL-SCNC: 20 MMOL/L — LOW (ref 22–31)
CREAT SERPL-MCNC: 7.38 MG/DL — HIGH (ref 0.5–1.3)
CREAT SERPL-MCNC: 7.88 MG/DL — HIGH (ref 0.5–1.3)
EOSINOPHIL # BLD AUTO: 0.03 K/UL — SIGNIFICANT CHANGE UP (ref 0–0.5)
EOSINOPHIL NFR BLD AUTO: 0.4 % — SIGNIFICANT CHANGE UP (ref 0–6)
GAS PNL BLDV: 128 MMOL/L — LOW (ref 136–145)
GAS PNL BLDV: SIGNIFICANT CHANGE UP
GLUCOSE BLDV-MCNC: 260 MG/DL — HIGH (ref 70–99)
GLUCOSE SERPL-MCNC: 192 MG/DL — HIGH (ref 70–99)
GLUCOSE SERPL-MCNC: 236 MG/DL — HIGH (ref 70–99)
HCO3 BLDV-SCNC: 24 MMOL/L — SIGNIFICANT CHANGE UP (ref 22–29)
HCT VFR BLD CALC: 33.9 % — LOW (ref 34.5–45)
HCT VFR BLDA CALC: 29 % — LOW (ref 34.5–46.5)
HGB BLD CALC-MCNC: 9.8 G/DL — LOW (ref 11.7–16.1)
HGB BLD-MCNC: 10.3 G/DL — LOW (ref 11.5–15.5)
IMM GRANULOCYTES NFR BLD AUTO: 0.8 % — SIGNIFICANT CHANGE UP (ref 0–1.5)
INR BLD: 0.98 RATIO — SIGNIFICANT CHANGE UP (ref 0.88–1.16)
LACTATE BLDV-MCNC: 1.7 MMOL/L — SIGNIFICANT CHANGE UP (ref 0.7–2)
LYMPHOCYTES # BLD AUTO: 1.48 K/UL — SIGNIFICANT CHANGE UP (ref 1–3.3)
LYMPHOCYTES # BLD AUTO: 19.4 % — SIGNIFICANT CHANGE UP (ref 13–44)
MAGNESIUM SERPL-MCNC: 2.6 MG/DL — SIGNIFICANT CHANGE UP (ref 1.6–2.6)
MCHC RBC-ENTMCNC: 27.2 PG — SIGNIFICANT CHANGE UP (ref 27–34)
MCHC RBC-ENTMCNC: 30.4 GM/DL — LOW (ref 32–36)
MCV RBC AUTO: 89.7 FL — SIGNIFICANT CHANGE UP (ref 80–100)
MONOCYTES # BLD AUTO: 0.87 K/UL — SIGNIFICANT CHANGE UP (ref 0–0.9)
MONOCYTES NFR BLD AUTO: 11.4 % — SIGNIFICANT CHANGE UP (ref 2–14)
NEUTROPHILS # BLD AUTO: 5.16 K/UL — SIGNIFICANT CHANGE UP (ref 1.8–7.4)
NEUTROPHILS NFR BLD AUTO: 67.7 % — SIGNIFICANT CHANGE UP (ref 43–77)
NRBC # BLD: 0 /100 WBCS — SIGNIFICANT CHANGE UP (ref 0–0)
NT-PROBNP SERPL-SCNC: 1585 PG/ML — HIGH (ref 0–300)
PCO2 BLDV: 42 MMHG — SIGNIFICANT CHANGE UP (ref 39–42)
PH BLDV: 7.37 — SIGNIFICANT CHANGE UP (ref 7.32–7.43)
PLATELET # BLD AUTO: 200 K/UL — SIGNIFICANT CHANGE UP (ref 150–400)
PO2 BLDV: 62 MMHG — HIGH (ref 25–45)
POTASSIUM BLDV-SCNC: 4.4 MMOL/L — SIGNIFICANT CHANGE UP (ref 3.5–5.1)
POTASSIUM SERPL-MCNC: 4.1 MMOL/L — SIGNIFICANT CHANGE UP (ref 3.5–5.3)
POTASSIUM SERPL-MCNC: 5 MMOL/L — SIGNIFICANT CHANGE UP (ref 3.5–5.3)
POTASSIUM SERPL-SCNC: 4.1 MMOL/L — SIGNIFICANT CHANGE UP (ref 3.5–5.3)
POTASSIUM SERPL-SCNC: 5 MMOL/L — SIGNIFICANT CHANGE UP (ref 3.5–5.3)
PROT SERPL-MCNC: 7.8 G/DL — SIGNIFICANT CHANGE UP (ref 6–8.3)
PROTHROM AB SERPL-ACNC: 11.8 SEC — SIGNIFICANT CHANGE UP (ref 10.6–13.6)
RBC # BLD: 3.78 M/UL — LOW (ref 3.8–5.2)
RBC # FLD: 18.6 % — HIGH (ref 10.3–14.5)
SAO2 % BLDV: 91.3 % — HIGH (ref 67–88)
SODIUM SERPL-SCNC: 127 MMOL/L — LOW (ref 135–145)
SODIUM SERPL-SCNC: 128 MMOL/L — LOW (ref 135–145)
TROPONIN T, HIGH SENSITIVITY RESULT: 73 NG/L — HIGH (ref 0–51)
WBC # BLD: 7.62 K/UL — SIGNIFICANT CHANGE UP (ref 3.8–10.5)
WBC # FLD AUTO: 7.62 K/UL — SIGNIFICANT CHANGE UP (ref 3.8–10.5)

## 2022-01-09 PROCEDURE — 71045 X-RAY EXAM CHEST 1 VIEW: CPT | Mod: 26

## 2022-01-09 PROCEDURE — 93010 ELECTROCARDIOGRAM REPORT: CPT

## 2022-01-09 PROCEDURE — 99285 EMERGENCY DEPT VISIT HI MDM: CPT | Mod: 25

## 2022-01-09 PROCEDURE — 99223 1ST HOSP IP/OBS HIGH 75: CPT

## 2022-01-09 RX ORDER — GABAPENTIN 400 MG/1
300 CAPSULE ORAL AT BEDTIME
Refills: 0 | Status: DISCONTINUED | OUTPATIENT
Start: 2022-01-09 | End: 2022-01-12

## 2022-01-09 RX ORDER — INSULIN GLARGINE 100 [IU]/ML
15 INJECTION, SOLUTION SUBCUTANEOUS AT BEDTIME
Refills: 0 | Status: DISCONTINUED | OUTPATIENT
Start: 2022-01-09 | End: 2022-01-12

## 2022-01-09 RX ORDER — HEPARIN SODIUM 5000 [USP'U]/ML
5000 INJECTION INTRAVENOUS; SUBCUTANEOUS EVERY 12 HOURS
Refills: 0 | Status: DISCONTINUED | OUTPATIENT
Start: 2022-01-09 | End: 2022-01-12

## 2022-01-09 RX ORDER — DEXTROSE 50 % IN WATER 50 %
25 SYRINGE (ML) INTRAVENOUS ONCE
Refills: 0 | Status: DISCONTINUED | OUTPATIENT
Start: 2022-01-09 | End: 2022-01-12

## 2022-01-09 RX ORDER — ENOXAPARIN SODIUM 100 MG/ML
15 INJECTION SUBCUTANEOUS
Qty: 0 | Refills: 0 | DISCHARGE

## 2022-01-09 RX ORDER — SODIUM CHLORIDE 9 MG/ML
1000 INJECTION, SOLUTION INTRAVENOUS
Refills: 0 | Status: DISCONTINUED | OUTPATIENT
Start: 2022-01-09 | End: 2022-01-12

## 2022-01-09 RX ORDER — GLUCAGON INJECTION, SOLUTION 0.5 MG/.1ML
1 INJECTION, SOLUTION SUBCUTANEOUS ONCE
Refills: 0 | Status: DISCONTINUED | OUTPATIENT
Start: 2022-01-09 | End: 2022-01-12

## 2022-01-09 RX ORDER — DEXTROSE 50 % IN WATER 50 %
15 SYRINGE (ML) INTRAVENOUS ONCE
Refills: 0 | Status: DISCONTINUED | OUTPATIENT
Start: 2022-01-09 | End: 2022-01-12

## 2022-01-09 RX ORDER — ALLOPURINOL 300 MG
100 TABLET ORAL DAILY
Refills: 0 | Status: DISCONTINUED | OUTPATIENT
Start: 2022-01-09 | End: 2022-01-12

## 2022-01-09 RX ORDER — INSULIN LISPRO 100/ML
VIAL (ML) SUBCUTANEOUS AT BEDTIME
Refills: 0 | Status: DISCONTINUED | OUTPATIENT
Start: 2022-01-09 | End: 2022-01-12

## 2022-01-09 RX ORDER — ACETAMINOPHEN 500 MG
650 TABLET ORAL EVERY 6 HOURS
Refills: 0 | Status: DISCONTINUED | OUTPATIENT
Start: 2022-01-09 | End: 2022-01-12

## 2022-01-09 RX ORDER — SEVELAMER CARBONATE 2400 MG/1
1600 POWDER, FOR SUSPENSION ORAL
Refills: 0 | Status: DISCONTINUED | OUTPATIENT
Start: 2022-01-09 | End: 2022-01-12

## 2022-01-09 RX ORDER — SENNA PLUS 8.6 MG/1
2 TABLET ORAL AT BEDTIME
Refills: 0 | Status: DISCONTINUED | OUTPATIENT
Start: 2022-01-09 | End: 2022-01-12

## 2022-01-09 RX ORDER — OXYCODONE HYDROCHLORIDE 5 MG/1
5 TABLET ORAL EVERY 6 HOURS
Refills: 0 | Status: DISCONTINUED | OUTPATIENT
Start: 2022-01-09 | End: 2022-01-12

## 2022-01-09 RX ORDER — INSULIN LISPRO 100/ML
VIAL (ML) SUBCUTANEOUS
Refills: 0 | Status: DISCONTINUED | OUTPATIENT
Start: 2022-01-09 | End: 2022-01-12

## 2022-01-09 RX ORDER — LANOLIN ALCOHOL/MO/W.PET/CERES
3 CREAM (GRAM) TOPICAL AT BEDTIME
Refills: 0 | Status: DISCONTINUED | OUTPATIENT
Start: 2022-01-09 | End: 2022-01-12

## 2022-01-09 RX ORDER — ATORVASTATIN CALCIUM 80 MG/1
20 TABLET, FILM COATED ORAL AT BEDTIME
Refills: 0 | Status: DISCONTINUED | OUTPATIENT
Start: 2022-01-09 | End: 2022-01-12

## 2022-01-09 RX ORDER — DEXTROSE 50 % IN WATER 50 %
12.5 SYRINGE (ML) INTRAVENOUS ONCE
Refills: 0 | Status: DISCONTINUED | OUTPATIENT
Start: 2022-01-09 | End: 2022-01-12

## 2022-01-09 NOTE — H&P ADULT - HISTORY OF PRESENT ILLNESS
76F w/ hx of ESRD on HD M, W, F, IDDM2, anemia, HTN, JARVIS on CPAP, gout, trigeminal neuralgia, rotator cuff tear p/w SOB x 4 days. [facility] Has been noticing worsened SOB with exertion or speaking gradually over the past 4 days. Grand child who is 5 years old sick with COVID and pt interacts very frequency with child on weekends. Brought to hospital today when son-in-law who is physician talking on phone with her became concerned regarding tachypnea and recommended visit to ER. Denies any obvious fevers, chills, cough, LE edema, chest pain. Does endorse occasional palpitations. No recent travel or history of blood clots.

## 2022-01-09 NOTE — H&P ADULT - NSHPOUTPATIENTPROVIDERS_GEN_ALL_CORE
[facility] - DALY Camacho / DALY Evans / FREIDA Lockett/ DALY Taylor/ DALY Becker/ YELENA Colbert / MARGARITA Kearns / JAY Howard [facility] - DALY Camacho / DALY Evans / FREIDA Lockett/ DALY Taylor/ DALY Becker/ YELENA Colbert / MARGARITA Kearns / Alfonzo Wisdom MD PMD

## 2022-01-09 NOTE — H&P ADULT - PROBLEM SELECTOR PLAN 3
Reportedly on Lantus 26U QHS at home. Will lower dose given hospital diet, more labile effect given ESRD.  -Cont. Lantus 15U QHS  -Fingersticks and sliding scale  -Cont. atorvastatin

## 2022-01-09 NOTE — H&P ADULT - PROBLEM SELECTOR PLAN 4
Hx of torn rotator cuff in R shoulder. Plan for repair on 1/18. Denies any recent trauma. ISTOP reviewed Reference #: [id number]  -Cont. tylenol PRN mild pain  -Oxycodone PRN moderate/ severe pain  -Bowel regimen Hx of torn rotator cuff in R shoulder. Plan for repair on 1/18. Denies any recent trauma. ISTOP reviewed Reference #: [id number]  -Cont. tylenol PRN mild pain  -Oxycodone PRN moderate/ severe pain  -Bowel regimen  -Cont. gabapentin

## 2022-01-09 NOTE — ED PROVIDER NOTE - PHYSICAL EXAMINATION
Gen: A&Ox4   HEENT: Atraumatic. Mucous membranes moist, no scleral icterus.  CV: RRR. Systolic murmur. No significant lower extremity edema. Fistula in LUE w/ palpable thrill. No signs of infection.  Resp: Not hypoxic. Tachypneic. Scattered crackles bilat, no wheezes.  GI: Abdomen non tender to palpation, soft and non-distended.   Skin/MSK: No open wounds. No ecchymosis appreciated.  Neuro: Following commands. No facial drop.   Psych: Appropriate mood, cooperative

## 2022-01-09 NOTE — H&P ADULT - ASSESSMENT
76F w/ hx of ESRD on HD M, W, F, IDDM2, anemia, HTN, JARVIS on CPAP, gout, trigeminal neuralgia, rotator cuff tear p/w SOB x 4 days 76F w/ hx of ESRD on HD M, W, F, IDDM2, anemia, HTN, JARVIS on CPAP, gout, trigeminal neuralgia, rotator cuff tear p/w SOB x 4 days and concern for COVID

## 2022-01-09 NOTE — ED ADULT NURSE NOTE - HOW OFTEN DO YOU HAVE A DRINK CONTAINING ALCOHOL?
----- Message from Prashanth Sal MD sent at 12/11/2019  5:59 PM CST -----  Please call patient and let them know that all their tests results were normal.  His prostate test is normal at 1.4, same as last year.  No diabetes, blood sugar is as good as it has been at 86  Cholesterol level is good, a little bit higher than it was last year at 174, last year 155. Less than 200 is good  His LDL is up to 114, from 94 last year.  I think it would be best if it was less than 100.   If he thinks he can do little better with low cholesterol diet we can continue current dose of his cholesterol pill, otherwise we could increase the dose a little if he would like.      
Left message for pt. to call clinic.    
Left message for pt. to call clinic.    
Pt notified per md result note, pt verbalized understanding.  Pt will try working on Cholesterol diet after the holidays, he will stay at current dose of medication.  
Never

## 2022-01-09 NOTE — H&P ADULT - CONSTITUTIONAL DETAILS
Occurred 7/23  Likely due to insufflation from EGD guided Cortrak per GI  Resolved 7/24  Can resume tube feeds   well-groomed/distress due to pain/obese

## 2022-01-09 NOTE — ED PROVIDER NOTE - CLINICAL SUMMARY MEDICAL DECISION MAKING FREE TEXT BOX
76 year old female, with past history significant for Anemia, Type-2 DM, ESRD (HD M/W/F), HTN, HLD, Sleep apnea (CPAP), Trigeminal neuralgia, Carpal tunnel syndrome and Gout, presenting to ED for c/o increasing SOB x 3 days. States she first noticed while at dialysis on Friday. Sick contact w/ COVID in family. Denies swelling in legs, CP, nausea, sweating, and abd pain. Denies blood in stool or melena. Denies fevers, chills, cough, congestion. Makes 5-10 cc urine ~3 times daily. No hx of smoking or asthma. No hx of MI. Exam as above. Concern for CHF, volume overload, AS, electrolyte abnormality, pna viral vs bacterial, less likely ACS but will rule out. Labs, CXR, COVID swab. ECG w/o acute ischemic changes. Will reassess. Likely admit. 76 year old female, with past history significant for Anemia, Type-2 DM, ESRD (HD M/W/F), HTN, HLD, Sleep apnea (CPAP), Trigeminal neuralgia, Carpal tunnel syndrome and Gout, presenting to ED for c/o increasing SOB x 3 days. States she first noticed while at dialysis on Friday. Sick contact w/ COVID in family. Denies swelling in legs, CP, nausea, sweating, and abd pain. Denies blood in stool or melena. Denies fevers, chills, cough, congestion. Makes 5-10 cc urine ~3 times daily. No hx of smoking or asthma. No hx of MI. Exam as above. Concern for CHF, volume overload, AS, electrolyte abnormality, pna viral vs bacterial, less likely ACS but will rule out. Labs, CXR, COVID swab. ECG w/o acute ischemic changes. Will reassess. Likely admit.    STEFANO Dave, Attending: seen with resident and reviewed note.    76 yoF, anemia, ESRD, HD MWF, last 2 days ago, no issues, HTN, HLD, p/w dyspnea/weakness for several days. Occasionally has palpitations. No pain, f/c. No abd pain or NVD. Vaccinated and boosted, but recent Covid exposure. Here with daughter.     Looks well. No hypoxia or tachycardia. Speaking full sentences. LUE fistula, w/ thrill.    Eval for PNA, overload, Covid. Screen for ACS but do not think. Does not sound like PE. Stable on room air and vitals reassuring. Suspect admission.

## 2022-01-09 NOTE — H&P ADULT - NSHPADDITIONALINFOADULT_GEN_ALL_CORE
Speaking Coherently I was asked to see this patient by the hospitalist in charge. Dr. Sanchez to assume care for patient in Am and thereafter

## 2022-01-09 NOTE — H&P ADULT - PROBLEM SELECTOR PLAN 1
Unclear etiology at this point. CXR w/o clear source. Concerning for COVID given history. note significant murmur which pt states is chronic.  -F/u COVID swab  -Check vital signs  -Trend troponin  -F/u D-dimer incase COVID  -Low threshold for TTE  -See below regarding dialysis

## 2022-01-09 NOTE — H&P ADULT - NSHPPHYSICALEXAM_GEN_ALL_CORE
Vital Signs Last 24 Hrs  T(C): 35.6 (01-09-22 @ 21:14), Max: 37.1 (01-09-22 @ 20:30)  T(F): 96 (01-09-22 @ 21:14), Max: 98.7 (01-09-22 @ 20:30)  HR: 101 (01-09-22 @ 21:14) (94 - 101)  BP: 166/77 (01-09-22 @ 21:14) (124/70 - 166/77)  BP(mean): --  RR: 22 (01-09-22 @ 21:14) (16 - 22)  SpO2: 100% (01-09-22 @ 21:14) (100% - 100%)

## 2022-01-09 NOTE — H&P ADULT - NSICDXPASTMEDICALHX_GEN_ALL_CORE_FT
PAST MEDICAL HISTORY:  Anemia     Bronchial asthma hx of    Carpal tunnel syndrome left    Chronic right shoulder pain     Diabetes mellitus     Disorder of ligament of wrist, left     Dyslipidemia     ESRD (end stage renal disease) on dialysis started HD 2018 ~ M/W/F    Gout     HTN (hypertension)     Hypercholesterolemia     Mild diastolic dysfunction ~ Stage I    Pedal edema     RSV bronchitis     Sleep apnea on CPAP    Trigeminal neuralgia hx of    Vertigo

## 2022-01-09 NOTE — H&P ADULT - PROBLEM SELECTOR PLAN 2
On HD M, W, F  -Consult nephrology in AM for dialysis  -Cont. sevelamer  -Cont. renal neeraj substitution  -Monitor I/Os

## 2022-01-09 NOTE — ED PROVIDER NOTE - OBJECTIVE STATEMENT
76 year old female, with past history significant for Anemia, Type-2 DM, ESRD (HD M/W/F), HTN, HLD, Sleep apnea (CPAP), Trigeminal neuralgia, Carpal tunnel syndrome and Gout, presenting to ED for c/o increasing SOB x 3 days. States she first noticed while at dialysis on Friday. Sick contact w/ COVID in family. Denies swelling in legs, CP, nausea, sweating, and abd pain. Denies blood in stool or melena. Denies fevers, chills, cough, congestion. Makes 5-10 cc urine ~3 times daily. No hx of smoking or asthma. No hx of MI.     Nephrologist: Dr. Taylor  PCP: Dr. Marquis Martínez
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
## 2022-01-09 NOTE — ED ADULT NURSE NOTE - NSIMPLEMENTINTERV_GEN_ALL_ED
Implemented All Fall Risk Interventions:  [place] to call system. Call bell, personal items and telephone within reach. Instruct patient to call for assistance. Room bathroom lighting operational. Non-slip footwear when patient is off stretcher. Physically safe environment: no spills, clutter or unnecessary equipment. Stretcher in lowest position, wheels locked, appropriate side rails in place. Provide visual cue, wrist band, yellow gown, etc. Monitor gait and stability. Monitor for mental status changes and reorient to person, place, and time. Review medications for side effects contributing to fall risk. Reinforce activity limits and safety measures with patient and family.

## 2022-01-09 NOTE — ED PROVIDER NOTE - NS ED ROS FT
Gen: Denies fever.   HEENT: Denies headache. Denies congestion.  CV: Denies chest pain. Denies lightheadedness.  Skin: Denies rash.   Resp: +SOB. Denies cough.  GI: Denies abd pain. Denies nausea. Denies vomiting. Denies diarrhea. Denies melena. Denies hematochezia.  Msk: Denies extremity swelling. Denies extremity pain.  : Denies dysuria. Denies hematuria.  Neuro: Denies LOC. Denies dizziness. Denies new numbness/tingling. Denies new focal weakness.  Psych: Denies SI

## 2022-01-09 NOTE — H&P ADULT - NSHPLABSRESULTS_GEN_ALL_CORE
I have reviewed the labs, imaging and ekg. EKG with NSR HR 85 QTc 480 non-specific ST segment findings. CXR w/o focal consolidations

## 2022-01-09 NOTE — ED PROVIDER NOTE - PROGRESS NOTE DETAILS
Josh Price PGY2: Work up thus far not revealing for etiology of SOB. Most likely CHF vs vasc congestion vs covid/pna. Accepted for further management to Dr. Sanchez. Repeat trop pending.

## 2022-01-10 LAB
A1C WITH ESTIMATED AVERAGE GLUCOSE RESULT: 6.4 % — HIGH (ref 4–5.6)
ALBUMIN SERPL ELPH-MCNC: 3.8 G/DL — SIGNIFICANT CHANGE UP (ref 3.3–5)
ALP SERPL-CCNC: 203 U/L — HIGH (ref 40–120)
ALT FLD-CCNC: 10 U/L — SIGNIFICANT CHANGE UP (ref 10–45)
ANION GAP SERPL CALC-SCNC: 19 MMOL/L — HIGH (ref 5–17)
AST SERPL-CCNC: 13 U/L — SIGNIFICANT CHANGE UP (ref 10–40)
BASOPHILS # BLD AUTO: 0.01 K/UL — SIGNIFICANT CHANGE UP (ref 0–0.2)
BASOPHILS NFR BLD AUTO: 0.1 % — SIGNIFICANT CHANGE UP (ref 0–2)
BILIRUB SERPL-MCNC: 0.3 MG/DL — SIGNIFICANT CHANGE UP (ref 0.2–1.2)
BUN SERPL-MCNC: 52 MG/DL — HIGH (ref 7–23)
CALCIUM SERPL-MCNC: 9.8 MG/DL — SIGNIFICANT CHANGE UP (ref 8.4–10.5)
CHLORIDE SERPL-SCNC: 92 MMOL/L — LOW (ref 96–108)
CO2 SERPL-SCNC: 18 MMOL/L — LOW (ref 22–31)
CREAT SERPL-MCNC: 8.74 MG/DL — HIGH (ref 0.5–1.3)
CRP SERPL-MCNC: 28 MG/L — HIGH (ref 0–4)
D DIMER BLD IA.RAPID-MCNC: 488 NG/ML DDU — HIGH
EOSINOPHIL # BLD AUTO: 0.06 K/UL — SIGNIFICANT CHANGE UP (ref 0–0.5)
EOSINOPHIL NFR BLD AUTO: 0.9 % — SIGNIFICANT CHANGE UP (ref 0–6)
ERYTHROCYTE [SEDIMENTATION RATE] IN BLOOD: 115 MM/HR — HIGH (ref 0–20)
ESTIMATED AVERAGE GLUCOSE: 137 MG/DL — HIGH (ref 68–114)
GLUCOSE BLDC GLUCOMTR-MCNC: 148 MG/DL — HIGH (ref 70–99)
GLUCOSE BLDC GLUCOMTR-MCNC: 159 MG/DL — HIGH (ref 70–99)
GLUCOSE BLDC GLUCOMTR-MCNC: 191 MG/DL — HIGH (ref 70–99)
GLUCOSE BLDC GLUCOMTR-MCNC: 249 MG/DL — HIGH (ref 70–99)
GLUCOSE BLDC GLUCOMTR-MCNC: 315 MG/DL — HIGH (ref 70–99)
GLUCOSE SERPL-MCNC: 178 MG/DL — HIGH (ref 70–99)
HCT VFR BLD CALC: 30.8 % — LOW (ref 34.5–45)
HGB BLD-MCNC: 9.6 G/DL — LOW (ref 11.5–15.5)
IMM GRANULOCYTES NFR BLD AUTO: 0.7 % — SIGNIFICANT CHANGE UP (ref 0–1.5)
LYMPHOCYTES # BLD AUTO: 1.51 K/UL — SIGNIFICANT CHANGE UP (ref 1–3.3)
LYMPHOCYTES # BLD AUTO: 21.4 % — SIGNIFICANT CHANGE UP (ref 13–44)
MAGNESIUM SERPL-MCNC: 2.7 MG/DL — HIGH (ref 1.6–2.6)
MCHC RBC-ENTMCNC: 27.5 PG — SIGNIFICANT CHANGE UP (ref 27–34)
MCHC RBC-ENTMCNC: 31.2 GM/DL — LOW (ref 32–36)
MCV RBC AUTO: 88.3 FL — SIGNIFICANT CHANGE UP (ref 80–100)
MONOCYTES # BLD AUTO: 0.68 K/UL — SIGNIFICANT CHANGE UP (ref 0–0.9)
MONOCYTES NFR BLD AUTO: 9.6 % — SIGNIFICANT CHANGE UP (ref 2–14)
NEUTROPHILS # BLD AUTO: 4.74 K/UL — SIGNIFICANT CHANGE UP (ref 1.8–7.4)
NEUTROPHILS NFR BLD AUTO: 67.3 % — SIGNIFICANT CHANGE UP (ref 43–77)
NRBC # BLD: 0 /100 WBCS — SIGNIFICANT CHANGE UP (ref 0–0)
PLATELET # BLD AUTO: 183 K/UL — SIGNIFICANT CHANGE UP (ref 150–400)
POTASSIUM SERPL-MCNC: 4.6 MMOL/L — SIGNIFICANT CHANGE UP (ref 3.5–5.3)
POTASSIUM SERPL-SCNC: 4.6 MMOL/L — SIGNIFICANT CHANGE UP (ref 3.5–5.3)
PROCALCITONIN SERPL-MCNC: 0.52 NG/ML — HIGH (ref 0.02–0.1)
PROT SERPL-MCNC: 7.2 G/DL — SIGNIFICANT CHANGE UP (ref 6–8.3)
RBC # BLD: 3.49 M/UL — LOW (ref 3.8–5.2)
RBC # FLD: 18.5 % — HIGH (ref 10.3–14.5)
SARS-COV-2 RNA SPEC QL NAA+PROBE: SIGNIFICANT CHANGE UP
SODIUM SERPL-SCNC: 129 MMOL/L — LOW (ref 135–145)
TROPONIN T, HIGH SENSITIVITY RESULT: 79 NG/L — HIGH (ref 0–51)
TROPONIN T, HIGH SENSITIVITY RESULT: 84 NG/L — HIGH (ref 0–51)
WBC # BLD: 7.05 K/UL — SIGNIFICANT CHANGE UP (ref 3.8–10.5)
WBC # FLD AUTO: 7.05 K/UL — SIGNIFICANT CHANGE UP (ref 3.8–10.5)

## 2022-01-10 RX ORDER — ERYTHROPOIETIN 10000 [IU]/ML
10000 INJECTION, SOLUTION INTRAVENOUS; SUBCUTANEOUS
Refills: 0 | Status: DISCONTINUED | OUTPATIENT
Start: 2022-01-10 | End: 2022-01-12

## 2022-01-10 RX ADMIN — HEPARIN SODIUM 5000 UNIT(S): 5000 INJECTION INTRAVENOUS; SUBCUTANEOUS at 19:50

## 2022-01-10 RX ADMIN — ERYTHROPOIETIN 10000 UNIT(S): 10000 INJECTION, SOLUTION INTRAVENOUS; SUBCUTANEOUS at 16:42

## 2022-01-10 RX ADMIN — SEVELAMER CARBONATE 1600 MILLIGRAM(S): 2400 POWDER, FOR SUSPENSION ORAL at 07:50

## 2022-01-10 RX ADMIN — Medication 100 MILLIGRAM(S): at 12:07

## 2022-01-10 RX ADMIN — INSULIN GLARGINE 15 UNIT(S): 100 INJECTION, SOLUTION SUBCUTANEOUS at 21:47

## 2022-01-10 RX ADMIN — Medication 2: at 21:47

## 2022-01-10 RX ADMIN — Medication 1: at 19:51

## 2022-01-10 RX ADMIN — SENNA PLUS 2 TABLET(S): 8.6 TABLET ORAL at 21:48

## 2022-01-10 RX ADMIN — HEPARIN SODIUM 5000 UNIT(S): 5000 INJECTION INTRAVENOUS; SUBCUTANEOUS at 05:25

## 2022-01-10 RX ADMIN — Medication 1 TABLET(S): at 19:49

## 2022-01-10 RX ADMIN — SEVELAMER CARBONATE 1600 MILLIGRAM(S): 2400 POWDER, FOR SUSPENSION ORAL at 19:50

## 2022-01-10 RX ADMIN — ATORVASTATIN CALCIUM 20 MILLIGRAM(S): 80 TABLET, FILM COATED ORAL at 21:55

## 2022-01-10 RX ADMIN — SEVELAMER CARBONATE 1600 MILLIGRAM(S): 2400 POWDER, FOR SUSPENSION ORAL at 12:07

## 2022-01-10 RX ADMIN — Medication 2: at 12:06

## 2022-01-10 RX ADMIN — GABAPENTIN 300 MILLIGRAM(S): 400 CAPSULE ORAL at 21:48

## 2022-01-11 LAB
ALBUMIN SERPL ELPH-MCNC: 3.9 G/DL — SIGNIFICANT CHANGE UP (ref 3.3–5)
ALP SERPL-CCNC: 193 U/L — HIGH (ref 40–120)
ALT FLD-CCNC: 12 U/L — SIGNIFICANT CHANGE UP (ref 10–45)
ANION GAP SERPL CALC-SCNC: 15 MMOL/L — SIGNIFICANT CHANGE UP (ref 5–17)
AST SERPL-CCNC: 15 U/L — SIGNIFICANT CHANGE UP (ref 10–40)
BASOPHILS # BLD AUTO: 0.02 K/UL — SIGNIFICANT CHANGE UP (ref 0–0.2)
BASOPHILS NFR BLD AUTO: 0.3 % — SIGNIFICANT CHANGE UP (ref 0–2)
BILIRUB SERPL-MCNC: 0.3 MG/DL — SIGNIFICANT CHANGE UP (ref 0.2–1.2)
BUN SERPL-MCNC: 32 MG/DL — HIGH (ref 7–23)
CALCIUM SERPL-MCNC: 10 MG/DL — SIGNIFICANT CHANGE UP (ref 8.4–10.5)
CHLORIDE SERPL-SCNC: 92 MMOL/L — LOW (ref 96–108)
CO2 SERPL-SCNC: 24 MMOL/L — SIGNIFICANT CHANGE UP (ref 22–31)
CREAT SERPL-MCNC: 6.28 MG/DL — HIGH (ref 0.5–1.3)
EOSINOPHIL # BLD AUTO: 0.05 K/UL — SIGNIFICANT CHANGE UP (ref 0–0.5)
EOSINOPHIL NFR BLD AUTO: 0.8 % — SIGNIFICANT CHANGE UP (ref 0–6)
GLUCOSE BLDC GLUCOMTR-MCNC: 136 MG/DL — HIGH (ref 70–99)
GLUCOSE BLDC GLUCOMTR-MCNC: 166 MG/DL — HIGH (ref 70–99)
GLUCOSE BLDC GLUCOMTR-MCNC: 238 MG/DL — HIGH (ref 70–99)
GLUCOSE BLDC GLUCOMTR-MCNC: 253 MG/DL — HIGH (ref 70–99)
GLUCOSE SERPL-MCNC: 101 MG/DL — HIGH (ref 70–99)
HCT VFR BLD CALC: 33.2 % — LOW (ref 34.5–45)
HGB BLD-MCNC: 10.3 G/DL — LOW (ref 11.5–15.5)
IMM GRANULOCYTES NFR BLD AUTO: 1.2 % — SIGNIFICANT CHANGE UP (ref 0–1.5)
LYMPHOCYTES # BLD AUTO: 1.52 K/UL — SIGNIFICANT CHANGE UP (ref 1–3.3)
LYMPHOCYTES # BLD AUTO: 23 % — SIGNIFICANT CHANGE UP (ref 13–44)
MCHC RBC-ENTMCNC: 27.8 PG — SIGNIFICANT CHANGE UP (ref 27–34)
MCHC RBC-ENTMCNC: 31 GM/DL — LOW (ref 32–36)
MCV RBC AUTO: 89.5 FL — SIGNIFICANT CHANGE UP (ref 80–100)
MONOCYTES # BLD AUTO: 0.77 K/UL — SIGNIFICANT CHANGE UP (ref 0–0.9)
MONOCYTES NFR BLD AUTO: 11.6 % — SIGNIFICANT CHANGE UP (ref 2–14)
NEUTROPHILS # BLD AUTO: 4.17 K/UL — SIGNIFICANT CHANGE UP (ref 1.8–7.4)
NEUTROPHILS NFR BLD AUTO: 63.1 % — SIGNIFICANT CHANGE UP (ref 43–77)
NRBC # BLD: 0 /100 WBCS — SIGNIFICANT CHANGE UP (ref 0–0)
PLATELET # BLD AUTO: 200 K/UL — SIGNIFICANT CHANGE UP (ref 150–400)
POTASSIUM SERPL-MCNC: 4.6 MMOL/L — SIGNIFICANT CHANGE UP (ref 3.5–5.3)
POTASSIUM SERPL-SCNC: 4.6 MMOL/L — SIGNIFICANT CHANGE UP (ref 3.5–5.3)
PROT SERPL-MCNC: 7.6 G/DL — SIGNIFICANT CHANGE UP (ref 6–8.3)
RBC # BLD: 3.71 M/UL — LOW (ref 3.8–5.2)
RBC # FLD: 18.6 % — HIGH (ref 10.3–14.5)
SODIUM SERPL-SCNC: 131 MMOL/L — LOW (ref 135–145)
WBC # BLD: 6.61 K/UL — SIGNIFICANT CHANGE UP (ref 3.8–10.5)
WBC # FLD AUTO: 6.61 K/UL — SIGNIFICANT CHANGE UP (ref 3.8–10.5)

## 2022-01-11 PROCEDURE — 71250 CT THORAX DX C-: CPT | Mod: 26

## 2022-01-11 PROCEDURE — 93306 TTE W/DOPPLER COMPLETE: CPT | Mod: 26

## 2022-01-11 RX ORDER — NYSTATIN CREAM 100000 [USP'U]/G
1 CREAM TOPICAL
Refills: 0 | Status: DISCONTINUED | OUTPATIENT
Start: 2022-01-11 | End: 2022-01-12

## 2022-01-11 RX ADMIN — SEVELAMER CARBONATE 1600 MILLIGRAM(S): 2400 POWDER, FOR SUSPENSION ORAL at 17:47

## 2022-01-11 RX ADMIN — SEVELAMER CARBONATE 1600 MILLIGRAM(S): 2400 POWDER, FOR SUSPENSION ORAL at 11:58

## 2022-01-11 RX ADMIN — Medication 3: at 11:59

## 2022-01-11 RX ADMIN — INSULIN GLARGINE 15 UNIT(S): 100 INJECTION, SOLUTION SUBCUTANEOUS at 21:42

## 2022-01-11 RX ADMIN — HEPARIN SODIUM 5000 UNIT(S): 5000 INJECTION INTRAVENOUS; SUBCUTANEOUS at 17:46

## 2022-01-11 RX ADMIN — Medication 1 TABLET(S): at 11:59

## 2022-01-11 RX ADMIN — Medication 100 MILLIGRAM(S): at 11:59

## 2022-01-11 RX ADMIN — GABAPENTIN 300 MILLIGRAM(S): 400 CAPSULE ORAL at 21:43

## 2022-01-11 RX ADMIN — Medication 1: at 17:45

## 2022-01-11 RX ADMIN — HEPARIN SODIUM 5000 UNIT(S): 5000 INJECTION INTRAVENOUS; SUBCUTANEOUS at 05:31

## 2022-01-11 RX ADMIN — ATORVASTATIN CALCIUM 20 MILLIGRAM(S): 80 TABLET, FILM COATED ORAL at 21:42

## 2022-01-11 RX ADMIN — NYSTATIN CREAM 1 APPLICATION(S): 100000 CREAM TOPICAL at 17:53

## 2022-01-11 RX ADMIN — SEVELAMER CARBONATE 1600 MILLIGRAM(S): 2400 POWDER, FOR SUSPENSION ORAL at 08:27

## 2022-01-11 NOTE — PROGRESS NOTE ADULT - PROBLEM SELECTOR PLAN 1
Unclear etiology at this point. CXR w/o clear source. Concerning for COVID given history. note significant murmur which pt states is chronic.  -TTE  Ct chest  Pul eval
Unclear etiology at this point. CXR w/o clear source. Concerning for COVID given history. note significant murmur which pt states is chronic.  -TTE reviewed  Ct chest reviewed  Pul eval

## 2022-01-11 NOTE — CONSULT NOTE ADULT - SUBJECTIVE AND OBJECTIVE BOX
Wound Care Team Note:    HPI:  76F w/ hx of ESRD on HD M, W, F, IDDM2, anemia, HTN, JARVIS on CPAP, gout, trigeminal neuralgia, rotator cuff tear p/w SOB x 4 days. [facility]. Has been noticing worsened SOB with exertion or speaking gradually over the past 4 days. Grand child who is 5 years old sick with COVID and pt interacts very frequency with child on weekends. Brought to hospital today when son-in-law who is physician talking on phone with her became concerned regarding tachypnea and recommended visit to ER. Denies any obvious fevers, chills, cough, LE edema, chest pain. Does endorse occasional palpitations. No recent travel or history of blood clots. (2022 22:21)    Request for wound care consult for bilateral buttocks skin breakdown received from nursing. Ms. Mallory was encountered on an alternating air with low air loss surface. She is incontinent of stool and urine. She was able turn and reposition self in bed although she is weak and walks with a walker. Her extreme immobility, inactivity, gross incontinence of urine and stool as well as poor nutritional status all contribute to her high risk for pressure injury development and hinder healing. She states that she has had the rash in between her buttocks for some time prior to coming to the hospital. She washes and dries the area, rubs alcohol on his and massages it, then applies either powder or cream. She was no consistent in her reporting. She wears a disposable diaper and two pairs of underwear under her pants. She was instructed not to use alcohol or massage the area.     PAST MEDICAL & SURGICAL HISTORY:  HTN (hypertension)  Hypercholesterolemia  Diabetes mellitus  Bronchial asthma,hx of  Vertigo  Trigeminal neuralgia, hx of  ESRD (end stage renal disease) on dialysis, started HD  ~ M/W/F  Pedal edema  Sleep apnea, on CPAP  Dyslipidemia  Anemia  Gout  Carpal tunnel syndrome, left  Chronic right shoulder pain  Disorder of ligament of wrist, left  Mild diastolic dysfunction, ~ Stage I  RSV bronchitis  S/P rotator cuff surgery, bilateral &#x27;s  S/P ,   S/P hysterectomy,   S/P total knee replacement, bilateral, left , right   S/P cataract extraction, bilateral,   History of carpal tunnel surgery of left wrist,   S/P arteriovenous (AV) fistula creation  left upper forearm cephalic vein brachial artery AV fistula creation on 2018, left arm basilic vein transposition on 2019.  History of arthroscopy of left shoulder, x 3  History of arthroscopy of right shoulder, x 3  History of vascular access device, 2018, removed 2020    MEDICATIONS  (STANDING):  allopurinol 100 milliGRAM(s) Oral daily  atorvastatin 20 milliGRAM(s) Oral at bedtime  dextrose 40% Gel 15 Gram(s) Oral once  dextrose 5%. 1000 milliLiter(s) (50 mL/Hr) IV Continuous <Continuous>  dextrose 5%. 1000 milliLiter(s) (100 mL/Hr) IV Continuous <Continuous>  dextrose 50% Injectable 25 Gram(s) IV Push once  dextrose 50% Injectable 12.5 Gram(s) IV Push once  dextrose 50% Injectable 25 Gram(s) IV Push once  epoetin shannon-epbx (RETACRIT) Injectable 08010 Unit(s) IV Push <User Schedule>  gabapentin 300 milliGRAM(s) Oral at bedtime  glucagon  Injectable 1 milliGRAM(s) IntraMuscular once  heparin   Injectable 5000 Unit(s) SubCutaneous every 12 hours  insulin glargine Injectable (LANTUS) 15 Unit(s) SubCutaneous at bedtime  insulin lispro (ADMELOG) corrective regimen sliding scale   SubCutaneous three times a day before meals  insulin lispro (ADMELOG) corrective regimen sliding scale   SubCutaneous at bedtime  Nephro-neeraj 1 Tablet(s) Oral daily  senna 2 Tablet(s) Oral at bedtime  sevelamer carbonate 1600 milliGRAM(s) Oral three times a day with meals    MEDICATIONS  (PRN):  acetaminophen     Tablet .. 650 milliGRAM(s) Oral every 6 hours PRN Temp greater or equal to 38C (100.4F), Mild Pain (1 - 3)  melatonin 3 milliGRAM(s) Oral at bedtime PRN Insomnia  oxyCODONE    IR 5 milliGRAM(s) Oral every 6 hours PRN Severe Pain (7 - 10)    Allergies    IV Contrast (Anaphylaxis)  shellfish (Hives; Rash)  shrimp (Hives)  smoke; coughing (Other)    Intolerances    Vital Signs Last 24 Hrs  T(C): 36.4 (2022 12:20), Max: 36.9 (10 Gopal 2022 15:30)  T(F): 97.5 (2022 12:20), Max: 98.5 (10 Gopal 2022 15:30)  HR: 107 (2022 12:20) (78 - 107)  BP: 104/67 (2022 12:20) (104/67 - 163/70)  BP(mean): 79 (2022 12:20) (79 - 79)  RR: 18 (2022 12:20) (18 - 20)  SpO2: 97% (2022 12:20) (97% - 99%)    Physical Exam:  General: A&Ox3   Respiratory: no SoB on room air  Gastrointestinal: soft NT/ND  Neurology: verbal, following commands  Musculoskeletal: no contractures  Vascular: BLE edema equal  Skin:  interbuttocks skin macerated, superficially denuded, scant drainage  No odor, erythema, increased warmth, tenderness, induration, fluctuance    LABS:      131<L>  |  92<L>  |  32<H>  ----------------------------<  101<H>  4.6   |  24  |  6.28<H>    Ca    10.0      2022 07:19  Mg     2.7     01-10    TPro  7.6  /  Alb  3.9  /  TBili  0.3  /  DBili  x   /  AST  15  /  ALT  12  /  AlkPhos  193<H>  11                          10.3   6.61  )-----------( 200      ( 2022 07:21 )             33.2     PT/INR - ( 2022 19:49 )   PT: 11.8 sec;   INR: 0.98 ratio         PTT - ( 2022 19:49 )  PTT:25.9 sec    
[facility] Consult Note Nephrology - CONSULTATION NOTE    76F w/ hx of ESRD on HD M, W, F, IDDM2, anemia, HTN, JARVIS on CPAP, gout, trigeminal neuralgia, rotator cuff tear p/w SOB x 4 days. [facility] Has been noticing worsened SOB with exertion or speaking gradually over the past 4 days. Grand child who is 5 years old sick with COVID and pt interacts very frequency with child on weekends. Brought to hospital today when son-in-law who is physician talking on phone with her became concerned regarding tachypnea and recommended visit to ER. Denies any obvious fevers, chills, cough, LE edema, chest pain. Does endorse occasional palpitations. No recent travel or history of blood clots.    Renal consult for ESRd on HD  via left upper ext avf  routine HD days are MWF  last HD friday  complains of sob  denies any cp/or fevers    PAST MEDICAL & SURGICAL HISTORY:  HTN (hypertension)    Hypercholesterolemia    Diabetes mellitus    Bronchial asthma  hx of    Vertigo    Trigeminal neuralgia  hx of    ESRD (end stage renal disease) on dialysis  started HD  ~ M/W/F    Pedal edema    Sleep apnea  on CPAP    Dyslipidemia    Anemia    Gout    Carpal tunnel syndrome  left    Chronic right shoulder pain    Disorder of ligament of wrist, left    Mild diastolic dysfunction  ~ Stage I    RSV bronchitis    S/P rotator cuff surgery  bilateral &#x27;s    S/P       S/P hysterectomy      S/P total knee replacement  bilateral, left 2006, right 2008    S/P cataract extraction  bilateral, 2005    History of carpal tunnel surgery of left wrist      S/P arteriovenous (AV) fistula creation  left upper forearm cephalic vein brachial artery AV fistula creation on 2018, left arm basilic vein transposition on 2019.      History of arthroscopy of left shoulder  x 3    History of arthroscopy of right shoulder  x 3    History of vascular access device  , removed 2020      IV Contrast (Anaphylaxis)  shellfish (Hives; Rash)  shrimp (Hives)  smoke; coughing (Other)    Home Medications Reviewed  Hospital Medications:   MEDICATIONS  (STANDING):  allopurinol 100 milliGRAM(s) Oral daily  atorvastatin 20 milliGRAM(s) Oral at bedtime  dextrose 40% Gel 15 Gram(s) Oral once  dextrose 5%. 1000 milliLiter(s) (50 mL/Hr) IV Continuous <Continuous>  dextrose 5%. 1000 milliLiter(s) (100 mL/Hr) IV Continuous <Continuous>  dextrose 50% Injectable 25 Gram(s) IV Push once  dextrose 50% Injectable 12.5 Gram(s) IV Push once  dextrose 50% Injectable 25 Gram(s) IV Push once  gabapentin 300 milliGRAM(s) Oral at bedtime  glucagon  Injectable 1 milliGRAM(s) IntraMuscular once  heparin   Injectable 5000 Unit(s) SubCutaneous every 12 hours  insulin glargine Injectable (LANTUS) 15 Unit(s) SubCutaneous at bedtime  insulin lispro (ADMELOG) corrective regimen sliding scale   SubCutaneous three times a day before meals  insulin lispro (ADMELOG) corrective regimen sliding scale   SubCutaneous at bedtime  Nephro-neeraj 1 Tablet(s) Oral daily  senna 2 Tablet(s) Oral at bedtime  sevelamer carbonate 1600 milliGRAM(s) Oral three times a day with meals    SOCIAL HISTORY:  Denies ETOh,Smoking,   FAMILY HISTORY:  No pertinent family history in first degree relatives      REVIEW OF SYSTEMS:  CONSTITUTIONAL: No weakness, fevers or chills  EYES/ENT: No visual changes;  No vertigo or throat pain   NECK: No pain or stiffness  RESPIRATORY: + shortness of breath  CARDIOVASCULAR: No chest pain or palpitations.  GASTROINTESTINAL: No abdominal or epigastric pain. No nausea, vomiting, or hematemesis; No diarrhea or constipation. No melena or hematochezia.  GENITOURINARY: No dysuria, frequency, foamy urine, urinary urgency, incontinence or hematuria  NEUROLOGICAL: No numbness or weakness  SKIN: No itching, burning, rashes, or lesions   VASCULAR: No bilateral lower extremity edema.   All other review of systems is negative unless indicated above.    VITALS:  T(F): 97.5 (01-10-22 @ 13:08), Max: 98.7 (22 @ 20:30)  HR: 91 (01-10-22 @ 13:08)  BP: 133/71 (01-10-22 @ 13:08)  RR: 18 (01-10-22 @ 13:08)  SpO2: 99% (01-10-22 @ 13:08)  Wt(kg): --    Height (cm): 162.6 (01-10 @ 10:00)  Weight (kg): 81.7 (01-10 @ 10:00)  BMI (kg/m2): 30.9 (01-10 @ 10:00)  BSA (m2): 1.87 (01-10 @ 10:00)  PHYSICAL EXAM:  Constitutional: NAD  HEENT: anicteric sclera, oropharynx clear, MMM  Neck: No JVD  Respiratory: b/l rhonchi  Cardiovascular: S1, S2, RRR  Gastrointestinal: BS+, soft, NT/ND  Extremities ++ peripheral edema  Neurological: A/O x 3, no focal deficits  Psychiatric: Normal mood, normal affect  : No CVA tenderness. No cottrell.   Skin: No rashes  Vascular Access: left upper ext avf + thrill    LABS:  01-10    129<L>  |  92<L>  |  52<H>  ----------------------------<  178<H>  4.6   |  18<L>  |  8.74<H>    Ca    9.8      10 Gopal 2022 06:48  Mg     2.7     01-10    TPro  7.2  /  Alb  3.8  /  TBili  0.3  /  DBili      /  AST  13  /  ALT  10  /  AlkPhos  203<H>  01-10    Creatinine Trend: 8.74 <--, 7.88 <--, 7.38 <--                        9.6    7.05  )-----------( 183      ( 10 Gopal 2022 06:48 )             30.8     Urine Studies:      RADIOLOGY & ADDITIONAL STUDIES:

## 2022-01-11 NOTE — CONSULT NOTE ADULT - ASSESSMENT
76F w/ hx of ESRD on HD M, W, F, IDDM2, anemia, HTN, JARVIS on CPAP, gout, trigeminal neuralgia, rotator cuff tear p/w SOB x 4 days    1) ESRD on HD  Routine HD days are MWF  Access: Left upper ext avf   Plan for HD today- 3kg uf  consent obtained  trend bmp      2) Anemia in CKD  epo 10k tiw with hd  trend hgb    3) SOB-  3kg uf today  consider ct chest and pulm eval  will monitor      Dr Taylor  [phone]
Impression:    Incontinence of bowel and bladder  Incontinence Dermatitis  Suggestive of fungal rash    Recommend:  1.) topical therapy: buttock injury - cleanse with soap and water, rinse, pat dry, apply nystatin powder, apply cavilon to periwound skin, twice daily  2.) Incontinence Management - incontinence cleanser, pads, pericare BID  3.) Maintain on an alternating air with low air loss surface  4.) Turn and reposition Q 2 hours  5.) Nutrition optimization  6.) Offload heels/feet with complete cair air fluidized boots; ensure that the soles of the feet are not resting on the foot board of the bed.    Care as per medicine. Will not actively follow but will remain available. Please recall for new issues or deterioration.  Upon discharge [facility] [address] [place] [address] [phone]  Seen and discussed with clinical nurse  Thank you for this consult  Meri Albright, BENEDICT-C, CWOCN [id number]

## 2022-01-11 NOTE — PROGRESS NOTE ADULT - PROBLEM SELECTOR PLAN 2
On HD M, W, F  -Nephro eval appreciated
On HD M, W, F  -Consult nephrology in AM for dialysis  -Cont. sevelamer  -Cont. renal neeraj substitution

## 2022-01-11 NOTE — PROGRESS NOTE ADULT - PROBLEM SELECTOR PLAN 3
Reportedly on Lantus 26U QHS at home. Will lower dose given hospital diet, more labile effect given ESRD.  -Cont. Lantus 15U QHS  -Fingersticks and sliding scale  -Cont. atorvastatin
Reportedly on Lantus 26U QHS at home. Will lower dose given hospital diet, more labile effect given ESRD.  -Cont. Lantus 15U QHS  -Fingersticks and sliding scale

## 2022-01-11 NOTE — PROGRESS NOTE ADULT - PROBLEM SELECTOR PLAN 4
-Cont. tylenol PRN mild pain  -Oxycodone PRN moderate/ severe pain  -Bowel regimen  -Cont. gabapentin
-Cont. tylenol PRN mild pain  -Oxycodone PRN moderate/ severe pain  -Bowel regimen  -Cont. gabapentin

## 2022-01-12 ENCOUNTER — TRANSCRIPTION ENCOUNTER (OUTPATIENT)
Age: 77
End: 2022-01-12

## 2022-01-12 VITALS
HEART RATE: 96 BPM | OXYGEN SATURATION: 100 % | TEMPERATURE: 98 F | WEIGHT: 176.37 LBS | DIASTOLIC BLOOD PRESSURE: 60 MMHG | RESPIRATION RATE: 18 BRPM | SYSTOLIC BLOOD PRESSURE: 117 MMHG

## 2022-01-12 LAB
ANION GAP SERPL CALC-SCNC: 19 MMOL/L — HIGH (ref 5–17)
BUN SERPL-MCNC: 65 MG/DL — HIGH (ref 7–23)
CALCIUM SERPL-MCNC: 10.1 MG/DL — SIGNIFICANT CHANGE UP (ref 8.4–10.5)
CHLORIDE SERPL-SCNC: 92 MMOL/L — LOW (ref 96–108)
CO2 SERPL-SCNC: 20 MMOL/L — LOW (ref 22–31)
CREAT SERPL-MCNC: 9.36 MG/DL — HIGH (ref 0.5–1.3)
GLUCOSE BLDC GLUCOMTR-MCNC: 127 MG/DL — HIGH (ref 70–99)
GLUCOSE BLDC GLUCOMTR-MCNC: 191 MG/DL — HIGH (ref 70–99)
GLUCOSE SERPL-MCNC: 131 MG/DL — HIGH (ref 70–99)
HCT VFR BLD CALC: 31.6 % — LOW (ref 34.5–45)
HGB BLD-MCNC: 9.6 G/DL — LOW (ref 11.5–15.5)
MAGNESIUM SERPL-MCNC: 2.7 MG/DL — HIGH (ref 1.6–2.6)
MCHC RBC-ENTMCNC: 27 PG — SIGNIFICANT CHANGE UP (ref 27–34)
MCHC RBC-ENTMCNC: 30.4 GM/DL — LOW (ref 32–36)
MCV RBC AUTO: 89 FL — SIGNIFICANT CHANGE UP (ref 80–100)
NRBC # BLD: 0 /100 WBCS — SIGNIFICANT CHANGE UP (ref 0–0)
PLATELET # BLD AUTO: 209 K/UL — SIGNIFICANT CHANGE UP (ref 150–400)
POTASSIUM SERPL-MCNC: 4.8 MMOL/L — SIGNIFICANT CHANGE UP (ref 3.5–5.3)
POTASSIUM SERPL-SCNC: 4.8 MMOL/L — SIGNIFICANT CHANGE UP (ref 3.5–5.3)
RBC # BLD: 3.55 M/UL — LOW (ref 3.8–5.2)
RBC # FLD: 18.5 % — HIGH (ref 10.3–14.5)
SODIUM SERPL-SCNC: 131 MMOL/L — LOW (ref 135–145)
WBC # BLD: 7.63 K/UL — SIGNIFICANT CHANGE UP (ref 3.8–10.5)
WBC # FLD AUTO: 7.63 K/UL — SIGNIFICANT CHANGE UP (ref 3.8–10.5)

## 2022-01-12 PROCEDURE — 99285 EMERGENCY DEPT VISIT HI MDM: CPT | Mod: 25

## 2022-01-12 PROCEDURE — 84132 ASSAY OF SERUM POTASSIUM: CPT

## 2022-01-12 PROCEDURE — 85027 COMPLETE CBC AUTOMATED: CPT

## 2022-01-12 PROCEDURE — 85025 COMPLETE CBC W/AUTO DIFF WBC: CPT

## 2022-01-12 PROCEDURE — 85018 HEMOGLOBIN: CPT

## 2022-01-12 PROCEDURE — 85014 HEMATOCRIT: CPT

## 2022-01-12 PROCEDURE — 85652 RBC SED RATE AUTOMATED: CPT

## 2022-01-12 PROCEDURE — 86140 C-REACTIVE PROTEIN: CPT

## 2022-01-12 PROCEDURE — 84145 PROCALCITONIN (PCT): CPT

## 2022-01-12 PROCEDURE — 36415 COLL VENOUS BLD VENIPUNCTURE: CPT

## 2022-01-12 PROCEDURE — 84295 ASSAY OF SERUM SODIUM: CPT

## 2022-01-12 PROCEDURE — 93005 ELECTROCARDIOGRAM TRACING: CPT

## 2022-01-12 PROCEDURE — 80053 COMPREHEN METABOLIC PANEL: CPT

## 2022-01-12 PROCEDURE — 71250 CT THORAX DX C-: CPT

## 2022-01-12 PROCEDURE — U0003: CPT

## 2022-01-12 PROCEDURE — 99261: CPT

## 2022-01-12 PROCEDURE — 93306 TTE W/DOPPLER COMPLETE: CPT

## 2022-01-12 PROCEDURE — U0005: CPT

## 2022-01-12 PROCEDURE — 82435 ASSAY OF BLOOD CHLORIDE: CPT

## 2022-01-12 PROCEDURE — 82803 BLOOD GASES ANY COMBINATION: CPT

## 2022-01-12 PROCEDURE — 83605 ASSAY OF LACTIC ACID: CPT

## 2022-01-12 PROCEDURE — 85610 PROTHROMBIN TIME: CPT

## 2022-01-12 PROCEDURE — 83036 HEMOGLOBIN GLYCOSYLATED A1C: CPT

## 2022-01-12 PROCEDURE — 82330 ASSAY OF CALCIUM: CPT

## 2022-01-12 PROCEDURE — 82947 ASSAY GLUCOSE BLOOD QUANT: CPT

## 2022-01-12 PROCEDURE — 82565 ASSAY OF CREATININE: CPT

## 2022-01-12 PROCEDURE — 82962 GLUCOSE BLOOD TEST: CPT

## 2022-01-12 PROCEDURE — 83735 ASSAY OF MAGNESIUM: CPT

## 2022-01-12 PROCEDURE — 85379 FIBRIN DEGRADATION QUANT: CPT

## 2022-01-12 PROCEDURE — 85730 THROMBOPLASTIN TIME PARTIAL: CPT

## 2022-01-12 PROCEDURE — 83880 ASSAY OF NATRIURETIC PEPTIDE: CPT

## 2022-01-12 PROCEDURE — 80048 BASIC METABOLIC PNL TOTAL CA: CPT

## 2022-01-12 PROCEDURE — 71045 X-RAY EXAM CHEST 1 VIEW: CPT

## 2022-01-12 PROCEDURE — 84484 ASSAY OF TROPONIN QUANT: CPT

## 2022-01-12 RX ORDER — NYSTATIN CREAM 100000 [USP'U]/G
1 CREAM TOPICAL
Qty: 60 | Refills: 0
Start: 2022-01-12 | End: 2022-02-10

## 2022-01-12 RX ADMIN — HEPARIN SODIUM 5000 UNIT(S): 5000 INJECTION INTRAVENOUS; SUBCUTANEOUS at 05:59

## 2022-01-12 RX ADMIN — Medication 1: at 07:44

## 2022-01-12 RX ADMIN — Medication 1 TABLET(S): at 15:23

## 2022-01-12 RX ADMIN — NYSTATIN CREAM 1 APPLICATION(S): 100000 CREAM TOPICAL at 07:44

## 2022-01-12 RX ADMIN — SEVELAMER CARBONATE 1600 MILLIGRAM(S): 2400 POWDER, FOR SUSPENSION ORAL at 12:45

## 2022-01-12 RX ADMIN — SEVELAMER CARBONATE 1600 MILLIGRAM(S): 2400 POWDER, FOR SUSPENSION ORAL at 07:45

## 2022-01-12 RX ADMIN — ERYTHROPOIETIN 10000 UNIT(S): 10000 INJECTION, SOLUTION INTRAVENOUS; SUBCUTANEOUS at 11:37

## 2022-01-12 RX ADMIN — Medication 100 MILLIGRAM(S): at 15:18

## 2022-01-12 NOTE — DISCHARGE NOTE NURSING/CASE MANAGEMENT/SOCIAL WORK - NSDCPEFALRISK_GEN_ALL_CORE
For information on Fall & Injury Prevention, visit: [email or web address] OR  [email or web address] OR  https://www.cdc.gov/steadi/patient.html

## 2022-01-12 NOTE — DISCHARGE NOTE PROVIDER - NSDCFUSCHEDAPPT_GEN_ALL_CORE_FT
SUN FRAUSTO ; 01/13/2022 ; [facility] PreAdmits  SUN FRAUSTO ; 01/18/2022 ; [facility] PreAdmits

## 2022-01-12 NOTE — DISCHARGE NOTE PROVIDER - NSDCCPCAREPLAN_GEN_ALL_CORE_FT
PRINCIPAL DISCHARGE DIAGNOSIS  Diagnosis: Dyspnea  Assessment and Plan of Treatment: - Follow-up with the [facility] at [address] [place] [address] 1 week after discharge. Please call the clinic to make an appointment [phone].   - Follow-up with your PCP, Dr. Martínez, in 1 week after discharge. Please call the office to schedule an appointment [phone].   - You will be resuming dialysis at [facility] starting Friday 1/14/22.  - Consistent carbohydrate, renal diet - No concentrated potassium or phosphorous, low sodium, no added sugar  - Take your medications as prescribed

## 2022-01-12 NOTE — DISCHARGE NOTE PROVIDER - NSDCPNSUBOBJ_GEN_ALL_CORE
Vital Signs Last 24 Hrs  T(C): 36.3 (12 Jan 2022 11:25), Max: 36.8 (11 Jan 2022 19:23)  T(F): 97.3 (12 Jan 2022 11:25), Max: 98.2 (11 Jan 2022 19:23)  HR: 87 (12 Jan 2022 11:25) (81 - 96)  BP: 132/77 (12 Jan 2022 11:25) (120/68 - 132/77)  BP(mean): 89 (11 Jan 2022 19:23) (89 - 89)  RR: 18 (12 Jan 2022 11:25) (18 - 18)  SpO2: 98% (12 Jan 2022 11:25) (96% - 98%)                        9.6    7.63  )-----------( 209      ( 12 Jan 2022 06:38 )             31.6   01-12    131<L>  |  92<L>  |  65<H>  ----------------------------<  131<H>  4.8   |  20<L>  |  9.36<H>    Ca    10.1      12 Jan 2022 06:38  Mg     2.7     01-12    TPro  7.6  /  Alb  3.9  /  TBili  0.3  /  DBili  x   /  AST  15  /  ALT  12  /  AlkPhos  193<H>  01-11    PHYSICAL EXAM:  GENERAL: NAD  NECK: Supple, No JVD  CHEST/LUNG: Clear to auscultation bilaterally; No wheezing.  HEART: Regular rate and rhythm; No murmurs, rubs, or gallops  ABDOMEN: Soft, Nontender, Nondistended; Bowel sounds present  EXTREMITIES:   No edema  NEUROLOGY: AAO X 3    Patient cleared for discharge home today 1/12/22 after dialysis with her prior 24 hour home aide as per Dr. Sanchez.     Spent 45 min face to face encounter with patient and discharge note.

## 2022-01-12 NOTE — DISCHARGE NOTE NURSING/CASE MANAGEMENT/SOCIAL WORK - PATIENT PORTAL LINK FT
You can access the FollowMyHealth Patient Portal offered by [facility] by registering at the following website: [email or web address]. By joining [employer]’s FollowMyHealth portal, you will also be able to view your health information using other applications (apps) compatible with our system.

## 2022-01-12 NOTE — DISCHARGE NOTE PROVIDER - HOSPITAL COURSE
76F w/ hx of ESRD on HD M, W, F, IDDM2, anemia, HTN, JARVIS on CPAP, gout, trigeminal neuralgia, rotator cuff tear p/w SOB x 4 days and concern for COVID     Problem/Plan - 1:  ·  Problem: Shortness of breath.   ·  Plan: Unclear etiology at this point. CXR w/o clear source. Concerning for COVID given history. note significant murmur which pt states is chronic.  -TTE reviewed  Ct chest reviewed  Pul eval.     Problem/Plan - 2:  ·  Problem: ESRD on dialysis.   ·  Plan: On HD M, W, F  -Nephro eval appreciated.     Problem/Plan - 3:  ·  Problem: Diabetes mellitus.   ·  Plan: Reportedly on Lantus 26U QHS at home. Will lower dose given hospital diet, more labile effect given ESRD.  -Cont. Lantus 15U QHS  -Fingersticks and sliding scale.     Problem/Plan - 4:  ·  Problem: Shoulder pain.   ·  Plan: -Cont. tylenol PRN mild pain  -Oxycodone PRN moderate/ severe pain  -Bowel regimen  -Cont. gabapentin.     Problem/Plan - 5:  ·  Problem: JARVIS on CPAP.   ·  Plan: On CPAP at home,     Problem/Plan - 6:  ·  Problem: Gout.   ·  Plan: -Cont. allopurinol.     Problem/Plan - 7:  ·  Problem: Prophylactic measure.   ·  Plan: DVT PPx  -hep subq.    Patient cleared for discharge home today 1/12/22 after dialysis with her prior 24 hour home aide as per Dr. Sanchez.

## 2022-01-12 NOTE — DISCHARGE NOTE NURSING/CASE MANAGEMENT/SOCIAL WORK - NSDCVIVACCINE_GEN_ALL_CORE_FT
influenza, injectable, quadrivalent, preservative free; 11-Mar-2016 15:41; Yi Baker (RN); [employer]; [id number]; IntraMuscular; Dorsogluteal Right.; 0.5 milliLiter(s); VIS (VIS Published: 07-Aug-2015, VIS Presented: 11-Mar-2016);

## 2022-01-12 NOTE — DISCHARGE NOTE PROVIDER - NSDCMRMEDTOKEN_GEN_ALL_CORE_FT
acetaminophen 325 mg oral tablet: 2 tab(s) orally every 6 hours, As needed, Temp greater or equal to 38C (100.4F), Mild Pain (1 - 3), Moderate Pain (4 - 6)  allopurinol 100 mg oral tablet: 1 tab(s) orally once a day  atorvastatin 20 mg oral tablet: 1 tab(s) orally once a day (at bedtime)  gabapentin 300 mg oral tablet: 1 tab(s) orally once a day (at bedtime)  Lantus 100 units/mL subcutaneous solution: 26 unit(s) subcutaneous once a day (at bedtime)  Amina-Lefty oral tablet: 1 tab(s) orally once a day  senna oral tablet: 2 tab(s) orally once a day (at bedtime)  sevelamer carbonate 800 mg oral tablet: 2 tab(s) orally 3 times a day (with meals)   acetaminophen 325 mg oral tablet: 2 tab(s) orally every 6 hours, As needed, Temp greater or equal to 38C (100.4F), Mild Pain (1 - 3), Moderate Pain (4 - 6)  allopurinol 100 mg oral tablet: 1 tab(s) orally once a day  atorvastatin 20 mg oral tablet: 1 tab(s) orally once a day (at bedtime)  gabapentin 300 mg oral tablet: 1 tab(s) orally once a day (at bedtime)  Lantus 100 units/mL subcutaneous solution: 26 unit(s) subcutaneous once a day (at bedtime)  nystatin 100,000 units/g topical powder: 1 application topically 2 times a day to buttock rash after cleansing with soap and water  Amina-Lefty oral tablet: 1 tab(s) orally once a day  senna oral tablet: 2 tab(s) orally once a day (at bedtime)  sevelamer carbonate 800 mg oral tablet: 2 tab(s) orally 3 times a day (with meals)

## 2022-01-12 NOTE — PROGRESS NOTE ADULT - SUBJECTIVE AND OBJECTIVE BOX
Patient seen and examined  no complaints    IV Contrast (Anaphylaxis)  shellfish (Hives; Rash)  shrimp (Hives)  smoke; coughing (Other)    Hospital Medications:   MEDICATIONS  (STANDING):  allopurinol 100 milliGRAM(s) Oral daily  atorvastatin 20 milliGRAM(s) Oral at bedtime  dextrose 40% Gel 15 Gram(s) Oral once  dextrose 5%. 1000 milliLiter(s) (50 mL/Hr) IV Continuous <Continuous>  dextrose 5%. 1000 milliLiter(s) (100 mL/Hr) IV Continuous <Continuous>  dextrose 50% Injectable 25 Gram(s) IV Push once  dextrose 50% Injectable 12.5 Gram(s) IV Push once  dextrose 50% Injectable 25 Gram(s) IV Push once  epoetin shannon-epbx (RETACRIT) Injectable 05180 Unit(s) IV Push <User Schedule>  gabapentin 300 milliGRAM(s) Oral at bedtime  glucagon  Injectable 1 milliGRAM(s) IntraMuscular once  heparin   Injectable 5000 Unit(s) SubCutaneous every 12 hours  insulin glargine Injectable (LANTUS) 15 Unit(s) SubCutaneous at bedtime  insulin lispro (ADMELOG) corrective regimen sliding scale   SubCutaneous three times a day before meals  insulin lispro (ADMELOG) corrective regimen sliding scale   SubCutaneous at bedtime  Nephro-neeraj 1 Tablet(s) Oral daily  nystatin Powder 1 Application(s) Topical two times a day  senna 2 Tablet(s) Oral at bedtime  sevelamer carbonate 1600 milliGRAM(s) Oral three times a day with meals        VITALS:  T(F): 97.5 (01-11-22 @ 12:20), Max: 98.5 (01-10-22 @ 15:30)  HR: 107 (01-11-22 @ 12:20)  BP: 104/67 (01-11-22 @ 12:20)  RR: 18 (01-11-22 @ 12:20)  SpO2: 97% (01-11-22 @ 12:20)  Wt(kg): --    01-10 @ 07:01  -  01-11 @ 07:00  --------------------------------------------------------  IN: 560 mL / OUT: 3200 mL / NET: -2640 mL    01-11 @ 07:01 - 01-11 @ 14:39  --------------------------------------------------------  IN: 520 mL / OUT: 0 mL / NET: 520 mL      PHYSICAL EXAM:  Constitutional: NAD  HEENT: anicteric sclera, oropharynx clear, MMM  Neck: No JVD  Respiratory: b/l rhonchi  Cardiovascular: S1, S2, RRR  Gastrointestinal: BS+, soft, NT/ND  Extremities ++ peripheral edema  Neurological: A/O x 3, no focal deficits  Psychiatric: Normal mood, normal affect  : No CVA tenderness. No cottrell.   Skin: No rashes  Vascular Access: left upper ext avf + thrill    LABS:  01-11    131<L>  |  92<L>  |  32<H>  ----------------------------<  101<H>  4.6   |  24  |  6.28<H>    Ca    10.0      11 Jan 2022 07:19  Mg     2.7     01-10    TPro  7.6  /  Alb  3.9  /  TBili  0.3  /  DBili      /  AST  15  /  ALT  12  /  AlkPhos  193<H>  01-11    Creatinine Trend: 6.28 <--, 8.74 <--, 7.88 <--, 7.38 <--                        10.3   6.61  )-----------( 200      ( 11 Jan 2022 07:21 )             33.2     Urine Studies:      RADIOLOGY & ADDITIONAL STUDIES:  
Patient is a 76y old  Female who presents with a chief complaint of SOB (10 Gopal 2022 14:12)      SUBJECTIVE / OVERNIGHT EVENTS:    Events noted.  CONSTITUTIONAL: No fever,  or fatigue  RESPIRATORY: SOB  CARDIOVASCULAR: No chest pain, palpitations, dizziness, or leg swelling  GASTROINTESTINAL: No abdominal or epigastric pain. No nausea, vomiting.  NEUROLOGICAL: No headache    MEDICATIONS  (STANDING):  allopurinol 100 milliGRAM(s) Oral daily  atorvastatin 20 milliGRAM(s) Oral at bedtime  dextrose 40% Gel 15 Gram(s) Oral once  dextrose 5%. 1000 milliLiter(s) (50 mL/Hr) IV Continuous <Continuous>  dextrose 5%. 1000 milliLiter(s) (100 mL/Hr) IV Continuous <Continuous>  dextrose 50% Injectable 25 Gram(s) IV Push once  dextrose 50% Injectable 12.5 Gram(s) IV Push once  dextrose 50% Injectable 25 Gram(s) IV Push once  epoetin shannon-epbx (RETACRIT) Injectable 69831 Unit(s) IV Push <User Schedule>  gabapentin 300 milliGRAM(s) Oral at bedtime  glucagon  Injectable 1 milliGRAM(s) IntraMuscular once  heparin   Injectable 5000 Unit(s) SubCutaneous every 12 hours  insulin glargine Injectable (LANTUS) 15 Unit(s) SubCutaneous at bedtime  insulin lispro (ADMELOG) corrective regimen sliding scale   SubCutaneous three times a day before meals  insulin lispro (ADMELOG) corrective regimen sliding scale   SubCutaneous at bedtime  Nephro-neeraj 1 Tablet(s) Oral daily  senna 2 Tablet(s) Oral at bedtime  sevelamer carbonate 1600 milliGRAM(s) Oral three times a day with meals    MEDICATIONS  (PRN):  acetaminophen     Tablet .. 650 milliGRAM(s) Oral every 6 hours PRN Temp greater or equal to 38C (100.4F), Mild Pain (1 - 3)  melatonin 3 milliGRAM(s) Oral at bedtime PRN Insomnia  oxyCODONE    IR 5 milliGRAM(s) Oral every 6 hours PRN Severe Pain (7 - 10)        CAPILLARY BLOOD GLUCOSE      POCT Blood Glucose.: 315 mg/dL (10 Gopal 2022 21:38)  POCT Blood Glucose.: 191 mg/dL (10 Gopal 2022 19:05)  POCT Blood Glucose.: 249 mg/dL (10 Gopal 2022 11:30)  POCT Blood Glucose.: 159 mg/dL (10 Gopal 2022 08:40)  POCT Blood Glucose.: 148 mg/dL (10 Gopal 2022 02:09)    I&O's Summary    10 Gopal 2022 07:01  -  11 Jan 2022 00:13  --------------------------------------------------------  IN: 560 mL / OUT: 3200 mL / NET: -2640 mL        T(C): 36.9 (01-10-22 @ 18:30), Max: 36.9 (01-10-22 @ 15:30)  HR: 78 (01-10-22 @ 18:30) (78 - 91)  BP: 163/70 (01-10-22 @ 18:30) (122/70 - 163/70)  RR: 18 (01-10-22 @ 18:30) (18 - 20)  SpO2: 99% (01-10-22 @ 18:30) (96% - 100%)    PHYSICAL EXAM:    NECK: Supple, No JVD  CHEST/LUNG: Clear to auscultation bilaterally; No wheezing.  HEART: Regular rate and rhythm; No murmurs, rubs, or gallops  ABDOMEN: Soft, Nontender, Nondistended; Bowel sounds present  EXTREMITIES:   No edema  NEUROLOGY: AAO       LABS:                        9.6    7.05  )-----------( 183      ( 10 Gopal 2022 06:48 )             30.8     01-10    129<L>  |  92<L>  |  52<H>  ----------------------------<  178<H>  4.6   |  18<L>  |  8.74<H>    Ca    9.8      10 Gopal 2022 06:48  Mg     2.7     01-10    TPro  7.2  /  Alb  3.8  /  TBili  0.3  /  DBili  x   /  AST  13  /  ALT  10  /  AlkPhos  203<H>  01-10    PT/INR - ( 09 Jan 2022 19:49 )   PT: 11.8 sec;   INR: 0.98 ratio         PTT - ( 09 Jan 2022 19:49 )  PTT:25.9 sec        CAPILLARY BLOOD GLUCOSE      POCT Blood Glucose.: 315 mg/dL (10 Gopal 2022 21:38)  POCT Blood Glucose.: 191 mg/dL (10 Gopal 2022 19:05)  POCT Blood Glucose.: 249 mg/dL (10 Gopal 2022 11:30)  POCT Blood Glucose.: 159 mg/dL (10 Gopal 2022 08:40)  POCT Blood Glucose.: 148 mg/dL (10 Gopal 2022 02:09)        RADIOLOGY & ADDITIONAL TESTS:    Imaging Personally Reviewed:    Consultant(s) Notes Reviewed:      Care Discussed with Consultants/Other Providers:    Mike Sanchez MD, CMD, FACP    [address] [place] [address]  Office Tel: [phone]  Cell: [phone]  
  Patient seen and examined  no complaints    IV Contrast (Anaphylaxis)  shellfish (Hives; Rash)  shrimp (Hives)  smoke; coughing (Other)    Hospital Medications:   MEDICATIONS  (STANDING):  allopurinol 100 milliGRAM(s) Oral daily  atorvastatin 20 milliGRAM(s) Oral at bedtime  dextrose 40% Gel 15 Gram(s) Oral once  dextrose 5%. 1000 milliLiter(s) (50 mL/Hr) IV Continuous <Continuous>  dextrose 5%. 1000 milliLiter(s) (100 mL/Hr) IV Continuous <Continuous>  dextrose 50% Injectable 25 Gram(s) IV Push once  dextrose 50% Injectable 12.5 Gram(s) IV Push once  dextrose 50% Injectable 25 Gram(s) IV Push once  epoetin shannon-epbx (RETACRIT) Injectable 64996 Unit(s) IV Push <User Schedule>  gabapentin 300 milliGRAM(s) Oral at bedtime  glucagon  Injectable 1 milliGRAM(s) IntraMuscular once  heparin   Injectable 5000 Unit(s) SubCutaneous every 12 hours  insulin glargine Injectable (LANTUS) 15 Unit(s) SubCutaneous at bedtime  insulin lispro (ADMELOG) corrective regimen sliding scale   SubCutaneous three times a day before meals  insulin lispro (ADMELOG) corrective regimen sliding scale   SubCutaneous at bedtime  Nephro-neeraj 1 Tablet(s) Oral daily  nystatin Powder 1 Application(s) Topical two times a day  senna 2 Tablet(s) Oral at bedtime  sevelamer carbonate 1600 milliGRAM(s) Oral three times a day with meals        VITALS:  T(F): 97.3 (01-12-22 @ 11:25), Max: 98.2 (01-11-22 @ 19:23)  HR: 87 (01-12-22 @ 11:25)  BP: 132/77 (01-12-22 @ 11:25)  RR: 18 (01-12-22 @ 11:25)  SpO2: 98% (01-12-22 @ 11:25)  Wt(kg): --    01-11 @ 07:01  -  01-12 @ 07:00  --------------------------------------------------------  IN: 760 mL / OUT: 0 mL / NET: 760 mL    01-12 @ 07:01  -  01-12 @ 14:32  --------------------------------------------------------  IN: 410 mL / OUT: 0 mL / NET: 410 mL      PHYSICAL EXAM:  Constitutional: NAD  HEENT: anicteric sclera, oropharynx clear, MMM  Neck: No JVD  Respiratory: b/l rhonchi  Cardiovascular: S1, S2, RRR  Gastrointestinal: BS+, soft, NT/ND  Extremities ++ peripheral edema  Neurological: A/O x 3, no focal deficits  Psychiatric: Normal mood, normal affect  : No CVA tenderness. No cottrell.   Skin: No rashes  Vascular Access: left upper ext avf + thrill    LABS:  01-12    131<L>  |  92<L>  |  65<H>  ----------------------------<  131<H>  4.8   |  20<L>  |  9.36<H>    Ca    10.1      12 Jan 2022 06:38  Mg     2.7     01-12    TPro  7.6  /  Alb  3.9  /  TBili  0.3  /  DBili      /  AST  15  /  ALT  12  /  AlkPhos  193<H>  01-11    Creatinine Trend: 9.36 <--, 6.28 <--, 8.74 <--, 7.88 <--, 7.38 <--                        9.6    7.63  )-----------( 209      ( 12 Jan 2022 06:38 )             31.6     Urine Studies:      RADIOLOGY & ADDITIONAL STUDIES:  
Patient is a 76y old  Female who presents with a chief complaint of SOB (11 Jan 2022 14:39)      SUBJECTIVE / OVERNIGHT EVENTS:    Events noted.  CONSTITUTIONAL: No fever,  or fatigue  RESPIRATORY: No cough, wheezing,  No shortness of breath  CARDIOVASCULAR: No chest pain, palpitations  GASTROINTESTINAL: No abdominal or epigastric pain.   NEUROLOGICAL: No headache    MEDICATIONS  (STANDING):  allopurinol 100 milliGRAM(s) Oral daily  atorvastatin 20 milliGRAM(s) Oral at bedtime  dextrose 40% Gel 15 Gram(s) Oral once  dextrose 5%. 1000 milliLiter(s) (50 mL/Hr) IV Continuous <Continuous>  dextrose 5%. 1000 milliLiter(s) (100 mL/Hr) IV Continuous <Continuous>  dextrose 50% Injectable 25 Gram(s) IV Push once  dextrose 50% Injectable 12.5 Gram(s) IV Push once  dextrose 50% Injectable 25 Gram(s) IV Push once  epoetin shannon-epbx (RETACRIT) Injectable 96677 Unit(s) IV Push <User Schedule>  gabapentin 300 milliGRAM(s) Oral at bedtime  glucagon  Injectable 1 milliGRAM(s) IntraMuscular once  heparin   Injectable 5000 Unit(s) SubCutaneous every 12 hours  insulin glargine Injectable (LANTUS) 15 Unit(s) SubCutaneous at bedtime  insulin lispro (ADMELOG) corrective regimen sliding scale   SubCutaneous three times a day before meals  insulin lispro (ADMELOG) corrective regimen sliding scale   SubCutaneous at bedtime  Nephro-neeraj 1 Tablet(s) Oral daily  nystatin Powder 1 Application(s) Topical two times a day  senna 2 Tablet(s) Oral at bedtime  sevelamer carbonate 1600 milliGRAM(s) Oral three times a day with meals    MEDICATIONS  (PRN):  acetaminophen     Tablet .. 650 milliGRAM(s) Oral every 6 hours PRN Temp greater or equal to 38C (100.4F), Mild Pain (1 - 3)  melatonin 3 milliGRAM(s) Oral at bedtime PRN Insomnia  oxyCODONE    IR 5 milliGRAM(s) Oral every 6 hours PRN Severe Pain (7 - 10)        CAPILLARY BLOOD GLUCOSE      POCT Blood Glucose.: 238 mg/dL (11 Jan 2022 21:39)  POCT Blood Glucose.: 166 mg/dL (11 Jan 2022 17:23)  POCT Blood Glucose.: 253 mg/dL (11 Jan 2022 11:43)  POCT Blood Glucose.: 136 mg/dL (11 Jan 2022 08:25)    I&O's Summary    10 Gopal 2022 07:01  -  11 Jan 2022 07:00  --------------------------------------------------------  IN: 560 mL / OUT: 3200 mL / NET: -2640 mL    11 Jan 2022 07:01  -  12 Jan 2022 00:38  --------------------------------------------------------  IN: 760 mL / OUT: 0 mL / NET: 760 mL        T(C): 36.8 (01-11-22 @ 19:23), Max: 36.9 (01-11-22 @ 04:50)  HR: 96 (01-11-22 @ 19:23) (96 - 107)  BP: 125/70 (01-11-22 @ 19:23) (104/67 - 125/70)  RR: 18 (01-11-22 @ 19:23) (18 - 18)  SpO2: 96% (01-11-22 @ 19:23) (96% - 99%)    PHYSICAL EXAM:  GENERAL: NAD  NECK: Supple, No JVD  CHEST/LUNG: Clear to auscultation bilaterally; No wheezing.  HEART: Regular rate and rhythm; No murmurs, rubs, or gallops  ABDOMEN: Soft, Nontender, Nondistended; Bowel sounds present  EXTREMITIES:   No edema  NEUROLOGY: AAO X 3      LABS:                        10.3   6.61  )-----------( 200      ( 11 Jan 2022 07:21 )             33.2     01-11    131<L>  |  92<L>  |  32<H>  ----------------------------<  101<H>  4.6   |  24  |  6.28<H>    Ca    10.0      11 Jan 2022 07:19  Mg     2.7     01-10    TPro  7.6  /  Alb  3.9  /  TBili  0.3  /  DBili  x   /  AST  15  /  ALT  12  /  AlkPhos  193<H>  01-11            CAPILLARY BLOOD GLUCOSE      POCT Blood Glucose.: 238 mg/dL (11 Jan 2022 21:39)  POCT Blood Glucose.: 166 mg/dL (11 Jan 2022 17:23)  POCT Blood Glucose.: 253 mg/dL (11 Jan 2022 11:43)  POCT Blood Glucose.: 136 mg/dL (11 Jan 2022 08:25)        RADIOLOGY & ADDITIONAL TESTS:    Imaging Personally Reviewed:    Consultant(s) Notes Reviewed:      Care Discussed with Consultants/Other Providers:    Mike Sanchez MD, CMD, FACP    [address] [place] [address]  Office Tel: [phone]  Cell: [phone]

## 2022-01-12 NOTE — DISCHARGE NOTE PROVIDER - NSDCFUADDAPPT_GEN_ALL_CORE_FT
Follow-up with the Wound Center at [address] [place] [address] 1 week after discharge. Please call the clinic to make an appointment [phone].   Follow-up with your PCP, Dr. Martínez, in 1 week after discharge. Please call the office to schedule an appointment [phone].   You will be resuming dialysis at [facility] starting Friday 1/14/22.

## 2022-01-12 NOTE — DISCHARGE NOTE PROVIDER - CARE PROVIDER_API CALL
Alfonzo Martínez  INTERNAL MEDICINE  [address] [place] [address]  Phone: [phone]  Fax: [phone]  Follow Up Time: 1 week

## 2022-01-12 NOTE — DISCHARGE NOTE PROVIDER - NSDCFUADDINST_GEN_ALL_CORE_FT
- Consistent carbohydrate, renal diet - No concentrated potassium or phosphorous, low sodium, no added sugar  - Take your medications as prescribed    - Consistent carbohydrate, renal diet - No concentrated potassium or phosphorous, low sodium, no added sugar  - Take your medications as prescribed   - Wound care for buttock injury: cleanse with soap and water, rinse, pat dry, apply nystatin powder, apply cavilon to periwound skin, twice daily

## 2022-01-13 ENCOUNTER — OUTPATIENT (OUTPATIENT)
Dept: OUTPATIENT SERVICES | Facility: HOSPITAL | Age: 77
LOS: 1 days | End: 2022-01-13
Payer: COMMERCIAL

## 2022-01-13 VITALS
TEMPERATURE: 99 F | OXYGEN SATURATION: 99 % | SYSTOLIC BLOOD PRESSURE: 119 MMHG | WEIGHT: 171.96 LBS | HEART RATE: 77 BPM | HEIGHT: 64 IN | DIASTOLIC BLOOD PRESSURE: 61 MMHG | RESPIRATION RATE: 18 BRPM

## 2022-01-13 DIAGNOSIS — M19.011 PRIMARY OSTEOARTHRITIS, RIGHT SHOULDER: ICD-10-CM

## 2022-01-13 DIAGNOSIS — Z98.49 CATARACT EXTRACTION STATUS, UNSPECIFIED EYE: Chronic | ICD-10-CM

## 2022-01-13 DIAGNOSIS — Z01.818 ENCOUNTER FOR OTHER PREPROCEDURAL EXAMINATION: ICD-10-CM

## 2022-01-13 DIAGNOSIS — M10.9 GOUT, UNSPECIFIED: ICD-10-CM

## 2022-01-13 DIAGNOSIS — Z98.89 OTHER SPECIFIED POSTPROCEDURAL STATES: Chronic | ICD-10-CM

## 2022-01-13 DIAGNOSIS — Z98.890 OTHER SPECIFIED POSTPROCEDURAL STATES: Chronic | ICD-10-CM

## 2022-01-13 DIAGNOSIS — R06.00 DYSPNEA, UNSPECIFIED: ICD-10-CM

## 2022-01-13 DIAGNOSIS — G47.33 OBSTRUCTIVE SLEEP APNEA (ADULT) (PEDIATRIC): ICD-10-CM

## 2022-01-13 DIAGNOSIS — Z96.659 PRESENCE OF UNSPECIFIED ARTIFICIAL KNEE JOINT: Chronic | ICD-10-CM

## 2022-01-13 DIAGNOSIS — Z90.710 ACQUIRED ABSENCE OF BOTH CERVIX AND UTERUS: Chronic | ICD-10-CM

## 2022-01-13 DIAGNOSIS — E11.9 TYPE 2 DIABETES MELLITUS WITHOUT COMPLICATIONS: ICD-10-CM

## 2022-01-13 DIAGNOSIS — J45.909 UNSPECIFIED ASTHMA, UNCOMPLICATED: ICD-10-CM

## 2022-01-13 DIAGNOSIS — N18.6 END STAGE RENAL DISEASE: ICD-10-CM

## 2022-01-13 LAB — BLD GP AB SCN SERPL QL: SIGNIFICANT CHANGE UP

## 2022-01-13 PROCEDURE — G0463: CPT

## 2022-01-13 NOTE — H&P PST ADULT - PROBLEM SELECTOR PLAN 1
Scheduled for right reverse total shoulder arthroplasty 1/18/2022  Preoperative instructions discussed and given to patient.   Instructed to call [phone] to schedule COVID 19 test 3 days prior to surgery.   Discussed preprocedure skin preparation using chlorhexidine gluconate 4% solution the day of surgery   Instructed patient to avoid aspirin and aspirin products, over the counter medications such as vitamins and herbal medications, one week prior to surgery  Patient verbalized understanding of instructions and is in agreement with plan of care.    NO NSAIDS - this patient has ESRD on hemodialysis  last serum Cr. 9.36 (1/12/2022) Scheduled for right reverse total shoulder arthroplasty 1/18/2022  Preoperative instructions discussed and given to patient.   Instructed to call [phone] to schedule COVID 19 test 3 days prior to surgery.   Discussed preprocedure skin preparation using chlorhexidine gluconate 4% solution the day of surgery   Instructed patient to avoid aspirin and aspirin products, over the counter medications such as vitamins and herbal medications  Patient verbalized understanding of instructions and is in agreement with plan of care.    NO NSAIDS - this patient has ESRD on hemodialysis  last serum Cr. 9.36 (1/12/2022)

## 2022-01-13 NOTE — H&P PST ADULT - PRIMARY CARE PROVIDER
Dr. Treadwell  Cardiologist Telephone [phone]  [facility] ([place])  Dr. Leyda Martínez Telephone [phone] Dr. Treadwell  Cardiologist Telephone [phone]  [facility] ([place])  Dr. Obinna Martínez Telephone [phone]

## 2022-01-13 NOTE — H&P PST ADULT - RESPIRATORY AND THORAX COMMENTS
h/o bronchial asthma, not on asthma medications, denies exacerbation in recent past, h/o JARVIS on CPAP w/ 11cm water

## 2022-01-13 NOTE — H&P PST ADULT - PROBLEM SELECTOR PLAN 6
Insulin dependent   Continue lantus in the evening as prescribed  Last A1C   POC glucose on admission the day of surgery Insulin dependent   Continue lantus 26 units at bedtime as prescribed.  A1C ordered today - result pending  POC glucose on admission the day of surgery

## 2022-01-13 NOTE — H&P PST ADULT - PROBLEM SELECTOR PLAN 2
Continue HD on M/W/F as scheduled  Patient is scheduled to receive HD the day before surgery Continue HD on M/W/F as scheduled via left AV fistula   Patient is scheduled to receive HD the day before surgery

## 2022-01-13 NOTE — H&P PST ADULT - PROBLEM SELECTOR PLAN 7
GOEL with minimal exertion  Echo (1/11/2022) - EF 75% (hyperdynamic left ventricle, mild aortic stenosis)  Stable 4.6cm ascending aortic aneurysm seen on CT Chest 1/11/2022  Stable pulmonary nodules measuring up to 6mm since 9/2018  Cardiac evaluation and optimization prior to surgery  Patient is followed by Cardiologist Dr. Treadwell ([facility]) [phone]

## 2022-01-13 NOTE — H&P PST ADULT - PROBLEM SELECTOR PLAN 4
Instructed to take allopurinol with a sip of water the morning of surgery  Follow up with PCP for management of Gout

## 2022-01-13 NOTE — H&P PST ADULT - GASTROINTESTINAL DETAILS
obese abdomen/soft/nontender/no distention/no masses palpable/bowel sounds normal/no bruit/no rebound tenderness/no guarding/no rigidity/no organomegaly

## 2022-01-13 NOTE — H&P PST ADULT - MS GEN HX ROS MEA POS PC
muscle cramps post HD/arthralgia/arthritis/joint swelling/joint pain/muscle cramps/muscle weakness/stiffness

## 2022-01-13 NOTE — H&P PST ADULT - PROBLEM/PLAN-5
Daily Note     Today's date: 2020  Patient name: Alessandro Bosch  : 2015  MRN: [id number]  Referring provider: Juan M Wu  Dx:   Encounter Diagnosis     ICD-10-CM    1  Neurofibromatosis, type 1 (von Recklinghausen's disease) ([facility] ) Q85 01    2  Hydrocephalus, unspecified type ([facility] ) G91 9        Subjective: Arrived with mom  Not present during the session  PT session prior to OT  OT present during the session for observation     Objective:  Building "":  Completed for /VM skills, [facility] skills, pt able to build "person" using the wooden sticks, with min A when "" song was played    Color, cut and paste activity: completed for VM skills, [facility] skills, grasp prehension Patient able to color 2, 4 inch simple shapes with marker in right hand requiring handover hand assist for distal control when coloring "snowman" picture    Cutting skills: for [facility] skills, motor planning, pt able to open and close regular child size scissors with assist to stabilize wrist for control , max A for stabilizing and rotating paper    HWT building letters, F, T: with mod A for multiple reps, able to build with visual card and then assist to copy same letter on [facility] mat and on [employer] with mod A    Assessment:  Saqib tolerated the session  Required redirection 75% of time during the session, easily distracted this date  Continues to demonstrate decreased hand/intrinsic strength impacting grasp patterns for FM/visual motor skills   Josué Cabello will benefit from continued services in order to be independent with functional activities       Plan: will continue with the plan of care
DISPLAY PLAN FREE TEXT

## 2022-01-13 NOTE — H&P PST ADULT - PROBLEM SELECTOR PLAN 3
Patient was diagnosed with JARVIS  Continues on CPAP with 11cm water  Recommend maintaining perioperative precautions Patient was diagnosed with JARVIS  Sleep study completed ("years ago")  Continue CPAP use with 11cm water  Patient is reportedly compliant with CPAP nightly  Recommend maintaining perioperative precautions Patient was diagnosed with JARVIS  Sleep study completed ("years ago")  Continue CPAP use with 11cm water  Patient is reportedly compliant with CPAP nightly  Recommend maintaining perioperative precautions    Pt was referred to pulmonologist, Dr. Salguero on 12/18/2021 - pending

## 2022-01-13 NOTE — H&P PST ADULT - VENOUS THROMBOEMBOLISM CURRENT STATUS
(1) swollen legs (current)/(1) other risk factor (includes escalating BMI, pack-years of smoking, diabetes requiring insulin, chemotherapy, female gender and length of surgery) (1) abnormal pulmonary function (COPD)/(1) swollen legs (current)/(1) other risk factor (includes escalating BMI, pack-years of smoking, diabetes requiring insulin, chemotherapy, female gender and length of surgery)/(2) central venous access

## 2022-01-13 NOTE — H&P PST ADULT - NSICDXPASTMEDICALHX_GEN_ALL_CORE_FT
PAST MEDICAL HISTORY:  Anemia     Bronchial asthma hx of    Carpal tunnel syndrome left    Chronic right shoulder pain     Diabetes mellitus     Disorder of ligament of wrist, left     Dyslipidemia     ESRD (end stage renal disease) on dialysis started HD 2018    Gout     HTN (hypertension)     Hypercholesterolemia     Pedal edema     Sleep apnea on CPAP    Trigeminal neuralgia hx of    Vertigo PAST MEDICAL HISTORY:  Anemia     Bronchial asthma hx of    Carpal tunnel syndrome left    Chronic right shoulder pain     Diabetes mellitus     Disorder of ligament of wrist, left     GOEL (dyspnea on exertion)     Dyslipidemia     ESRD (end stage renal disease) on dialysis started HD 2018 ~ M/W/F    Gout     HTN (hypertension)     Hypercholesterolemia     Mild diastolic dysfunction ~ Stage I    Pedal edema     RSV bronchitis     Sleep apnea on CPAP    Trigeminal neuralgia hx of    Vertigo

## 2022-01-13 NOTE — H&P PST ADULT - HISTORY OF PRESENT ILLNESS
75  yr old female with PMH of HTN, HLD, T2DM, gout, chronic right shoulder pain (compliant with prescribed medications), , JARVIS compliant with CPAP, ESRD on Hemodialysis 3 times a week via left AV fistula is here today for presurgical evaluation. Patient is diagnosed with primary osteoarthritis, right shoulder. She had right shoulder replacement      but complains of       . Patient is scheduled for right reverse total shoulder arthroplasty 1/18/2022 75  year old female with PMH of HTN, T2DM, chronic anemia, gout, chronic right shoulder pain (compliant with prescribed medications), JARVIS compliant with CPAP, ESRD on Hemodialysis (M/W/F) via left AV fistula, Dyspnea with minimal exertion, complains of right shoulder pain since 2006, worsening over the past 4-5 years. Patient is diagnosed with primary osteoarthritis, right shoulder. States she has had right shoulder arthroscopy x 3 with rotator cuff repair on both shoulders, but pain persists. 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     Patient complains of right shoulder pain since 2006, worsening over the past 4-5 years. She is diagnosed with primary osteoarthritis, right shoulder. States she has had right shoulder arthroscopy x 3 with rotator cuff repair, but pain persists. She is now scheduled for right reverse total shoulder arthroplasty 1/18/2022. Denies having prior shoulder replacement in the past

## 2022-01-13 NOTE — H&P PST ADULT - NSICDXPASTSURGICALHX_GEN_ALL_CORE_FT
PAST SURGICAL HISTORY:  History of arthroscopy of left shoulder x 3    History of arthroscopy of right shoulder x 3    History of carpal tunnel surgery of left wrist     History of vascular access device , removed 2020    S/P arteriovenous (AV) fistula creation left upper forearm cephalic vein brachial artery AV fistula creation on 2018, left arm basilic vein transposition on 2019.      S/P      S/P cataract extraction bilateral, 2005    S/P hysterectomy 1978    S/P rotator cuff surgery bilateral 's    S/P total knee replacement bilateral, left , right  PAST SURGICAL HISTORY:  History of arthroscopy of left shoulder x 3    History of arthroscopy of right shoulder x 3    History of carpal tunnel surgery of left wrist     History of vascular access device , removed 2020    S/P arteriovenous (AV) fistula creation left upper forearm cephalic vein brachial artery AV fistula creation on 2018, left arm basilic vein transposition on 2019.      S/P      S/P cataract extraction bilateral, 2005    S/P hysterectomy 1978    S/P rotator cuff surgery bilateral 's    S/P total knee replacement bilateral, left , right

## 2022-01-13 NOTE — H&P PST ADULT - MUSCULOSKELETAL COMMENTS
h/o bilateral knee pain, bilateral shoulder pain, left forearm ligament tear severely limited FROM of both arms left better than right

## 2022-01-13 NOTE — H&P PST ADULT - ASSESSMENT
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

## 2022-01-13 NOTE — H&P PST ADULT - NEGATIVE GENERAL SYMPTOMS
Statement Selected
no fever/no chills/no sweating/no anorexia/no weight gain/no polyphagia/no polyuria/no polydipsia/no malaise

## 2022-01-13 NOTE — H&P PST ADULT - PROBLEM SELECTOR PLAN 5
Stable off medication for several years  Unable to recall last exacerbation  Follow up with pulmonologist postoperatively Stable off medication for several years  Patient unable to recall last exacerbation  Follow up with pulmonologist postoperatively

## 2022-01-13 NOTE — H&P PST ADULT - SKIN COMMENTS
stage 2 D/u in rectal region - compliant with nyastatin powder as prescribed stage 2 d/u in region of upper buttock - compliant with nyastatin powder as prescribed

## 2022-01-14 LAB
A1C WITH ESTIMATED AVERAGE GLUCOSE RESULT: 6.5 % — HIGH (ref 4–5.6)
ESTIMATED AVERAGE GLUCOSE: 140 MG/DL — HIGH (ref 68–114)
MRSA PCR RESULT.: SIGNIFICANT CHANGE UP
S AUREUS DNA NOSE QL NAA+PROBE: SIGNIFICANT CHANGE UP

## 2022-01-21 NOTE — H&P PST ADULT - LAST ECHOCARDIOGRAM
[facility] - Intensive Care [facility] Medicine  Progress Note    Patient Name: Shukri Rosales  MRN: [id number]  Patient Class: IP- Inpatient   Admission Date: 1/19/2022  Length of Stay: 2 days  Attending Physician: Lamberto Sandoval MD  Primary Care Provider: Cris Matias NP        Subjective:     Principal Problem:Acute metabolic encephalopathy        HPI:   62 y.o. male patient with a PMHx of incisional abdominal hernia, peripheral vascular disease, DM2, adrenal cortical adenoma, CKD, carotid stenosis, schizophrenia, GERD, HTN, and HLD who presents to the Emergency Department for SOB. According to AASI, the pt's family reports that the pt started to have trouble breathing at 0700 this morning, but the pt refused to go to a hospital. According to AASI, the pt then became altered, so the pt's family members called 911. When [facility] arrived on scene, the pt's oxygen saturation was 77% on RA, and the pt was cyanotic. En route to the ED, AASI administered a partial duo-neb which was ripped off by the pt, and the pt was put on 20L of O2 which brought his oxygen saturation up to 89%. The pt admits to smoking. At the time of my exam, patient is on BIPAP and lethargic s/p Ativan. Will repeat ABG      Overview/Hospital Course:  62 y.o. male patient with Hx of incisional abdominal hernia, PAD, DM2, adrenal cortical adenoma, CKD, carotid stenosis, schizophrenia, GERD, HTN, and HLD brought to ER by EMS for SOB. According to AASI, the pt's family reports that the pt started to have trouble breathing at 0700 this morning, but the pt refused to go to a hospital. According to AASI, the pt then became altered, so the pt's family members called 911. When [facility] arrived on scene, the pt's oxygen saturation was 77% on RA, and the pt was cyanotic. En route to the ED, AASI administered a partial duo-neb which was ripped off by the pt, and the pt was put on 20L of O2 which brought his oxygen saturation up to 89%. The pt  admits to smoking. Initial labs in the ER Show WBC 19, with 88% Segs, Bun 35, BNP 3500, Trop 0.199, ABG showed PC 7.31/57/419 on Bipap 100%. Pt had received a dose of lasix in the ER. Pt placed in ICU on Bipap. He was also on Precedex gtt for his agitation.    1/20- remains mildly sedated with Precedex on NC, off Bipap as Hypercapnia improved, still gets agitated, hence getting haldol plus Ativan prn, about to have NGT placed. WBC improved to 15.8, D dimer 1.1, Trop down to 0.191. continue present care. Started on IV lasix.  1/21 patient off Precedex gtt. Still restrained at wrist though per RN less confused today, off BIPAP and on 5L nasal canula      Interval History: no overnight events    Review of Systems   Reason unable to perform ROS: patient is not a consistent historian, although oriented hie cannot answer some questions and gives inconsistent answers to others.   Constitutional: Negative for chills and fever.     Objective:     Vital Signs (Most Recent):  Temp: 98 °F (36.7 °C) (01/21/22 1130)  Pulse: 95 (01/21/22 1430)  Resp: (!) 30 (01/21/22 1430)  BP: 137/85 (01/21/22 1400)  SpO2: 96 % (01/21/22 1430) Vital Signs (24h Range):  Temp:  [97.1 °F (36.2 °C)-98.2 °F (36.8 °C)] 98 °F (36.7 °C)  Pulse:  [] 95  Resp:  [10-64] 30  SpO2:  [90 %-100 %] 96 %  BP: (121-175)/() 137/85     Weight: 89.8 kg (198 lb)  Body mass index is 26.85 kg/m².    Intake/Output Summary (Last 24 hours) at 1/21/2022 1621  Last data filed at 1/21/2022 1300  Gross per 24 hour   Intake 1554.49 ml   Output 2560 ml   Net -1005.51 ml      Physical Exam  Constitutional:       General: He is not in acute distress.     Appearance: Normal appearance. He is ill-appearing.   HENT:      Head: Normocephalic.      Nose: Nose normal.      Mouth/Throat:      Mouth: Mucous membranes are moist.   Eyes:      General: No scleral icterus.     Pupils: Pupils are equal, round, and reactive to light.   Cardiovascular:      Rate and Rhythm: Normal  rate and regular rhythm.      Pulses: Normal pulses.   Pulmonary:      Effort: Pulmonary effort is normal.      Breath sounds: Wheezing and rales present.   Abdominal:      General: Abdomen is flat. Bowel sounds are normal.   Musculoskeletal:      Right lower leg: No edema.      Left lower leg: No edema.   Skin:     General: Skin is warm and dry.      Capillary Refill: Capillary refill takes less than 2 seconds.   Neurological:      General: No focal deficit present.      Mental Status: He is alert and oriented to person, place, and time.      Comments: Oriented to self, place and time but still confused  No focal weakness noted         Significant Labs:   Blood Culture: No results for input(s): LABBLOO in the last 48 hours.  BMP:   Recent Labs   Lab 01/21/22 0448   *   *   K 3.2*      CO2 23   BUN 40*   CREATININE 1.1   CALCIUM 9.6   MG 1.7     CBC:   Recent Labs   Lab 01/20/22 0449 01/21/22 0448   WBC 15.86* 9.70   HGB 12.0* 13.1*   HCT 38.6* 43.3    346     CMP:   Recent Labs   Lab 01/20/22 0449 01/21/22 0448    147*   K 3.9 3.2*    106   CO2 21* 23   * 154*   BUN 45* 40*   CREATININE 1.3 1.1   CALCIUM 9.3 9.6   ANIONGAP 14 18*   EGFRNONAA 58* >60     Cardiac Markers: No results for input(s): CKMB, MYOGLOBIN, BNP, TROPISTAT in the last 48 hours.  Coagulation: No results for input(s): PT, INR, APTT in the last 48 hours.    Significant Imaging: I have reviewed all pertinent imaging results/findings within the past 24 hours.     ECHO  · The left ventricle is normal in size with concentric hypertrophy and mildly decreased systolic function.  · Grade I left ventricular diastolic dysfunction.  · Normal right ventricular size with normal right ventricular systolic function.  · There is pulmonary hypertension.  · Intermediate central venous pressure (8 mmHg).  · The estimated PA systolic pressure is 53 mmHg.  · The estimated ejection fraction is 40%.  · There are  segmental left ventricular wall motion abnormalities.          Assessment/Plan:      * Acute metabolic encephalopathy  Sedated on Precedex      1/21  Likely secondary to infection and hypoxemia  Improved  ?What his baseline is, although oriented now he remains somewhat confused. No focal weakness    Bilateral pneumonia  Bilateral Pneumonia  Blood culture NGTD  Cont PO Augmentin # 1  Wean down supplemental O2 as tolerated      Acute respiratory failure with hypoxia and hypercarbia  Patient with Hypercapnic and Hypoxic Respiratory failure which is Acute on chronic.  he is not on home oxygen. Supplemental oxygen was provided and noted- Oxygen Concentration (%):  [40] 40.   Signs/symptoms of respiratory failure include- increased work of breathing and respiratory distress. Contributing diagnoses includes - CHF, COPD and Pneumonia Labs and images were reviewed. Patient Has recent ABG, which has been reviewed. Will treat underlying causes and adjust management of respiratory failure as follows- IV abx, BIPAP    Improved with Bipap- now on NC  Expect improvement with diuresis    1/21  Patient says not on home O2  Currently on 5L, wean down as tolerated  Covid negative  Cont dialy oral Lasix  Round clock bronchodilator  Cont Antibiotics  Passed SLP eval    Severe sepsis  This patient does have evidence of infective focus  My overall impression is sepsis. Vital signs were reviewed and noted in progress note.  Antibiotics given-   Antibiotics (From admission, onward)            Start     Stop Route Frequency Ordered    01/21/22 1145  amoxicillin-clavulanate 875-125mg per tablet 1 tablet         01/26 0859 Oral Every 12 hours 01/21/22 1032    01/20/22 1000  mupirocin 2 % ointment         01/25 0859 Nasl 2 times daily 01/20/22 0851        Cultures were taken-   Microbiology Results (last 7 days)     Procedure Component Value Units Date/Time    Blood Culture #2 **CANNOT BE ORDERED STAT** [[id number]] Collected: 01/19/22 1539     Order Status: Completed Specimen: Blood from Peripheral, Antecubital, Right Updated: 01/20/22 2212     Blood Culture, Routine No Growth to date      No Growth to date    Blood Culture #1 **CANNOT BE ORDERED STAT** [[id number]] Collected: 01/19/22 1531    Order Status: Completed Specimen: Blood from Peripheral, Antecubital, Left Updated: 01/20/22 2212     Blood Culture, Routine No Growth to date      No Growth to date    Influenza A & B by Molecular [[id number]] Collected: 01/19/22 2215    Order Status: Completed Specimen: Nasopharyngeal Swab Updated: 01/19/22 2303     Influenza A, Molecular Negative     Influenza B, Molecular Negative     Flu A & B Source Nasal swab    Culture, Respiratory with Gram Stain [[id number]]     Order Status: No result Specimen: Respiratory         Latest lactate reviewed, they are-  Recent Labs   Lab 01/19/22 1532   LACTATE 2.6*       Organ dysfunction indicated by Acute respiratory failure  Source- PNA    Source control Achieved by- IV abx    Sepsis s/t CAP w/ acute hypercapnic resp failure  Empiric Abx  BIPAP, titrate as tolerated      Initially suspected of Sepsis due to elevated WBC but Leukocytosis likely reactive  Started on IV Lasix    1/21  Resolving/resolved    Schizoaffective disorder, bipolar type  Sedated with Precedex, also getting Prn Haldol      1/21  Patient calm  Cont home Cymbalta  Will review home meds    Electrolyte imbalance  PRN Potassium replacement on board      Type 2 diabetes mellitus with stage 3 chronic kidney disease, with long-term current use of insulin  SSI    Improved with Levemir and SSI    1/21  Diabetic diet  Levemir 15 U QHS  SSI      Tobacco abuse disorder  Smoking cessation counseling      Essential hypertension  Continue home meds amlodipine, clonidine  Restart lisinopril, HCTZ as tolerated, pt clinically volume deplete    BP under control    1/21  Cont current meds      VTE Risk Mitigation (From admission, onward)         Ordered     enoxaparin  injection 40 mg  Daily         01/19/22 1837     IP VTE HIGH RISK PATIENT  Once         01/19/22 1837     Place sequential compression device  Until discontinued         01/19/22 1837                Discharge Planning   ALICIA:      Code Status: Full Code   Is the patient medically ready for discharge?:     Reason for patient still in hospital (select all that apply): Treatment and PT / OT recommendations  Discharge Plan A: Home            Critical care time spent on the evaluation and treatment of severe organ dysfunction, review of pertinent labs and imaging studies, discussions with consulting providers and discussions with patient/family: 30 minutes.      Lamberto Sandoval MD  Department of Hospital Medicine   [facility] - Intensive Care ([facility])   12/19/2019- EF- 57%

## 2022-02-08 ENCOUNTER — INPATIENT (INPATIENT)
Facility: HOSPITAL | Age: 77
LOS: 2 days | Discharge: HOME | DRG: 312 | End: 2022-02-11
Attending: HOSPITALIST | Admitting: HOSPITALIST
Payer: COMMERCIAL

## 2022-02-08 VITALS
TEMPERATURE: 98 F | HEART RATE: 91 BPM | RESPIRATION RATE: 20 BRPM | DIASTOLIC BLOOD PRESSURE: 59 MMHG | OXYGEN SATURATION: 100 % | SYSTOLIC BLOOD PRESSURE: 108 MMHG

## 2022-02-08 DIAGNOSIS — D64.9 ANEMIA, UNSPECIFIED: ICD-10-CM

## 2022-02-08 DIAGNOSIS — Z98.890 OTHER SPECIFIED POSTPROCEDURAL STATES: Chronic | ICD-10-CM

## 2022-02-08 DIAGNOSIS — Z98.89 OTHER SPECIFIED POSTPROCEDURAL STATES: Chronic | ICD-10-CM

## 2022-02-08 DIAGNOSIS — Z96.659 PRESENCE OF UNSPECIFIED ARTIFICIAL KNEE JOINT: Chronic | ICD-10-CM

## 2022-02-08 DIAGNOSIS — E11.9 TYPE 2 DIABETES MELLITUS WITHOUT COMPLICATIONS: ICD-10-CM

## 2022-02-08 DIAGNOSIS — M10.9 GOUT, UNSPECIFIED: ICD-10-CM

## 2022-02-08 DIAGNOSIS — Z29.9 ENCOUNTER FOR PROPHYLACTIC MEASURES, UNSPECIFIED: ICD-10-CM

## 2022-02-08 DIAGNOSIS — N18.6 END STAGE RENAL DISEASE: ICD-10-CM

## 2022-02-08 DIAGNOSIS — Z98.49 CATARACT EXTRACTION STATUS, UNSPECIFIED EYE: Chronic | ICD-10-CM

## 2022-02-08 DIAGNOSIS — G47.33 OBSTRUCTIVE SLEEP APNEA (ADULT) (PEDIATRIC): ICD-10-CM

## 2022-02-08 DIAGNOSIS — Z90.710 ACQUIRED ABSENCE OF BOTH CERVIX AND UTERUS: Chronic | ICD-10-CM

## 2022-02-08 DIAGNOSIS — Z01.818 ENCOUNTER FOR OTHER PREPROCEDURAL EXAMINATION: ICD-10-CM

## 2022-02-08 DIAGNOSIS — M19.011 PRIMARY OSTEOARTHRITIS, RIGHT SHOULDER: ICD-10-CM

## 2022-02-08 DIAGNOSIS — R55 SYNCOPE AND COLLAPSE: ICD-10-CM

## 2022-02-08 LAB
ALBUMIN SERPL ELPH-MCNC: 3.1 G/DL — LOW (ref 3.5–5)
ALP SERPL-CCNC: 227 U/L — HIGH (ref 40–120)
ALT FLD-CCNC: 20 U/L DA — SIGNIFICANT CHANGE UP (ref 10–60)
ANION GAP SERPL CALC-SCNC: 10 MMOL/L — SIGNIFICANT CHANGE UP (ref 5–17)
AST SERPL-CCNC: 57 U/L — HIGH (ref 10–40)
BILIRUB SERPL-MCNC: 0.5 MG/DL — SIGNIFICANT CHANGE UP (ref 0.2–1.2)
BUN SERPL-MCNC: 24 MG/DL — HIGH (ref 7–18)
CALCIUM SERPL-MCNC: 10 MG/DL — SIGNIFICANT CHANGE UP (ref 8.4–10.5)
CHLORIDE SERPL-SCNC: 100 MMOL/L — SIGNIFICANT CHANGE UP (ref 96–108)
CO2 SERPL-SCNC: 22 MMOL/L — SIGNIFICANT CHANGE UP (ref 22–31)
CREAT SERPL-MCNC: 5.06 MG/DL — HIGH (ref 0.5–1.3)
GLUCOSE BLDC GLUCOMTR-MCNC: 140 MG/DL — HIGH (ref 70–99)
GLUCOSE BLDC GLUCOMTR-MCNC: 156 MG/DL — HIGH (ref 70–99)
GLUCOSE BLDC GLUCOMTR-MCNC: 175 MG/DL — HIGH (ref 70–99)
GLUCOSE SERPL-MCNC: 136 MG/DL — HIGH (ref 70–99)
HCT VFR BLD CALC: 32.2 % — LOW (ref 34.5–45)
HGB BLD-MCNC: 9.8 G/DL — LOW (ref 11.5–15.5)
MCHC RBC-ENTMCNC: 28 PG — SIGNIFICANT CHANGE UP (ref 27–34)
MCHC RBC-ENTMCNC: 30.4 GM/DL — LOW (ref 32–36)
MCV RBC AUTO: 92 FL — SIGNIFICANT CHANGE UP (ref 80–100)
NRBC # BLD: 1 /100 WBCS — HIGH (ref 0–0)
PLATELET # BLD AUTO: 209 K/UL — SIGNIFICANT CHANGE UP (ref 150–400)
POTASSIUM SERPL-MCNC: 4.9 MMOL/L — SIGNIFICANT CHANGE UP (ref 3.5–5.3)
POTASSIUM SERPL-SCNC: 4.9 MMOL/L — SIGNIFICANT CHANGE UP (ref 3.5–5.3)
PROT SERPL-MCNC: 8.3 G/DL — SIGNIFICANT CHANGE UP (ref 6–8.3)
RBC # BLD: 3.5 M/UL — LOW (ref 3.8–5.2)
RBC # FLD: 21.5 % — HIGH (ref 10.3–14.5)
SARS-COV-2 RNA SPEC QL NAA+PROBE: SIGNIFICANT CHANGE UP
SODIUM SERPL-SCNC: 132 MMOL/L — LOW (ref 135–145)
TROPONIN I, HIGH SENSITIVITY RESULT: 35.2 NG/L — SIGNIFICANT CHANGE UP
WBC # BLD: 6.81 K/UL — SIGNIFICANT CHANGE UP (ref 3.8–10.5)
WBC # FLD AUTO: 6.81 K/UL — SIGNIFICANT CHANGE UP (ref 3.8–10.5)

## 2022-02-08 PROCEDURE — 71045 X-RAY EXAM CHEST 1 VIEW: CPT | Mod: 26

## 2022-02-08 PROCEDURE — 99223 1ST HOSP IP/OBS HIGH 75: CPT | Mod: GC

## 2022-02-08 RX ORDER — INSULIN LISPRO 100/ML
VIAL (ML) SUBCUTANEOUS
Refills: 0 | Status: DISCONTINUED | OUTPATIENT
Start: 2022-02-08 | End: 2022-02-11

## 2022-02-08 RX ORDER — ALLOPURINOL 300 MG
100 TABLET ORAL DAILY
Refills: 0 | Status: DISCONTINUED | OUTPATIENT
Start: 2022-02-08 | End: 2022-02-11

## 2022-02-08 RX ORDER — INSULIN GLARGINE 100 [IU]/ML
26 INJECTION, SOLUTION SUBCUTANEOUS
Qty: 0 | Refills: 0 | DISCHARGE

## 2022-02-08 RX ORDER — HEPARIN SODIUM 5000 [USP'U]/ML
5000 INJECTION INTRAVENOUS; SUBCUTANEOUS EVERY 8 HOURS
Refills: 0 | Status: DISCONTINUED | OUTPATIENT
Start: 2022-02-08 | End: 2022-02-11

## 2022-02-08 RX ORDER — INSULIN GLARGINE 100 [IU]/ML
27 INJECTION, SOLUTION SUBCUTANEOUS AT BEDTIME
Refills: 0 | Status: DISCONTINUED | OUTPATIENT
Start: 2022-02-08 | End: 2022-02-11

## 2022-02-08 RX ORDER — SEVELAMER CARBONATE 2400 MG/1
1600 POWDER, FOR SUSPENSION ORAL
Refills: 0 | Status: DISCONTINUED | OUTPATIENT
Start: 2022-02-08 | End: 2022-02-11

## 2022-02-08 RX ORDER — ATORVASTATIN CALCIUM 80 MG/1
20 TABLET, FILM COATED ORAL AT BEDTIME
Refills: 0 | Status: DISCONTINUED | OUTPATIENT
Start: 2022-02-08 | End: 2022-02-11

## 2022-02-08 RX ORDER — INSULIN LISPRO 100/ML
VIAL (ML) SUBCUTANEOUS AT BEDTIME
Refills: 0 | Status: DISCONTINUED | OUTPATIENT
Start: 2022-02-08 | End: 2022-02-11

## 2022-02-08 RX ORDER — GABAPENTIN 400 MG/1
300 CAPSULE ORAL AT BEDTIME
Refills: 0 | Status: DISCONTINUED | OUTPATIENT
Start: 2022-02-08 | End: 2022-02-11

## 2022-02-08 RX ADMIN — SEVELAMER CARBONATE 1600 MILLIGRAM(S): 2400 POWDER, FOR SUSPENSION ORAL at 17:22

## 2022-02-08 RX ADMIN — Medication 1 TABLET(S): at 17:22

## 2022-02-08 RX ADMIN — ATORVASTATIN CALCIUM 20 MILLIGRAM(S): 80 TABLET, FILM COATED ORAL at 21:23

## 2022-02-08 RX ADMIN — Medication 100 MILLIGRAM(S): at 17:23

## 2022-02-08 RX ADMIN — HEPARIN SODIUM 5000 UNIT(S): 5000 INJECTION INTRAVENOUS; SUBCUTANEOUS at 21:23

## 2022-02-08 NOTE — ASU PATIENT PROFILE, ADULT - NSICDXPASTMEDICALHX_GEN_ALL_CORE_FT
PAST MEDICAL HISTORY:  Anemia     Bronchial asthma hx of    Carpal tunnel syndrome left    Chronic right shoulder pain     Diabetes mellitus     Disorder of ligament of wrist, left     GOEL (dyspnea on exertion)     Dyslipidemia     ESRD (end stage renal disease) on dialysis started HD 2018 ~ M/W/F    Gout     HTN (hypertension)     Hypercholesterolemia     Mild diastolic dysfunction ~ Stage I    Pedal edema     RSV bronchitis     Sleep apnea on CPAP    Trigeminal neuralgia hx of    Vertigo

## 2022-02-08 NOTE — H&P ADULT - ASSESSMENT
Pt is a 75 yo F from home, lives alone has HHA (24h/6d), ambulates with a walker with PMH of ESRD (on HD M/W/F), T2DM, Gout, Trigeminal neuralgia and PSH of bilateral total knee replacement came to Ambulatory surgery this morning for an elective right shoulder replacement comes with dizziness, SOB and palpitations since this morning. Admitted for near syncope.

## 2022-02-08 NOTE — H&P ADULT - PROBLEM SELECTOR PLAN 4
Pt has h/o type 2DM  At home took lantus 36 U  started on lantus 27U and low dose insulin HSS  follow A1c

## 2022-02-08 NOTE — CHART NOTE - NSCHARTNOTEFT_GEN_A_CORE
Patient is 76y/oF in ASU for scheduled Right total shoulder replacement. Patient in PACU states she feels SOB and having palpitations. Also states that she went to [facility] last month and had fluid drained from her lungs. Hospitalist called for consult. CXR and EKG ordered. Surgical procedure has been cancelled as of now. Case discussed with Dr. Goodwin.

## 2022-02-08 NOTE — H&P ADULT - PROBLEM SELECTOR PLAN 3
Pt has history of anemia (baseline around 10 in Jan'22)  On admission, hemoglobin was 9.8  monitor CBC

## 2022-02-08 NOTE — ASU PATIENT PROFILE, ADULT - FALL HARM RISK - HARM RISK INTERVENTIONS

## 2022-02-08 NOTE — H&P ADULT - PROBLEM SELECTOR PLAN 1
Pt presented with dizziness, felt like passing out, SOB and palpitations  VItals stable  Physical exam unremarkable  EKG showed NSR with RBBB  Hemoglobin 9.8  Na 132  COVID-19 PCR negative  Admitted to telemetry to monitor for any arrythmia  follow up UA and CXR

## 2022-02-08 NOTE — PATIENT PROFILE ADULT - FALL HARM RISK - RISK INTERVENTIONS

## 2022-02-08 NOTE — ASU PATIENT PROFILE, ADULT - VISION (WITH CORRECTIVE LENSES IF THE PATIENT USUALLY WEARS THEM):
Cataract surgery/Normal vision: sees adequately in most situations; can see medication labels, newsprint

## 2022-02-08 NOTE — H&P ADULT - PROBLEM SELECTOR PLAN 2
Pt has h/o ESRD on HD (M/W/F)  she gets HD at [facility] and follows up with Nephrologist Dr. Taylor  Nephrologist Dr. Stovall consulted Pt has h/o ESRD on HD (M/W/F)  she gets HD at [facility] and follows up with Nephrologist Dr. Taylor  Nephrologist Dr. Kearns consulted

## 2022-02-08 NOTE — H&P ADULT - HISTORY OF PRESENT ILLNESS
Pt is a 75 yo F from home, lives alone has HHA (24h/6d), ambulates with a walker with PMH of ESRD (on HD M/W/F), T2DM, Gout and PSH of bilateral total knee replacement came to Ambulatory surgery this morning for an elect Pt is a 77 yo F from home, lives alone has HHA (24h/6d), ambulates with a walker with PMH of ESRD (on HD M/W/F), T2DM, Gout, Trigeminal neuralgia and PSH of bilateral total knee replacement came to Ambulatory surgery this morning for an elective right shoulder replacement comes with dizziness, SOB and palpitations since this morning. This morning when she woke up, she felt dizzy with the room spinning around and she felt like she was about to pass out. So laid down on her bed and felt a little better. After she came to the hospital she was having palpitations and shortness of breath. She again felt a little dizzy that time. No c/o chest pain, fever, headache, N/V, abdominal pain, urinary complaints. No recent travel/sickness/ change in meds.

## 2022-02-08 NOTE — H&P ADULT - ATTENDING COMMENTS
77yo F PMHx of ESRD on HD, T2DM, Gout with 24h HHA presented for elective right shoulder revision, however post-poned due to weakness and palpitations. Patient went for dialysis yesterday and reported a normal session with 2.3L removed, but reports feeling fatigued after she has more than 2L removed. Last night patient felt weak and short of breath and decided to sleep it off. This morning the patient reports having palpitations and feeling light-headed like she was going to pass out. She also feels weak like she can't walk very far. The symptoms resolved so she presented for her elective surgery. Patient also reports dyspnea on exertion for the past few months. She was hospitalized one month ago and reports having additional dialysis sessions with improvement in her shortness of breath. TTE at the time showed no acute change.    #Near Syncope  #Palpitations  #ESRD on HD  #Type 2 DM on Insulin  #Obstructive Sleep Apnea  #Gout  #Trigeminal Neuralgia    Monitor on telemetry for possible arrythmia, low suspicion for MI given negative troponin and no chest pain. EKG with RBBB similar to previous. Check TTE to assess for structural cause of dyspnea, possible pulmonary hypertension given JARVIS. Renal consult to continue HD. If no significant events, possible discharge in next 1-2 days with outpatient follow-up.

## 2022-02-08 NOTE — H&P ADULT - NSHPPHYSICALEXAM_GEN_ALL_CORE
GENERAL: NAD, lying in bed comfortably  HEAD:  Atraumatic, Normocephalic  EYES: EOMI, PERRLA, conjunctiva and sclera clear  ENT: Moist mucous membranes  NECK: Supple, No JVD  CHEST/LUNG: Clear to auscultation bilaterally; No rales, rhonchi, wheezing, or rubs. Unlabored respirations  HEART: Regular rate and rhythm; No murmurs, rubs, or gallops  ABDOMEN: Bowel sounds present; Soft, Nontender, Nondistended. No hepatomegaly  EXTREMITIES:  Has 1+ peripheral edema  NERVOUS SYSTEM:  Alert & Oriented X3, speech clear. No deficits   MSK: FROM all 4 extremities, full and equal strength  SKIN: Right AVF, non tender

## 2022-02-08 NOTE — H&P ADULT - PROBLEM SELECTOR PLAN 6
Pt has JARVIS  uses CPAP at night  will continue CPAP at night while inpatient  Pt requested referral for outpatient Pulmonology for new CPAP machine and PFT evaluation

## 2022-02-09 LAB
ALBUMIN SERPL ELPH-MCNC: 2.7 G/DL — LOW (ref 3.5–5)
ALP SERPL-CCNC: 207 U/L — HIGH (ref 40–120)
ALT FLD-CCNC: 17 U/L DA — SIGNIFICANT CHANGE UP (ref 10–60)
ANION GAP SERPL CALC-SCNC: 11 MMOL/L — SIGNIFICANT CHANGE UP (ref 5–17)
ANISOCYTOSIS BLD QL: SLIGHT — SIGNIFICANT CHANGE UP
AST SERPL-CCNC: 11 U/L — SIGNIFICANT CHANGE UP (ref 10–40)
BASOPHILS # BLD AUTO: 0.01 K/UL — SIGNIFICANT CHANGE UP (ref 0–0.2)
BASOPHILS NFR BLD AUTO: 0.2 % — SIGNIFICANT CHANGE UP (ref 0–2)
BILIRUB SERPL-MCNC: 0.4 MG/DL — SIGNIFICANT CHANGE UP (ref 0.2–1.2)
BUN SERPL-MCNC: 36 MG/DL — HIGH (ref 7–18)
CALCIUM SERPL-MCNC: 9.5 MG/DL — SIGNIFICANT CHANGE UP (ref 8.4–10.5)
CHLORIDE SERPL-SCNC: 99 MMOL/L — SIGNIFICANT CHANGE UP (ref 96–108)
CHOLEST SERPL-MCNC: 132 MG/DL — SIGNIFICANT CHANGE UP
CO2 SERPL-SCNC: 24 MMOL/L — SIGNIFICANT CHANGE UP (ref 22–31)
CREAT SERPL-MCNC: 7.06 MG/DL — HIGH (ref 0.5–1.3)
EOSINOPHIL # BLD AUTO: 0.06 K/UL — SIGNIFICANT CHANGE UP (ref 0–0.5)
EOSINOPHIL NFR BLD AUTO: 0.9 % — SIGNIFICANT CHANGE UP (ref 0–6)
GLUCOSE BLDC GLUCOMTR-MCNC: 120 MG/DL — HIGH (ref 70–99)
GLUCOSE BLDC GLUCOMTR-MCNC: 138 MG/DL — HIGH (ref 70–99)
GLUCOSE BLDC GLUCOMTR-MCNC: 220 MG/DL — HIGH (ref 70–99)
GLUCOSE BLDC GLUCOMTR-MCNC: 238 MG/DL — HIGH (ref 70–99)
GLUCOSE SERPL-MCNC: 276 MG/DL — HIGH (ref 70–99)
HBV SURFACE AG SER-ACNC: SIGNIFICANT CHANGE UP
HCT VFR BLD CALC: 28.7 % — LOW (ref 34.5–45)
HDLC SERPL-MCNC: 60 MG/DL — SIGNIFICANT CHANGE UP
HGB BLD-MCNC: 8.9 G/DL — LOW (ref 11.5–15.5)
HYPOCHROMIA BLD QL: SLIGHT — SIGNIFICANT CHANGE UP
IMM GRANULOCYTES NFR BLD AUTO: 0.9 % — SIGNIFICANT CHANGE UP (ref 0–1.5)
LIPID PNL WITH DIRECT LDL SERPL: 33 MG/DL — SIGNIFICANT CHANGE UP
LYMPHOCYTES # BLD AUTO: 1.2 K/UL — SIGNIFICANT CHANGE UP (ref 1–3.3)
LYMPHOCYTES # BLD AUTO: 18.7 % — SIGNIFICANT CHANGE UP (ref 13–44)
MACROCYTES BLD QL: SLIGHT — SIGNIFICANT CHANGE UP
MAGNESIUM SERPL-MCNC: 2.7 MG/DL — HIGH (ref 1.6–2.6)
MANUAL SMEAR VERIFICATION: SIGNIFICANT CHANGE UP
MCHC RBC-ENTMCNC: 28.3 PG — SIGNIFICANT CHANGE UP (ref 27–34)
MCHC RBC-ENTMCNC: 31 GM/DL — LOW (ref 32–36)
MCV RBC AUTO: 91.1 FL — SIGNIFICANT CHANGE UP (ref 80–100)
MICROCYTES BLD QL: SLIGHT — SIGNIFICANT CHANGE UP
MONOCYTES # BLD AUTO: 0.58 K/UL — SIGNIFICANT CHANGE UP (ref 0–0.9)
MONOCYTES NFR BLD AUTO: 9 % — SIGNIFICANT CHANGE UP (ref 2–14)
NEUTROPHILS # BLD AUTO: 4.51 K/UL — SIGNIFICANT CHANGE UP (ref 1.8–7.4)
NEUTROPHILS NFR BLD AUTO: 70.3 % — SIGNIFICANT CHANGE UP (ref 43–77)
NON HDL CHOLESTEROL: 72 MG/DL — SIGNIFICANT CHANGE UP
NRBC # BLD: 0 /100 WBCS — SIGNIFICANT CHANGE UP (ref 0–0)
PHOSPHATE SERPL-MCNC: 4.8 MG/DL — HIGH (ref 2.5–4.5)
PLAT MORPH BLD: NORMAL — SIGNIFICANT CHANGE UP
PLATELET # BLD AUTO: 197 K/UL — SIGNIFICANT CHANGE UP (ref 150–400)
PLATELET COUNT - ESTIMATE: NORMAL — SIGNIFICANT CHANGE UP
POIKILOCYTOSIS BLD QL AUTO: SLIGHT — SIGNIFICANT CHANGE UP
POLYCHROMASIA BLD QL SMEAR: SLIGHT — SIGNIFICANT CHANGE UP
POTASSIUM SERPL-MCNC: 3.9 MMOL/L — SIGNIFICANT CHANGE UP (ref 3.5–5.3)
POTASSIUM SERPL-SCNC: 3.9 MMOL/L — SIGNIFICANT CHANGE UP (ref 3.5–5.3)
PROT SERPL-MCNC: 7.3 G/DL — SIGNIFICANT CHANGE UP (ref 6–8.3)
RBC # BLD: 3.15 M/UL — LOW (ref 3.8–5.2)
RBC # FLD: 21.4 % — HIGH (ref 10.3–14.5)
RBC BLD AUTO: ABNORMAL
SODIUM SERPL-SCNC: 134 MMOL/L — LOW (ref 135–145)
TRIGL SERPL-MCNC: 197 MG/DL — HIGH
TSH SERPL-MCNC: 1.13 UU/ML — SIGNIFICANT CHANGE UP (ref 0.34–4.82)
WBC # BLD: 6.42 K/UL — SIGNIFICANT CHANGE UP (ref 3.8–10.5)
WBC # FLD AUTO: 6.42 K/UL — SIGNIFICANT CHANGE UP (ref 3.8–10.5)

## 2022-02-09 PROCEDURE — 99233 SBSQ HOSP IP/OBS HIGH 50: CPT | Mod: GC

## 2022-02-09 RX ORDER — ALBUTEROL 90 UG/1
2 AEROSOL, METERED ORAL EVERY 6 HOURS
Refills: 0 | Status: DISCONTINUED | OUTPATIENT
Start: 2022-02-09 | End: 2022-02-11

## 2022-02-09 RX ORDER — MIDODRINE HYDROCHLORIDE 2.5 MG/1
10 TABLET ORAL
Refills: 0 | Status: DISCONTINUED | OUTPATIENT
Start: 2022-02-09 | End: 2022-02-11

## 2022-02-09 RX ORDER — ERYTHROPOIETIN 10000 [IU]/ML
4000 INJECTION, SOLUTION INTRAVENOUS; SUBCUTANEOUS
Refills: 0 | Status: DISCONTINUED | OUTPATIENT
Start: 2022-02-09 | End: 2022-02-11

## 2022-02-09 RX ORDER — SODIUM CHLORIDE 9 MG/ML
250 INJECTION INTRAMUSCULAR; INTRAVENOUS; SUBCUTANEOUS ONCE
Refills: 0 | Status: COMPLETED | OUTPATIENT
Start: 2022-02-09 | End: 2022-02-09

## 2022-02-09 RX ORDER — HEPARIN SODIUM 5000 [USP'U]/ML
1000 INJECTION INTRAVENOUS; SUBCUTANEOUS ONCE
Refills: 0 | Status: COMPLETED | OUTPATIENT
Start: 2022-02-09 | End: 2022-02-09

## 2022-02-09 RX ADMIN — HEPARIN SODIUM 5000 UNIT(S): 5000 INJECTION INTRAVENOUS; SUBCUTANEOUS at 05:46

## 2022-02-09 RX ADMIN — SEVELAMER CARBONATE 1600 MILLIGRAM(S): 2400 POWDER, FOR SUSPENSION ORAL at 14:19

## 2022-02-09 RX ADMIN — Medication 100 MILLIGRAM(S): at 14:19

## 2022-02-09 RX ADMIN — HEPARIN SODIUM 5000 UNIT(S): 5000 INJECTION INTRAVENOUS; SUBCUTANEOUS at 14:20

## 2022-02-09 RX ADMIN — SEVELAMER CARBONATE 1600 MILLIGRAM(S): 2400 POWDER, FOR SUSPENSION ORAL at 08:20

## 2022-02-09 RX ADMIN — INSULIN GLARGINE 27 UNIT(S): 100 INJECTION, SOLUTION SUBCUTANEOUS at 21:43

## 2022-02-09 RX ADMIN — ATORVASTATIN CALCIUM 20 MILLIGRAM(S): 80 TABLET, FILM COATED ORAL at 21:43

## 2022-02-09 RX ADMIN — GABAPENTIN 300 MILLIGRAM(S): 400 CAPSULE ORAL at 21:43

## 2022-02-09 RX ADMIN — Medication 2: at 17:17

## 2022-02-09 RX ADMIN — HEPARIN SODIUM 1000 UNIT(S): 5000 INJECTION INTRAVENOUS; SUBCUTANEOUS at 12:24

## 2022-02-09 RX ADMIN — SEVELAMER CARBONATE 1600 MILLIGRAM(S): 2400 POWDER, FOR SUSPENSION ORAL at 17:18

## 2022-02-09 RX ADMIN — Medication 1 TABLET(S): at 14:20

## 2022-02-09 RX ADMIN — SODIUM CHLORIDE 125 MILLILITER(S): 9 INJECTION INTRAMUSCULAR; INTRAVENOUS; SUBCUTANEOUS at 16:16

## 2022-02-09 RX ADMIN — HEPARIN SODIUM 5000 UNIT(S): 5000 INJECTION INTRAVENOUS; SUBCUTANEOUS at 21:43

## 2022-02-09 NOTE — CONSULT NOTE ADULT - ASSESSMENT
# ESRD. dialysed today . Intradialytic hypotension, UF goal decreased.  [id number]  pre-HD with next HD  #Near syncope with SOB. cardiac eval ongoing.  In light of OSAS and inability to see pulmonary as outpt, consult Dr Merida for eval and outpt F/U  # anemia of CKD. Retacrit on HD   # renal osteodystrophy. cont sevelamer.  # hyponatremia- anticipate improvement with HD     THANKS FOR THE CONSULT

## 2022-02-09 NOTE — PROGRESS NOTE ADULT - PROBLEM SELECTOR PLAN 2
Pt has h/o ESRD on HD (M/W/F)  she gets HD at [facility] and follows up with Nephrologist Dr. Taylor  Nephrologist Dr. Kearns consulted 72.6

## 2022-02-09 NOTE — CONSULT NOTE ADULT - TIME BILLING
- Review of records, telemetry, vital signs and daily labs.   - General and cardiovascular physical examination.  - Generation of cardiovascular treatment plan.  - Coordination of care.      Patient was seen and examined by me on 02/09/2022,interim events noted,labs and radiology studies reviewed.  Jeremiah David MD,FACC.  [address] [place], [place] [id number].  [phone]

## 2022-02-09 NOTE — CONSULT NOTE ADULT - ASSESSMENT
75 yo F from home, lives alone has HHA (24h/6d), ambulates with a walker with PMH of ESRD (on HD M/W/F), T2DM, Gout, Trigeminal neuralgia and PSH of bilateral total knee replacement came to Ambulatory surgery this morning for an elective right shoulder replacement comes with dizziness, SOB and palpitations since this morning. Admitted for near syncope.     Problem/Plan - 1:  ·  Problem: Near syncope.   ·  Plan: Pt presented with dizziness, felt like passing out, SOB and palpitations  VItals stable, Physical exam unremarkable, EKG unchanged from previous (showed NSR with RBBB)  Telemonitoring with PVCs occasionally  Orthostatic vitals negative  Patient symptomatically improved, now without complaints  TTE   Start BBlockers  Scheduled for Right Shoulder peplacement  Patient's Revised Cardiac Risk Index (RCRI) score is 1  ( 6.0 % risk) . Patient is at  intermediate risk of  adverse perioperative cardiac events undergoing  intermediate risk surgery. No further cardiac testing is needed prior to patient's surgery.     Problem/Plan - 2:  (Data referenced from "H&P Adult" 08-Feb-2022 13:56)  ·  Problem: ESRD on dialysis.   ·  Plan: Pt has h/o ESRD on HD (M/W/F)  she gets HD at [facility] and follows up with Nephrologist Dr. Taylor  Nephrologist Dr. Kearns consulted.    Problem/Plan - 3:  ·  Problem: Anemia.   ·  Plan: Pt has history of anemia (baseline around 10 in Jan'22)  On admission, hemoglobin was 9.8 > 8.9 this AM  monitor CBC.    Problem/Plan - 4:  (Data referenced from "H&P Adult" 08-Feb-2022 13:56)  ·  Problem: Diabetes mellitus.   ·  Plan: Pt has h/o type 2DM  At home took lantus 36 U  started on lantus 27U and low dose insulin HSS  follow A1c.    Problem/Plan - 5:  (Data referenced from "H&P Adult" 08-Feb-2022 13:56)  ·  Problem: Gout.   ·  Plan: Pt has h/o gout   started on home med of allopurinol.    Problem/Plan - 6:  (Data referenced from "H&P Adult" 08-Feb-2022 13:56)  ·  Problem: JARVIS on CPAP.   ·  Plan: Pt has JARVIS  uses CPAP at night  will continue CPAP at night while inpatient  Pt requested referral for outpatient Pulmonology for new CPAP machine and PFT evaluation.    Problem/Plan - 7:  (Data referenced from "H&P Adult" 08-Feb-2022 13:56)  ·  Problem: Prophylactic measure.   ·  Plan: on heparin sq for DVT ppx.

## 2022-02-09 NOTE — CONSULT NOTE ADULT - SUBJECTIVE AND OBJECTIVE BOX
PATIENT SEEN AND EXAMINED ON :-2022  DATE OF SERVICE:      2022       Interim events noted,Labs ,Radiological studies and Cardiology tests reviewed .    Reason for Admission: Near syncope and SOB  History of Present Illness:   Pt is a 75 yo F from home, lives alone has HHA (24h/6d), ambulates with a walker with PMH of ESRD (on HD M//), T2DM, Gout, Trigeminal neuralgia and PSH of bilateral total knee replacement came to Ambulatory surgery this morning for an elective right shoulder replacement comes with dizziness, SOB and palpitations since this morning. This morning when she woke up, she felt dizzy with the room spinning around and she felt like she was about to pass out. So laid down on her bed and felt a little better. After she came to the hospital she was having palpitations and shortness of breath. She again felt a little dizzy that time. No c/o chest pain, fever, headache, N/V, abdominal pain, urinary complaints. No recent travel/sickness/ change in meds.      Review of Systems:  Review of Systems: CONSTITUTIONAL: No fevers or chills, no weight loss   EYES/ENT: No visual changes;  No vertigo or throat pain   NECK: No pain or stiffness  RESPIRATORY: No cough, wheezing, hemoptysis  CARDIOVASCULAR: No chest pain  GASTROINTESTINAL: No abdominal or epigastric pain. No nausea, vomiting, or hematemesis; No diarrhea or constipation. No melena or hematochezia.  GENITOURINARY: No discharge, hematuria  NEUROLOGICAL: No numbness or weakness  All other review of systems is negative unless indicated above.  Other Review of Systems: All other review of systems negative, except as noted in HPI      Allergies and Intolerances:        Allergies:  	IV Contrast: Drug, Anaphylaxis  	shrimp: Food, Hives, shrimp  	shellfish: Food, Hives, Rash  	smoke; coughing: Miscellaneous, Other, smoke, coughing      Patient History:   Past Medical, Past Surgical, and Family History:  PAST MEDICAL HISTORY:  Anemia     Bronchial asthma hx of    Carpal tunnel syndrome left    Chronic right shoulder pain     Diabetes mellitus     Disorder of ligament of wrist, left     GOEL (dyspnea on exertion)     Dyslipidemia     ESRD (end stage renal disease) on dialysis started HD  ~ /W/    Gout     HTN (hypertension)     Hypercholesterolemia     Mild diastolic dysfunction ~ Stage I    Pedal edema     RSV bronchitis     Sleep apnea on CPAP    Trigeminal neuralgia hx of    Vertigo.     PAST SURGICAL HISTORY:  History of arthroscopy of left shoulder x 3    History of arthroscopy of right shoulder x 3    History of carpal tunnel surgery of left wrist     History of vascular access device , removed 2020    S/P arteriovenous (AV) fistula creation left upper forearm cephalic vein brachial artery AV fistula creation on 2018, left arm basilic vein transposition on 2019.      S/P      S/P cataract extraction bilateral, 2005    S/P hysterectomy     S/P rotator cuff surgery bilateral 's    S/P total knee replacement bilateral, left , right .     FAMILY HISTORY:  No pertinent family history in first degree relatives. No pertinent family history of: cancer and congestive heart failure.      MEDICATIONS  (STANDING):  allopurinol 100 milliGRAM(s) Oral daily  atorvastatin 20 milliGRAM(s) Oral at bedtime  epoetin shannon-epbx (RETACRIT) Injectable 4000 Unit(s) IV Push <User Schedule>  gabapentin 300 milliGRAM(s) Oral at bedtime  heparin   Injectable 5000 Unit(s) SubCutaneous every 8 hours  insulin glargine Injectable (LANTUS) 27 Unit(s) SubCutaneous at bedtime  insulin lispro (ADMELOG) corrective regimen sliding scale   SubCutaneous three times a day before meals  insulin lispro (ADMELOG) corrective regimen sliding scale   SubCutaneous at bedtime  Nephro-neeraj 1 Tablet(s) Oral daily  sevelamer carbonate 1600 milliGRAM(s) Oral three times a day with meals    Vital Signs Last 24 Hrs  T(C): 36.7 (2022 10:12), Max: 36.9 (2022 16:03)  T(F): 98 (2022 10:12), Max: 98.5 (2022 16:03)  HR: 86 (2022 10:12) (78 - 95)  BP: 121/71 (2022 10:12) (98/61 - 127/88)  BP(mean): --  RR: 17 (2022 10:12) (17 - 20)  SpO2: 100% (2022 10:12) (98% - 100%)      PHYSICAL EXAMINATION:  GENERAL: NAD, well built, resting comfortably in bed   HEAD:  Atraumatic, Normocephalic  EYES:  conjunctiva and sclera clear  NECK: Supple, No JVD, Normal thyroid  CHEST/LUNG: Clear to auscultation. Clear to percussion bilaterally; No rales, rhonchi, wheezing, or rubs  HEART: Regular rate and rhythm; (+) 2/6  systolic murmurs, No rubs, or gallops  ABDOMEN: Soft, Nontender, Nondistended; Bowel sounds present, no pain or masses on palpation  NERVOUS SYSTEM:  Alert & Oriented X3  : voiding well  EXTREMITIES:  2+ Peripheral Pulses, No clubbing, cyanosis, or edema  SKIN: warm dry    LABS:                 8.9    6.42  )-----------( 197      ( 2022 10:23 )             28.7         134<L>  |  99  |  36<H>  ----------------------------<  276<H>  3.9   |  24  |  7.06<H>    Ca    9.5      2022 10:23  Phos  4.8       Mg     2.7         TPro  7.3  /  Alb  2.7<L>  /  TBili  0.4  /  DBili  x   /  AST  11  /  ALT  17  /  AlkPhos  207<H>      LIVER FUNCTIONS - ( 2022 10:23 )  Alb: 2.7 g/dL / Pro: 7.3 g/dL / ALK PHOS: 207 U/L / ALT: 17 U/L DA / AST: 11 U/L / GGT: x           ECG:   Normal sinus rhythm at 89 BPM  Right bundle branch block  Abnormal ECG      ECHO: >  Study Date: 2Conclusions:  Hyperdynamic left ventricle. EF 75%  Mild aortic stenosis.    
Time of visit:    CHIEF COMPLAINT: Patient is a 76y old  Female who presents with a chief complaint of Near syncope and SOB (2022 17:14)      HPI:  Pt is a 77 yo F from home, lives alone has HHA (24h/6d), ambulates with a walker with PMH of ESRD (on HD M//), T2DM, Gout, Trigeminal neuralgia and PSH of bilateral total knee replacement came to Ambulatory surgery this morning for an elective right shoulder replacement comes with dizziness, SOB and palpitations since this morning. This morning when she woke up, she felt dizzy with the room spinning around and she felt like she was about to pass out. So laid down on her bed and felt a little better. After she came to the hospital she was having palpitations and shortness of breath. She again felt a little dizzy that time. No c/o chest pain, fever, headache, N/V, abdominal pain, urinary complaints. No recent travel/sickness/ change in meds.   (2022 13:56)   Patient seen and examined.     PAST MEDICAL & SURGICAL HISTORY:  HTN (hypertension)    Hypercholesterolemia    Diabetes mellitus    Bronchial asthma  hx of    Vertigo    Trigeminal neuralgia  hx of    ESRD (end stage renal disease) on dialysis  started HD  ~ //    Pedal edema    Sleep apnea  on CPAP    Dyslipidemia    Anemia    Gout    Carpal tunnel syndrome  left    Chronic right shoulder pain    Disorder of ligament of wrist, left    Mild diastolic dysfunction  ~ Stage I    RSV bronchitis    GOEL (dyspnea on exertion)    S/P rotator cuff surgery  bilateral &#x27;s    S/P       S/P hysterectomy      S/P total knee replacement  bilateral, left , right     S/P cataract extraction  bilateral,     History of carpal tunnel surgery of left wrist      S/P arteriovenous (AV) fistula creation  left upper forearm cephalic vein brachial artery AV fistula creation on 2018, left arm basilic vein transposition on 2019.      History of arthroscopy of left shoulder  x 3    History of arthroscopy of right shoulder  x 3    History of vascular access device  , removed 2020        Allergies    IV Contrast (Anaphylaxis)  shellfish (Hives; Rash)  shrimp (Hives)  smoke; coughing (Other)    Intolerances        MEDICATIONS  (STANDING):  allopurinol 100 milliGRAM(s) Oral daily  atorvastatin 20 milliGRAM(s) Oral at bedtime  epoetin shannon-epbx (RETACRIT) Injectable 4000 Unit(s) IV Push <User Schedule>  gabapentin 300 milliGRAM(s) Oral at bedtime  heparin   Injectable 5000 Unit(s) SubCutaneous every 8 hours  insulin glargine Injectable (LANTUS) 27 Unit(s) SubCutaneous at bedtime  insulin lispro (ADMELOG) corrective regimen sliding scale   SubCutaneous three times a day before meals  insulin lispro (ADMELOG) corrective regimen sliding scale   SubCutaneous at bedtime  midodrine. 10 milliGRAM(s) Oral <User Schedule>  Nephro-neeraj 1 Tablet(s) Oral daily  sevelamer carbonate 1600 milliGRAM(s) Oral three times a day with meals      MEDICATIONS  (PRN):   Medications up to date at time of exam.    Medications up to date at time of exam.    FAMILY HISTORY:  No pertinent family history in first degree relatives        SOCIAL HISTORY  Smoking History: [  x ]  none smoking/smoke exposure, [   ] former smoker  Living Condition: [   ] apartment, [   ] private house  Work History: retired Nurse   Travel History: denies recent travel  Illicit Substance Use: denies  Alcohol Use: denies    REVIEW OF SYSTEMS:    CONSTITUTIONAL:  denies fevers, chills, sweats, weight loss    HEENT:  denies diplopia or blurred vision, sore throat or runny nose.    CARDIOVASCULAR:  denies pressure, squeezing, tightness, or heaviness about the chest; no palpitations.    RESPIRATORY:  denies SOB, cough, GOEL, wheezing.    GASTROINTESTINAL:  denies abdominal pain, nausea, vomiting or diarrhea.    GENITOURINARY: denies dysuria, frequency or urgency.    NEUROLOGIC:  denies numbness, tingling, seizures or weakness.    PSYCHIATRIC:  denies disorder of thought or mood.    MSK: denies swelling, redness      PHYSICAL EXAMINATION:    GENERAL: The patient is a well-developed, well-nourished, in no apparent distress.     Vital Signs Last 24 Hrs  T(C): 36.8 (2022 15:18), Max: 36.8 (2022 04:20)  T(F): 98.3 (2022 15:18), Max: 98.3 (2022 15:18)  HR: 96 (2022 15:18) (78 - 97)  BP: 83/50 (2022 15:18) (83/50 - 121/71)  BP(mean): --  RR: 18 (2022 15:18) (17 - 20)  SpO2: 95% (2022 15:18) (95% - 100%)   (if applicable)    Chest Tube (if applicable)    HEENT: Head is normocephalic and atraumatic. Extraocular muscles are intact. Mucous membranes are moist.     NECK: Supple, no palpable adenopathy.    LUNGS: Clear to auscultation, no wheezing, rales, or rhonchi.    HEART: Regular rate and rhythm without murmur.    ABDOMEN: Soft, nontender, and nondistended.  No hepatosplenomegaly is noted.    RENAL: No difficulty voiding, no pelvic pain    EXTREMITIES: Without any cyanosis, clubbing, rash, lesions or edema.    NEUROLOGIC: Awake, alert, oriented, grossly intact    SKIN: Warm, dry, good turgor.      LABS:                        8.9    6.42  )-----------( 197      ( 2022 10:23 )             28.7     02    134<L>  |  99  |  36<H>  ----------------------------<  276<H>  3.9   |  24  |  7.06<H>    Ca    9.5      2022 10:23  Phos  4.8     -  Mg     2.7         TPro  7.3  /  Alb  2.7<L>  /  TBili  0.4  /  DBili  x   /  AST  11  /  ALT  17  /  AlkPhos  207<H>                          MICROBIOLOGY: (if applicable)    RADIOLOGY & ADDITIONAL STUDIES:  EKG:   CXR:< from: Xray Chest 1 View- PORTABLE-Urgent (Xray Chest 1 View- PORTABLE-Urgent .) (22 @ 17:21) >    ACC: [id number] EXAM:  XR CHEST PORTABLE URGENT 1V                          PROCEDURE DATE:  2022          INTERPRETATION:  Chest one view    HISTORY: Shortness of breath    COMPARISON STUDY: 2022    Frontal expiratory view of the chest shows the heart to be similarly   enlarged in size. The lungs are clear and there is no evidence of   pneumothorax nor pleural effusion.    IMPRESSION:  No active pulmonary disease.        Thank you for the courtesy of this referral.    --- End of Report---            RAIZA WOO MD; Attending Interventional Radiologist  This document has been electronically signed. 2022 12:28PM    < end of copied text >    ECHO:    IMPRESSION: 76y Female PAST MEDICAL & SURGICAL HISTORY:  HTN (hypertension)    Hypercholesterolemia    Diabetes mellitus    Bronchial asthma  hx of    Vertigo    Trigeminal neuralgia  hx of    ESRD (end stage renal disease) on dialysis  started HD  ~ M/W/F    Pedal edema    Sleep apnea  on CPAP    Dyslipidemia    Anemia    Gout    Carpal tunnel syndrome  left    Chronic right shoulder pain    Disorder of ligament of wrist, left    Mild diastolic dysfunction  ~ Stage I    RSV bronchitis    GOEL (dyspnea on exertion)    S/P rotator cuff surgery  bilateral &#x27;s    S/P       S/P hysterectomy      S/P total knee replacement  bilateral, left , right     S/P cataract extraction  bilateral,     History of carpal tunnel surgery of left wrist      S/P arteriovenous (AV) fistula creation  left upper forearm cephalic vein brachial artery AV fistula creation on 2018, left arm basilic vein transposition on 2019.      History of arthroscopy of left shoulder  x 3    History of arthroscopy of right shoulder  x 3    History of vascular access device  , removed 2020     p/w           IMP: This is a  76 yr old obese  woman , non smoker  from home, lives alone has HHA (24h/6d), ambulates with a walker with ESRD (on HD M/W/), T2DM, Gout, Trigeminal neuralgia and PSH of bilateral total knee replacement came to Ambulatory surgery this morning for an elective right shoulder replacement comes with dizziness, SOB and palpitations since this morning. Admitted for near syncope.  Pat dx with JARVIS on CPAP . PAP devise malfx for 4 months       Sugg  - Pat will need a repeat sleep study   - No PAP for now   - O2 supp as needed   - Monitor Blood sugar with coverage   - Dialysis as per Neph   - Aluterol inhaler 2 puff q6h prn   
[facility] : Progress Note :: [phone], (office [phone]),   Dr Kearns / Dr Colbert / Dr Becker / Dr Evans / Dr Claudia LOUIE / Dr Lockett / Dr Camacho / Dr Jose De Jesus [place]  _____________________________________________________________________________________________  Patient is a 76y Female whom presented to the hospital for elective shoulder surgery.  Complained of dizziness SOB  and near syncope the morning of surgery, thus surgery cancelled, admitted to medicine service for evaluation of these complains.  Has JARVIS, as per her, her CPAP machine is broken, but has been having trouble getting appointment with pulmonologist.  s/p HD earlier today.  CXR did not reveal  any pulmonary edema.    PAST MEDICAL & SURGICAL HISTORY:  HTN (hypertension)    Hypercholesterolemia    Diabetes mellitus    Bronchial asthma  hx of    Vertigo    Trigeminal neuralgia  hx of    ESRD (end stage renal disease) on dialysis  started HD  ~ M/W/F    Pedal edema    Sleep apnea  on CPAP    Dyslipidemia    Anemia    Gout    Carpal tunnel syndrome  left    Chronic right shoulder pain    Disorder of ligament of wrist, left    Mild diastolic dysfunction  ~ Stage I    RSV bronchitis    GOEL (dyspnea on exertion)    S/P rotator cuff surgery  bilateral &#x27;s    S/P       S/P hysterectomy      S/P total knee replacement  bilateral, left , right     S/P cataract extraction  bilateral, 2005    History of carpal tunnel surgery of left wrist      S/P arteriovenous (AV) fistula creation  left upper forearm cephalic vein brachial artery AV fistula creation on 2018, left arm basilic vein transposition on 2019.      History of arthroscopy of left shoulder  x 3    History of arthroscopy of right shoulder  x 3    History of vascular access device  , removed 2020      IV Contrast (Anaphylaxis)  shellfish (Hives; Rash)  shrimp (Hives)  smoke; coughing (Other)    Home Medications Reviewed  Hospital Medications:   MEDICATIONS  (STANDING):  allopurinol 100 milliGRAM(s) Oral daily  atorvastatin 20 milliGRAM(s) Oral at bedtime  epoetin shannon-epbx (RETACRIT) Injectable 4000 Unit(s) IV Push <User Schedule>  gabapentin 300 milliGRAM(s) Oral at bedtime  heparin   Injectable 5000 Unit(s) SubCutaneous every 8 hours  insulin glargine Injectable (LANTUS) 27 Unit(s) SubCutaneous at bedtime  insulin lispro (ADMELOG) corrective regimen sliding scale   SubCutaneous three times a day before meals  insulin lispro (ADMELOG) corrective regimen sliding scale   SubCutaneous at bedtime  midodrine. 10 milliGRAM(s) Oral <User Schedule>  Nephro-neeraj 1 Tablet(s) Oral daily  sevelamer carbonate 1600 milliGRAM(s) Oral three times a day with meals    SOCIAL HISTORY:  Denies ETOh,Smoking,   FAMILY HISTORY:  No pertinent family history in first degree relatives        VITALS:  T(F): 98.3 (22 @ 15:18), Max: 98.3 (22 @ 20:18)  HR: 96 (22 @ 15:18)  BP: 83/50 (22 @ 15:18)  RR: 18 (22 @ 15:18)  SpO2: 95% (22 @ 15:18)  Wt(kg): --      PHYSICAL EXAM:  Constitutional: NAD  HEENT: anicteric sclera, oropharynx clear.  Neck: No JVD  Respiratory: CTAB, no wheezes, rales or rhonchi  Cardiovascular: S1, S2, RRR  Gastrointestinal: BS+, soft, NT/ND  Extremities: No peripheral edema  Neurological: A/O x 3, no focal deficits  : No CVA tenderness. No cottrell.   Skin: No rashes  Vascular Access: LT arm AVF with thrill and bruit.    LABS:      134<L>  |  99  |  36<H>  ----------------------------<  276<H>  3.9   |  24  |  7.06<H>    Ca    9.5      2022 10:23  Phos  4.8       Mg     2.7         TPro  7.3  /  Alb  2.7<L>  /  TBili  0.4  /  DBili      /  AST  11  /  ALT  17  /  AlkPhos  207<H>  02-09    Creatinine Trend: 7.06 <--, 5.06 <--                        8.9    6.42  )-----------( 197      ( 2022 10:23 )             28.7     Urine Studies:      RADIOLOGY & ADDITIONAL STUDIES:

## 2022-02-09 NOTE — PROGRESS NOTE ADULT - PROBLEM SELECTOR PLAN 1
Pt presented with dizziness, felt like passing out, SOB and palpitations  VItals stable  Physical exam unremarkable  EKG showed NSR with RBBB (unchanged from previous EKG)   Hemoglobin 9.8  Na 132  COVID-19 PCR negative  Admitted to telemetry to monitor for any arrythmia  CXR negative   follow up UA Pt presented with dizziness, felt like passing out, SOB and palpitations  VItals stable, Physical exam unremarkable, EKG unchanged from previous (showed NSR with RBBB)  Telemonitoring with PVCs occasionally  Orthostatic vitals negative  Patient symptomatically improved, now without complaints  May be cardiogenic given h/o AS (mild on TTE in 1/2022)  Cardio consulted Dr. David, will consider TTE

## 2022-02-09 NOTE — PROGRESS NOTE ADULT - PROBLEM SELECTOR PLAN 3
Pt has history of anemia (baseline around 10 in Jan'22)  On admission, hemoglobin was 9.8  monitor CBC Pt has history of anemia (baseline around 10 in Jan'22)  On admission, hemoglobin was 9.8 > 8.9 this AM  monitor CBC

## 2022-02-09 NOTE — PROGRESS NOTE ADULT - SUBJECTIVE AND OBJECTIVE BOX
PGY-1 Progress Note discussed with attending    CHIEF COMPLAINT & BRIEF HOSPITAL COURSE:  Pt is a 77 yo F from home, lives alone has HHA (24h/6d), ambulates with a walker with PMH of ESRD (on HD M/W/F), T2DM, Gout, Trigeminal neuralgia and PSH of bilateral total knee replacement came to Ambulatory surgery this morning for an elective right shoulder replacement comes with dizziness, SOB and palpitations since this morning. This morning when she woke up, she felt dizzy with the room spinning around and she felt like she was about to pass out. So laid down on her bed and felt a little better. After she came to the hospital she was having palpitations and shortness of breath. She again felt a little dizzy that time. No c/o chest pain, fever, headache, N/V, abdominal pain, urinary complaints. No recent travel/sickness/ change in meds.    INTERVAL HPI/OVERNIGHT EVENTS:   No acute overnight events noted. Patient seen and examined at bedside. Reports improvement of symptoms, resting comfortable.       REVIEW OF SYSTEMS:  CONSTITUTIONAL: No fever, weight loss, or fatigue  RESPIRATORY: No cough, wheezing, chills or hemoptysis; no shortness of breath  CARDIOVASCULAR: No chest pain, palpitations, dizziness, or leg swelling  GASTROINTESTINAL: No abdominal pain. No nausea, vomiting, or hematemesis; No diarrhea or constipation. No melena or hematochezia.  GENITOURINARY: No dysuria or hematuria, urinary frequency  NEUROLOGICAL: No headaches, memory loss, loss of strength, numbness, or tremors  SKIN: No itching, burning, rashes, or lesions     MEDICATIONS  (STANDING):  allopurinol 100 milliGRAM(s) Oral daily  atorvastatin 20 milliGRAM(s) Oral at bedtime  epoetin shannon-epbx (RETACRIT) Injectable 4000 Unit(s) IV Push <User Schedule>  gabapentin 300 milliGRAM(s) Oral at bedtime  heparin   Injectable 5000 Unit(s) SubCutaneous every 8 hours  insulin glargine Injectable (LANTUS) 27 Unit(s) SubCutaneous at bedtime  insulin lispro (ADMELOG) corrective regimen sliding scale   SubCutaneous three times a day before meals  insulin lispro (ADMELOG) corrective regimen sliding scale   SubCutaneous at bedtime  Nephro-neeraj 1 Tablet(s) Oral daily  sevelamer carbonate 1600 milliGRAM(s) Oral three times a day with meals    MEDICATIONS  (PRN):      Vital Signs Last 24 Hrs  T(C): 36.7 (09 Feb 2022 10:12), Max: 36.9 (08 Feb 2022 16:03)  T(F): 98 (09 Feb 2022 10:12), Max: 98.5 (08 Feb 2022 16:03)  HR: 86 (09 Feb 2022 10:12) (78 - 95)  BP: 121/71 (09 Feb 2022 10:12) (98/61 - 127/88)  BP(mean): --  RR: 17 (09 Feb 2022 10:12) (17 - 20)  SpO2: 100% (09 Feb 2022 10:12) (98% - 100%)    PHYSICAL EXAMINATION:  GENERAL: NAD, well built, resting comfortably in bed   HEAD:  Atraumatic, Normocephalic  EYES:  conjunctiva and sclera clear  NECK: Supple, No JVD, Normal thyroid  CHEST/LUNG: Clear to auscultation. Clear to percussion bilaterally; No rales, rhonchi, wheezing, or rubs  HEART: Regular rate and rhythm; (+) systolic murmurs, No rubs, or gallops  ABDOMEN: Soft, Nontender, Nondistended; Bowel sounds present, no pain or masses on palpation  NERVOUS SYSTEM:  Alert & Oriented X3  : voiding well  EXTREMITIES:  2+ Peripheral Pulses, No clubbing, cyanosis, or edema  SKIN: warm dry                          8.9    6.42  )-----------( 197      ( 09 Feb 2022 10:23 )             28.7     02-09    134<L>  |  99  |  36<H>  ----------------------------<  276<H>  3.9   |  24  |  7.06<H>    Ca    9.5      09 Feb 2022 10:23  Phos  4.8     02-09  Mg     2.7     02-09    TPro  7.3  /  Alb  2.7<L>  /  TBili  0.4  /  DBili  x   /  AST  11  /  ALT  17  /  AlkPhos  207<H>  02-09    LIVER FUNCTIONS - ( 09 Feb 2022 10:23 )  Alb: 2.7 g/dL / Pro: 7.3 g/dL / ALK PHOS: 207 U/L / ALT: 17 U/L DA / AST: 11 U/L / GGT: x                   I&O's Summary          CAPILLARY BLOOD GLUCOSE      RADIOLOGY & ADDITIONAL TESTS:                   PGY-1 Progress Note discussed with attending    CHIEF COMPLAINT & BRIEF HOSPITAL COURSE:  Pt is a 77 yo F from home, lives alone has HHA (24h/6d), ambulates with a walker with PMH of ESRD (on HD M/W/F), T2DM, Gout, Trigeminal neuralgia and PSH of bilateral total knee replacement came to Ambulatory surgery this morning for an elective right shoulder replacement comes with dizziness, SOB and palpitations since this morning. This morning when she woke up, she felt dizzy with the room spinning around and she felt like she was about to pass out. So laid down on her bed and felt a little better. After she came to the hospital she was having palpitations and shortness of breath. She again felt a little dizzy that time. No c/o chest pain, fever, headache, N/V, abdominal pain, urinary complaints. No recent travel/sickness/ change in meds.    INTERVAL HPI/OVERNIGHT EVENTS:   No acute overnight events noted. Patient seen and examined at bedside. Reports improvement of symptoms, resting comfortable. Denies shortness of breath, dizziness this AM      REVIEW OF SYSTEMS:  CONSTITUTIONAL: No fever, weight loss, or fatigue  RESPIRATORY: No cough, wheezing, chills or hemoptysis; no shortness of breath  CARDIOVASCULAR: No chest pain, palpitations, dizziness, or leg swelling  GASTROINTESTINAL: No abdominal pain. No nausea, vomiting, or hematemesis; No diarrhea or constipation. No melena or hematochezia.  GENITOURINARY: No dysuria or hematuria, urinary frequency  NEUROLOGICAL: No headaches, memory loss, loss of strength, numbness, or tremors  SKIN: No itching, burning, rashes, or lesions     MEDICATIONS  (STANDING):  allopurinol 100 milliGRAM(s) Oral daily  atorvastatin 20 milliGRAM(s) Oral at bedtime  epoetin shannon-epbx (RETACRIT) Injectable 4000 Unit(s) IV Push <User Schedule>  gabapentin 300 milliGRAM(s) Oral at bedtime  heparin   Injectable 5000 Unit(s) SubCutaneous every 8 hours  insulin glargine Injectable (LANTUS) 27 Unit(s) SubCutaneous at bedtime  insulin lispro (ADMELOG) corrective regimen sliding scale   SubCutaneous three times a day before meals  insulin lispro (ADMELOG) corrective regimen sliding scale   SubCutaneous at bedtime  Nephro-neeraj 1 Tablet(s) Oral daily  sevelamer carbonate 1600 milliGRAM(s) Oral three times a day with meals    MEDICATIONS  (PRN):      Vital Signs Last 24 Hrs  T(C): 36.7 (09 Feb 2022 10:12), Max: 36.9 (08 Feb 2022 16:03)  T(F): 98 (09 Feb 2022 10:12), Max: 98.5 (08 Feb 2022 16:03)  HR: 86 (09 Feb 2022 10:12) (78 - 95)  BP: 121/71 (09 Feb 2022 10:12) (98/61 - 127/88)  BP(mean): --  RR: 17 (09 Feb 2022 10:12) (17 - 20)  SpO2: 100% (09 Feb 2022 10:12) (98% - 100%)    PHYSICAL EXAMINATION:  GENERAL: NAD, well built, resting comfortably in bed   HEAD:  Atraumatic, Normocephalic  EYES:  conjunctiva and sclera clear  NECK: Supple, No JVD, Normal thyroid  CHEST/LUNG: Clear to auscultation. Clear to percussion bilaterally; No rales, rhonchi, wheezing, or rubs  HEART: Regular rate and rhythm; (+) systolic murmurs, No rubs, or gallops  ABDOMEN: Soft, Nontender, Nondistended; Bowel sounds present, no pain or masses on palpation  NERVOUS SYSTEM:  Alert & Oriented X3  : voiding well  EXTREMITIES:  2+ Peripheral Pulses, No clubbing, cyanosis, or edema  SKIN: warm dry                          8.9    6.42  )-----------( 197      ( 09 Feb 2022 10:23 )             28.7     02-09    134<L>  |  99  |  36<H>  ----------------------------<  276<H>  3.9   |  24  |  7.06<H>    Ca    9.5      09 Feb 2022 10:23  Phos  4.8     02-09  Mg     2.7     02-09    TPro  7.3  /  Alb  2.7<L>  /  TBili  0.4  /  DBili  x   /  AST  11  /  ALT  17  /  AlkPhos  207<H>  02-09    LIVER FUNCTIONS - ( 09 Feb 2022 10:23 )  Alb: 2.7 g/dL / Pro: 7.3 g/dL / ALK PHOS: 207 U/L / ALT: 17 U/L DA / AST: 11 U/L / GGT: x                   I&O's Summary          CAPILLARY BLOOD GLUCOSE      RADIOLOGY & ADDITIONAL TESTS:

## 2022-02-10 ENCOUNTER — TRANSCRIPTION ENCOUNTER (OUTPATIENT)
Age: 77
End: 2022-02-10

## 2022-02-10 LAB
ALBUMIN SERPL ELPH-MCNC: 2.9 G/DL — LOW (ref 3.5–5)
ALP SERPL-CCNC: 212 U/L — HIGH (ref 40–120)
ALT FLD-CCNC: 17 U/L DA — SIGNIFICANT CHANGE UP (ref 10–60)
ANION GAP SERPL CALC-SCNC: 10 MMOL/L — SIGNIFICANT CHANGE UP (ref 5–17)
AST SERPL-CCNC: 12 U/L — SIGNIFICANT CHANGE UP (ref 10–40)
BILIRUB SERPL-MCNC: 0.4 MG/DL — SIGNIFICANT CHANGE UP (ref 0.2–1.2)
BUN SERPL-MCNC: 27 MG/DL — HIGH (ref 7–18)
CALCIUM SERPL-MCNC: 10 MG/DL — SIGNIFICANT CHANGE UP (ref 8.4–10.5)
CHLORIDE SERPL-SCNC: 99 MMOL/L — SIGNIFICANT CHANGE UP (ref 96–108)
CO2 SERPL-SCNC: 26 MMOL/L — SIGNIFICANT CHANGE UP (ref 22–31)
CREAT SERPL-MCNC: 5.88 MG/DL — HIGH (ref 0.5–1.3)
D DIMER BLD IA.RAPID-MCNC: 607 NG/ML DDU — HIGH
GLUCOSE BLDC GLUCOMTR-MCNC: 129 MG/DL — HIGH (ref 70–99)
GLUCOSE BLDC GLUCOMTR-MCNC: 177 MG/DL — HIGH (ref 70–99)
GLUCOSE BLDC GLUCOMTR-MCNC: 186 MG/DL — HIGH (ref 70–99)
GLUCOSE BLDC GLUCOMTR-MCNC: 220 MG/DL — HIGH (ref 70–99)
GLUCOSE SERPL-MCNC: 197 MG/DL — HIGH (ref 70–99)
HCT VFR BLD CALC: 31.5 % — LOW (ref 34.5–45)
HGB BLD-MCNC: 9.8 G/DL — LOW (ref 11.5–15.5)
MCHC RBC-ENTMCNC: 28.5 PG — SIGNIFICANT CHANGE UP (ref 27–34)
MCHC RBC-ENTMCNC: 31.1 GM/DL — LOW (ref 32–36)
MCV RBC AUTO: 91.6 FL — SIGNIFICANT CHANGE UP (ref 80–100)
NRBC # BLD: 0 /100 WBCS — SIGNIFICANT CHANGE UP (ref 0–0)
PLATELET # BLD AUTO: 192 K/UL — SIGNIFICANT CHANGE UP (ref 150–400)
POTASSIUM SERPL-MCNC: 3.3 MMOL/L — LOW (ref 3.5–5.3)
POTASSIUM SERPL-SCNC: 3.3 MMOL/L — LOW (ref 3.5–5.3)
PROT SERPL-MCNC: 7.7 G/DL — SIGNIFICANT CHANGE UP (ref 6–8.3)
RBC # BLD: 3.44 M/UL — LOW (ref 3.8–5.2)
RBC # FLD: 21.2 % — HIGH (ref 10.3–14.5)
SODIUM SERPL-SCNC: 135 MMOL/L — SIGNIFICANT CHANGE UP (ref 135–145)
WBC # BLD: 6.07 K/UL — SIGNIFICANT CHANGE UP (ref 3.8–10.5)
WBC # FLD AUTO: 6.07 K/UL — SIGNIFICANT CHANGE UP (ref 3.8–10.5)

## 2022-02-10 PROCEDURE — 73030 X-RAY EXAM OF SHOULDER: CPT | Mod: 26,RT

## 2022-02-10 PROCEDURE — 99233 SBSQ HOSP IP/OBS HIGH 50: CPT | Mod: GC

## 2022-02-10 PROCEDURE — 71045 X-RAY EXAM CHEST 1 VIEW: CPT | Mod: 26

## 2022-02-10 RX ORDER — BACITRACIN ZINC 500 UNIT/G
1 OINTMENT IN PACKET (EA) TOPICAL DAILY
Refills: 0 | Status: DISCONTINUED | OUTPATIENT
Start: 2022-02-10 | End: 2022-02-11

## 2022-02-10 RX ORDER — POTASSIUM CHLORIDE 20 MEQ
20 PACKET (EA) ORAL
Refills: 0 | Status: COMPLETED | OUTPATIENT
Start: 2022-02-10 | End: 2022-02-10

## 2022-02-10 RX ORDER — NYSTATIN CREAM 100000 [USP'U]/G
1 CREAM TOPICAL
Refills: 0 | Status: DISCONTINUED | OUTPATIENT
Start: 2022-02-10 | End: 2022-02-11

## 2022-02-10 RX ORDER — METOPROLOL TARTRATE 50 MG
25 TABLET ORAL DAILY
Refills: 0 | Status: DISCONTINUED | OUTPATIENT
Start: 2022-02-10 | End: 2022-02-11

## 2022-02-10 RX ADMIN — Medication 2: at 07:59

## 2022-02-10 RX ADMIN — Medication 1: at 11:57

## 2022-02-10 RX ADMIN — HEPARIN SODIUM 5000 UNIT(S): 5000 INJECTION INTRAVENOUS; SUBCUTANEOUS at 13:03

## 2022-02-10 RX ADMIN — Medication 1 TABLET(S): at 11:56

## 2022-02-10 RX ADMIN — HEPARIN SODIUM 5000 UNIT(S): 5000 INJECTION INTRAVENOUS; SUBCUTANEOUS at 06:21

## 2022-02-10 RX ADMIN — SEVELAMER CARBONATE 1600 MILLIGRAM(S): 2400 POWDER, FOR SUSPENSION ORAL at 16:56

## 2022-02-10 RX ADMIN — ATORVASTATIN CALCIUM 20 MILLIGRAM(S): 80 TABLET, FILM COATED ORAL at 21:13

## 2022-02-10 RX ADMIN — SEVELAMER CARBONATE 1600 MILLIGRAM(S): 2400 POWDER, FOR SUSPENSION ORAL at 07:58

## 2022-02-10 RX ADMIN — Medication 20 MILLIEQUIVALENT(S): at 09:15

## 2022-02-10 RX ADMIN — Medication 100 MILLIGRAM(S): at 11:56

## 2022-02-10 RX ADMIN — Medication 1 APPLICATION(S): at 18:57

## 2022-02-10 RX ADMIN — GABAPENTIN 300 MILLIGRAM(S): 400 CAPSULE ORAL at 21:13

## 2022-02-10 RX ADMIN — ERYTHROPOIETIN 4000 UNIT(S): 10000 INJECTION, SOLUTION INTRAVENOUS; SUBCUTANEOUS at 09:49

## 2022-02-10 RX ADMIN — Medication 20 MILLIEQUIVALENT(S): at 12:00

## 2022-02-10 RX ADMIN — SEVELAMER CARBONATE 1600 MILLIGRAM(S): 2400 POWDER, FOR SUSPENSION ORAL at 11:56

## 2022-02-10 RX ADMIN — HEPARIN SODIUM 5000 UNIT(S): 5000 INJECTION INTRAVENOUS; SUBCUTANEOUS at 21:15

## 2022-02-10 RX ADMIN — NYSTATIN CREAM 1 APPLICATION(S): 100000 CREAM TOPICAL at 17:01

## 2022-02-10 NOTE — MEDICAL STUDENT PROGRESS NOTE(EDUCATION) - NS MD HP STUD ASPLAN ASSES FT
Pt is a 77 yo F from home, lives alone has HHA (24h/6d), ambulates with a walker with PMH of ESRD (on HD M/W/F), T2DM, Gout, Trigeminal neuralgia and PSH of bilateral total knee replacement came to Ambulatory surgery this morning for an elective right shoulder replacement comes with dizziness, SOB and palpitations since this morning. Admitted for near syncope.

## 2022-02-10 NOTE — DISCHARGE NOTE PROVIDER - NSDCMRMEDTOKEN_GEN_ALL_CORE_FT
allopurinol 100 mg oral tablet: 1 tab(s) orally once a day  atorvastatin 20 mg oral tablet: 1 tab(s) orally once a day (at bedtime)  gabapentin 300 mg oral tablet: 1 tab(s) orally once a day (at bedtime)  Lantus 100 units/mL subcutaneous solution: 36 unit(s) subcutaneous once a day (at bedtime)  Amina-Lefty oral tablet: 1 tab(s) orally once a day  sevelamer carbonate 800 mg oral tablet: 2 tab(s) orally 3 times a day (with meals)   allopurinol 100 mg oral tablet: 1 tab(s) orally once a day  atorvastatin 20 mg oral tablet: 1 tab(s) orally once a day (at bedtime)  gabapentin 300 mg oral tablet: 1 tab(s) orally once a day (at bedtime)  Lantus 100 units/mL subcutaneous solution: 36 unit(s) subcutaneous once a day (at bedtime)  midodrine 10 mg oral tablet: 1 tab(s) orally Monday, Wednesday, and Friday (30 minutes before dialysis)   Amina-Lefty oral tablet: 1 tab(s) orally once a day  sevelamer carbonate 800 mg oral tablet: 2 tab(s) orally 3 times a day (with meals)

## 2022-02-10 NOTE — MEDICAL STUDENT PROGRESS NOTE(EDUCATION) - SUBJECTIVE AND OBJECTIVE BOX
PGY-1 Progress Note discussed with attending    CHIEF COMPLAINT & BRIEF HOSPITAL COURSE:  Pt is a 77 yo F from home, lives alone has HHA (24h/6d), ambulates with a walker with PMH of ESRD (on HD M/W/F), T2DM, Gout, Trigeminal neuralgia and PSH of bilateral total knee replacement came to Ambulatory surgery this morning for an elective right shoulder replacement comes with dizziness, SOB and palpitations since this morning. This morning when she woke up, she felt dizzy with the room spinning around and she felt like she was about to pass out. So laid down on her bed and felt a little better. After she came to the hospital she was having palpitations and shortness of breath. She again felt a little dizzy that time. No c/o chest pain, fever, headache, N/V, abdominal pain, urinary complaints. No recent travel/sickness/ change in meds.    INTERVAL HPI/OVERNIGHT EVENTS:   No acute overnight events noted. Patient seen and examined at bedside. Reports shortness of breath, palpitations, and dizziness this AM. Otherwise no other complaints.       REVIEW OF SYSTEMS:  CONSTITUTIONAL: No fever, weight loss, or fatigue  RESPIRATORY: No cough, wheezing, chills or hemoptysis; (+) shortness of breath  CARDIOVASCULAR: No chest pain, (+) palpitations, dizziness, (-) leg swelling  GASTROINTESTINAL: No abdominal pain. No nausea, vomiting, or hematemesis; No diarrhea or constipation. No melena or hematochezia.  GENITOURINARY: No dysuria or hematuria, urinary frequency  NEUROLOGICAL: No headaches, memory loss, loss of strength, numbness, or tremors  SKIN: No itching, burning, rashes, or lesions     MEDICATIONS  (STANDING):  allopurinol 100 milliGRAM(s) Oral daily  atorvastatin 20 milliGRAM(s) Oral at bedtime  epoetin shannon-epbx (RETACRIT) Injectable 4000 Unit(s) IV Push <User Schedule>  gabapentin 300 milliGRAM(s) Oral at bedtime  heparin   Injectable 5000 Unit(s) SubCutaneous every 8 hours  insulin glargine Injectable (LANTUS) 27 Unit(s) SubCutaneous at bedtime  insulin lispro (ADMELOG) corrective regimen sliding scale   SubCutaneous three times a day before meals  insulin lispro (ADMELOG) corrective regimen sliding scale   SubCutaneous at bedtime  midodrine. 10 milliGRAM(s) Oral <User Schedule>  Nephro-neeraj 1 Tablet(s) Oral daily  potassium chloride    Tablet ER 20 milliEquivalent(s) Oral every 2 hours  sevelamer carbonate 1600 milliGRAM(s) Oral three times a day with meals    MEDICATIONS  (PRN):  ALBUTerol    90 MICROgram(s) HFA Inhaler 2 Puff(s) Inhalation every 6 hours PRN Shortness of Breath and/or Wheezing    Vital Signs Last 24 Hrs  T(C): 36.4 (10 Feb 2022 07:42), Max: 36.9 (10 Feb 2022 05:00)  T(F): 97.5 (10 Feb 2022 07:42), Max: 98.4 (10 Feb 2022 05:00)  HR: 92 (10 Feb 2022 07:42) (86 - 97)  BP: 99/59 (10 Feb 2022 07:42) (83/50 - 121/71)  BP(mean): 70 (10 Feb 2022 07:42) (70 - 70)  RR: 18 (10 Feb 2022 07:42) (17 - 18)  SpO2: 97% (10 Feb 2022 07:42) (95% - 100%)    PHYSICAL EXAMINATION:  GENERAL: NAD, well built, resting comfortably in bed   HEAD:  Atraumatic, Normocephalic  EYES:  conjunctiva and sclera clear  NECK: Supple, No JVD, Normal thyroid  CHEST/LUNG: Clear to auscultation. Clear to percussion bilaterally; No rales, rhonchi, wheezing, or rubs  HEART: Regular rate and rhythm; No murmurs, rubs, or gallops  ABDOMEN: Soft, Nontender, Nondistended; Bowel sounds present, no pain or masses on palpation  NERVOUS SYSTEM:  Alert & Oriented X3  : voiding well  EXTREMITIES:  2+ Peripheral Pulses, No clubbing, cyanosis, or edema  SKIN: warm dry                          9.8    6.07  )-----------( 192      ( 10 Feb 2022 07:22 )             31.5     02-10    135  |  99  |  27<H>  ----------------------------<  197<H>  3.3<L>   |  26  |  5.88<H>    Ca    10.0      10 Feb 2022 07:22  Phos  4.8     02-09  Mg     2.7     02-09    TPro  7.7  /  Alb  2.9<L>  /  TBili  0.4  /  DBili  x   /  AST  12  /  ALT  17  /  AlkPhos  212<H>  02-10    RADIOLOGY & ADDITIONAL TESTS:      ACC: [id number] EXAM:  XR CHEST PORTABLE URGENT 1V                          PROCEDURE DATE:  02/08/2022          INTERPRETATION:  Chest one view    HISTORY: Shortness of breath    COMPARISON STUDY: 1/9/2022    Frontal expiratory view of the chest shows the heart to be similarly   enlarged in size. The lungs are clear and there is no evidence of   pneumothorax nor pleural effusion.    IMPRESSION:  No active pulmonary disease.        Thank you for the courtesy of this referral.    --- End of Report---

## 2022-02-10 NOTE — PROGRESS NOTE ADULT - PROBLEM SELECTOR PLAN 3
·  Plan: Pt has history of anemia (baseline around 10 in Jan'22)  On admission, hemoglobin was 9.8 > 8.9 > 9.8 this AM  monitor CBC

## 2022-02-10 NOTE — PROGRESS NOTE ADULT - PROBLEM SELECTOR PLAN 1
·  Plan: Pt presented with dizziness, felt like passing out, SOB and palpitations  Vitals stable, Physical exam unremarkable, EKG unchanged from previous (showed NSR with RBBB)  Telemonitoring with PVCs occasionally  Orthostatic vitals negative  May be cardiogenic given h/o AS (mild on TTE in 1/2022)  consider near Syncope related to hypotension related from HD  no further cardiac intervention needed at this time

## 2022-02-10 NOTE — PROGRESS NOTE ADULT - PROBLEM SELECTOR PLAN 6
·  Plan: Pt has JARVIS  uses CPAP at night  - pt reports CPAP is broken  - pulm consult rec repeat sleep study, no PAP for now, O2 + albuterol prn (monitor blood glucose w/coverage)   Pt requested referral for outpatient Pulmonology for new CPAP machine and PFT evaluation.

## 2022-02-10 NOTE — DISCHARGE NOTE PROVIDER - NSDCCPCAREPLAN_GEN_ALL_CORE_FT
PRINCIPAL DISCHARGE DIAGNOSIS  Diagnosis: Near syncope  Assessment and Plan of Treatment:       SECONDARY DISCHARGE DIAGNOSES  Diagnosis: ESRD on dialysis  Assessment and Plan of Treatment: You were noted to have a history of end-stage renal disease (ESRD) on dialysis. You received dialysis on your regular standard dialysis days provided by the hospital nephrologist. You were noted to have low blood pressure during your dialysis sessions and a medication called Midodrine was given. Your electrolytes were corrected with hospital dialysis and you were deemed clear for discharge.    Diagnosis: Anemia secondary to renal failure  Assessment and Plan of Treatment: You were noted to have a history of anemia, most likely due to your end-stage renal disease. Your hemoglobin, a marker of your anemia, was monitored daily. Your normal baseline hemoglobin was within range during your hospital stay and no further intervention was provided. Please follow up within 1-2 weeks with your primary care provider to review this condition.    Diagnosis: Type 2 diabetes mellitus  Assessment and Plan of Treatment: You were noted to have a history of Type 2 Diabetes Mellitus. During your hospital stay you were treated with you home insulin medication and your glucose was monitored daily. Your glucose was found to be in appropriate ranges daily and no additional medications or interventions were provided. Please follow up within 1-2 weeks with your primary care provider to review this condition.    Diagnosis: Gout  Assessment and Plan of Treatment: You were noted to have a history of gout. During your hospital stay, you were treated with your home medication and no additional medications or interventions were provied. Please follow up within 1-2 weeks with your primary care provider to review this condition.    Diagnosis: JARVIS on CPAP  Assessment and Plan of Treatment: You were noted to have a history of obstructive sleep apnea (JARVIS) treated with home Continuous Positive Airway Pressure (CPAP). You informed us that your CPAP at home was non-functioning. During your hospital stay, your oxygen was monitored and were found to be stable without additional oxygen. You are recommended to follow up with your outpatient pulmonologist to go over your condition.    Diagnosis: Preventive measure  Assessment and Plan of Treatment: During your hospital stay, you were treated prophylactically with heparin to prevent a dangerous condition involving the formation of clots in the deep veins of your legs called Deep Vein Thrombosis.     PRINCIPAL DISCHARGE DIAGNOSIS  Diagnosis: Near syncope  Assessment and Plan of Treatment: You were admitted to the hospital because you were experiencing symptoms of dizziness, palpitations, shortness of breath, and nearly passing out. We placed telemonitoring equipment on you to monitor your heart to see if there is a cardiac cause for your symptoms but were found to be normal. We tested your blood pressure upon standing to assess if you have a condition known to cause low blood pressure upon standing and were found to be negative. You were also re-evaluated using an ultrasound of your heart to further assess for a cause for your symptoms. You were noted to have low blood pressure during and following your dialysis sessions and this was the suggestive cause of your symptoms. A medicine called Midodrine was given to you to help increase your blood pressure following dialysis. You reported improvement of symptoms and were deemed medically cleared for discharge. Please follow up within 1-2 weeks with your primary care provider to review this condition.      SECONDARY DISCHARGE DIAGNOSES  Diagnosis: ESRD on dialysis  Assessment and Plan of Treatment: You were noted to have a history of end-stage renal disease (ESRD) on dialysis. You received dialysis on your regular standard dialysis days provided by the hospital nephrologist. You were noted to have low blood pressure during your dialysis sessions and a medication called Midodrine was given. Your electrolytes were corrected with hospital dialysis and you were deemed clear for discharge. Please follow up within 1-2 weeks with your nephrologist to review this condition.    Diagnosis: Anemia secondary to renal failure  Assessment and Plan of Treatment: You were noted to have a history of anemia, most likely due to your end-stage renal disease. Your hemoglobin, a marker of your anemia, was monitored daily. Your normal baseline hemoglobin was within range during your hospital stay and no further intervention was provided. Please follow up within 1-2 weeks with your primary care provider to review this condition.    Diagnosis: Type 2 diabetes mellitus  Assessment and Plan of Treatment: You were noted to have a history of Type 2 Diabetes Mellitus. During your hospital stay you were treated with you home insulin medication and your glucose was monitored daily. Your glucose was found to be in the appropriate ranges daily and no additional medications or interventions were provided. Please follow up within 1-2 weeks with your primary care provider to review this condition.    Diagnosis: Gout  Assessment and Plan of Treatment: You were noted to have a history of gout. During your hospital stay, you were treated with your home medication and no additional medications or interventions were provied. Please follow up within 1-2 weeks with your primary care provider to review this condition.    Diagnosis: JARVIS on CPAP  Assessment and Plan of Treatment: You were noted to have a history of obstructive sleep apnea (JARVIS) treated with home Continuous Positive Airway Pressure (CPAP). You informed us that your CPAP at home was non-functioning. During your hospital stay, your oxygen was monitored and were found to be stable without additional oxygen. You are recommended to follow up with your outpatient pulmonologist to go over your condition.    Diagnosis: Preventive measure  Assessment and Plan of Treatment: During your hospital stay, you were treated prophylactically with heparin to prevent a dangerous condition involving the formation of clots in the deep veins of your legs called Deep Vein Thrombosis. You will not require any further interventions upon discharge.     PRINCIPAL DISCHARGE DIAGNOSIS  Diagnosis: Near syncope  Assessment and Plan of Treatment: You were admitted to the hospital because you were experiencing symptoms of dizziness, palpitations, shortness of breath, and nearly passing out. We placed telemonitoring equipment on you to monitor your heart to see if there is a cardiac cause for your symptoms but were found to be normal. We tested your blood pressure upon standing to assess if you have a condition known to cause low blood pressure upon standing and were found to be negative. You were also re-evaluated using an ultrasound of your heart to further assess for a cause for your symptoms. You were noted to have low blood pressure during and following your dialysis sessions and this was the suggestive cause of your symptoms. A medicine called Midodrine was given to you to help increase your blood pressure following dialysis. PLease take this medication on dialysis days as prescribed. You reported improvement of symptoms and were deemed medically cleared for discharge. Please follow up within 1-2 weeks with your primary care provider to review this condition.      SECONDARY DISCHARGE DIAGNOSES  Diagnosis: Aortic stenosis  Assessment and Plan of Treatment: You have a history of aortic stenosis. A repeat echo showed Aortic stenosis with a hyperdynamic Left ventricle and an increased ejection fraction (75%). Please follow up with Dr. David Cardiologist as an outpatient (details below)    Diagnosis: ESRD on dialysis  Assessment and Plan of Treatment: You have a history of end-stage renal disease (ESRD) for which you are on dialysis. You received dialysis on your regular standard dialysis days provided by the hospital nephrologist. You were noted to have low blood pressure during your dialysis sessions and a medication called Midodrine was given. Your electrolytes were corrected with hospital dialysis and you were deemed clear for discharge. Please follow up within 1-2 weeks with your nephrologist to review this condition.    Diagnosis: Anemia secondary to renal failure  Assessment and Plan of Treatment: You were noted to have a history of anemia, most likely due to your end-stage renal disease. Your hemoglobin, a marker of your anemia, was monitored daily. Your normal baseline hemoglobin was within range during your hospital stay and no further intervention was provided. Please follow up within 1-2 weeks with your primary care provider to review this condition.    Diagnosis: Type 2 diabetes mellitus  Assessment and Plan of Treatment: You were noted to have a history of Type 2 Diabetes Mellitus. During your hospital stay you were treated with you home insulin medication and your glucose was monitored daily. Your glucose was found to be in the appropriate ranges daily and no additional medications or interventions were provided. Please follow up within 1-2 weeks with your primary care provider to review this condition.    Diagnosis: Gout  Assessment and Plan of Treatment: You have previously been diagnosed with gout. We continued your home medications during your hospitalization. Please continue to take your medications as prescribed and follow up with your primary doctor within 1 week of discharge and routinely after for further management.    Diagnosis: JARVIS on CPAP  Assessment and Plan of Treatment: You have a history of obstructive sleep apnea (JARVIS) treated with home Continuous Positive Airway Pressure (CPAP). You informed us that your CPAP at home was non-functioning. During your hospital stay, your oxygen was monitored and were found to be stable without additional oxygen. You are recommended to follow up with your outpatient pulmonologist to go over your condition.

## 2022-02-10 NOTE — DISCHARGE NOTE NURSING/CASE MANAGEMENT/SOCIAL WORK - PATIENT PORTAL LINK FT
You can access the FollowMyHealth Patient Portal offered by [facility] by registering at the following website: [email or web address]. By joining [employer]’s FollowMyHealth portal, you will also be able to view your health information using other applications (apps) compatible with our system.

## 2022-02-10 NOTE — PROGRESS NOTE ADULT - SUBJECTIVE AND OBJECTIVE BOX
Time of Visit:  Patient seen and examined.     MEDICATIONS  (STANDING):  allopurinol 100 milliGRAM(s) Oral daily  atorvastatin 20 milliGRAM(s) Oral at bedtime  epoetin shannon-epbx (RETACRIT) Injectable 4000 Unit(s) IV Push <User Schedule>  gabapentin 300 milliGRAM(s) Oral at bedtime  heparin   Injectable 5000 Unit(s) SubCutaneous every 8 hours  insulin glargine Injectable (LANTUS) 27 Unit(s) SubCutaneous at bedtime  insulin lispro (ADMELOG) corrective regimen sliding scale   SubCutaneous three times a day before meals  insulin lispro (ADMELOG) corrective regimen sliding scale   SubCutaneous at bedtime  metoprolol succinate ER 25 milliGRAM(s) Oral daily  midodrine. 10 milliGRAM(s) Oral <User Schedule>  Nephro-neeraj 1 Tablet(s) Oral daily  sevelamer carbonate 1600 milliGRAM(s) Oral three times a day with meals      MEDICATIONS  (PRN):  ALBUTerol    90 MICROgram(s) HFA Inhaler 2 Puff(s) Inhalation every 6 hours PRN Shortness of Breath and/or Wheezing       Medications up to date at time of exam.    ROS;  No fever, chills, cough, congestion on exam. Verbalized of episodic mild SOB on exertion.     PHYSICAL EXAMINATION:    Vital Signs Last 24 Hrs  T(C): 36.3 (10 Feb 2022 11:03), Max: 36.9 (10 Feb 2022 05:00)  T(F): 97.3 (10 Feb 2022 11:03), Max: 98.4 (10 Feb 2022 05:00)  HR: 71 (10 Feb 2022 12:43) (71 - 96)  BP: 120/75 (10 Feb 2022 12:43) (83/50 - 126/74)  BP(mean): 70 (10 Feb 2022 07:42) (70 - 70)  RR: 18 (10 Feb 2022 11:03) (18 - 18)  SpO2: 97% (10 Feb 2022 11:03) (95% - 100%)   (if applicable)    General: Alert and oriented. Able to answer question at rest with no SOB. No acute distress.      HEENT: Head is normocephalic and atraumatic. No nasal tenderness . Extraocular muscles are intact. Mucous membranes are moist.     NECK: Supple, no palpable adenopathy.    LUNGS: Clear to auscultation bilaterally with no wheezing, rales, or rhonchi. No use of accessory muscle.     HEART: S1 S2 Regular rate and no click/ rub.     ABDOMEN: Soft, nontender, and nondistended. Active bowel sounds.     EXTREMITIES: Without any cyanosis, clubbing, rash, lesions or edema.    NEUROLOGIC: Awake, alert, oriented.     SKIN: Warm and moist . Non diaphoretic.       LABS:                        9.8    6.07  )-----------( 192      ( 10 Feb 2022 07:22 )             31.5     02-10    135  |  99  |  27<H>  ----------------------------<  197<H>  3.3<L>   |  26  |  5.88<H>    Ca    10.0      10 Feb 2022 07:22  Phos  4.8     02-  Mg     2.7     -    TPro  7.7  /  Alb  2.9<L>  /  TBili  0.4  /  DBili  x   /  AST  12  /  ALT  17  /  AlkPhos  212<H>  -10      MICROBIOLOGY: (if applicable)    RADIOLOGY & ADDITIONAL STUDIES:  EKG:   CXR:  ECHO:    IMPRESSION: 76y Female PAST MEDICAL & SURGICAL HISTORY:  HTN (hypertension)    Hypercholesterolemia    Diabetes mellitus    Bronchial asthma  hx of    Vertigo    Trigeminal neuralgia  hx of    ESRD (end stage renal disease) on dialysis  started HD  ~ M/W/F    Pedal edema    Sleep apnea  on CPAP    Dyslipidemia    Anemia    Gout    Carpal tunnel syndrome  left    Chronic right shoulder pain    Disorder of ligament of wrist, left    Mild diastolic dysfunction  ~ Stage I    RSV bronchitis    GOEL (dyspnea on exertion)    S/P rotator cuff surgery  bilateral &#x27;s    S/P       S/P hysterectomy      S/P total knee replacement  bilateral, left , right     S/P cataract extraction  bilateral,     History of carpal tunnel surgery of left wrist      S/P arteriovenous (AV) fistula creation  left upper forearm cephalic vein brachial artery AV fistula creation on 2018, left arm basilic vein transposition on 2019.      History of arthroscopy of left shoulder  x 3    History of arthroscopy of right shoulder  x 3    History of vascular access device  , removed 2020    Impression: This is a 77 Y/O Female presented to ED with Dizziness, Palpitation and SOB x 1 Day. Admitted for Near Syncope. Has Obstructive Sleep Apnea and using CPAP at night. 22 Negative PCR for Covid 19. CXR no acute pulmonary findings.      Suggestion:  O2 saturation 99% room air. Has episodic SOB on exertion , can have oxygen supplementation 2L NC if needed for SOB.   Continue PRN Albuterol 90 mcg 2 Puffs Q 6 Hours.   Pulmonary oral hygiene care especially every after oral inhaler used.    Clarified to the patient at bedside that her CPAP is at bedside and its working good but patient requesting to have new device. Can use her own device with supervision from hospital RT.     Nephrology follow up for HD.        Reinforced the patient that she needs to have outpatient repeat sleep study to get new CPAP machine.    Time of Visit:  Patient seen and examined.     MEDICATIONS  (STANDING):  allopurinol 100 milliGRAM(s) Oral daily  atorvastatin 20 milliGRAM(s) Oral at bedtime  epoetin shannon-epbx (RETACRIT) Injectable 4000 Unit(s) IV Push <User Schedule>  gabapentin 300 milliGRAM(s) Oral at bedtime  heparin   Injectable 5000 Unit(s) SubCutaneous every 8 hours  insulin glargine Injectable (LANTUS) 27 Unit(s) SubCutaneous at bedtime  insulin lispro (ADMELOG) corrective regimen sliding scale   SubCutaneous three times a day before meals  insulin lispro (ADMELOG) corrective regimen sliding scale   SubCutaneous at bedtime  metoprolol succinate ER 25 milliGRAM(s) Oral daily  midodrine. 10 milliGRAM(s) Oral <User Schedule>  Nephro-neeraj 1 Tablet(s) Oral daily  sevelamer carbonate 1600 milliGRAM(s) Oral three times a day with meals      MEDICATIONS  (PRN):  ALBUTerol    90 MICROgram(s) HFA Inhaler 2 Puff(s) Inhalation every 6 hours PRN Shortness of Breath and/or Wheezing       Medications up to date at time of exam.    ROS;  No fever, chills, cough, congestion on exam. Verbalized of episodic mild SOB on exertion.     PHYSICAL EXAMINATION:    Vital Signs Last 24 Hrs  T(C): 36.3 (10 Feb 2022 11:03), Max: 36.9 (10 Feb 2022 05:00)  T(F): 97.3 (10 Feb 2022 11:03), Max: 98.4 (10 Feb 2022 05:00)  HR: 71 (10 Feb 2022 12:43) (71 - 96)  BP: 120/75 (10 Feb 2022 12:43) (83/50 - 126/74)  BP(mean): 70 (10 Feb 2022 07:42) (70 - 70)  RR: 18 (10 Feb 2022 11:03) (18 - 18)  SpO2: 97% (10 Feb 2022 11:03) (95% - 100%)   (if applicable)    General: Alert and oriented. Able to answer question at rest with no SOB. No acute distress.      HEENT: Head is normocephalic and atraumatic. No nasal tenderness . Extraocular muscles are intact. Mucous membranes are moist.     NECK: Supple, no palpable adenopathy.    LUNGS: Clear to auscultation bilaterally with no wheezing, rales, or rhonchi. No use of accessory muscle.     HEART: S1 S2 Regular rate and no click/ rub.     ABDOMEN: Soft, nontender, and nondistended. Active bowel sounds.     EXTREMITIES: Without any cyanosis, clubbing, rash, lesions or edema.    NEUROLOGIC: Awake, alert, oriented.     SKIN: Warm and moist . Non diaphoretic.       LABS:                        9.8    6.07  )-----------( 192      ( 10 Feb 2022 07:22 )             31.5     02-10    135  |  99  |  27<H>  ----------------------------<  197<H>  3.3<L>   |  26  |  5.88<H>    Ca    10.0      10 Feb 2022 07:22  Phos  4.8     02-  Mg     2.7     -    TPro  7.7  /  Alb  2.9<L>  /  TBili  0.4  /  DBili  x   /  AST  12  /  ALT  17  /  AlkPhos  212<H>  -10      MICROBIOLOGY: (if applicable)    RADIOLOGY & ADDITIONAL STUDIES:  EKG:   CXR:  ECHO:    IMPRESSION: 76y Female PAST MEDICAL & SURGICAL HISTORY:  HTN (hypertension)    Hypercholesterolemia    Diabetes mellitus    Bronchial asthma  hx of    Vertigo    Trigeminal neuralgia  hx of    ESRD (end stage renal disease) on dialysis  started HD  ~ M/W/F    Pedal edema    Sleep apnea  on CPAP    Dyslipidemia    Anemia    Gout    Carpal tunnel syndrome  left    Chronic right shoulder pain    Disorder of ligament of wrist, left    Mild diastolic dysfunction  ~ Stage I    RSV bronchitis    GOEL (dyspnea on exertion)    S/P rotator cuff surgery  bilateral &#x27;s    S/P       S/P hysterectomy      S/P total knee replacement  bilateral, left , right     S/P cataract extraction  bilateral,     History of carpal tunnel surgery of left wrist      S/P arteriovenous (AV) fistula creation  left upper forearm cephalic vein brachial artery AV fistula creation on 2018, left arm basilic vein transposition on 2019.      History of arthroscopy of left shoulder  x 3    History of arthroscopy of right shoulder  x 3    History of vascular access device  , removed 2020    Impression: This is a 75 Y/O Female presented to ED with Dizziness, Palpitation and SOB x 1 Day. Admitted for Near Syncope. Has Obstructive Sleep Apnea and using CPAP at night. 22 Negative PCR for Covid 19. CXR no acute pulmonary findings.      Suggestion:  - Pat is tolerating room air   - Hemodynamic stable   - Check DDimer   - Non contrast CT 22 chest suggest dilated PA  and cardiac echo 22 no pulmonary hypertension   - Repeat limited  cardiac echo to evaluate for PAP  discussed   - Continue PRN Albuterol 90 mcg 2 Puffs Q 6 Hours.   - Pulmonary oral hygiene care especially every after oral inhaler used.    - Clarified to the patient at bedside that her CPAP is at bedside and its working good but patient requesting to have new device. Can use her own device with supervision from hospital RT.     - Nephrology follow up for HD.       - Reinforced the patient that she needs to have outpatient repeat sleep study to get new CPAP machine.

## 2022-02-10 NOTE — DISCHARGE NOTE PROVIDER - ATTENDING DISCHARGE PHYSICAL EXAMINATION:
PHYSICAL EXAM:  GENERAL: NAD, well-developed, lying flat in bed after HD  HEAD:  Atraumatic, Normocephalic  EYES: EOMI, PERRLA, conjunctiva and sclera clear  NECK: Supple, No JVD  CHEST/LUNG: Clear to auscultation bilaterally; No wheeze, rhonchi, rales  HEART: Regular rate and rhythm; No murmurs, rubs, or gallops  ABDOMEN: Soft, Nontender, Nondistended; Bowel sounds present  EXTREMITIES:  2+ Peripheral Pulses, No clubbing, cyanosis, or edema  PSYCH: AAOx3, calm, cooperative  NEUROLOGY: non-focal  SKIN: No rashes or lesions

## 2022-02-10 NOTE — PROGRESS NOTE ADULT - SUBJECTIVE AND OBJECTIVE BOX
PGY-1 Progress Note discussed with attending    CHIEF COMPLAINT & BRIEF HOSPITAL COURSE:  Pt is a 75 yo F from home, lives alone has HHA (24h/6d), ambulates with a walker with PMH of ESRD (on HD M/W/F), T2DM, Gout, Trigeminal neuralgia and PSH of bilateral total knee replacement came to Ambulatory surgery this morning for an elective right shoulder replacement comes with dizziness, SOB and palpitations since this morning. This morning when she woke up, she felt dizzy with the room spinning around and she felt like she was about to pass out. So laid down on her bed and felt a little better. After she came to the hospital she was having palpitations and shortness of breath. She again felt a little dizzy that time. No c/o chest pain, fever, headache, N/V, abdominal pain, urinary complaints. No recent travel/sickness/ change in meds.    INTERVAL HPI/OVERNIGHT EVENTS:   No acute overnight events noted. Patient seen and examined at bedside. Reports shortness of breath, palpitations, and dizziness this AM. Otherwise no other complaints.       REVIEW OF SYSTEMS:  CONSTITUTIONAL: No fever, weight loss, or fatigue  RESPIRATORY: No cough, wheezing, chills or hemoptysis; (+) shortness of breath  CARDIOVASCULAR: No chest pain, (+) palpitations, dizziness, (-) leg swelling  GASTROINTESTINAL: No abdominal pain. No nausea, vomiting, or hematemesis; No diarrhea or constipation. No melena or hematochezia.  GENITOURINARY: No dysuria or hematuria, urinary frequency  NEUROLOGICAL: No headaches, memory loss, loss of strength, numbness, or tremors  SKIN: No itching, burning, rashes, or lesions     MEDICATIONS  (STANDING):  allopurinol 100 milliGRAM(s) Oral daily  atorvastatin 20 milliGRAM(s) Oral at bedtime  epoetin shannon-epbx (RETACRIT) Injectable 4000 Unit(s) IV Push <User Schedule>  gabapentin 300 milliGRAM(s) Oral at bedtime  heparin   Injectable 5000 Unit(s) SubCutaneous every 8 hours  insulin glargine Injectable (LANTUS) 27 Unit(s) SubCutaneous at bedtime  insulin lispro (ADMELOG) corrective regimen sliding scale   SubCutaneous three times a day before meals  insulin lispro (ADMELOG) corrective regimen sliding scale   SubCutaneous at bedtime  midodrine. 10 milliGRAM(s) Oral <User Schedule>  Nephro-neeraj 1 Tablet(s) Oral daily  potassium chloride    Tablet ER 20 milliEquivalent(s) Oral every 2 hours  sevelamer carbonate 1600 milliGRAM(s) Oral three times a day with meals    MEDICATIONS  (PRN):  ALBUTerol    90 MICROgram(s) HFA Inhaler 2 Puff(s) Inhalation every 6 hours PRN Shortness of Breath and/or Wheezing    Vital Signs Last 24 Hrs  T(C): 36.4 (10 Feb 2022 07:42), Max: 36.9 (10 Feb 2022 05:00)  T(F): 97.5 (10 Feb 2022 07:42), Max: 98.4 (10 Feb 2022 05:00)  HR: 92 (10 Feb 2022 07:42) (86 - 97)  BP: 99/59 (10 Feb 2022 07:42) (83/50 - 121/71)  BP(mean): 70 (10 Feb 2022 07:42) (70 - 70)  RR: 18 (10 Feb 2022 07:42) (17 - 18)  SpO2: 97% (10 Feb 2022 07:42) (95% - 100%)    PHYSICAL EXAMINATION:  GENERAL: NAD, well built, resting comfortably in bed   HEAD:  Atraumatic, Normocephalic  EYES:  conjunctiva and sclera clear  NECK: Supple, No JVD, Normal thyroid  CHEST/LUNG: Clear to auscultation. Clear to percussion bilaterally; No rales, rhonchi, wheezing, or rubs  HEART: Regular rate and rhythm; No murmurs, rubs, or gallops  ABDOMEN: Soft, Nontender, Nondistended; Bowel sounds present, no pain or masses on palpation  NERVOUS SYSTEM:  Alert & Oriented X3  : voiding well  EXTREMITIES:  2+ Peripheral Pulses, No clubbing, cyanosis, or edema  SKIN: warm dry                          9.8    6.07  )-----------( 192      ( 10 Feb 2022 07:22 )             31.5     02-10    135  |  99  |  27<H>  ----------------------------<  197<H>  3.3<L>   |  26  |  5.88<H>    Ca    10.0      10 Feb 2022 07:22  Phos  4.8     02-09  Mg     2.7     02-09    TPro  7.7  /  Alb  2.9<L>  /  TBili  0.4  /  DBili  x   /  AST  12  /  ALT  17  /  AlkPhos  212<H>  02-10    RADIOLOGY & ADDITIONAL TESTS:      ACC: [id number] EXAM:  XR CHEST PORTABLE URGENT 1V                          PROCEDURE DATE:  02/08/2022          INTERPRETATION:  Chest one view    HISTORY: Shortness of breath    COMPARISON STUDY: 1/9/2022    Frontal expiratory view of the chest shows the heart to be similarly   enlarged in size. The lungs are clear and there is no evidence of   pneumothorax nor pleural effusion.    IMPRESSION:  No active pulmonary disease.        Thank you for the courtesy of this referral.    --- End of Report---

## 2022-02-10 NOTE — DISCHARGE NOTE NURSING/CASE MANAGEMENT/SOCIAL WORK - NSDCPEFALRISK_GEN_ALL_CORE
For information on Fall & Injury Prevention, visit: [email or web address] OR  [email or web address] OR  https://www.cdc.gov/steadi/patient.html

## 2022-02-10 NOTE — PROGRESS NOTE ADULT - SUBJECTIVE AND OBJECTIVE BOX
DATE OF SERVICE:  2/10/2022  Patient was seen,examined and evaluated  by me.ER evaluation, Labs and Hospital course was reviewed,    CHIEF COMPLAINT: sob and near syncope    HPI:HPI:  Pt is a 77 yo F from home, lives alone has HHA (24h/6d), ambulates with a walker with PMH of ESRD (on HD M//), T2DM, Gout, Trigeminal neuralgia and PSH of bilateral total knee replacement came to Ambulatory surgery this morning for an elective right shoulder replacement comes with dizziness, SOB and palpitations since this morning. This morning when she woke up, she felt dizzy with the room spinning around and she felt like she was about to pass out. So laid down on her bed and felt a little better. After she came to the hospital she was having palpitations and shortness of breath. She again felt a little dizzy that time. No c/o chest pain, fever, headache, N/V, abdominal pain, urinary complaints. No recent travel/sickness/ change in meds.   (2022 13:56)      PAST MEDICAL & SURGICAL HISTORY:  HTN (hypertension)    Hypercholesterolemia    Diabetes mellitus    Bronchial asthma  hx of    Vertigo    Trigeminal neuralgia  hx of    ESRD (end stage renal disease) on dialysis  started HD  ~ /W/    Pedal edema    Sleep apnea  on CPAP    Dyslipidemia    Anemia    Gout    Carpal tunnel syndrome  left    Chronic right shoulder pain    Disorder of ligament of wrist, left    Mild diastolic dysfunction  ~ Stage I    RSV bronchitis    GOEL (dyspnea on exertion)    S/P rotator cuff surgery  bilateral &#x27;s    S/P       S/P hysterectomy      S/P total knee replacement  bilateral, left , right     S/P cataract extraction  bilateral,     History of carpal tunnel surgery of left wrist      S/P arteriovenous (AV) fistula creation  left upper forearm cephalic vein brachial artery AV fistula creation on 2018, left arm basilic vein transposition on 2019.      History of arthroscopy of left shoulder  x 3    History of arthroscopy of right shoulder  x 3    History of vascular access device  , removed 2020        MEDICATIONS  (STANDING):  allopurinol 100 milliGRAM(s) Oral daily  atorvastatin 20 milliGRAM(s) Oral at bedtime  epoetin shannon-epbx (RETACRIT) Injectable 4000 Unit(s) IV Push <User Schedule>  gabapentin 300 milliGRAM(s) Oral at bedtime  heparin   Injectable 5000 Unit(s) SubCutaneous every 8 hours  insulin glargine Injectable (LANTUS) 27 Unit(s) SubCutaneous at bedtime  insulin lispro (ADMELOG) corrective regimen sliding scale   SubCutaneous three times a day before meals  insulin lispro (ADMELOG) corrective regimen sliding scale   SubCutaneous at bedtime  metoprolol succinate ER 25 milliGRAM(s) Oral daily  midodrine. 10 milliGRAM(s) Oral <User Schedule>  Nephro-neeraj 1 Tablet(s) Oral daily  sevelamer carbonate 1600 milliGRAM(s) Oral three times a day with meals    MEDICATIONS  (PRN):  ALBUTerol    90 MICROgram(s) HFA Inhaler 2 Puff(s) Inhalation every 6 hours PRN Shortness of Breath and/or Wheezing      FAMILY HISTORY:  No pertinent family history in first degree relatives      No family history of premature coronary artery disease or sudden cardiac death    SOCIAL HISTORY:  Smoking-[ ] Active  [ ] Former [ ] Non Smoker  Alcohol-[ ] Denies [ ] Social [ ] Daily  Ilicit Drug use-[ ] Denies [ ] Active user    REVIEW OF SYSTEMS:  Constitutional: [ ] fever, [ ]weight loss, [ ]fatigue   Activity [ ] Bedbound,[ ] Ambulates [ ] Unassisted[ ] Cane/Walker [ ] Assistence.  Effort tolerance:[ ] Excellent [ ] Good [ ] Fair [ ] Poor [ ]  Eyes: [ ] visual changes  Respiratory: [ ]shortness of breath;  [ ] cough, [ ]wheezing, [ ]chills, [ ]hemoptysis  Cardiovascular: [ ] chest pain, [ ]palpitations, [ ]dizziness,  [ ]leg swelling[ ]orthopnea [ ]PND  Gastrointestinal: [ ] abdominal pain, [ ]nausea, [ ]vomiting,  [ ]diarrhea,[ ]constipation  Genitourinary: [ ] dysuria, [ ] hematuria  Neurologic: [ ] headaches [ ] tremors[ ] weakness  Skin: [ ] itching, [ ]burning, [ ] rashes  Endocrine: [ ] heat or cold intolerance  Musculoskeletal: [ ] joint pain or swelling; [ ] muscle, back, or extremity pain  Psychiatric: [ ] depression, [ ]anxiety, [ ]mood swings, or [ ]difficulty sleeping  Hematologic: [ ] easy bruising, [ ] bleeding gums       [ x] All others negative	  [ ] Unable to obtain    Vital Signs Last 24 Hrs  T(C): 36.3 (10 Feb 2022 15:41), Max: 36.9 (10 Feb 2022 05:00)  T(F): 97.3 (10 Feb 2022 15:41), Max: 98.4 (10 Feb 2022 05:00)  HR: 79 (10 Feb 2022 15:41) (71 - 96)  BP: 118/67 (10 Feb 2022 15:41) (91/56 - 126/74)  BP(mean): 70 (10 Feb 2022 07:42) (70 - 70)  RR: 17 (10 Feb 2022 15:41) (17 - 18)  SpO2: 98% (10 Feb 2022 15:41) (96% - 100%)  I&O's Summary    2022 07:01  -  10 Feb 2022 07:00  --------------------------------------------------------  IN: 250 mL / OUT: 0 mL / NET: 250 mL        PHYSICAL EXAM:  General: No acute distress BMI-  HEENT: EOMI, PERRL[ ] Icteric  Neck: Supple, No JVD  Lungs: Equal air entry bilaterally; [ ] Rales [ ] Rhonchi [ ] Wheezing  Heart: Regular rate and rhythm;[ ] Murmurs-   /6 [ ] Systolic [ ] Diastolic [ ] Radiation,No rubs, or gallops  Abdomen: Nontender, bowel sounds present  Extremities: No clubbing, cyanosis, or edema[ ] Calf tenderness  Nervous system:  Alert & Oriented X3, no focal deficits  Psychiatric: Normal affect  Skin: No rashes or lesions      LABS:  02-10    135  |  99  |  27<H>  ----------------------------<  197<H>  3.3<L>   |  26  |  5.88<H>    Ca    10.0      10 Feb 2022 07:22  Phos  4.8     02-09  Mg     2.7     02-09    TPro  7.7  /  Alb  2.9<L>  /  TBili  0.4  /  DBili  x   /  AST  12  /  ALT  17  /  AlkPhos  212<H>  02-10    Creatinine Trend: 5.88<--, 7.06<--, 5.06<--, 9.36<--                        9.8    6.07  )-----------( 192      ( 10 Feb 2022 07:22 )             31.5         Lipid Panel:   Cardiac Enzymes:           RADIOLOGY:    ECG [my interpretation]:    TELEMETRY:    ECHO:  < from: Transthoracic Echocardiogram (22 @ 09:09) >  Observations:  Mitral Valve: Normal mitral valve.  Aortic Valve/Aorta: Mild aortic stenosis:  ---LVOTd 1.9 cm, VTI 21.2 cm.  ---Aortic valve mean gradient 13.7 mmHg; VTI 38.7 cm. DI =  0.55.  ---Aortic valve area by continuity 1.6 cm2.  Minimal aortic regurgitation.  Normal aortic root size.  Left Atrium: Normal left atrium.  Left Ventricle: Normal left ventricular internal dimensions  and wall thicknesses.  Hyperdynamic left ventricle.  Impaired LV-relaxation with normal filling pressure.  Right Heart: Normal right atrium. Normal right ventricular  size and function.  Normal tricuspid valve. Mild tricuspid regurgitation.  Normal pulmonic valve.  Pericardium/Pleura: Normal pericardium with no pericardial  effusion.  Hemodynamic: Estimated right atrial pressure is normal.  No evidence of pulmonary hypertension.  ------------------------------------------------------------------------  Conclusions:  Hyperdynamic left ventricle.  Mild aortic stenosis.  ------------------------------------------------------------------------  Confirmed on  2022 - 14:41:12 by Tramaine Vincent MD, FASE  ------------------------------------------------------------------------    < end of copied text >      STRESS TEST:    CATHETERIZATION:

## 2022-02-10 NOTE — DISCHARGE NOTE PROVIDER - HOSPITAL COURSE
Pt is a 75 yo F from home, lives alone has HHA (24h/6d), ambulates with a walker with PMH of ESRD (on HD M/W/F), T2DM, Gout, Trigeminal neuralgia and PSH of bilateral total knee replacement came scheduled to have ambulatory surgery on 2/8 for an elective right shoulder replacement but was instead admitted for near syncope due to dizziness, SOB, and palpitations since that morning. She denies any chest pain, fever, headache, N/V, abdominal pain, urinary complaints. No recent travel/sickness/ change in meds. Upon presentation, her vitals and physical exam were unremarkable. EKG unchanged from previous (showed NSR with RBBB). During her hospital stay, she was placed on telemonitoring with occasional PVS. Orthostatic vitals were noted to be negative. Cardiology was consulted and recommended ultrasonography of pulmonary artery after noting markedly enlarged pulmonary arteries on previous CT on 1/11/22. She was also taken for hemodialysis on her standard day and was found to be have intradialytic hypotension. She was treated with midodrine. Patient symptomatically improved without any complaints. Patient has a history of anemia, CBC was monitored daily and Hgb was within her normal baseline. Patient has history of Type 2 DM, she was treated with home lantus and low dose insulin HSS during her hospital stay. Patient has history of gout, resumed home medication of allopurinol. Patient has history of JARVIS on CPAP, she admitted that her home CPAP was not working, pulmonary was consulted who recommended repeat sleep study, no PAP during stay, and O2 & albuterol as needed. She was also treated with sq heparin for DVT prophylaxis. Medical care team deemed patient medically stable for discharge. Pt is a 75 yo F from home, lives alone has HHA (24h/6d), ambulates with a walker with PMH of ESRD (on HD M/W/F), T2DM, Gout, Trigeminal neuralgia and PSH of bilateral total knee replacement came scheduled to have ambulatory surgery on 2/8 for an elective right shoulder replacement but was instead admitted for near syncope due to dizziness, SOB, and palpitations since that morning. She denies any chest pain, fever, headache, N/V, abdominal pain, and urinary complaints. No recent travel/sickness/ change in meds. Upon presentation, her vitals and physical exam were unremarkable. EKG was unchanged from previous (showed NSR with RBBB). During her hospital stay, she was placed on telemonitoring with occasional PVCS noted. Orthostatic vitals were noted to be negative. Cardiology was consulted and recommended ultrasonography of pulmonary artery after noting markedly enlarged pulmonary arteries on previous CT on 1/11/22. She was also taken for hemodialysis on her normal dialysis days and was found to be have intradialytic hypotension. She was treated with midodrine. Patient symptomatically improved without any complaints. Patient has a history of anemia, CBC was monitored daily and Hgb was within her normal baseline. Patient has history of Type 2 DM, she was treated with home lantus and low dose insulin HSS during her hospital stay. Patient has history of gout, resumed home medication of allopurinol. Patient has history of JARVIS on CPAP, she admitted that her home CPAP was not working, pulmonary was consulted who recommended repeat sleep study, no PAP during stay, and O2 & albuterol as needed. She was also treated with sq heparin for DVT prophylaxis. Medical care team deemed patient medically stable for discharge. Pt is a 77 yo F from home, lives alone has HHA (24h/6d), ambulates with a walker with PMH of ESRD (on HD M/W/F), T2DM, Gout, Trigeminal neuralgia and PSH of bilateral total knee replacement came scheduled to have ambulatory surgery on 2/8 for an elective right shoulder replacement but was instead admitted for near syncope due to dizziness, SOB, and palpitations since that morning. She denies any chest pain, fever, headache, N/V, abdominal pain, and urinary complaints. No recent travel/sickness/ change in meds. Upon presentation, her vitals and physical exam were unremarkable. EKG was unchanged from previous (showed NSR with RBBB). During her hospital stay, she was placed on telemonitoring with occasional PVCS noted. Orthostatic vitals were noted to be negative. Cardiology was consulted and recommended ultrasonography of pulmonary artery after noting markedly enlarged pulmonary arteries on previous CT on 1/11/22. She was also taken for hemodialysis on her normal dialysis days and was found to be have intradialytic hypotension. She was treated with midodrine. Patient symptomatically improved without any complaints. Patient has a history of anemia, CBC was monitored daily and Hgb was within her normal baseline. Patient has history of Type 2 DM, she was treated with home lantus and low dose insulin HSS during her hospital stay. Patient has history of gout, resumed home medication of allopurinol. Patient has history of JARVIS on CPAP, she admitted that her home CPAP was not working, pulmonary was consulted who recommended repeat sleep study, no CPAP during stay, and O2 & albuterol as needed. She was also treated with sq heparin for DVT prophylaxis. Medical care team deemed patient medically stable for discharge on dialysis days as well as outpatient PCP and Cardiology follow up.

## 2022-02-10 NOTE — PHYSICAL THERAPY INITIAL EVALUATION ADULT - GENERAL OBSERVATIONS, REHAB EVAL
patient tele referral, cancel plan tsr in amb surg unit  todizziness and sob, denies dizziness nor sob, ambulatory with rw on level and incline surface with the presence of caregiver

## 2022-02-10 NOTE — PROGRESS NOTE ADULT - PROBLEM SELECTOR PLAN 1
·  Plan: Pt presented with dizziness, felt like passing out, SOB and palpitations  Vitals stable, Physical exam unremarkable, EKG unchanged from previous (showed NSR with RBBB)  Telemonitoring with PVCs occasionally  Orthostatic vitals negative  May be cardiogenic given h/o AS (mild on TTE in 1/2022)  Cardio consulted Dr. David; says no further w/u at this time   consider near Syncope related to hypotension related from HD ·  Plan: Pt presented with dizziness, felt like passing out, SOB and palpitations  Vitals stable, Physical exam unremarkable, EKG unchanged from previous (showed NSR with RBBB)  Telemonitoring with PVCs occasionally  Orthostatic vitals negative  May be cardiogenic given h/o AS (mild on TTE in 1/2022)  Cardio consulted Dr. David; says no further w/u at this time   consider near Syncope related to hypotension related from HD  PT: Home PT

## 2022-02-10 NOTE — MEDICAL STUDENT PROGRESS NOTE(EDUCATION) - NS MD HP STUD ASPLAN PLAN FT
Problem/Plan - 1:  ·  Problem: Near syncope.   ·  Plan: Pt presented with dizziness, felt like passing out, SOB and palpitations  Vitals stable, Physical exam unremarkable, EKG unchanged from previous (showed NSR with RBBB)  Telemonitoring with PVCs occasionally  Orthostatic vitals negative  May be cardiogenic given h/o AS (mild on TTE in 1/2022)  Cardio consulted Dr. David; says no further w/u at this time   consider near Syncope related to hypotension related from HD     Problem/Plan - 2:    ·  Problem: ESRD on dialysis.   ·  Plan: Pt has h/o ESRD on HD (M/W/F)  she gets HD at [facility] [place] and follows up with Nephrologist Dr. Taylor  Nephrologist Dr. Kearns consulted  - pt received HD yesterday and experience intradialytic hypotension  Nephro recs:   - pt has renal osteodystrophy, give sevelamer  - Continue HD with midodrine 10mg prior to HD on HD days  - hyponatremia corrected w/HD  - Hypokalemia noted s/p HD, will give K+     Problem/Plan - 3:  ·  Problem: Anemia.   ·  Plan: Pt has history of anemia (baseline around 10 in Jan'22)  On admission, hemoglobin was 9.8 > 8.9 > 9.8 this AM  monitor CBC.     Problem/Plan - 4:   ·  Problem: Diabetes mellitus.   ·  Plan: Pt has h/o type 2DM  At home took lantus 36 U  started on lantus 27U and low dose insulin HSS  follow A1c.     Problem/Plan - 5:  ·  Problem: Gout.   ·  Plan: Pt has h/o gout   started on home med of allopurinol.     Problem/Plan - 6:    ·  Problem: JARVIS on CPAP.   ·  Plan: Pt has JARVIS  uses CPAP at night  - pt reports CPAP is broken  - pulm consult rec repeat sleep study, no PAP for now, O2 + albuterol prn (monitor blood glucose w/coverage)   Pt requested referral for outpatient Pulmonology for new CPAP machine and PFT evaluation.     Problem/Plan - 7:  ·  Problem: Prophylactic measure.   ·  Plan: on heparin sq for DVT ppx

## 2022-02-10 NOTE — PROGRESS NOTE ADULT - PROBLEM SELECTOR PLAN 2
·  Plan: Pt has h/o ESRD on HD (M/W/F)  she gets HD at [facility] and follows up with Nephrologist Dr. Taylor  Nephrologist Dr. Kearns consulted  - pt received HD yesterday and experience intradialytic hypotension  Nephro recs:   - pt has renal osteodystrophy, give sevelamer  - Continue HD with midodrine 10mg prior to HD on HD days  - hyponatremia corrected w/HD  - Hypokalemia noted s/p HD, will give K+

## 2022-02-10 NOTE — DISCHARGE NOTE PROVIDER - CARE PROVIDER_API CALL
Jeremiah David)  Cardiology  [address] [place] [address]  Phone: [phone]  Fax: [phone]  Follow Up Time:

## 2022-02-10 NOTE — PROGRESS NOTE ADULT - SUBJECTIVE AND OBJECTIVE BOX
[facility] : Progress Note :: [phone], (office [phone]),   Dr Kearns / Dr Colbert / Dr Becker / Dr Evans / Dr Claudia LOUIE / Dr Lockett / Dr Camacho / Dr Jose De Jesus [place]  _____________________________________________________________________________________________    no complains     IV Contrast (Anaphylaxis)  shellfish (Hives; Rash)  shrimp (Hives)  smoke; coughing (Other)    Hospital Medications:   MEDICATIONS  (STANDING):  allopurinol 100 milliGRAM(s) Oral daily  atorvastatin 20 milliGRAM(s) Oral at bedtime  epoetin shannon-epbx (RETACRIT) Injectable 4000 Unit(s) IV Push <User Schedule>  gabapentin 300 milliGRAM(s) Oral at bedtime  heparin   Injectable 5000 Unit(s) SubCutaneous every 8 hours  insulin glargine Injectable (LANTUS) 27 Unit(s) SubCutaneous at bedtime  insulin lispro (ADMELOG) corrective regimen sliding scale   SubCutaneous three times a day before meals  insulin lispro (ADMELOG) corrective regimen sliding scale   SubCutaneous at bedtime  metoprolol succinate ER 25 milliGRAM(s) Oral daily  midodrine. 10 milliGRAM(s) Oral <User Schedule>  Nephro-neeraj 1 Tablet(s) Oral daily  sevelamer carbonate 1600 milliGRAM(s) Oral three times a day with meals      VITALS:  T(F): 97.3 (02-10-22 @ 11:03), Max: 98.4 (02-10-22 @ 05:00)  HR: 71 (02-10-22 @ 12:43)  BP: 120/75 (02-10-22 @ 12:43)  RR: 18 (02-10-22 @ 11:03)  SpO2: 97% (02-10-22 @ 11:03)  Wt(kg): --    02-09 @ 07:01  -  02-10 @ 07:00  --------------------------------------------------------  IN: 250 mL / OUT: 0 mL / NET: 250 mL        PHYSICAL EXAM:  Constitutional: NAD  HEENT: anicteric sclera, oropharynx clear.  Neck: No JVD  Respiratory: CTAB, no wheezes, rales or rhonchi  Cardiovascular: S1, S2, RRR  Gastrointestinal: BS+, soft, NT/ND  Extremities:  No peripheral edema  Neurological: A/O x 3, no focal deficits  Vascular Access: AVF with thrill  and bruit    LABS:  02-10    135  |  99  |  27<H>  ----------------------------<  197<H>  3.3<L>   |  26  |  5.88<H>    Ca    10.0      10 Feb 2022 07:22  Phos  4.8     02-09  Mg     2.7     02-09    TPro  7.7  /  Alb  2.9<L>  /  TBili  0.4  /  DBili      /  AST  12  /  ALT  17  /  AlkPhos  212<H>  02-10    Creatinine Trend: 5.88 <--, 7.06 <--, 5.06 <--                        9.8    6.07  )-----------( 192      ( 10 Feb 2022 07:22 )             31.5     Urine Studies:      RADIOLOGY & ADDITIONAL STUDIES:

## 2022-02-10 NOTE — DISCHARGE NOTE NURSING/CASE MANAGEMENT/SOCIAL WORK - NSDCVIVACCINE_GEN_ALL_CORE_FT
influenza, injectable, quadrivalent, preservative free; 11-Mar-2016 15:41; Yi Baker (RN); [employer]; [id number]; IntraMuscular; Dorsogluteal Right.; 0.5 milliLiter(s); VIS (VIS Published: 07-Aug-2015, VIS Presented: 11-Mar-2016);

## 2022-02-11 VITALS
SYSTOLIC BLOOD PRESSURE: 119 MMHG | HEART RATE: 92 BPM | RESPIRATION RATE: 16 BRPM | DIASTOLIC BLOOD PRESSURE: 73 MMHG | OXYGEN SATURATION: 100 % | TEMPERATURE: 98 F

## 2022-02-11 DIAGNOSIS — D64.9 ANEMIA, UNSPECIFIED: ICD-10-CM

## 2022-02-11 DIAGNOSIS — M10.9 GOUT, UNSPECIFIED: ICD-10-CM

## 2022-02-11 DIAGNOSIS — G47.33 OBSTRUCTIVE SLEEP APNEA (ADULT) (PEDIATRIC): ICD-10-CM

## 2022-02-11 DIAGNOSIS — E87.6 HYPOKALEMIA: ICD-10-CM

## 2022-02-11 DIAGNOSIS — N18.6 END STAGE RENAL DISEASE: ICD-10-CM

## 2022-02-11 DIAGNOSIS — Z29.9 ENCOUNTER FOR PROPHYLACTIC MEASURES, UNSPECIFIED: ICD-10-CM

## 2022-02-11 DIAGNOSIS — E11.9 TYPE 2 DIABETES MELLITUS WITHOUT COMPLICATIONS: ICD-10-CM

## 2022-02-11 LAB
ANION GAP SERPL CALC-SCNC: 13 MMOL/L — SIGNIFICANT CHANGE UP (ref 5–17)
BUN SERPL-MCNC: 47 MG/DL — HIGH (ref 7–18)
CALCIUM SERPL-MCNC: 10.1 MG/DL — SIGNIFICANT CHANGE UP (ref 8.4–10.5)
CHLORIDE SERPL-SCNC: 100 MMOL/L — SIGNIFICANT CHANGE UP (ref 96–108)
CO2 SERPL-SCNC: 22 MMOL/L — SIGNIFICANT CHANGE UP (ref 22–31)
CREAT SERPL-MCNC: 8.13 MG/DL — HIGH (ref 0.5–1.3)
GLUCOSE BLDC GLUCOMTR-MCNC: 133 MG/DL — HIGH (ref 70–99)
GLUCOSE BLDC GLUCOMTR-MCNC: 143 MG/DL — HIGH (ref 70–99)
GLUCOSE BLDC GLUCOMTR-MCNC: 162 MG/DL — HIGH (ref 70–99)
GLUCOSE SERPL-MCNC: 122 MG/DL — HIGH (ref 70–99)
HCT VFR BLD CALC: 32.3 % — LOW (ref 34.5–45)
HGB BLD-MCNC: 9.8 G/DL — LOW (ref 11.5–15.5)
MAGNESIUM SERPL-MCNC: 2.8 MG/DL — HIGH (ref 1.6–2.6)
MCHC RBC-ENTMCNC: 27.5 PG — SIGNIFICANT CHANGE UP (ref 27–34)
MCHC RBC-ENTMCNC: 30.3 GM/DL — LOW (ref 32–36)
MCV RBC AUTO: 90.5 FL — SIGNIFICANT CHANGE UP (ref 80–100)
NRBC # BLD: 0 /100 WBCS — SIGNIFICANT CHANGE UP (ref 0–0)
PHOSPHATE SERPL-MCNC: 5.2 MG/DL — HIGH (ref 2.5–4.5)
PLATELET # BLD AUTO: 198 K/UL — SIGNIFICANT CHANGE UP (ref 150–400)
POTASSIUM SERPL-MCNC: 4.3 MMOL/L — SIGNIFICANT CHANGE UP (ref 3.5–5.3)
POTASSIUM SERPL-SCNC: 4.3 MMOL/L — SIGNIFICANT CHANGE UP (ref 3.5–5.3)
RBC # BLD: 3.57 M/UL — LOW (ref 3.8–5.2)
RBC # FLD: 20.6 % — HIGH (ref 10.3–14.5)
SODIUM SERPL-SCNC: 135 MMOL/L — SIGNIFICANT CHANGE UP (ref 135–145)
WBC # BLD: 6.45 K/UL — SIGNIFICANT CHANGE UP (ref 3.8–10.5)
WBC # FLD AUTO: 6.45 K/UL — SIGNIFICANT CHANGE UP (ref 3.8–10.5)

## 2022-02-11 PROCEDURE — 73030 X-RAY EXAM OF SHOULDER: CPT

## 2022-02-11 PROCEDURE — 80061 LIPID PANEL: CPT

## 2022-02-11 PROCEDURE — 85025 COMPLETE CBC W/AUTO DIFF WBC: CPT

## 2022-02-11 PROCEDURE — 36415 COLL VENOUS BLD VENIPUNCTURE: CPT

## 2022-02-11 PROCEDURE — 84100 ASSAY OF PHOSPHORUS: CPT

## 2022-02-11 PROCEDURE — 93306 TTE W/DOPPLER COMPLETE: CPT

## 2022-02-11 PROCEDURE — 99239 HOSP IP/OBS DSCHRG MGMT >30: CPT | Mod: GC

## 2022-02-11 PROCEDURE — 84484 ASSAY OF TROPONIN QUANT: CPT

## 2022-02-11 PROCEDURE — 87340 HEPATITIS B SURFACE AG IA: CPT

## 2022-02-11 PROCEDURE — 71045 X-RAY EXAM CHEST 1 VIEW: CPT

## 2022-02-11 PROCEDURE — 80053 COMPREHEN METABOLIC PANEL: CPT

## 2022-02-11 PROCEDURE — 82962 GLUCOSE BLOOD TEST: CPT

## 2022-02-11 PROCEDURE — 85027 COMPLETE CBC AUTOMATED: CPT

## 2022-02-11 PROCEDURE — 87635 SARS-COV-2 COVID-19 AMP PRB: CPT

## 2022-02-11 PROCEDURE — 83735 ASSAY OF MAGNESIUM: CPT

## 2022-02-11 PROCEDURE — 93005 ELECTROCARDIOGRAM TRACING: CPT

## 2022-02-11 PROCEDURE — 97161 PT EVAL LOW COMPLEX 20 MIN: CPT

## 2022-02-11 PROCEDURE — 84443 ASSAY THYROID STIM HORMONE: CPT

## 2022-02-11 PROCEDURE — 99261: CPT

## 2022-02-11 PROCEDURE — 80048 BASIC METABOLIC PNL TOTAL CA: CPT

## 2022-02-11 PROCEDURE — 85379 FIBRIN DEGRADATION QUANT: CPT

## 2022-02-11 RX ORDER — MIDODRINE HYDROCHLORIDE 2.5 MG/1
1 TABLET ORAL
Qty: 13 | Refills: 0
Start: 2022-02-11 | End: 2022-03-12

## 2022-02-11 RX ORDER — METOPROLOL TARTRATE 50 MG
1 TABLET ORAL
Qty: 30 | Refills: 0
Start: 2022-02-11 | End: 2022-03-12

## 2022-02-11 RX ADMIN — SEVELAMER CARBONATE 1600 MILLIGRAM(S): 2400 POWDER, FOR SUSPENSION ORAL at 17:09

## 2022-02-11 RX ADMIN — ERYTHROPOIETIN 4000 UNIT(S): 10000 INJECTION, SOLUTION INTRAVENOUS; SUBCUTANEOUS at 11:16

## 2022-02-11 RX ADMIN — Medication 100 MILLIGRAM(S): at 14:27

## 2022-02-11 RX ADMIN — Medication 1 APPLICATION(S): at 17:10

## 2022-02-11 RX ADMIN — MIDODRINE HYDROCHLORIDE 10 MILLIGRAM(S): 2.5 TABLET ORAL at 07:44

## 2022-02-11 RX ADMIN — Medication 1 APPLICATION(S): at 14:27

## 2022-02-11 RX ADMIN — SEVELAMER CARBONATE 1600 MILLIGRAM(S): 2400 POWDER, FOR SUSPENSION ORAL at 07:43

## 2022-02-11 RX ADMIN — HEPARIN SODIUM 5000 UNIT(S): 5000 INJECTION INTRAVENOUS; SUBCUTANEOUS at 05:34

## 2022-02-11 RX ADMIN — SEVELAMER CARBONATE 1600 MILLIGRAM(S): 2400 POWDER, FOR SUSPENSION ORAL at 14:27

## 2022-02-11 RX ADMIN — Medication 1 TABLET(S): at 14:27

## 2022-02-11 RX ADMIN — NYSTATIN CREAM 1 APPLICATION(S): 100000 CREAM TOPICAL at 05:34

## 2022-02-11 RX ADMIN — NYSTATIN CREAM 1 APPLICATION(S): 100000 CREAM TOPICAL at 17:09

## 2022-02-11 RX ADMIN — Medication 1: at 17:10

## 2022-02-11 RX ADMIN — HEPARIN SODIUM 5000 UNIT(S): 5000 INJECTION INTRAVENOUS; SUBCUTANEOUS at 14:08

## 2022-02-11 NOTE — PROGRESS NOTE ADULT - ATTENDING COMMENTS
Patient seen/evaluated at bedside on 2/10/22. I agree with the resident progress note/outlined plan of care. My independent findings and conclusions are documented.    Patient reports she feels better and has been ambulating appropriately in the hospital, though she reports symptoms of GOEL with further exercise. Reports history of symptoms timed to during or post dialysis sessions. Denies chest pain, palpitations currently    vitals reviewed  PE: AAox3 NAD  CTAB/l no accessory muscle use  lying comfortably flat in bed  1+ bilateral pitting edema  Left arm thrill and +bruit    Case appreciatively discussed with pulmonary service-->patient previously noted with markedly dilated pulmonary artery on CT Chest in January though TTE did not correlate. Age adjusted D dimer is appropriate at 89  -plan for a focused TTE to assess pulmonary artery pressures (PAP) tomorrow   dialysis session tomorrow and expected discharge home
Patient seen and examined on 2/9/22.     IMP: Near Syncope possibly hypotension related from HD    Plan  -Continue HD with midodrine 10mg prior to HD on HD days  -Pulm c/s as patient reports CPAP machine is broken  -Nephrology recs appreciated  -Cards (Dr. David) consulted, no further cardiac w/u is needed  -Tele monitoring  -Have reached out to Dr. Goodwin regarding rescheduling of shoulder surgery      Remaining care as above.
Patient seen and examined on 2/11/22. This is a late entry. Case discussed with housestaff. In brief, this is a 77 yo F with ESRD on HD (MWF), DM2, Gout, Trigeminal neuralgia who presented for near syncope and palpitations while at ambulatory surgery. Suspect symptoms likely related to hypotension after hemodialysis. Tolerated hemodialysis today with midodrine. Repeat limited TTE shows normal RV function and size. Medically stable for dc home today.

## 2022-02-11 NOTE — PROGRESS NOTE ADULT - ASSESSMENT
77 yo F from home, lives alone has HHA (24h/6d), ambulates with a walker with PMH of ESRD (on HD M/W/F), T2DM, Gout, Trigeminal neuralgia and PSH of bilateral total knee replacement came to Ambulatory surgery this morning for an elective right shoulder replacement comes with dizziness, SOB and palpitations since this morning. Admitted for near syncope.     Problem/Plan - 1:  ·  Problem: Near syncope.   ·  Plan: Pt presented with dizziness, felt like passing out, SOB and palpitations  VItals stable, Physical exam unremarkable, EKG unchanged from previous (showed NSR with RBBB)  Telemonitoring with PVCs occasionally  Orthostatic vitals negative  Patient symptomatically improved, now without complaints  TTE   Start BBlockers  Scheduled for Right Shoulder peplacement  Patient's Revised Cardiac Risk Index (RCRI) score is 1  ( 6.0 % risk) . Patient is at  intermediate risk of  adverse perioperative cardiac events undergoing  intermediate risk surgery. No further cardiac testing is needed prior to patient's surgery.   No PHTN will tepeat limited TTE to evaluate Pulm pressures if not elevated no further cardiac work-up    77 yo F from home, lives alone has HHA (24h/6d), ambulates with a walker with PMH of ESRD (on HD M/W/F), T2DM, Gout, Trigeminal neuralgia and PSH of bilateral total knee replacement came to Ambulatory surgery this morning for an elective right shoulder replacement comes with dizziness, SOB and palpitations since this morning. Admitted for near syncope.     Problem/Plan - 1:  ·  Problem: Near syncope.   ·  Plan: Pt presented with dizziness, felt like passing out, SOB and palpitations  VItals stable, Physical exam unremarkable, EKG unchanged from previous (showed NSR with RBBB)  Telemonitoring with PVCs occasionally  Orthostatic vitals negative  Patient symptomatically improved, now without complaints  TTE EF >55%  Started  BBlockers  Scheduled for Right Shoulder peplacement  Patient's Revised Cardiac Risk Index (RCRI) score is 1  ( 6.0 % risk) . Patient is at  intermediate risk of  adverse perioperative cardiac events undergoing  intermediate risk surgery. No further cardiac testing is needed prior to patient's surgery.     Problem/Plan - 2:  (Data referenced from "H&P Adult" 08-Feb-2022 13:56)  ·  Problem: ESRD on dialysis.   ·  Plan: Pt has h/o ESRD on HD (M/W/F)  she gets HD at [facility] and follows up with Nephrologist Dr. Taylor  Nephrologist Dr. Kearns consulted.    Problem/Plan - 3:  ·  Problem: Anemia.   ·  Plan: Pt has history of anemia (baseline around 10 in Jan'22)  On admission, hemoglobin was 9.8 > 8.9 this AM  monitor CBC.    Problem/Plan - 4:  (Data referenced from "H&P Adult" 08-Feb-2022 13:56)  ·  Problem: Diabetes mellitus.   ·  Plan: Pt has h/o type 2DM  At home took lantus 36 U  started on lantus 27U and low dose insulin HSS  follow A1c.    Problem/Plan - 5:  (Data referenced from "H&P Adult" 08-Feb-2022 13:56)  ·  Problem: Gout.   ·  Plan: Pt has h/o gout   started on home med of allopurinol.    Problem/Plan - 6:  (Data referenced from "H&P Adult" 08-Feb-2022 13:56)  ·  Problem: JARVIS on CPAP.   ·  Plan: Pt has JARVIS  uses CPAP at night  will continue CPAP at night while inpatient  Pt requested referral for outpatient Pulmonology for new CPAP machine and PFT evaluation.    Problem/Plan - 7:  (Data referenced from "H&P Adult" 08-Feb-2022 13:56)  ·  Problem: Prophylactic measure.   ·  Plan: on heparin sq for DVT ppx.    Problem/Plan - 2:  (Data referenced from "H&P Adult" 08-Feb-2022 13:56)  ·  Problem: ESRD on dialysis.   ·  Plan: Pt has h/o ESRD on HD (M/W/F)  she gets HD at [facility] and follows up with Nephrologist Dr. Taylor  .    Problem/Plan - 3:  ·  Problem: Anemia.   ·  Plan: Pt has history of anemia (baseline around 10 in Jan'22)  On admission, hemoglobin was 9.8 > 8.9 this AM  monitor CBC.    Problem/Plan - 4:  (Data referenced from "H&P Adult" 08-Feb-2022 13:56)  ·  Problem: Diabetes mellitus.   ·  Plan: Pt has h/o type 2DM  At home took lantus 36 U  started on lantus 27U and low dose insulin HSS  follow A1c.    Problem/Plan - 5:  (Data referenced from "H&P Adult" 08-Feb-2022 13:56)  ·  Problem: Gout.   ·  Plan: Pt has h/o gout   started on home med of allopurinol.    Problem/Plan - 6:  (Data referenced from "H&P Adult" 08-Feb-2022 13:56)  ·  Problem: JARVIS on CPAP.   ·  Plan: Pt has JARVIS  uses CPAP at night  will continue CPAP at night while inpatient  Pt requested referral for outpatient Pulmonology for new CPAP machine and PFT evaluation.    Problem/Plan - 7:  (Data referenced from "H&P Adult" 08-Feb-2022 13:56)  ·  Problem: Prophylactic measure.   ·  Plan: on heparin sq for DVT ppx.  
# ESRD. dialysed yesterday with  Intradialytic hypotension.  [id number]  pre-HD. admitted with near syncope with SOB.   will need sleep study as outpt per pulmonology  # anemia of CKD. Retacrit on HD   # renal osteodystrophy. cont sevelamer.  # hyponatremia- anticipate improvement with HD   # hypokalemia- was repleted. HD against 3.0 K+ bath 
# ESRD. Seen on HD  Intradialytic hypotension.  Midodrine with HD. admitted with near syncope with SOB.   will need sleep study as outpt per pulmonology  # anemia of CKD. Retacrit on HD   # renal osteodystrophy. cont sevelamer.  
Pt is a 77 yo F from home, lives alone has HHA (24h/6d), ambulates with a walker with PMH of ESRD (on HD M/W/F), T2DM, Gout, Trigeminal neuralgia and PSH of bilateral total knee replacement came to Ambulatory surgery this morning for an elective right shoulder replacement comes with dizziness, SOB and palpitations since this morning. Admitted for near syncope. 
Pt is a 75 yo F from home, lives alone has HHA (24h/6d), ambulates with a walker with PMH of ESRD (on HD M/W/F), T2DM, Gout, Trigeminal neuralgia and PSH of bilateral total knee replacement came to Ambulatory surgery this morning for an elective right shoulder replacement comes with dizziness, SOB and palpitations since this morning. Admitted for near syncope. 
Pt is a 77 yo F from home, lives alone has HHA (24h/6d), ambulates with a walker with PMH of ESRD (on HD M/W/F), T2DM, Gout, Trigeminal neuralgia and PSH of bilateral total knee replacement came to Ambulatory surgery this morning for an elective right shoulder replacement comes with dizziness, SOB and palpitations since this morning. Admitted for near syncope. 
Pt is a 75 yo F from home, lives alone has HHA (24h/6d), ambulates with a walker with PMH of ESRD (on HD M/W/F), T2DM, Gout, Trigeminal neuralgia and PSH of bilateral total knee replacement came to Ambulatory surgery this morning for an elective right shoulder replacement comes with dizziness, SOB and palpitations since this morning. Admitted for near syncope.

## 2022-02-11 NOTE — PROGRESS NOTE ADULT - REASON FOR ADMISSION
Near syncope and SOB

## 2022-02-11 NOTE — ADVANCED PRACTICE NURSE CONSULT - ASSESSMENT
This is a 76yr old female patient admitted for R. Shoulder Osteoarthritis, presenting with the following:  -There is evidence of a Fungal Infection to the Toes of the R. Foot  -There is evidence of Moisture Associated Skin Damage to the Perianal area

## 2022-02-11 NOTE — PROGRESS NOTE ADULT - PROBLEM SELECTOR PLAN 1
·  Plan: Pt presented with dizziness, felt like passing out, SOB and palpitations  Vitals stable, Physical exam unremarkable, EKG unchanged from previous (showed NSR with RBBB)  Telemonitoring with PVCs occasionally  Orthostatic vitals negative  May be cardiogenic given h/o AS (mild on TTE in 1/2022)  Cardio consulted Dr. David; says no further w/u at this time   Previous CT 1/11 noted to have enlarged pulmonary artery suggestive of Pulmonary HTN, prior TTE shows normal pulmonary artery pressure; will repeat limited TTE for evaluation for PHTN  Cardio pre-op eval: Patient's Revised Cardiac Risk Index (RCRI) score is 1  ( 6.0 % risk) . Patient is at  intermediate risk of  adverse perioperative cardiac events undergoing  intermediate risk surgery. No further cardiac testing is needed prior to patient's surgery.   No PHTN will tepeat limited TTE to evaluate Pulm pressures if not elevated no further cardiac work-up  consider near Syncope related to hypotension related from HD  PT: Home PT  D/c in AM tomorrow if no report of symptoms Pt presented with dizziness, felt like passing out, SOB and palpitations  Vitals stable, Physical exam unremarkable, EKG unchanged from previous (showed NSR with RBBB)  Telemonitoring with PVCs occasionally  Orthostatic vitals negative  May be cardiogenic given h/o AS (mild on TTE in 1/2022)  Cardio consulted Dr. David; says no further w/u at this time   Previous CT 1/11 noted to have enlarged pulmonary artery suggestive of Pulmonary HTN, prior TTE shows normal pulmonary artery pressure; will repeat limited TTE for evaluation for PHTN  Cardio pre-op eval: Patient's Revised Cardiac Risk Index (RCRI) score is 1  ( 6.0 % risk) . Patient is at  intermediate risk of  adverse perioperative cardiac events undergoing  intermediate risk surgery. No further cardiac testing is needed prior to patient's surgery.   -No PHTN will repeat limited TTE to evaluate Pulm pressures if not elevated no further cardiac work-up  consider near Syncope related to hypotension related from HD  PT: Home PT  DC after hemodialysis if asymptomatic

## 2022-02-11 NOTE — PROGRESS NOTE ADULT - PROVIDER SPECIALTY LIST ADULT
Cardiology
Nephrology
Nephrology
Pulmonology
Cardiology
Internal Medicine

## 2022-02-11 NOTE — PROGRESS NOTE ADULT - PROBLEM SELECTOR PLAN 7
on heparin sq for DVT ppx Pt has JARVIS, uses CPAP at night  - pt reports CPAP is broken  - pulm consult rec repeat sleep study, no CPAP for now, O2 + albuterol prn (monitor blood glucose w/coverage)   Pt requested referral for outpatient Pulmonology for new CPAP machine and PFT evaluation.

## 2022-02-11 NOTE — PROGRESS NOTE ADULT - SUBJECTIVE AND OBJECTIVE BOX
DATE OF SERVICE:  02/11/2022  Patient was seen and examined on  02/11/2022   .Interim events noted.Consultant notes ,Labs,Telemetry reviewed by me      HPI and HOSPITAL COURSE: HPI:  Pt is a 75 yo F from home, lives alone has HHA (24h/6d), ambulates with a walker with PMH of ESRD (on HD M/W/F), T2DM, Gout, Trigeminal neuralgia and PSH of bilateral total knee replacement came to Ambulatory surgery this morning for an elective right shoulder replacement comes with dizziness, SOB and palpitations since this morning. This morning when she woke up, she felt dizzy with the room spinning around and she felt like she was about to pass out. So laid down on her bed and felt a little better. After she came to the hospital she was having palpitations and shortness of breath. She again felt a little dizzy that time. No c/o chest pain, fever, headache, N/V, abdominal pain, urinary complaints. No recent travel/sickness/ change in meds.      INTERIM EVENTS:Patient seen at bedside ,interim events noted.  Awake no dyspnea OR postponed  Concern for CT chest findings for  Markedly enlarged pulmonary  artery concerning for pulmonary hypertension done in 1/22 but no PHTN on repeared TTE      PMH -reviewed admission note, no change since admission  Heart Failure: Acute [ ] Chronic [ ] Acute on Chronic [ ] Diastolic [ ] Systolic [ ] Combined Systolic and Diastolic[ ]  PAULINE[ ]  ATN[ ]  CKD I [ ] CKDII [ ] CKD III [ ] CKD IV [ ] CKD V [ ] ESRD[ ]  HTN[ ] CVA[ ] DM[ ] COPD[ ] COVID[ ] AF[ ]  PPM[ ] ICD[ ]    MEDICATIONS  (STANDING):  allopurinol 100 milliGRAM(s) Oral daily  atorvastatin 20 milliGRAM(s) Oral at bedtime  BACItracin   Ointment 1 Application(s) Topical daily  clotrimazole 1% Cream 1 Application(s) Topical two times a day  epoetin shannon-epbx (RETACRIT) Injectable 4000 Unit(s) IV Push <User Schedule>  gabapentin 300 milliGRAM(s) Oral at bedtime  heparin   Injectable 5000 Unit(s) SubCutaneous every 8 hours  insulin glargine Injectable (LANTUS) 27 Unit(s) SubCutaneous at bedtime  insulin lispro (ADMELOG) corrective regimen sliding scale   SubCutaneous three times a day before meals  insulin lispro (ADMELOG) corrective regimen sliding scale   SubCutaneous at bedtime  metoprolol succinate ER 25 milliGRAM(s) Oral daily  midodrine. 10 milliGRAM(s) Oral <User Schedule>  Nephro-neeraj 1 Tablet(s) Oral daily  nystatin Ointment 1 Application(s) Topical two times a day  sevelamer carbonate 1600 milliGRAM(s) Oral three times a day with meals    MEDICATIONS  (PRN):  ALBUTerol    90 MICROgram(s) HFA Inhaler 2 Puff(s) Inhalation every 6 hours PRN Shortness of Breath and/or Wheezing            REVIEW OF SYSTEMS:  Constitutional: [ ] fever, [ ]weight loss,  [ ]fatigue  Eyes: [ ] visual changes  Respiratory: [ ]shortness of breath;  [ ] cough, [ ]wheezing, [ ]chills, [ ]hemoptysis  Cardiovascular: [ ] chest pain, [ ]palpitations, [ ]dizziness,  [ ]leg swelling[ ]orthopnea[ ]PND  Gastrointestinal: [ ] abdominal pain, [ ]nausea, [ ]vomiting,  [ ]diarrhea [ ]Constipation [ ]Melena  Genitourinary: [ ] dysuria, [ ] hematuria [ ]Garcia  Neurologic: [ ] headaches [ ] tremors[ ]weakness [ ]Paralysis Right[ ] Left[ ]  Skin: [ ] itching, [ ]burning, [ ] rashes  Endocrine: [ ] heat or cold intolerance  Musculoskeletal: [ ] joint pain or swelling; [ ] muscle, back, or extremity pain  Psychiatric: [ ] depression, [ ]anxiety, [ ]mood swings, or [ ]difficulty sleeping  Hematologic: [ ] easy bruising, [ ] bleeding gums    [ ] All remaining systems negative except as per above.   [ ]Unable to obtain.  [x] No change in ROS since admission      Vital Signs Last 24 Hrs  T(C): 36.4 (11 Feb 2022 07:35), Max: 37 (11 Feb 2022 04:49)  T(F): 97.6 (11 Feb 2022 07:35), Max: 98.6 (11 Feb 2022 04:49)  HR: 74 (11 Feb 2022 07:35) (71 - 96)  BP: 130/79 (11 Feb 2022 07:35) (104/62 - 130/79)  BP(mean): --  RR: 18 (11 Feb 2022 07:35) (17 - 18)  SpO2: 99% (11 Feb 2022 07:35) (95% - 100%)  I&O's Summary      PHYSICAL EXAM:  General: No acute distress BMI-28  HEENT: EOMI, PERRL  Neck: Supple, [ ] JVD  Lungs: Equal air entry bilaterally; [ ] rales [ ] wheezing [ ] rhonchi  Heart: Regular rate and rhythm; [x ] murmur   2/6 [ x] systolic [ ] diastolic [ ] radiation[ ] rubs [ ]  gallops  Abdomen: Nontender, bowel sounds present  Extremities: No clubbing, cyanosis, [ ] edema [ ]Pulses  equal and intact  Nervous system:  Alert & Oriented X3, no focal deficits  Psychiatric: Normal affect  Skin: No rashes or lesions    LABS:  02-11    135  |  100  |  47<H>  ----------------------------<  122<H>  4.3   |  22  |  8.13<H>    Ca    10.1      11 Feb 2022 07:38  Phos  5.2     02-11  Mg     2.8     02-11    TPro  7.7  /  Alb  2.9<L>  /  TBili  0.4  /  DBili  x   /  AST  12  /  ALT  17  /  AlkPhos  212<H>  02-10    Creatinine Trend: 8.13<--, 5.88<--, 7.06<--, 5.06<--                        9.8    6.45  )-----------( 198      ( 11 Feb 2022 07:38 )             32.3           ECHO: >  Study Date: 1/11/2022Conclusions:  Hyperdynamic left ventricle. EF 75%  Mild aortic stenosis.  Hemodynamic: Estimated right atrial pressure is normal.  No evidence of pulmonary hypertension.    CT CHEST:  IMPRESSION:  No pneumonia, pulmonary edema or other acute intrathoracic process.  Stable pulmonary nodules measuring up to 6 mm since September 2018.  Stable 4.6 cm ascending aortic aneurysm. Markedly enlarged pulmonary  artery concerning for pulmonary hypertension.

## 2022-02-11 NOTE — PROGRESS NOTE ADULT - PROBLEM SELECTOR PLAN 2
·  Plan: Pt has h/o ESRD on HD (M/W/F)  she gets HD at [facility] and follows up with Nephrologist Dr. Taylor  Nephrologist Dr. Kearns consulted  - pt received HD yesterday and experience intradialytic hypotension  Nephro recs:   - pt has renal osteodystrophy, give sevelamer  - Continue HD with midodrine 10mg prior to HD on HD days  - hyponatremia corrected w/HD  - Hypokalemia noted s/p HD, will give K+, resolved   - s/p dialysis 2/11, plan for d/c tmmrw Pt has h/o ESRD on HD (M/W/F)  she gets HD at [facility] and follows up with Nephrologist Dr. Taylor  Nephrologist Dr. Kearns consulted  - pt received HD yesterday and experience intradialytic hypotension  Nephro recs:   - pt has renal osteodystrophy, give sevelamer  - Continue HD with midodrine 10mg prior to HD on HD days  - hyponatremia corrected w/HD  - s/p dialysis 2/11, plan for d/c today

## 2022-02-11 NOTE — PROGRESS NOTE ADULT - TIME BILLING
Patient was seen and examined by me  on 2/10/2022 ,interim events noted,Laboratory and Imaging studies were reviewed.  Thank you for the courtesy of the consultation,I would be available for any further discussion if needed.  Jeremiah David MD,FACC.  [address] [place] [address] [phone]
- Review of records, telemetry, vital signs and daily labs.   - General and cardiovascular physical examination.  - Generation of cardiovascular treatment plan.  - Coordination of care.      Patient was seen and examined by me on 02/11/2022,interim events noted,labs and radiology studies reviewed.  Jeremiah David MD,FACC.  [address] [place], [place] [id number].  [phone]

## 2022-02-11 NOTE — ADVANCED PRACTICE NURSE CONSULT - RECOMMEDATIONS
-Clean all affected areas with warm water, mild soap, and pat dry  -Apply TRIAD Moisture Barrier Cream to the Perianal area b.i.d. PRN  -Frequent toileting  -Order and then apply Antifungal ointment to the toes of the R. Foot  -Encourage the patient to reposition Q 2hrs using wedges or pillows

## 2022-02-11 NOTE — PROGRESS NOTE ADULT - PROBLEM SELECTOR PLAN 5
Pt has h/o gout   started on home med of allopurinol Pt has h/o type 2DM  At home took lantus 36 U  started on lantus 27U and low dose insulin HSS  A1c 6.5

## 2022-02-11 NOTE — PROGRESS NOTE ADULT - PROBLEM SELECTOR PLAN 6
·  Plan: Pt has JARVIS  uses CPAP at night  - pt reports CPAP is broken  - pulm consult rec repeat sleep study, no PAP for now, O2 + albuterol prn (monitor blood glucose w/coverage)   Pt requested referral for outpatient Pulmonology for new CPAP machine and PFT evaluation. Pt has h/o gout   c/w home med of allopurinol

## 2022-02-11 NOTE — PROGRESS NOTE ADULT - PROBLEM SELECTOR PLAN 4
Pt has h/o type 2DM  At home took lantus 36 U  started on lantus 27U and low dose insulin HSS  follow A1c Pt has history of anemia (baseline around 10 in Jan'22)  likely related to ESRD   On admission, hemoglobin was 9.8 > 8.9 > 9.8 > 9.8 this AM  monitor CBC

## 2022-02-11 NOTE — PROGRESS NOTE ADULT - SUBJECTIVE AND OBJECTIVE BOX
[facility] : Progress Note :: [phone], (office [phone]),   Dr Kearns / Dr Colbert / Dr Becker / Dr Evans / Dr Claudia LOUIE / Dr Lockett / Dr Camacho / Dr Jose De Jesus [place]  _____________________________________________________________________________________________    Seen on HD. hypotensive     IV Contrast (Anaphylaxis)  shellfish (Hives; Rash)  shrimp (Hives)  smoke; coughing (Other)    Hospital Medications:   MEDICATIONS  (STANDING):  allopurinol 100 milliGRAM(s) Oral daily  atorvastatin 20 milliGRAM(s) Oral at bedtime  BACItracin   Ointment 1 Application(s) Topical daily  clotrimazole 1% Cream 1 Application(s) Topical two times a day  epoetin shannon-epbx (RETACRIT) Injectable 4000 Unit(s) IV Push <User Schedule>  gabapentin 300 milliGRAM(s) Oral at bedtime  heparin   Injectable 5000 Unit(s) SubCutaneous every 8 hours  insulin glargine Injectable (LANTUS) 27 Unit(s) SubCutaneous at bedtime  insulin lispro (ADMELOG) corrective regimen sliding scale   SubCutaneous three times a day before meals  insulin lispro (ADMELOG) corrective regimen sliding scale   SubCutaneous at bedtime  metoprolol succinate ER 25 milliGRAM(s) Oral daily  midodrine. 10 milliGRAM(s) Oral <User Schedule>  Nephro-neeraj 1 Tablet(s) Oral daily  nystatin Ointment 1 Application(s) Topical two times a day  sevelamer carbonate 1600 milliGRAM(s) Oral three times a day with meals      VITALS:  T(F): 98.2 (02-11-22 @ 13:50), Max: 98.6 (02-11-22 @ 04:49)  HR: 80 (02-11-22 @ 13:50)  BP: 93/59 (02-11-22 @ 13:50)  RR: 16 (02-11-22 @ 09:45)  SpO2: 100% (02-11-22 @ 09:45)  Wt(kg): --      PHYSICAL EXAM:  Constitutional: NAD  HEENT: anicteric sclera, oropharynx clear.  Neck: No JVD  Respiratory: CTAB, no wheezes, rales or rhonchi  Cardiovascular: S1, S2, RRR  Gastrointestinal: BS+, soft, NT/ND  Extremities: No peripheral edema  Neurological: A/O x 3, no focal deficits  : No CVA tenderness. No cottrell.   Skin: No rashes  Vascular Access: AVF cannulated     LABS:  02-11    135  |  100  |  47<H>  ----------------------------<  122<H>  4.3   |  22  |  8.13<H>    Ca    10.1      11 Feb 2022 07:38  Phos  5.2     02-11  Mg     2.8     02-11    TPro  7.7  /  Alb  2.9<L>  /  TBili  0.4  /  DBili      /  AST  12  /  ALT  17  /  AlkPhos  212<H>  02-10    Creatinine Trend: 8.13 <--, 5.88 <--, 7.06 <--, 5.06 <--                        9.8    6.45  )-----------( 198      ( 11 Feb 2022 07:38 )             32.3     Urine Studies:      RADIOLOGY & ADDITIONAL STUDIES:

## 2022-02-11 NOTE — PROGRESS NOTE ADULT - SUBJECTIVE AND OBJECTIVE BOX
PGY-1 Progress Note discussed with attending    CHIEF COMPLAINT & BRIEF HOSPITAL COURSE:  Pt is a 77 yo F from home, lives alone has HHA (24h/6d), ambulates with a walker with PMH of ESRD (on HD M/W/F), T2DM, Gout, Trigeminal neuralgia and PSH of bilateral total knee replacement came to Ambulatory surgery this morning for an elective right shoulder replacement comes with dizziness, SOB and palpitations since this morning. This morning when she woke up, she felt dizzy with the room spinning around and she felt like she was about to pass out. So laid down on her bed and felt a little better. After she came to the hospital she was having palpitations and shortness of breath. She again felt a little dizzy that time. No c/o chest pain, fever, headache, N/V, abdominal pain, urinary complaints. No recent travel/sickness/ change in meds.    INTERVAL HPI/OVERNIGHT EVENTS:   No acute overnight events noted. Pt seen and examined at bedside. Resting comfortably in bed, denies any dizziness, SOB, palpitations, or chest pain.     REVIEW OF SYSTEMS:  CONSTITUTIONAL: No fever, weight loss, or fatigue  RESPIRATORY: No cough, wheezing, chills or hemoptysis; No shortness of breath  CARDIOVASCULAR: No chest pain, palpitations, dizziness, or leg swelling  GASTROINTESTINAL: No abdominal pain. No nausea, vomiting, or hematemesis; No diarrhea or constipation. No melena or hematochezia.  GENITOURINARY: No dysuria or hematuria, urinary frequency  NEUROLOGICAL: No headaches, memory loss, loss of strength, numbness, or tremors  SKIN: No itching, burning, rashes, or lesions     MEDICATIONS  (STANDING):  allopurinol 100 milliGRAM(s) Oral daily  atorvastatin 20 milliGRAM(s) Oral at bedtime  BACItracin   Ointment 1 Application(s) Topical daily  clotrimazole 1% Cream 1 Application(s) Topical two times a day  epoetin shannon-epbx (RETACRIT) Injectable 4000 Unit(s) IV Push <User Schedule>  gabapentin 300 milliGRAM(s) Oral at bedtime  heparin   Injectable 5000 Unit(s) SubCutaneous every 8 hours  insulin glargine Injectable (LANTUS) 27 Unit(s) SubCutaneous at bedtime  insulin lispro (ADMELOG) corrective regimen sliding scale   SubCutaneous three times a day before meals  insulin lispro (ADMELOG) corrective regimen sliding scale   SubCutaneous at bedtime  metoprolol succinate ER 25 milliGRAM(s) Oral daily  midodrine. 10 milliGRAM(s) Oral <User Schedule>  Nephro-neeraj 1 Tablet(s) Oral daily  nystatin Ointment 1 Application(s) Topical two times a day  sevelamer carbonate 1600 milliGRAM(s) Oral three times a day with meals    MEDICATIONS  (PRN):  ALBUTerol    90 MICROgram(s) HFA Inhaler 2 Puff(s) Inhalation every 6 hours PRN Shortness of Breath and/or Wheezing      Vital Signs Last 24 Hrs  T(C): 36.8 (11 Feb 2022 13:50), Max: 37 (11 Feb 2022 04:49)  T(F): 98.2 (11 Feb 2022 13:50), Max: 98.6 (11 Feb 2022 04:49)  HR: 80 (11 Feb 2022 13:50) (74 - 82)  BP: 93/59 (11 Feb 2022 13:50) (93/59 - 130/79)  BP(mean): --  RR: 16 (11 Feb 2022 09:45) (16 - 18)  SpO2: 100% (11 Feb 2022 09:45) (95% - 100%)    PHYSICAL EXAMINATION:  GENERAL: NAD, well built  HEAD:  Atraumatic, Normocephalic  EYES:  conjunctiva and sclera clear  NECK: Supple, No JVD, Normal thyroid  CHEST/LUNG: Clear to auscultation. Clear to percussion bilaterally; No rales, rhonchi, wheezing, or rubs  HEART: Regular rate and rhythm; (+) systolic murmur, (-) rubs, or gallops  ABDOMEN: Soft, Nontender, Nondistended; Bowel sounds present, no pain or masses on palpation  NERVOUS SYSTEM:  Alert & Oriented X3  : voiding well  EXTREMITIES:  2+ Peripheral Pulses, No clubbing, cyanosis, or edema  SKIN: warm dry                          9.8    6.45  )-----------( 198      ( 11 Feb 2022 07:38 )             32.3     02-11    135  |  100  |  47<H>  ----------------------------<  122<H>  4.3   |  22  |  8.13<H>    Ca    10.1      11 Feb 2022 07:38  Phos  5.2     02-11  Mg     2.8     02-11    TPro  7.7  /  Alb  2.9<L>  /  TBili  0.4  /  DBili  x   /  AST  12  /  ALT  17  /  AlkPhos  212<H>  02-10    LIVER FUNCTIONS - ( 10 Feb 2022 07:22 )  Alb: 2.9 g/dL / Pro: 7.7 g/dL / ALK PHOS: 212 U/L / ALT: 17 U/L DA / AST: 12 U/L / GGT: x             I&O's Summary          CAPILLARY BLOOD GLUCOSE      RADIOLOGY & ADDITIONAL TESTS:        ACC: [id number] EXAM:  XR CHEST PORTABLE URGENT 1V                          PROCEDURE DATE:  02/08/2022          INTERPRETATION:  Chest one view    HISTORY: Shortness of breath    COMPARISON STUDY: 1/9/2022    Frontal expiratory view of the chest shows the heart to be similarly   enlarged in size. The lungs are clear and there is no evidence of   pneumothorax nor pleural effusion.    IMPRESSION:  No active pulmonary disease.        Thank you for the courtesy of this referral.    --- End of Report---

## 2022-03-10 NOTE — DISCHARGE NOTE ADULT - NSFTFAASSIST2FT_GEN_ALL_CORE
CHIEF COMPLAINT:  Kayley Dean is here today for Subsequent Annual Medicare Wellness Visit.      Medication verified and med list updated  Denies Latex allergy or symptoms of Latex sensitivity.    Refills needed today?  No          Patient would like communication of their results via:   Home Phone: [phone] (home)  Okay to leave a message containing results? Yes     Cholesterol (mg/dL)   Date Value   09/10/2020 228 (H)     HDL (mg/dL)   Date Value   09/10/2020 57     No components found for: CHOLHDLRATIO  Triglycerides (mg/dL)   Date Value   09/10/2020 149     LDL (mg/dL)   Date Value   09/10/2020 141 (H)      Glucose (mg/dL)   Date Value   09/13/2021 84       Health Maintenance Due   Topic Date Due   • DTaP/Tdap/Td Vaccine (1 - Tdap) Never done   • Shingles Vaccine (2 of 3) 07/07/2017   • Medicare Advantage- Medicare Wellness Visit  01/01/2022   • Depression Screening  03/03/2022   Patient is due for the topics as listed above and will discuss with their PCP.     shortness of breath

## 2022-05-05 ENCOUNTER — OUTPATIENT (OUTPATIENT)
Dept: OUTPATIENT SERVICES | Facility: HOSPITAL | Age: 77
LOS: 1 days | End: 2022-05-05
Payer: MEDICARE

## 2022-05-05 ENCOUNTER — APPOINTMENT (OUTPATIENT)
Dept: CV DIAGNOSTICS | Facility: HOSPITAL | Age: 77
End: 2022-05-05

## 2022-05-05 DIAGNOSIS — Z98.890 OTHER SPECIFIED POSTPROCEDURAL STATES: Chronic | ICD-10-CM

## 2022-05-05 DIAGNOSIS — Z96.659 PRESENCE OF UNSPECIFIED ARTIFICIAL KNEE JOINT: Chronic | ICD-10-CM

## 2022-05-05 DIAGNOSIS — Z98.89 OTHER SPECIFIED POSTPROCEDURAL STATES: Chronic | ICD-10-CM

## 2022-05-05 DIAGNOSIS — Z90.710 ACQUIRED ABSENCE OF BOTH CERVIX AND UTERUS: Chronic | ICD-10-CM

## 2022-05-05 DIAGNOSIS — I25.10 ATHEROSCLEROTIC HEART DISEASE OF NATIVE CORONARY ARTERY WITHOUT ANGINA PECTORIS: ICD-10-CM

## 2022-05-05 DIAGNOSIS — Z98.49 CATARACT EXTRACTION STATUS, UNSPECIFIED EYE: Chronic | ICD-10-CM

## 2022-05-05 PROCEDURE — 93018 CV STRESS TEST I&R ONLY: CPT

## 2022-05-05 PROCEDURE — A9500: CPT

## 2022-05-05 PROCEDURE — 93016 CV STRESS TEST SUPVJ ONLY: CPT

## 2022-05-05 PROCEDURE — 93017 CV STRESS TEST TRACING ONLY: CPT

## 2022-05-05 PROCEDURE — 78452 HT MUSCLE IMAGE SPECT MULT: CPT | Mod: 26,MH

## 2022-05-05 PROCEDURE — 78452 HT MUSCLE IMAGE SPECT MULT: CPT | Mod: MH

## 2022-05-15 ENCOUNTER — OUTPATIENT (OUTPATIENT)
Dept: OUTPATIENT SERVICES | Facility: HOSPITAL | Age: 77
LOS: 1 days | End: 2022-05-15
Payer: MEDICARE

## 2022-05-15 DIAGNOSIS — Z98.49 CATARACT EXTRACTION STATUS, UNSPECIFIED EYE: Chronic | ICD-10-CM

## 2022-05-15 DIAGNOSIS — Z90.710 ACQUIRED ABSENCE OF BOTH CERVIX AND UTERUS: Chronic | ICD-10-CM

## 2022-05-15 DIAGNOSIS — Z98.890 OTHER SPECIFIED POSTPROCEDURAL STATES: Chronic | ICD-10-CM

## 2022-05-15 DIAGNOSIS — Z98.89 OTHER SPECIFIED POSTPROCEDURAL STATES: Chronic | ICD-10-CM

## 2022-05-15 DIAGNOSIS — Z96.659 PRESENCE OF UNSPECIFIED ARTIFICIAL KNEE JOINT: Chronic | ICD-10-CM

## 2022-05-15 DIAGNOSIS — Z11.52 ENCOUNTER FOR SCREENING FOR COVID-19: ICD-10-CM

## 2022-05-15 PROCEDURE — C9803: CPT

## 2022-05-15 PROCEDURE — U0003: CPT

## 2022-05-15 PROCEDURE — U0005: CPT

## 2022-05-16 LAB — SARS-COV-2 RNA SPEC QL NAA+PROBE: SIGNIFICANT CHANGE UP

## 2022-05-17 ENCOUNTER — OUTPATIENT (OUTPATIENT)
Dept: OUTPATIENT SERVICES | Facility: HOSPITAL | Age: 77
LOS: 1 days | Discharge: HOME | End: 2022-05-17
Payer: MEDICARE

## 2022-05-17 VITALS — HEIGHT: 64 IN | WEIGHT: 171.96 LBS

## 2022-05-17 DIAGNOSIS — R07.9 CHEST PAIN, UNSPECIFIED: ICD-10-CM

## 2022-05-17 DIAGNOSIS — Z98.890 OTHER SPECIFIED POSTPROCEDURAL STATES: Chronic | ICD-10-CM

## 2022-05-17 DIAGNOSIS — Z98.49 CATARACT EXTRACTION STATUS, UNSPECIFIED EYE: Chronic | ICD-10-CM

## 2022-05-17 DIAGNOSIS — Z98.89 OTHER SPECIFIED POSTPROCEDURAL STATES: Chronic | ICD-10-CM

## 2022-05-17 DIAGNOSIS — Z90.710 ACQUIRED ABSENCE OF BOTH CERVIX AND UTERUS: Chronic | ICD-10-CM

## 2022-05-17 DIAGNOSIS — Z96.659 PRESENCE OF UNSPECIFIED ARTIFICIAL KNEE JOINT: Chronic | ICD-10-CM

## 2022-05-17 LAB
ALBUMIN SERPL ELPH-MCNC: 4.3 G/DL — SIGNIFICANT CHANGE UP (ref 3.3–5)
ALP SERPL-CCNC: 276 U/L — HIGH (ref 40–120)
ALT FLD-CCNC: 19 U/L — SIGNIFICANT CHANGE UP (ref 4–33)
ANION GAP SERPL CALC-SCNC: 18 MMOL/L — HIGH (ref 7–14)
AST SERPL-CCNC: 57 U/L — HIGH (ref 4–32)
BILIRUB SERPL-MCNC: 0.3 MG/DL — SIGNIFICANT CHANGE UP (ref 0.2–1.2)
BUN SERPL-MCNC: 50 MG/DL — HIGH (ref 7–23)
CALCIUM SERPL-MCNC: 10.1 MG/DL — SIGNIFICANT CHANGE UP (ref 8.4–10.5)
CHLORIDE SERPL-SCNC: 90 MMOL/L — LOW (ref 98–107)
CO2 SERPL-SCNC: 23 MMOL/L — SIGNIFICANT CHANGE UP (ref 22–31)
CREAT SERPL-MCNC: 6.03 MG/DL — HIGH (ref 0.5–1.3)
EGFR: 7 ML/MIN/1.73M2 — LOW
GLUCOSE SERPL-MCNC: 289 MG/DL — HIGH (ref 70–99)
HCT VFR BLD CALC: 38.7 % — SIGNIFICANT CHANGE UP (ref 34.5–45)
HGB BLD-MCNC: 11.8 G/DL — SIGNIFICANT CHANGE UP (ref 11.5–15.5)
MAGNESIUM SERPL-MCNC: 2.8 MG/DL — HIGH (ref 1.6–2.6)
MCHC RBC-ENTMCNC: 28.1 PG — SIGNIFICANT CHANGE UP (ref 27–34)
MCHC RBC-ENTMCNC: 30.5 GM/DL — LOW (ref 32–36)
MCV RBC AUTO: 92.1 FL — SIGNIFICANT CHANGE UP (ref 80–100)
NRBC # BLD: 0 /100 WBCS — SIGNIFICANT CHANGE UP
NRBC # FLD: 0 K/UL — SIGNIFICANT CHANGE UP
PHOSPHATE SERPL-MCNC: 4.3 MG/DL — SIGNIFICANT CHANGE UP (ref 2.5–4.5)
PLATELET # BLD AUTO: 211 K/UL — SIGNIFICANT CHANGE UP (ref 150–400)
POTASSIUM SERPL-MCNC: 6.1 MMOL/L — HIGH (ref 3.5–5.3)
POTASSIUM SERPL-SCNC: 6.1 MMOL/L — HIGH (ref 3.5–5.3)
PROT SERPL-MCNC: 8.4 G/DL — HIGH (ref 6–8.3)
RBC # BLD: 4.2 M/UL — SIGNIFICANT CHANGE UP (ref 3.8–5.2)
RBC # FLD: 21.5 % — HIGH (ref 10.3–14.5)
SODIUM SERPL-SCNC: 131 MMOL/L — LOW (ref 135–145)
WBC # BLD: 10.87 K/UL — HIGH (ref 3.8–10.5)
WBC # FLD AUTO: 10.87 K/UL — HIGH (ref 3.8–10.5)

## 2022-05-17 PROCEDURE — 93454 CORONARY ARTERY ANGIO S&I: CPT | Mod: 26

## 2022-05-17 PROCEDURE — 93571 IV DOP VEL&/PRESS C FLO 1ST: CPT | Mod: 26,LD

## 2022-05-17 PROCEDURE — 93010 ELECTROCARDIOGRAM REPORT: CPT | Mod: 77

## 2022-05-17 PROCEDURE — 93010 ELECTROCARDIOGRAM REPORT: CPT

## 2022-05-17 RX ORDER — SODIUM CHLORIDE 9 MG/ML
1000 INJECTION, SOLUTION INTRAVENOUS
Refills: 0 | Status: DISCONTINUED | OUTPATIENT
Start: 2022-05-17 | End: 2022-06-01

## 2022-05-17 RX ORDER — INSULIN LISPRO 100/ML
VIAL (ML) SUBCUTANEOUS AT BEDTIME
Refills: 0 | Status: DISCONTINUED | OUTPATIENT
Start: 2022-05-17 | End: 2022-06-01

## 2022-05-17 RX ORDER — DEXTROSE 50 % IN WATER 50 %
25 SYRINGE (ML) INTRAVENOUS ONCE
Refills: 0 | Status: DISCONTINUED | OUTPATIENT
Start: 2022-05-17 | End: 2022-06-01

## 2022-05-17 RX ORDER — DEXTROSE 50 % IN WATER 50 %
15 SYRINGE (ML) INTRAVENOUS ONCE
Refills: 0 | Status: DISCONTINUED | OUTPATIENT
Start: 2022-05-17 | End: 2022-06-01

## 2022-05-17 RX ORDER — INSULIN LISPRO 100/ML
VIAL (ML) SUBCUTANEOUS
Refills: 0 | Status: DISCONTINUED | OUTPATIENT
Start: 2022-05-17 | End: 2022-06-01

## 2022-05-17 RX ORDER — SODIUM CHLORIDE 9 MG/ML
3 INJECTION INTRAMUSCULAR; INTRAVENOUS; SUBCUTANEOUS EVERY 8 HOURS
Refills: 0 | Status: DISCONTINUED | OUTPATIENT
Start: 2022-05-17 | End: 2022-06-01

## 2022-05-17 RX ORDER — GABAPENTIN 400 MG/1
1 CAPSULE ORAL
Qty: 0 | Refills: 0 | DISCHARGE

## 2022-05-17 RX ORDER — INSULIN GLARGINE 100 [IU]/ML
36 INJECTION, SOLUTION SUBCUTANEOUS
Qty: 0 | Refills: 0 | DISCHARGE

## 2022-05-17 RX ORDER — DEXTROSE 50 % IN WATER 50 %
12.5 SYRINGE (ML) INTRAVENOUS ONCE
Refills: 0 | Status: DISCONTINUED | OUTPATIENT
Start: 2022-05-17 | End: 2022-06-01

## 2022-05-17 RX ORDER — GLUCAGON INJECTION, SOLUTION 0.5 MG/.1ML
1 INJECTION, SOLUTION SUBCUTANEOUS ONCE
Refills: 0 | Status: DISCONTINUED | OUTPATIENT
Start: 2022-05-17 | End: 2022-06-01

## 2022-05-17 RX ADMIN — Medication 3: at 13:59

## 2022-05-17 NOTE — H&P CARDIOLOGY - HISTORY OF PRESENT ILLNESS
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 Pt has been experiencing intermittent palpitations for the past year, which typically occur during hemodialysis. Pt describes her palpitations as a "strong and fast heart beat" with no exertional component. Pt does admit to some lightheadedness. Pt recently was found to have episodes of intradialytic hypotension for which she was started on Midodrine. Pt does admit to fatigue on exertion but denies chest pain. Pt needs to have another shoulder procedure but it keeps getting postponed because of her need for aggressive fluid balance. Pt saw her cardiologist, Dr. Treadwell, and a recent nuclear stress test revealed medium sized, mild defects in the basal anterior and basal to mid anteroseptal walls that are reversible suggestive of ischemia and large, mild to moderate defects in the inferior, inferoapical, basal to mid inferolateral, and basal to mid inferoseptal walls that are mostly fixed, suggestive of infarct with mild joao-infarct ischemia. Pt denies fever, chills, recent travel, headache, dizziness, visual deficits, chest pain, orthopnea, abdominal pain, N/V/D/C, hematochezia, melena, dysuria, hematuria, LOC, syncope. In light of patients cardiac risk factors, symptoms, and abnormal noninvasive test findings, there is high suspicion for CAD. Patient is now referred to [facility] for a cardiac catheterization with possible PTCA/stent.     Of note, pt has an allergy to IV contrast for which she was pre-medicated with three doses of Prednisone 50mg with her last dose taken at 6:00AM.    Cardiologist: Dr. Sridhar Treadwell  COVID status: PCR negative 5/15/2022

## 2022-05-17 NOTE — H&P CARDIOLOGY - NSICDXPASTMEDICALHX_GEN_ALL_CORE_FT
PAST MEDICAL HISTORY:  Anemia     DM (diabetes mellitus)     ESRD (end stage renal disease) on dialysis MWF via left AVF    Gout     HLD (hyperlipidemia)     HTN (hypertension)     JARVIS (obstructive sleep apnea) CPAP QHS    Trigeminal neuralgia     Vertigo

## 2022-05-17 NOTE — H&P CARDIOLOGY - NSICDXPASTSURGICALHX_GEN_ALL_CORE_FT
PAST SURGICAL HISTORY:  S/P arteriovenous (AV) fistula creation left upper forearm cephalic vein brachial artery AV fistula creation on 2018, left arm basilic vein transposition on 2019.      S/P      S/P carpal tunnel release Left    S/P cataract extraction bilateral,     S/P hysterectomy     S/P rotator cuff surgery Bilateral    S/P total knee replacement Bilateral, left , right

## 2022-06-09 NOTE — H&P PST ADULT - RS GEN PE MLT RESP DETAILS PC
no wheezes/no rales/no subcutaneous emphysema/good air movement/no intercostal retractions/respirations non-labored/no rhonchi/normal/airway patent/no chest wall tenderness/clear to auscultation bilaterally/breath sounds equal Xolair Pregnancy And Lactation Text: This medication is Pregnancy Category B and is considered safe during pregnancy. This medication is excreted in breast milk.

## 2022-09-23 ENCOUNTER — EMERGENCY (EMERGENCY)
Facility: HOSPITAL | Age: 77
LOS: 1 days | Discharge: HOME | End: 2022-09-23
Attending: EMERGENCY MEDICINE
Payer: MEDICARE

## 2022-09-23 VITALS
SYSTOLIC BLOOD PRESSURE: 148 MMHG | RESPIRATION RATE: 16 BRPM | DIASTOLIC BLOOD PRESSURE: 76 MMHG | HEART RATE: 86 BPM | HEIGHT: 64 IN | OXYGEN SATURATION: 100 %

## 2022-09-23 VITALS
SYSTOLIC BLOOD PRESSURE: 124 MMHG | TEMPERATURE: 98 F | OXYGEN SATURATION: 96 % | RESPIRATION RATE: 17 BRPM | DIASTOLIC BLOOD PRESSURE: 68 MMHG | HEART RATE: 86 BPM

## 2022-09-23 DIAGNOSIS — Z98.89 OTHER SPECIFIED POSTPROCEDURAL STATES: Chronic | ICD-10-CM

## 2022-09-23 DIAGNOSIS — Z90.710 ACQUIRED ABSENCE OF BOTH CERVIX AND UTERUS: Chronic | ICD-10-CM

## 2022-09-23 DIAGNOSIS — Z98.890 OTHER SPECIFIED POSTPROCEDURAL STATES: Chronic | ICD-10-CM

## 2022-09-23 DIAGNOSIS — Z98.49 CATARACT EXTRACTION STATUS, UNSPECIFIED EYE: Chronic | ICD-10-CM

## 2022-09-23 DIAGNOSIS — Z96.659 PRESENCE OF UNSPECIFIED ARTIFICIAL KNEE JOINT: Chronic | ICD-10-CM

## 2022-09-23 PROCEDURE — 99283 EMERGENCY DEPT VISIT LOW MDM: CPT

## 2022-09-23 PROCEDURE — 99284 EMERGENCY DEPT VISIT MOD MDM: CPT

## 2022-09-23 RX ORDER — ACETYLCYSTEINE 200 MG/ML
2 VIAL (ML) MISCELLANEOUS
Qty: 60 | Refills: 0
Start: 2022-09-23

## 2022-09-23 NOTE — ED PROVIDER NOTE - PHYSICAL EXAMINATION
Trach with a cannula is in place.  Cannula was easily inserted and removed. There is no surrounding redness or skin changes.

## 2022-09-23 NOTE — ED PROVIDER NOTE - NSFOLLOWUPINSTRUCTIONS_ED_ALL_ED_FT
IMPORTANT INSTRUCTIONS FROM Dr. FRANK:    Please follow up with your personal medical doctor in 24-48 hours.   Bring results from today to your visit.    If you were advised to take any medications - be sure to review the package insert.    If your symptoms change, get worse or if you have any new symptoms, come to the ER right away.  If you have any questions, call the ER at the phone number on this page.

## 2022-09-23 NOTE — ED ADULT NURSE NOTE - OBJECTIVE STATEMENT
78 y/o F A&OX4 PMHx dialysis on M/W/F c/c placement of trac was placed august 25 and daughter usually suctions pt. Aid was home with pt and she needed to be suctioned but aid does not know how to suction. As per aid, pt appears to look as if she is chocking on her secretions, and she called 911. Pt O2 is 100% RA. Placed on cardiac monitor. Provider at beside. VSS. Safety precautions maintained.

## 2022-09-23 NOTE — ED PROVIDER NOTE - NS ED MD DISPO DISCHARGE CCDA
Patient is resting comfortably at this time. Patient feels significantly better than when she came into the department. Patient has not had a bought of vomiting or diarrhea since being here in the department. Patient/Caregiver provided printed discharge information.

## 2022-09-23 NOTE — ED ADULT NURSE NOTE - NSIMPLEMENTINTERV_GEN_ALL_ED
Implemented All Fall Risk Interventions:  [place] to call system. Call bell, personal items and telephone within reach. Instruct patient to call for assistance. Room bathroom lighting operational. Non-slip footwear when patient is off stretcher. Physically safe environment: no spills, clutter or unnecessary equipment. Stretcher in lowest position, wheels locked, appropriate side rails in place. Provide visual cue, wrist band, yellow gown, etc. Monitor gait and stability. Monitor for mental status changes and reorient to person, place, and time. Review medications for side effects contributing to fall risk. Reinforce activity limits and safety measures with patient and family.

## 2022-09-23 NOTE — ED PROVIDER NOTE - PATIENT PORTAL LINK FT
You can access the FollowMyHealth Patient Portal offered by [facility] by registering at the following website: [email or web address]. By joining [facility] [employer]’s FollowMyHealth portal, you will also be able to view your health information using other applications (apps) compatible with our system.

## 2022-09-23 NOTE — ED PROVIDER NOTE - OBJECTIVE STATEMENT
This is a 77-year-old female who had a trach placed about a month ago after her surgery to her wrist.  More recently she often require suctioning.  Her daughter is the caregiver but she is at work, and the family member at the bedside says that she does not usually do the suctioning and she needed assistance so she called 911.  There is no fevers or other new symptoms.  There is no change to sputum or any other new findings.  There is no bleeding.

## 2022-09-24 PROBLEM — E11.9 TYPE 2 DIABETES MELLITUS WITHOUT COMPLICATIONS: Chronic | Status: ACTIVE | Noted: 2022-05-17

## 2022-09-24 PROBLEM — E78.5 HYPERLIPIDEMIA, UNSPECIFIED: Chronic | Status: ACTIVE | Noted: 2022-05-17

## 2022-09-24 PROBLEM — G47.33 OBSTRUCTIVE SLEEP APNEA (ADULT) (PEDIATRIC): Chronic | Status: ACTIVE | Noted: 2022-05-17

## 2022-09-24 PROBLEM — N18.6 END STAGE RENAL DISEASE: Chronic | Status: ACTIVE | Noted: 2018-08-21
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
## 2022-10-20 NOTE — DISCHARGE NOTE ADULT - PROVIDER TOKENS
- BPs well controlled  - c/w home lisinopril On Toujeo 80 units AM, 84 units PM + Novolog 20 qAC at home.  - c/w Lantus at reduced dose to 40 qhs for now, titrate as needed  - Hold premeal insulin for now  - Low dose correctional scale GLENNY:'[id number]' TOKNIK:'[id number]',GLENNY:'[id number]'

## 2022-11-07 NOTE — DISCHARGE NOTE ADULT - REASON FOR ADMISSION
shortness of breath Double O-Z Plasty Text: The defect edges were debeveled with a #15 scalpel blade.  Given the location of the defect, shape of the defect and the proximity to free margins a Double O-Z plasty (double transposition flap) was deemed most appropriate.  Using a sterile surgical marker, the appropriate transposition flaps were drawn incorporating the defect and placing the expected incisions within the relaxed skin tension lines where possible. The area thus outlined was incised deep to adipose tissue with a #15 scalpel blade.  The skin margins were undermined to an appropriate distance in all directions utilizing iris scissors.  Hemostasis was achieved with electrocautery.  The flaps were then transposed into place, one clockwise and the other counterclockwise, and anchored with interrupted buried subcutaneous sutures.

## 2023-01-15 NOTE — ED ADULT NURSE NOTE - NSSUSCREENINGQ4_ED_ALL_ED
PHYSICAL THERAPY TREATMENT NOTE - INPATIENT     Room Number: 422/422-A       Presenting Problem: POD1 spinal I&D    Problem List  Principal Problem:    Surgical site reaction, initial encounter  Active Problems:    Hyponatremia    Tremor      PHYSICAL THERAPY ASSESSMENT   Chart reviewed. RN Sherrie Soler approved participation in physical therapy. PPE worn by therapist: mask and gloves. Patient was wearing a mask during session. Patient presented in bedside chair with 4/10 pain. Patient with good  progress towards goals during this session. Education provided on Spine precautions, Physical therapy plan of care and physiological benefits of out of bed mobility. Patient with good carryover. Pt is received in the chair and was cleared for therapy session. Pt's spinal precautions were reviewed and maintained throughout the session. Pt is min A with sit<>stand transfers with the RW. Pt was able to AMB about 100' with the RW min A for balance and safety. Pt with decreased step length with slow gait. Pt required 3 standing rest breaks as he fatigues quickly with activity. Returned pt back to the room and to sitting in the chair with all needs within reach. Recommend that pt dc's to a rehab facility due to his poor activity tolerance and does live alone with no assist at home. Pt is from [place] and is not safe to dc to home alone at this time. Reported to the RN on the status of the pt. Bed mobility: NT  Transfers: Min assist  Gait Assistance: Contact guard assist;Minimum assistance  Distance (ft): 100'  Assistive Device: Rolling walker  Pattern: R Decreased stance time           Patient was left in bedside chair at end of session with all needs in reach. The patient's Approx Degree of Impairment: 54.16% has been calculated based on documentation in the [facility] '6 clicks' Inpatient Basic Mobility Short Form. Research supports that patients with this level of impairment may benefit from Acute Rehab.  RN aware of patient status post session. DISCHARGE RECOMMENDATIONS  PT Discharge Recommendations: Acute rehabilitation     PLAN  PT Treatment Plan: Bed mobility; Body mechanics; Coordination; Endurance; Patient education; Energy conservation;Gait training;Strengthening;Transfer training;Balance training;Neuromuscular re-educate    SUBJECTIVE  Pt was agreeable to therapy session. OBJECTIVE  Precautions:  (right drop foot)    WEIGHT BEARING RESTRICTION  Weight Bearing Restriction: None                PAIN ASSESSMENT   Ratin  Location: back  Management Techniques: Activity promotion; Body mechanics; Relaxation;Repositioning    BALANCE                                                                                                                       Static Sitting: Good  Dynamic Sitting: Fair +           Static Standing: Fair -  Dynamic Standing: Poor +    ACTIVITY TOLERANCE                         O2 WALK       AM-PAC '6-Clicks' INPATIENT SHORT FORM - BASIC MOBILITY  How much difficulty does the patient currently have. .. Patient Difficulty: Turning over in bed (including adjusting bedclothes, sheets and blankets)?: A Little   Patient Difficulty: Sitting down on and standing up from a chair with arms (e.g., wheelchair, bedside commode, etc.): A Little   Patient Difficulty: Moving from lying on back to sitting on the side of the bed?: A Little   How much help from another person does the patient currently need. .. Help from Another: Moving to and from a bed to a chair (including a wheelchair)?: A Little   Help from Another: Need to walk in hospital room?: A Little   Help from Another: Climbing 3-5 steps with a railing?: Total     AM-PAC Score:  Raw Score: 16   Approx Degree of Impairment: 54.16%   Standardized Score (AM-PAC Scale): 40.78   CMS Modifier (G-Code): CK      Patient End of Session: Up in chair;Needs met;Call light within reach;RN aware of session/findings; All patient questions and concerns addressed    CURRENT GOALS Goals to be met by: 1/20/23  Patient Goal Patient's self-stated goal is: maximize functional independence and safety   Goal #1 Patient is able to demonstrate supine - sit EOB @ level: modified independent      Goal #1   Current Status  NT received in the chair   Goal #2 Patient is able to demonstrate transfers EOB to/from [facility] at assistance level: minimum assistance with walker - rolling      Goal #2  Current Status  min A with the RW   Goal #3 Patient is able to ambulate 50 feet with assist device: walker - rolling at assistance level: supervision   Goal #3   Current Status  100' with the RW min A   Goal #4 Patient will negotiate 2 stairs/one curb w/ assistive device and contact guard assist   Goal #4   Current Status  NT   Goal #5 Patient to demonstrate independence with home activity/exercise instructions provided to patient in preparation for discharge.    Goal #5   Current Status  IN PROGRESS   Goal #6     Goal #6  Current Status         PT Session Time: 15 minutes    Therapeutic Activity: 15 minutes No

## 2023-05-11 NOTE — PROGRESS NOTE ADULT - PROBLEM SELECTOR PLAN 7
Statement Selected
- c/w coreg, hydralazine, nifedipine xl ( with hold parameters)
- c/w coreg, hydralazine, nifedipine xl ( with hold parameters)
not using cpap at home

## 2023-05-17 ENCOUNTER — INPATIENT (INPATIENT)
Facility: HOSPITAL | Age: 78
LOS: 0 days | Discharge: HOME | DRG: 682 | End: 2023-05-18
Attending: INTERNAL MEDICINE | Admitting: INTERNAL MEDICINE
Payer: MEDICARE

## 2023-05-17 VITALS
RESPIRATION RATE: 22 BRPM | DIASTOLIC BLOOD PRESSURE: 89 MMHG | OXYGEN SATURATION: 100 % | SYSTOLIC BLOOD PRESSURE: 150 MMHG | HEART RATE: 78 BPM | HEIGHT: 64 IN | WEIGHT: 171.96 LBS | TEMPERATURE: 98 F

## 2023-05-17 DIAGNOSIS — Z98.49 CATARACT EXTRACTION STATUS, UNSPECIFIED EYE: Chronic | ICD-10-CM

## 2023-05-17 DIAGNOSIS — Z96.659 PRESENCE OF UNSPECIFIED ARTIFICIAL KNEE JOINT: Chronic | ICD-10-CM

## 2023-05-17 DIAGNOSIS — Z98.890 OTHER SPECIFIED POSTPROCEDURAL STATES: Chronic | ICD-10-CM

## 2023-05-17 DIAGNOSIS — R06.00 DYSPNEA, UNSPECIFIED: ICD-10-CM

## 2023-05-17 DIAGNOSIS — M10.9 GOUT, UNSPECIFIED: ICD-10-CM

## 2023-05-17 DIAGNOSIS — R06.02 SHORTNESS OF BREATH: ICD-10-CM

## 2023-05-17 DIAGNOSIS — E11.9 TYPE 2 DIABETES MELLITUS WITHOUT COMPLICATIONS: ICD-10-CM

## 2023-05-17 DIAGNOSIS — Z90.710 ACQUIRED ABSENCE OF BOTH CERVIX AND UTERUS: Chronic | ICD-10-CM

## 2023-05-17 DIAGNOSIS — Z98.89 OTHER SPECIFIED POSTPROCEDURAL STATES: Chronic | ICD-10-CM

## 2023-05-17 DIAGNOSIS — N18.6 END STAGE RENAL DISEASE: ICD-10-CM

## 2023-05-17 LAB
ALBUMIN SERPL ELPH-MCNC: 3.9 G/DL — SIGNIFICANT CHANGE UP (ref 3.3–5)
ALP SERPL-CCNC: 338 U/L — HIGH (ref 40–120)
ALT FLD-CCNC: 17 U/L — SIGNIFICANT CHANGE UP (ref 10–45)
ANION GAP SERPL CALC-SCNC: 16 MMOL/L — SIGNIFICANT CHANGE UP (ref 5–17)
APTT BLD: 29 SEC — SIGNIFICANT CHANGE UP (ref 27.5–35.5)
AST SERPL-CCNC: 33 U/L — SIGNIFICANT CHANGE UP (ref 10–40)
BASE EXCESS BLDV CALC-SCNC: 1.2 MMOL/L — SIGNIFICANT CHANGE UP (ref -2–3)
BASOPHILS # BLD AUTO: 0.02 K/UL — SIGNIFICANT CHANGE UP (ref 0–0.2)
BASOPHILS NFR BLD AUTO: 0.3 % — SIGNIFICANT CHANGE UP (ref 0–2)
BILIRUB SERPL-MCNC: 0.2 MG/DL — SIGNIFICANT CHANGE UP (ref 0.2–1.2)
BLOOD GAS VENOUS - CREATININE: SIGNIFICANT CHANGE UP MG/DL (ref 0.5–1.3)
BUN SERPL-MCNC: 44 MG/DL — HIGH (ref 7–23)
CA-I SERPL-SCNC: 1.26 MMOL/L — SIGNIFICANT CHANGE UP (ref 1.15–1.33)
CALCIUM SERPL-MCNC: 9.6 MG/DL — SIGNIFICANT CHANGE UP (ref 8.4–10.5)
CHLORIDE BLDV-SCNC: 100 MMOL/L — SIGNIFICANT CHANGE UP (ref 96–108)
CHLORIDE SERPL-SCNC: 96 MMOL/L — SIGNIFICANT CHANGE UP (ref 96–108)
CO2 BLDV-SCNC: 29 MMOL/L — HIGH (ref 22–26)
CO2 SERPL-SCNC: 24 MMOL/L — SIGNIFICANT CHANGE UP (ref 22–31)
CREAT SERPL-MCNC: 7.62 MG/DL — HIGH (ref 0.5–1.3)
EGFR: 5 ML/MIN/1.73M2 — LOW
EOSINOPHIL # BLD AUTO: 0.04 K/UL — SIGNIFICANT CHANGE UP (ref 0–0.5)
EOSINOPHIL NFR BLD AUTO: 0.6 % — SIGNIFICANT CHANGE UP (ref 0–6)
GAS PNL BLDV: 134 MMOL/L — LOW (ref 136–145)
GAS PNL BLDV: SIGNIFICANT CHANGE UP
GAS PNL BLDV: SIGNIFICANT CHANGE UP
GLUCOSE BLDC GLUCOMTR-MCNC: 200 MG/DL — HIGH (ref 70–99)
GLUCOSE BLDC GLUCOMTR-MCNC: 87 MG/DL — SIGNIFICANT CHANGE UP (ref 70–99)
GLUCOSE BLDC GLUCOMTR-MCNC: 87 MG/DL — SIGNIFICANT CHANGE UP (ref 70–99)
GLUCOSE BLDV-MCNC: 82 MG/DL — SIGNIFICANT CHANGE UP (ref 70–99)
GLUCOSE SERPL-MCNC: 101 MG/DL — HIGH (ref 70–99)
HCO3 BLDV-SCNC: 27 MMOL/L — SIGNIFICANT CHANGE UP (ref 22–29)
HCT VFR BLD CALC: 32.2 % — LOW (ref 34.5–45)
HCT VFR BLDA CALC: 30 % — LOW (ref 34.5–46.5)
HGB BLD CALC-MCNC: 10.1 G/DL — LOW (ref 11.7–16.1)
HGB BLD-MCNC: 10.2 G/DL — LOW (ref 11.5–15.5)
IMM GRANULOCYTES NFR BLD AUTO: 1.6 % — HIGH (ref 0–0.9)
INR BLD: 0.99 RATIO — SIGNIFICANT CHANGE UP (ref 0.88–1.16)
LACTATE BLDV-MCNC: 1.3 MMOL/L — SIGNIFICANT CHANGE UP (ref 0.5–2)
LYMPHOCYTES # BLD AUTO: 1.29 K/UL — SIGNIFICANT CHANGE UP (ref 1–3.3)
LYMPHOCYTES # BLD AUTO: 20.8 % — SIGNIFICANT CHANGE UP (ref 13–44)
MAGNESIUM SERPL-MCNC: 2.4 MG/DL — SIGNIFICANT CHANGE UP (ref 1.6–2.6)
MCHC RBC-ENTMCNC: 28.5 PG — SIGNIFICANT CHANGE UP (ref 27–34)
MCHC RBC-ENTMCNC: 31.7 GM/DL — LOW (ref 32–36)
MCV RBC AUTO: 89.9 FL — SIGNIFICANT CHANGE UP (ref 80–100)
MONOCYTES # BLD AUTO: 0.7 K/UL — SIGNIFICANT CHANGE UP (ref 0–0.9)
MONOCYTES NFR BLD AUTO: 11.3 % — SIGNIFICANT CHANGE UP (ref 2–14)
NEUTROPHILS # BLD AUTO: 4.05 K/UL — SIGNIFICANT CHANGE UP (ref 1.8–7.4)
NEUTROPHILS NFR BLD AUTO: 65.4 % — SIGNIFICANT CHANGE UP (ref 43–77)
NRBC # BLD: 0 /100 WBCS — SIGNIFICANT CHANGE UP (ref 0–0)
NT-PROBNP SERPL-SCNC: 4071 PG/ML — HIGH (ref 0–300)
PCO2 BLDV: 48 MMHG — HIGH (ref 39–42)
PH BLDV: 7.36 — SIGNIFICANT CHANGE UP (ref 7.32–7.43)
PLATELET # BLD AUTO: 188 K/UL — SIGNIFICANT CHANGE UP (ref 150–400)
PO2 BLDV: 56 MMHG — HIGH (ref 25–45)
POTASSIUM BLDV-SCNC: 4.5 MMOL/L — SIGNIFICANT CHANGE UP (ref 3.5–5.1)
POTASSIUM SERPL-MCNC: 5.1 MMOL/L — SIGNIFICANT CHANGE UP (ref 3.5–5.3)
POTASSIUM SERPL-SCNC: 5.1 MMOL/L — SIGNIFICANT CHANGE UP (ref 3.5–5.3)
PROT SERPL-MCNC: 7.4 G/DL — SIGNIFICANT CHANGE UP (ref 6–8.3)
PROTHROM AB SERPL-ACNC: 11.5 SEC — SIGNIFICANT CHANGE UP (ref 10.5–13.4)
RAPID RVP RESULT: SIGNIFICANT CHANGE UP
RBC # BLD: 3.58 M/UL — LOW (ref 3.8–5.2)
RBC # FLD: 18.6 % — HIGH (ref 10.3–14.5)
SAO2 % BLDV: 88 % — SIGNIFICANT CHANGE UP (ref 67–88)
SARS-COV-2 RNA SPEC QL NAA+PROBE: SIGNIFICANT CHANGE UP
SODIUM SERPL-SCNC: 136 MMOL/L — SIGNIFICANT CHANGE UP (ref 135–145)
TROPONIN T, HIGH SENSITIVITY RESULT: 74 NG/L — HIGH (ref 0–51)
TROPONIN T, HIGH SENSITIVITY RESULT: 78 NG/L — HIGH (ref 0–51)
WBC # BLD: 6.2 K/UL — SIGNIFICANT CHANGE UP (ref 3.8–10.5)
WBC # FLD AUTO: 6.2 K/UL — SIGNIFICANT CHANGE UP (ref 3.8–10.5)

## 2023-05-17 PROCEDURE — 99223 1ST HOSP IP/OBS HIGH 75: CPT

## 2023-05-17 PROCEDURE — 36000 PLACE NEEDLE IN VEIN: CPT

## 2023-05-17 PROCEDURE — 71045 X-RAY EXAM CHEST 1 VIEW: CPT | Mod: 26

## 2023-05-17 PROCEDURE — 99285 EMERGENCY DEPT VISIT HI MDM: CPT | Mod: 25

## 2023-05-17 RX ORDER — INSULIN LISPRO 100/ML
VIAL (ML) SUBCUTANEOUS
Refills: 0 | Status: DISCONTINUED | OUTPATIENT
Start: 2023-05-17 | End: 2023-05-18

## 2023-05-17 RX ORDER — DEXTROSE 50 % IN WATER 50 %
12.5 SYRINGE (ML) INTRAVENOUS ONCE
Refills: 0 | Status: DISCONTINUED | OUTPATIENT
Start: 2023-05-17 | End: 2023-05-18

## 2023-05-17 RX ORDER — LIDOCAINE AND PRILOCAINE CREAM 25; 25 MG/G; MG/G
1 CREAM TOPICAL ONCE
Refills: 0 | Status: COMPLETED | OUTPATIENT
Start: 2023-05-17 | End: 2023-05-17

## 2023-05-17 RX ORDER — ATORVASTATIN CALCIUM 80 MG/1
1 TABLET, FILM COATED ORAL
Qty: 0 | Refills: 0 | DISCHARGE

## 2023-05-17 RX ORDER — ALLOPURINOL 300 MG
1 TABLET ORAL
Qty: 0 | Refills: 0 | DISCHARGE

## 2023-05-17 RX ORDER — INSULIN LISPRO 100/ML
VIAL (ML) SUBCUTANEOUS AT BEDTIME
Refills: 0 | Status: DISCONTINUED | OUTPATIENT
Start: 2023-05-17 | End: 2023-05-18

## 2023-05-17 RX ORDER — INSULIN GLARGINE 100 [IU]/ML
20 INJECTION, SOLUTION SUBCUTANEOUS AT BEDTIME
Refills: 0 | Status: DISCONTINUED | OUTPATIENT
Start: 2023-05-17 | End: 2023-05-18

## 2023-05-17 RX ORDER — HEPARIN SODIUM 5000 [USP'U]/ML
5000 INJECTION INTRAVENOUS; SUBCUTANEOUS EVERY 8 HOURS
Refills: 0 | Status: DISCONTINUED | OUTPATIENT
Start: 2023-05-17 | End: 2023-05-18

## 2023-05-17 RX ORDER — INSULIN GLARGINE 100 [IU]/ML
26 INJECTION, SOLUTION SUBCUTANEOUS
Qty: 0 | Refills: 0 | DISCHARGE

## 2023-05-17 RX ORDER — SODIUM CHLORIDE 9 MG/ML
1000 INJECTION, SOLUTION INTRAVENOUS
Refills: 0 | Status: DISCONTINUED | OUTPATIENT
Start: 2023-05-17 | End: 2023-05-18

## 2023-05-17 RX ORDER — ALLOPURINOL 300 MG
100 TABLET ORAL DAILY
Refills: 0 | Status: DISCONTINUED | OUTPATIENT
Start: 2023-05-17 | End: 2023-05-18

## 2023-05-17 RX ORDER — ASPIRIN/CALCIUM CARB/MAGNESIUM 324 MG
1 TABLET ORAL
Qty: 0 | Refills: 0 | DISCHARGE

## 2023-05-17 RX ORDER — SEVELAMER CARBONATE 2400 MG/1
2 POWDER, FOR SUSPENSION ORAL
Qty: 0 | Refills: 0 | DISCHARGE

## 2023-05-17 RX ORDER — DEXTROSE 50 % IN WATER 50 %
15 SYRINGE (ML) INTRAVENOUS ONCE
Refills: 0 | Status: DISCONTINUED | OUTPATIENT
Start: 2023-05-17 | End: 2023-05-18

## 2023-05-17 RX ORDER — GLUCAGON INJECTION, SOLUTION 0.5 MG/.1ML
1 INJECTION, SOLUTION SUBCUTANEOUS ONCE
Refills: 0 | Status: DISCONTINUED | OUTPATIENT
Start: 2023-05-17 | End: 2023-05-18

## 2023-05-17 RX ORDER — DEXTROSE 50 % IN WATER 50 %
25 SYRINGE (ML) INTRAVENOUS ONCE
Refills: 0 | Status: DISCONTINUED | OUTPATIENT
Start: 2023-05-17 | End: 2023-05-18

## 2023-05-17 RX ORDER — MIDODRINE HYDROCHLORIDE 2.5 MG/1
1 TABLET ORAL
Qty: 13 | Refills: 0 | DISCHARGE

## 2023-05-17 RX ORDER — ATORVASTATIN CALCIUM 80 MG/1
40 TABLET, FILM COATED ORAL AT BEDTIME
Refills: 0 | Status: DISCONTINUED | OUTPATIENT
Start: 2023-05-17 | End: 2023-05-18

## 2023-05-17 RX ADMIN — INSULIN GLARGINE 20 UNIT(S): 100 INJECTION, SOLUTION SUBCUTANEOUS at 23:33

## 2023-05-17 RX ADMIN — LIDOCAINE AND PRILOCAINE CREAM 1 APPLICATION(S): 25; 25 CREAM TOPICAL at 14:03

## 2023-05-17 RX ADMIN — ATORVASTATIN CALCIUM 40 MILLIGRAM(S): 80 TABLET, FILM COATED ORAL at 21:55

## 2023-05-17 NOTE — H&P ADULT - NSHPREVIEWOFSYSTEMS_GEN_ALL_CORE
General: + fatigue  HEENT: + sore throat, + cough with white sputum   CV: no chest pain; + peripheral edema bilateral lower extremities  Pulm: + SOB at rest and with exertion   Skin: no rashes General: + fatigue  HEENT: + sore throat, + cough with white sputum   CV: no chest pain; + 1+ pitting edema bilateral lower extremities  Pulm: + SOB at rest and with exertion   Skin: no rashes

## 2023-05-17 NOTE — ED PROVIDER NOTE - CLINICAL SUMMARY MEDICAL DECISION MAKING FREE TEXT BOX
77 year old female with ESRD on HD MWF via left AVF, HTN, HLD, DM, anemia of chronic renal disease, JARVIS on CPAP presents to the ED complaining of shortness of breath since this morning.  Reports feels short of breath both at rest and with exertion with associated intermittent chest tightness. On oxygen for comfort will attempt to wean off. Will check labs, cxr, likely admit for HD today.

## 2023-05-17 NOTE — ED PROVIDER NOTE - ATTENDING APP SHARED VISIT CONTRIBUTION OF CARE
Dr. Boudreaux (Attending Physician)  I performed a history and physical exam of the patient and discussed their management with the advanced care provider. I reviewed the advanced care provider's note and agree with the documented findings and plan of care. My medical decision making and objective findings are found above.

## 2023-05-17 NOTE — ED PROVIDER NOTE - CADM POA URETHRAL CATHETER
Patient BIBA from home after with sudden onset difficulty breathing and weakness. Patient given 2 albuterol nebulizers at home by daughter. Patient had 1 episode of vomiting. Patient recently hospitalized for fem-pop bypass. Patient 88% on room air in triage. EMS reports redness and warmth to surgical site.    ISAR POSITIVE.
No

## 2023-05-17 NOTE — H&P ADULT - PROBLEM SELECTOR PLAN 2
ESRD on HD (M/W/F), last HD 5/15  she gets HD at [facility] and follows up with Nephrologist Dr. Taylor  nephrology team consulted, plan for HD today 5/17

## 2023-05-17 NOTE — ED ADULT NURSE NOTE - OBJECTIVE STATEMENT
77Y F AXO3 PMH of ESRD, hypertension, COPD, and DM and no pertinent PSH presented to the ED via EMS c/o SOB since this morning. Pt is on dialysis and has a MWF schedule, last dialysis was on 5/15/2023. Pt reports that SOB occurs during rest and with exertion. Pt also endorses chest tightness that occurs intermittently. Pt was placed on 2 L O2 nasal cannula by EMS for comfort, O2 sat on room air was 98%. Upon arrival to the ED, the pt is well appearing, has bilateral, even, and unlabored chest rise, and airway is patent. Upon assessment, pt has even and bilateral peripheral pulses, ROM, and soft, non-tender, non-distended abdomen. Pt denies fevers, chills, n/v/d, sick contacts, cough, urinary symptoms, black or bloody stools, and numbness or tingling of extremities. Comfort and safety provided, bed in lowest position and side rails up.

## 2023-05-17 NOTE — ED PROVIDER NOTE - PROGRESS NOTE DETAILS
Attempted ultrasound IV due to difficult access.  Patient unable to tolerate procedure at this time and is refusing IV.  Nephrologist Dr. Taylor evaluated patient at bedside and recommended admission to Dr. Sanchez, they will arrange dialysis today. - Carlos Puhg PA-C Attempted ultrasound IV due to difficult access.  Patient unable to tolerate procedure at this time, will trial EMLA cream and reattempt. Nephrologist Dr. Taylor evaluated patient at bedside and recommended admission to Dr. Sanchez, they will arrange dialysis today. Spoke w/ Dr. Sanchez regarding admission, accepted pt to his service. ED attending aware.  - Carlos Pugh PA-C

## 2023-05-17 NOTE — ED PROVIDER NOTE - CPE EDP GASTRO NORM
normal... Purse String (Intermediate) Text: Given the location of the defect and the characteristics of the surrounding skin a purse string intermediate closure was deemed most appropriate.  Undermining was performed circumferentially around the surgical defect.  A purse string suture was then placed and tightened.

## 2023-05-17 NOTE — H&P ADULT - NSHPPHYSICALEXAM_GEN_ALL_CORE
Vital Signs Last 24 Hrs  T(C): 36.6 (17 May 2023 11:45), Max: 36.6 (17 May 2023 11:23)  T(F): 97.9 (17 May 2023 11:45), Max: 97.9 (17 May 2023 11:45)  HR: 82 (17 May 2023 11:45) (78 - 82)  BP: 128/71 (17 May 2023 11:45) (128/71 - 150/89)  BP(mean): --  RR: 20 (17 May 2023 11:45) (20 - 22)  SpO2: 98% (17 May 2023 11:45) (98% - 100%)    Parameters below as of 17 May 2023 11:45  Patient On (Oxygen Delivery Method): room air    GENERAL: NAD  CV: Regular rate and rhythm. No murmurs, rubs, or gallops  Respiratory: normal respiratory effort, speaking in complete sentences. Lungs clear to auscultation bilaterally, no wheezes/crackles.  ABDOMEN: Soft, Nontender, Nondistended; Bowel sounds normal  EXTREMITIES: No lower extremity edema. Bilateral LE symmetric in size.  NEURO: Symmetric facial expressions. Moves 4 extremities spontaneously   Skin: No rashes

## 2023-05-17 NOTE — ED ADULT NURSE NOTE - NSFALLRISKINTERV_ED_ALL_ED

## 2023-05-17 NOTE — ED ADULT NURSE REASSESSMENT NOTE - NS ED NURSE REASSESS COMMENT FT1
Handoff report given to Devan Thornton RN. Pt is awake, alert, and speaking in full coherent sentences. Vital signs stable. Pt resting comfortably in stretcher, side rails up and bed in lowest position. Pt's aide at bedside, pt admitted and awaiting bed.

## 2023-05-17 NOTE — ED PROVIDER NOTE - NS ED ATTENDING STATEMENT MOD
This was a shared visit with the BENJIE. I reviewed and verified the documentation and independently performed the documented:

## 2023-05-17 NOTE — H&P ADULT - ASSESSMENT
76 yo female PMhx ESRD on HD MWF via left AVF, HTN, HLD, DM, anemia of chronic renal disease, JARVIS on CPAP presents with dyspnea, suspect secondary to hypervolemia , plan for HD today

## 2023-05-17 NOTE — H&P ADULT - PROBLEM SELECTOR PLAN 4
- home regimen: lantus 26 units subQ daily  - will c/w lantus 20 units qHS + sliding scale insulin with meals and bedtime while inpatient  - carbohydrate consistent diet

## 2023-05-17 NOTE — CONSULT NOTE ADULT - ASSESSMENT
76 yo female PMhx ESRD on HD MWF via left AVF, HTN, HLD, DM, anemia of chronic renal disease, JARVIS on CPAP presents to the ED complaining of shortness of breath      1) ESRD on HD  Routine HD days are MWF  Access: Left upper ext avf   Plan for HD today- 3kg uf  consent obtained  trend bmp      2) Anemia in CKD  epo 10k tiw with hd  trend hgb    3) SOB-  3kg uf today  low na diet  will monitor      Dr Taylor  [phone]

## 2023-05-17 NOTE — ED PROCEDURE NOTE - PROCEDURE ADDITIONAL DETAILS
Emergency Department Focused Ultrasound performed at patient's bedside for placement of ultrasound guided IV. The study was confirmed with blood return and ease of flushing saline.    Upper extremity laterality: R forearm  IV Gauge: 18

## 2023-05-17 NOTE — ED PROCEDURE NOTE - ATTENDING APP SHARED VISIT CONTRIBUTION OF CARE
Dr. Boudreaux (Attending Physician)  I supervised the above procedure and agree with the documented note.

## 2023-05-17 NOTE — ED PROVIDER NOTE - OBJECTIVE STATEMENT
76 yo female PMhx ESRD on HD MWF via left AVF, HTN, HLD, DM, anemia of chronic renal disease, JARVIS on CPAP presents to the ED complaining of shortness of breath since this morning.  Reports feels short of breath both at rest and with exertion with associated intermittent chest tightness.  Additionally complaining of dull frontal headache.  Last dialysis session was 5/15/2023 and completed full session.  Denies cough, fever/chills, hemoptysis, worsening/asymmetric LE swelling, abdominal pain, n/v/d, body aches, flank pain, palpitations.

## 2023-05-17 NOTE — ED ADULT NURSE REASSESSMENT NOTE - NS ED NURSE REASSESS COMMENT FT1
fs 87 called acp regarding lantus. states to feed pt and recheck sugar. relayed to holding nurse nemo

## 2023-05-17 NOTE — H&P ADULT - PROBLEM SELECTOR PLAN 1
Dyspnea likely related to volume status secondary to ESRD   patient denies chest pain, WELLS score for PE is 0 --- low suspicion for PE.   - patient satting well on RA   - RVP + COVID negative  - CXR clear lungs, no focal consolidation   - troponin 78 on admission ; reviewed prior records, troponin 70s-80s in 1/2022, ----- troponin likely elevated secondary to ESRD ,low suspicion for ACS.  f/u repeat troponin   - f/u strep throat PCR  - HD scheduled for today 5/17

## 2023-05-17 NOTE — H&P ADULT - TIME BILLING
extensive chart review (previous hospitalization, current admission labs/images), interviewing and examining patient, discussion with consultants, ACPs, writing notes.

## 2023-05-17 NOTE — CONSULT NOTE ADULT - SUBJECTIVE AND OBJECTIVE BOX
[facility] Consult Note Nephrology - CONSULTATION NOTE    78 yo female PMhx ESRD on HD MWF via left AVF, HTN, HLD, DM, anemia of chronic renal disease, JARVIS on CPAP presents to the ED complaining of shortness of breath since this morning.  Reports feels short of breath both at rest and with exertion with associated intermittent chest tightness.  Additionally complaining of dull frontal headache.  Last dialysis session was 5/15/2023 and completed full session.  Denies cough, fever/chills, hemoptysis, worsening/asymmetric LE swelling, abdominal pain, n/v/d, body aches, flank pain, palpitations.    Renal Consult for ESRD ON HD  HD at [facility]  sob--pt with high idwg's outpt  denies any chest pain      PAST MEDICAL & SURGICAL HISTORY:  HTN (hypertension)      Vertigo      Trigeminal neuralgia      ESRD (end stage renal disease) on dialysis  MWF via left AVF      Anemia      Gout      HLD (hyperlipidemia)      DM (diabetes mellitus)      JARVIS (obstructive sleep apnea)  CPAP QHS      S/P rotator cuff surgery  Bilateral      S/P         S/P hysterectomy        S/P total knee replacement  Bilateral, left 2006, right 2008      S/P cataract extraction  bilateral, 2005      S/P arteriovenous (AV) fistula creation  left upper forearm cephalic vein brachial artery AV fistula creation on 2018, left arm basilic vein transposition on 2019.        S/P carpal tunnel release  Left        IV Contrast (Anaphylaxis)  shellfish (Hives; Rash)  smoke; coughing (Other)  shrimp (Hives)    Home Medications Reviewed  Hospital Medications:   MEDICATIONS  (STANDING):  lidocaine/prilocaine Cream 1 Application(s) Topical Once    SOCIAL HISTORY:  Denies ETOh,Smoking,   FAMILY HISTORY:  No pertinent family history in first degree relatives      REVIEW OF SYSTEMS:  CONSTITUTIONAL: +weakness  EYES/ENT: No visual changes;  No vertigo or throat pain   NECK: No pain or stiffness  RESPIRATORY: + shortness of breath  CARDIOVASCULAR: No chest pain or palpitations.  GASTROINTESTINAL: No abdominal or epigastric pain. No nausea, vomiting, or hematemesis; No diarrhea or constipation. No melena or hematochezia.  GENITOURINARY: No dysuria, frequency, foamy urine, urinary urgency, incontinence or hematuria  NEUROLOGICAL: No numbness or weakness  SKIN: No itching, burning, rashes, or lesions   VASCULAR: No bilateral lower extremity edema.   All other review of systems is negative unless indicated above.    VITALS:  T(F): 97.9 (23 @ 11:45), Max: 97.9 (23 @ 11:45)  HR: 82 (23 @ 11:45)  BP: 128/71 (23 @ 11:45)  RR: 20 (23 @ 11:45)  SpO2: 98% (23 @ 11:45)  Wt(kg): --    Height (cm): 162.6 (:)  Weight (kg): 78 (:23)  BMI (kg/m2): 29.5 (:)  BSA (m2): 1.83 (:)  PHYSICAL EXAM:  Constitutional: NAD  HEENT: anicteric sclera, oropharynx clear, MMM  Neck: No JVD  Respiratory: b/l  rhonchi  Cardiovascular: S1, S2, RRR  Gastrointestinal: BS+, soft, NT/ND  Extremities: ++ peripheral edema  Neurological: A/O x 3, no focal deficits  Psychiatric: Normal mood, normal affect  : No CVA tenderness. No cottrell.   Skin: No rashes  Vascular Access: + avf    LABS:      136  |  96  |  44<H>  ----------------------------<  101<H>  5.1   |  24  |  7.62<H>    Ca    9.6      17 May 2023 12:47  Mg     2.4         TPro  7.4  /  Alb  3.9  /  TBili  0.2  /  DBili      /  AST  33  /  ALT  17  /  AlkPhos  338<H>      Creatinine Trend: 7.62 <--                        10.2   6.20  )-----------( 188      ( 17 May 2023 12:47 )             32.2     Urine Studies:      RADIOLOGY & ADDITIONAL STUDIES:

## 2023-05-17 NOTE — H&P ADULT - NSHPLABSRESULTS_GEN_ALL_CORE
10.2   6.20  )-----------( 188      ( 17 May 2023 12:47 )             32.2     Hgb Trend: 10.2<--  05-17    136  |  96  |  44<H>  ----------------------------<  101<H>  5.1   |  24  |  7.62<H>    Ca    9.6      17 May 2023 12:47  Mg     2.4     05-17    TPro  7.4  /  Alb  3.9  /  TBili  0.2  /  DBili  x   /  AST  33  /  ALT  17  /  AlkPhos  338<H>  05-17    Creatinine Trend: 7.62<--  PT/INR - ( 17 May 2023 12:47 )   PT: 11.5 sec;   INR: 0.99 ratio         PTT - ( 17 May 2023 12:47 )  PTT:29.0 sec    Personally reviewed CXR clear lungs

## 2023-05-17 NOTE — H&P ADULT - NSVTERISKASSESS_GEN_ALL_CORE FT
"Knee Joint Injection      Patient: Adriana Malcolm    YOB: 1952    Chief Complaint   Patient presents with   • Right Knee - Follow-up   • Injections     orthovisc #1       History of Present Illness:  Patient is here for an injection.  No new complaints.    Physical Exam: 65 y.o. female  General Appearance:    Alert, cooperative, in no acute distress                   Vitals:    10/23/17 0755   Weight: 222 lb (101 kg)   Height: 65\" (165.1 cm)        Patient is alert and oriented, ×3 no acute distress.  Affect is normal respiratory rate is normal unlabored.  Exam and complaints are unchanged.    Procedure:  Large Joint Arthrocentesis  Date/Time: 10/23/2017 7:56 AM  Consent given by: patient  Site marked: site marked  Timeout: Immediately prior to procedure a time out was called to verify the correct patient, procedure, equipment, support staff and site/side marked as required   Supporting Documentation  Indications: pain   Procedure Details  Location: knee - R knee  Preparation: Patient was prepped and draped in the usual sterile fashion  Needle size: 22 G  Approach: anteromedial  Medications administered: 30 mg Hyaluronan 30 MG/2ML  Patient tolerance: patient tolerated the procedure well with no immediate complications          Assessment:    Diagnoses and all orders for this visit:    Primary osteoarthritis of right knee  -     Large Joint Arthrocentesis    Chronic pain of right knee  -     Large Joint Arthrocentesis        Plan:   Slowly increase activity as tolerated.  Discussed importance of leg strengthening and general conditioning.  Discussed warning signs of injection.  Discussed that purpose of injections was symptom improvement and improved activity.  Also discussed that further treatment options depended on symptoms at followup and length of time of improvement after injections.    No Follow-up on file.    Bay Salguero, APRN        "
Medical Assessment Completed on: 17-May-2023 15:38

## 2023-05-18 ENCOUNTER — TRANSCRIPTION ENCOUNTER (OUTPATIENT)
Age: 78
End: 2023-05-18

## 2023-05-18 VITALS
SYSTOLIC BLOOD PRESSURE: 132 MMHG | DIASTOLIC BLOOD PRESSURE: 74 MMHG | RESPIRATION RATE: 18 BRPM | OXYGEN SATURATION: 96 % | TEMPERATURE: 98 F | HEART RATE: 82 BPM

## 2023-05-18 LAB
A1C WITH ESTIMATED AVERAGE GLUCOSE RESULT: 6.2 % — HIGH (ref 4–5.6)
ANION GAP SERPL CALC-SCNC: 11 MMOL/L — SIGNIFICANT CHANGE UP (ref 5–17)
BUN SERPL-MCNC: 28 MG/DL — HIGH (ref 7–23)
CALCIUM SERPL-MCNC: 9.3 MG/DL — SIGNIFICANT CHANGE UP (ref 8.4–10.5)
CHLORIDE SERPL-SCNC: 98 MMOL/L — SIGNIFICANT CHANGE UP (ref 96–108)
CO2 SERPL-SCNC: 29 MMOL/L — SIGNIFICANT CHANGE UP (ref 22–31)
CREAT SERPL-MCNC: 5.44 MG/DL — HIGH (ref 0.5–1.3)
EGFR: 8 ML/MIN/1.73M2 — LOW
ESTIMATED AVERAGE GLUCOSE: 131 MG/DL — HIGH (ref 68–114)
GLUCOSE BLDC GLUCOMTR-MCNC: 129 MG/DL — HIGH (ref 70–99)
GLUCOSE BLDC GLUCOMTR-MCNC: 191 MG/DL — HIGH (ref 70–99)
GLUCOSE SERPL-MCNC: 178 MG/DL — HIGH (ref 70–99)
HCT VFR BLD CALC: 32.4 % — LOW (ref 34.5–45)
HGB BLD-MCNC: 9.9 G/DL — LOW (ref 11.5–15.5)
MCHC RBC-ENTMCNC: 28.1 PG — SIGNIFICANT CHANGE UP (ref 27–34)
MCHC RBC-ENTMCNC: 30.6 GM/DL — LOW (ref 32–36)
MCV RBC AUTO: 92 FL — SIGNIFICANT CHANGE UP (ref 80–100)
NRBC # BLD: 0 /100 WBCS — SIGNIFICANT CHANGE UP (ref 0–0)
PHOSPHATE SERPL-MCNC: 2.9 MG/DL — SIGNIFICANT CHANGE UP (ref 2.5–4.5)
PLATELET # BLD AUTO: 174 K/UL — SIGNIFICANT CHANGE UP (ref 150–400)
POTASSIUM SERPL-MCNC: 4.7 MMOL/L — SIGNIFICANT CHANGE UP (ref 3.5–5.3)
POTASSIUM SERPL-SCNC: 4.7 MMOL/L — SIGNIFICANT CHANGE UP (ref 3.5–5.3)
RBC # BLD: 3.52 M/UL — LOW (ref 3.8–5.2)
RBC # FLD: 18.7 % — HIGH (ref 10.3–14.5)
SODIUM SERPL-SCNC: 138 MMOL/L — SIGNIFICANT CHANGE UP (ref 135–145)
WBC # BLD: 6.97 K/UL — SIGNIFICANT CHANGE UP (ref 3.8–10.5)
WBC # FLD AUTO: 6.97 K/UL — SIGNIFICANT CHANGE UP (ref 3.8–10.5)

## 2023-05-18 PROCEDURE — 85025 COMPLETE CBC W/AUTO DIFF WBC: CPT

## 2023-05-18 PROCEDURE — 36415 COLL VENOUS BLD VENIPUNCTURE: CPT

## 2023-05-18 PROCEDURE — 99285 EMERGENCY DEPT VISIT HI MDM: CPT

## 2023-05-18 PROCEDURE — 82435 ASSAY OF BLOOD CHLORIDE: CPT

## 2023-05-18 PROCEDURE — 80048 BASIC METABOLIC PNL TOTAL CA: CPT

## 2023-05-18 PROCEDURE — 85014 HEMATOCRIT: CPT

## 2023-05-18 PROCEDURE — 85610 PROTHROMBIN TIME: CPT

## 2023-05-18 PROCEDURE — 82565 ASSAY OF CREATININE: CPT

## 2023-05-18 PROCEDURE — 84295 ASSAY OF SERUM SODIUM: CPT

## 2023-05-18 PROCEDURE — 83880 ASSAY OF NATRIURETIC PEPTIDE: CPT

## 2023-05-18 PROCEDURE — 82330 ASSAY OF CALCIUM: CPT

## 2023-05-18 PROCEDURE — 97161 PT EVAL LOW COMPLEX 20 MIN: CPT

## 2023-05-18 PROCEDURE — 94660 CPAP INITIATION&MGMT: CPT

## 2023-05-18 PROCEDURE — 84484 ASSAY OF TROPONIN QUANT: CPT

## 2023-05-18 PROCEDURE — 0225U NFCT DS DNA&RNA 21 SARSCOV2: CPT

## 2023-05-18 PROCEDURE — 84100 ASSAY OF PHOSPHORUS: CPT

## 2023-05-18 PROCEDURE — 84132 ASSAY OF SERUM POTASSIUM: CPT

## 2023-05-18 PROCEDURE — 85018 HEMOGLOBIN: CPT

## 2023-05-18 PROCEDURE — 83735 ASSAY OF MAGNESIUM: CPT

## 2023-05-18 PROCEDURE — 85730 THROMBOPLASTIN TIME PARTIAL: CPT

## 2023-05-18 PROCEDURE — 83036 HEMOGLOBIN GLYCOSYLATED A1C: CPT

## 2023-05-18 PROCEDURE — 82962 GLUCOSE BLOOD TEST: CPT

## 2023-05-18 PROCEDURE — 71045 X-RAY EXAM CHEST 1 VIEW: CPT

## 2023-05-18 PROCEDURE — 82947 ASSAY GLUCOSE BLOOD QUANT: CPT

## 2023-05-18 PROCEDURE — 99261: CPT

## 2023-05-18 PROCEDURE — 83605 ASSAY OF LACTIC ACID: CPT

## 2023-05-18 PROCEDURE — 80053 COMPREHEN METABOLIC PANEL: CPT

## 2023-05-18 PROCEDURE — 82803 BLOOD GASES ANY COMBINATION: CPT

## 2023-05-18 PROCEDURE — 85027 COMPLETE CBC AUTOMATED: CPT

## 2023-05-18 RX ADMIN — Medication 100 MILLIGRAM(S): at 12:19

## 2023-05-18 RX ADMIN — HEPARIN SODIUM 5000 UNIT(S): 5000 INJECTION INTRAVENOUS; SUBCUTANEOUS at 05:39

## 2023-05-18 RX ADMIN — Medication 1: at 12:19

## 2023-05-18 NOTE — PHYSICAL THERAPY INITIAL EVALUATION ADULT - GAIT DEVIATIONS NOTED, PT EVAL
decreased beau/increased time in double stance/decreased step length/decreased stride length/decreased weight-shifting ability

## 2023-05-18 NOTE — PHYSICAL THERAPY INITIAL EVALUATION ADULT - PLANNED THERAPY INTERVENTIONS, PT EVAL
GOAL: Pt will ascend/descend 5 steps with CG Ax1 with U HR and step over step pattern in 4 weeks./balance training/bed mobility training/gait training/strengthening/transfer training

## 2023-05-18 NOTE — DISCHARGE NOTE PROVIDER - PROVIDER TOKENS
PROVIDER:[TOKEN:[[id number]],FOLLOWUP:[1 week]],PROVIDER:[TOKEN:[[id number]],FOLLOWUP:[2 weeks]]

## 2023-05-18 NOTE — PHYSICAL THERAPY INITIAL EVALUATION ADULT - PERTINENT HX OF CURRENT PROBLEM, REHAB EVAL
78 yo female PMhx ESRD on HD MWF via left AVF, HTN, HLD, DM, anemia of chronic renal disease, JARVIS on CPAP presents to the ED complaining of shortness of breath since this morning. Patient also reports acute onset of cough with white sputum since this morning. She denies fever/chills. She denies chest pain. She reports stable bilateral LE edema.  Daughter reported patient was exposed to her children who had strep throat several days ago, then complained of sore throat and had take several doses of her children's amoxicilin. Patient's last dialysis session was 5/15/2023 and completed full session

## 2023-05-18 NOTE — PHYSICAL THERAPY INITIAL EVALUATION ADULT - ACTIVE RANGE OF MOTION EXAMINATION, REHAB EVAL
B/L UE shoulder flexion <90 degrees at baseline, L wrist not tested in splica splint/Left UE Active ROM was WFL (within functional limits)/Right UE Active ROM was WFL (within functional limits)/Left LE Active ROM was WFL (within functional limits)/Right LE Active ROM was WFL (within functional limits)

## 2023-05-18 NOTE — DISCHARGE NOTE NURSING/CASE MANAGEMENT/SOCIAL WORK - NSDCVIVACCINE_GEN_ALL_CORE_FT
influenza, injectable, quadrivalent, preservative free; 11-Mar-2016 15:41; Yi Baker (RN); [employer]; [id number]; IntraMuscular; Dorsogluteal Right.; 0.5 milliLiter(s); VIS (VIS Published: 07-Aug-2015, VIS Presented: 11-Mar-2016);

## 2023-05-18 NOTE — PROGRESS NOTE ADULT - ASSESSMENT
78 yo female PMhx ESRD on HD MWF via left AVF, HTN, HLD, DM, anemia of chronic renal disease, JARVIS on CPAP presents to the ED complaining of shortness of breath    1) ESRD on HD  Routine HD days are MWF  Access: Left upper ext avf   tolerated HD well yesterday  trend bmp    2) Anemia in CKD  epo 10k tiw with hd  trend hgb    3) SOB-  3kg uf yesterday  low na diet  will monitor  xray clear, VBG was WNL  patietn appears euvolemic on exam  she denies chest pain or palpitations  not opposed from renal perspective for DC home      For any question, call:  Cell # [phone]  Pager # [phone]  Callback # [phone]

## 2023-05-18 NOTE — DISCHARGE NOTE NURSING/CASE MANAGEMENT/SOCIAL WORK - PATIENT PORTAL LINK FT
You can access the FollowMyHealth Patient Portal offered by [facility] by registering at the following website: [email or web address]. By joining [employer]’s FollowMyHealth portal, you will also be able to view your health information using other applications (apps) compatible with our system.

## 2023-05-18 NOTE — PHYSICAL THERAPY INITIAL EVALUATION ADULT - ADDITIONAL COMMENTS
Pt lives with 24/7 HHA in a PH +5 steps to enter with unilateral handrail. No steps inside. PTA pt was ambulating with (S) assist and SC, required assist for transfers and stair negotiation from [facility]. Required assist with all ADLs from [facility]. Owns W/C for longer distances

## 2023-05-18 NOTE — PHYSICAL THERAPY INITIAL EVALUATION ADULT - GENERAL OBSERVATIONS, REHAB EVAL
Pt rec'd standing in restroom with HHA in NAD, VSS, +tele monitoring, +LUE thumb spica splint (states from prior hx of wrist sprain) , agreeable to PT.

## 2023-05-18 NOTE — DISCHARGE NOTE PROVIDER - NSDCMRMEDTOKEN_GEN_ALL_CORE_FT
allopurinol 100 mg oral tablet: 1 tab(s) orally once a day  atorvastatin 40 mg oral tablet: 1 tab(s) orally once a day (at bedtime)  Lantus 100 units/mL subcutaneous solution: 26 unit(s) subcutaneous once a day (at bedtime)  midodrine 10 mg oral tablet: 1 tab(s) orally Monday, Wednesday, and Friday (30 minutes before dialysis)   Amina-Lefty oral tablet: 1 tab(s) orally once a day  sevelamer carbonate 800 mg oral tablet: 2 tab(s) orally 3 times a day (with meals)

## 2023-05-18 NOTE — PATIENT PROFILE ADULT - FALL HARM RISK - RISK INTERVENTIONS

## 2023-05-18 NOTE — DISCHARGE NOTE PROVIDER - HOSPITAL COURSE
76 yo female PMhx ESRD on HD MWF via left AVF, HTN, HLD, DM, anemia of chronic renal disease, JARVIS on CPAP presents to the ED complaining of shortness of breath    1) ESRD on HD  Routine HD days are MWF  Access: Left upper ext avf   tolerated HD well yesterday  trend bmp    2) Anemia in CKD  epo 10k tiw with hd  trend hgb    3) SOB-  3kg uf yesterday  low na diet  will monitor  xray clear, VBG was WNL  patietn appears euvolemic on exam  she denies chest pain or palpitations

## 2023-05-18 NOTE — PROGRESS NOTE ADULT - SUBJECTIVE AND OBJECTIVE BOX
[facility] - DALY Camacho / DALY Evans / FREIDA Lockett/ DALY Taylor/ DALY Becker/ YELENA Colbert / MARGARITA Kearns / JAY Howard  ---------------------------------------------------------------------------------------------------------------  seen and examined today for ESRD  Interval : NAD  VITALS:  T(F): 99 (05-18-23 @ 04:45), Max: 99 (05-18-23 @ 04:45)  HR: 97 (05-18-23 @ 09:30)  BP: 113/68 (05-18-23 @ 04:45)  RR: 18 (05-18-23 @ 04:45)  SpO2: 95% (05-18-23 @ 09:30)  Wt(kg): --    05-17 @ 07:01  -  05-18 @ 07:00  --------------------------------------------------------  IN: 0 mL / OUT: 3000 mL / NET: -3000 mL      Physical Exam :-  Constitutional: NAD  Neck: Supple.  Respiratory: Bilateral equal breath sounds,  Cardiovascular: S1, S2 normal,  Gastrointestinal: Bowel Sounds present, soft, non tender.  Extremities: No edema  Neurological: Alert and Oriented x 3, no focal deficits  Psychiatric: Normal mood, normal affect  Data:-  Allergies :   IV Contrast (Anaphylaxis)  shellfish (Hives; Rash)  smoke; coughing (Other)  shrimp (Hives)    Hospital Medications:   MEDICATIONS  (STANDING):  allopurinol 100 milliGRAM(s) Oral daily  atorvastatin 40 milliGRAM(s) Oral at bedtime  dextrose 5%. 1000 milliLiter(s) (50 mL/Hr) IV Continuous <Continuous>  dextrose 5%. 1000 milliLiter(s) (100 mL/Hr) IV Continuous <Continuous>  dextrose 50% Injectable 12.5 Gram(s) IV Push once  dextrose 50% Injectable 25 Gram(s) IV Push once  dextrose 50% Injectable 25 Gram(s) IV Push once  glucagon  Injectable 1 milliGRAM(s) IntraMuscular once  heparin   Injectable 5000 Unit(s) SubCutaneous every 8 hours  insulin glargine Injectable (LANTUS) 20 Unit(s) SubCutaneous at bedtime  insulin lispro (ADMELOG) corrective regimen sliding scale   SubCutaneous at bedtime  insulin lispro (ADMELOG) corrective regimen sliding scale   SubCutaneous three times a day before meals    05-18    138  |  98  |  28<H>  ----------------------------<  178<H>  4.7   |  29  |  5.44<H>    Ca    9.3      18 May 2023 07:12  Phos  2.9     05-18  Mg     2.4     05-17    TPro  7.4  /  Alb  3.9  /  TBili  0.2  /  DBili      /  AST  33  /  ALT  17  /  AlkPhos  338<H>  05-17    Creatinine Trend: 5.44 <--, 7.62 <--                        9.9    6.97  )-----------( 174      ( 18 May 2023 07:09 )             32.4

## 2023-05-18 NOTE — DISCHARGE NOTE NURSING/CASE MANAGEMENT/SOCIAL WORK - NSDCFUADDAPPT_GEN_ALL_CORE_FT
APPTS ARE READY TO BE MADE: [ x] YES    Best Family or Patient Contact (if needed):    Additional Information about above appointments (if needed):    1: PMD   2:   3:     Other comments or requests:

## 2023-05-18 NOTE — DISCHARGE NOTE PROVIDER - NSDCCPCAREPLAN_GEN_ALL_CORE_FT
PRINCIPAL DISCHARGE DIAGNOSIS  Diagnosis: Shortness of breath  Assessment and Plan of Treatment: s/p HD patient denies CP      SECONDARY DISCHARGE DIAGNOSES  Diagnosis: Diabetes  Assessment and Plan of Treatment: HgA1C this admission.  Make sure you get your HgA1c checked every three months.  If you take oral diabetes medications, check your blood glucose two times a day.  If you take insulin, check your blood glucose before meals and at bedtime.  It's important not to skip any meals.  Keep a log of your blood glucose results and always take it with you to your doctor appointments.  Keep a list of your current medications including injectables and over the counter medications and bring this medication list with you to all your doctor appointments.  If you have not seen your ophthalmologist this year call for appointment.  Check your feet daily for redness, sores, or openings. Do not self treat. If no improvement in two days call your primary care physician for an appointment.  Low blood sugar (hypoglycemia) is a blood sugar below 70mg/dl. Check your blood sugar if you feel signs/symptoms of hypoglycemia. If your blood sugar is below 70 take 15 grams of carbohydrates (ex 4 oz of apple juice, 3-4 glucose tablets, or 4-6 oz of regular soda) wait 15 minutes and repeat blood sugar to make sure it comes up above 70.  If your blood sugar is above 70 and you are due for a meal, have a meal.  If you are not due for a meal have a snack.  This snack helps keeps your blood sugar at a safe range.    Diagnosis: ESRD on dialysis  Assessment and Plan of Treatment: F/U w Renal in 1-2 weeks

## 2023-05-18 NOTE — DISCHARGE NOTE PROVIDER - CARE PROVIDER_API CALL
Alfonzo Martínez  INTERNAL MEDICINE  [address] [place] [address]  Phone: [phone]  Fax: [phone]  Follow Up Time: 1 week    Jose De Jesus Howard)  Internal Medicine; Nephrology  [address] [place] [address]  Phone: [phone]  Fax: [phone]  Follow Up Time: 2 weeks

## 2023-05-18 NOTE — DISCHARGE NOTE PROVIDER - NSDCFUADDAPPT_GEN_ALL_CORE_FT
APPTS ARE READY TO BE MADE: [ x] YES    Best Family or Patient Contact (if needed):    Additional Information about above appointments (if needed):    1: PMD   2:   3:     Other comments or requests:    APPTS ARE READY TO BE MADE: [ x] YES    Best Family or Patient Contact (if needed):    Additional Information about above appointments (if needed):    1: PMD   2:   3:     Other comments or requests:     Patient is scheduled with Dr. Bhagat 5/25/2023 9:00AM at [address] [place] [address]   APPTS ARE READY TO BE MADE: [ x] YES    Best Family or Patient Contact (if needed):    Additional Information about above appointments (if needed):    1: PMD   2:   3:     Other comments or requests:     Patient is scheduled with Dr. Bhagat 5/25/2023 9:00AM at [address] [place] [address], [facility].

## 2023-05-18 NOTE — PHYSICAL THERAPY INITIAL EVALUATION ADULT - PATIENT/FAMILY/SIGNIFICANT OTHER GOALS STATEMENT, PT EVAL
Follow-up with your GI doctor.  Take omeprazole daily as directed.  Drink lots of fluids, eat a bland diet.    Return if: Uncontrolled pain, fevers, vomiting, new symptoms   To go home

## 2023-08-01 NOTE — ED ADULT TRIAGE NOTE - MODE OF ARRIVAL
How Severe Are Your Spot(S)?: mild What Type Of Note Output Would You Prefer (Optional)?: Standard Output What Is The Reason For Today's Visit?: Full Body Skin Examination with No Concerns What Is The Reason For Today's Visit? (Being Monitored For X): concerning skin lesions on a periodic basis EMS Ambulance

## 2023-10-13 NOTE — H&P PST ADULT - SOURCE OF INFORMATION, PROFILE
Subjective:       Patient ID: Nestor Garcia is a 79 y.o. male.    Chief Complaint: Pre-op Exam    Pt here for pre-op exam. He is scheduled to have cataract surgery at [facility] with Dr. Rodriguez 11/8/23 under monitored anesthesia.     He is overall feeling well. He did well with his last cataract surgery and is looking forward to having the right side done.     He has no other complaints today.     He would like to retry mounja for his DM.    Review of Systems   Constitutional:  Negative for chills and fever.   HENT:  Negative for congestion and sore throat.    Respiratory:  Negative for cough and shortness of breath.    Cardiovascular:  Negative for chest pain.   Gastrointestinal:  Negative for abdominal pain, diarrhea, nausea and vomiting.   Genitourinary:  Negative for dysuria and hematuria.   Neurological:  Negative for dizziness, syncope and light-headedness.       Objective:      Physical Exam  Vitals reviewed.   Constitutional:       General: He is not in acute distress.  HENT:      Head: Normocephalic and atraumatic.      Mouth/Throat:      Mouth: Mucous membranes are moist.   Eyes:      Conjunctiva/sclera: Conjunctivae normal.   Cardiovascular:      Rate and Rhythm: Normal rate and regular rhythm.      Heart sounds: Normal heart sounds. No murmur heard.  Pulmonary:      Effort: Pulmonary effort is normal. No respiratory distress.      Breath sounds: Normal breath sounds.   Musculoskeletal:      Cervical back: Neck supple.   Neurological:      Mental Status: He is alert and oriented to person, place, and time.   Psychiatric:         Mood and Affect: Mood normal.         Behavior: Behavior normal.         Assessment:       1. Pre-op evaluation    2. Type 2 diabetes mellitus with diabetic polyneuropathy, with long-term current use of insulin    3. Stage 3b chronic kidney disease    4. Vitamin D deficiency        Plan:           1. Pre-op evaluation    2. Type 2 diabetes mellitus with diabetic  polyneuropathy, with long-term current use of insulin  Overview:  Cont lantus  Lab Results   Component Value Date    HGBA1C 7.5 (H) 08/19/2022         Orders:  -     Hemoglobin A1C; Future; Expected date: 10/13/2023  -     Microalbumin/Creatinine Ratio, Urine; Future; Expected date: 10/13/2023  -     tirzepatide (MOUNJARO) 2.5 mg/0.5 mL PnIj; Inject 2.5 mg into the skin every 7 days.  Dispense: 4 Pen; Refill: 2    3. Stage 3b chronic kidney disease    4. Vitamin D deficiency  -     Vitamin D; Future; Expected date: 10/13/2023    Labs as ordered  Resume mounjaro 2.5mg weekly. Advised pt to monitor glucose closely as he may need to cut back on lantus    Pt is medically optimized for cataract surgery under monitored anesthesia    RTC for worsening symptoms or failure to improve, or RTC PRN/as scheduled       patient

## 2023-11-06 NOTE — H&P PST ADULT - PRESSURE ULCER(S)
Personalized Preventive Plan for Mathieu Perez - 11/6/2023  Medicare offers a range of preventive health benefits. Some of the tests and screenings are paid in full while other may be subject to a deductible, co-insurance, and/or copay. Some of these benefits include a comprehensive review of your medical history including lifestyle, illnesses that may run in your family, and various assessments and screenings as appropriate. After reviewing your medical record and screening and assessments performed today your provider may have ordered immunizations, labs, imaging, and/or referrals for you. A list of these orders (if applicable) as well as your Preventive Care list are included within your After Visit Summary for your review. Other Preventive Recommendations:    A preventive eye exam performed by an eye specialist is recommended every 1-2 years to screen for glaucoma; cataracts, macular degeneration, and other eye disorders. A preventive dental visit is recommended every 6 months. Try to get at least 150 minutes of exercise per week or 10,000 steps per day on a pedometer . Order or download the FREE \"Exercise & Physical Activity: Your Everyday Guide\" from [employer]. Call 4-542.426.5183 or search [employer] online. You need 2487-9073 mg of calcium and 0725-5627 IU of vitamin D per day. It is possible to meet your calcium requirement with diet alone, but a vitamin D supplement is usually necessary to meet this goal.  When exposed to the sun, use a sunscreen that protects against both UVA and UVB radiation with an SPF of 30 or greater. Reapply every 2 to 3 hours or after sweating, drying off with a towel, or swimming. Always wear a seat belt when traveling in a car. Always wear a helmet when riding a bicycle or motorcycle. no

## 2023-12-14 NOTE — ED PROVIDER NOTE - NSFOLLOWUPINSTRUCTIONS_ED_ALL_ED_FT
normal mood with appropriate affect
You were seen in the ED for dizziness, shortness of breath.   The following labs/imaging were obtained: see attached (if applicable)  Continue home medications (if any).   Return to the ED if you develop fever,  chest pain shortness of breath or worsening or new concerning symptoms.  Follow up with your primary care in 2-3 days. Follow up with your nephrologist and discuss symptoms experienced during dialysis.   Discussed with pt results of work up, strict return precautions, and need for follow up.  Pt expressed understanding and agrees with plan.

## 2024-01-03 NOTE — H&P PST ADULT - NSICDXPROBLEM_GEN_ALL_CORE_FT
Seizure PROBLEM DIAGNOSES  Problem: Asthma  Assessment and Plan: continue use of inhalers and use on day of surgery. Follow-up with PCP postop for asthmatic management.    Problem: JARVIS on CPAP  Assessment and Plan: Continue use of CPAP. Perioperative pulmonary precautions.    Problem: DM (diabetes mellitus)  Assessment and Plan: Continue insulin and hold on day of surgery. Perioperative glucose monitoring and cover as needed. Follow-up with PCP postop for diabetic management.    Problem: ESRD on hemodialysis  Assessment and Plan: Pt on dialysis 3 times a week on Mondays, Wednesdays and Fridays. Stat BMP on day of surgery. Pt to be dialyzed the day before surgery.

## 2024-01-13 NOTE — ASU DISCHARGE PLAN (ADULT/PEDIATRIC) - A. DRIVE A CAR, OPERATE POWER TOOLS OR MACHINERY
Procedure:  LABOR ANALGESIA    Relevant Problems   GYN   (+) 36 weeks gestation of pregnancy   (+) 39 weeks gestation of pregnancy   (+) High risk teen pregnancy, antepartum    39w2d; hx of asthma in childhood. Ptosis of left eye    Physical Exam    Airway    Mallampati score: III  TM Distance: >3 FB  Neck ROM: full     Dental       Cardiovascular  Cardiovascular exam normal    Pulmonary  Pulmonary exam normal     Other Findings  post-pubertal.      Anesthesia Plan  ASA Score- 2     Anesthesia Type- epidural with ASA Monitors.         Additional Monitors:     Airway Plan:            Plan Factors-Exercise tolerance (METS): >4 METS.    Chart reviewed. EKG reviewed.  Existing labs reviewed. Patient summary reviewed.    Patient is not a current smoker.  Patient did not smoke on day of surgery.    Obstructive sleep apnea risk education given perioperatively.        Induction-     Postoperative Plan-     Informed Consent- Anesthetic plan and risks discussed with patient, spouse and mother.  I personally reviewed this patient with the CRNA. Discussed and agreed on the Anesthesia Plan with the CRNA..                 Statement Selected

## 2024-03-06 NOTE — OCCUPATIONAL THERAPY INITIAL EVALUATION ADULT - PHYSICAL ASSIST/NONPHYSICAL ASSIST:DRESS LOWER BODY, OT EVAL
Diarrhea:  We recommend imodium AD (available over the counter) 2 tablets at the first onset of diarrhea. Then take 1 tablet with each loose stool after that. Please call if you are still having symptoms.  *Please call if you are having more than 6 bouts of diarrhea/daily.      Constipation:  We recommend senokot-S (also over the counter) 1-2 tablets twice daily as needed for constipation.   If you are still constipated despite 2 tablets twice daily, I recommend adding once-twice daily miralax, which is also over the counter.   Bisacodyl 5 m-2 tablets daily as needed for constipation.  Make sure you are drinking 6-8 8oz glasses of water/daily.     Nausea:  I have sent a prescription to your pharmacy for prochlorperazine (compazine). This can be taken once every 6 hours as needed for nausea. Please take this at the first onset of symptoms.     Nausea, scheduled medications:  Dexamethasone 2 tablets daily for 3 days, starting D2 of each cycle    We also recommend making sure you have a thermometer at home to monitor temperature if you start feeling ill. Please call with any temperature over 100.4F.      verbal cues/nonverbal cues (demo/gestures)/1 person assist

## 2024-03-13 NOTE — DISCHARGE NOTE PROVIDER - NSDCHHPTASSISTHOME_GEN_ALL_CORE
RN called pt. No answer. LVM to call office # back.   Patient Needs Assistance to Leave Residence...

## 2024-06-20 NOTE — H&P ADULT - NSHPREVIEWOFSYSTEMS_GEN_ALL_CORE
Mr. Hooks called me back at this time. He is the primary number on Ms. Bull's chart. He said she is doing well. She is having quite a bit of pain but taking the medication and it does help. PT has been out to see her and they are doing their exercises. Mr. Hooks doesn't have any questions for me at this time. He has my contact information should he need anything.    CONSTITUTIONAL: No fevers or chills, no weight loss   EYES/ENT: No visual changes;  No vertigo or throat pain   NECK: No pain or stiffness  RESPIRATORY: No cough, wheezing, hemoptysis  CARDIOVASCULAR: No chest pain  GASTROINTESTINAL: No abdominal or epigastric pain. No nausea, vomiting, or hematemesis; No diarrhea or constipation. No melena or hematochezia.  GENITOURINARY: No discharge, hematuria  NEUROLOGICAL: No numbness or weakness  All other review of systems is negative unless indicated above.

## 2024-07-22 NOTE — ED ADULT TRIAGE NOTE - RESPIRATORY RATE (BREATHS/MIN)
Detail Level: Detailed Quality 226: Preventive Care And Screening: Tobacco Use: Screening And Cessation Intervention: Patient screened for tobacco use and is an ex/non-smoker 17

## 2024-07-24 NOTE — DISCHARGE NOTE PROVIDER - NPI NUMBER (FOR SYSADMIN USE ONLY) :
----- Message from Bre Hagan sent at 7/24/2024 11:16 AM CDT -----  Regarding: have CHANG been recieved that was faxed back  Contact:  [phone]  The patient sister called requesting to see if signed CHANG had been recieved. Wanting to sched PT as soon as possible. Please reach out to advise     No further information provided    Patient can be contacted @# [phone]   [[phone]]

## 2024-08-29 NOTE — ED ADULT NURSE NOTE - NS ED NOTE ABUSE SUSPICION NEGLECT YN
[de-identified] : Discussed findings of today's exam and possible causes of patient's pain.  Educated patient on their most probable diagnosis of chronic bilateral carpal tunnel syndrome.  Reviewed possible courses of treatment, and we collaboratively decided best course of treatment at this time will include conservative management.  Patient has had this condition for approximately 3 years.  She has seen multiple providers for this in the past.  She has tried night splints, therapy, activity modification, and oral anti-inflammatories.  She was under the care of a hand surgeon, she was referred for EMG to confirm the diagnosis but could not tolerate or complete the EMG study.  She did not schedule surgery subsequently.  She has been assessment managing with the pain since that time.  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  At this time I recommend to proceed with surgical consultation.  I advised that the surgeon will likely require EMG, but she would like to proceed with consultation to see if they would potentially schedule surgery without the EMG.  She states she will undergo EMG if she absolutely has to, but felt it was quite unpleasant when she underwent this study previously and would like to avoid it if at all possible.  Patient will be facilitated with a timely consultation with one of my [facility] for further evaluation and management.  Patient appreciates and agrees with current plan.  This note was generated using dragon medical dictation software.  A reasonable effort has been made for proofreading its contents, but typos may still remain.  If there are any questions or points of clarification needed please notify my office. No

## 2024-10-18 NOTE — PATIENT PROFILE ADULT. - MEDICATION HERBAL REMEDIES, PROFILE
Here for follow up of depression   She is becoming a hoarder   She is shopping a lot and has a lot of junk in her house   She has been feeling depressed as she is lonely   Sleep pattern is off   She works , has a dog and has a lot of friends   Feels like a 5 th wheel   She wants to see a therapist for hoarding   She also has toe nail left dystrophy and wants to see podiatry   Visit Vitals  BP (!) 141/89   Pulse 73   Ht 5' (1.524 m)   Wt 64.1 kg (141 lb 6.8 oz)   SpO2 98%   BMI 27.62 kg/m²     Constitutional:  A+O x 3. In NAD.  Cardiovascular:  Normal rate. Normal rhythm. No murmurs, gallops, or rubs.    Respiratory:  No respiratory distress. Normal breath sounds. No rales. No wheezing.        ASSESSMENT AND PLAN:  1)Nail ridging   Continue MVI       2)Hoarding behavior :  See therapist ( was seeing one , she will reach out)  Does not want meds   Can try prozac 10 mg daily and see   Rtc in one month     3)toe nail dystrophy see podiatry              no

## 2024-11-26 NOTE — ED PROVIDER NOTE - SKIN, MLM
Pt restring quietly in no apparent distress.    Skin normal color for race, warm, dry and intact. No evidence of rash.

## 2024-12-02 NOTE — ED ADULT TRIAGE NOTE - IDEAL BODY WEIGHT(KG)
Dementia: Onset: First noticed: around 20 year ago but more dramatic over the past two months.     -MoCA Results (8/15/2024) documentation: Total: 21/30 suggesting mild cognitive impairment.   -MRI: Not an MRI candidate due to pancerebellar symptoms and enhanced startle consistent with olivopontocerebellar atrophy - patient would not be able to lay still for MRI.  -9/24/2024 Pet Scan: IMPRESSION: The scan is positive, indicating moderate to frequent  amyloid neuritic plaques.  -Labs 8/15/2024: EPOE4: Negative for the APOE4 variant, B12 189     Results of PET scan were discussed with patient's son and daughter.  We also discussed potential treatment options including Leqembi or Kinsula and oral anticholinesterase inhibitors (Aricept) and NMDA receptor antagonist (Namenda)    Patient was started on Aricept about 3 weeks ago.  There appears to be some improvement with regards to patient is less combative, it appears that she is changing her close/bathing more due to increased laundry.  She did have 1 bout of diarrhea.     Patient's family opted not to pursue infusion therapy due to potential risks of cerebral edema and hemorrhage, patient also would not be able to tolerate or complete MRI without sedation.  The risk of sedation would most likely outweigh the benefit of the infusion.  Will increase Aricept to 10 mg  
52

## 2024-12-19 ENCOUNTER — INPATIENT (INPATIENT)
Facility: HOSPITAL | Age: 79
LOS: 7 days | Discharge: HOME | DRG: 641 | End: 2024-12-27
Attending: HOSPITALIST | Admitting: HOSPITALIST
Payer: MEDICARE

## 2024-12-19 VITALS
WEIGHT: 187.39 LBS | TEMPERATURE: 99 F | HEIGHT: 65 IN | SYSTOLIC BLOOD PRESSURE: 144 MMHG | HEART RATE: 75 BPM | OXYGEN SATURATION: 96 % | DIASTOLIC BLOOD PRESSURE: 75 MMHG | RESPIRATION RATE: 20 BRPM

## 2024-12-19 DIAGNOSIS — Z96.659 PRESENCE OF UNSPECIFIED ARTIFICIAL KNEE JOINT: Chronic | ICD-10-CM

## 2024-12-19 DIAGNOSIS — Z98.89 OTHER SPECIFIED POSTPROCEDURAL STATES: Chronic | ICD-10-CM

## 2024-12-19 DIAGNOSIS — Z98.890 OTHER SPECIFIED POSTPROCEDURAL STATES: Chronic | ICD-10-CM

## 2024-12-19 DIAGNOSIS — Z90.710 ACQUIRED ABSENCE OF BOTH CERVIX AND UTERUS: Chronic | ICD-10-CM

## 2024-12-19 DIAGNOSIS — Z98.49 CATARACT EXTRACTION STATUS, UNSPECIFIED EYE: Chronic | ICD-10-CM

## 2024-12-19 PROCEDURE — 99285 EMERGENCY DEPT VISIT HI MDM: CPT

## 2024-12-19 PROCEDURE — 93010 ELECTROCARDIOGRAM REPORT: CPT

## 2024-12-19 NOTE — ED ADULT TRIAGE NOTE - INTERNATIONAL TRAVEL
VS: /60 (BP Location: Left arm)   Pulse 87   Temp 98.8  F (37.1  C) (Oral)   Resp 16   SpO2 97%  pt denies CP or SOB. Tele monitor for sinus rhythm.    O2: Nasal Cannula 1L   Output: Voiding adequate amount via Dueñas catheter. Brigette care done    Last BM: 07/25/2020, Small   Activity: Bedrest. 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   Dressing: Left stump dressing CDI. Lymphedema wrap in place   Diet: Tolerating regular diet. Last BG @ 117 and 97. No insulin giving this shift.   LDA: PIV SL. Upper arm and lower arm   Equipment: IV pole, bariatric bedpan, commode, and personal belongings.   Plan: Continue with plan of care.   Additional Info:  Pt stump dressing was changed by ortho this morning.      No

## 2024-12-20 DIAGNOSIS — E78.5 HYPERLIPIDEMIA, UNSPECIFIED: ICD-10-CM

## 2024-12-20 DIAGNOSIS — Z29.9 ENCOUNTER FOR PROPHYLACTIC MEASURES, UNSPECIFIED: ICD-10-CM

## 2024-12-20 DIAGNOSIS — R10.32 LEFT LOWER QUADRANT PAIN: ICD-10-CM

## 2024-12-20 DIAGNOSIS — E87.70 FLUID OVERLOAD, UNSPECIFIED: ICD-10-CM

## 2024-12-20 DIAGNOSIS — R53.1 WEAKNESS: ICD-10-CM

## 2024-12-20 DIAGNOSIS — E11.9 TYPE 2 DIABETES MELLITUS WITHOUT COMPLICATIONS: ICD-10-CM

## 2024-12-20 LAB
ALBUMIN SERPL ELPH-MCNC: 4.1 G/DL — SIGNIFICANT CHANGE UP (ref 3.3–5)
ALP SERPL-CCNC: 345 U/L — HIGH (ref 40–120)
ALT FLD-CCNC: 11 U/L — SIGNIFICANT CHANGE UP (ref 10–45)
ANION GAP SERPL CALC-SCNC: 16 MMOL/L — SIGNIFICANT CHANGE UP (ref 5–17)
AST SERPL-CCNC: 29 U/L — SIGNIFICANT CHANGE UP (ref 10–40)
BASOPHILS # BLD AUTO: 0.02 K/UL — SIGNIFICANT CHANGE UP (ref 0–0.2)
BASOPHILS NFR BLD AUTO: 0.4 % — SIGNIFICANT CHANGE UP (ref 0–2)
BILIRUB SERPL-MCNC: 0.4 MG/DL — SIGNIFICANT CHANGE UP (ref 0.2–1.2)
BUN SERPL-MCNC: 33 MG/DL — HIGH (ref 7–23)
CALCIUM SERPL-MCNC: 9.5 MG/DL — SIGNIFICANT CHANGE UP (ref 8.4–10.5)
CHLORIDE SERPL-SCNC: 96 MMOL/L — SIGNIFICANT CHANGE UP (ref 96–108)
CO2 SERPL-SCNC: 24 MMOL/L — SIGNIFICANT CHANGE UP (ref 22–31)
CREAT SERPL-MCNC: 6.98 MG/DL — HIGH (ref 0.5–1.3)
EGFR: 6 ML/MIN/1.73M2 — LOW
EOSINOPHIL # BLD AUTO: 0.04 K/UL — SIGNIFICANT CHANGE UP (ref 0–0.5)
EOSINOPHIL NFR BLD AUTO: 0.7 % — SIGNIFICANT CHANGE UP (ref 0–6)
FLUAV AG NPH QL: SIGNIFICANT CHANGE UP
FLUBV AG NPH QL: SIGNIFICANT CHANGE UP
GAS PNL BLDV: SIGNIFICANT CHANGE UP
GLUCOSE BLDC GLUCOMTR-MCNC: 102 MG/DL — HIGH (ref 70–99)
GLUCOSE BLDC GLUCOMTR-MCNC: 135 MG/DL — HIGH (ref 70–99)
GLUCOSE BLDC GLUCOMTR-MCNC: 190 MG/DL — HIGH (ref 70–99)
GLUCOSE BLDC GLUCOMTR-MCNC: 195 MG/DL — HIGH (ref 70–99)
GLUCOSE BLDC GLUCOMTR-MCNC: 99 MG/DL — SIGNIFICANT CHANGE UP (ref 70–99)
GLUCOSE SERPL-MCNC: 112 MG/DL — HIGH (ref 70–99)
HCT VFR BLD CALC: 35.7 % — SIGNIFICANT CHANGE UP (ref 34.5–45)
HGB BLD-MCNC: 10.8 G/DL — LOW (ref 11.5–15.5)
IMM GRANULOCYTES NFR BLD AUTO: 0.4 % — SIGNIFICANT CHANGE UP (ref 0–0.9)
LIDOCAIN IGE QN: 36 U/L — SIGNIFICANT CHANGE UP (ref 7–60)
LYMPHOCYTES # BLD AUTO: 1.39 K/UL — SIGNIFICANT CHANGE UP (ref 1–3.3)
LYMPHOCYTES # BLD AUTO: 25.1 % — SIGNIFICANT CHANGE UP (ref 13–44)
MCHC RBC-ENTMCNC: 27.3 PG — SIGNIFICANT CHANGE UP (ref 27–34)
MCHC RBC-ENTMCNC: 30.3 G/DL — LOW (ref 32–36)
MCV RBC AUTO: 90.4 FL — SIGNIFICANT CHANGE UP (ref 80–100)
MONOCYTES # BLD AUTO: 0.62 K/UL — SIGNIFICANT CHANGE UP (ref 0–0.9)
MONOCYTES NFR BLD AUTO: 11.2 % — SIGNIFICANT CHANGE UP (ref 2–14)
NEUTROPHILS # BLD AUTO: 3.44 K/UL — SIGNIFICANT CHANGE UP (ref 1.8–7.4)
NEUTROPHILS NFR BLD AUTO: 62.2 % — SIGNIFICANT CHANGE UP (ref 43–77)
NRBC # BLD: 0 /100 WBCS — SIGNIFICANT CHANGE UP (ref 0–0)
NT-PROBNP SERPL-SCNC: HIGH PG/ML (ref 0–300)
PLATELET # BLD AUTO: 164 K/UL — SIGNIFICANT CHANGE UP (ref 150–400)
POTASSIUM SERPL-MCNC: 4.4 MMOL/L — SIGNIFICANT CHANGE UP (ref 3.5–5.3)
POTASSIUM SERPL-SCNC: 4.4 MMOL/L — SIGNIFICANT CHANGE UP (ref 3.5–5.3)
PROT SERPL-MCNC: 7.5 G/DL — SIGNIFICANT CHANGE UP (ref 6–8.3)
RBC # BLD: 3.95 M/UL — SIGNIFICANT CHANGE UP (ref 3.8–5.2)
RBC # FLD: 19.9 % — HIGH (ref 10.3–14.5)
RSV RNA NPH QL NAA+NON-PROBE: SIGNIFICANT CHANGE UP
SARS-COV-2 RNA SPEC QL NAA+PROBE: SIGNIFICANT CHANGE UP
SODIUM SERPL-SCNC: 136 MMOL/L — SIGNIFICANT CHANGE UP (ref 135–145)
TROPONIN T, HIGH SENSITIVITY RESULT: 89 NG/L — HIGH (ref 0–51)
TROPONIN T, HIGH SENSITIVITY RESULT: 93 NG/L — HIGH (ref 0–51)
WBC # BLD: 5.53 K/UL — SIGNIFICANT CHANGE UP (ref 3.8–10.5)
WBC # FLD AUTO: 5.53 K/UL — SIGNIFICANT CHANGE UP (ref 3.8–10.5)

## 2024-12-20 PROCEDURE — 99223 1ST HOSP IP/OBS HIGH 75: CPT

## 2024-12-20 PROCEDURE — 71045 X-RAY EXAM CHEST 1 VIEW: CPT | Mod: 26

## 2024-12-20 PROCEDURE — 74176 CT ABD & PELVIS W/O CONTRAST: CPT | Mod: 26,MC

## 2024-12-20 RX ORDER — INFLUENZA A VIRUS A/WISCONSIN/588/2019 (H1N1) RECOMBINANT HEMAGGLUTININ ANTIGEN, INFLUENZA A VIRUS A/DARWIN/6/2021 (H3N2) RECOMBINANT HEMAGGLUTININ ANTIGEN, INFLUENZA B VIRUS B/AUSTRIA/1359417/2021 RECOMBINANT HEMAGGLUTININ ANTIGEN, AND INFLUENZA B VIRUS B/PHUKET/3073/2013 RECOMBINANT HEMAGGLUTININ ANTIGEN 45; 45; 45; 45 UG/.5ML; UG/.5ML; UG/.5ML; UG/.5ML
0.5 INJECTION INTRAMUSCULAR ONCE
Refills: 0 | Status: DISCONTINUED | OUTPATIENT
Start: 2024-12-20 | End: 2024-12-27

## 2024-12-20 RX ORDER — GLUCAGON INJECTION, SOLUTION 0.5 MG/.1ML
1 INJECTION, SOLUTION SUBCUTANEOUS ONCE
Refills: 0 | Status: DISCONTINUED | OUTPATIENT
Start: 2024-12-20 | End: 2024-12-27

## 2024-12-20 RX ORDER — DEXTROSE MONOHYDRATE 25 G/50ML
25 INJECTION, SOLUTION INTRAVENOUS ONCE
Refills: 0 | Status: DISCONTINUED | OUTPATIENT
Start: 2024-12-20 | End: 2024-12-27

## 2024-12-20 RX ORDER — MIDODRINE HYDROCHLORIDE 5 MG/1
10 TABLET ORAL
Refills: 0 | Status: DISCONTINUED | OUTPATIENT
Start: 2024-12-20 | End: 2024-12-27

## 2024-12-20 RX ORDER — DEXTROSE MONOHYDRATE 25 G/50ML
12.5 INJECTION, SOLUTION INTRAVENOUS ONCE
Refills: 0 | Status: DISCONTINUED | OUTPATIENT
Start: 2024-12-20 | End: 2024-12-27

## 2024-12-20 RX ORDER — ATORVASTATIN CALCIUM 40 MG/1
40 TABLET, FILM COATED ORAL AT BEDTIME
Refills: 0 | Status: DISCONTINUED | OUTPATIENT
Start: 2024-12-20 | End: 2024-12-27

## 2024-12-20 RX ORDER — HEPARIN SODIUM 1000 [USP'U]/ML
5000 INJECTION, SOLUTION INTRAVENOUS; SUBCUTANEOUS EVERY 12 HOURS
Refills: 0 | Status: DISCONTINUED | OUTPATIENT
Start: 2024-12-20 | End: 2024-12-27

## 2024-12-20 RX ORDER — SODIUM CHLORIDE 9 MG/ML
1000 INJECTION, SOLUTION INTRAVENOUS
Refills: 0 | Status: DISCONTINUED | OUTPATIENT
Start: 2024-12-20 | End: 2024-12-27

## 2024-12-20 RX ORDER — SEVELAMER CARBONATE 800 MG/1
1600 TABLET, FILM COATED ORAL
Refills: 0 | Status: DISCONTINUED | OUTPATIENT
Start: 2024-12-20 | End: 2024-12-27

## 2024-12-20 RX ORDER — DEXTROSE MONOHYDRATE 25 G/50ML
15 INJECTION, SOLUTION INTRAVENOUS ONCE
Refills: 0 | Status: DISCONTINUED | OUTPATIENT
Start: 2024-12-20 | End: 2024-12-27

## 2024-12-20 RX ORDER — ACETAMINOPHEN 80 MG/.8ML
650 SOLUTION/ DROPS ORAL EVERY 6 HOURS
Refills: 0 | Status: DISCONTINUED | OUTPATIENT
Start: 2024-12-20 | End: 2024-12-27

## 2024-12-20 RX ORDER — INSULIN GLARGINE-YFGN 100 [IU]/ML
20 INJECTION, SOLUTION SUBCUTANEOUS AT BEDTIME
Refills: 0 | Status: DISCONTINUED | OUTPATIENT
Start: 2024-12-20 | End: 2024-12-27

## 2024-12-20 RX ORDER — ONDANSETRON 4 MG/1
4 TABLET ORAL EVERY 8 HOURS
Refills: 0 | Status: DISCONTINUED | OUTPATIENT
Start: 2024-12-20 | End: 2024-12-27

## 2024-12-20 RX ORDER — INSULIN LISPRO 100/ML
VIAL (ML) SUBCUTANEOUS
Refills: 0 | Status: DISCONTINUED | OUTPATIENT
Start: 2024-12-20 | End: 2024-12-27

## 2024-12-20 RX ORDER — GINKGO BILOBA 40 MG
3 CAPSULE ORAL AT BEDTIME
Refills: 0 | Status: DISCONTINUED | OUTPATIENT
Start: 2024-12-20 | End: 2024-12-27

## 2024-12-20 RX ADMIN — INSULIN GLARGINE-YFGN 20 UNIT(S): 100 INJECTION, SOLUTION SUBCUTANEOUS at 21:46

## 2024-12-20 RX ADMIN — HEPARIN SODIUM 5000 UNIT(S): 1000 INJECTION, SOLUTION INTRAVENOUS; SUBCUTANEOUS at 18:38

## 2024-12-20 RX ADMIN — MIDODRINE HYDROCHLORIDE 10 MILLIGRAM(S): 5 TABLET ORAL at 13:07

## 2024-12-20 RX ADMIN — ATORVASTATIN CALCIUM 40 MILLIGRAM(S): 40 TABLET, FILM COATED ORAL at 21:46

## 2024-12-20 RX ADMIN — Medication 1: at 13:07

## 2024-12-20 RX ADMIN — SEVELAMER CARBONATE 1600 MILLIGRAM(S): 800 TABLET, FILM COATED ORAL at 18:37

## 2024-12-20 NOTE — ED PROVIDER NOTE - PHYSICAL EXAMINATION
weak appearing head normal appearing atraumatic, trachea midline, no respiratory distress, lungs cta bilaterally, rrr no murmurs, soft slight diffuse ttp llq abdomen, no visible extremity deformities, Alert and oriented, non focal neuro exam, skin warm and dry, normal affect and mood, no leg swelling, calf ttp or jvd

## 2024-12-20 NOTE — PATIENT PROFILE ADULT - NS PRO AD NO ADVANCE DIRECTIVE
03/06/23 1050   Post-Acute Status   Post-Acute Authorization Home Health   Home Health Status Pending Payor Review   Discharge Delays None known at this time   Discharge Plan   Discharge Plan A Home Health     HH orders faxed to [facility].     No

## 2024-12-20 NOTE — H&P ADULT - HISTORY OF PRESENT ILLNESS
79 yr old Female with past medical history of HD MWF Left arm AV fistula, HTN, HLD, T2DM, JARVIS on CPAP who presents with generalized weakness of 1 week duration. Patient was started on blood pressure medications recently and felt weakness after dialysis on Monday. She otherwise continued HD on Wednesday without issue but the generalized weakness has been getting worse. She denies any specific signs of symptoms. She also endorses some diffuse abdominal pain in her LLQ. She complains of episode of nonbloody diarrhea last week.  She denies any urinary symptoms.

## 2024-12-20 NOTE — H&P ADULT - NSHPPHYSICALEXAM_GEN_ALL_CORE
Constitutional: NAD  HEENT: anicteric sclera, oropharynx clear, MMM  Neck: supple.   Respiratory: Bilateral equal breath sounds , no wheezes, no crackles  Cardiovascular: S1, S2, Regular  Gastrointestinal: Bowel Sound present, soft, NT/ND  Extremities: No peripheral edema  Neurological: Alert and oriented x 3  Psychiatric: Normal mood, normal affect  L AVF

## 2024-12-20 NOTE — H&P ADULT - NSHPREVIEWOFSYSTEMS_GEN_ALL_CORE
Review of Systems:   CONSTITUTIONAL: No fever, weight loss. (+) weakness.   RESPIRATORY: No SOB. No cough, wheezing, chills or hemoptysis  CARDIOVASCULAR: No chest pain, palpitations, dizziness, or leg swelling  GASTROINTESTINAL: LLQ pain.   GENITOURINARY: No dysuria, frequency, hematuria, or incontinence  NEUROLOGICAL: No headaches, memory loss, loss of strength, numbness, or tremors  SKIN: No itching, burning, rashes, or lesions   LYMPH NODES: No enlarged glands  ENDOCRINE: No heat or cold intolerance; No hair loss  MUSCULOSKELETAL: No joint pain or swelling; No muscle, back pain  PSYCHIATRIC: No depression, anxiety, mood swings, or difficulty sleeping  HEME/LYMPH: No easy bruising, or bleeding gums

## 2024-12-20 NOTE — ED PROVIDER NOTE - ATTENDING CONTRIBUTION TO CARE
I, Aram Ross, performed a history and physical exam of the patient and discussed their management with the resident provider. I reviewed the resident provider's note and agree with the documented findings and plan of care. I was present and available for all procedures.    see my full note for details

## 2024-12-20 NOTE — ED PROVIDER NOTE - INDEPENDENTLY INTERPRETED
none  For Constipation :   • Increase your water intake. Drink at least 8 glasses of water daily.  • Try adding fiber to your diet by eating fruits, vegetables and foods that are rich in grains.  • If you do experience constipation, you may take an over-the-counter stool softener/laxative such as Rosita Colace, Senekot or  Milk of Magnesia. Yes

## 2024-12-20 NOTE — H&P ADULT - PROBLEM SELECTOR PLAN 4
Followup A1c  Restarted patients Lantus at lower dose 20U  Carb controlled Diet  ISS Followup A1C  Restarted patients Lantus at lower dose 20U    Carb controlled Diet  ISS

## 2024-12-20 NOTE — PHYSICAL THERAPY INITIAL EVALUATION ADULT - PHYSICAL ASSIST/NONPHYSICAL ASSIST: SIT/SUPINE, REHAB EVAL
Detail Level: Zone Topical Steroids Counseling: I discussed with the patient that prolonged use of topical steroids can result in the increased appearance of superficial blood vessels (telangiectasias), lightening (hypopigmentation) and thinning of the skin (atrophy).  Patient understands to avoid using high potency steroids in skin folds, the groin or the face.  The patient verbalized understanding of the proper use and possible adverse effects of topical steroids.  All of the patient's questions and concerns were addressed. verbal cues/nonverbal cues (demo/gestures)/1 person assist

## 2024-12-20 NOTE — PHYSICAL THERAPY INITIAL EVALUATION ADULT - ADDITIONAL COMMENTS
Patient lives in a private house with family: 6 DULCE w/B/L handrails and first floor set up for patient. Pt. reports PTA, pt. was able to ambulate using RW & one person CGA, needs assistance for ADLs. Pt. has 24 hrs x 7 days HHA. Pt. has RW, WC, bedside commode, raised toilet seat.

## 2024-12-20 NOTE — PHYSICAL THERAPY INITIAL EVALUATION ADULT - GAIT TRAINING, PT EVAL
Occupational Therapy    Visit Type: treatment  SUBJECTIVE  Patient agreed to participate in therapy this date.  RN in agreement to work with patient for therapy session.  Patient / Family Goal: return to previous functional status and maximize function    OBJECTIVE      Vitals:  Resting:     HR: 73 bpm     BP/MAP: 129/62 (84) mmHg     SPO2: 99%     Minimal change in vitals with activity       Sitting Balance  (HOME = base of support)  Static      - Trial 1 details: with double LE support, with back unsupported and stand by assist  Dynamic      - Trial 1 details: minimal assist  Assistance to progress supported leaning posteriorly to unsupported, but able to maintain SBA once unsupported.     Standing Balance  (HOME = base of support)  Firm Surface: Double Leg      - Static, Eyes Open       - Trial 1 details: with double UE support and moderate assist     - Dynamic, Eyes Open       - Trial 1 details: moderate assist and with double UE support  Initial HHA progressing to RW.            Bed Mobility  Pt sitting in bedside chair upon therapist arrival.   Transfers  Assistive devices: hand hold, 2-wheeled walker  - Sit to stand: moderate assist, with verbal cues, with tactile cues  - Stand to sit: moderate assist, with verbal cues, with tactile cues  Verbal/tactile cues for hand placement. STS x3 trials      Activities of Daily Living (ADLs)  Grooming/Oral Hygiene:   - Grooming assist: set up  - Position: chair  Upper Body Dressing:  Upper body dressing deficit: simulated.  Lower Body Dressing:   - Assist: total assist - non-dependent  - Footwear:       - Type: socks  Toileting:   - Toilet transfer:        - Assist: moderate assist (simulated)     Interventions    Treatment provided: ADL training, activity tolerance, balance retraining, compensatory techniques, energy conservation, bed mobility training, safety training, transfer training, HEP training, functional ambulation and body mechanics   Pt was receptive to  education on sternal precautions and application to ADLs and functional mobility and compensatory ADL techniques. Pt was also encouraged to perform BUE AROM HEP.  Skilled input: verbal instruction/cues, tactile instruction/cues and facilitation  Verbal Consent: Writer verbally educated and received verbal consent for hand placement, positioning of patient, and techniques to be performed today from patient for therapist position for techniques as described above and how they are pertinent to the patient's plan of care.         Education:   - Present and ready to learn: patient  Education provided during session:  - Results of above outlined education: Needs reinforcement and Verbalizes understanding    ASSESSMENT   Progress: progressing toward goals    Discharge needs based on today's assessment:  - Current level of function: significantly below baseline level of function  - Therapy needs at discharge: intensive daily therapy  - Activities of daily living (ADLs) requiring support at discharge: transfers, bathing, toileting, dressing, grooming, ambulation and bed mobility  - Impairments that require further therapy intervention: pain, strength, activity tolerance and balance  AM-PAC  - Prior Level of Function: IND/MOD I ([facility] 22-24)       Key: MOD A=moderate assistance, IND/MOD I=independent/modified independent  - Generalized Current Level of Function     - Current Self-Cares: 11       Scoring Key= >21 Modified Independent; 20-21 Supervision; 18-19 Minimal assist; 13-17 Moderate assist; 9-12 Max assist; <9 Total assist      PLAN (while hospitalized)  Suggestions for next session as indicated:     OT Frequency: 3-5 x per week      PT/OT Mobility Equipment for Discharge: TBD  PT/OT ADL Equipment for Discharge: shower chair  Agreement to plan and goals: patient agrees with goals and treatment plan      GOALS  Review Date: 5/14/2024  Long Term Goals: (to be met by time of discharge from hospital)  Grooming: Patient will  complete grooming tasks in standing supervision.  Status: progressing/ongoing  Lower body dressing: Patient will complete lower body dressing supervision.  Status: progressing/ongoing  Toileting: Patient will complete toileting supervision.  Status: progressing/ongoing  Toilet transfer: Patient will complete toilet transfer with supervision.   Status: progressing/ongoing    Documented in the chart in the following areas: Assessment/Plan.    Patient at End of Session:   Location: in chair  Safety measures: alarm system in place/re-engaged and call light within reach  Handoff to: nurse      Therapy procedure time and total treatment time can be found documented on the Time Entry flowsheet   GOAL: Patient will ambulate 500 feet with RW & CGA

## 2024-12-20 NOTE — PHYSICAL THERAPY INITIAL EVALUATION ADULT - PERTINENT HX OF CURRENT PROBLEM, REHAB EVAL
79 yr old Female with past medical history of HD MWF Left arm AV fistula, HTN, HLD, T2DM, JARVIS on CPAP who presents with generalized weakness of 1 week duration. Patient was started on blood pressure medications recently and felt weakness after dialysis on Monday. She otherwise continued HD on Wednesday without issue but the generalized weakness has been getting worse. She denies any specific signs of symptoms. She also endorses some diffuse abdominal pain in her LLQ. She complains of episode of nonbloody diarrhea last week.  She denies any urinary symptoms. Hospital course: (12/20) CT Abd/Pelvis: Colon diverticulosis without diverticulitis.

## 2024-12-20 NOTE — H&P ADULT - MLM HIDDEN
Code Status:  DNR  Visit Type: Follow Up     Facility:  [facility] [[id number]]      Facility Type: SNF (Skilled Nursing Facility, TCU)    History of Present Illness:    Trinidad Brown is a 71 y.o. female was seen today for follow-up TCU visit.  She had a hospitalization at [facility] 3/20 424/2/2019.  She has a past medical history for 4 type 2 diabetes.  She had a fall at home with persistent pain and was seen in the ER.  She had a CT of her spine which showed significant spinal stenosis of her L3 to L5-S1.  An MRI showed lumbar spondylosis with mild acute active compression fracture and moderate edema.  She did have a disc herniation and ligamentous changes on MRI.  Neurosurgery was consulted and she was taken to surgery on 3/29/19 for an L-spine decompressive laminectomy.  Her incision site was stapled closed and postop course was complicated by acute blood loss anemia with a discharge hemoglobin of 8.7 down from baseline of 14.9.  He was also treated for an E. coli UTI with 5 days of amoxicillin.  She was discharged to the TCU with orders to wear a TLSO when out of bed.  She also had significant left foot drop which per patient was chronic and an AFO was ordered.  She did show cognitive impairment however due to cultural issues they were unable to do good cognitive testing while in the hospital.  Her daughter had reported to hospital staff that her memory issues were declining the past couple months.  It was also recommended that she have osteoporosis workup as an outpatient.    Today, I met with her with a ong speaking staff.  Nursing reports she had a fall over the weekend in which she had slid to the floor without injury and her vital signs were stable.  Yesterday again she had a fall and so a spinal x-ray was done which showed no acute findings and no new fractures.  UA UC was also obtained and the UA showed bacteria still awaiting UC results.  We will start her on Bactrim x7 days  preemptively as she was recently treated for UTI with Cipro and while she was in the hospital treated with amoxicillin.  She denies any urinary symptoms of dysuria, increased frequency or changes in her urine.  She denies any back pain and is sitting up with her TLSO on.  Her Dilaudid was discontinued last week as she no longer needs.  Blood sugars are well managed between 100-200 for most of her blood sugars.    Current Outpatient Medications   Medication Sig Dispense Refill     acetaminophen (TYLENOL) 325 MG tablet Take 650 mg by mouth every 6 (six) hours as needed for pain.       atorvastatin (LIPITOR) 40 MG tablet Take 40 mg by mouth at bedtime.       calcium carbonate-vitamin D3 (CALCIUM WITH VITAMIN D) 600 mg(1,500mg) -400 unit per tablet Take 1 tablet by mouth 2 (two) times a day.       LANTUS SOLOSTAR U-100 INSULIN 100 unit/mL (3 mL) pen Inject 45 Units under the skin at bedtime. 11.65 Type 2 with hyperglycemia  Contact provider if insulin prescribed is not the preferred insulin per insurance. (Patient taking differently: Inject 38 Units under the skin at bedtime. 11.65 Type 2 with hyperglycemia  Contact provider if insulin prescribed is not the preferred insulin per insurance.      ) 15 mL 0     metFORMIN (GLUCOPHAGE) 1000 MG tablet Take 1,000 mg by mouth 2 (two) times a day with meals.       polyethylene glycol (MIRALAX) 17 gram packet Take 1 packet (17 g total) by mouth 2 (two) times a day. (Patient taking differently: Take 17 g by mouth daily as needed.       ) 60 packet 0     senna-docusate (PERICOLACE) 8.6-50 mg tablet Take 1 tablet by mouth 2 (two) times a day. 60 tablet 0     sitaGLIPtin (JANUVIA) 100 MG tablet Take 100 mg by mouth daily.       No current facility-administered medications for this visit.      No Known Allergies  Immunization History   Administered Date(s) Administered     Influenza high dose, seasonal 03/26/2019     Influenza, inj, historic,unspecified 09/16/2015         Review of  Systems   Patient denies fever, chills, headache, lightheadedness, dizziness, rhinorrhea, cough, congestion, shortness of breath, chest pain, palpitations, abdominal pain, n/v, diarrhea, constipation, change in appetite, dysuria, frequency, burning or pain with urination.  Other than stated in HPI all other review of systems is negative.             Physical Exam   Vital signs: /60, heart rate 72, respiratory 16, temp 97.7.   GENERAL APPEARANCE: Well developed, obese woman in no acute distress  HEENT: normocephalic, atraumatic  PERRL, sclerae anicteric, conjunctivae clear and moist, EOM intact  LUNGS: Lung sounds CTA, no adventitious sounds, respiratory effort normal.  CARD: RRR, S1, S2, without murmurs, gallops, rubs,   ABD: Soft and nontender with normal bowel sounds.   MSK: foot drop right foot  EXTREMITIES: No cyanosis, clubbing or edema.  Good CMS with positive pulses to lower extremities. No s/s of DVT  NEURO: Alert and oriented, face is symmetric.  Good lower extremity sensation.   SKIN: no new skin issues.    PSYCH: euthymic            Labs:    Recent Results (from the past 240 hour(s))   Urinalysis   Result Value Ref Range    Color, UA Yellow Colorless, Yellow, Straw, Light Yellow    Clarity, UA Cloudy (!) Clear    Glucose, UA Negative Negative    Bilirubin, UA Negative Negative    Ketones, UA Negative Negative    Specific Gravity, UA 1.020 1.001 - 1.030    Blood, UA Negative Negative    pH, UA 6.5 4.5 - 8.0    Protein, UA Trace (!) Negative mg/dL    Urobilinogen, UA <2.0 E.U./dL <2.0 E.U./dL, 2.0 E.U./dL    Nitrite, UA Positive (!) Negative    Leukocytes, UA Large (!) Negative    Bacteria, UA Many (!) None Seen hpf    RBC, UA 0-2 None Seen, 0-2 hpf    WBC, UA  (!) None Seen, 0-5 hpf    Squam Epithel, UA 0-5 None Seen, 0-5 lpf    Mucus, UA Many (!) None Seen lpf           Assessment:  1. S/P laminectomy     2. Closed compression fracture of third lumbar vertebra, sequela     3. Pain management      4. Type 2 diabetes mellitus with hyperglycemia, with long-term current use of insulin (H)     5. Acute cystitis without hematuria         Plan:   For her compression fracture she needs to continue wearing her TLSO and working with therapy.  Therapy will be obtaining custom AFOs for her ongoing foot drop.    Pain: Controlled and not using medications.  Continue with therapies.    Diabetes: Continue with metformin 1000 mg twice daily, Januvia 100 mg daily, and Lantus at 38 units daily.  If she is consistently under 200s could consider decreasing Lantus dose further.    UTI: We will treat with Bactrim as she has been treated with Cipro in the past awaiting UC                Electronically signed by: Teri Brush, EDDIE    yes

## 2024-12-20 NOTE — H&P ADULT - PROBLEM SELECTOR PLAN 2
Nephrology consulted to continue HD Nephrology consulted to continue HD  Avoid nephrotoxic agents. Nephrology consulted to continue HD  Avoid nephrotoxic agents.    SW consult for re-establishment of dialysis on discharge.

## 2024-12-20 NOTE — ED PROVIDER NOTE - CLINICAL SUMMARY MEDICAL DECISION MAKING FREE TEXT BOX
sondra attending- Patient presenting with generalized weakness will evaluate for infection metabolic etiologies otherwise slight abdominal tenderness nor lesion on exam will evaluate for diverticulitis or other intra-abdominal surgery pathology with patient with history of diarrhea could be blood pressure orthostasis secondary to recent blood pressure medication this started medication this evening dialysis and feeling weak after dialysis otherwise fluid status appears isovolemic swelling does not appear to be worsening dispo pending workup and reevaluation discussed with patient daughter at bedside agreed with plan unlikely ACS PE pneumothorax dissection AAA pneumonia

## 2024-12-20 NOTE — H&P ADULT - ASSESSMENT
79 ESRD MWF HTN, HLD, T2DM, JARVIS on CPAP who presents for generalized weakness--> likely due to orthostatics vs potential UTI.

## 2024-12-20 NOTE — PATIENT PROFILE ADULT - FUNCTIONAL ASSESSMENT - BASIC MOBILITY 6.
3-calculated by average/Not able to assess (calculate score using [facility] averaging method)

## 2024-12-20 NOTE — ED PROVIDER NOTE - PROGRESS NOTE DETAILS
Labs and imaging c/w volume overload requiring dialysis. Patient vitally stable. Will admit for further HD. Olivia Thibodeaux DO (PGY-2)

## 2024-12-20 NOTE — H&P ADULT - PROBLEM SELECTOR PLAN 1
Pt with generalized weakness x1 week  Followup TSH  HD to continue  Midodrine ordered for prior dialysis per history  Followup Urinalysis for potential infectious cause of weakness. Pt with generalized weakness x1 week  Followup TSH  Complaint of palpitations as well--> TTE ordered  HD today per  Nephrology  Midodrine ordered for prior dialysis per history  Followup Urinalysis for potential infectious cause of weakness.  Followup GI PCR Pt with generalized weakness x1 week  Followup TSH  Per history, patient recently started on BP meds--> would hold until symptoms resolve.   Complaint of palpitations as well--> TTE ordered  HD today per  Nephrology  Midodrine ordered for prior dialysis per history  Followup Urinalysis for potential infectious cause of weakness.  Followup GI PCR  CT abdomen/pelvis shows Diverticulosis without Diverticulitis  WBC wnl, no fevers, hold off any antibiotics for now.

## 2024-12-20 NOTE — CONSULT NOTE ADULT - ASSESSMENT
79 year old female with PMH of ESRD on HD MWF, HTN, Anemia of chornic disease, HLD, DM, and JARVIS who presented to the hospital with generalized weakness for the past one week. The nephoplogy team was consulted for management of hemodialysis.    ESRD on HD   Center: [facility]  Nephrologist: Jhonatan Smith   Schedule: MWF   Access; LUE AVF   [facility] outpatient HD on 12/18/24    plan   labs reviewed  will arrange for HD today as per routine schedule  HD consent obtained and placed in chart  UF as tolerated by BP   please dose medications as per ESRD     Anemia   in setting of kidney disease  hbg at goal right now   will monitor for DEE needs     If any questions, please feel free to contact me     Jhonatan Smith  Nephrology Attending  Cell #[phone]

## 2024-12-20 NOTE — PHYSICAL THERAPY INITIAL EVALUATION ADULT - ACTIVE RANGE OF MOTION EXAMINATION, REHAB EVAL
R GH active elevation ~70% of the total available ROM; L active GH elevation 10~ of total available ROM/bilateral upper extremity Active ROM was WFL (within functional limits)/bilateral  lower extremity Active ROM was WFL (within functional limits)

## 2024-12-20 NOTE — H&P ADULT - PROBLEM SELECTOR PLAN 3
CT Abdomen/pelvis shows diverticulosis no diverticulitis  Monitor for fevers  No diarrhea on this admission  GI PCR ordered. CT Abdomen/pelvis shows diverticulosis no diverticulitis  Monitor for fevers  No diarrhea on this admission.   GI PCR ordered.

## 2024-12-20 NOTE — ED PROVIDER NOTE - OBJECTIVE STATEMENT
sondra attending- 79-year-old female past medical history of ESRD on hemodialysis Monday Wednesday Friday via left arm AV fistula hypertension hyperlipidemia diabetes anemia of chronic renal disease JARVIS on CPAP presenting with daughter for progressive weakness x 1 week and palpitations patient reports having generalized weakness for the last week started on blood pressure medications recently and after dialysis on Monday felt extremely weak having difficulty ambulating into the house after transportation transported her.  Otherwise continued weakness throughout the week no specific signs and symptoms.  Denies fever chills nausea vomiting diarrhea chest pain shortness of breath leg swelling is chronic and slightly improved to baseline otherwise endorsing mild diffuse abdominal pain mostly in the left lower quadrant.  Had 1 episode of diarrhea last week

## 2024-12-20 NOTE — ED ADULT NURSE NOTE - OBJECTIVE STATEMENT
79Y female past medical history of ESRD on hemodialysis Monday Wednesday Friday via left arm AV fistula hypertension hyperlipidemia diabetes anemia of chronic renal disease JARVIS on CPAP presenting with daughter for progressive weakness x 1 week and palpitations patient reports having generalized weakness for the last week started on blood pressure medications recently and after dialysis on Monday felt extremely weak having difficulty ambulating into the house after transportation transported her.  Otherwise continued weakness throughout the week no specific signs and symptoms.  Denies fever chills nausea vomiting diarrhea chest pain shortness of breath leg swelling is chronic and slightly improved to baseline otherwise endorsing mild diffuse abdominal pain mostly in the left lower quadrant.

## 2024-12-20 NOTE — PHYSICAL THERAPY INITIAL EVALUATION ADULT - NSPTDMEREC_GEN_A_CORE
Pt. has all required DMEs: RW, WC, bedside commode, raised toilet seat. Also has 24 hrs x 7 days HHA

## 2024-12-20 NOTE — PHYSICAL THERAPY INITIAL EVALUATION ADULT - GAIT DEVIATIONS NOTED, PT EVAL
decreased beau/decreased velocity of limb motion/decreased step length/decreased weight-shifting ability

## 2024-12-21 DIAGNOSIS — I20.9 ANGINA PECTORIS, UNSPECIFIED: ICD-10-CM

## 2024-12-21 LAB
A1C WITH ESTIMATED AVERAGE GLUCOSE RESULT: 5.4 % — SIGNIFICANT CHANGE UP (ref 4–5.6)
ADD ON TEST-SPECIMEN IN LAB: SIGNIFICANT CHANGE UP
ALBUMIN SERPL ELPH-MCNC: 4.3 G/DL — SIGNIFICANT CHANGE UP (ref 3.3–5)
ALP SERPL-CCNC: 347 U/L — HIGH (ref 40–120)
ALT FLD-CCNC: 10 U/L — SIGNIFICANT CHANGE UP (ref 10–45)
ANION GAP SERPL CALC-SCNC: 17 MMOL/L — SIGNIFICANT CHANGE UP (ref 5–17)
AST SERPL-CCNC: 13 U/L — SIGNIFICANT CHANGE UP (ref 10–40)
BASOPHILS # BLD AUTO: 0.02 K/UL — SIGNIFICANT CHANGE UP (ref 0–0.2)
BASOPHILS NFR BLD AUTO: 0.4 % — SIGNIFICANT CHANGE UP (ref 0–2)
BILIRUB SERPL-MCNC: 0.4 MG/DL — SIGNIFICANT CHANGE UP (ref 0.2–1.2)
BUN SERPL-MCNC: 29 MG/DL — HIGH (ref 7–23)
CALCIUM SERPL-MCNC: 10 MG/DL — SIGNIFICANT CHANGE UP (ref 8.4–10.5)
CHLORIDE SERPL-SCNC: 96 MMOL/L — SIGNIFICANT CHANGE UP (ref 96–108)
CHOLEST SERPL-MCNC: 131 MG/DL — SIGNIFICANT CHANGE UP
CO2 SERPL-SCNC: 25 MMOL/L — SIGNIFICANT CHANGE UP (ref 22–31)
CREAT SERPL-MCNC: 6.31 MG/DL — HIGH (ref 0.5–1.3)
EGFR: 6 ML/MIN/1.73M2 — LOW
EOSINOPHIL # BLD AUTO: 0.09 K/UL — SIGNIFICANT CHANGE UP (ref 0–0.5)
EOSINOPHIL NFR BLD AUTO: 1.9 % — SIGNIFICANT CHANGE UP (ref 0–6)
ESTIMATED AVERAGE GLUCOSE: 108 MG/DL — SIGNIFICANT CHANGE UP (ref 68–114)
GLUCOSE BLDC GLUCOMTR-MCNC: 113 MG/DL — HIGH (ref 70–99)
GLUCOSE BLDC GLUCOMTR-MCNC: 123 MG/DL — HIGH (ref 70–99)
GLUCOSE BLDC GLUCOMTR-MCNC: 153 MG/DL — HIGH (ref 70–99)
GLUCOSE BLDC GLUCOMTR-MCNC: 215 MG/DL — HIGH (ref 70–99)
GLUCOSE SERPL-MCNC: 81 MG/DL — SIGNIFICANT CHANGE UP (ref 70–99)
HCT VFR BLD CALC: 34.5 % — SIGNIFICANT CHANGE UP (ref 34.5–45)
HDLC SERPL-MCNC: 46 MG/DL — LOW
HGB BLD-MCNC: 10.6 G/DL — LOW (ref 11.5–15.5)
IMM GRANULOCYTES NFR BLD AUTO: 0.6 % — SIGNIFICANT CHANGE UP (ref 0–0.9)
LIPID PNL WITH DIRECT LDL SERPL: 69 MG/DL — SIGNIFICANT CHANGE UP
LYMPHOCYTES # BLD AUTO: 1.45 K/UL — SIGNIFICANT CHANGE UP (ref 1–3.3)
LYMPHOCYTES # BLD AUTO: 29.9 % — SIGNIFICANT CHANGE UP (ref 13–44)
MCHC RBC-ENTMCNC: 28.2 PG — SIGNIFICANT CHANGE UP (ref 27–34)
MCHC RBC-ENTMCNC: 30.7 G/DL — LOW (ref 32–36)
MCV RBC AUTO: 91.8 FL — SIGNIFICANT CHANGE UP (ref 80–100)
MONOCYTES # BLD AUTO: 0.74 K/UL — SIGNIFICANT CHANGE UP (ref 0–0.9)
MONOCYTES NFR BLD AUTO: 15.3 % — HIGH (ref 2–14)
NEUTROPHILS # BLD AUTO: 2.52 K/UL — SIGNIFICANT CHANGE UP (ref 1.8–7.4)
NEUTROPHILS NFR BLD AUTO: 51.9 % — SIGNIFICANT CHANGE UP (ref 43–77)
NON HDL CHOLESTEROL: 86 MG/DL — SIGNIFICANT CHANGE UP
NRBC # BLD: 0 /100 WBCS — SIGNIFICANT CHANGE UP (ref 0–0)
PLATELET # BLD AUTO: 158 K/UL — SIGNIFICANT CHANGE UP (ref 150–400)
POTASSIUM SERPL-MCNC: 3.9 MMOL/L — SIGNIFICANT CHANGE UP (ref 3.5–5.3)
POTASSIUM SERPL-SCNC: 3.9 MMOL/L — SIGNIFICANT CHANGE UP (ref 3.5–5.3)
PROT SERPL-MCNC: 7.4 G/DL — SIGNIFICANT CHANGE UP (ref 6–8.3)
RBC # BLD: 3.76 M/UL — LOW (ref 3.8–5.2)
RBC # FLD: 19.7 % — HIGH (ref 10.3–14.5)
SODIUM SERPL-SCNC: 138 MMOL/L — SIGNIFICANT CHANGE UP (ref 135–145)
TRIGL SERPL-MCNC: 84 MG/DL — SIGNIFICANT CHANGE UP
TROPONIN T, HIGH SENSITIVITY RESULT: 95 NG/L — HIGH (ref 0–51)
TSH SERPL-MCNC: 2 UIU/ML — SIGNIFICANT CHANGE UP (ref 0.27–4.2)
WBC # BLD: 4.85 K/UL — SIGNIFICANT CHANGE UP (ref 3.8–10.5)
WBC # FLD AUTO: 4.85 K/UL — SIGNIFICANT CHANGE UP (ref 3.8–10.5)

## 2024-12-21 PROCEDURE — 99233 SBSQ HOSP IP/OBS HIGH 50: CPT

## 2024-12-21 RX ADMIN — INSULIN GLARGINE-YFGN 20 UNIT(S): 100 INJECTION, SOLUTION SUBCUTANEOUS at 21:08

## 2024-12-21 RX ADMIN — Medication 2: at 17:19

## 2024-12-21 RX ADMIN — SEVELAMER CARBONATE 1600 MILLIGRAM(S): 800 TABLET, FILM COATED ORAL at 12:32

## 2024-12-21 RX ADMIN — SEVELAMER CARBONATE 1600 MILLIGRAM(S): 800 TABLET, FILM COATED ORAL at 17:18

## 2024-12-21 RX ADMIN — ATORVASTATIN CALCIUM 40 MILLIGRAM(S): 40 TABLET, FILM COATED ORAL at 21:08

## 2024-12-21 RX ADMIN — SEVELAMER CARBONATE 1600 MILLIGRAM(S): 800 TABLET, FILM COATED ORAL at 12:13

## 2024-12-21 RX ADMIN — HEPARIN SODIUM 5000 UNIT(S): 1000 INJECTION, SOLUTION INTRAVENOUS; SUBCUTANEOUS at 04:47

## 2024-12-21 RX ADMIN — HEPARIN SODIUM 5000 UNIT(S): 1000 INJECTION, SOLUTION INTRAVENOUS; SUBCUTANEOUS at 17:20

## 2024-12-21 NOTE — PROGRESS NOTE ADULT - SUBJECTIVE AND OBJECTIVE BOX
Patient is a 79y old  Female who presents with a chief complaint of Weakness (20 Dec 2024 11:55)      SUBJECTIVE / OVERNIGHT EVENTS: reports she has intermittent chest pain on/off, no sob, abdominal pain, chills, does not make urine     MEDICATIONS  (STANDING):  atorvastatin 40 milliGRAM(s) Oral at bedtime  dextrose 5%. 1000 milliLiter(s) (50 mL/Hr) IV Continuous <Continuous>  dextrose 5%. 1000 milliLiter(s) (100 mL/Hr) IV Continuous <Continuous>  dextrose 50% Injectable 25 Gram(s) IV Push once  dextrose 50% Injectable 12.5 Gram(s) IV Push once  dextrose 50% Injectable 25 Gram(s) IV Push once  glucagon  Injectable 1 milliGRAM(s) IntraMuscular once  heparin   Injectable 5000 Unit(s) SubCutaneous every 12 hours  influenza  Vaccine (HIGH DOSE) 0.5 milliLiter(s) IntraMuscular once  insulin glargine Injectable (LANTUS) 20 Unit(s) SubCutaneous at bedtime  insulin lispro (ADMELOG) corrective regimen sliding scale   SubCutaneous three times a day before meals  midodrine. 10 milliGRAM(s) Oral <User Schedule>  sevelamer carbonate 1600 milliGRAM(s) Oral three times a day with meals    MEDICATIONS  (PRN):  acetaminophen     Tablet .. 650 milliGRAM(s) Oral every 6 hours PRN Temp greater or equal to 38C (100.4F), Mild Pain (1 - 3)  dextrose Oral Gel 15 Gram(s) Oral once PRN Blood Glucose LESS THAN 70 milliGRAM(s)/deciliter  melatonin 3 milliGRAM(s) Oral at bedtime PRN Insomnia  ondansetron Injectable 4 milliGRAM(s) IV Push every 8 hours PRN Nausea and/or Vomiting        CAPILLARY BLOOD GLUCOSE      POCT Blood Glucose.: 123 mg/dL (21 Dec 2024 08:28)  POCT Blood Glucose.: 190 mg/dL (20 Dec 2024 21:35)  POCT Blood Glucose.: 135 mg/dL (20 Dec 2024 17:03)  POCT Blood Glucose.: 195 mg/dL (20 Dec 2024 12:33)  POCT Blood Glucose.: 102 mg/dL (20 Dec 2024 10:16)    I&O's Summary    20 Dec 2024 07:01  -  21 Dec 2024 07:00  --------------------------------------------------------  IN: 480 mL / OUT: 2650 mL / NET: -2170 mL        PHYSICAL EXAM:  GENERAL: NAD, well-developed +LAVF  HEAD:  Atraumatic, Normocephalic  EYES: EOMI, PERRLA, conjunctiva and sclera clear  NECK: Supple, No JVD  CHEST/LUNG: Clear to auscultation bilaterally; No wheeze  HEART: Regular rate and rhythm; No murmurs, rubs, or gallops  ABDOMEN: Soft, Nontender, Nondistended; Bowel sounds present  EXTREMITIES:  2+ Peripheral Pulses, No clubbing, cyanosis, or edema  PSYCH: AAOx3      LABS:                        10.6   4.85  )-----------( 158      ( 21 Dec 2024 07:32 )             34.5     12-21    138  |  96  |  29[H]  ----------------------------<  81  3.9   |  25  |  6.31[H]    Ca    10.0      21 Dec 2024 07:28    TPro  7.4  /  Alb  4.3  /  TBili  0.4  /  DBili  x   /  AST  13  /  ALT  10  /  AlkPhos  347[H]  12-21          Urinalysis Basic - ( 21 Dec 2024 07:28 )    Color: x / Appearance: x / SG: x / pH: x  Gluc: 81 mg/dL / Ketone: x  / Bili: x / Urobili: x   Blood: x / Protein: x / Nitrite: x   Leuk Esterase: x / RBC: x / WBC x   Sq Epi: x / Non Sq Epi: x / Bacteria: x        RADIOLOGY & ADDITIONAL TESTS:    Imaging Personally Reviewed:    Consultant(s) Notes Reviewed:      Care Discussed with Consultants/Other Providers:

## 2024-12-21 NOTE — PROGRESS NOTE ADULT - SUBJECTIVE AND OBJECTIVE BOX
Patient seen and examined  no complaints    IV Contrast (Anaphylaxis)  Shrimp (Hives)  smoke; coughing (Other)  shellfish (Hives; Rash)    Hospital Medications:   MEDICATIONS  (STANDING):  atorvastatin 40 milliGRAM(s) Oral at bedtime  dextrose 5%. 1000 milliLiter(s) (50 mL/Hr) IV Continuous <Continuous>  dextrose 5%. 1000 milliLiter(s) (100 mL/Hr) IV Continuous <Continuous>  dextrose 50% Injectable 25 Gram(s) IV Push once  dextrose 50% Injectable 12.5 Gram(s) IV Push once  dextrose 50% Injectable 25 Gram(s) IV Push once  glucagon  Injectable 1 milliGRAM(s) IntraMuscular once  heparin   Injectable 5000 Unit(s) SubCutaneous every 12 hours  influenza  Vaccine (HIGH DOSE) 0.5 milliLiter(s) IntraMuscular once  insulin glargine Injectable (LANTUS) 20 Unit(s) SubCutaneous at bedtime  insulin lispro (ADMELOG) corrective regimen sliding scale   SubCutaneous three times a day before meals  midodrine. 10 milliGRAM(s) Oral <User Schedule>  sevelamer carbonate 1600 milliGRAM(s) Oral three times a day with meals      VITALS:  T(F): 98.1 (12-21-24 @ 11:54), Max: 98.6 (12-20-24 @ 20:05)  HR: 78 (12-21-24 @ 11:54)  BP: 155/85 (12-21-24 @ 11:54)  RR: 18 (12-21-24 @ 11:54)  SpO2: 100% (12-21-24 @ 11:54)  Wt(kg): --    12-20 @ 07:01  -  12-21 @ 07:00  --------------------------------------------------------  IN: 480 mL / OUT: 2650 mL / NET: -2170 mL    12-21 @ 07:01  -  12-21 @ 14:50  --------------------------------------------------------  IN: 400 mL / OUT: 1 mL / NET: 399 mL        PHYSICAL EXAM:  Constitutional: NAD  HEENT: anicteric sclera, oropharynx clear, MMM  Neck: supple.   Respiratory: Bilateral equal breath sounds , no wheezes, no crackles  Cardiovascular: S1, S2, Regular  Gastrointestinal: Bowel Sound present, soft, NT/ND  Extremities: No peripheral edema  Neurological: Alert and oriented x 3  Psychiatric: Normal mood, normal affect  L AVF    LABS:  12-21    138  |  96  |  29[H]  ----------------------------<  81  3.9   |  25  |  6.31[H]    Ca    10.0      21 Dec 2024 07:28    TPro  7.4  /  Alb  4.3  /  TBili  0.4  /  DBili      /  AST  13  /  ALT  10  /  AlkPhos  347[H]  12-21    Creatinine Trend: 6.31 <--, 6.98 <--                        10.6   4.85  )-----------( 158      ( 21 Dec 2024 07:32 )             34.5     Urine Studies:  Urinalysis Basic - ( 21 Dec 2024 07:28 )    Color:  / Appearance:  / SG:  / pH:   Gluc: 81 mg/dL / Ketone:   / Bili:  / Urobili:    Blood:  / Protein:  / Nitrite:    Leuk Esterase:  / RBC:  / WBC    Sq Epi:  / Non Sq Epi:  / Bacteria:         RADIOLOGY & ADDITIONAL STUDIES:

## 2024-12-22 LAB
ANION GAP SERPL CALC-SCNC: 14 MMOL/L — SIGNIFICANT CHANGE UP (ref 5–17)
BUN SERPL-MCNC: 48 MG/DL — HIGH (ref 7–23)
CALCIUM SERPL-MCNC: 9.5 MG/DL — SIGNIFICANT CHANGE UP (ref 8.4–10.5)
CHLORIDE SERPL-SCNC: 95 MMOL/L — LOW (ref 96–108)
CO2 SERPL-SCNC: 25 MMOL/L — SIGNIFICANT CHANGE UP (ref 22–31)
CREAT SERPL-MCNC: 8.55 MG/DL — HIGH (ref 0.5–1.3)
EGFR: 4 ML/MIN/1.73M2 — LOW
GLUCOSE BLDC GLUCOMTR-MCNC: 120 MG/DL — HIGH (ref 70–99)
GLUCOSE BLDC GLUCOMTR-MCNC: 128 MG/DL — HIGH (ref 70–99)
GLUCOSE BLDC GLUCOMTR-MCNC: 187 MG/DL — HIGH (ref 70–99)
GLUCOSE BLDC GLUCOMTR-MCNC: 91 MG/DL — SIGNIFICANT CHANGE UP (ref 70–99)
GLUCOSE SERPL-MCNC: 87 MG/DL — SIGNIFICANT CHANGE UP (ref 70–99)
HCT VFR BLD CALC: 33.9 % — LOW (ref 34.5–45)
HGB BLD-MCNC: 10.5 G/DL — LOW (ref 11.5–15.5)
MAGNESIUM SERPL-MCNC: 2.2 MG/DL — SIGNIFICANT CHANGE UP (ref 1.6–2.6)
MCHC RBC-ENTMCNC: 28.2 PG — SIGNIFICANT CHANGE UP (ref 27–34)
MCHC RBC-ENTMCNC: 31 G/DL — LOW (ref 32–36)
MCV RBC AUTO: 90.9 FL — SIGNIFICANT CHANGE UP (ref 80–100)
NRBC # BLD: 0 /100 WBCS — SIGNIFICANT CHANGE UP (ref 0–0)
PHOSPHATE SERPL-MCNC: 4.7 MG/DL — HIGH (ref 2.5–4.5)
PLATELET # BLD AUTO: 150 K/UL — SIGNIFICANT CHANGE UP (ref 150–400)
POTASSIUM SERPL-MCNC: 4.3 MMOL/L — SIGNIFICANT CHANGE UP (ref 3.5–5.3)
POTASSIUM SERPL-SCNC: 4.3 MMOL/L — SIGNIFICANT CHANGE UP (ref 3.5–5.3)
RBC # BLD: 3.73 M/UL — LOW (ref 3.8–5.2)
RBC # FLD: 19.5 % — HIGH (ref 10.3–14.5)
SODIUM SERPL-SCNC: 134 MMOL/L — LOW (ref 135–145)
WBC # BLD: 4.92 K/UL — SIGNIFICANT CHANGE UP (ref 3.8–10.5)
WBC # FLD AUTO: 4.92 K/UL — SIGNIFICANT CHANGE UP (ref 3.8–10.5)

## 2024-12-22 PROCEDURE — 99233 SBSQ HOSP IP/OBS HIGH 50: CPT

## 2024-12-22 RX ADMIN — SEVELAMER CARBONATE 1600 MILLIGRAM(S): 800 TABLET, FILM COATED ORAL at 11:53

## 2024-12-22 RX ADMIN — Medication 1: at 17:39

## 2024-12-22 RX ADMIN — SEVELAMER CARBONATE 1600 MILLIGRAM(S): 800 TABLET, FILM COATED ORAL at 17:39

## 2024-12-22 RX ADMIN — INSULIN GLARGINE-YFGN 20 UNIT(S): 100 INJECTION, SOLUTION SUBCUTANEOUS at 21:06

## 2024-12-22 RX ADMIN — ATORVASTATIN CALCIUM 40 MILLIGRAM(S): 40 TABLET, FILM COATED ORAL at 21:06

## 2024-12-22 RX ADMIN — ACETAMINOPHEN 650 MILLIGRAM(S): 80 SOLUTION/ DROPS ORAL at 03:21

## 2024-12-22 RX ADMIN — ACETAMINOPHEN 650 MILLIGRAM(S): 80 SOLUTION/ DROPS ORAL at 02:51

## 2024-12-22 RX ADMIN — HEPARIN SODIUM 5000 UNIT(S): 1000 INJECTION, SOLUTION INTRAVENOUS; SUBCUTANEOUS at 05:07

## 2024-12-22 RX ADMIN — HEPARIN SODIUM 5000 UNIT(S): 1000 INJECTION, SOLUTION INTRAVENOUS; SUBCUTANEOUS at 17:39

## 2024-12-22 NOTE — PROGRESS NOTE ADULT - SUBJECTIVE AND OBJECTIVE BOX
INCOMPLETE NOTE    Milan Melara M.D.  Division of Hospital Medicine  Available on [employer] TEAMS    SUBJECTIVE / ACUTE INTERVAL EVENTS: Patient seen and examined. No overnight events. No subjective complaints.     OBJECTIVE:   Vital Signs Last 24 Hrs  T(F): 98.2 (22 Dec 2024 07:30), Max: 98.5 (21 Dec 2024 21:15)  HR: 71 (22 Dec 2024 07:30) (71 - 90)  BP: 151/84 (22 Dec 2024 07:30) (130/71 - 155/85)  RR: 17 (22 Dec 2024 07:30) (17 - 18)  SpO2: 96% (22 Dec 2024 07:30) (94% - 100%)  Parameters below as of 22 Dec 2024 07:30  Patient On (Oxygen Delivery Method): room air    I&O's Summary  21 Dec 2024 07:01  -  22 Dec 2024 07:00  --------------------------------------------------------  IN: 400 mL / OUT: 1 mL / NET: 399 mL    Physical Examination:  GEN: elderly woman, laying in bed in NAD  PSYCH: A&Ox3, mood and affect appear appropriate   NEURO: no focal neurologic deficits appreciated  RESPI: no accessory muscle use, B/L air entry   CARDIO: regular rate/rhythm, no LE edema B/L  ABD: soft, NT, ND  EXT: patient able to move all extremities spontaneously, Lt AVF  VASC: peripheral pulses palpated    Labs:  CAPILLARY BLOOD GLUCOSE  POCT Blood Glucose.: 91 mg/dL (22 Dec 2024 06:36)  POCT Blood Glucose.: 153 mg/dL (21 Dec 2024 20:59)  POCT Blood Glucose.: 215 mg/dL (21 Dec 2024 17:15)  POCT Blood Glucose.: 113 mg/dL (21 Dec 2024 12:08)                        10.5   4.92  )-----------( 150      ( 22 Dec 2024 08:40 )             33.9     12-22  134[L]  |  95[L]  |  48[H]  ----------------------------<  87  4.3   |  25  |  8.55[H]    Ca    9.5      22 Dec 2024 08:40  Phos  4.7     12-22  Mg     2.2     12-22    TPro  7.4  /  Alb  4.3  /  TBili  0.4  /  DBili  x   /  AST  13  /  ALT  10  /  AlkPhos  347[H]  12-21    Urinalysis Basic - ( 22 Dec 2024 08:40 )  Color: x / Appearance: x / SG: x / pH: x  Gluc: 87 mg/dL / Ketone: x  / Bili: x / Urobili: x   Blood: x / Protein: x / Nitrite: x   Leuk Esterase: x / RBC: x / WBC x   Sq Epi: x / Non Sq Epi: x / Bacteria: x    Consultant(s) Notes Reviewed: Nephrology    Care Discussed with Consultants/Other Providers: DANUTA Lopez    MEDICATIONS  (STANDING):  atorvastatin 40 milliGRAM(s) Oral at bedtime  dextrose 5%. 1000 milliLiter(s) (50 mL/Hr) IV Continuous <Continuous>  dextrose 5%. 1000 milliLiter(s) (100 mL/Hr) IV Continuous <Continuous>  dextrose 50% Injectable 25 Gram(s) IV Push once  dextrose 50% Injectable 12.5 Gram(s) IV Push once  dextrose 50% Injectable 25 Gram(s) IV Push once  glucagon  Injectable 1 milliGRAM(s) IntraMuscular once  heparin   Injectable 5000 Unit(s) SubCutaneous every 12 hours  influenza  Vaccine (HIGH DOSE) 0.5 milliLiter(s) IntraMuscular once  insulin glargine Injectable (LANTUS) 20 Unit(s) SubCutaneous at bedtime  insulin lispro (ADMELOG) corrective regimen sliding scale   SubCutaneous three times a day before meals  midodrine. 10 milliGRAM(s) Oral <User Schedule>  sevelamer carbonate 1600 milliGRAM(s) Oral three times a day with meals    MEDICATIONS  (PRN):  acetaminophen     Tablet .. 650 milliGRAM(s) Oral every 6 hours PRN Temp greater or equal to 38C (100.4F), Mild Pain (1 - 3)  dextrose Oral Gel 15 Gram(s) Oral once PRN Blood Glucose LESS THAN 70 milliGRAM(s)/deciliter  melatonin 3 milliGRAM(s) Oral at bedtime PRN Insomnia  ondansetron Injectable 4 milliGRAM(s) IV Push every 8 hours PRN Nausea and/or Vomiting Milan Melara M.D.  Division of Hospital Medicine  Available on [employer] TEAMS    SUBJECTIVE / ACUTE INTERVAL EVENTS: Patient seen and examined. No overnight events. Ongoing intermittent CP -- reviewed HIE and LHC as performed at [facility] in 2022. Will appreciate cardiology's evaluation and plan for further ischemic workup (stress testing ordered).     OBJECTIVE:   Vital Signs Last 24 Hrs  T(F): 98.2 (22 Dec 2024 07:30), Max: 98.5 (21 Dec 2024 21:15)  HR: 71 (22 Dec 2024 07:30) (71 - 90)  BP: 151/84 (22 Dec 2024 07:30) (130/71 - 155/85)  RR: 17 (22 Dec 2024 07:30) (17 - 18)  SpO2: 96% (22 Dec 2024 07:30) (94% - 100%)  Parameters below as of 22 Dec 2024 07:30  Patient On (Oxygen Delivery Method): room air    I&O's Summary  21 Dec 2024 07:01  -  22 Dec 2024 07:00  --------------------------------------------------------  IN: 400 mL / OUT: 1 mL / NET: 399 mL    Physical Examination:  GEN: elderly woman, laying in bed in NAD  PSYCH: A&Ox3, mood and affect appear appropriate   NEURO: no focal neurologic deficits appreciated  RESPI: no accessory muscle use, B/L air entry   CARDIO: regular rate/rhythm, no LE edema B/L  ABD: soft, NT, ND  EXT: patient able to move all extremities spontaneously, Lt AVF  VASC: peripheral pulses palpated    Labs:  CAPILLARY BLOOD GLUCOSE  POCT Blood Glucose.: 91 mg/dL (22 Dec 2024 06:36)  POCT Blood Glucose.: 153 mg/dL (21 Dec 2024 20:59)  POCT Blood Glucose.: 215 mg/dL (21 Dec 2024 17:15)  POCT Blood Glucose.: 113 mg/dL (21 Dec 2024 12:08)                        10.5   4.92  )-----------( 150      ( 22 Dec 2024 08:40 )             33.9     12-22  134[L]  |  95[L]  |  48[H]  ----------------------------<  87  4.3   |  25  |  8.55[H]    Ca    9.5      22 Dec 2024 08:40  Phos  4.7     12-22  Mg     2.2     12-22    TPro  7.4  /  Alb  4.3  /  TBili  0.4  /  DBili  x   /  AST  13  /  ALT  10  /  AlkPhos  347[H]  12-21    Urinalysis Basic - ( 22 Dec 2024 08:40 )  Color: x / Appearance: x / SG: x / pH: x  Gluc: 87 mg/dL / Ketone: x  / Bili: x / Urobili: x   Blood: x / Protein: x / Nitrite: x   Leuk Esterase: x / RBC: x / WBC x   Sq Epi: x / Non Sq Epi: x / Bacteria: x    Consultant(s) Notes Reviewed: Nephrology    Care Discussed with Consultants/Other Providers: DANUTA Milleragirandy    MEDICATIONS  (STANDING):  atorvastatin 40 milliGRAM(s) Oral at bedtime  dextrose 5%. 1000 milliLiter(s) (50 mL/Hr) IV Continuous <Continuous>  dextrose 5%. 1000 milliLiter(s) (100 mL/Hr) IV Continuous <Continuous>  dextrose 50% Injectable 25 Gram(s) IV Push once  dextrose 50% Injectable 12.5 Gram(s) IV Push once  dextrose 50% Injectable 25 Gram(s) IV Push once  glucagon  Injectable 1 milliGRAM(s) IntraMuscular once  heparin   Injectable 5000 Unit(s) SubCutaneous every 12 hours  influenza  Vaccine (HIGH DOSE) 0.5 milliLiter(s) IntraMuscular once  insulin glargine Injectable (LANTUS) 20 Unit(s) SubCutaneous at bedtime  insulin lispro (ADMELOG) corrective regimen sliding scale   SubCutaneous three times a day before meals  midodrine. 10 milliGRAM(s) Oral <User Schedule>  sevelamer carbonate 1600 milliGRAM(s) Oral three times a day with meals    MEDICATIONS  (PRN):  acetaminophen     Tablet .. 650 milliGRAM(s) Oral every 6 hours PRN Temp greater or equal to 38C (100.4F), Mild Pain (1 - 3)  dextrose Oral Gel 15 Gram(s) Oral once PRN Blood Glucose LESS THAN 70 milliGRAM(s)/deciliter  melatonin 3 milliGRAM(s) Oral at bedtime PRN Insomnia  ondansetron Injectable 4 milliGRAM(s) IV Push every 8 hours PRN Nausea and/or Vomiting

## 2024-12-22 NOTE — CONSULT NOTE ADULT - SUBJECTIVE AND OBJECTIVE BOX
[facility]  - Ans Serv [phone], Office [phone]  Dr Lockett/Dr Camacho /Dr Garry davenport /Dr JACY Taylor/Dr Del Becker/Dr Stephon Colbert /Dr MALCLOM Kearns  _______________________________________________________________________________________________  The patient seen and examined today.  HPI:  Patient is an 79 year old female with PMH of ESRD on HD MWF, HTN, Anemia of chornic disease, HLD, DM, and JARVIS who presented to the hospital with generalized weakness for the past one week. The nephoplogy team was consulted for management of hemodialysis. The patient was seen and examined earlier today. She undergoes HD at [facility] and her last outpatient dialysis was on . She reported having these symptoms present for the past few days. Denied any chest pain, shortness of breath, nausea, vomiting.     PAST MEDICAL & SURGICAL HISTORY:  HTN (hypertension)      Vertigo      Trigeminal neuralgia      ESRD (end stage renal disease) on dialysis  MWF via left AVF      Anemia      Gout      HLD (hyperlipidemia)      DM (diabetes mellitus)      JARVIS (obstructive sleep apnea)  CPAP QHS      S/P rotator cuff surgery  Bilateral      S/P         S/P hysterectomy        S/P total knee replacement  Bilateral, left , right       S/P cataract extraction  bilateral,       S/P arteriovenous (AV) fistula creation  left upper forearm cephalic vein brachial artery AV fistula creation on 2018, left arm basilic vein transposition on 2019.        S/P carpal tunnel release  Left        Allergies :- IV Contrast (Anaphylaxis)  shrimp (Hives)  smoke; coughing (Other)  shellfish (Hives; Rash)    Home Medications Reviewed  Hospital Medications:   MEDICATIONS  (STANDING):    SOCIAL HISTORY:  Denies ETOh,Smoking,   FAMILY HISTORY:  No pertinent family history in first degree relatives        REVIEW OF SYSTEMS:  As mentioned above    VITALS:  T(F): 98.1 (12-20-24 @ 07:20), Max: 99.2 (24 @ 04:20)  HR: 78 (24 @ 07:20)  BP: 156/77 (24 @ 07:20)  RR: 18 (24 @ 07:20)  SpO2: 95% (24 @ 07:20)  Wt(kg): --    Height (cm): 165.1 ( @ 19:09)  Weight (kg): 85 ( 19:09)  BMI (kg/m2): 31.2 (:09)  BSA (m2): 1.92 (:09)    PHYSICAL EXAM:  Constitutional: NAD  HEENT: anicteric sclera, oropharynx clear, MMM  Neck: supple.   Respiratory: Bilateral equal breath sounds , no wheezes, no crackles  Cardiovascular: S1, S2, Regular  Gastrointestinal: Bowel Sound present, soft, NT/ND  Extremities: No peripheral edema  Neurological: Alert and oriented x 3  Psychiatric: Normal mood, normal affect  L AVF    Data:      136  |  96  |  33[H]  ----------------------------<  112[H]  4.4   |  24  |  6.98[H]    Ca    9.5      20 Dec 2024 01:05    TPro  7.5  /  Alb  4.1  /  TBili  0.4  /  DBili      /  AST  29  /  ALT  11  /  AlkPhos  345[H]      Creatinine Trend: 6.98 <--                        10.8   5.53  )-----------( 164      ( 20 Dec 2024 03:15 )             35.7     Urine Studies:  Urinalysis Basic - ( 20 Dec 2024 01:05 )    Color:  / Appearance:  / SG:  / pH:   Gluc: 112 mg/dL / Ketone:   / Bili:  / Urobili:    Blood:  / Protein:  / Nitrite:    Leuk Esterase:  / RBC:  / WBC    Sq Epi:  / Non Sq Epi:  / Bacteria:       
DATE OF SERVICE: 24      CHIEF COMPLAINT:Patient is a 79y old  Female who presents with a chief complaint of Weakness (22 Dec 2024 11:11)      HISTORY OF PRESENT ILLNESS:HPI:  79 yr old Female with past medical history of HD MWF Left arm AV fistula, HTN, HLD, T2DM, JARVIS on CPAP who presents with generalized weakness of 1 week duration. Patient was started on blood pressure medications recently and felt weakness after dialysis on Monday. She otherwise continued HD on Wednesday without issue but the generalized weakness has been getting worse. She denies any specific signs of symptoms. She also endorses some diffuse abdominal pain in her LLQ. She complains of episode of nonbloody diarrhea last week.  She denies any urinary symptoms.  (20 Dec 2024 11:55)      PAST MEDICAL & SURGICAL HISTORY:  HTN (hypertension)      Vertigo      Trigeminal neuralgia      ESRD (end stage renal disease) on dialysis  MWF via left AVF      Anemia      Gout      HLD (hyperlipidemia)      DM (diabetes mellitus)      JARVIS (obstructive sleep apnea)  CPAP QHS      S/P rotator cuff surgery  Bilateral      S/P         S/P hysterectomy        S/P total knee replacement  Bilateral, left , right 2008      S/P cataract extraction  bilateral,       S/P arteriovenous (AV) fistula creation  left upper forearm cephalic vein brachial artery AV fistula creation on 2018, left arm basilic vein transposition on 2019.        S/P carpal tunnel release  Left              MEDICATIONS:  heparin   Injectable 5000 Unit(s) SubCutaneous every 12 hours  midodrine. 10 milliGRAM(s) Oral <User Schedule>        acetaminophen     Tablet .. 650 milliGRAM(s) Oral every 6 hours PRN  melatonin 3 milliGRAM(s) Oral at bedtime PRN  ondansetron Injectable 4 milliGRAM(s) IV Push every 8 hours PRN      atorvastatin 40 milliGRAM(s) Oral at bedtime  dextrose 50% Injectable 25 Gram(s) IV Push once  dextrose 50% Injectable 12.5 Gram(s) IV Push once  dextrose 50% Injectable 25 Gram(s) IV Push once  dextrose Oral Gel 15 Gram(s) Oral once PRN  glucagon  Injectable 1 milliGRAM(s) IntraMuscular once  insulin glargine Injectable (LANTUS) 20 Unit(s) SubCutaneous at bedtime  insulin lispro (ADMELOG) corrective regimen sliding scale   SubCutaneous three times a day before meals    dextrose 5%. 1000 milliLiter(s) IV Continuous <Continuous>  dextrose 5%. 1000 milliLiter(s) IV Continuous <Continuous>  influenza  Vaccine (HIGH DOSE) 0.5 milliLiter(s) IntraMuscular once      FAMILY HISTORY:  No pertinent family history in first degree relatives        Non-contributory    SOCIAL HISTORY:    [ ] not a smoker    Allergies    IV Contrast (Anaphylaxis)  Shrimp (Hives)  smoke; coughing (Other)  shellfish (Hives; Rash)    Intolerances    	    REVIEW OF SYSTEMS:  CONSTITUTIONAL: No fever  EYES: No eye pain, visual disturbances, or discharge  ENMT:  No difficulty hearing, tinnitus  NECK: No pain or stiffness  RESPIRATORY: No cough, wheezing,  CARDIOVASCULAR: + chest pain, no palpitations, passing out, dizziness, or leg swelling  GASTROINTESTINAL:  No nausea, vomiting, diarrhea or constipation. No melena.  GENITOURINARY: No dysuria, hematuria  NEUROLOGICAL: No stroke like symptoms  SKIN: No burning or lesions   ENDOCRINE: No heat or cold intolerance  MUSCULOSKELETAL: No joint pain or swelling  PSYCHIATRIC: No  anxiety, mood swings  HEME/LYMPH: No bleeding gums  ALLERGY AND IMMUNOLOGIC: No hives or eczema	    All other ROS negative    PHYSICAL EXAM:  T(C): 36.9 (24 @ 19:56), Max: 36.9 (24 @ 19:56)  HR: 86 (24 @ 21:36) (71 - 91)  BP: 112/65 (24 @ 19:56) (112/65 - 151/84)  RR: 18 (24 @ 19:56) (17 - 18)  SpO2: 96% (24 @ 21:36) (94% - 99%)  Wt(kg): --  I&O's Summary    21 Dec 2024 07:  -  22 Dec 2024 07:00  --------------------------------------------------------  IN: 400 mL / OUT: 1 mL / NET: 399 mL    22 Dec 2024 07:01  -  22 Dec 2024 21:49  --------------------------------------------------------  IN: 240 mL / OUT: 2000 mL / NET: -1760 mL        Appearance: Normal	  HEENT:   Normal oral mucosa, EOMI	  Cardiovascular:  S1 S2, No JVD,    Respiratory: Lungs clear to auscultation	  Psychiatry: Alert  Gastrointestinal:  Soft, Non-tender, + BS	  Skin: No rashes   Neurologic: Non-focal  Extremities:  No edema  Vascular: Peripheral pulses palpable    	    	  	  CARDIAC MARKERS:  Labs personally reviewed by me                                  10.5   4.92  )-----------( 150      ( 22 Dec 2024 08:40 )             33.9     12-    134[L]  |  95[L]  |  48[H]  ----------------------------<  87  4.3   |  25  |  8.55[H]    Ca    9.5      22 Dec 2024 08:40  Phos  4.7     12-  Mg     2.2     12-    TPro  7.4  /  Alb  4.3  /  TBili  0.4  /  DBili  x   /  AST  13  /  ALT  10  /  AlkPhos  347[H]  12-21          EKG: Personally reviewed by me - NSR RBBB  Radiology: Personally reviewed by me - CXR clear lungs      CATH     LM   Left main artery: Angiography shows minor irregularities.      LAD   Left anterior descending artery: IFR negative. There is a 50 %  stenosis.    CX   Circumflex: Angiography shows mild atherosclerosis.      RCA   Right coronary artery: Angiography shows mild atherosclerosis.            Assessment /Plan:     79yoF w/ PMHx of ESRD MWF HTN, HLD, T2DM, JARVIS on CPAP who presents for generalized weakness and intermittent CP.          Problem/Plan - 1:  ·  Problem: Chest pain  ·  Plan: Atypical chest pain  Trop elevation nonspecific iso ESRD but not uptrending and chronically elevated dating back to   TTE pending  EKG nonischemic   NM stress test ordered      Problem/Plan - 2:  ·  Problem: Palpitations  ·  Plan:  OP monitor to r/o occult arrythmia  - Tele     Problem/Plan - 3:  ·  Problem: HLD (hyperlipidemia).   ·  Plan: Continue Statin.     Problem/Plan - 4:  ·  Problem: Need for prophylactic measure.   ·  Plan: DVT PPx: Heparin subq           Differential diagnosis and plan of care discussed with patient after the evaluation. Counseling on diet, nutritional counseling, weight management, exercise and medication compliance was done.   Advanced care planning/advanced directives discussed with patient/family. DNR status including forceful chest compressions to attempt to restart the heart, ventilator support/artificial breathing, electric shock, artificial nutrition, health care proxy, Molst form all discussed with pt. Pt wishes to consider. Sixteen minutes spent on discussing advanced directives.          Stefan Thomas DO [facility]  Cardiovascular Medicine  [address] [place] [address], [address]  Office [phone]  Available via call/text on [employer] Teams

## 2024-12-22 NOTE — PROGRESS NOTE ADULT - SUBJECTIVE AND OBJECTIVE BOX
Patient seen and examined  no complaints    IV Contrast (Anaphylaxis)  Shrimp (Hives)  smoke; coughing (Other)  shellfish (Hives; Rash)    Hospital Medications:   MEDICATIONS  (STANDING):  atorvastatin 40 milliGRAM(s) Oral at bedtime  dextrose 5%. 1000 milliLiter(s) (50 mL/Hr) IV Continuous <Continuous>  dextrose 5%. 1000 milliLiter(s) (100 mL/Hr) IV Continuous <Continuous>  dextrose 50% Injectable 25 Gram(s) IV Push once  dextrose 50% Injectable 12.5 Gram(s) IV Push once  dextrose 50% Injectable 25 Gram(s) IV Push once  glucagon  Injectable 1 milliGRAM(s) IntraMuscular once  heparin   Injectable 5000 Unit(s) SubCutaneous every 12 hours  influenza  Vaccine (HIGH DOSE) 0.5 milliLiter(s) IntraMuscular once  insulin glargine Injectable (LANTUS) 20 Unit(s) SubCutaneous at bedtime  insulin lispro (ADMELOG) corrective regimen sliding scale   SubCutaneous three times a day before meals  midodrine. 10 milliGRAM(s) Oral <User Schedule>  sevelamer carbonate 1600 milliGRAM(s) Oral three times a day with meals        VITALS:  T(F): 97.2 (12-22-24 @ 11:00), Max: 98.5 (12-21-24 @ 21:15)  HR: 78 (12-22-24 @ 11:00)  BP: 134/78 (12-22-24 @ 11:00)  RR: 18 (12-22-24 @ 11:00)  SpO2: 97% (12-22-24 @ 11:00)  Wt(kg): --    12-21 @ 07:01  -  12-22 @ 07:00  --------------------------------------------------------  IN: 400 mL / OUT: 1 mL / NET: 399 mL    12-22 @ 07:01  -  12-22 @ 11:12  --------------------------------------------------------  IN: 0 mL / OUT: 2000 mL / NET: -2000 mL    PHYSICAL EXAM:  Constitutional: NAD  HEENT: anicteric sclera, oropharynx clear, MMM  Neck: supple.   Respiratory: Bilateral equal breath sounds , no wheezes, no crackles  Cardiovascular: S1, S2, Regular  Gastrointestinal: Bowel Sound present, soft, NT/ND  Extremities: No peripheral edema  Neurological: Alert and oriented x 3  Psychiatric: Normal mood, normal affect  L AVF    LABS:  12-22    134[L]  |  95[L]  |  48[H]  ----------------------------<  87  4.3   |  25  |  8.55[H]    Ca    9.5      22 Dec 2024 08:40  Phos  4.7     12-22  Mg     2.2     12-22    TPro  7.4  /  Alb  4.3  /  TBili  0.4  /  DBili      /  AST  13  /  ALT  10  /  AlkPhos  347[H]  12-21    Creatinine Trend: 8.55 <--, 6.31 <--, 6.98 <--                        10.5   4.92  )-----------( 150      ( 22 Dec 2024 08:40 )             33.9     Urine Studies:  Urinalysis Basic - ( 22 Dec 2024 08:40 )    Color:  / Appearance:  / SG:  / pH:   Gluc: 87 mg/dL / Ketone:   / Bili:  / Urobili:    Blood:  / Protein:  / Nitrite:    Leuk Esterase:  / RBC:  / WBC    Sq Epi:  / Non Sq Epi:  / Bacteria:         RADIOLOGY & ADDITIONAL STUDIES:

## 2024-12-23 ENCOUNTER — RESULT REVIEW (OUTPATIENT)
Age: 79
End: 2024-12-23

## 2024-12-23 LAB
ANION GAP SERPL CALC-SCNC: 16 MMOL/L — SIGNIFICANT CHANGE UP (ref 5–17)
BUN SERPL-MCNC: 40 MG/DL — HIGH (ref 7–23)
CALCIUM SERPL-MCNC: 10 MG/DL — SIGNIFICANT CHANGE UP (ref 8.4–10.5)
CHLORIDE SERPL-SCNC: 93 MMOL/L — LOW (ref 96–108)
CO2 SERPL-SCNC: 25 MMOL/L — SIGNIFICANT CHANGE UP (ref 22–31)
CREAT SERPL-MCNC: 6.88 MG/DL — HIGH (ref 0.5–1.3)
EGFR: 6 ML/MIN/1.73M2 — LOW
GLUCOSE BLDC GLUCOMTR-MCNC: 115 MG/DL — HIGH (ref 70–99)
GLUCOSE BLDC GLUCOMTR-MCNC: 120 MG/DL — HIGH (ref 70–99)
GLUCOSE BLDC GLUCOMTR-MCNC: 124 MG/DL — HIGH (ref 70–99)
GLUCOSE BLDC GLUCOMTR-MCNC: 80 MG/DL — SIGNIFICANT CHANGE UP (ref 70–99)
GLUCOSE SERPL-MCNC: 128 MG/DL — HIGH (ref 70–99)
MAGNESIUM SERPL-MCNC: 2.2 MG/DL — SIGNIFICANT CHANGE UP (ref 1.6–2.6)
PHOSPHATE SERPL-MCNC: 4 MG/DL — SIGNIFICANT CHANGE UP (ref 2.5–4.5)
POTASSIUM SERPL-MCNC: 4.2 MMOL/L — SIGNIFICANT CHANGE UP (ref 3.5–5.3)
POTASSIUM SERPL-SCNC: 4.2 MMOL/L — SIGNIFICANT CHANGE UP (ref 3.5–5.3)
SODIUM SERPL-SCNC: 134 MMOL/L — LOW (ref 135–145)
TROPONIN T, HIGH SENSITIVITY RESULT: 89 NG/L — HIGH (ref 0–51)

## 2024-12-23 PROCEDURE — 93306 TTE W/DOPPLER COMPLETE: CPT | Mod: 26

## 2024-12-23 PROCEDURE — 99232 SBSQ HOSP IP/OBS MODERATE 35: CPT

## 2024-12-23 RX ADMIN — ATORVASTATIN CALCIUM 40 MILLIGRAM(S): 40 TABLET, FILM COATED ORAL at 22:06

## 2024-12-23 RX ADMIN — HEPARIN SODIUM 5000 UNIT(S): 1000 INJECTION, SOLUTION INTRAVENOUS; SUBCUTANEOUS at 17:11

## 2024-12-23 RX ADMIN — HEPARIN SODIUM 5000 UNIT(S): 1000 INJECTION, SOLUTION INTRAVENOUS; SUBCUTANEOUS at 06:34

## 2024-12-23 RX ADMIN — SEVELAMER CARBONATE 1600 MILLIGRAM(S): 800 TABLET, FILM COATED ORAL at 08:16

## 2024-12-23 RX ADMIN — MIDODRINE HYDROCHLORIDE 10 MILLIGRAM(S): 5 TABLET ORAL at 08:16

## 2024-12-23 RX ADMIN — SEVELAMER CARBONATE 1600 MILLIGRAM(S): 800 TABLET, FILM COATED ORAL at 17:11

## 2024-12-23 RX ADMIN — INSULIN GLARGINE-YFGN 20 UNIT(S): 100 INJECTION, SOLUTION SUBCUTANEOUS at 22:07

## 2024-12-23 RX ADMIN — SEVELAMER CARBONATE 1600 MILLIGRAM(S): 800 TABLET, FILM COATED ORAL at 13:33

## 2024-12-23 NOTE — PROGRESS NOTE ADULT - SUBJECTIVE AND OBJECTIVE BOX
DATE OF SERVICE: 12-23-24      Patient is a 79y old  Female who presents with a chief complaint of Weakness (23 Dec 2024 17:46)      INTERVAL HISTORY: feels ok    TELEMETRY Personally reviewed: no events  	  MEDICATIONS:  midodrine. 10 milliGRAM(s) Oral <User Schedule>        PHYSICAL EXAM:  T(C): 37.2 (12-23-24 @ 21:12), Max: 37.2 (12-23-24 @ 21:12)  HR: 82 (12-23-24 @ 23:59) (70 - 85)  BP: 107/62 (12-23-24 @ 21:12) (107/62 - 144/83)  RR: 18 (12-23-24 @ 21:12) (18 - 18)  SpO2: 97% (12-23-24 @ 23:59) (96% - 100%)  Wt(kg): --  I&O's Summary    22 Dec 2024 07:01  -  23 Dec 2024 07:00  --------------------------------------------------------  IN: 240 mL / OUT: 2000 mL / NET: -1760 mL    23 Dec 2024 07:01  -  24 Dec 2024 00:58  --------------------------------------------------------  IN: 120 mL / OUT: 0 mL / NET: 120 mL          Appearance: In no distress	  HEENT:    PERRL, EOMI	  Cardiovascular:  S1 S2, No JVD  Respiratory: Lungs clear to auscultation	  Gastrointestinal:  Soft, Non-tender, + BS	  Vascularature:  No edema of LE  Psychiatric: Appropriate affect   Neuro: no acute focal deficits                               10.5   4.92  )-----------( 150      ( 22 Dec 2024 08:40 )             33.9     12-23    134[L]  |  93[L]  |  40[H]  ----------------------------<  128[H]  4.2   |  25  |  6.88[H]    Ca    10.0      23 Dec 2024 09:31  Phos  4.0     12-23  Mg     2.2     12-23          Labs personally reviewed      ASSESSMENT/PLAN: 	  EKG: Personally reviewed by me - NSR RBBB  Radiology: Personally reviewed by me - CXR clear lungs      CATH 2022    LM   Left main artery: Angiography shows minor irregularities.      LAD   Left anterior descending artery: IFR negative. There is a 50 %  stenosis.    CX   Circumflex: Angiography shows mild atherosclerosis.      RCA   Right coronary artery: Angiography shows mild atherosclerosis.            Assessment /Plan:     79yoF w/ PMHx of ESRD MWF HTN, HLD, T2DM, JARVIS on CPAP who presents for generalized weakness and intermittent CP.          Problem/Plan - 1:  ·  Problem: Chest pain  ·  Plan: Atypical chest pain  Trop elevation nonspecific iso ESRD but not uptrending and chronically elevated dating back to 2021  TTE pending  EKG nonischemic   NM stress test ordered but unable to complete as she is unable to lift her arm  D/w pt and daughter option of cath instead, they both decline     Problem/Plan - 2:  ·  Problem: Palpitations  ·  Plan:  OP monitor to r/o occult arrythmia  - Tele     Problem/Plan - 3:  ·  Problem: HLD (hyperlipidemia).   ·  Plan: Continue Statin.     Problem/Plan - 4:  ·  Problem: Need for prophylactic measure.   ·  Plan: DVT PPx: Heparin subq        No further inpt cardiac work up, OP follow up          Stefan Thomas DO [facility]  Cardiovascular Medicine  [address] [place] [address], [address]  Office: [phone]  Available via Text/call on Microsoft Teams

## 2024-12-23 NOTE — PROGRESS NOTE ADULT - SUBJECTIVE AND OBJECTIVE BOX
Patient seen and examined  no complaints    IV Contrast (Anaphylaxis)  Shrimp (Hives)  smoke; coughing (Other)  shellfish (Hives; Rash)    Hospital Medications:   MEDICATIONS  (STANDING):  atorvastatin 40 milliGRAM(s) Oral at bedtime  dextrose 5%. 1000 milliLiter(s) (50 mL/Hr) IV Continuous <Continuous>  dextrose 5%. 1000 milliLiter(s) (100 mL/Hr) IV Continuous <Continuous>  dextrose 50% Injectable 25 Gram(s) IV Push once  dextrose 50% Injectable 12.5 Gram(s) IV Push once  dextrose 50% Injectable 25 Gram(s) IV Push once  glucagon  Injectable 1 milliGRAM(s) IntraMuscular once  heparin   Injectable 5000 Unit(s) SubCutaneous every 12 hours  influenza  Vaccine (HIGH DOSE) 0.5 milliLiter(s) IntraMuscular once  insulin glargine Injectable (LANTUS) 20 Unit(s) SubCutaneous at bedtime  insulin lispro (ADMELOG) corrective regimen sliding scale   SubCutaneous three times a day before meals  midodrine. 10 milliGRAM(s) Oral <User Schedule>  sevelamer carbonate 1600 milliGRAM(s) Oral three times a day with meals        VITALS:  T(F): 98 (12-23-24 @ 13:06), Max: 98.6 (12-23-24 @ 00:09)  HR: 76 (12-23-24 @ 13:06)  BP: 132/75 (12-23-24 @ 13:06)  RR: 18 (12-23-24 @ 13:06)  SpO2: 98% (12-23-24 @ 13:06)  Wt(kg): --    12-22 @ 07:01  -  12-23 @ 07:00  --------------------------------------------------------  IN: 240 mL / OUT: 2000 mL / NET: -1760 mL      PHYSICAL EXAM:  Constitutional: NAD  HEENT: anicteric sclera, oropharynx clear, MMM  Neck: supple.   Respiratory: Bilateral equal breath sounds , no wheezes, no crackles  Cardiovascular: S1, S2, Regular  Gastrointestinal: Bowel Sound present, soft, NT/ND  Extremities: No peripheral edema  Neurological: Alert and oriented x 3  Psychiatric: Normal mood, normal affect  L AVF    LABS:  12-23    134[L]  |  93[L]  |  40[H]  ----------------------------<  128[H]  4.2   |  25  |  6.88[H]    Ca    10.0      23 Dec 2024 09:31  Phos  4.0     12-23  Mg     2.2     12-23      Creatinine Trend: 6.88 <--, 8.55 <--, 6.31 <--, 6.98 <--                        10.5   4.92  )-----------( 150      ( 22 Dec 2024 08:40 )             33.9     Urine Studies:  Urinalysis Basic - ( 23 Dec 2024 09:31 )    Color:  / Appearance:  / SG:  / pH:   Gluc: 128 mg/dL / Ketone:   / Bili:  / Urobili:    Blood:  / Protein:  / Nitrite:    Leuk Esterase:  / RBC:  / WBC    Sq Epi:  / Non Sq Epi:  / Bacteria:         RADIOLOGY & ADDITIONAL STUDIES:

## 2024-12-23 NOTE — PROGRESS NOTE ADULT - SUBJECTIVE AND OBJECTIVE BOX
Angelica Pizarro MD  [facility] Division of Hospital Medicine    SUBJECTIVE / OVERNIGHT EVENTS:  - no events overnight, no n/v/abd pain/cough/chest pain. seen walking with a walker with assistance. states she still has some palpitations and sob.     MEDICATIONS  (STANDING):  atorvastatin 40 milliGRAM(s) Oral at bedtime  dextrose 5%. 1000 milliLiter(s) (50 mL/Hr) IV Continuous <Continuous>  dextrose 5%. 1000 milliLiter(s) (100 mL/Hr) IV Continuous <Continuous>  dextrose 50% Injectable 25 Gram(s) IV Push once  dextrose 50% Injectable 12.5 Gram(s) IV Push once  dextrose 50% Injectable 25 Gram(s) IV Push once  glucagon  Injectable 1 milliGRAM(s) IntraMuscular once  heparin   Injectable 5000 Unit(s) SubCutaneous every 12 hours  influenza  Vaccine (HIGH DOSE) 0.5 milliLiter(s) IntraMuscular once  insulin glargine Injectable (LANTUS) 20 Unit(s) SubCutaneous at bedtime  insulin lispro (ADMELOG) corrective regimen sliding scale   SubCutaneous three times a day before meals  midodrine. 10 milliGRAM(s) Oral <User Schedule>  sevelamer carbonate 1600 milliGRAM(s) Oral three times a day with meals    MEDICATIONS  (PRN):  acetaminophen     Tablet .. 650 milliGRAM(s) Oral every 6 hours PRN Temp greater or equal to 38C (100.4F), Mild Pain (1 - 3)  dextrose Oral Gel 15 Gram(s) Oral once PRN Blood Glucose LESS THAN 70 milliGRAM(s)/deciliter  melatonin 3 milliGRAM(s) Oral at bedtime PRN Insomnia  ondansetron Injectable 4 milliGRAM(s) IV Push every 8 hours PRN Nausea and/or Vomiting      I&O's Summary    22 Dec 2024 07:01  -  23 Dec 2024 07:00  --------------------------------------------------------  IN: 240 mL / OUT: 2000 mL / NET: -1760 mL        PHYSICAL EXAM:  Vital Signs Last 24 Hrs  T(C): 36.7 (23 Dec 2024 13:06), Max: 37 (23 Dec 2024 00:09)  T(F): 98 (23 Dec 2024 13:06), Max: 98.6 (23 Dec 2024 00:09)  HR: 76 (23 Dec 2024 13:06) (70 - 86)  BP: 132/75 (23 Dec 2024 13:06) (106/65 - 144/83)  BP(mean): --  RR: 18 (23 Dec 2024 13:06) (18 - 18)  SpO2: 98% (23 Dec 2024 13:06) (96% - 98%)    Parameters below as of 23 Dec 2024 05:06  Patient On (Oxygen Delivery Method): BiPAP/CPAP      Physical Examination:  GEN: elderly woman, laying in bed in NAD  PSYCH: A&Ox3, mood and affect appear appropriate   NEURO: no focal neurologic deficits appreciated  RESPI: no accessory muscle use, B/L air entry   CARDIO: regular rate/rhythm, no LE edema B/L  ABD: soft, NT, ND  EXT: patient able to move all extremities spontaneously, Lt AVF  VASC: peripheral pulses palpated    LABS:                        10.5   4.92  )-----------( 150      ( 22 Dec 2024 08:40 )             33.9     12-23    134[L]  |  93[L]  |  40[H]  ----------------------------<  128[H]  4.2   |  25  |  6.88[H]    Ca    10.0      23 Dec 2024 09:31  Phos  4.0     12-23  Mg     2.2     12-23            Urinalysis Basic - ( 23 Dec 2024 09:31 )    Color: x / Appearance: x / SG: x / pH: x  Gluc: 128 mg/dL / Ketone: x  / Bili: x / Urobili: x   Blood: x / Protein: x / Nitrite: x   Leuk Esterase: x / RBC: x / WBC x   Sq Epi: x / Non Sq Epi: x / Bacteria: x

## 2024-12-24 ENCOUNTER — TRANSCRIPTION ENCOUNTER (OUTPATIENT)
Age: 79
End: 2024-12-24

## 2024-12-24 LAB
ANION GAP SERPL CALC-SCNC: 17 MMOL/L — SIGNIFICANT CHANGE UP (ref 5–17)
BASOPHILS # BLD AUTO: 0.01 K/UL — SIGNIFICANT CHANGE UP (ref 0–0.2)
BASOPHILS NFR BLD AUTO: 0.2 % — SIGNIFICANT CHANGE UP (ref 0–2)
BUN SERPL-MCNC: 62 MG/DL — HIGH (ref 7–23)
CALCIUM SERPL-MCNC: 9.9 MG/DL — SIGNIFICANT CHANGE UP (ref 8.4–10.5)
CHLORIDE SERPL-SCNC: 92 MMOL/L — LOW (ref 96–108)
CO2 SERPL-SCNC: 23 MMOL/L — SIGNIFICANT CHANGE UP (ref 22–31)
CREAT SERPL-MCNC: 8.5 MG/DL — HIGH (ref 0.5–1.3)
EGFR: 4 ML/MIN/1.73M2 — LOW
EOSINOPHIL # BLD AUTO: 0.08 K/UL — SIGNIFICANT CHANGE UP (ref 0–0.5)
EOSINOPHIL NFR BLD AUTO: 1.5 % — SIGNIFICANT CHANGE UP (ref 0–6)
GLUCOSE BLDC GLUCOMTR-MCNC: 110 MG/DL — HIGH (ref 70–99)
GLUCOSE BLDC GLUCOMTR-MCNC: 169 MG/DL — HIGH (ref 70–99)
GLUCOSE BLDC GLUCOMTR-MCNC: 196 MG/DL — HIGH (ref 70–99)
GLUCOSE BLDC GLUCOMTR-MCNC: 91 MG/DL — SIGNIFICANT CHANGE UP (ref 70–99)
GLUCOSE SERPL-MCNC: 102 MG/DL — HIGH (ref 70–99)
HCT VFR BLD CALC: 31.5 % — LOW (ref 34.5–45)
HGB BLD-MCNC: 9.9 G/DL — LOW (ref 11.5–15.5)
IMM GRANULOCYTES NFR BLD AUTO: 0.6 % — SIGNIFICANT CHANGE UP (ref 0–0.9)
LYMPHOCYTES # BLD AUTO: 1.73 K/UL — SIGNIFICANT CHANGE UP (ref 1–3.3)
LYMPHOCYTES # BLD AUTO: 33.3 % — SIGNIFICANT CHANGE UP (ref 13–44)
MAGNESIUM SERPL-MCNC: 2.3 MG/DL — SIGNIFICANT CHANGE UP (ref 1.6–2.6)
MCHC RBC-ENTMCNC: 28 PG — SIGNIFICANT CHANGE UP (ref 27–34)
MCHC RBC-ENTMCNC: 31.4 G/DL — LOW (ref 32–36)
MCV RBC AUTO: 89 FL — SIGNIFICANT CHANGE UP (ref 80–100)
MONOCYTES # BLD AUTO: 0.79 K/UL — SIGNIFICANT CHANGE UP (ref 0–0.9)
MONOCYTES NFR BLD AUTO: 15.2 % — HIGH (ref 2–14)
NEUTROPHILS # BLD AUTO: 2.55 K/UL — SIGNIFICANT CHANGE UP (ref 1.8–7.4)
NEUTROPHILS NFR BLD AUTO: 49.2 % — SIGNIFICANT CHANGE UP (ref 43–77)
NRBC # BLD: 0 /100 WBCS — SIGNIFICANT CHANGE UP (ref 0–0)
PHOSPHATE SERPL-MCNC: 4.3 MG/DL — SIGNIFICANT CHANGE UP (ref 2.5–4.5)
PLATELET # BLD AUTO: 164 K/UL — SIGNIFICANT CHANGE UP (ref 150–400)
POTASSIUM SERPL-MCNC: 4.7 MMOL/L — SIGNIFICANT CHANGE UP (ref 3.5–5.3)
POTASSIUM SERPL-SCNC: 4.7 MMOL/L — SIGNIFICANT CHANGE UP (ref 3.5–5.3)
RBC # BLD: 3.54 M/UL — LOW (ref 3.8–5.2)
RBC # FLD: 19.2 % — HIGH (ref 10.3–14.5)
SODIUM SERPL-SCNC: 132 MMOL/L — LOW (ref 135–145)
WBC # BLD: 5.19 K/UL — SIGNIFICANT CHANGE UP (ref 3.8–10.5)
WBC # FLD AUTO: 5.19 K/UL — SIGNIFICANT CHANGE UP (ref 3.8–10.5)

## 2024-12-24 PROCEDURE — 99232 SBSQ HOSP IP/OBS MODERATE 35: CPT

## 2024-12-24 RX ADMIN — INSULIN GLARGINE-YFGN 20 UNIT(S): 100 INJECTION, SOLUTION SUBCUTANEOUS at 21:49

## 2024-12-24 RX ADMIN — ATORVASTATIN CALCIUM 40 MILLIGRAM(S): 40 TABLET, FILM COATED ORAL at 21:49

## 2024-12-24 RX ADMIN — Medication 1: at 12:09

## 2024-12-24 RX ADMIN — Medication 1: at 17:36

## 2024-12-24 RX ADMIN — SEVELAMER CARBONATE 1600 MILLIGRAM(S): 800 TABLET, FILM COATED ORAL at 17:37

## 2024-12-24 RX ADMIN — SEVELAMER CARBONATE 1600 MILLIGRAM(S): 800 TABLET, FILM COATED ORAL at 12:09

## 2024-12-24 RX ADMIN — HEPARIN SODIUM 5000 UNIT(S): 1000 INJECTION, SOLUTION INTRAVENOUS; SUBCUTANEOUS at 05:12

## 2024-12-24 RX ADMIN — HEPARIN SODIUM 5000 UNIT(S): 1000 INJECTION, SOLUTION INTRAVENOUS; SUBCUTANEOUS at 17:37

## 2024-12-24 RX ADMIN — SEVELAMER CARBONATE 1600 MILLIGRAM(S): 800 TABLET, FILM COATED ORAL at 10:31

## 2024-12-24 RX ADMIN — MIDODRINE HYDROCHLORIDE 10 MILLIGRAM(S): 5 TABLET ORAL at 11:38

## 2024-12-24 NOTE — PROGRESS NOTE ADULT - SUBJECTIVE AND OBJECTIVE BOX
Angelica Pizarro MD  [facility] Division of Hospital Medicine    SUBJECTIVE / OVERNIGHT EVENTS:  - no events overnight, seen at the end of the dialysis session, no changes overnight. no n/v/abd pain/cough.     MEDICATIONS  (STANDING):  atorvastatin 40 milliGRAM(s) Oral at bedtime  dextrose 5%. 1000 milliLiter(s) (50 mL/Hr) IV Continuous <Continuous>  dextrose 5%. 1000 milliLiter(s) (100 mL/Hr) IV Continuous <Continuous>  dextrose 50% Injectable 25 Gram(s) IV Push once  dextrose 50% Injectable 12.5 Gram(s) IV Push once  dextrose 50% Injectable 25 Gram(s) IV Push once  glucagon  Injectable 1 milliGRAM(s) IntraMuscular once  heparin   Injectable 5000 Unit(s) SubCutaneous every 12 hours  influenza  Vaccine (HIGH DOSE) 0.5 milliLiter(s) IntraMuscular once  insulin glargine Injectable (LANTUS) 20 Unit(s) SubCutaneous at bedtime  insulin lispro (ADMELOG) corrective regimen sliding scale   SubCutaneous three times a day before meals  midodrine. 10 milliGRAM(s) Oral <User Schedule>  sevelamer carbonate 1600 milliGRAM(s) Oral three times a day with meals    MEDICATIONS  (PRN):  acetaminophen     Tablet .. 650 milliGRAM(s) Oral every 6 hours PRN Temp greater or equal to 38C (100.4F), Mild Pain (1 - 3)  dextrose Oral Gel 15 Gram(s) Oral once PRN Blood Glucose LESS THAN 70 milliGRAM(s)/deciliter  melatonin 3 milliGRAM(s) Oral at bedtime PRN Insomnia  ondansetron Injectable 4 milliGRAM(s) IV Push every 8 hours PRN Nausea and/or Vomiting      I&O's Summary    23 Dec 2024 07:01  -  24 Dec 2024 07:00  --------------------------------------------------------  IN: 120 mL / OUT: 0 mL / NET: 120 mL    24 Dec 2024 07:01  -  24 Dec 2024 15:05  --------------------------------------------------------  IN: 1300 mL / OUT: 3100 mL / NET: -1800 mL        PHYSICAL EXAM:  Vital Signs Last 24 Hrs  T(C): 36.7 (24 Dec 2024 12:33), Max: 37.2 (23 Dec 2024 21:12)  T(F): 98 (24 Dec 2024 12:33), Max: 98.9 (23 Dec 2024 21:12)  HR: 70 (24 Dec 2024 12:33) (70 - 94)  BP: 115/65 (24 Dec 2024 12:33) (102/57 - 115/65)  BP(mean): --  RR: 18 (24 Dec 2024 12:33) (18 - 18)  SpO2: 95% (24 Dec 2024 12:33) (95% - 100%)    Parameters below as of 24 Dec 2024 12:33  Patient On (Oxygen Delivery Method): room air      Physical Examination:  GEN: elderly woman, laying in bed in NAD  PSYCH: A&Ox3, mood and affect appear appropriate   NEURO: no focal neurologic deficits appreciated  RESPI: no accessory muscle use, B/L air entry   CARDIO: regular rate/rhythm, no LE edema B/L  ABD: soft, NT, ND  EXT: patient able to move all extremities spontaneously, Lt AVF  VASC: peripheral pulses palpated  LABS:                        9.9    5.19  )-----------( 164      ( 24 Dec 2024 09:37 )             31.5     12-24    132[L]  |  92[L]  |  62[H]  ----------------------------<  102[H]  4.7   |  23  |  8.50[H]    Ca    9.9      24 Dec 2024 09:37  Phos  4.3     12-24  Mg     2.3     12-24            Urinalysis Basic - ( 24 Dec 2024 09:37 )    Color: x / Appearance: x / SG: x / pH: x  Gluc: 102 mg/dL / Ketone: x  / Bili: x / Urobili: x   Blood: x / Protein: x / Nitrite: x   Leuk Esterase: x / RBC: x / WBC x   Sq Epi: x / Non Sq Epi: x / Bacteria: x

## 2024-12-24 NOTE — DISCHARGE NOTE PROVIDER - NSDCCPCAREPLAN_GEN_ALL_CORE_FT
PRINCIPAL DISCHARGE DIAGNOSIS  Diagnosis: Generalized weakness  Assessment and Plan of Treatment: Work up unremarkable.   Continue with midodrine prior to HD sessions as prescribed.      SECONDARY DISCHARGE DIAGNOSES  Diagnosis: LLQ pain  Assessment and Plan of Treatment: Resolved.  No further diarrhea.  CT abd/pelvis with diverticulosis WITHOUT diverticulitis.    Diagnosis: Volume overload  Assessment and Plan of Treatment: Continue with dialysis as ordered.    You had dialysis today 12/24 and plan to resume this Friday 12/27/24    Diagnosis: Chest pain  Assessment and Plan of Treatment: No changes on EKG.    Unable to tolerate stress test.  Refused angiogram.  Continued follow up as outpatient with your cardiologist.     PRINCIPAL DISCHARGE DIAGNOSIS  Diagnosis: Generalized weakness  Assessment and Plan of Treatment: Work up unremarkable.   Continue with midodrine prior to HD sessions as prescribed.      SECONDARY DISCHARGE DIAGNOSES  Diagnosis: Volume overload  Assessment and Plan of Treatment: Continue with dialysis as ordered.    You had dialysis today 12/27 and plan to continue again on 12/29/24    Diagnosis: LLQ pain  Assessment and Plan of Treatment: Resolved.  No further diarrhea.  CT abd/pelvis with diverticulosis WITHOUT diverticulitis.    Diagnosis: Chest pain  Assessment and Plan of Treatment: No changes on EKG.    Unable to tolerate stress test.  Refused angiogram.  Continued follow up as outpatient with your cardiologist.

## 2024-12-24 NOTE — DISCHARGE NOTE PROVIDER - HOSPITAL COURSE
HPI:  79 yr old Female with past medical history of HD MWF Left arm AV fistula, HTN, HLD, T2DM, JARVIS on CPAP who presents with generalized weakness of 1 week duration. Patient was started on blood pressure medications recently and felt weakness after dialysis on Monday. She otherwise continued HD on Wednesday without issue but the generalized weakness has been getting worse. She denies any specific signs of symptoms. She also endorses some diffuse abdominal pain in her LLQ. She complains of episode of nonbloody diarrhea last week.  She denies any urinary symptoms.  (20 Dec 2024 11:55)    Hospital Course:  The etiology of the patient's weakness was not definitively determined. TSH was normal. Midodrine was ordered for use prior to dialysis sessions, as per her outpatient regimen.  The patient experienced intermittent chest pain. Troponins were elevated, likely related to her ESRD, but did not show a significant uptrend. EKG showed no ST/T changes. An echocardiogram was performed but a stress echo could not be completed due to her inability to raise her hands. Cardiology offered cardiac cath however the patient and daughter declined.  No further cardiac work up inpatient.  To follow up with her cardiologist as outpatient.  LLQ Pain/Diarrhea: CT abdomen/pelvis revealed diverticulosis without diverticulitis. A GI PCR was ordered to evaluate for infectious causes of diarrhea. The patient was monitored for fever and abdominal pain, and antibiotics were held given the lack of evidence of infection.    Important Medication Changes and Reason:  n/a  Active or Pending Issues Requiring Follow-up:    Advanced Directives:   [X] Full code  [ ] DNR  [ ] Hospice    Discharge Diagnoses:  End-stage renal disease (ESRD) on hemodialysis  Type 2 diabetes mellitus (T2DM)  Hyperlipidemia (HLD)  Hypertension (HTN)  Generalized weakness       79yoF w/ PMHx of ESRD MWF HTN, HLD, T2DM, JARVIS on CPAP who presents for generalized weakness and intermittent CP.     #Generalized weakness.   - Pt with generalized weakness x1 week  - Complaint of chest pain, management as below   - Midodrine ordered for prior dialysis per history  - CT abdomen/pelvis shows Diverticulosis without Diverticulitis  - WBC wnl, no fevers, hold off any antibiotics for now.  - PT recs [facility] but family wants to take her home - home supplies to be installed 12/27. has bed in [facility] as an alternative dispo plan.     #Angina pectoris.   - Pt with intermittent chest pain  - Trend troponins - elevated iso ESRD but not uptrending significantly   - echo done 12/23, could not do stress echo - f/u cards recs regarding possible cath   - No st/t changes on EKG   - Cardiology consulted - family does not want cath, no further workup at this time.    #LLQ pain.   - CT Abdomen/pelvis shows diverticulosis no diverticulitis  - Monitor for fevers  - Had diarrhea per staff, now resolved      #ESRD on dialysis.   - Nephrology consulted to continue HD  - Avoid nephrotoxic agents.  - SW consult for re-establishment of dialysis on discharge.  - plan for HD 12/27 and then outpatient HD 12/29    Important Medication Changes and Reason:  n/a    Active or Pending Issues Requiring Follow-up:  none    Advanced Directives:   [X] Full code  [ ] DNR  [ ] Hospice    Discharge Diagnoses:  End-stage renal disease (ESRD) on hemodialysis  Type 2 diabetes mellitus (T2DM)  Hyperlipidemia (HLD)  Hypertension (HTN)  Generalized weakness       HPI:  79 yr old Female with past medical history of HD MWF Left arm AV fistula, HTN, HLD, T2DM, JARVIS on CPAP who presents with generalized weakness of 1 week duration. Patient was started on blood pressure medications recently and felt weakness after dialysis on Monday. She otherwise continued HD on Wednesday without issue but the generalized weakness has been getting worse. She denies any specific signs of symptoms. She also endorses some diffuse abdominal pain in her LLQ. She complains of episode of nonbloody diarrhea last week.  She denies any urinary symptoms.  (20 Dec 2024 11:55)    Hospital Course:  Patient was admitted for generalized weakness. Patient had elevated troponins with angina, likely elevated in the setting of ESRD but not uptrending significantly. Cardiology consulted, family declined cardiac cath and no further workup was recommended. Patient had some LLQ pain, CT abdomen/pelvis showed Diverticulosis without Diverticulitis and diarrhea resolved without interventions. Physical therapy evaluation recommended subacute rehab, however family wanted to take home and home supplies installed by 12/27, with rehab as alternative safe dispo plan.    Patient is medically cleared for discharge. Medication reconciliation reviewed, revised, and resolved with Dr. Pizarro who had medically cleared patient for discharge with follow-up as advised. Patient is currently stable for discharge to home with home PT at this time.    Important Medication Changes and Reason:  Please see medication reconciliation for any medication changes    Active or Pending Issues Requiring Follow-up:  - PCP    Advanced Directives:   [x] Full code  [ ] DNR  [ ] Hospice    Discharge Diagnoses:  End-stage renal disease (ESRD) on hemodialysis  Type 2 diabetes mellitus (T2DM)  Hyperlipidemia (HLD)  Hypertension (HTN)  Generalized weakness       HPI:  79 yr old Female with past medical history of HD MWF Left arm AV fistula, HTN, HLD, T2DM, JARVIS on CPAP who presents with generalized weakness of 1 week duration. Patient was started on blood pressure medications recently and felt weakness after dialysis on Monday. She otherwise continued HD on Wednesday without issue but the generalized weakness has been getting worse. She denies any specific signs of symptoms. She also endorses some diffuse abdominal pain in her LLQ. She complains of episode of nonbloody diarrhea last week.  She denies any urinary symptoms.  (20 Dec 2024 11:55)    Hospital Course:  Patient was admitted for generalized weakness. Patient had elevated troponins with angina, likely elevated in the setting of ESRD but not uptrending significantly. Cardiology consulted, family declined cardiac cath and no further workup was recommended. Patient had some LLQ pain, CT abdomen/pelvis showed Diverticulosis without Diverticulitis and diarrhea resolved without interventions. Physical therapy evaluation recommended subacute rehab, however family wanted to take home and home supplies installed by 12/27, with rehab as alternative safe dispo plan.    Patient is medically cleared for discharge. Medication reconciliation reviewed, revised, and resolved with Dr. Pizarro who had medically cleared patient for discharge with follow-up as advised. Patient is currently stable for discharge to home with home PT at this time.    Important Medication Changes and Reason:  Please see medication reconciliation for any medication changes    Active or Pending Issues Requiring Follow-up:  - PCP    Advanced Directives:   [x] Full code  [ ] DNR  [ ] Hospice    Discharge Diagnoses:  End-stage renal disease (ESRD) on hemodialysis  Angina Pectoris   Type 2 diabetes mellitus (T2DM)  Hyperlipidemia (HLD)  Hypertension (HTN)  Generalized weakness

## 2024-12-24 NOTE — DISCHARGE NOTE PROVIDER - ATTENDING DISCHARGE PHYSICAL EXAMINATION:
GEN: elderly woman, in dialysis   PSYCH: A&Ox3, mood and affect appear appropriate   NEURO: no focal neurologic deficits appreciated  RESPI: no accessory muscle use, B/L air entry   CARDIO: regular rate/rhythm, no LE edema B/L  ABD: soft, NT, ND  EXT: patient able to move all extremities spontaneously, Lt AVF

## 2024-12-24 NOTE — DISCHARGE NOTE PROVIDER - PROVIDER TOKENS
PROVIDER:[TOKEN:[[id number]],FOLLOWUP:[1 week]] PROVIDER:[TOKEN:[[id number]],FOLLOWUP:[1 week]],PROVIDER:[TOKEN:[[id number]],FOLLOWUP:[2 weeks]]

## 2024-12-24 NOTE — PROGRESS NOTE ADULT - SUBJECTIVE AND OBJECTIVE BOX
DATE OF SERVICE: 12-24-24 @ 10:00    Patient is a 79y old  Female who presents with a chief complaint of Weakness (23 Dec 2024 17:58)      INTERVAL HISTORY: in no acute distress    REVIEW OF SYSTEMS:  CONSTITUTIONAL: No weakness  EYES/ENT: No visual changes;  No throat pain   NECK: No pain or stiffness  RESPIRATORY: No cough, wheezing; No shortness of breath  CARDIOVASCULAR: No chest pain or palpitations  GASTROINTESTINAL: No abdominal  pain. No nausea, vomiting, or hematemesis  GENITOURINARY: No dysuria, frequency or hematuria  NEUROLOGICAL: No stroke like symptoms  SKIN: No rashes    TELEMETRY Personally reviewed: sinus 60-70bpm  	  MEDICATIONS:  midodrine. 10 milliGRAM(s) Oral <User Schedule>        PHYSICAL EXAM:  T(C): 36.2 (12-24-24 @ 07:30), Max: 37.2 (12-23-24 @ 21:12)  HR: 77 (12-24-24 @ 07:30) (76 - 94)  BP: 112/61 (12-24-24 @ 07:30) (107/62 - 132/75)  RR: 18 (12-24-24 @ 07:30) (18 - 18)  SpO2: 96% (12-24-24 @ 07:30) (96% - 100%)  Wt(kg): --  I&O's Summary    23 Dec 2024 07:01  -  24 Dec 2024 07:00  --------------------------------------------------------  IN: 120 mL / OUT: 0 mL / NET: 120 mL          Appearance: In no distress	  HEENT:    PERRL, EOMI	  Cardiovascular:  S1 S2, No JVD  Respiratory: Lungs clear to auscultation	  Gastrointestinal:  Soft, Non-tender, + BS	  Vascularature:  No edema of LE  Psychiatric: Appropriate affect   Neuro: no acute focal deficits                               9.9    5.19  )-----------( 164      ( 24 Dec 2024 09:37 )             31.5     12-23    134[L]  |  93[L]  |  40[H]  ----------------------------<  128[H]  4.2   |  25  |  6.88[H]    Ca    10.0      23 Dec 2024 09:31  Phos  4.0     12-23  Mg     2.2     12-23          Labs personally reviewed    < from: TTE W or WO Ultrasound Enhancing Agent (12.23.24 @ 10:09) >  CONCLUSIONS:      1. Left ventricular cavity is normal in size. Left ventricular wall thickness is normal. Left ventricular systolic function is normal with an ejection fraction of 65 % by Cooper's method of disks. There are no regional wall motion abnormalities seen.   2. Normal left ventricular diastolic function, with normal left ventricular filling pressure.   3. Normal right ventricular cavity size, with normal wall thickness, and normal right ventricular systolic function.   4. Mild aortic stenosis.   5. No pericardial effusion seen.   6. Technically difficult image quality.      ASSESSMENT/PLAN: 	    79yoF w/ PMHx of ESRD MWF HTN, HLD, T2DM, JARVIS on CPAP who presents for generalized weakness and intermittent CP.          Problem/Plan - 1:  ·  Problem: Chest pain  ·  Plan: Atypical chest pain  Trop elevation nonspecific iso ESRD but not uptrending and chronically elevated dating back to 2021  TTE 12/23 - EF 65%, no WMA, normal filling pressure, no pericardial effusion, mild aortic stenosis   EKG nonischemic   NM stress test ordered but unable to complete as she is unable to lift her arm  D/w pt and daughter option of cath instead, they both decline     Problem/Plan - 2:  ·  Problem: Palpitations  ·  Plan:  OP monitor to r/o occult arrythmia  - Tele     Problem/Plan - 3:  ·  Problem: HLD (hyperlipidemia).   ·  Plan: Continue Statin.     Problem/Plan - 4:  ·  Problem: Need for prophylactic measure.   ·  Plan: DVT PPx: Heparin subq        No further inpt cardiac work up, OP follow up            TUCKER Miranda,  [facility]  Cardiovascular Medicine  [address], [address]  Available through call or text on [employer] TEAM[employer]  Office: [phone]     DATE OF SERVICE: 12-24-24 @ 10:00    Patient is a 79y old  Female who presents with a chief complaint of Weakness (23 Dec 2024 17:58)      INTERVAL HISTORY: in no acute distress, but reports some palpitations & weakness    REVIEW OF SYSTEMS:  CONSTITUTIONAL: No weakness  EYES/ENT: No visual changes;  No throat pain   NECK: No pain or stiffness  RESPIRATORY: No cough, wheezing; + shortness of breath (states it's better)   CARDIOVASCULAR: No chest pain, + palpitations  GASTROINTESTINAL: No abdominal  pain. No nausea, vomiting, or hematemesis  GENITOURINARY: No dysuria, frequency or hematuria  NEUROLOGICAL: No stroke like symptoms  SKIN: No rashes    TELEMETRY Personally reviewed: morning sinus 60-70bpm, later in day sinus 70-90, no events  	  MEDICATIONS:  midodrine. 10 milliGRAM(s) Oral <User Schedule>        PHYSICAL EXAM:  T(C): 36.2 (12-24-24 @ 07:30), Max: 37.2 (12-23-24 @ 21:12)  HR: 77 (12-24-24 @ 07:30) (76 - 94)  BP: 112/61 (12-24-24 @ 07:30) (107/62 - 132/75)  RR: 18 (12-24-24 @ 07:30) (18 - 18)  SpO2: 96% (12-24-24 @ 07:30) (96% - 100%)  Wt(kg): --  I&O's Summary    23 Dec 2024 07:01  -  24 Dec 2024 07:00  --------------------------------------------------------  IN: 120 mL / OUT: 0 mL / NET: 120 mL          Appearance: In no distress	  HEENT:    PERRL, EOMI	  Cardiovascular:  S1 S2, No JVD  Respiratory: Lungs clear to auscultation	  Gastrointestinal:  Soft, Non-tender, + BS	  Vascularature:  No edema of LE  Psychiatric: Appropriate affect   Neuro: no acute focal deficits                               9.9    5.19  )-----------( 164      ( 24 Dec 2024 09:37 )             31.5     12-23    134[L]  |  93[L]  |  40[H]  ----------------------------<  128[H]  4.2   |  25  |  6.88[H]    Ca    10.0      23 Dec 2024 09:31  Phos  4.0     12-23  Mg     2.2     12-23          Labs personally reviewed    < from: TTE W or WO Ultrasound Enhancing Agent (12.23.24 @ 10:09) >  CONCLUSIONS:      1. Left ventricular cavity is normal in size. Left ventricular wall thickness is normal. Left ventricular systolic function is normal with an ejection fraction of 65 % by Cooper's method of disks. There are no regional wall motion abnormalities seen.   2. Normal left ventricular diastolic function, with normal left ventricular filling pressure.   3. Normal right ventricular cavity size, with normal wall thickness, and normal right ventricular systolic function.   4. Mild aortic stenosis.   5. No pericardial effusion seen.   6. Technically difficult image quality.      ASSESSMENT/PLAN: 	    79yoF w/ PMHx of ESRD MWF HTN, HLD, T2DM, JARVIS on CPAP who presents for generalized weakness and intermittent CP.          Problem/Plan - 1:  ·  Problem: Chest pain  ·  Plan: Atypical chest pain  Trop elevation nonspecific iso ESRD but not uptrending and chronically elevated dating back to 2021  TTE 12/23 - EF 65%, no WMA, normal filling pressure, no pericardial effusion, mild aortic stenosis   EKG nonischemic   NM stress test ordered but unable to complete as she is unable to lift her arm  D/w pt and daughter option of cath instead, they both decline     Problem/Plan - 2:  ·  Problem: Palpitations  ·  Plan:  OP monitor to r/o occult arrythmia  - Tele     Problem/Plan - 3:  ·  Problem: HLD (hyperlipidemia).   ·  Plan: Continue Statin.     Problem/Plan - 4:  ·  Problem: Need for prophylactic measure.   ·  Plan: DVT PPx: Heparin subq      No further inpt cardiac work up, OP follow up            TUCKER Miranda, DO [facility]  Cardiovascular Medicine  [address], [address]  Available through call or text on [employer] TEAM[employer]  Office: [phone]     DATE OF SERVICE: 12-24-24 @ 10:00    Patient is a 79y old  Female who presents with a chief complaint of Weakness (23 Dec 2024 17:58)      INTERVAL HISTORY: in no acute distress, but reports some palpitations & weakness    REVIEW OF SYSTEMS:  CONSTITUTIONAL: No weakness  EYES/ENT: No visual changes;  No throat pain   NECK: No pain or stiffness  RESPIRATORY: No cough, wheezing; + shortness of breath (states it's better)   CARDIOVASCULAR: No chest pain, + palpitations  GASTROINTESTINAL: No abdominal  pain. No nausea, vomiting, or hematemesis  GENITOURINARY: No dysuria, frequency or hematuria  NEUROLOGICAL: No stroke like symptoms  SKIN: No rashes    TELEMETRY Personally reviewed: morning sinus 60-70bpm, later in day sinus 70-90, no events  	  MEDICATIONS:  midodrine. 10 milliGRAM(s) Oral <User Schedule>        PHYSICAL EXAM:  T(C): 36.2 (12-24-24 @ 07:30), Max: 37.2 (12-23-24 @ 21:12)  HR: 77 (12-24-24 @ 07:30) (76 - 94)  BP: 112/61 (12-24-24 @ 07:30) (107/62 - 132/75)  RR: 18 (12-24-24 @ 07:30) (18 - 18)  SpO2: 96% (12-24-24 @ 07:30) (96% - 100%)  Wt(kg): --  I&O's Summary    23 Dec 2024 07:01  -  24 Dec 2024 07:00  --------------------------------------------------------  IN: 120 mL / OUT: 0 mL / NET: 120 mL          Appearance: In no distress	  HEENT:    PERRL, EOMI	  Cardiovascular:  S1 S2, No JVD  Respiratory: Lungs clear to auscultation	  Gastrointestinal:  Soft, Non-tender, + BS	  Vascularature:  No edema of LE  Psychiatric: Appropriate affect   Neuro: no acute focal deficits                               9.9    5.19  )-----------( 164      ( 24 Dec 2024 09:37 )             31.5     12-23    134[L]  |  93[L]  |  40[H]  ----------------------------<  128[H]  4.2   |  25  |  6.88[H]    Ca    10.0      23 Dec 2024 09:31  Phos  4.0     12-23  Mg     2.2     12-23          Labs personally reviewed    < from: TTE W or WO Ultrasound Enhancing Agent (12.23.24 @ 10:09) >  CONCLUSIONS:      1. Left ventricular cavity is normal in size. Left ventricular wall thickness is normal. Left ventricular systolic function is normal with an ejection fraction of 65 % by Cooper's method of disks. There are no regional wall motion abnormalities seen.   2. Normal left ventricular diastolic function, with normal left ventricular filling pressure.   3. Normal right ventricular cavity size, with normal wall thickness, and normal right ventricular systolic function.   4. Mild aortic stenosis.   5. No pericardial effusion seen.   6. Technically difficult image quality.      ASSESSMENT/PLAN: 	    79yoF w/ PMHx of ESRD MWF HTN, HLD, T2DM, JARVIS on CPAP who presents for generalized weakness and intermittent CP.          Problem/Plan - 1:  ·  Problem: Chest pain  ·  Plan: Atypical chest pain  Trop elevation nonspecific iso ESRD but not uptrending and chronically elevated dating back to 2021  TTE 12/23 - EF 65%, no WMA, normal filling pressure, no pericardial effusion, mild aortic stenosis   EKG nonischemic   NM stress test ordered but unable to complete as she is unable to lift her arm  D/w pt and daughter option of cath instead, they both decline  OP follow up for testing when she can lift her arm     Problem/Plan - 2:  ·  Problem: Palpitations  ·  Plan:  OP monitor to r/o occult arrythmia  - Tele     Problem/Plan - 3:  ·  Problem: HLD (hyperlipidemia).   ·  Plan: Continue Statin.     Problem/Plan - 4:  ·  Problem: Need for prophylactic measure.   ·  Plan: DVT PPx: Heparin subq      No further inpt cardiac work up, OP follow up            TUCKER Miranda,  [facility]  Cardiovascular Medicine  [address], [address]  Available through call or text on [employer] TE[employer]  Office: [phone]

## 2024-12-24 NOTE — DISCHARGE NOTE PROVIDER - CARE PROVIDERS DIRECT ADDRESSES
,DirectAddress_Unknown ,DirectAddress_Unknown,[email or web address]

## 2024-12-24 NOTE — PROGRESS NOTE ADULT - SUBJECTIVE AND OBJECTIVE BOX
Patient seen and examined  no complaints      IV Contrast (Anaphylaxis)  Shrimp (Hives)  smoke; coughing (Other)  shellfish (Hives; Rash)    Hospital Medications:   MEDICATIONS  (STANDING):  atorvastatin 40 milliGRAM(s) Oral at bedtime  dextrose 5%. 1000 milliLiter(s) (50 mL/Hr) IV Continuous <Continuous>  dextrose 5%. 1000 milliLiter(s) (100 mL/Hr) IV Continuous <Continuous>  dextrose 50% Injectable 25 Gram(s) IV Push once  dextrose 50% Injectable 12.5 Gram(s) IV Push once  dextrose 50% Injectable 25 Gram(s) IV Push once  glucagon  Injectable 1 milliGRAM(s) IntraMuscular once  heparin   Injectable 5000 Unit(s) SubCutaneous every 12 hours  influenza  Vaccine (HIGH DOSE) 0.5 milliLiter(s) IntraMuscular once  insulin glargine Injectable (LANTUS) 20 Unit(s) SubCutaneous at bedtime  insulin lispro (ADMELOG) corrective regimen sliding scale   SubCutaneous three times a day before meals  midodrine. 10 milliGRAM(s) Oral <User Schedule>  sevelamer carbonate 1600 milliGRAM(s) Oral three times a day with meals      VITALS:  T(F): 97.2 (12-24-24 @ 07:30), Max: 98.9 (12-23-24 @ 21:12)  HR: 77 (12-24-24 @ 07:30)  BP: 112/61 (12-24-24 @ 07:30)  RR: 18 (12-24-24 @ 07:30)  SpO2: 96% (12-24-24 @ 07:30)  Wt(kg): --    12-23 @ 07:01  -  12-24 @ 07:00  --------------------------------------------------------  IN: 120 mL / OUT: 0 mL / NET: 120 mL      PHYSICAL EXAM:  Constitutional: NAD  HEENT: anicteric sclera, oropharynx clear, MMM  Neck: supple.   Respiratory: Bilateral equal breath sounds , no wheezes, no crackles  Cardiovascular: S1, S2, Regular  Gastrointestinal: Bowel Sound present, soft, NT/ND  Extremities: No peripheral edema  Neurological: Alert and oriented x 3  Psychiatric: Normal mood, normal affect  L AVF    LABS:  12-24    132[L]  |  92[L]  |  62[H]  ----------------------------<  102[H]  4.7   |  23  |  8.50[H]    Ca    9.9      24 Dec 2024 09:37  Phos  4.3     12-24  Mg     2.3     12-24      Creatinine Trend: 8.50 <--, 6.88 <--, 8.55 <--, 6.31 <--, 6.98 <--                        9.9    5.19  )-----------( 164      ( 24 Dec 2024 09:37 )             31.5     Urine Studies:  Urinalysis Basic - ( 24 Dec 2024 09:37 )    Color:  / Appearance:  / SG:  / pH:   Gluc: 102 mg/dL / Ketone:   / Bili:  / Urobili:    Blood:  / Protein:  / Nitrite:    Leuk Esterase:  / RBC:  / WBC    Sq Epi:  / Non Sq Epi:  / Bacteria:         RADIOLOGY & ADDITIONAL STUDIES:

## 2024-12-24 NOTE — DISCHARGE NOTE PROVIDER - NSDCMRMEDTOKEN_GEN_ALL_CORE_FT
atorvastatin 40 mg oral tablet: 1 tab(s) orally once a day (at bedtime)  Lantus 100 units/mL subcutaneous solution: 20 unit(s) subcutaneous once a day (at bedtime)  midodrine 10 mg oral tablet: 1 tab(s) orally Monday, Wednesday, and Friday (30 minutes before dialysis)   Amina-Lefty oral tablet: 1 tab(s) orally once a day  sevelamer carbonate 800 mg oral tablet: 2 tab(s) orally 3 times a day (with meals)   atorvastatin 40 mg oral tablet: 1 tab(s) orally once a day (at bedtime)  Lantus 100 units/mL subcutaneous solution: 20 unit(s) subcutaneous once a day (at bedtime)  midodrine 10 mg oral tablet: 1 tab(s) orally Monday, Wednesday, and Friday (30 minutes before dialysis)   Physical Therapy: Please evaluate and treat  Amina-Lefty oral tablet: 1 tab(s) orally once a day  sevelamer carbonate 800 mg oral tablet: 2 tab(s) orally 3 times a day (with meals)

## 2024-12-24 NOTE — DISCHARGE NOTE PROVIDER - CARE PROVIDER_API CALL
Alfonzo Martínez  Internal Medicine  [address] [place] [address]  Phone: [phone]  Fax: [phone]  Follow Up Time: 1 week   Alfonzo Martínez  Internal Medicine  [address] [place] [address]  Phone: [phone]  Fax: [phone]  Follow Up Time: 1 week    Stefan Thomas  Cardiovascular Disease  [address], [address]  [place] [address]  Phone: [phone]  Fax: [phone]  Follow Up Time: 2 weeks

## 2024-12-25 LAB
ANION GAP SERPL CALC-SCNC: 17 MMOL/L — SIGNIFICANT CHANGE UP (ref 5–17)
BUN SERPL-MCNC: 54 MG/DL — HIGH (ref 7–23)
CALCIUM SERPL-MCNC: 9.9 MG/DL — SIGNIFICANT CHANGE UP (ref 8.4–10.5)
CHLORIDE SERPL-SCNC: 91 MMOL/L — LOW (ref 96–108)
CO2 SERPL-SCNC: 23 MMOL/L — SIGNIFICANT CHANGE UP (ref 22–31)
CREAT SERPL-MCNC: 7.33 MG/DL — HIGH (ref 0.5–1.3)
EGFR: 5 ML/MIN/1.73M2 — LOW
GLUCOSE BLDC GLUCOMTR-MCNC: 103 MG/DL — HIGH (ref 70–99)
GLUCOSE BLDC GLUCOMTR-MCNC: 151 MG/DL — HIGH (ref 70–99)
GLUCOSE BLDC GLUCOMTR-MCNC: 152 MG/DL — HIGH (ref 70–99)
GLUCOSE BLDC GLUCOMTR-MCNC: 189 MG/DL — HIGH (ref 70–99)
GLUCOSE SERPL-MCNC: 138 MG/DL — HIGH (ref 70–99)
HCT VFR BLD CALC: 32.8 % — LOW (ref 34.5–45)
HGB BLD-MCNC: 10.4 G/DL — LOW (ref 11.5–15.5)
MAGNESIUM SERPL-MCNC: 2.2 MG/DL — SIGNIFICANT CHANGE UP (ref 1.6–2.6)
MCHC RBC-ENTMCNC: 28.4 PG — SIGNIFICANT CHANGE UP (ref 27–34)
MCHC RBC-ENTMCNC: 31.7 G/DL — LOW (ref 32–36)
MCV RBC AUTO: 89.6 FL — SIGNIFICANT CHANGE UP (ref 80–100)
NRBC # BLD: 0 /100 WBCS — SIGNIFICANT CHANGE UP (ref 0–0)
PHOSPHATE SERPL-MCNC: 4.5 MG/DL — SIGNIFICANT CHANGE UP (ref 2.5–4.5)
PLATELET # BLD AUTO: 171 K/UL — SIGNIFICANT CHANGE UP (ref 150–400)
POTASSIUM SERPL-MCNC: 4.5 MMOL/L — SIGNIFICANT CHANGE UP (ref 3.5–5.3)
POTASSIUM SERPL-SCNC: 4.5 MMOL/L — SIGNIFICANT CHANGE UP (ref 3.5–5.3)
RBC # BLD: 3.66 M/UL — LOW (ref 3.8–5.2)
RBC # FLD: 18.8 % — HIGH (ref 10.3–14.5)
SODIUM SERPL-SCNC: 131 MMOL/L — LOW (ref 135–145)
WBC # BLD: 5.12 K/UL — SIGNIFICANT CHANGE UP (ref 3.8–10.5)
WBC # FLD AUTO: 5.12 K/UL — SIGNIFICANT CHANGE UP (ref 3.8–10.5)

## 2024-12-25 PROCEDURE — 99232 SBSQ HOSP IP/OBS MODERATE 35: CPT

## 2024-12-25 RX ADMIN — Medication 1: at 08:15

## 2024-12-25 RX ADMIN — SEVELAMER CARBONATE 1600 MILLIGRAM(S): 800 TABLET, FILM COATED ORAL at 18:24

## 2024-12-25 RX ADMIN — HEPARIN SODIUM 5000 UNIT(S): 1000 INJECTION, SOLUTION INTRAVENOUS; SUBCUTANEOUS at 18:24

## 2024-12-25 RX ADMIN — HEPARIN SODIUM 5000 UNIT(S): 1000 INJECTION, SOLUTION INTRAVENOUS; SUBCUTANEOUS at 05:48

## 2024-12-25 RX ADMIN — INSULIN GLARGINE-YFGN 20 UNIT(S): 100 INJECTION, SOLUTION SUBCUTANEOUS at 21:49

## 2024-12-25 RX ADMIN — Medication 3 MILLIGRAM(S): at 21:50

## 2024-12-25 RX ADMIN — SEVELAMER CARBONATE 1600 MILLIGRAM(S): 800 TABLET, FILM COATED ORAL at 08:16

## 2024-12-25 RX ADMIN — SEVELAMER CARBONATE 1600 MILLIGRAM(S): 800 TABLET, FILM COATED ORAL at 12:35

## 2024-12-25 RX ADMIN — ATORVASTATIN CALCIUM 40 MILLIGRAM(S): 40 TABLET, FILM COATED ORAL at 21:49

## 2024-12-25 RX ADMIN — Medication 1: at 18:12

## 2024-12-25 NOTE — PROVIDER CONTACT NOTE (OTHER) - REASON
4 beats wide complex on tele, HR up tp 224 for 1 second on tele monitor while pt sleeping
patient c/o palpitations

## 2024-12-25 NOTE — PROVIDER CONTACT NOTE (OTHER) - ACTION/TREATMENT ORDERED:
DANUTA Leiva made aware and states "will review telemetry." no further interventions at this time. patient care ongoing.
ACP Stephy Grier made aware. Will continue to monitor

## 2024-12-25 NOTE — PROVIDER CONTACT NOTE (OTHER) - ASSESSMENT
patient remains AOx4, tolerating RA, VS as noted, patient states that palpitations started 5 minutes ago and they are much better now; however, still feels them. patient on telemetry monitoring with no ectopies. as per patient, pt has had similar episodes prior to event with the most recent being yesterday 12/24.

## 2024-12-25 NOTE — PROGRESS NOTE ADULT - SUBJECTIVE AND OBJECTIVE BOX
[facility] : Ans Serv [phone], Office [phone]  Dr. Smith/Dr Lockett/Dr Camacho  /Dr Garry davenport /Dr JACY Taylor/Dr Del Becker/Dr Stephon Colbert /Dr MALCOLM Kearns  _______________________________________________________________________________________________    Pt seen and examined this morning   no acute issues noted overnight     VITALS:  T(F): 98 (12-25 @ 04:38), Max: 98.9 (12-23 @ 21:12)  HR: 73 (12-25 @ 04:38)  BP: 145/75 (12-25 @ 04:38)  ABP: --  RR: 18 (12-25 @ 04:38)  SpO2: 99% (12-25 @ 04:38)    12-24 @ 07:01  -  12-25 @ 07:00  --------------------------------------------------------  IN: 1300 mL / OUT: 3100 mL / NET: -1800 mL      Physical Exam :-  Constitutional: NAD  HEENT: anicteric sclera, oropharynx clear, MMM  Neck: supple.   Respiratory: Bilateral equal breath sounds , no wheezes, no crackles  Cardiovascular: S1, S2, Regular  Gastrointestinal: Bowel Sound present, soft, NT/ND  Extremities: No peripheral edema  Neurological: Alert and oriented x 3  Psychiatric: Normal mood, normal affect  L AVF    Data:-  Allergies :   IV Contrast (Anaphylaxis)  Shrimp (Hives)  smoke; coughing (Other)  shellfish (Hives; Rash)    Hospital Medications:   MEDICATIONS  (STANDING):  atorvastatin 40 milliGRAM(s) Oral at bedtime  dextrose 5%. 1000 milliLiter(s) (50 mL/Hr) IV Continuous <Continuous>  dextrose 5%. 1000 milliLiter(s) (100 mL/Hr) IV Continuous <Continuous>  dextrose 50% Injectable 25 Gram(s) IV Push once  dextrose 50% Injectable 12.5 Gram(s) IV Push once  dextrose 50% Injectable 25 Gram(s) IV Push once  glucagon  Injectable 1 milliGRAM(s) IntraMuscular once  heparin   Injectable 5000 Unit(s) SubCutaneous every 12 hours  influenza  Vaccine (HIGH DOSE) 0.5 milliLiter(s) IntraMuscular once  insulin glargine Injectable (LANTUS) 20 Unit(s) SubCutaneous at bedtime  insulin lispro (ADMELOG) corrective regimen sliding scale   SubCutaneous three times a day before meals  midodrine. 10 milliGRAM(s) Oral <User Schedule>  sevelamer carbonate 1600 milliGRAM(s) Oral three times a day with meals    12-24    132[L]  |  92[L]  |  62[H]  ----------------------------<  102[H]  4.7   |  23  |  8.50[H]    Ca    9.9      24 Dec 2024 09:37  Phos  4.3     12-24  Mg     2.3     12-24      Creatinine Trend: 8.50 <--, 6.88 <--, 8.55 <--, 6.31 <--, 6.98 <--  egfr trend : 4 <--, 6 <--, 4 <--, 6 <--, 6 <--                        9.9    5.19  )-----------( 164      ( 24 Dec 2024 09:37 )             31.5

## 2024-12-25 NOTE — PROGRESS NOTE ADULT - SUBJECTIVE AND OBJECTIVE BOX
DATE OF SERVICE: 12-25-24 @ 09:50    Patient is a 79y old  Female who presents with a chief complaint of Weakness (25 Dec 2024 09:15)      INTERVAL HISTORY: in no acute distress    REVIEW OF SYSTEMS:  CONSTITUTIONAL: No weakness  EYES/ENT: No visual changes;  No throat pain   NECK: No pain or stiffness  RESPIRATORY: No cough, wheezing; No shortness of breath  CARDIOVASCULAR: No chest pain or palpitations  GASTROINTESTINAL: No abdominal  pain. No nausea, vomiting, or hematemesis  GENITOURINARY: No dysuria, frequency or hematuria  NEUROLOGICAL: No stroke like symptoms  SKIN: No rashes    TELEMETRY Personally reviewed: Sinus 70-80 bpm, yesterday afternoon 4 beats WCT   	  MEDICATIONS:  midodrine. 10 milliGRAM(s) Oral <User Schedule>        PHYSICAL EXAM:  T(C): 36.7 (12-25-24 @ 04:38), Max: 37.1 (12-24-24 @ 20:17)  HR: 73 (12-25-24 @ 04:38) (70 - 87)  BP: 145/75 (12-25-24 @ 04:38) (102/57 - 145/75)  RR: 18 (12-25-24 @ 04:38) (18 - 18)  SpO2: 99% (12-25-24 @ 04:38) (95% - 99%)  Wt(kg): --  I&O's Summary    24 Dec 2024 07:01  -  25 Dec 2024 07:00  --------------------------------------------------------  IN: 1300 mL / OUT: 3100 mL / NET: -1800 mL          Appearance: In no distress	  HEENT:    PERRL, EOMI	  Cardiovascular:  S1 S2, No JVD  Respiratory: Lungs clear to auscultation	  Gastrointestinal:  Soft, Non-tender, + BS	  Vascularature:  No edema of LE  Psychiatric: Appropriate affect   Neuro: no acute focal deficits                               9.9    5.19  )-----------( 164      ( 24 Dec 2024 09:37 )             31.5     12-24    132[L]  |  92[L]  |  62[H]  ----------------------------<  102[H]  4.7   |  23  |  8.50[H]    Ca    9.9      24 Dec 2024 09:37  Phos  4.3     12-24  Mg     2.3     12-24          Labs personally reviewed      ASSESSMENT/PLAN: 	      79yoF w/ PMHx of ESRD MWF HTN, HLD, T2DM, JARVIS on CPAP who presents for generalized weakness and intermittent CP.          Problem/Plan - 1:  ·  Problem: Chest pain  ·  Plan: Atypical chest pain  Trop elevation nonspecific iso ESRD but not uptrending and chronically elevated dating back to 2021  TTE 12/23 - EF 65%, no WMA, normal filling pressure, no pericardial effusion, mild aortic stenosis   EKG nonischemic   NM stress test ordered but unable to complete as she is unable to lift her arm  D/w pt and daughter option of cath instead, they both decline  OP follow up for testing when she can lift her arm     Problem/Plan - 2:  ·  Problem: Palpitations  ·  Plan:  OP monitor to r/o occult arrythmia  - Tele     Problem/Plan - 3:  ·  Problem: HLD (hyperlipidemia).   ·  Plan: Continue Statin.     Problem/Plan - 4:  ·  Problem: Need for prophylactic measure.   ·  Plan: DVT PPx: Heparin subq      No further inpt cardiac work up, OP follow up                TUCKER Miranda,  [facility]  Cardiovascular Medicine  [address] [place] [address], [address]  Available through call or text on [employer] TE[employer]  Office: [phone]     DATE OF SERVICE: 12-25-24 @ 09:50    Patient is a 79y old  Female who presents with a chief complaint of Weakness (25 Dec 2024 09:15)      INTERVAL HISTORY: in no acute distress    REVIEW OF SYSTEMS:  CONSTITUTIONAL: No weakness  EYES/ENT: No visual changes;  No throat pain   NECK: No pain or stiffness  RESPIRATORY: No cough, wheezing; No shortness of breath  CARDIOVASCULAR: No chest pain or palpitations  GASTROINTESTINAL: No abdominal  pain. No nausea, vomiting, or hematemesis  GENITOURINARY: No dysuria, frequency or hematuria  NEUROLOGICAL: No stroke like symptoms  SKIN: No rashes    TELEMETRY Personally reviewed: Sinus 70-80 bpm, yesterday afternoon 4 beats WCT   	  MEDICATIONS:  midodrine. 10 milliGRAM(s) Oral <User Schedule>        PHYSICAL EXAM:  T(C): 36.7 (12-25-24 @ 04:38), Max: 37.1 (12-24-24 @ 20:17)  HR: 73 (12-25-24 @ 04:38) (70 - 87)  BP: 145/75 (12-25-24 @ 04:38) (102/57 - 145/75)  RR: 18 (12-25-24 @ 04:38) (18 - 18)  SpO2: 99% (12-25-24 @ 04:38) (95% - 99%)  Wt(kg): --  I&O's Summary    24 Dec 2024 07:01  -  25 Dec 2024 07:00  --------------------------------------------------------  IN: 1300 mL / OUT: 3100 mL / NET: -1800 mL          Appearance: In no distress	  HEENT:    PERRL, EOMI	  Cardiovascular:  S1 S2, No JVD  Respiratory: Lungs clear to auscultation	  Gastrointestinal:  Soft, Non-tender, + BS	  Vascularature:  No edema of LE  Psychiatric: Appropriate affect   Neuro: no acute focal deficits                               9.9    5.19  )-----------( 164      ( 24 Dec 2024 09:37 )             31.5     12-24    132[L]  |  92[L]  |  62[H]  ----------------------------<  102[H]  4.7   |  23  |  8.50[H]    Ca    9.9      24 Dec 2024 09:37  Phos  4.3     12-24  Mg     2.3     12-24          Labs personally reviewed      ASSESSMENT/PLAN: 	    79yoF w/ PMHx of ESRD MWF HTN, HLD, T2DM, JARVIS on CPAP who presents for generalized weakness and intermittent CP.          Problem/Plan - 1:  ·  Problem: Chest pain  ·  Plan: Atypical chest pain  Trop elevation nonspecific iso ESRD but not uptrending and chronically elevated dating back to 2021  TTE 12/23 - EF 65%, no WMA, normal filling pressure, no pericardial effusion, mild aortic stenosis   EKG nonischemic   NM stress test ordered but unable to complete as she is unable to lift her arm  D/w pt and daughter option of cath instead, they both decline  OP follow up for testing when she can lift her arm     Problem/Plan - 2:  ·  Problem: Palpitations  ·  Plan:  OP monitor to r/o occult arrythmia  - Tele     Problem/Plan - 3:  ·  Problem: HLD (hyperlipidemia).   ·  Plan: Continue Statin.     Problem/Plan - 4:  ·  Problem: Need for prophylactic measure.   ·  Plan: DVT PPx: Heparin subq      No further inpt cardiac work up, OP follow up            TUCKER Miranda,  [facility]  Cardiovascular Medicine  [address] [place] [address], [address]  Available through call or text on [employer] TEAM[employer]  Office: [phone]

## 2024-12-25 NOTE — PROGRESS NOTE ADULT - SUBJECTIVE AND OBJECTIVE BOX
Angelica Pizarro MD  [facility] Division of Hospital Medicine    SUBJECTIVE / OVERNIGHT EVENTS:  - overnight with some beats of wct, this morning seen with daughter and aide walking with a walker to the chair/recliner. no n/v/abd pain/cough/headaches.     MEDICATIONS  (STANDING):  atorvastatin 40 milliGRAM(s) Oral at bedtime  dextrose 5%. 1000 milliLiter(s) (50 mL/Hr) IV Continuous <Continuous>  dextrose 5%. 1000 milliLiter(s) (100 mL/Hr) IV Continuous <Continuous>  dextrose 50% Injectable 25 Gram(s) IV Push once  dextrose 50% Injectable 12.5 Gram(s) IV Push once  dextrose 50% Injectable 25 Gram(s) IV Push once  glucagon  Injectable 1 milliGRAM(s) IntraMuscular once  heparin   Injectable 5000 Unit(s) SubCutaneous every 12 hours  influenza  Vaccine (HIGH DOSE) 0.5 milliLiter(s) IntraMuscular once  insulin glargine Injectable (LANTUS) 20 Unit(s) SubCutaneous at bedtime  insulin lispro (ADMELOG) corrective regimen sliding scale   SubCutaneous three times a day before meals  midodrine. 10 milliGRAM(s) Oral <User Schedule>  sevelamer carbonate 1600 milliGRAM(s) Oral three times a day with meals    MEDICATIONS  (PRN):  acetaminophen     Tablet .. 650 milliGRAM(s) Oral every 6 hours PRN Temp greater or equal to 38C (100.4F), Mild Pain (1 - 3)  dextrose Oral Gel 15 Gram(s) Oral once PRN Blood Glucose LESS THAN 70 milliGRAM(s)/deciliter  melatonin 3 milliGRAM(s) Oral at bedtime PRN Insomnia  ondansetron Injectable 4 milliGRAM(s) IV Push every 8 hours PRN Nausea and/or Vomiting      I&O's Summary    24 Dec 2024 07:01  -  25 Dec 2024 07:00  --------------------------------------------------------  IN: 1300 mL / OUT: 3100 mL / NET: -1800 mL        PHYSICAL EXAM:  Vital Signs Last 24 Hrs  T(C): 36.8 (25 Dec 2024 12:36), Max: 37.1 (24 Dec 2024 20:17)  T(F): 98.2 (25 Dec 2024 12:36), Max: 98.7 (24 Dec 2024 20:17)  HR: 84 (25 Dec 2024 12:36) (73 - 84)  BP: 132/79 (25 Dec 2024 12:36) (127/73 - 145/75)  BP(mean): --  RR: 18 (25 Dec 2024 04:38) (18 - 18)  SpO2: 99% (25 Dec 2024 04:38) (96% - 99%)    Parameters below as of 25 Dec 2024 04:38  Patient On (Oxygen Delivery Method): BiPAP/CPAP      GEN: elderly woman, laying on a recliner in NAD  PSYCH: A&Ox3, mood and affect appear appropriate   NEURO: no focal neurologic deficits appreciated  RESPI: no accessory muscle use, B/L air entry   CARDIO: regular rate/rhythm, no LE edema B/L  ABD: soft, NT, ND  EXT: patient able to move all extremities spontaneously, Lt AVF  VASC: peripheral pulses palpated    LABS:                        9.9    5.19  )-----------( 164      ( 24 Dec 2024 09:37 )             31.5     12-24    132[L]  |  92[L]  |  62[H]  ----------------------------<  102[H]  4.7   |  23  |  8.50[H]    Ca    9.9      24 Dec 2024 09:37  Phos  4.3     12-24  Mg     2.3     12-24            Urinalysis Basic - ( 24 Dec 2024 09:37 )    Color: x / Appearance: x / SG: x / pH: x  Gluc: 102 mg/dL / Ketone: x  / Bili: x / Urobili: x   Blood: x / Protein: x / Nitrite: x   Leuk Esterase: x / RBC: x / WBC x   Sq Epi: x / Non Sq Epi: x / Bacteria: x

## 2024-12-25 NOTE — PROVIDER CONTACT NOTE (OTHER) - SITUATION
patient c/o palpitations
4 beats wide complex on tele, HR up tp 224 for 1 second on tele monitor while pt sleeping

## 2024-12-26 LAB
ANION GAP SERPL CALC-SCNC: 19 MMOL/L — HIGH (ref 5–17)
BUN SERPL-MCNC: 64 MG/DL — HIGH (ref 7–23)
CALCIUM SERPL-MCNC: 10 MG/DL — SIGNIFICANT CHANGE UP (ref 8.4–10.5)
CHLORIDE SERPL-SCNC: 90 MMOL/L — LOW (ref 96–108)
CO2 SERPL-SCNC: 22 MMOL/L — SIGNIFICANT CHANGE UP (ref 22–31)
CREAT SERPL-MCNC: 8.31 MG/DL — HIGH (ref 0.5–1.3)
EGFR: 4 ML/MIN/1.73M2 — LOW
GLUCOSE BLDC GLUCOMTR-MCNC: 74 MG/DL — SIGNIFICANT CHANGE UP (ref 70–99)
GLUCOSE BLDC GLUCOMTR-MCNC: 82 MG/DL — SIGNIFICANT CHANGE UP (ref 70–99)
GLUCOSE BLDC GLUCOMTR-MCNC: 93 MG/DL — SIGNIFICANT CHANGE UP (ref 70–99)
GLUCOSE BLDC GLUCOMTR-MCNC: 93 MG/DL — SIGNIFICANT CHANGE UP (ref 70–99)
GLUCOSE SERPL-MCNC: 64 MG/DL — LOW (ref 70–99)
MAGNESIUM SERPL-MCNC: 2.4 MG/DL — SIGNIFICANT CHANGE UP (ref 1.6–2.6)
PHOSPHATE SERPL-MCNC: 5 MG/DL — HIGH (ref 2.5–4.5)
POTASSIUM SERPL-MCNC: 4.9 MMOL/L — SIGNIFICANT CHANGE UP (ref 3.5–5.3)
POTASSIUM SERPL-SCNC: 4.9 MMOL/L — SIGNIFICANT CHANGE UP (ref 3.5–5.3)
SODIUM SERPL-SCNC: 131 MMOL/L — LOW (ref 135–145)

## 2024-12-26 PROCEDURE — 99232 SBSQ HOSP IP/OBS MODERATE 35: CPT

## 2024-12-26 RX ADMIN — HEPARIN SODIUM 5000 UNIT(S): 1000 INJECTION, SOLUTION INTRAVENOUS; SUBCUTANEOUS at 18:34

## 2024-12-26 RX ADMIN — SEVELAMER CARBONATE 1600 MILLIGRAM(S): 800 TABLET, FILM COATED ORAL at 08:20

## 2024-12-26 RX ADMIN — ATORVASTATIN CALCIUM 40 MILLIGRAM(S): 40 TABLET, FILM COATED ORAL at 21:21

## 2024-12-26 RX ADMIN — SEVELAMER CARBONATE 1600 MILLIGRAM(S): 800 TABLET, FILM COATED ORAL at 12:33

## 2024-12-26 RX ADMIN — SEVELAMER CARBONATE 1600 MILLIGRAM(S): 800 TABLET, FILM COATED ORAL at 18:35

## 2024-12-26 RX ADMIN — HEPARIN SODIUM 5000 UNIT(S): 1000 INJECTION, SOLUTION INTRAVENOUS; SUBCUTANEOUS at 06:32

## 2024-12-26 RX ADMIN — INSULIN GLARGINE-YFGN 20 UNIT(S): 100 INJECTION, SOLUTION SUBCUTANEOUS at 21:21

## 2024-12-26 NOTE — PROGRESS NOTE ADULT - PROBLEM SELECTOR PLAN 1
Pt with generalized weakness x1 week  TSH 2  Complaint of chest pain, management as below   HD per renal   Midodrine ordered for prior dialysis per history  Followup GI PCR as pt reportedly with diarrhea as below  CT abdomen/pelvis shows Diverticulosis without Diverticulitis  WBC wnl, no fevers, hold off any antibiotics for now.  Does not make urine
Pt with generalized weakness x1 week  TSH 2  Complaint of chest pain, management as below   HD per renal   Midodrine ordered for prior dialysis per history  Followup GI PCR as pt reportedly with diarrhea as below  CT abdomen/pelvis shows Diverticulosis without Diverticulitis  WBC wnl, no fevers, hold off any antibiotics for now.  Does not make urine
- Pt with generalized weakness x1 week  - Complaint of chest pain, management as below   - Midodrine ordered for prior dialysis per history  - CT abdomen/pelvis shows Diverticulosis without Diverticulitis  - WBC wnl, no fevers, hold off any antibiotics for now.  - PT recs [facility] but family wants to take her home - home supplies to be installed 12/27. has bed in [facility] as an alternative dispo plan.
Pt with generalized weakness x1 week  TSH 2  Complaint of chest pain, management as below   HD per renal   Midodrine ordered for prior dialysis per history  Followup GI PCR as pt with diarrhea   CT abdomen/pelvis shows Diverticulosis without Diverticulitis  WBC wnl, no fevers, hold off any antibiotics for now.  Does not make urine
Pt with generalized weakness x1 week  TSH 2  Complaint of chest pain, management as below   HD per renal   Midodrine ordered for prior dialysis per history  CT abdomen/pelvis shows Diverticulosis without Diverticulitis  WBC wnl, no fevers, hold off any antibiotics for now.  Does not make urine
Pt with generalized weakness x1 week  TSH 2  Complaint of chest pain, management as below   HD per renal   Midodrine ordered for prior dialysis per history  CT abdomen/pelvis shows Diverticulosis without Diverticulitis  WBC wnl, no fevers, hold off any antibiotics for now.  Does not make urine

## 2024-12-26 NOTE — PROGRESS NOTE ADULT - SUBJECTIVE AND OBJECTIVE BOX
Angelica Pizarro MD  [facility] Division of Hospital Medicine    SUBJECTIVE / OVERNIGHT EVENTS:  - no events overnight, seen sitting on the bed having food. no n/v/abd pain/cough/chest pain.   MEDICATIONS  (STANDING):  atorvastatin 40 milliGRAM(s) Oral at bedtime  dextrose 5%. 1000 milliLiter(s) (100 mL/Hr) IV Continuous <Continuous>  dextrose 5%. 1000 milliLiter(s) (50 mL/Hr) IV Continuous <Continuous>  dextrose 50% Injectable 25 Gram(s) IV Push once  dextrose 50% Injectable 12.5 Gram(s) IV Push once  dextrose 50% Injectable 25 Gram(s) IV Push once  glucagon  Injectable 1 milliGRAM(s) IntraMuscular once  heparin   Injectable 5000 Unit(s) SubCutaneous every 12 hours  influenza  Vaccine (HIGH DOSE) 0.5 milliLiter(s) IntraMuscular once  insulin glargine Injectable (LANTUS) 20 Unit(s) SubCutaneous at bedtime  insulin lispro (ADMELOG) corrective regimen sliding scale   SubCutaneous three times a day before meals  midodrine. 10 milliGRAM(s) Oral <User Schedule>  sevelamer carbonate 1600 milliGRAM(s) Oral three times a day with meals    MEDICATIONS  (PRN):  acetaminophen     Tablet .. 650 milliGRAM(s) Oral every 6 hours PRN Temp greater or equal to 38C (100.4F), Mild Pain (1 - 3)  dextrose Oral Gel 15 Gram(s) Oral once PRN Blood Glucose LESS THAN 70 milliGRAM(s)/deciliter  melatonin 3 milliGRAM(s) Oral at bedtime PRN Insomnia  ondansetron Injectable 4 milliGRAM(s) IV Push every 8 hours PRN Nausea and/or Vomiting      I&O's Summary      PHYSICAL EXAM:  Vital Signs Last 24 Hrs  T(C): 36.8 (26 Dec 2024 11:51), Max: 36.8 (25 Dec 2024 20:43)  T(F): 98.2 (26 Dec 2024 11:51), Max: 98.2 (25 Dec 2024 20:43)  HR: 78 (26 Dec 2024 11:51) (71 - 86)  BP: 137/73 (26 Dec 2024 11:51) (121/66 - 137/73)  BP(mean): --  RR: 18 (26 Dec 2024 11:51) (18 - 18)  SpO2: 95% (26 Dec 2024 11:51) (95% - 99%)    Parameters below as of 26 Dec 2024 11:51  Patient On (Oxygen Delivery Method): room air      GEN: elderly woman, laying on a recliner in NAD  PSYCH: A&Ox3, mood and affect appear appropriate   NEURO: no focal neurologic deficits appreciated  RESPI: no accessory muscle use, B/L air entry   CARDIO: regular rate/rhythm, no LE edema B/L  ABD: soft, NT, ND  EXT: patient able to move all extremities spontaneously, Lt AVF  VASC: peripheral pulses palpated    LABS:                        10.4   5.12  )-----------( 171      ( 25 Dec 2024 17:02 )             32.8     12-26    131[L]  |  90[L]  |  64[H]  ----------------------------<  64[L]  4.9   |  22  |  8.31[H]    Ca    10.0      26 Dec 2024 07:33  Phos  5.0     12-26  Mg     2.4     12-26            Urinalysis Basic - ( 26 Dec 2024 07:33 )    Color: x / Appearance: x / SG: x / pH: x  Gluc: 64 mg/dL / Ketone: x  / Bili: x / Urobili: x   Blood: x / Protein: x / Nitrite: x   Leuk Esterase: x / RBC: x / WBC x   Sq Epi: x / Non Sq Epi: x / Bacteria: x

## 2024-12-26 NOTE — PROGRESS NOTE ADULT - PROBLEM SELECTOR PLAN 5
Restarted patients Lantus at lower dose 20U  Carb controlled Diet  ISS w/ serial FS monitoring
Restarted patients Lantus at lower dose 20U  Carb controlled Diet  ISS w/ serial FS monitoring
Followup A1C  Restarted patients Lantus at lower dose 20U  Carb controlled Diet  ISS
Restarted patients Lantus at lower dose 20U  Carb controlled Diet  ISS w/ serial FS monitoring

## 2024-12-26 NOTE — PROGRESS NOTE ADULT - PROBLEM SELECTOR PROBLEM 1
Generalized weakness
low sodium, low cholesterol  dysphagia 1 with nectar thick; ensure supplement 2 cans daily  assist with feeds ; encourage feeds

## 2024-12-26 NOTE — PROGRESS NOTE ADULT - SUBJECTIVE AND OBJECTIVE BOX
DATE OF SERVICE: 12-26-24 @ 12:55    Patient is a 79y old  Female who presents with a chief complaint of Weakness (26 Dec 2024 09:08)      INTERVAL HISTORY: in no acute distress    REVIEW OF SYSTEMS:  CONSTITUTIONAL: No weakness  EYES/ENT: No visual changes;  No throat pain   NECK: No pain or stiffness  RESPIRATORY: No cough, wheezing; No shortness of breath  CARDIOVASCULAR: No chest pain or palpitations  GASTROINTESTINAL: No abdominal  pain. No nausea, vomiting, or hematemesis  GENITOURINARY: No dysuria, frequency or hematuria  NEUROLOGICAL: No stroke like symptoms  SKIN: No rashes    TELEMETRY Personally reviewed: off tele, earlier in SR 70-80 bpm   	  MEDICATIONS:  midodrine. 10 milliGRAM(s) Oral <User Schedule>        PHYSICAL EXAM:  T(C): 36.8 (12-26-24 @ 11:51), Max: 36.8 (12-25-24 @ 20:43)  HR: 78 (12-26-24 @ 11:51) (71 - 86)  BP: 137/73 (12-26-24 @ 11:51) (121/66 - 137/73)  RR: 18 (12-26-24 @ 11:51) (18 - 18)  SpO2: 95% (12-26-24 @ 11:51) (95% - 99%)  Wt(kg): --  I&O's Summary        Appearance: In no distress	  HEENT:    PERRL, EOMI	  Cardiovascular:  S1 S2, No JVD  Respiratory: Lungs clear to auscultation	  Gastrointestinal:  Soft, Non-tender, + BS	  Vascularature:  No edema of LE  Psychiatric: Appropriate affect   Neuro: no acute focal deficits                               10.4   5.12  )-----------( 171      ( 25 Dec 2024 17:02 )             32.8     12-26    131[L]  |  90[L]  |  64[H]  ----------------------------<  64[L]  4.9   |  22  |  8.31[H]    Ca    10.0      26 Dec 2024 07:33  Phos  5.0     12-26  Mg     2.4     12-26          Labs personally reviewed      ASSESSMENT/PLAN: 	      79yoF w/ PMHx of ESRD MWF HTN, HLD, T2DM, JARVIS on CPAP who presents for generalized weakness and intermittent CP.          Problem/Plan - 1:  ·  Problem: Chest pain  ·  Plan: Atypical chest pain  Trop elevation nonspecific iso ESRD but not uptrending and chronically elevated dating back to 2021  TTE 12/23 - EF 65%, no WMA, normal filling pressure, no pericardial effusion, mild aortic stenosis   EKG nonischemic   NM stress test ordered but unable to complete as she is unable to lift her arm  D/w pt and daughter option of cath instead, they both decline  OP follow up for testing when she can lift her arm     Problem/Plan - 2:  ·  Problem: Palpitations  ·  Plan:  OP monitor to r/o occult arrythmia  - Tele     Problem/Plan - 3:  ·  Problem: HLD (hyperlipidemia).   ·  Plan: Continue Statin.     Problem/Plan - 4:  ·  Problem: Need for prophylactic measure.   ·  Plan: DVT PPx: Heparin subq      No further inpt cardiac work up, OP follow up          TUCKER Miranda DO [facility]  Cardiovascular Medicine  [address] [place] [address], [address]  Available through call or text on [employer]  Office: [phone]

## 2024-12-26 NOTE — PROGRESS NOTE ADULT - PROBLEM SELECTOR PLAN 4
- Nephrology consulted to continue HD  - Avoid nephrotoxic agents.  - SW consult for re-establishment of dialysis on discharge.  - plan for HD 12/27 and then outpatient HD 12/29
Nephrology consulted to continue HD  Avoid nephrotoxic agents.  SW consult for re-establishment of dialysis on discharge.

## 2024-12-26 NOTE — PROGRESS NOTE ADULT - PROBLEM SELECTOR PLAN 6
[place] History and Physical    Boy German Contreras is a 0 day old male 7 lb 4 oz (3289 g) infant, delivered at Gestational Age: 39w1d on 2021.    Born by c/s.  Infant is bottle feeding.    MATERNAL INFORMATION:     Age, /Para, REA:   Information for the patient's mother:  German Contreras [[id number]]   32 year old         2020, by Last Menstrual Period      steroids during pregnancy: None     Information for the patient's mother:  German Contreras [[id number]]     Social History     Tobacco Use   • Smoking status: Former Smoker     Types: Cigarettes     Last attempt to quit: 2014     Years since quittin.3   • Smokeless tobacco: Never Used   Substance Use Topics   • Alcohol use: No     Frequency: Never     Drinks per session: 1 or 2     Binge frequency: Never     Information for the patient's mother:  German Contreras [[id number]]     Past Medical History:   Diagnosis Date   • Anemia    • Female circumcision    • NO HX OF     Ca, Htn, DM, CAD, CVA, DVT, asthma, liver disease, or migraine   • Sinusitis        Prenatal Labs:  Information for the patient's mother:  German Contreras [[id number]]     Recent Labs   Lab 10/15/19  1312 10/15/19  1226   HIV Antigen/Antibody Screen NONREACTIVE  --    HEP B Surface AG NEGATIVE  --    Treponema pallidum IgG/IgM Ab NONREACTIVE  --    Neisseria Gonorrhoeae by Nucleic Acid Amplification  --  NEGATIVE   Chlamydia Trachomatis by Nucleic Acid Amplification  --  NEGATIVE   Rubella Antibody IgG 7.5*  --    ABO/RH(D) A POSITIVE  --      Information for the patient's mother:  German Contreras [[id number]]     Recent Labs   Lab 10/15/19  1312   CULTURE NO GROWTH.   Specimen Description URINE, CLEAN CATCH/MIDSTREAM     GBS: Negative No antibiotics needed.    BIRTH HISTORY:     Delivery Method: , Low Transverse [251]   Rupture date & time: 2020 10:20 AM   Date & time of birth 2021 10:20 AM   Induction: None     Complications/ Risks None     Placenta appearance: 
Intact   Cord info/complications: 3 Vessels None       Delayed cord clamping: No   Indications for : Prior Uterine Surgery   Presentation/position: Vertex         Forceps attempted? No     Vacuum attempted? No     Shoulder dystocia? No                          INFORMATION     Resuscitation:  Suctioning          APGARS  One minute Five minutes   Skin color: 0  1    Heart rate: 2  2     Reflex: 2  2    Muscle tone: 2  2    Breathin  2    Totals:   8  9      Cord Blood/Candor Evaluation (CRD)  No results found  Blood gases sent? No   Cord pH No results found    PHYSICAL EXAM     VITALS:   Visit Vitals  Pulse 140   Temp (!) 97.3 °F (36.3 °C) (Axillary)   Resp 48   Ht 21\" (53.3 cm) Comment: Filed from Delivery Summary   Wt 3289 g Comment: Filed from Delivery Summary   HC 35.5 cm (13.98\") Comment: Filed from Delivery Summary   BMI 11.56 kg/m²     Intake/Output        07 -  0659 700 -  0659    P.O.  10    Total Intake  10    Net  +10          Urine Occurrence  1 x    Stool Occurrence  1 x    Unmeasured Breast Milk Occurrence  1 x          Birth Measurements:        Weight: 7 lb 4 oz (3289 g)        Length: 21\"        Head circumference: 35.5 cm    GENERAL:Baby Boy is an alert, vigorous male with appropriate behavior. He is in no acute distress.  SKIN: His skin is warm with normal turgor. The color of the skin is pink. There is no rash. There are no bruises or other signs of injury.  Significant jaundice is not present.  HEAD: The head is atraumatic and normocephalic. The anterior fontanel is open and flat.  EYES: The conjunctivae appear normal with neither icterus nor subconjunctival hemorrhage.   Red reflexes are seen bilaterally.  EARS: Pinnae normal.  NOSE: There is no nasal flaring, nares patent bilaterally.  THROAT:  The oropharynx is normal.  There is no cleft of the palate.  NECK: Clavicles without crepitus.  TRUNK AND THORAX: There are no lesions on the trunk; there is no 
dimple over the presacral area. There are no retractions.  LUNGS: The lung fields are clear to auscultation.  HEART: The precordium is quiet. The heart rhythm is grossly regular. S1 and S2 are normal. There are no murmurs. Normal femoral pulses.  ABDOMEN: The umbilical cord stump is normal. There is not an umbilical hernia. The abdomen is flat and soft.   GENITALIA: normal male genitalia with bilateral descended testes  RECTAL: anus patent  EXTREMITIES: Moving all 4 extremities. The hip exam is normal  . There are no hip clicks or clunks.    NEUROLOGIC: He displays normal tone throughout. He is not jittery.    ASSESSMENT     Well 0 day old male infant.      PLAN      Sepsis:    Risk Scores: Risk - Well Appearin; Risk - Equivocal: 0.05   Sepsis Classification: (most recent) Well Appearing (20 1612)  Plan: Based on risk scores and a current classification of Well Appearing, routine vital signs. no CBC or blood cultures. No antibiotics.       Feeding:  [place] is currently being fed Breast milk and formula.   I am aware that mother's feeding choice upon admission was the following:   Information for the patient's mother:  German Contreras [[id number]]   Breast milk feeding with formula supplementation  There are no medical contraindications to exclusive breast milk feeding, and the benefits of exclusively breastfeeding were discussed/reinforced with the mother.    Routine  care.    Ped: Debbie Salazar?                
Continue Statin

## 2024-12-26 NOTE — PROGRESS NOTE ADULT - PROBLEM SELECTOR PLAN 3
CT Abdomen/pelvis shows diverticulosis no diverticulitis  Monitor for fevers  Had diarrhea per staff   GI PCR ordered.  Monitor stool count
- CT Abdomen/pelvis shows diverticulosis no diverticulitis  - Monitor for fevers  - Had diarrhea per staff, now resolved
CT Abdomen/pelvis shows diverticulosis no diverticulitis  Monitor for fevers  Had diarrhea per staff   GI PCR ordered.
CT Abdomen/pelvis shows diverticulosis no diverticulitis  Monitor for fevers  Had diarrhea per staff, now resolved   Monitor stool count
CT Abdomen/pelvis shows diverticulosis no diverticulitis  Monitor for fevers  Had diarrhea per staff, now resolved   Monitor stool count
CT Abdomen/pelvis shows diverticulosis no diverticulitis  Monitor for fevers  Had diarrhea per staff   GI PCR ordered   Monitor stool count

## 2024-12-26 NOTE — PROGRESS NOTE ADULT - TIME BILLING
- Ordering, reviewing, and interpreting labs, testing, and imaging.  - Independently obtaining a review of systems and performing a physical exam  - Reviewing consultant documentation/recommendations in addition to discussing plan of care with consultants.  - Counselling and educating patient and family regarding interpretation of aforementioned items and plan of care.
conducting history and physical examination, reviewing and ordering diagnostic workup as above, discussing with consultants, determining acute diagnoses / plan for continued monitoring and management, and communication with patient.
- Ordering, reviewing, and interpreting labs, testing, and imaging.  - Independently obtaining a review of systems and performing a physical exam  - Reviewing consultant documentation/recommendations in addition to discussing plan of care with consultants.  - Counselling and educating patient and family regarding interpretation of aforementioned items and plan of care.

## 2024-12-26 NOTE — PROGRESS NOTE ADULT - SUBJECTIVE AND OBJECTIVE BOX
[facility] : Ans Serv [phone], Office [phone]  Dr. Smith/Dr Lockett/Dr Camacho  /Dr Garry davenport /Dr JACY Taylor/Dr Del Becker/Dr Stephon Colbert /Dr FOWLER Njrhiannau  _______________________________________________________________________________________________    Pt seen and examined this morning   no acute issues noted overnight     VITALS:  T(F): 98.2 (12-26 @ 04:41), Max: 98.7 (12-24 @ 20:17)  HR: 71 (12-26 @ 04:41)  BP: 121/66 (12-26 @ 04:41)  ABP: --  RR: 18 (12-26 @ 04:41)  SpO2: 97% (12-26 @ 04:41)    Physical Exam :-  Constitutional: NAD  HEENT: anicteric sclera, oropharynx clear, MMM  Neck: supple.   Respiratory: Bilateral equal breath sounds , no wheezes, no crackles  Cardiovascular: S1, S2, Regular  Gastrointestinal: Bowel Sound present, soft, NT/ND  Extremities: No peripheral edema  Neurological: Alert and oriented x 3  Psychiatric: Normal mood, normal affect  L AVF    Data:-  Allergies :   IV Contrast (Anaphylaxis)  Shrimp (Hives)  smoke; coughing (Other)  shellfish (Hives; Rash)    Hospital Medications:   MEDICATIONS  (STANDING):  atorvastatin 40 milliGRAM(s) Oral at bedtime  dextrose 5%. 1000 milliLiter(s) (100 mL/Hr) IV Continuous <Continuous>  dextrose 5%. 1000 milliLiter(s) (50 mL/Hr) IV Continuous <Continuous>  dextrose 50% Injectable 25 Gram(s) IV Push once  dextrose 50% Injectable 12.5 Gram(s) IV Push once  dextrose 50% Injectable 25 Gram(s) IV Push once  glucagon  Injectable 1 milliGRAM(s) IntraMuscular once  heparin   Injectable 5000 Unit(s) SubCutaneous every 12 hours  influenza  Vaccine (HIGH DOSE) 0.5 milliLiter(s) IntraMuscular once  insulin glargine Injectable (LANTUS) 20 Unit(s) SubCutaneous at bedtime  insulin lispro (ADMELOG) corrective regimen sliding scale   SubCutaneous three times a day before meals  midodrine. 10 milliGRAM(s) Oral <User Schedule>  sevelamer carbonate 1600 milliGRAM(s) Oral three times a day with meals    12-26    131[L]  |  90[L]  |  64[H]  ----------------------------<  64[L]  4.9   |  22  |  8.31[H]    Ca    10.0      26 Dec 2024 07:33  Phos  5.0     12-26  Mg     2.4     12-26      Creatinine Trend: 8.31 <--, 7.33 <--, 8.50 <--, 6.88 <--, 8.55 <--, 6.31 <--  egfr trend : 4 <--, 5 <--, 4 <--, 6 <--, 4 <--, 6 <--, 6 <--                        10.4   5.12  )-----------( 171      ( 25 Dec 2024 17:02 )             32.8

## 2024-12-26 NOTE — PROGRESS NOTE ADULT - PROBLEM SELECTOR PROBLEM 3
LLQ pain
81 y/o M with no significant PMHx here c/o burning sensation in esophagus, occurring last night while pt was asleep into this AM. Pt states the burning sensation is intermittent. Pt denies SOB, abdominal pain, or neck pain. PE: heart RRR, lungs CTAB, abdomen soft, NTND, FROM all ext. IMP: likely GI related given burning occurring at night without exertional SX. SX are intermittent and pt had normal stress test x8 months ago. Plan: one set of enzymes, EKG and treat for GI related acid reflux.
LLQ pain
LLQ pain
pt and family demanding to have IV taken out and be discharged, don't want to wait any longer. Will AMA.
LLQ pain

## 2024-12-26 NOTE — PROGRESS NOTE ADULT - PROBLEM SELECTOR PLAN 2
Pt with intermittent chest pain  Trend troponins - elevated iso ESRD but not uptrending significantly   Check echo   No st/t changes on EKG   Check stress test  Cardiology consulted
- Pt with intermittent chest pain  - Trend troponins - elevated iso ESRD but not uptrending significantly   - echo done 12/23, could not do stress echo - f/u cards recs regarding possible cath   - No st/t changes on EKG   - Cardiology consulted - family does not want cath, no further workup at this time.
Pt with intermittent chest pain  Trend troponins - elevated iso ESRD but not uptrending significantly   echo done 12/23, could not do stress echo - f/u cards recs regarding possible cath   No st/t changes on EKG   Cardiology consulted
Pt with intermittent chest pain  Trend troponins - elevated iso ESRD but not uptrending significantly   echo done 12/23, could not do stress echo - f/u cards recs regarding possible cath   No st/t changes on EKG   Cardiology consulted - family does not want cath, no further workup at this time
Pt with intermittent chest pain  Trend troponins   Check echo   No st/t changes on EKG   Check stress test
Pt with intermittent chest pain  Trend troponins - elevated iso ESRD but not uptrending significantly   echo done 12/23, could not do stress echo - f/u cards recs regarding possible cath   No st/t changes on EKG   Cardiology consulted - family does not want cath, no further workup at this time

## 2024-12-26 NOTE — PROGRESS NOTE ADULT - PROBLEM SELECTOR PLAN 7
DVT PPx: Heparin subq  PT- Home PT 24/7 HHA
DVT PPx: Heparin subq  PT- Home PT 24/7 HHA
DVT PPx: Heparin subq  patient was recommendd for benny, family wants to take home but states they need a lift? earliest this can be installed is supposedly on thursday
DVT PPx: Heparin subq  medically cleared for discharge
DVT PPx: Heparin subq  patient was recommendd for benny, family wants to take home but states they need a lift? earliest this can be installed is supposedly on thursday
DVT PPx: Heparin subq  PT- Home PT 24/7 HHA

## 2024-12-27 ENCOUNTER — TRANSCRIPTION ENCOUNTER (OUTPATIENT)
Age: 79
End: 2024-12-27

## 2024-12-27 VITALS
RESPIRATION RATE: 18 BRPM | OXYGEN SATURATION: 93 % | HEART RATE: 92 BPM | TEMPERATURE: 98 F | DIASTOLIC BLOOD PRESSURE: 79 MMHG | SYSTOLIC BLOOD PRESSURE: 138 MMHG

## 2024-12-27 LAB
GLUCOSE BLDC GLUCOMTR-MCNC: 77 MG/DL — SIGNIFICANT CHANGE UP (ref 70–99)
GLUCOSE BLDC GLUCOMTR-MCNC: 88 MG/DL — SIGNIFICANT CHANGE UP (ref 70–99)

## 2024-12-27 PROCEDURE — 84295 ASSAY OF SERUM SODIUM: CPT

## 2024-12-27 PROCEDURE — 97116 GAIT TRAINING THERAPY: CPT

## 2024-12-27 PROCEDURE — 84132 ASSAY OF SERUM POTASSIUM: CPT

## 2024-12-27 PROCEDURE — 82435 ASSAY OF BLOOD CHLORIDE: CPT

## 2024-12-27 PROCEDURE — 85027 COMPLETE CBC AUTOMATED: CPT

## 2024-12-27 PROCEDURE — 94660 CPAP INITIATION&MGMT: CPT

## 2024-12-27 PROCEDURE — 85014 HEMATOCRIT: CPT

## 2024-12-27 PROCEDURE — 97161 PT EVAL LOW COMPLEX 20 MIN: CPT

## 2024-12-27 PROCEDURE — 85025 COMPLETE CBC W/AUTO DIFF WBC: CPT

## 2024-12-27 PROCEDURE — 84484 ASSAY OF TROPONIN QUANT: CPT

## 2024-12-27 PROCEDURE — 36415 COLL VENOUS BLD VENIPUNCTURE: CPT

## 2024-12-27 PROCEDURE — 80053 COMPREHEN METABOLIC PANEL: CPT

## 2024-12-27 PROCEDURE — 82803 BLOOD GASES ANY COMBINATION: CPT

## 2024-12-27 PROCEDURE — 99239 HOSP IP/OBS DSCHRG MGMT >30: CPT

## 2024-12-27 PROCEDURE — 99261: CPT

## 2024-12-27 PROCEDURE — 83036 HEMOGLOBIN GLYCOSYLATED A1C: CPT

## 2024-12-27 PROCEDURE — 84443 ASSAY THYROID STIM HORMONE: CPT

## 2024-12-27 PROCEDURE — 83690 ASSAY OF LIPASE: CPT

## 2024-12-27 PROCEDURE — 93306 TTE W/DOPPLER COMPLETE: CPT

## 2024-12-27 PROCEDURE — 82947 ASSAY GLUCOSE BLOOD QUANT: CPT

## 2024-12-27 PROCEDURE — 99285 EMERGENCY DEPT VISIT HI MDM: CPT

## 2024-12-27 PROCEDURE — 82330 ASSAY OF CALCIUM: CPT

## 2024-12-27 PROCEDURE — 87637 SARSCOV2&INF A&B&RSV AMP PRB: CPT

## 2024-12-27 PROCEDURE — 83880 ASSAY OF NATRIURETIC PEPTIDE: CPT

## 2024-12-27 PROCEDURE — 74176 CT ABD & PELVIS W/O CONTRAST: CPT | Mod: MC

## 2024-12-27 PROCEDURE — 93005 ELECTROCARDIOGRAM TRACING: CPT

## 2024-12-27 PROCEDURE — 84100 ASSAY OF PHOSPHORUS: CPT

## 2024-12-27 PROCEDURE — 0241U: CPT

## 2024-12-27 PROCEDURE — 97110 THERAPEUTIC EXERCISES: CPT

## 2024-12-27 PROCEDURE — 71045 X-RAY EXAM CHEST 1 VIEW: CPT

## 2024-12-27 PROCEDURE — 80048 BASIC METABOLIC PNL TOTAL CA: CPT

## 2024-12-27 PROCEDURE — 83735 ASSAY OF MAGNESIUM: CPT

## 2024-12-27 PROCEDURE — 85018 HEMOGLOBIN: CPT

## 2024-12-27 PROCEDURE — 80061 LIPID PANEL: CPT

## 2024-12-27 PROCEDURE — 83605 ASSAY OF LACTIC ACID: CPT

## 2024-12-27 PROCEDURE — 82962 GLUCOSE BLOOD TEST: CPT

## 2024-12-27 RX ADMIN — SEVELAMER CARBONATE 1600 MILLIGRAM(S): 800 TABLET, FILM COATED ORAL at 08:09

## 2024-12-27 RX ADMIN — MIDODRINE HYDROCHLORIDE 10 MILLIGRAM(S): 5 TABLET ORAL at 10:48

## 2024-12-27 RX ADMIN — HEPARIN SODIUM 5000 UNIT(S): 1000 INJECTION, SOLUTION INTRAVENOUS; SUBCUTANEOUS at 06:51

## 2024-12-27 RX ADMIN — SEVELAMER CARBONATE 1600 MILLIGRAM(S): 800 TABLET, FILM COATED ORAL at 14:08

## 2024-12-27 NOTE — DISCHARGE NOTE NURSING/CASE MANAGEMENT/SOCIAL WORK - PATIENT PORTAL LINK FT
You can access the FollowMyHealth Patient Portal offered by [facility] by registering at the following website: [email or web address]. By joining [facility]’s FollowMyHealth portal, you will also be able to view your health information using other applications (apps) compatible with our system.

## 2024-12-27 NOTE — PROGRESS NOTE ADULT - SUBJECTIVE AND OBJECTIVE BOX
[facility] : Ans Serv [phone], Office [phone]  Dr. Smith/Dr Lockett/Dr Camacho  /Dr Garry davenport /Dr JACY Taylor/Dr Del Becker/Dr Stephon Colbert /Dr FOWLER Njrhiannau  _______________________________________________________________________________________________    Pt seen and examined this morning   no acute issues noted overnight     VITALS:  T(F): 97.8 (12-27 @ 04:34), Max: 98.6 (12-26 @ 20:23)  HR: 73 (12-27 @ 04:34)  BP: 138/68 (12-27 @ 04:34)  ABP: --  RR: 18 (12-27 @ 04:34)  SpO2: 98% (12-27 @ 04:34)    Physical Exam :-  Constitutional: NAD  HEENT: anicteric sclera, oropharynx clear, MMM  Neck: supple.   Respiratory: Bilateral equal breath sounds , no wheezes, no crackles  Cardiovascular: S1, S2, Regular  Gastrointestinal: Bowel Sound present, soft, NT/ND  Extremities: No peripheral edema  Neurological: Alert and oriented x 3  Psychiatric: Normal mood, normal affect  L AVF    Data:-  Allergies :   IV Contrast (Anaphylaxis)  Shrimp (Hives)  smoke; coughing (Other)  shellfish (Hives; Rash)    Hospital Medications:   MEDICATIONS  (STANDING):  atorvastatin 40 milliGRAM(s) Oral at bedtime  dextrose 5%. 1000 milliLiter(s) (50 mL/Hr) IV Continuous <Continuous>  dextrose 5%. 1000 milliLiter(s) (100 mL/Hr) IV Continuous <Continuous>  dextrose 50% Injectable 25 Gram(s) IV Push once  dextrose 50% Injectable 12.5 Gram(s) IV Push once  dextrose 50% Injectable 25 Gram(s) IV Push once  glucagon  Injectable 1 milliGRAM(s) IntraMuscular once  heparin   Injectable 5000 Unit(s) SubCutaneous every 12 hours  influenza  Vaccine (HIGH DOSE) 0.5 milliLiter(s) IntraMuscular once  insulin glargine Injectable (LANTUS) 20 Unit(s) SubCutaneous at bedtime  insulin lispro (ADMELOG) corrective regimen sliding scale   SubCutaneous three times a day before meals  midodrine. 10 milliGRAM(s) Oral <User Schedule>  sevelamer carbonate 1600 milliGRAM(s) Oral three times a day with meals    12-26    131[L]  |  90[L]  |  64[H]  ----------------------------<  64[L]  4.9   |  22  |  8.31[H]    Ca    10.0      26 Dec 2024 07:33  Phos  5.0     12-26  Mg     2.4     12-26      Creatinine Trend: 8.31 <--, 7.33 <--, 8.50 <--, 6.88 <--, 8.55 <--, 6.31 <--  egfr trend : 4 <--, 5 <--, 4 <--, 6 <--, 4 <--, 6 <--, 6 <--                        10.4   5.12  )-----------( 171      ( 25 Dec 2024 17:02 )             32.8

## 2024-12-27 NOTE — PROGRESS NOTE ADULT - PROVIDER SPECIALTY LIST ADULT
Cardiology
Cardiology
Nephrology
Nephrology
Cardiology
Cardiology
Hospitalist
Nephrology
Cardiology
Nephrology
Hospitalist

## 2024-12-27 NOTE — PROGRESS NOTE ADULT - REASON FOR ADMISSION
Weakness

## 2024-12-27 NOTE — PROGRESS NOTE ADULT - ASSESSMENT
79 year old female with PMH of ESRD on HD MWF, HTN, Anemia of chornic disease, HLD, DM, and JARVIS who presented to the hospital with generalized weakness for the past one week. The nephoplogy team was consulted for management of hemodialysis.    ESRD on HD   Center: [facility]  Nephrologist: Jhonatan Smith   Schedule: MWF   Access; LUE AVF   las outpatient HD on 12/18/24    plan   plan for HD today   discharge following HD today; please remind patient she needs to return for outpatient HD at [facility] on Sunday due to holiday schedule  HD consent obtained and placed in chart  UF as tolerated by BP   please dose medications as per ESRD     Anemia   in setting of kidney disease  hbg at goal  monitor hbg     If any questions, please feel free to contact me     Jhonatan Smith  Nephrology Attending  Cell #[phone]    
79 year old female with PMH of ESRD on HD MWF, HTN, Anemia of chornic disease, HLD, DM, and JARVIS who presented to the hospital with generalized weakness for the past one week. The nephoplogy team was consulted for management of hemodialysis.    ESRD on HD   Center: [facility]  Nephrologist: Jhonatan Smith   Schedule: MWF   Access; LUE AVF   las outpatient HD on 12/18/24    plan   s/p HD yesterday as per holiday schedule   next HD for Friday  HD consent obtained and placed in chart  UF as tolerated by BP   please dose medications as per ESRD     Anemia   in setting of kidney disease  hbg below goal now; start EPO 10k units with HD    monitor hbg     If any questions, please feel free to contact me     Jhonatan Smith  Nephrology Attending  Cell #[phone]    
79 year old female with PMH of ESRD on HD MWF, HTN, Anemia of chornic disease, HLD, DM, and JARVIS who presented to the hospital with generalized weakness for the past one week. The nephoplogy team was consulted for management of hemodialysis.    ESRD on HD   Center: [facility]  Nephrologist: Jhonatan Smith   Schedule: MWF   Access; LUE AVF   las outpatient HD on 12/18/24    plan   labs reviewed  s/p HD yesterday-- tolerated well  Plan for next HD tomm ( holiday schedule)  HD consent obtained and placed in chart  UF as tolerated by BP   please dose medications as per ESRD     Anemia   in setting of kidney disease  hbg at goal right now   will monitor for DEE needs     If any questions, please feel free to contact me     Dr Taylor  [phone]
79 year old female with PMH of ESRD on HD MWF, HTN, Anemia of chornic disease, HLD, DM, and JARVIS who presented to the hospital with generalized weakness for the past one week. The nephoplogy team was consulted for management of hemodialysis.    ESRD on HD   Center: [facility]  Nephrologist: Jhonatan Smith   Schedule: MWF   Access; LUE AVF   las outpatient HD on 12/18/24    plan   ot seen on hd- tolerating well- bp stable- asymptomatic-- will cont to monitor  HD consent obtained and placed in chart  UF as tolerated by BP   please dose medications as per ESRD     Anemia   in setting of kidney disease  hbg at goal right now   will monitor for DEE needs     If any questions, please feel free to contact me     Dr Taylor  [phone]
79 year old female with PMH of ESRD on HD MWF, HTN, Anemia of chornic disease, HLD, DM, and JARVIS who presented to the hospital with generalized weakness for the past one week. The nephoplogy team was consulted for management of hemodialysis.    ESRD on HD   Center: [facility]  Nephrologist: Jhonatan Smith   Schedule: MWF   Access; LUE AVF   las outpatient HD on 12/18/24    plan   plan for next HD tomorrow  HD consent obtained and placed in chart  UF as tolerated by BP   please dose medications as per ESRD     Anemia   in setting of kidney disease  hbg below goal now; start EPO 10k units with HD    monitor hbg     If any questions, please feel free to contact me     Jhonatan Smith  Nephrology Attending  Cell #[phone]  
79 year old female with PMH of ESRD on HD MWF, HTN, Anemia of chornic disease, HLD, DM, and JARVIS who presented to the hospital with generalized weakness for the past one week. The nephoplogy team was consulted for management of hemodialysis.    ESRD on HD   Center: [facility]  Nephrologist: Jhonatan Smith   Schedule: MWF   Access; LUE AVF   las outpatient HD on 12/18/24    plan   pt seen and examined on HD- tolerating HD well- bp stable- no complaints  next HD tuesday ( holiday schedule )  HD consent obtained and placed in chart  UF as tolerated by BP   please dose medications as per ESRD     Anemia   in setting of kidney disease  hbg at goal right now   will monitor for DEE needs     If any questions, please feel free to contact me     Dr Taylor  [phone]
79 year old female with PMH of ESRD on HD MWF, HTN, Anemia of chornic disease, HLD, DM, and JARVIS who presented to the hospital with generalized weakness for the past one week. The nephoplogy team was consulted for management of hemodialysis.    ESRD on HD   Center: [facility]  Nephrologist: Jhonatan Smith   Schedule: MWF   Access; LUE AVF   las outpatient HD on 12/18/24    plan   s/p HD yesterday- tolerated well  Plan for next HD tomm  HD consent obtained and placed in chart  UF as tolerated by BP   please dose medications as per ESRD     Anemia   in setting of kidney disease  hbg at goal right now   will monitor for DEE needs     If any questions, please feel free to contact me     Dr Taylor  [phone]
79yoF w/ PMHx of ESRD MWF HTN, HLD, T2DM, JARVIS on CPAP who presents for generalized weakness and intermittent CP. 
79 ESRD MWF HTN, HLD, T2DM, JARVIS on CPAP who presents for generalized weakness
79yoF w/ PMHx of ESRD MWF HTN, HLD, T2DM, JARVIS on CPAP who presents for generalized weakness and intermittent CP. 

## 2024-12-27 NOTE — PROGRESS NOTE ADULT - SUBJECTIVE AND OBJECTIVE BOX
DATE OF SERVICE: 12-27-24 @ 09:33    Patient is a 79y old  Female who presents with a chief complaint of Weakness (27 Dec 2024 06:59)      INTERVAL HISTORY: in no acute distress    REVIEW OF SYSTEMS:  CONSTITUTIONAL: No weakness  EYES/ENT: No visual changes;  No throat pain   NECK: No pain or stiffness  RESPIRATORY: No cough, wheezing; No shortness of breath  CARDIOVASCULAR: No chest pain or palpitations  GASTROINTESTINAL: No abdominal  pain. No nausea, vomiting, or hematemesis  GENITOURINARY: No dysuria, frequency or hematuria  NEUROLOGICAL: No stroke like symptoms  SKIN: No rashes    TELEMETRY Personally reviewed: off tele  	  MEDICATIONS:  midodrine. 10 milliGRAM(s) Oral <User Schedule>        PHYSICAL EXAM:  T(C): 36.2 (12-27-24 @ 08:35), Max: 37 (12-26-24 @ 20:23)  HR: 70 (12-27-24 @ 08:35) (69 - 78)  BP: 129/72 (12-27-24 @ 08:35) (129/72 - 145/73)  RR: 16 (12-27-24 @ 08:35) (16 - 18)  SpO2: 95% (12-27-24 @ 08:35) (95% - 98%)  Wt(kg): --  I&O's Summary        Appearance: In no distress	  HEENT:    PERRL, EOMI	  Cardiovascular:  S1 S2, No JVD  Respiratory: Lungs clear to auscultation	  Gastrointestinal:  Soft, Non-tender, + BS	  Vascularature:  No edema of LE  Psychiatric: Appropriate affect   Neuro: no acute focal deficits                               10.4   5.12  )-----------( 171      ( 25 Dec 2024 17:02 )             32.8     12-26    131[L]  |  90[L]  |  64[H]  ----------------------------<  64[L]  4.9   |  22  |  8.31[H]    Ca    10.0      26 Dec 2024 07:33  Phos  5.0     12-26  Mg     2.4     12-26          Labs personally reviewed      ASSESSMENT/PLAN: 	          79yoF w/ PMHx of ESRD MWF HTN, HLD, T2DM, JARVIS on CPAP who presents for generalized weakness and intermittent CP.          Problem/Plan - 1:  ·  Problem: Chest pain  ·  Plan: Atypical chest pain  Trop elevation nonspecific iso ESRD but not uptrending and chronically elevated dating back to 2021  TTE 12/23 - EF 65%, no WMA, normal filling pressure, no pericardial effusion, mild aortic stenosis   EKG nonischemic   NM stress test ordered but unable to complete as she is unable to lift her arm  D/w pt and daughter option of cath instead, they both decline  OP follow up for testing when she can lift her arm     Problem/Plan - 2:  ·  Problem: Palpitations  ·  Plan:  OP monitor to r/o occult arrythmia  - was on tele, d/brett tele 12/26     Problem/Plan - 3:  ·  Problem: HLD (hyperlipidemia).   ·  Plan: Continue Statin.     Problem/Plan - 4:  ·  Problem: Need for prophylactic measure.   ·  Plan: DVT PPx: Heparin subq    No further inpt cardiac work up, OP follow up  D/c planning    TUCKER Miranda DO [facility]  Cardiovascular Medicine  [address] [place] [address], [address]  Available through call or text on [employer]  Office: [phone]

## 2024-12-27 NOTE — DISCHARGE NOTE NURSING/CASE MANAGEMENT/SOCIAL WORK - FINANCIAL ASSISTANCE
[facility] provides services at a reduced cost to those who are determined to be eligible through [facility]’s financial assistance program. Information regarding [facility]’s financial assistance program can be found by going to [email or web address] or by calling [phone].

## 2024-12-27 NOTE — DISCHARGE NOTE NURSING/CASE MANAGEMENT/SOCIAL WORK - NSDCVIVACCINE_GEN_ALL_CORE_FT
influenza, injectable, quadrivalent, preservative free; 11-Mar-2016 15:41; Yi Baker (RN); [employer]; [id number]; IntraMuscular; Dorsogluteal Right.; 0.5 milliLiter(s); VIS (VIS Published: 07-Aug-2015, VIS Presented: 11-Mar-2016);

## 2024-12-28 LAB
GLUCOSE BLDC GLUCOMTR-MCNC: 130 MG/DL — HIGH (ref 70–99)
GLUCOSE BLDC GLUCOMTR-MCNC: 79 MG/DL — SIGNIFICANT CHANGE UP (ref 70–99)

## 2025-01-02 NOTE — ED PROVIDER NOTE - SKIN, MLM
Pt's last OV was on 7/1/24.  Refill sent.   
Skin normal color for race, warm, dry and intact. No evidence of rash.

## 2025-01-13 ENCOUNTER — INPATIENT (INPATIENT)
Facility: HOSPITAL | Age: 80
LOS: 4 days | Discharge: HOME | End: 2025-01-18
Attending: INTERNAL MEDICINE | Admitting: INTERNAL MEDICINE
Payer: MEDICARE

## 2025-01-13 VITALS
HEIGHT: 64 IN | RESPIRATION RATE: 17 BRPM | DIASTOLIC BLOOD PRESSURE: 84 MMHG | HEART RATE: 68 BPM | TEMPERATURE: 98 F | OXYGEN SATURATION: 100 % | SYSTOLIC BLOOD PRESSURE: 132 MMHG | WEIGHT: 184.97 LBS

## 2025-01-13 DIAGNOSIS — Z98.890 OTHER SPECIFIED POSTPROCEDURAL STATES: Chronic | ICD-10-CM

## 2025-01-13 DIAGNOSIS — Z98.49 CATARACT EXTRACTION STATUS, UNSPECIFIED EYE: Chronic | ICD-10-CM

## 2025-01-13 DIAGNOSIS — Z96.659 PRESENCE OF UNSPECIFIED ARTIFICIAL KNEE JOINT: Chronic | ICD-10-CM

## 2025-01-13 DIAGNOSIS — Z98.89 OTHER SPECIFIED POSTPROCEDURAL STATES: Chronic | ICD-10-CM

## 2025-01-13 DIAGNOSIS — Z90.710 ACQUIRED ABSENCE OF BOTH CERVIX AND UTERUS: Chronic | ICD-10-CM

## 2025-01-13 LAB
APTT BLD: 33.6 SEC — SIGNIFICANT CHANGE UP (ref 24.5–35.6)
BASOPHILS # BLD AUTO: 0.02 K/UL — SIGNIFICANT CHANGE UP (ref 0–0.2)
BASOPHILS NFR BLD AUTO: 0.4 % — SIGNIFICANT CHANGE UP (ref 0–2)
EOSINOPHIL # BLD AUTO: 0.06 K/UL — SIGNIFICANT CHANGE UP (ref 0–0.5)
EOSINOPHIL NFR BLD AUTO: 1.1 % — SIGNIFICANT CHANGE UP (ref 0–6)
HCT VFR BLD CALC: 30.8 % — LOW (ref 34.5–45)
HGB BLD-MCNC: 9.9 G/DL — LOW (ref 11.5–15.5)
IMM GRANULOCYTES NFR BLD AUTO: 0.6 % — SIGNIFICANT CHANGE UP (ref 0–0.9)
INR BLD: 0.96 RATIO — SIGNIFICANT CHANGE UP (ref 0.85–1.16)
LYMPHOCYTES # BLD AUTO: 1.18 K/UL — SIGNIFICANT CHANGE UP (ref 1–3.3)
LYMPHOCYTES # BLD AUTO: 21.9 % — SIGNIFICANT CHANGE UP (ref 13–44)
MCHC RBC-ENTMCNC: 29.6 PG — SIGNIFICANT CHANGE UP (ref 27–34)
MCHC RBC-ENTMCNC: 32.1 G/DL — SIGNIFICANT CHANGE UP (ref 32–36)
MCV RBC AUTO: 91.9 FL — SIGNIFICANT CHANGE UP (ref 80–100)
MONOCYTES # BLD AUTO: 0.52 K/UL — SIGNIFICANT CHANGE UP (ref 0–0.9)
MONOCYTES NFR BLD AUTO: 9.6 % — SIGNIFICANT CHANGE UP (ref 2–14)
NEUTROPHILS # BLD AUTO: 3.59 K/UL — SIGNIFICANT CHANGE UP (ref 1.8–7.4)
NEUTROPHILS NFR BLD AUTO: 66.4 % — SIGNIFICANT CHANGE UP (ref 43–77)
NRBC # BLD: 0 /100 WBCS — SIGNIFICANT CHANGE UP (ref 0–0)
NRBC BLD-RTO: 0 /100 WBCS — SIGNIFICANT CHANGE UP (ref 0–0)
PLATELET # BLD AUTO: 191 K/UL — SIGNIFICANT CHANGE UP (ref 150–400)
PROTHROM AB SERPL-ACNC: 11.1 SEC — SIGNIFICANT CHANGE UP (ref 9.9–13.4)
RBC # BLD: 3.35 M/UL — LOW (ref 3.8–5.2)
RBC # FLD: 18.7 % — HIGH (ref 10.3–14.5)
WBC # BLD: 5.4 K/UL — SIGNIFICANT CHANGE UP (ref 3.8–10.5)
WBC # FLD AUTO: 5.4 K/UL — SIGNIFICANT CHANGE UP (ref 3.8–10.5)

## 2025-01-13 PROCEDURE — 71045 X-RAY EXAM CHEST 1 VIEW: CPT | Mod: 26

## 2025-01-13 PROCEDURE — 99285 EMERGENCY DEPT VISIT HI MDM: CPT

## 2025-01-13 PROCEDURE — 93010 ELECTROCARDIOGRAM REPORT: CPT

## 2025-01-14 DIAGNOSIS — R07.89 OTHER CHEST PAIN: ICD-10-CM

## 2025-01-14 DIAGNOSIS — R79.89 OTHER SPECIFIED ABNORMAL FINDINGS OF BLOOD CHEMISTRY: ICD-10-CM

## 2025-01-14 DIAGNOSIS — N18.6 END STAGE RENAL DISEASE: ICD-10-CM

## 2025-01-14 DIAGNOSIS — E78.5 HYPERLIPIDEMIA, UNSPECIFIED: ICD-10-CM

## 2025-01-14 DIAGNOSIS — I25.10 ATHEROSCLEROTIC HEART DISEASE OF NATIVE CORONARY ARTERY WITHOUT ANGINA PECTORIS: ICD-10-CM

## 2025-01-14 LAB
ALBUMIN SERPL ELPH-MCNC: 3.3 G/DL — SIGNIFICANT CHANGE UP (ref 3.3–5)
ALBUMIN SERPL ELPH-MCNC: 3.4 G/DL — SIGNIFICANT CHANGE UP (ref 3.3–5)
ALP SERPL-CCNC: 523 U/L — HIGH (ref 40–120)
ALP SERPL-CCNC: 537 U/L — HIGH (ref 40–120)
ALT FLD-CCNC: 14 U/L — SIGNIFICANT CHANGE UP (ref 12–78)
ALT FLD-CCNC: 14 U/L — SIGNIFICANT CHANGE UP (ref 12–78)
ANION GAP SERPL CALC-SCNC: 5 MMOL/L — SIGNIFICANT CHANGE UP (ref 5–17)
ANION GAP SERPL CALC-SCNC: 5 MMOL/L — SIGNIFICANT CHANGE UP (ref 5–17)
AST SERPL-CCNC: 18 U/L — SIGNIFICANT CHANGE UP (ref 15–37)
AST SERPL-CCNC: 25 U/L — SIGNIFICANT CHANGE UP (ref 15–37)
BILIRUB SERPL-MCNC: 0.4 MG/DL — SIGNIFICANT CHANGE UP (ref 0.2–1.2)
BILIRUB SERPL-MCNC: 0.5 MG/DL — SIGNIFICANT CHANGE UP (ref 0.2–1.2)
BUN SERPL-MCNC: 27 MG/DL — HIGH (ref 7–23)
BUN SERPL-MCNC: 30 MG/DL — HIGH (ref 7–23)
CALCIUM SERPL-MCNC: 9 MG/DL — SIGNIFICANT CHANGE UP (ref 8.5–10.1)
CALCIUM SERPL-MCNC: 9.2 MG/DL — SIGNIFICANT CHANGE UP (ref 8.5–10.1)
CHLORIDE SERPL-SCNC: 98 MMOL/L — SIGNIFICANT CHANGE UP (ref 96–108)
CHLORIDE SERPL-SCNC: 99 MMOL/L — SIGNIFICANT CHANGE UP (ref 96–108)
CK MB BLD-MCNC: <1.6 % — SIGNIFICANT CHANGE UP (ref 0–3.5)
CK MB CFR SERPL CALC: <1 NG/ML — SIGNIFICANT CHANGE UP (ref 0.5–3.6)
CK SERPL-CCNC: 63 U/L — SIGNIFICANT CHANGE UP (ref 26–192)
CO2 SERPL-SCNC: 28 MMOL/L — SIGNIFICANT CHANGE UP (ref 22–31)
CO2 SERPL-SCNC: 30 MMOL/L — SIGNIFICANT CHANGE UP (ref 22–31)
CREAT SERPL-MCNC: 6 MG/DL — HIGH (ref 0.5–1.3)
CREAT SERPL-MCNC: 6.83 MG/DL — HIGH (ref 0.5–1.3)
EGFR: 6 ML/MIN/1.73M2 — LOW
EGFR: 6 ML/MIN/1.73M2 — LOW
EGFR: 7 ML/MIN/1.73M2 — LOW
EGFR: 7 ML/MIN/1.73M2 — LOW
GLUCOSE BLDC GLUCOMTR-MCNC: 121 MG/DL — HIGH (ref 70–99)
GLUCOSE BLDC GLUCOMTR-MCNC: 142 MG/DL — HIGH (ref 70–99)
GLUCOSE BLDC GLUCOMTR-MCNC: 153 MG/DL — HIGH (ref 70–99)
GLUCOSE BLDC GLUCOMTR-MCNC: 161 MG/DL — HIGH (ref 70–99)
GLUCOSE SERPL-MCNC: 132 MG/DL — HIGH (ref 70–99)
GLUCOSE SERPL-MCNC: 89 MG/DL — SIGNIFICANT CHANGE UP (ref 70–99)
HCT VFR BLD CALC: 31.6 % — LOW (ref 34.5–45)
HGB BLD-MCNC: 10.1 G/DL — LOW (ref 11.5–15.5)
MAGNESIUM SERPL-MCNC: 2.3 MG/DL — SIGNIFICANT CHANGE UP (ref 1.6–2.6)
MAGNESIUM SERPL-MCNC: 2.4 MG/DL — SIGNIFICANT CHANGE UP (ref 1.6–2.6)
MCHC RBC-ENTMCNC: 29.3 PG — SIGNIFICANT CHANGE UP (ref 27–34)
MCHC RBC-ENTMCNC: 32 G/DL — SIGNIFICANT CHANGE UP (ref 32–36)
MCV RBC AUTO: 91.6 FL — SIGNIFICANT CHANGE UP (ref 80–100)
NRBC # BLD: 0 /100 WBCS — SIGNIFICANT CHANGE UP (ref 0–0)
NRBC BLD-RTO: 0 /100 WBCS — SIGNIFICANT CHANGE UP (ref 0–0)
PHOSPHATE SERPL-MCNC: 2.7 MG/DL — SIGNIFICANT CHANGE UP (ref 2.5–4.5)
PLATELET # BLD AUTO: 123 K/UL — LOW (ref 150–400)
POTASSIUM SERPL-MCNC: 4.3 MMOL/L — SIGNIFICANT CHANGE UP (ref 3.5–5.3)
POTASSIUM SERPL-MCNC: 4.7 MMOL/L — SIGNIFICANT CHANGE UP (ref 3.5–5.3)
POTASSIUM SERPL-SCNC: 4.3 MMOL/L — SIGNIFICANT CHANGE UP (ref 3.5–5.3)
POTASSIUM SERPL-SCNC: 4.7 MMOL/L — SIGNIFICANT CHANGE UP (ref 3.5–5.3)
PROT SERPL-MCNC: 7.5 GM/DL — SIGNIFICANT CHANGE UP (ref 6–8.3)
PROT SERPL-MCNC: 7.5 GM/DL — SIGNIFICANT CHANGE UP (ref 6–8.3)
RBC # BLD: 3.45 M/UL — LOW (ref 3.8–5.2)
RBC # FLD: 18.7 % — HIGH (ref 10.3–14.5)
SODIUM SERPL-SCNC: 132 MMOL/L — LOW (ref 135–145)
SODIUM SERPL-SCNC: 133 MMOL/L — LOW (ref 135–145)
TROPONIN I, HIGH SENSITIVITY RESULT: 49.7 NG/L — SIGNIFICANT CHANGE UP
TROPONIN I, HIGH SENSITIVITY RESULT: 55.7 NG/L — HIGH
TROPONIN I, HIGH SENSITIVITY RESULT: 56.2 NG/L — HIGH
TSH SERPL-MCNC: 1.6 UU/ML — SIGNIFICANT CHANGE UP (ref 0.36–3.74)
WBC # BLD: 5.81 K/UL — SIGNIFICANT CHANGE UP (ref 3.8–10.5)
WBC # FLD AUTO: 5.81 K/UL — SIGNIFICANT CHANGE UP (ref 3.8–10.5)

## 2025-01-14 PROCEDURE — 99222 1ST HOSP IP/OBS MODERATE 55: CPT

## 2025-01-14 PROCEDURE — 99284 EMERGENCY DEPT VISIT MOD MDM: CPT

## 2025-01-14 RX ORDER — INSULIN LISPRO 100 U/ML
INJECTION, SOLUTION INTRAVENOUS; SUBCUTANEOUS AT BEDTIME
Refills: 0 | Status: DISCONTINUED | OUTPATIENT
Start: 2025-01-14 | End: 2025-01-18

## 2025-01-14 RX ORDER — INSULIN LISPRO 100 U/ML
INJECTION, SOLUTION INTRAVENOUS; SUBCUTANEOUS
Refills: 0 | Status: DISCONTINUED | OUTPATIENT
Start: 2025-01-14 | End: 2025-01-18

## 2025-01-14 RX ORDER — MELATONIN 5 MG
3 TABLET ORAL AT BEDTIME
Refills: 0 | Status: DISCONTINUED | OUTPATIENT
Start: 2025-01-14 | End: 2025-01-18

## 2025-01-14 RX ORDER — SEVELAMER HYDROCHLORIDE 800 MG/1
1600 TABLET ORAL
Refills: 0 | Status: DISCONTINUED | OUTPATIENT
Start: 2025-01-14 | End: 2025-01-18

## 2025-01-14 RX ORDER — SODIUM CHLORIDE 9 G/1000ML
1000 INJECTION, SOLUTION INTRAVENOUS
Refills: 0 | Status: DISCONTINUED | OUTPATIENT
Start: 2025-01-14 | End: 2025-01-18

## 2025-01-14 RX ORDER — DEXTROSE 50 % IN WATER 50 %
25 SYRINGE (ML) INTRAVENOUS ONCE
Refills: 0 | Status: DISCONTINUED | OUTPATIENT
Start: 2025-01-14 | End: 2025-01-18

## 2025-01-14 RX ORDER — DEXTROSE 50 % IN WATER 50 %
15 SYRINGE (ML) INTRAVENOUS ONCE
Refills: 0 | Status: DISCONTINUED | OUTPATIENT
Start: 2025-01-14 | End: 2025-01-18

## 2025-01-14 RX ORDER — ACETAMINOPHEN 500 MG/5ML
650 LIQUID (ML) ORAL EVERY 6 HOURS
Refills: 0 | Status: DISCONTINUED | OUTPATIENT
Start: 2025-01-14 | End: 2025-01-18

## 2025-01-14 RX ORDER — INSULIN GLARGINE-YFGN 100 [IU]/ML
10 INJECTION, SOLUTION SUBCUTANEOUS AT BEDTIME
Refills: 0 | Status: DISCONTINUED | OUTPATIENT
Start: 2025-01-14 | End: 2025-01-15

## 2025-01-14 RX ORDER — GLUCAGON 3 MG/1
1 POWDER NASAL ONCE
Refills: 0 | Status: DISCONTINUED | OUTPATIENT
Start: 2025-01-14 | End: 2025-01-18

## 2025-01-14 RX ORDER — ATORVASTATIN CALCIUM 80 MG/1
40 TABLET, FILM COATED ORAL AT BEDTIME
Refills: 0 | Status: DISCONTINUED | OUTPATIENT
Start: 2025-01-14 | End: 2025-01-18

## 2025-01-14 RX ORDER — ASPIRIN 325 MG
81 TABLET ORAL DAILY
Refills: 0 | Status: DISCONTINUED | OUTPATIENT
Start: 2025-01-14 | End: 2025-01-18

## 2025-01-14 RX ORDER — HEPARIN SODIUM 1000 [USP'U]/ML
5000 INJECTION INTRAVENOUS; SUBCUTANEOUS EVERY 12 HOURS
Refills: 0 | Status: DISCONTINUED | OUTPATIENT
Start: 2025-01-14 | End: 2025-01-18

## 2025-01-14 RX ORDER — MIDODRINE HYDROCHLORIDE 5 MG/1
10 TABLET ORAL
Refills: 0 | Status: DISCONTINUED | OUTPATIENT
Start: 2025-01-14 | End: 2025-01-18

## 2025-01-14 RX ORDER — DEXTROSE 50 % IN WATER 50 %
12.5 SYRINGE (ML) INTRAVENOUS ONCE
Refills: 0 | Status: DISCONTINUED | OUTPATIENT
Start: 2025-01-14 | End: 2025-01-18

## 2025-01-14 RX ADMIN — Medication 650 MILLIGRAM(S): at 16:56

## 2025-01-14 RX ADMIN — ATORVASTATIN CALCIUM 40 MILLIGRAM(S): 80 TABLET, FILM COATED ORAL at 23:24

## 2025-01-14 RX ADMIN — INSULIN GLARGINE-YFGN 10 UNIT(S): 100 INJECTION, SOLUTION SUBCUTANEOUS at 23:24

## 2025-01-14 RX ADMIN — SEVELAMER HYDROCHLORIDE 1600 MILLIGRAM(S): 800 TABLET ORAL at 11:49

## 2025-01-14 RX ADMIN — Medication 650 MILLIGRAM(S): at 16:06

## 2025-01-14 RX ADMIN — Medication 81 MILLIGRAM(S): at 11:49

## 2025-01-14 RX ADMIN — HEPARIN SODIUM 5000 UNIT(S): 1000 INJECTION INTRAVENOUS; SUBCUTANEOUS at 17:40

## 2025-01-14 RX ADMIN — SEVELAMER HYDROCHLORIDE 1600 MILLIGRAM(S): 800 TABLET ORAL at 17:39

## 2025-01-14 RX ADMIN — INSULIN LISPRO 1: 100 INJECTION, SOLUTION INTRAVENOUS; SUBCUTANEOUS at 11:55

## 2025-01-15 LAB
ALBUMIN SERPL ELPH-MCNC: 3.4 G/DL — SIGNIFICANT CHANGE UP (ref 3.3–5)
ALP SERPL-CCNC: 491 U/L — HIGH (ref 40–120)
ALT FLD-CCNC: 12 U/L — SIGNIFICANT CHANGE UP (ref 12–78)
ANION GAP SERPL CALC-SCNC: 7 MMOL/L — SIGNIFICANT CHANGE UP (ref 5–17)
AST SERPL-CCNC: 13 U/L — LOW (ref 15–37)
BILIRUB SERPL-MCNC: 0.4 MG/DL — SIGNIFICANT CHANGE UP (ref 0.2–1.2)
BUN SERPL-MCNC: 36 MG/DL — HIGH (ref 7–23)
CALCIUM SERPL-MCNC: 9 MG/DL — SIGNIFICANT CHANGE UP (ref 8.5–10.1)
CHLORIDE SERPL-SCNC: 97 MMOL/L — SIGNIFICANT CHANGE UP (ref 96–108)
CO2 SERPL-SCNC: 30 MMOL/L — SIGNIFICANT CHANGE UP (ref 22–31)
CREAT SERPL-MCNC: 7.23 MG/DL — HIGH (ref 0.5–1.3)
EGFR: 5 ML/MIN/1.73M2 — LOW
EGFR: 5 ML/MIN/1.73M2 — LOW
GLUCOSE BLDC GLUCOMTR-MCNC: 106 MG/DL — HIGH (ref 70–99)
GLUCOSE BLDC GLUCOMTR-MCNC: 107 MG/DL — HIGH (ref 70–99)
GLUCOSE BLDC GLUCOMTR-MCNC: 156 MG/DL — HIGH (ref 70–99)
GLUCOSE BLDC GLUCOMTR-MCNC: 93 MG/DL — SIGNIFICANT CHANGE UP (ref 70–99)
GLUCOSE SERPL-MCNC: 109 MG/DL — HIGH (ref 70–99)
HCT VFR BLD CALC: 30.7 % — LOW (ref 34.5–45)
HGB BLD-MCNC: 9.8 G/DL — LOW (ref 11.5–15.5)
MAGNESIUM SERPL-MCNC: 2.4 MG/DL — SIGNIFICANT CHANGE UP (ref 1.6–2.6)
MCHC RBC-ENTMCNC: 29.3 PG — SIGNIFICANT CHANGE UP (ref 27–34)
MCHC RBC-ENTMCNC: 31.9 G/DL — LOW (ref 32–36)
MCV RBC AUTO: 91.6 FL — SIGNIFICANT CHANGE UP (ref 80–100)
NRBC # BLD: 0 /100 WBCS — SIGNIFICANT CHANGE UP (ref 0–0)
NRBC BLD-RTO: 0 /100 WBCS — SIGNIFICANT CHANGE UP (ref 0–0)
PHOSPHATE SERPL-MCNC: 2.6 MG/DL — SIGNIFICANT CHANGE UP (ref 2.5–4.5)
PLATELET # BLD AUTO: 189 K/UL — SIGNIFICANT CHANGE UP (ref 150–400)
POTASSIUM SERPL-MCNC: 3.8 MMOL/L — SIGNIFICANT CHANGE UP (ref 3.5–5.3)
POTASSIUM SERPL-SCNC: 3.8 MMOL/L — SIGNIFICANT CHANGE UP (ref 3.5–5.3)
PROT SERPL-MCNC: 7.3 GM/DL — SIGNIFICANT CHANGE UP (ref 6–8.3)
RBC # BLD: 3.35 M/UL — LOW (ref 3.8–5.2)
RBC # FLD: 18.6 % — HIGH (ref 10.3–14.5)
SODIUM SERPL-SCNC: 134 MMOL/L — LOW (ref 135–145)
T4 FREE SERPL-MCNC: 1.1 NG/DL — SIGNIFICANT CHANGE UP (ref 0.9–1.7)
WBC # BLD: 5.26 K/UL — SIGNIFICANT CHANGE UP (ref 3.8–10.5)
WBC # FLD AUTO: 5.26 K/UL — SIGNIFICANT CHANGE UP (ref 3.8–10.5)

## 2025-01-15 PROCEDURE — 99284 EMERGENCY DEPT VISIT MOD MDM: CPT

## 2025-01-15 PROCEDURE — 99232 SBSQ HOSP IP/OBS MODERATE 35: CPT

## 2025-01-15 RX ADMIN — HEPARIN SODIUM 5000 UNIT(S): 1000 INJECTION INTRAVENOUS; SUBCUTANEOUS at 18:09

## 2025-01-15 RX ADMIN — ATORVASTATIN CALCIUM 40 MILLIGRAM(S): 80 TABLET, FILM COATED ORAL at 21:28

## 2025-01-15 RX ADMIN — MIDODRINE HYDROCHLORIDE 10 MILLIGRAM(S): 5 TABLET ORAL at 13:08

## 2025-01-15 RX ADMIN — SEVELAMER HYDROCHLORIDE 1600 MILLIGRAM(S): 800 TABLET ORAL at 12:34

## 2025-01-15 RX ADMIN — SEVELAMER HYDROCHLORIDE 1600 MILLIGRAM(S): 800 TABLET ORAL at 09:54

## 2025-01-15 RX ADMIN — HEPARIN SODIUM 5000 UNIT(S): 1000 INJECTION INTRAVENOUS; SUBCUTANEOUS at 05:19

## 2025-01-15 RX ADMIN — Medication 81 MILLIGRAM(S): at 12:34

## 2025-01-15 RX ADMIN — SEVELAMER HYDROCHLORIDE 1600 MILLIGRAM(S): 800 TABLET ORAL at 18:10

## 2025-01-16 ENCOUNTER — TRANSCRIPTION ENCOUNTER (OUTPATIENT)
Age: 80
End: 2025-01-16

## 2025-01-16 LAB
GLUCOSE BLDC GLUCOMTR-MCNC: 115 MG/DL — HIGH (ref 70–99)
GLUCOSE BLDC GLUCOMTR-MCNC: 226 MG/DL — HIGH (ref 70–99)
GLUCOSE BLDC GLUCOMTR-MCNC: 227 MG/DL — HIGH (ref 70–99)

## 2025-01-16 PROCEDURE — 99232 SBSQ HOSP IP/OBS MODERATE 35: CPT

## 2025-01-16 PROCEDURE — 99223 1ST HOSP IP/OBS HIGH 75: CPT

## 2025-01-16 RX ORDER — INFLUENZA A VIRUS A/IDAHO/07/2018 (H1N1) ANTIGEN (MDCK CELL DERIVED, PROPIOLACTONE INACTIVATED, INFLUENZA A VIRUS A/INDIANA/08/2018 (H3N2) ANTIGEN (MDCK CELL DERIVED, PROPIOLACTONE INACTIVATED), INFLUENZA B VIRUS B/SINGAPORE/INFTT-16-0610/2016 ANTIGEN (MDCK CELL DERIVED, PROPIOLACTONE INACTIVATED), INFLUENZA B VIRUS B/IOWA/06/2017 ANTIGEN (MDCK CELL DERIVED, PROPIOLACTONE INACTIVATED) 15; 15; 15; 15 UG/.5ML; UG/.5ML; UG/.5ML; UG/.5ML
0.5 INJECTION, SUSPENSION INTRAMUSCULAR ONCE
Refills: 0 | Status: DISCONTINUED | OUTPATIENT
Start: 2025-01-16 | End: 2025-01-18

## 2025-01-16 RX ADMIN — HEPARIN SODIUM 5000 UNIT(S): 1000 INJECTION INTRAVENOUS; SUBCUTANEOUS at 05:47

## 2025-01-16 RX ADMIN — HEPARIN SODIUM 5000 UNIT(S): 1000 INJECTION INTRAVENOUS; SUBCUTANEOUS at 17:49

## 2025-01-16 RX ADMIN — ATORVASTATIN CALCIUM 40 MILLIGRAM(S): 80 TABLET, FILM COATED ORAL at 22:13

## 2025-01-16 RX ADMIN — SEVELAMER HYDROCHLORIDE 1600 MILLIGRAM(S): 800 TABLET ORAL at 09:46

## 2025-01-16 RX ADMIN — SEVELAMER HYDROCHLORIDE 1600 MILLIGRAM(S): 800 TABLET ORAL at 16:56

## 2025-01-16 RX ADMIN — INSULIN LISPRO 2: 100 INJECTION, SOLUTION INTRAVENOUS; SUBCUTANEOUS at 17:49

## 2025-01-17 LAB
GLUCOSE BLDC GLUCOMTR-MCNC: 102 MG/DL — HIGH (ref 70–99)
GLUCOSE BLDC GLUCOMTR-MCNC: 122 MG/DL — HIGH (ref 70–99)
GLUCOSE BLDC GLUCOMTR-MCNC: 228 MG/DL — HIGH (ref 70–99)
MRSA PCR RESULT.: SIGNIFICANT CHANGE UP
S AUREUS DNA NOSE QL NAA+PROBE: SIGNIFICANT CHANGE UP

## 2025-01-17 PROCEDURE — 99233 SBSQ HOSP IP/OBS HIGH 50: CPT

## 2025-01-17 PROCEDURE — 99232 SBSQ HOSP IP/OBS MODERATE 35: CPT

## 2025-01-17 RX ADMIN — SEVELAMER HYDROCHLORIDE 1600 MILLIGRAM(S): 800 TABLET ORAL at 09:09

## 2025-01-17 RX ADMIN — SEVELAMER HYDROCHLORIDE 1600 MILLIGRAM(S): 800 TABLET ORAL at 16:57

## 2025-01-17 RX ADMIN — HEPARIN SODIUM 5000 UNIT(S): 1000 INJECTION INTRAVENOUS; SUBCUTANEOUS at 05:20

## 2025-01-17 RX ADMIN — MIDODRINE HYDROCHLORIDE 10 MILLIGRAM(S): 5 TABLET ORAL at 09:10

## 2025-01-17 RX ADMIN — HEPARIN SODIUM 5000 UNIT(S): 1000 INJECTION INTRAVENOUS; SUBCUTANEOUS at 17:02

## 2025-01-17 RX ADMIN — ATORVASTATIN CALCIUM 40 MILLIGRAM(S): 80 TABLET, FILM COATED ORAL at 22:25

## 2025-01-17 RX ADMIN — INSULIN LISPRO 2: 100 INJECTION, SOLUTION INTRAVENOUS; SUBCUTANEOUS at 16:56

## 2025-01-18 ENCOUNTER — TRANSCRIPTION ENCOUNTER (OUTPATIENT)
Age: 80
End: 2025-01-18

## 2025-01-18 VITALS
HEART RATE: 97 BPM | SYSTOLIC BLOOD PRESSURE: 109 MMHG | RESPIRATION RATE: 18 BRPM | OXYGEN SATURATION: 97 % | TEMPERATURE: 98 F | DIASTOLIC BLOOD PRESSURE: 67 MMHG

## 2025-01-18 LAB
GLUCOSE BLDC GLUCOMTR-MCNC: 130 MG/DL — HIGH (ref 70–99)
GLUCOSE BLDC GLUCOMTR-MCNC: 251 MG/DL — HIGH (ref 70–99)

## 2025-01-18 PROCEDURE — 99239 HOSP IP/OBS DSCHRG MGMT >30: CPT

## 2025-01-18 RX ORDER — B1/B2/B3/B5/B6/B12/VIT C/FOLIC 500-0.5 MG
1 TABLET ORAL DAILY
Refills: 0 | Status: DISCONTINUED | OUTPATIENT
Start: 2025-01-18 | End: 2025-01-18

## 2025-01-18 RX ADMIN — SEVELAMER HYDROCHLORIDE 1600 MILLIGRAM(S): 800 TABLET ORAL at 09:07

## 2025-01-18 RX ADMIN — Medication 81 MILLIGRAM(S): at 11:53

## 2025-01-18 RX ADMIN — INSULIN LISPRO 3: 100 INJECTION, SOLUTION INTRAVENOUS; SUBCUTANEOUS at 11:48

## 2025-01-18 RX ADMIN — SEVELAMER HYDROCHLORIDE 1600 MILLIGRAM(S): 800 TABLET ORAL at 11:48

## 2025-01-21 ENCOUNTER — INPATIENT (INPATIENT)
Facility: HOSPITAL | Age: 80
LOS: 2 days | Discharge: HOME | DRG: 310 | End: 2025-01-24
Attending: INTERNAL MEDICINE | Admitting: INTERNAL MEDICINE
Payer: MEDICARE

## 2025-01-21 VITALS
HEART RATE: 90 BPM | OXYGEN SATURATION: 96 % | HEIGHT: 64 IN | RESPIRATION RATE: 16 BRPM | TEMPERATURE: 98 F | DIASTOLIC BLOOD PRESSURE: 76 MMHG | SYSTOLIC BLOOD PRESSURE: 143 MMHG | WEIGHT: 149.91 LBS

## 2025-01-21 DIAGNOSIS — E11.9 TYPE 2 DIABETES MELLITUS WITHOUT COMPLICATIONS: ICD-10-CM

## 2025-01-21 DIAGNOSIS — N18.6 END STAGE RENAL DISEASE: ICD-10-CM

## 2025-01-21 DIAGNOSIS — Z98.890 OTHER SPECIFIED POSTPROCEDURAL STATES: Chronic | ICD-10-CM

## 2025-01-21 DIAGNOSIS — Z90.710 ACQUIRED ABSENCE OF BOTH CERVIX AND UTERUS: Chronic | ICD-10-CM

## 2025-01-21 DIAGNOSIS — Z98.49 CATARACT EXTRACTION STATUS, UNSPECIFIED EYE: Chronic | ICD-10-CM

## 2025-01-21 DIAGNOSIS — Z98.89 OTHER SPECIFIED POSTPROCEDURAL STATES: Chronic | ICD-10-CM

## 2025-01-21 DIAGNOSIS — I10 ESSENTIAL (PRIMARY) HYPERTENSION: ICD-10-CM

## 2025-01-21 DIAGNOSIS — Z96.659 PRESENCE OF UNSPECIFIED ARTIFICIAL KNEE JOINT: Chronic | ICD-10-CM

## 2025-01-21 DIAGNOSIS — R00.2 PALPITATIONS: ICD-10-CM

## 2025-01-21 LAB
ALBUMIN SERPL ELPH-MCNC: 4.4 G/DL — SIGNIFICANT CHANGE UP (ref 3.3–5)
ALP SERPL-CCNC: 444 U/L — HIGH (ref 40–120)
ALT FLD-CCNC: 10 U/L — SIGNIFICANT CHANGE UP (ref 10–45)
ANION GAP SERPL CALC-SCNC: 14 MMOL/L — SIGNIFICANT CHANGE UP (ref 5–17)
APTT BLD: 27.1 SEC — SIGNIFICANT CHANGE UP (ref 24.5–35.6)
AST SERPL-CCNC: 18 U/L — SIGNIFICANT CHANGE UP (ref 10–40)
BASOPHILS # BLD AUTO: 0.02 K/UL — SIGNIFICANT CHANGE UP (ref 0–0.2)
BASOPHILS NFR BLD AUTO: 0.3 % — SIGNIFICANT CHANGE UP (ref 0–2)
BILIRUB SERPL-MCNC: 0.4 MG/DL — SIGNIFICANT CHANGE UP (ref 0.2–1.2)
BLD GP AB SCN SERPL QL: NEGATIVE — SIGNIFICANT CHANGE UP
BUN SERPL-MCNC: 24 MG/DL — HIGH (ref 7–23)
CALCIUM SERPL-MCNC: 9.7 MG/DL — SIGNIFICANT CHANGE UP (ref 8.4–10.5)
CHLORIDE SERPL-SCNC: 91 MMOL/L — LOW (ref 96–108)
CO2 SERPL-SCNC: 30 MMOL/L — SIGNIFICANT CHANGE UP (ref 22–31)
CREAT SERPL-MCNC: 4.76 MG/DL — HIGH (ref 0.5–1.3)
EGFR: 9 ML/MIN/1.73M2 — LOW
EOSINOPHIL # BLD AUTO: 0.05 K/UL — SIGNIFICANT CHANGE UP (ref 0–0.5)
EOSINOPHIL NFR BLD AUTO: 0.6 % — SIGNIFICANT CHANGE UP (ref 0–6)
FLUAV AG NPH QL: DETECTED
FLUBV AG NPH QL: SIGNIFICANT CHANGE UP
GLUCOSE BLDC GLUCOMTR-MCNC: 109 MG/DL — HIGH (ref 70–99)
GLUCOSE BLDC GLUCOMTR-MCNC: 140 MG/DL — HIGH (ref 70–99)
GLUCOSE BLDC GLUCOMTR-MCNC: 146 MG/DL — HIGH (ref 70–99)
GLUCOSE SERPL-MCNC: 141 MG/DL — HIGH (ref 70–99)
HCT VFR BLD CALC: 31.4 % — LOW (ref 34.5–45)
HGB BLD-MCNC: 9.9 G/DL — LOW (ref 11.5–15.5)
IMM GRANULOCYTES NFR BLD AUTO: 2 % — HIGH (ref 0–0.9)
INR BLD: 1.02 RATIO — SIGNIFICANT CHANGE UP (ref 0.85–1.16)
LYMPHOCYTES # BLD AUTO: 0.68 K/UL — LOW (ref 1–3.3)
LYMPHOCYTES # BLD AUTO: 8.5 % — LOW (ref 13–44)
MCHC RBC-ENTMCNC: 28.9 PG — SIGNIFICANT CHANGE UP (ref 27–34)
MCHC RBC-ENTMCNC: 31.5 G/DL — LOW (ref 32–36)
MCV RBC AUTO: 91.8 FL — SIGNIFICANT CHANGE UP (ref 80–100)
MONOCYTES # BLD AUTO: 0.91 K/UL — HIGH (ref 0–0.9)
MONOCYTES NFR BLD AUTO: 11.4 % — SIGNIFICANT CHANGE UP (ref 2–14)
NEUTROPHILS # BLD AUTO: 6.14 K/UL — SIGNIFICANT CHANGE UP (ref 1.8–7.4)
NEUTROPHILS NFR BLD AUTO: 77.2 % — HIGH (ref 43–77)
NRBC # BLD: 0 /100 WBCS — SIGNIFICANT CHANGE UP (ref 0–0)
NRBC BLD-RTO: 0 /100 WBCS — SIGNIFICANT CHANGE UP (ref 0–0)
PLATELET # BLD AUTO: 145 K/UL — LOW (ref 150–400)
POTASSIUM SERPL-MCNC: 4.4 MMOL/L — SIGNIFICANT CHANGE UP (ref 3.5–5.3)
POTASSIUM SERPL-SCNC: 4.4 MMOL/L — SIGNIFICANT CHANGE UP (ref 3.5–5.3)
PROT SERPL-MCNC: 8 G/DL — SIGNIFICANT CHANGE UP (ref 6–8.3)
PROTHROM AB SERPL-ACNC: 11.6 SEC — SIGNIFICANT CHANGE UP (ref 9.9–13.4)
RBC # BLD: 3.42 M/UL — LOW (ref 3.8–5.2)
RBC # FLD: 18.3 % — HIGH (ref 10.3–14.5)
RH IG SCN BLD-IMP: NEGATIVE — SIGNIFICANT CHANGE UP
RSV RNA NPH QL NAA+NON-PROBE: SIGNIFICANT CHANGE UP
SARS-COV-2 RNA SPEC QL NAA+PROBE: SIGNIFICANT CHANGE UP
SODIUM SERPL-SCNC: 135 MMOL/L — SIGNIFICANT CHANGE UP (ref 135–145)
TROPONIN T, HIGH SENSITIVITY RESULT: 103 NG/L — HIGH (ref 0–51)
TROPONIN T, HIGH SENSITIVITY RESULT: 97 NG/L — HIGH (ref 0–51)
WBC # BLD: 7.96 K/UL — SIGNIFICANT CHANGE UP (ref 3.8–10.5)
WBC # FLD AUTO: 7.96 K/UL — SIGNIFICANT CHANGE UP (ref 3.8–10.5)

## 2025-01-21 PROCEDURE — 93990 DOPPLER FLOW TESTING: CPT | Mod: 26

## 2025-01-21 PROCEDURE — 71045 X-RAY EXAM CHEST 1 VIEW: CPT | Mod: 26

## 2025-01-21 PROCEDURE — 99285 EMERGENCY DEPT VISIT HI MDM: CPT

## 2025-01-21 PROCEDURE — 36410 VNPNXR 3YR/> PHY/QHP DX/THER: CPT

## 2025-01-21 RX ORDER — MIDODRINE HYDROCHLORIDE 5 MG/1
10 TABLET ORAL THREE TIMES A DAY
Refills: 0 | Status: DISCONTINUED | OUTPATIENT
Start: 2025-01-21 | End: 2025-01-22

## 2025-01-21 RX ORDER — DM/PSEUDOEPHED/ACETAMINOPHEN 10-30-250
15 CAPSULE ORAL ONCE
Refills: 0 | Status: DISCONTINUED | OUTPATIENT
Start: 2025-01-21 | End: 2025-01-24

## 2025-01-21 RX ORDER — OSELTAMIVIR PHOSPHATE 75 MG/1
30 CAPSULE ORAL
Refills: 0 | Status: DISCONTINUED | OUTPATIENT
Start: 2025-01-21 | End: 2025-01-24

## 2025-01-21 RX ORDER — ATORVASTATIN CALCIUM 80 MG/1
40 TABLET, FILM COATED ORAL AT BEDTIME
Refills: 0 | Status: DISCONTINUED | OUTPATIENT
Start: 2025-01-21 | End: 2025-01-24

## 2025-01-21 RX ORDER — DM/PSEUDOEPHED/ACETAMINOPHEN 10-30-250
12.5 CAPSULE ORAL ONCE
Refills: 0 | Status: DISCONTINUED | OUTPATIENT
Start: 2025-01-21 | End: 2025-01-24

## 2025-01-21 RX ORDER — ASPIRIN 81 MG/1
81 TABLET, COATED ORAL DAILY
Refills: 0 | Status: DISCONTINUED | OUTPATIENT
Start: 2025-01-21 | End: 2025-01-24

## 2025-01-21 RX ORDER — INSULIN LISPRO 100/ML
VIAL (ML) SUBCUTANEOUS
Refills: 0 | Status: DISCONTINUED | OUTPATIENT
Start: 2025-01-21 | End: 2025-01-24

## 2025-01-21 RX ORDER — DM/PSEUDOEPHED/ACETAMINOPHEN 10-30-250
25 CAPSULE ORAL ONCE
Refills: 0 | Status: DISCONTINUED | OUTPATIENT
Start: 2025-01-21 | End: 2025-01-24

## 2025-01-21 RX ORDER — HEPARIN SODIUM,PORCINE 10000/ML
5000 VIAL (ML) INJECTION EVERY 12 HOURS
Refills: 0 | Status: DISCONTINUED | OUTPATIENT
Start: 2025-01-21 | End: 2025-01-24

## 2025-01-21 RX ORDER — SEVELAMER CARBONATE 800 MG/1
800 TABLET, FILM COATED ORAL
Refills: 0 | Status: DISCONTINUED | OUTPATIENT
Start: 2025-01-21 | End: 2025-01-24

## 2025-01-21 RX ORDER — GLUCAGON 3 MG/1
1 POWDER NASAL ONCE
Refills: 0 | Status: DISCONTINUED | OUTPATIENT
Start: 2025-01-21 | End: 2025-01-24

## 2025-01-21 RX ORDER — SODIUM CHLORIDE 9 G/ML
1000 INJECTION, SOLUTION INTRAVENOUS
Refills: 0 | Status: DISCONTINUED | OUTPATIENT
Start: 2025-01-21 | End: 2025-01-24

## 2025-01-21 RX ADMIN — SEVELAMER CARBONATE 800 MILLIGRAM(S): 800 TABLET, FILM COATED ORAL at 12:49

## 2025-01-21 RX ADMIN — MIDODRINE HYDROCHLORIDE 10 MILLIGRAM(S): 5 TABLET ORAL at 18:02

## 2025-01-21 RX ADMIN — ATORVASTATIN CALCIUM 40 MILLIGRAM(S): 80 TABLET, FILM COATED ORAL at 21:41

## 2025-01-21 RX ADMIN — Medication 5000 UNIT(S): at 18:02

## 2025-01-21 RX ADMIN — MIDODRINE HYDROCHLORIDE 10 MILLIGRAM(S): 5 TABLET ORAL at 12:48

## 2025-01-21 RX ADMIN — SEVELAMER CARBONATE 800 MILLIGRAM(S): 800 TABLET, FILM COATED ORAL at 18:02

## 2025-01-21 RX ADMIN — ASPIRIN 81 MILLIGRAM(S): 81 TABLET, COATED ORAL at 12:48

## 2025-01-21 RX ADMIN — Medication 100 MILLIGRAM(S): at 07:34

## 2025-01-21 NOTE — ED ADULT NURSE REASSESSMENT NOTE - ED CARDIAC CAPILLARY REFILL
[de-identified] : The patient presents for reevaluation of left foot second metatarsal fracture, 7 1/2 weeks from injury. She has been maintained in the cam boot. She has no pain at this time. She is here for repeat x-rays.
2 seconds or less

## 2025-01-21 NOTE — H&P ADULT - NSHPREVIEWOFSYSTEMS_GEN_ALL_CORE
REVIEW OF SYSTEMS:  CONSTITUTIONAL: No fever,  no  weight loss  ENT:  No  tinnitus,   no   vertigo  NECK: No pain or stiffness  RESPIRATORY: No cough, wheezing, chills or hemoptysis;    No Shortness of Breath  CARDIOVASCULAR:+  chest pain, palpitations,  no  dizziness  GASTROINTESTINAL: No abdominal or epigastric pain. No nausea, vomiting, or hematemesis; No diarrhea  No melena or hematochezia.  GENITOURINARY: No dysuria, frequency, hematuria, or incontinence  NEUROLOGICAL: No headaches  SKIN: No itching,  no   rash  LYMPH Nodes: No enlarged glands  ENDOCRINE: No heat or cold intolerance  MUSCULOSKELETAL: No joint pain or swelling  PSYCHIATRIC: No depression, anxiety  HEME/LYMPH: No easy bruising, or bleeding gums  ALLERGY AND IMMUNOLOGIC: No hives or eczema

## 2025-01-21 NOTE — ED PROVIDER NOTE - PRO INTERPRETER NEED 2
1/10/2022     RE: Kaern Caban  [address]  [address]  [place] [address]     Dear Colleague,    Thank you for referring your patient, Karen Caban, to the [facility] at [facility]. Please see a copy of my visit note below.    Humaira is a 69 year old who is being evaluated via a billable telephone visit.      What phone number would you like to be contacted at? [phone]  How would you like to obtain your AVS? Carloshart     69-year-old woman, currently smoking, here for follow-up.    No concerning changes on previous CT results.  She can go to yearly screening.    We discussed her smoking.  She is not at a point where she is ready to quit.    We discussed her CT scan results.  She understands.    Plan for yearly CT screening.  We discussed the risks and benefits of this.  She understands.    12-minute spent on this telephone visit.    Delonte Strong MD     English

## 2025-01-21 NOTE — ED PROVIDER NOTE - PHYSICAL EXAMINATION
GENERAL: lethargic, endomorphic body habitus  HEENT: atraumatic, normocephalic, vision grossly intact, EOMI, no conjunctivitis or discharge, hearing grossly intact, no nasal discharge or epistaxis, clear pharynx  CV: regular rate, normal rhythm, normal S1/S2, no murmurs/rubs, no cyanosis  PULM: normal work of breathing, normal O2 saturation on RA, clear breath sounds in b/l upper/lower lung fields, no crackles/rales/rhonchi/wheezing  GI: soft/non-tender/nondistended abdomen, no guarding or rebound tenderness, no palpable masses  : no CVA tenderness  NEURO: A&Ox1, follows commands  MSK: no joint tenderness/swelling/erythema, ranging all extremities with no appreciable loss of ROM  EXT: LUE fistula w/ multiple areas of ulceration w/ <0,5cm diameter defect in fistula w/ brisk bleeding packed with surgicel  SKIN: warm, dry, and intact, no rashes  PSYCH: appropriate mood and affect

## 2025-01-21 NOTE — H&P ADULT - NSHPPHYSICALEXAM_GEN_ALL_CORE
T(C): 36.7 (01-21-25 @ 07:30), Max: 36.8 (01-21-25 @ 00:45)  HR: 78 (01-21-25 @ 07:30) (78 - 90)  BP: 121/61 (01-21-25 @ 07:30) (121/61 - 153/70)  RR: 17 (01-21-25 @ 07:30) (16 - 17)  SpO2: 96% (01-21-25 @ 07:30) (96% - 98%)  GENERAL: NAD, lying in bed comfortably  HEAD:  Atraumatic, normocephalic  EYES: EOMI, PERRLA, conjunctiva and sclera clear  NECK: Supple, trachea midline, no JVD  HEART: Regular rate and rhythm, no murmurs, rubs, or gallops  LUNGS: Unlabored respirations.  Clear to auscultation bilaterally, no crackles, wheezing, or rhonchi  ABDOMEN: Soft, nontender, nondistended, +BS  EXTREMITIES: 2+ peripheral pulses bilaterally. No clubbing, cyanosis, or edema  NERVOUS SYSTEM:  A&Ox3, moving all extremities, no focal deficits   SKIN: No rashes or lesions   fistula, wrapped T(C): 36.7 (01-21-25 @ 07:30), Max: 36.8 (01-21-25 @ 00:45)  HR: 78 (01-21-25 @ 07:30) (78 - 90)  BP: 121/61 (01-21-25 @ 07:30) (121/61 - 153/70)  RR: 17 (01-21-25 @ 07:30) (16 - 17)  SpO2: 96% (01-21-25 @ 07:30) (96% - 98%)  GENERAL: NAD, lying in bed comfortably  HEAD:  Atraumatic, normocephalic  EYES: EOMI, PERRLA, conjunctiva and sclera clear  NECK: Supple, trachea midline, no JVD  HEART: Regular rate and rhythm, no murmurs, rubs, or gallops  LUNGS: Unlabored respirations.  Clear to auscultation bilaterally, no crackles, wheezing, or rhonchi  ABDOMEN: Soft, nontender, nondistended, +BS  EXTREMITIES: 2+ peripheral pulses bilaterally. No clubbing, cyanosis, or edema  NERVOUS SYSTEM:  A&Ox3, moving all extremities, no focal deficits   SKIN: No rashes or lesions     no bleeding from avf

## 2025-01-21 NOTE — ED ADULT NURSE REASSESSMENT NOTE - NS ED NURSE REASSESS COMMENT FT1
Pt observed sitting up in stretcher conversing with RN without difficulty. Breathing spontaneous and unlabored. Pt updated on plan of care awaiting ultrasound of her fistula site, and dispo. Pt has no active bleeding at this time, daughter at bedside asking for a cough suppressant. No acute distress noted. Call bell within reach.

## 2025-01-21 NOTE — H&P ADULT - HISTORY OF PRESENT ILLNESS
79yF           h/o   HLD, DM, CAD, ESRD on MWF dialysis with recent admission for chest palpitations w plan to perform outpt cath for chest pain        , presents w/ bleeding from fistula on left after dialysis.   and cp      was at outpatient HD when she developed chest palpitations approx 1 hr after HD completed.      EMS was called and saw reported half liter blood loss near the LUE coming from the AVF.     EM placed pressure bandage and patient was brought to  er/  staff held pressure with surgicel and placed tight kerlix dressing. Vascular surgery consulted.     per  daughter,   fistula but believes it was about 5 years ago.   and  care is  via  care at [facility].     States that her mom has to "get the fistula cleaned out" every 3-6 months. Last "cleaning" was approx 3 months ago.         patient gets hives with IV contrast and usually is pre-medicated with prednisone prior to these "cleanings".

## 2025-01-21 NOTE — CONSULT NOTE ADULT - ASSESSMENT
79 year old female with PMH of ESRD on HD MWF, HTN, Anemia of chronic disease, HLD, DM, and JARVIS whop presented to the hospital with complaints of chest discomfort following HD yesterday. The nephrology team was consulted for management of dialysis.       ESRD on HD   Center: [facility]  Nephrologist: Jhonatan Smith   Schedule: MWF   Access; LUE AVF   las outpatient HD on 1/20/25    plan   plan for HD tomorrow  HD consent obtained and placed in chart  UF as tolerated by BP   please dose medications as per ESRD     Anemia   in setting of kidney disease  hbg at goal right now   will monitor for DEE needs     If any questions, please feel free to contact me     Jhonatan Smith  Nephrology Attending  Cell #[phone]

## 2025-01-21 NOTE — ED ADULT TRIAGE NOTE - CHIEF COMPLAINT QUOTE
completed full diaylsis session, EMS called for bleeding from fistula. Bleeding controlled w/ pressure dressing

## 2025-01-21 NOTE — ED PROVIDER NOTE - ATTENDING CONTRIBUTION TO CARE
MD Juarez:  patient seen and evaluated personally.   I agree with the History & Physical,  Impression & Plan other than what was detailed in my note.  MD Juarez  79 years old female with h/o HLD, DM, CAD, ESRD on MWF dialysis recently dc'd with plan to perform outpt cath for chest pain, presents w/ bleeding from fistula on left after dialysis, reported half liter blood loss pressure bandage and now no longer bleeding, pt was reported to seem a little out of it but now at baseline,  had palpitations, and chest pressure earlier however is currently asymptomatic , afebrile vitals stable  non toxic well appearing, NC/AT,  conjunctiva non conjected, sclera anicteric, moist mucous membranes, neck supple, heart sounds, normal, no mrg, lungs cta b/l no wrr, abd soft non distended w/ no tenderness, no visual deformities of extremities, axox3, , normal mood and affect, pt had pressure bandage taken down at bs, pulsatile blood, immeidately  covered w/ surgicel, minimal blood loss, (one spurt) plan for surg eval stat, and acs workup.

## 2025-01-21 NOTE — ED PROVIDER NOTE - PROGRESS NOTE DETAILS
Attending Masom:  surgery paged for bleeding fistula Attending Masom:  surgery called back and are aware of pt. Luis Antonio PGY3: Patient was evaluated by vascular surgery, no active fistula bleed.  Requesting the patient be admitted with the [facility] duplex fistula.  Will also obtain palpitations workup with possible cardiac evaluation and catheterization and patient. Admitted to Dr. Sherrill Mcarthur's.  EKG shows no ischemic changes.

## 2025-01-21 NOTE — ED PROVIDER NOTE - CLINICAL SUMMARY MEDICAL DECISION MAKING FREE TEXT BOX
80 yo F w/ PMHx of DM, HTN, HLD, CHF, ESRD on dialysis (MWF, last HD 1/20) presents for her palpitations and SOB 1 hour after dialysis.  When EMS arrived, patient was found to have a bleeding fistula with profuse blood loss.  Patient has had a history of palpitations and SOB and has had medical admission with cardiac evaluation in the past ([facility] - Dr. Cardenas).  Patient has had a less amount of bleeding from fistula in the past and was previously treated with wrapping with hemostasis.  This time, patient's fistula is bleeding more significant than usual.  Patient has not had a fistula site access for the past week while she was admitted to the hospital and was recently discharged from [facility] 2 days ago.  Patient was supposed to get catheterization, but declined inpatient in favor outpatient follow-up.  Patient is more lethargic than baseline per daughter.  Patient is also been having coughing and SOB over the past few days.  Her palpitations are triggered by coughing, dialysis, and a number of other factors.  Patient is lethargic limiting ROS.  Medical history provided by daughter at bedside.    Vital signs remarkable for /76.  Physical exam is remarkable for LUE fistula with multiple ulcerations and <1 cm diameter area of brisk bleeding from visualized aspect of Surgicel, lethargic, and ANO x 1.  Otherwise clear heart/lung sounds, no pitting edema.  Concern for fistula bleed requiring surgical intervention.  Also concern for anemia causing palpitations, lethargy.  Also concern for possible occult infection including URI/pneumonia.  Also concern for ACS as patient was previously worked up for palpitations and required catheterization that patient deferred.  Plan for labs, surgery consult, CXR, and EKG.

## 2025-01-21 NOTE — H&P ADULT - NSHPLABSRESULTS_GEN_ALL_CORE
LABS:                        9.9    7.96  )-----------( 145      ( 21 Jan 2025 02:43 )             31.4     01-21    135  |  91[L]  |  24[H]  ----------------------------<  141[H]  4.4   |  30  |  4.76[H]    Ca    9.7      21 Jan 2025 02:43    TPro  8.0  /  Alb  4.4  /  TBili  0.4  /  DBili  x   /  AST  18  /  ALT  10  /  AlkPhos  444[H]  01-21    PT/INR - ( 21 Jan 2025 02:43 )   PT: 11.6 sec;   INR: 1.02 ratio         PTT - ( 21 Jan 2025 02:43 )  PTT:27.1 sec      Urinalysis Basic - ( 21 Jan 2025 02:43 )    Color: x / Appearance: x / SG: x / pH: x  Gluc: 141 mg/dL / Ketone: x  / Bili: x / Urobili: x   Blood: x / Protein: x / Nitrite: x   Leuk Esterase: x / RBC: x / WBC x   Sq Epi: x / Non Sq Epi: x / Bacteria: x              Thyroid Stimulating Hormone, Serum: 1.600 uU/mL (01-14 @ 09:40)

## 2025-01-21 NOTE — H&P ADULT - ASSESSMENT
79yF           h/o   HLD, DM, CAD, ESRD on MWF dialysis with recent admission for chest palpitations w plan to perform outpt cath for chest pain        , presents w/ bleeding from fistula on left after dialysis.   and cp      was at outpatient HD when she developed chest palpitations approx 1 hr after HD completed.       EMS  reported , bleeding   from the AVF.  ems   placed pressure bandage/  er   staff held pressure with surgicel and placed tight kerlix dressing. Vascular surgery consulted.     per  daughter,   fistula but believes it was about 5 years ago.   and  care is  via  care at [facility].     states that her mom has to "get the fistula cleaned out" every 3-6 months. Last "cleaning" was approx 3 months ago.         patient gets hives with IV contrast and usually is pre-medicated with prednisone prior to these "cleanings".     cp/  palps,  none   now      elevated troponin from ckd  and demand ischemia.  card f/p      asa/  lipitor    CKD      on HD      renal     vasc  following  /  for  access   malfunction    DM        ,  follow   fs   orthostasis,  on  midodrine    dvt ppx       rad< from: TTE W or WO Ultrasound Enhancing Agent (12.23.24 @ 10:09) >     1. Left ventricular cavity is normal in size. Left ventricular wall thickness is normal. Left ventricular systolic function is normal with an ejection fraction of 65 % by Cooper's method of disks. There are no regional wall motion abnormalities seen.   2. Normal left ventricular diastolic function, with normal left ventricular filling pressure.   3. Normal right ventricular cavity size, with normal wall thickness, and normal right ventricular systolic function.   4. Mild aortic stenosis.   5. No pericardial effusion seen.   6. Technically difficult image quality.  _______________    < end of copied text >         79yF           h/o   HLD, DM, CAD, ESRD on MWF dialysis with recent admission for chest palpitations w plan to perform outpt cath for chest pain        , presents w/ bleeding from fistula on left after dialysis.   and cp      was at outpatient HD when she developed chest palpitations approx 1 hr after HD completed.       EMS  reported , bleeding   from the AVF.  ems   placed pressure bandage/  er   staff held pressure with surgicel and placed tight kerlix dressing. Vascular surgery consulted.     per  daughter,   fistula but believes it was about 5 years ago.   and  care is  via  care at [facility].     states that her mom has to "get the fistula cleaned out" every 3-6 months. Last "cleaning" was approx 3 months ago.         patient gets hives with IV contrast and usually is pre-medicated with prednisone prior to these "cleanings".     cp/  palps,  none   now      elevated troponin from ckd  and demand ischemia.  card f/p/ needs    cath with pre  meds      asa/  lipitor    CKD      on HD      renal     vasc  following  /  for  access   malfunction    DM        ,  follow   fs   orthostasis,  on  midodrine    dvt ppx/  aide  at  bedside       rad< from: TTE W or WO Ultrasound Enhancing Agent (12.23.24 @ 10:09) >     1. Left ventricular cavity is normal in size. Left ventricular wall thickness is normal. Left ventricular systolic function is normal with an ejection fraction of 65 % by Cooper's method of disks. There are no regional wall motion abnormalities seen.   2. Normal left ventricular diastolic function, with normal left ventricular filling pressure.   3. Normal right ventricular cavity size, with normal wall thickness, and normal right ventricular systolic function.   4. Mild aortic stenosis.   5. No pericardial effusion seen.   6. Technically difficult image quality.  _______________    < end of copied text >

## 2025-01-21 NOTE — ED ADULT NURSE NOTE - OBJECTIVE STATEMENT
80 y/o female complaining of  bleeding from Left sided fistula. Pt is A&Ox4, breathing spontaneously speaking in full sentences with pmh of HD on M,W,F, DM, HLD presents to the Ed for evaluation of continued bleeding from the fistula site. Pt initially stated she felt like she had palpitation after HD. Pt completed Hd with no issues, when EMS arrived pt states she was told that her site was bleeding. Bleeding controlled by EMS on arrival to the Ed. Pt denies chest pain, sob, dizziness, recent fall. Pt lung sounds clear and equal bilat abd soft nontender nondistended x4 quadrants. Pt placed in patient gown bed in lowest position to maintains safety.

## 2025-01-21 NOTE — CONSULT NOTE ADULT - SUBJECTIVE AND OBJECTIVE BOX
[facility]  - Ans Serv [phone], Office [phone]  Dr. Smith/Dr Lockett/Dr Camacho /Dr Garry davenport /Dr JACY Taylor/Dr Del Becker/Dr Stephon Colbert /Dr MALCOLM Kearns  _______________________________________________________________________________________________  The patient seen and examined today.  HPI:  Patient is a 79 year old female with PMH of ESRD on HD MWF, HTN, Anemia of chronic disease, HLD, DM, and JARVIS whop presented to the hospital with complaints of chest discomfort following HD yesterday. The nephrology team was consulted for management of dialysis. The patient was seen and examined earlier in the ED. Of note the pateint was recently admitted to [facility] with similar complaints and was planned to undergo cardiac catheterization, however decided to pursue as outpatient. The patient undergoes HD at [facility] and her last outpatient HD session was last night      PAST MEDICAL & SURGICAL HISTORY:  HTN (hypertension)      Vertigo      Trigeminal neuralgia      ESRD (end stage renal disease) on dialysis  MWF via left AVF      Anemia      Gout      HLD (hyperlipidemia)      DM (diabetes mellitus)      JARVIS (obstructive sleep apnea)  CPAP QHS      S/P rotator cuff surgery  Bilateral      S/P         S/P hysterectomy        S/P total knee replacement  Bilateral, left , right 2008      S/P cataract extraction  bilateral,       S/P arteriovenous (AV) fistula creation  left upper forearm cephalic vein brachial artery AV fistula creation on 2018, left arm basilic vein transposition on 2019.        S/P carpal tunnel release  Left        Allergies :- shellfish (Hives; Rash)  smoke; coughing (Other)  IV Contrast (Anaphylaxis)  Shrimp (Hives)    Home Medications Reviewed  Hospital Medications:   MEDICATIONS  (STANDING):  aspirin enteric coated 81 milliGRAM(s) Oral daily  atorvastatin 40 milliGRAM(s) Oral at bedtime  dextrose 5%. 1000 milliLiter(s) (100 mL/Hr) IV Continuous <Continuous>  dextrose 5%. 1000 milliLiter(s) (50 mL/Hr) IV Continuous <Continuous>  dextrose 50% Injectable 25 Gram(s) IV Push once  dextrose 50% Injectable 12.5 Gram(s) IV Push once  dextrose 50% Injectable 25 Gram(s) IV Push once  glucagon  Injectable 1 milliGRAM(s) IntraMuscular once  heparin   Injectable 5000 Unit(s) SubCutaneous every 12 hours  insulin lispro (ADMELOG) corrective regimen sliding scale   SubCutaneous three times a day before meals  midodrine. 10 milliGRAM(s) Oral three times a day  sevelamer carbonate 800 milliGRAM(s) Oral three times a day with meals    SOCIAL HISTORY:  Denies ETOh,Smoking,   FAMILY HISTORY:      REVIEW OF SYSTEMS:  As mentioned above.     VITALS:  T(F): 98 (25 @ 07:30), Max: 98.2 (25 @ 00:45)  HR: 78 (25 @ 07:30)  BP: 121/61 (25 @ 07:30)  RR: 17 (25 @ 07:30)  SpO2: 96% (25 @ 07:30)  Wt(kg): --    Height (cm): 162.6 ( @ 00:45)  Weight (kg): 68 ( 00:45)  BMI (kg/m2): 25.7 ( 00:45)  BSA (m2): 1.73 ( 00:45)    PHYSICAL EXAM:  Constitutional: NAD  HEENT: anicteric sclera, oropharynx clear, MMM  Neck: supple.   Respiratory: Bilateral equal breath sounds , no wheezes, no crackles  Cardiovascular: S1, S2, Regular  Gastrointestinal: Bowel Sound present, soft, NT/ND  Extremities: No peripheral edema  Neurological: Alert and oriented x 3  Psychiatric: Normal mood, normal affect    Data:      135  |  91[L]  |  24[H]  ----------------------------<  141[H]  4.4   |  30  |  4.76[H]    Ca    9.7      2025 02:43    TPro  8.0  /  Alb  4.4  /  TBili  0.4  /  DBili      /  AST  18  /  ALT  10  /  AlkPhos  444[H]  -    Creatinine Trend: 4.76 <--, 7.23 <--, 6.83 <--                        9.9    7.96  )-----------( 145      ( 2025 02:43 )             31.4     Urine Studies:  Urinalysis Basic - ( 2025 02:43 )    Color:  / Appearance:  / SG:  / pH:   Gluc: 141 mg/dL / Ketone:   / Bili:  / Urobili:    Blood:  / Protein:  / Nitrite:    Leuk Esterase:  / RBC:  / WBC    Sq Epi:  / Non Sq Epi:  / Bacteria:       
Date of Service, 25 @ 17:58  CHIEF COMPLAINT:Patient is a 79y old  Female who presents with a chief complaint of cp/ palps (2025 17:26)      HPI:  79 years old female with h/o HLD, DM, CAD, ESRD on MWF dialysis recently dc'd with plan to perform outpt cath for chest pain, presents w/ bleeding from fistula on left after dialysis, reported half liter blood loss pressure bandage and now no longer bleeding, pt was reported to seem a little out of it but now at baseline,  had palpitations, and chest pressure earlier however is currently asymptomatic         PAST MEDICAL & SURGICAL HISTORY:  HTN (hypertension)  Vertigo  Trigeminal neuralgia  ESRD (end stage renal disease) on dialysis  MWF via left AVF  Anemia  Gout  HLD (hyperlipidemia)  DM (diabetes mellitus)  JARVIS (obstructive sleep apnea)  CPAP QHS  S/P rotator cuff surgery  Bilateral  S/P     S/P hysterectomy    S/P total knee replacement  Bilateral, left , right   S/P cataract extraction  bilateral,   S/P arteriovenous (AV) fistula creation  left upper forearm cephalic vein brachial artery AV fistula creation on 2018, left arm basilic vein transposition on 2019.    S/P carpal tunnel release  Left          MEDICATIONS  (STANDING):  aspirin enteric coated 81 milliGRAM(s) Oral daily  atorvastatin 40 milliGRAM(s) Oral at bedtime  dextrose 5%. 1000 milliLiter(s) (100 mL/Hr) IV Continuous <Continuous>  dextrose 5%. 1000 milliLiter(s) (50 mL/Hr) IV Continuous <Continuous>  dextrose 50% Injectable 25 Gram(s) IV Push once  dextrose 50% Injectable 12.5 Gram(s) IV Push once  dextrose 50% Injectable 25 Gram(s) IV Push once  glucagon  Injectable 1 milliGRAM(s) IntraMuscular once  heparin   Injectable 5000 Unit(s) SubCutaneous every 12 hours  insulin lispro (ADMELOG) corrective regimen sliding scale   SubCutaneous three times a day before meals  midodrine. 10 milliGRAM(s) Oral three times a day  oseltamivir 30 milliGRAM(s) Oral <User Schedule>  sevelamer carbonate 800 milliGRAM(s) Oral three times a day with meals    MEDICATIONS  (PRN):  dextrose Oral Gel 15 Gram(s) Oral once PRN Blood Glucose LESS THAN 70 milliGRAM(s)/deciliter      FAMILY HISTORY:      SOCIAL HISTORY:    [x ] Non-smoker  [ ] Smoker  [ ] Alcohol    Allergies    shellfish (Hives; Rash)  smoke; coughing (Other)  IV Contrast (Anaphylaxis)  Shrimp (Hives)    Intolerances    	    REVIEW OF SYSTEMS:  CONSTITUTIONAL: No fever, weight loss, or fatigue  EYES: No eye pain, visual disturbances, or discharge  ENT:  No difficulty hearing, tinnitus, vertigo; No sinus or throat pain  NECK: No pain or stiffness  RESPIRATORY: No cough, wheezing, chills or hemoptysis; No Shortness of Breath  CARDIOVASCULAR: + chest pain,no  palpitations, passing out, dizziness, or leg swelling  GASTROINTESTINAL: No abdominal or epigastric pain. No nausea, vomiting, or hematemesis; No diarrhea or constipation. No melena or hematochezia.  GENITOURINARY: No dysuria, frequency, hematuria, or incontinence  NEUROLOGICAL: No headaches, memory loss, loss of strength, numbness, or tremors  SKIN: No itching, burning, rashes, or lesions   LYMPH Nodes: No enlarged glands  ENDOCRINE: No heat or cold intolerance; No hair loss  MUSCULOSKELETAL: No joint pain or swelling; No muscle, back, or extremity pain  PSYCHIATRIC: No depression, anxiety, mood swings, or difficulty sleeping  HEME/LYMPH: No easy bruising, or bleeding gums  ALLERGY AND IMMUNOLOGIC: No hives or eczema	    [ x] All others negative	  [ ] Unable to obtain    PHYSICAL EXAM:  T(C): 36.8 (25 @ 12:00), Max: 36.8 (25 @ 00:45)  HR: 78 (25 @ 12:00) (78 - 90)  BP: 127/77 (25 @ 12:00) (121/61 - 155/85)  RR: 16 (25 @ 12:00) (16 - 19)  SpO2: 92% (25 @ 12:00) (92% - 98%)  Wt(kg): --  I&O's Summary      Appearance: Normal	  HEENT:   Normal oral mucosa, PERRL, EOMI	  Lymphatic: No lymphadenopathy  Cardiovascular: Normal S1 S2, No JVD, + murmurs, No edema  Respiratory:rhonchi  Psychiatry: A & O x 3, Mood & affect appropriate  Gastrointestinal:  Soft, Non-tender, + BS	  Skin: No rashes, No ecchymoses, No cyanosis	  Neurologic: Non-focal  Extremities: Normal range of motion, No clubbing, cyanosis or edema  Vascular: Peripheral pulses palpable 2+ bilaterally    TELEMETRY: 	    ECG:  	  RADIOLOGY:  OTHER: 	  	  LABS:	 	    CARDIAC MARKERS:                              9.9    7.96  )-----------( 145      ( 2025 02:43 )             31.4         135  |  91[L]  |  24[H]  ----------------------------<  141[H]  4.4   |  30  |  4.76[H]    Ca    9.7      2025 02:43    TPro  8.0  /  Alb  4.4  /  TBili  0.4  /  DBili  x   /  AST  18  /  ALT  10  /  AlkPhos  444[H]      proBNP:   Lipid Profile:   HgA1c:   TSH:   PT/INR - ( 2025 02:43 )   PT: 11.6 sec;   INR: 1.02 ratio         PTT - ( 2025 02:43 )  PTT:27.1 sec    PREVIOUS DIAGNOSTIC TESTING:    < from: 12 Lead ECG (25 @ 21:04) >  Diagnosis Line Normal sinus rhythm  Right bundle branch block  Abnormal ECG  When compared with ECG of 2021 00:46,  premature ventricular complexes are no longer present    < from: TTE W or WO Ultrasound Enhancing Agent (24 @ 10:09) >   1. Left ventricular cavity is normal in size. Left ventricular wall thickness is normal. Left ventricular systolic function is normal with an ejection fraction of 65 % by Cooper's method of disks. There are no regional wall motion abnormalities seen.   2. Normal left ventricular diastolic function, with normal left ventricular filling pressure.   3. Normal right ventricular cavity size, with normal wall thickness, and normal right ventricular systolic function.   4. Mild aortic stenosis.   5. No pericardial effusion seen.   6. Technically difficult image quality.    < from: Xray Chest 1 View- PORTABLE-Urgent (25 @ 01:37) >  The heart size is enlarged.  No focal consolidation.  No pneumothorax or pleural effusion.    IMPRESSION:  No focal consolidation.    LM   Left main artery: Angiography shows minor irregularities.      LAD   Left anterior descending artery: IFR negative. There is a 50 %  stenosis.    CX   Circumflex: Angiography shows mild atherosclerosis.      RCA   Right coronary artery: Angiography shows mild atherosclerosis.        FluA/FluB/RSV/COVID PCR (25 @ 09:11)    SARS-CoV-2 Result: NotDetec: This Respiratory Panel uses polymerase chain reaction (PCR) to detect for  influenza A; influenza B; respiratory syncytial virus; and SARS-CoV-2.  Test results should be correlated with clinical presentation, patient  history, and epidemiology.   Influenza A Result: Detected   Influenza B Result: NotDetec   Resp Syn Virus Result: NotDetec          
Surgery Consult Note  Attending: Antony  Service: Vascular Surgery  [id number]    HPI: 79yF PMH HLD, DM, CAD, ESRD on MWF dialysis with recent admission for chest palpitations w plan to perform outpt cath for chest pain, presents w/ bleeding from fistula on left after dialysis. Patient was at outpatient HD when she developed chest palpitations approx 1 hr after HD completed. EMS was called and saw reported half liter blood loss near the LUE coming from the AVF. EM placed pressure bandage and patient was brought to ED. In ED, examined by ED resident and reported repeat pulsitile bleeding from lower aspect of AVF. ED staff held pressure with surgicel and placed tight kerlix dressing. Vascular surgery consulted.     Patient seen and examined. Daughter at bedside. She did not witness the bleeding herself. Daughter is unclear who created fistula but believes it was about 5 years ago. Receives all care at [facility]. States that her mom has to "get the fistula cleaned out" every 3-6 months. Last "cleaning" was approx 3 months ago. Of note, patient gets hives with IV contrast and usually is pre-medicated with prednisone prior to these "cleanings".       PAST MEDICAL HISTORY:  PAST MEDICAL & SURGICAL HISTORY:  HTN (hypertension)    Vertigo    Trigeminal neuralgia    ESRD (end stage renal disease) on dialysis  MWF via left AVF    Anemia    Gout    HLD (hyperlipidemia)    DM (diabetes mellitus)    JARVIS (obstructive sleep apnea)  CPAP QHS    S/P rotator cuff surgery  Bilateral    S/P       S/P hysterectomy      S/P total knee replacement  Bilateral, left , right     S/P cataract extraction  bilateral,     S/P arteriovenous (AV) fistula creation  left upper forearm cephalic vein brachial artery AV fistula creation on 2018, left arm basilic vein transposition on 2019.      S/P carpal tunnel release  Left          ALLERGIES:  Allergies    shellfish (Hives; Rash)  smoke; coughing (Other)  IV Contrast (Anaphylaxis)  Shrimp (Hives)    Intolerances        SOCIAL HISTORY: Negative for tobacco, etoh, or drug use    FAMILY HISTORY:  FAMILY HISTORY:      PHYSICAL EXAM:  General: NAD, sleepy  Pulmonary: normal resp effort  Cardiovascular: NSR, no murmurs  Extremities: WWP, normal strength, LUE upper arm fistula with normal skin changes, no erythema, no ulceration, no drainage, no active bleeding at time of exam, palpable thrill, surgicel dressing replaced      VITAL SIGNS:  Vital Signs Last 24 Hrs  T(C): 36.8 (2025 00:45), Max: 36.8 (2025 00:45)  T(F): 98.2 (2025 00:45), Max: 98.2 (2025 00:45)  HR: 90 (2025 00:45) (90 - 90)  BP: 143/76 (2025 00:45) (143/76 - 143/76)  BP(mean): --  RR: 16 (2025 00:45) (16 - 16)  SpO2: 96% (2025 00:45) (96% - 96%)    Parameters below as of 2025 00:45  Patient On (Oxygen Delivery Method): room air      I&O's Summary      LABS:              CAPILLARY BLOOD GLUCOSE      POCT Blood Glucose.: 144 mg/dL (2025 01:03)        CULTURES:      RADIOLOGY & ADDITIONAL STUDIES:                    
      HPI:    79yF           h/o   HLD, DM, CAD, ESRD on MWF dialysis with recent admission for chest palpitations w plan to perform outpt cath for chest pain        , presents w/ bleeding from fistula on left after dialysis.   and cp      was at outpatient HD when she developed chest palpitations approx 1 hr after HD completed.      EMS was called and saw reported half liter blood loss near the LUE coming from the AVF.     EM placed pressure bandage and patient was brought to  er/  staff held pressure with surgicel and placed tight kerlix dressing. Vascular surgery consulted.     per  daughter,   fistula but believes it was about 5 years ago.   and  care is  via  care at [facility].     States that her mom has to "get the fistula cleaned out" every 3-6 months. Last "cleaning" was approx 3 months ago.         patient gets hives with IV contrast and usually is pre-medicated with prednisone prior to these "cleanings".      (2025 08:58)      Patient has history of diabetes, A1C 5.4 %   Endo was consulted for glycemic control.      PAST MEDICAL & SURGICAL HISTORY:  HTN (hypertension)      Vertigo      Trigeminal neuralgia      ESRD (end stage renal disease) on dialysis  MWF via left AVF      Anemia      Gout      HLD (hyperlipidemia)      DM (diabetes mellitus)      JARVIS (obstructive sleep apnea)  CPAP QHS      S/P rotator cuff surgery  Bilateral      S/P         S/P hysterectomy        S/P total knee replacement  Bilateral, left , right 2008      S/P cataract extraction  bilateral,       S/P arteriovenous (AV) fistula creation  left upper forearm cephalic vein brachial artery AV fistula creation on 2018, left arm basilic vein transposition on 2019.        S/P carpal tunnel release  Left        FAMILY HISTORY:      Social History:        HOME MEDICATIONS:  Home Medications:  aspirin 81 mg oral delayed release tablet: 1 tab(s) orally once a day (2025 10:55)  atorvastatin 40 mg oral tablet: 1 tab(s) orally once a day (at bedtime) (17 May 2023 16:10)  Lantus 100 units/mL subcutaneous solution: 20 unit(s) subcutaneous once a day (at bedtime) (24 Dec 2024 13:46)  midodrine 10 mg oral tablet: 1 tab(s) orally Monday, Wednesday, and Friday (30 minutes before dialysis)  (17 May 2023 16:10)  sevelamer carbonate 800 mg oral tablet: 2 tab(s) orally 3 times a day (with meals) (17 May 2023 16:10)            MEDICATIONS  (STANDING):  aspirin enteric coated 81 milliGRAM(s) Oral daily  atorvastatin 40 milliGRAM(s) Oral at bedtime  dextrose 5%. 1000 milliLiter(s) (100 mL/Hr) IV Continuous <Continuous>  dextrose 5%. 1000 milliLiter(s) (50 mL/Hr) IV Continuous <Continuous>  dextrose 50% Injectable 25 Gram(s) IV Push once  dextrose 50% Injectable 12.5 Gram(s) IV Push once  dextrose 50% Injectable 25 Gram(s) IV Push once  glucagon  Injectable 1 milliGRAM(s) IntraMuscular once  heparin   Injectable 5000 Unit(s) SubCutaneous every 12 hours  insulin lispro (ADMELOG) corrective regimen sliding scale   SubCutaneous three times a day before meals  midodrine. 10 milliGRAM(s) Oral three times a day  oseltamivir 30 milliGRAM(s) Oral <User Schedule>  sevelamer carbonate 800 milliGRAM(s) Oral three times a day with meals    MEDICATIONS  (PRN):  dextrose Oral Gel 15 Gram(s) Oral once PRN Blood Glucose LESS THAN 70 milliGRAM(s)/deciliter      Allergies    shellfish (Hives; Rash)  smoke; coughing (Other)  IV Contrast (Anaphylaxis)  Shrimp (Hives)    Intolerances        Review of Systems:  Neuro: No HA, no dizziness  Cardiovascular: No chest pain, no palpitations  Respiratory: no SOB, no cough  GI: No nausea, vomiting, abdominal pain  MSK: Denies joint/muscle pain      ALL OTHER SYSTEMS REVIEWED AND NEGATIVE        PHYSICAL EXAM:  VITALS: T(C): 36.8 (25 @ 12:00)  T(F): 98.2 (25 @ 12:00), Max: 98.2 (25 @ 00:45)  HR: 78 (25 @ 12:00) (78 - 90)  BP: 127/77 (25 @ 12:00) (121/61 - 155/85)  RR:  (16 - 19)  SpO2:  (92% - 98%)  Wt(kg): --  GENERAL: NAD, well-groomed, well-developed  NEURO:  alert and oriented  RESPIRATORY: Clear to auscultation bilaterally; No rales, rhonchi, wheezing  CARDIOVASCULAR: Si S2  GI: Soft, non distended, normal bowel sounds  MUSCULOSKELETAL: Moves all extremities equally       POCT Blood Glucose.: 146 mg/dL (25 @ 11:53)  POCT Blood Glucose.: 144 mg/dL (25 @ 01:03)                            9.9    7.96  )-----------( 145      ( 2025 02:43 )             31.4           135  |  91[L]  |  24[H]  ----------------------------<  141[H]  4.4   |  30  |  4.76[H]    eGFR: 9[L]    Ca    9.7          TPro  8.0  /  Alb  4.4  /  TBili  0.4  /  DBili  x   /  AST  18  /  ALT  10  /  AlkPhos  444[H]        Thyroid Function Tests:   @ 09:50 TSH -- FreeT4 1.1 T3 -- Anti TPO -- Anti Thyroglobulin Ab -- TSI --   @ 09:40 TSH 1.600 FreeT4 -- T3 -- Anti TPO -- Anti Thyroglobulin Ab -- TSI --    Diet, Consistent Carbohydrate w/Evening Snack (25 @ 09:10) [Active]         Chol 131 Direct LDL -- LDL calculated 69 HDL 46[L] Trig 84  A1C with Estimated Average Glucose Result: 5.4 % (24 @ 07:32)              
      HPI:    79yF           h/o   HLD, DM, CAD, ESRD on MWF dialysis with recent admission for chest palpitations w plan to perform outpt cath for chest pain        , presents w/ bleeding from fistula on left after dialysis.   and cp      was at outpatient HD when she developed chest palpitations approx 1 hr after HD completed.      EMS was called and saw reported half liter blood loss near the LUE coming from the AVF.     EM placed pressure bandage and patient was brought to  er/  staff held pressure with surgicel and placed tight kerlix dressing. Vascular surgery consulted.     per  daughter,   fistula but believes it was about 5 years ago.   and  care is  via  care at [facility].     States that her mom has to "get the fistula cleaned out" every 3-6 months. Last "cleaning" was approx 3 months ago.         patient gets hives with IV contrast and usually is pre-medicated with prednisone prior to these "cleanings".      (2025 08:58)  Patient has history of diabetes, was on insulin at home, no recent hypoglycemic episodes. Patient follows up with PCP for health diabetes management.    PAST MEDICAL & SURGICAL HISTORY:  HTN (hypertension)      Vertigo      Trigeminal neuralgia      ESRD (end stage renal disease) on dialysis  MWF via left AVF      Anemia      Gout      HLD (hyperlipidemia)      DM (diabetes mellitus)      JARVIS (obstructive sleep apnea)  CPAP QHS      S/P rotator cuff surgery  Bilateral      S/P         S/P hysterectomy        S/P total knee replacement  Bilateral, left , right 2008      S/P cataract extraction  bilateral,       S/P arteriovenous (AV) fistula creation  left upper forearm cephalic vein brachial artery AV fistula creation on 2018, left arm basilic vein transposition on 2019.        S/P carpal tunnel release  Left          FAMILY HISTORY:      Social History:    Outpatient Medications:    MEDICATIONS  (STANDING):  aspirin enteric coated 81 milliGRAM(s) Oral daily  atorvastatin 40 milliGRAM(s) Oral at bedtime  dextrose 5%. 1000 milliLiter(s) (100 mL/Hr) IV Continuous <Continuous>  dextrose 5%. 1000 milliLiter(s) (50 mL/Hr) IV Continuous <Continuous>  dextrose 50% Injectable 25 Gram(s) IV Push once  dextrose 50% Injectable 12.5 Gram(s) IV Push once  dextrose 50% Injectable 25 Gram(s) IV Push once  glucagon  Injectable 1 milliGRAM(s) IntraMuscular once  heparin   Injectable 5000 Unit(s) SubCutaneous every 12 hours  insulin lispro (ADMELOG) corrective regimen sliding scale   SubCutaneous three times a day before meals  midodrine. 10 milliGRAM(s) Oral three times a day  oseltamivir 30 milliGRAM(s) Oral <User Schedule>  sevelamer carbonate 800 milliGRAM(s) Oral three times a day with meals    MEDICATIONS  (PRN):  dextrose Oral Gel 15 Gram(s) Oral once PRN Blood Glucose LESS THAN 70 milliGRAM(s)/deciliter      Allergies    shellfish (Hives; Rash)  smoke; coughing (Other)  IV Contrast (Anaphylaxis)  Shrimp (Hives)    Intolerances        ALL  SYSTEMS REVIEWED.    PHYSICAL EXAM:  VITALS: T(C): 36.8 (25 @ 12:00)  T(F): 98.2 (25 @ 12:00), Max: 98.2 (25 @ 00:45)  HR: 78 (25 @ 12:00) (78 - 90)  BP: 127/77 (25 @ 12:00) (121/61 - 155/85)  RR:  (16 - 19)  SpO2:  (92% - 98%)  Wt(kg): --  THYROID: Normal size, no palpable nodules  RESPIRATORY: Clear to auscultation bilaterally.  CARDIOVASCULAR: Si S2, No murmurs;  GI: Soft, non distended.      POCT Blood Glucose.: 146 mg/dL (25 @ 11:53)  POCT Blood Glucose.: 144 mg/dL (25 @ 01:03)                            9.9    7.96  )-----------( 145      ( 2025 02:43 )             31.4           135  |  91[L]  |  24[H]  ----------------------------<  141[H]  4.4   |  30  |  4.76[H]    eGFR: 9[L]    Ca    9.7          TPro  8.0  /  Alb  4.4  /  TBili  0.4  /  DBili  x   /  AST  18  /  ALT  10  /  AlkPhos  444[H]        Thyroid Function Tests:   @ 09:50 TSH -- FreeT4 1.1 T3 -- Anti TPO -- Anti Thyroglobulin Ab -- TSI --   @ 09:40 TSH 1.600 FreeT4 -- T3 -- Anti TPO -- Anti Thyroglobulin Ab -- TSI --       Chol 131 Direct LDL -- LDL calculated 69 HDL 46[L] Trig 84    Radiology:

## 2025-01-21 NOTE — CONSULT NOTE ADULT - ASSESSMENT
Assessment  DMT2: 79y Female with DM T2 with hyperglycemia admitted with bleeding, palpitations, patient was on insulin at home, now on low dose   insulin, blood sugars are improving, no hypoglycemic episode,  eating meals.  Tight sugar control is not recommended for this patient.  Palpitations: on medications, monitored, no acute events..  HTN: On antihypertensive medications, monitored, stable..  ESRD CKD: On hemodialysis, Labs, chart reviewed.            Perla Taylor MD  Cell:  [phone]  Office: [phone]

## 2025-01-21 NOTE — CONSULT NOTE ADULT - ASSESSMENT
79yF   h/o   HLD, DM, CAD, ESRD on MWF dialysis with recent admission for chest palpitations    Assessment  DMT2: 79y Female with DM T2 with hyperglycemia, A1C 5.4% , was on basal insulin at home, glucose trending stable at this time.   CAD: on medications, stable, monitored.  HTN: on antihypertensive medications, monitored, asymptomatic.  ESRD: On hemodialysis, Monitor labs/BMP      Discussed plan and management wit Dr Claudia Taylor MD  Cell: [phone]  Office: [phone]

## 2025-01-21 NOTE — CONSULT NOTE ADULT - PROBLEM SELECTOR RECOMMENDATION 9
Will continue current insulin regimen for now.   Will continue monitoring FS, log, and glucose trends, will Follow up.  Patient counseled for compliance with consistent low carb diet and exercise as tolerated outpatient.
Will continue current insulin regimen for now. Will continue monitoring  blood sugars, will Follow up.  Patient counseled for compliance with consistent low carb diet.  .

## 2025-01-21 NOTE — CONSULT NOTE ADULT - ASSESSMENT
79 years old female with h/o HLD, DM, CAD, ESRD on MWF dialysis recently dc'd with plan to perform outpt cath for chest pain, presents w/ bleeding from fistula on left after dialysis, reported half liter blood loss pressure bandage and now no longer bleeding, pt was reported to seem a little out of it but now at baseline,  had palpitations, and chest pressure earlier however is currently asymptomatic.  pt with sig cardiac hx on HD with chest pain recently dc ed forcardiac cath now with +FLU  continue all cardiac meds  Tamiflu  will adjust meds  'plan for cardiac cath after flu treated  ESRD renal follow up  asa daily  beta blocker if tolerated  \lipid   dvt prophylaxis

## 2025-01-21 NOTE — CONSULT NOTE ADULT - ASSESSMENT
ASSESSMENT: 79 F with PMH ESRD via LUE AVF p/w AVF bleeding, now stopped.     Plan:  - no acute surgical intervention  - recommend medicine admission for palpitations and somnolence  - recommend AVF duplex  - vascular surgery to follow, call back sooner if bleeding resumes    Plan Discussed with Vascular Surgery Fellow on behalf of Dr. Hardy    Vascular Surgery   [id number]   ASSESSMENT: 79 F with PMH ESRD via LUE AVF p/w AVF bleeding, now stopped.     Plan:  - no acute surgical intervention  - recommend medicine admission for palpitations and somnolence  - recommend AVF duplex  - as of now, AVF can continue to be accessed - recommend access at alternate points from most recent bleeding points  - vascular surgery to follow, call back sooner if bleeding resumes    Plan Discussed with Vascular Surgery Fellow on behalf of Dr. Hardy    Vascular Surgery   [id number]   ASSESSMENT: 79 F with PMH ESRD via LUE AVF p/w AVF bleeding, now stopped.     Plan:  - no acute surgical intervention  - recommend medicine admission for palpitations and somnolence  - recommend AVF duplex  - as of now, AVF can continue to be accessed - recommend access at alternate points from most recent bleeding points  - vascular surgery will sign off, call back sooner if bleeding resumes    Plan Discussed with Vascular Surgery Fellow on behalf of Dr. Hardy    Vascular Surgery   [id number]

## 2025-01-21 NOTE — CONSULT NOTE ADULT - PROBLEM SELECTOR RECOMMENDATION 3
Monitor labs, Renal FU
Monitor labs and lytes, on renal medications. On HD as scheduled, renal follow up.

## 2025-01-21 NOTE — ED ADULT NURSE REASSESSMENT NOTE - COMFORT CARE
pt and family made aware of Flu A+ and being moved to ed holding area while waiting for inpt bed assignment/plan of care explained/wait time explained
plan of care explained/wait time explained

## 2025-01-21 NOTE — CONSULT NOTE ADULT - PROBLEM SELECTOR RECOMMENDATION 2
Suggest to continue medications, monitoring, FU primary team recommendations.
Suggest to continue medications, monitoring, FU primary team recommendations. .

## 2025-01-22 LAB
GLUCOSE BLDC GLUCOMTR-MCNC: 105 MG/DL — HIGH (ref 70–99)
GLUCOSE BLDC GLUCOMTR-MCNC: 111 MG/DL — HIGH (ref 70–99)
GLUCOSE BLDC GLUCOMTR-MCNC: 121 MG/DL — HIGH (ref 70–99)

## 2025-01-22 RX ORDER — PREDNISONE 5 MG/1
50 TABLET ORAL ONCE
Refills: 0 | Status: COMPLETED | OUTPATIENT
Start: 2025-01-23 | End: 2025-01-23

## 2025-01-22 RX ORDER — PREDNISONE 5 MG/1
50 TABLET ORAL ONCE
Refills: 0 | Status: COMPLETED | OUTPATIENT
Start: 2025-01-22 | End: 2025-01-22

## 2025-01-22 RX ORDER — MIDODRINE HYDROCHLORIDE 5 MG/1
10 TABLET ORAL THREE TIMES A DAY
Refills: 0 | Status: DISCONTINUED | OUTPATIENT
Start: 2025-01-22 | End: 2025-01-24

## 2025-01-22 RX ORDER — ACETAMINOPHEN 160 MG/5ML
650 SUSPENSION ORAL ONCE
Refills: 0 | Status: COMPLETED | OUTPATIENT
Start: 2025-01-22 | End: 2025-01-22

## 2025-01-22 RX ORDER — PANTOPRAZOLE 20 MG/1
40 TABLET, DELAYED RELEASE ORAL
Refills: 0 | Status: DISCONTINUED | OUTPATIENT
Start: 2025-01-22 | End: 2025-01-24

## 2025-01-22 RX ADMIN — SEVELAMER CARBONATE 800 MILLIGRAM(S): 800 TABLET, FILM COATED ORAL at 12:51

## 2025-01-22 RX ADMIN — ASPIRIN 81 MILLIGRAM(S): 81 TABLET, COATED ORAL at 08:35

## 2025-01-22 RX ADMIN — ATORVASTATIN CALCIUM 40 MILLIGRAM(S): 80 TABLET, FILM COATED ORAL at 22:36

## 2025-01-22 RX ADMIN — Medication 5000 UNIT(S): at 22:37

## 2025-01-22 RX ADMIN — OSELTAMIVIR PHOSPHATE 30 MILLIGRAM(S): 75 CAPSULE ORAL at 17:55

## 2025-01-22 RX ADMIN — ACETAMINOPHEN 650 MILLIGRAM(S): 160 SUSPENSION ORAL at 17:54

## 2025-01-22 RX ADMIN — PREDNISONE 50 MILLIGRAM(S): 5 TABLET ORAL at 22:36

## 2025-01-22 RX ADMIN — SEVELAMER CARBONATE 800 MILLIGRAM(S): 800 TABLET, FILM COATED ORAL at 17:54

## 2025-01-22 RX ADMIN — Medication 5000 UNIT(S): at 08:36

## 2025-01-22 RX ADMIN — SEVELAMER CARBONATE 800 MILLIGRAM(S): 800 TABLET, FILM COATED ORAL at 08:35

## 2025-01-22 RX ADMIN — Medication 100 MILLIGRAM(S): at 22:43

## 2025-01-22 NOTE — PROGRESS NOTE ADULT - ASSESSMENT
Assessment  DMT2: 79y Female with DM T2 with hyperglycemia admitted with bleeding, palpitations, patient was on insulin at home, now on low dose basal insulin, blood sugars are improving, no hypoglycemic episode.  Tight sugar control is not recommended for this patient.  Palpitations: on medications, monitored, no acute events..  HTN: On antihypertensive medications, monitored, stable..  ESRD CKD: On hemodialysis, Labs, chart reviewed.            Perla Taylor MD  Cell:  [phone]  Office: [phone]

## 2025-01-22 NOTE — PROGRESS NOTE ADULT - SUBJECTIVE AND OBJECTIVE BOX
Chief complaint    Patient is a 79y old  Female who presents with a chief complaint of cp/ palps (22 Jan 2025 07:07)   Review of systems  Patient appears comfortable.    Labs and Fingersticks  CAPILLARY BLOOD GLUCOSE      POCT Blood Glucose.: 111 mg/dL (22 Jan 2025 07:26)  POCT Blood Glucose.: 140 mg/dL (21 Jan 2025 21:38)  POCT Blood Glucose.: 109 mg/dL (21 Jan 2025 17:47)  POCT Blood Glucose.: 146 mg/dL (21 Jan 2025 11:53)      Anion Gap: 14 (01-21 @ 02:43)      Calcium: 9.7 (01-21 @ 02:43)  Albumin: 4.4 (01-21 @ 02:43)    Alanine Aminotransferase (ALT/SGPT): 10 (01-21 @ 02:43)  Alkaline Phosphatase: 444 *H* (01-21 @ 02:43)  Aspartate Aminotransferase (AST/SGOT): 18 (01-21 @ 02:43)        01-21    135  |  91[L]  |  24[H]  ----------------------------<  141[H]  4.4   |  30  |  4.76[H]    Ca    9.7      21 Jan 2025 02:43    TPro  8.0  /  Alb  4.4  /  TBili  0.4  /  DBili  x   /  AST  18  /  ALT  10  /  AlkPhos  444[H]  01-21                        9.9    7.96  )-----------( 145      ( 21 Jan 2025 02:43 )             31.4     Medications  MEDICATIONS  (STANDING):  aspirin enteric coated 81 milliGRAM(s) Oral daily  atorvastatin 40 milliGRAM(s) Oral at bedtime  dextrose 5%. 1000 milliLiter(s) (100 mL/Hr) IV Continuous <Continuous>  dextrose 5%. 1000 milliLiter(s) (50 mL/Hr) IV Continuous <Continuous>  dextrose 50% Injectable 25 Gram(s) IV Push once  dextrose 50% Injectable 12.5 Gram(s) IV Push once  dextrose 50% Injectable 25 Gram(s) IV Push once  glucagon  Injectable 1 milliGRAM(s) IntraMuscular once  heparin   Injectable 5000 Unit(s) SubCutaneous every 12 hours  insulin lispro (ADMELOG) corrective regimen sliding scale   SubCutaneous three times a day before meals  midodrine. 10 milliGRAM(s) Oral three times a day  oseltamivir 30 milliGRAM(s) Oral <User Schedule>  sevelamer carbonate 800 milliGRAM(s) Oral three times a day with meals      Physical Exam  General: Patient appears comfortable.  Vital Signs Last 12 Hrs  T(F): 98.4 (01-22-25 @ 06:49), Max: 98.8 (01-21-25 @ 21:17)  HR: 75 (01-22-25 @ 06:49) (67 - 87)  BP: 170/77 (01-22-25 @ 06:49) (122/69 - 170/77)  BP(mean): --  RR: 18 (01-22-25 @ 06:49) (17 - 18)  SpO2: 98% (01-22-25 @ 06:49) (96% - 98%)  Neck: No palpable thyroid nodules.  CVS: S1S2, No murmurs  Respiratory: No wheezing, no crepitations  GI: Abdomen soft, non tender.    Diagnostics        Radiology:

## 2025-01-22 NOTE — PROGRESS NOTE ADULT - SUBJECTIVE AND OBJECTIVE BOX
Date of Service: 01-22-25 @ 07:07           CARDIOLOGY     PROGRESS  NOTE   ________________________________________________    CHIEF COMPLAINT:Patient is a 79y old  Female who presents with a chief complaint of cp/ palps (21 Jan 2025 17:57)  no complain  	  REVIEW OF SYSTEMS:  CONSTITUTIONAL: No fever, weight loss, or fatigue  EYES: No eye pain, visual disturbances, or discharge  ENT:  No difficulty hearing, tinnitus, vertigo; No sinus or throat pain  NECK: No pain or stiffness  RESPIRATORY: No cough, wheezing, chills or hemoptysis; No Shortness of Breath  CARDIOVASCULAR: No chest pain, palpitations, passing out, dizziness, or leg swelling  GASTROINTESTINAL: No abdominal or epigastric pain. No nausea, vomiting, or hematemesis; No diarrhea or constipation. No melena or hematochezia.  GENITOURINARY: No dysuria, frequency, hematuria, or incontinence  NEUROLOGICAL: No headaches, memory loss, loss of strength, numbness, or tremors  SKIN: No itching, burning, rashes, or lesions   LYMPH Nodes: No enlarged glands  ENDOCRINE: No heat or cold intolerance; No hair loss  MUSCULOSKELETAL: No joint pain or swelling; No muscle, back, or extremity pain  PSYCHIATRIC: No depression, anxiety, mood swings, or difficulty sleeping  HEME/LYMPH: No easy bruising, or bleeding gums  ALLERGY AND IMMUNOLOGIC: No hives or eczema	    [x ] All others negative	  [ ] Unable to obtain    PHYSICAL EXAM:  T(C): 36.9 (01-22-25 @ 06:49), Max: 37.1 (01-21-25 @ 21:17)  HR: 75 (01-22-25 @ 06:49) (67 - 87)  BP: 170/77 (01-22-25 @ 06:49) (121/61 - 170/77)  RR: 18 (01-22-25 @ 06:49) (16 - 19)  SpO2: 98% (01-22-25 @ 06:49) (92% - 98%)  Wt(kg): --  I&O's Summary      Appearance: Normal	  HEENT:   Normal oral mucosa, PERRL, EOMI	  Lymphatic: No lymphadenopathy  Cardiovascular: Normal S1 S2, No JVD, + murmurs, No edema  Respiratory rhonchi  Psychiatry: A & O x 3, Mood & affect appropriate  Gastrointestinal:  Soft, Non-tender, + BS	  Skin: No rashes, No ecchymoses, No cyanosis	  Neurologic: Non-focal  Extremities: Normal range of motion, No clubbing, cyanosis or edema  Vascular: Peripheral pulses palpable 2+ bilaterally    MEDICATIONS  (STANDING):  aspirin enteric coated 81 milliGRAM(s) Oral daily  atorvastatin 40 milliGRAM(s) Oral at bedtime  dextrose 5%. 1000 milliLiter(s) (100 mL/Hr) IV Continuous <Continuous>  dextrose 5%. 1000 milliLiter(s) (50 mL/Hr) IV Continuous <Continuous>  dextrose 50% Injectable 25 Gram(s) IV Push once  dextrose 50% Injectable 12.5 Gram(s) IV Push once  dextrose 50% Injectable 25 Gram(s) IV Push once  glucagon  Injectable 1 milliGRAM(s) IntraMuscular once  heparin   Injectable 5000 Unit(s) SubCutaneous every 12 hours  insulin lispro (ADMELOG) corrective regimen sliding scale   SubCutaneous three times a day before meals  midodrine. 10 milliGRAM(s) Oral three times a day  oseltamivir 30 milliGRAM(s) Oral <User Schedule>  sevelamer carbonate 800 milliGRAM(s) Oral three times a day with meals      TELEMETRY: 	    ECG:  	  RADIOLOGY:  OTHER: 	  	  LABS:	 	    CARDIAC MARKERS:                          9.9    7.96  )-----------( 145      ( 21 Jan 2025 02:43 )             31.4     01-21    135  |  91[L]  |  24[H]  ----------------------------<  141[H]  4.4   |  30  |  4.76[H]    Ca    9.7      21 Jan 2025 02:43    TPro  8.0  /  Alb  4.4  /  TBili  0.4  /  DBili  x   /  AST  18  /  ALT  10  /  AlkPhos  444[H]  01-21    proBNP:   Lipid Profile:   HgA1c:   TSH: Thyroid Stimulating Hormone, Serum: 1.600 uU/mL (01-14 @ 09:40)    PT/INR - ( 21 Jan 2025 02:43 )   PT: 11.6 sec;   INR: 1.02 ratio         PTT - ( 21 Jan 2025 02:43 )  PTT:27.1 sec      Assessment and plan  ---------------------------  79 years old female with h/o HLD, DM, CAD, ESRD on MWF dialysis recently dc'd with plan to perform outpt cath for chest pain, presents w/ bleeding from fistula on left after dialysis, reported half liter blood loss pressure bandage and now no longer bleeding, pt was reported to seem a little out of it but now at baseline,  had palpitations, and chest pressure earlier however is currently asymptomatic.  pt with sig cardiac hx on HD with chest pain recently dc ed forcardiac cath now with +FLU  continue all cardiac meds  Tamiflu  will adjust meds  'plan for cardiac cath after flu treated  ESRD renal follow up  asa daily  beta blocker if tolerated  dvt prophylaxis  plan for cardiac cath tomorrow, zak has contrast allergies will prepare tomorrow  HD today

## 2025-01-22 NOTE — PROGRESS NOTE ADULT - ASSESSMENT
79 year old female with PMH of ESRD on HD MWF, HTN, Anemia of chronic disease, HLD, DM, and JARVIS whop presented to the hospital with complaints of chest discomfort following HD yesterday. The nephrology team was consulted for management of dialysis.       ESRD on HD   Center: [facility]  Nephrologist: Jhonatan Smith   Schedule: MWF   Access; LUE AVF   last outpatient HD on 1/20/25    plan   plan for HD today  HD consent obtained and placed in chart  UF as tolerated by BP   please dose medications as per ESRD     Anemia   in setting of kidney disease  hbg at goal right now   will monitor for DEE needs     If any questions, please feel free to contact me     Jhonatan Smith  Nephrology Attending  Cell #[phone]

## 2025-01-22 NOTE — PROGRESS NOTE ADULT - ASSESSMENT
79yF           h/o   HLD, DM, CAD, ESRD on MWF dialysis with recent admission for chest palpitations w plan to perform outpt cath for chest pain        , presents w/ bleeding from fistula on left after dialysis.   and cp      was at outpatient HD when she developed chest palpitations approx 1 hr after HD completed.       EMS  reported , bleeding   from the AVF.  ems   placed pressure bandage/  er   staff held pressure with surgicel and placed tight kerlix dressing. Vascular surgery consulted.     per  daughter,   fistula but believes it was about 5 years ago.   and  care is  via  care at [facility].     states that her mom has to "get the fistula cleaned out" every 3-6 months. Last "cleaning" was approx 3 months ago.         patient gets hives with IV contrast and usually is pre-medicated with prednisone prior to these "cleanings".     cp/  palps,  none   now      elevated troponin from ckd  and demand ischemia.  card f/p/ needs    cath with pre  meds      asa/  lipitor    CKD      on HD      renal     vasc  following  /  for  access   malfunction    DM        ,  follow   fs   orthostasis,  on  midodrine    cath in  am, franc horta   pre  med s prednisone to start    today    dvt ppx/  aide  at  bedside       rad< from: TTE W or WO Ultrasound Enhancing Agent (12.23.24 @ 10:09) >     1. Left ventricular cavity is normal in size. Left ventricular wall thickness is normal. Left ventricular systolic function is normal with an ejection fraction of 65 % by Cooper's method of disks. There are no regional wall motion abnormalities seen.   2. Normal left ventricular diastolic function, with normal left ventricular filling pressure.   3. Normal right ventricular cavity size, with normal wall thickness, and normal right ventricular systolic function.   4. Mild aortic stenosis.   5. No pericardial effusion seen.   6. Technically difficult image quality.  _______________    < end of copied text >

## 2025-01-22 NOTE — PROGRESS NOTE ADULT - SUBJECTIVE AND OBJECTIVE BOX
[facility] : Ans Serv [phone], Office [phone]  Dr. Smith/Dr Lockett/Dr Camacho  /Dr Garry davenport /Dr JACY Taylor/Dr Del Becker/Dr Stephon Colbert /Dr MALCOLM Koou  _______________________________________________________________________________________________    Pt seen and examined this morning   feeling better     VITALS:  T(F): 98.4 (01-22 @ 06:49), Max: 98.8 (01-21 @ 21:17)  HR: 75 (01-22 @ 06:49)  BP: 170/77 (01-22 @ 06:49)  ABP: --  RR: 18 (01-22 @ 06:49)  SpO2: 98% (01-22 @ 06:49)    Physical Exam :-  Constitutional: NAD  Respiratory: Bilateral equal breath sounds, no Crackles present.  Cardiovascular: S1, S2 normal  Gastrointestinal: Bowel Sounds present, soft  Extremities: no Edema Feet  Neurological: Alert and Oriented x 3  Psychiatric: Normal mood, normal affect    Data:-  Allergies :   shellfish (Hives; Rash)  smoke; coughing (Other)  IV Contrast (Anaphylaxis)  Shrimp (Hives)    Hospital Medications:   MEDICATIONS  (STANDING):  aspirin enteric coated 81 milliGRAM(s) Oral daily  atorvastatin 40 milliGRAM(s) Oral at bedtime  dextrose 5%. 1000 milliLiter(s) (100 mL/Hr) IV Continuous <Continuous>  dextrose 5%. 1000 milliLiter(s) (50 mL/Hr) IV Continuous <Continuous>  dextrose 50% Injectable 25 Gram(s) IV Push once  dextrose 50% Injectable 12.5 Gram(s) IV Push once  dextrose 50% Injectable 25 Gram(s) IV Push once  glucagon  Injectable 1 milliGRAM(s) IntraMuscular once  heparin   Injectable 5000 Unit(s) SubCutaneous every 12 hours  insulin lispro (ADMELOG) corrective regimen sliding scale   SubCutaneous three times a day before meals  midodrine. 10 milliGRAM(s) Oral three times a day  oseltamivir 30 milliGRAM(s) Oral <User Schedule>  sevelamer carbonate 800 milliGRAM(s) Oral three times a day with meals    01-21    135  |  91[L]  |  24[H]  ----------------------------<  141[H]  4.4   |  30  |  4.76[H]    Ca    9.7      21 Jan 2025 02:43    TPro  8.0  /  Alb  4.4  /  TBili  0.4  /  DBili      /  AST  18  /  ALT  10  /  AlkPhos  444[H]  01-21    Creatinine Trend: 4.76 <--, 7.23 <--  egfr trend : 9 <--, 5 <--, 6 <--, 7 <--                        9.9    7.96  )-----------( 145      ( 21 Jan 2025 02:43 )             31.4

## 2025-01-22 NOTE — PROVIDER CONTACT NOTE (OTHER) - ASSESSMENT
Pt A&Ox4, forgetful @times, complained of shortness of breath and palpitations shortly after coming back from finishing dialysis with 2L removed. Vital signs as charted. No other complaints of pain, dizziness or headache. Pt on tele, NSR, no events per tele tech. Pt A&Ox4, forgetful @times, complained of shortness of breath and palpitations shortly after coming back from finishing dialysis with 2L removed. Vital signs as charted. Pt c/o 4/10 headache. No other complaints of pain or dizziness. Pt on tele, NSR, no events per tele tech.

## 2025-01-22 NOTE — PROGRESS NOTE ADULT - SUBJECTIVE AND OBJECTIVE BOX
date of service: 01-22-25 @ 10:08  afebrile  REVIEW OF SYSTEMS:  CONSTITUTIONAL: No fever,  no  weight loss  ENT:  No  tinnitus,   no   vertigo  NECK: No pain or stiffness  RESPIRATORY: No cough, wheezing, chills or hemoptysis;    No Shortness of Breath  CARDIOVASCULAR: No chest pain, palpitations, dizziness  GASTROINTESTINAL: No abdominal or epigastric pain. No nausea, vomiting, or hematemesis; No diarrhea  No melena or hematochezia.  GENITOURINARY: No dysuria, frequency, hematuria, or incontinence  NEUROLOGICAL: No headaches  SKIN: No itching,  no   rash  LYMPH Nodes: No enlarged glands  ENDOCRINE: No heat or cold intolerance  MUSCULOSKELETAL: No joint pain or swelling  PSYCHIATRIC: No depression, anxiety  HEME/LYMPH: No easy bruising, or bleeding gums  ALLERGY AND IMMUNOLOGIC: No hives or eczema	    MEDICATIONS  (STANDING):  aspirin enteric coated 81 milliGRAM(s) Oral daily  atorvastatin 40 milliGRAM(s) Oral at bedtime  dextrose 5%. 1000 milliLiter(s) (100 mL/Hr) IV Continuous <Continuous>  dextrose 5%. 1000 milliLiter(s) (50 mL/Hr) IV Continuous <Continuous>  dextrose 50% Injectable 25 Gram(s) IV Push once  dextrose 50% Injectable 12.5 Gram(s) IV Push once  dextrose 50% Injectable 25 Gram(s) IV Push once  glucagon  Injectable 1 milliGRAM(s) IntraMuscular once  heparin   Injectable 5000 Unit(s) SubCutaneous every 12 hours  insulin lispro (ADMELOG) corrective regimen sliding scale   SubCutaneous three times a day before meals  midodrine. 10 milliGRAM(s) Oral three times a day  oseltamivir 30 milliGRAM(s) Oral <User Schedule>  sevelamer carbonate 800 milliGRAM(s) Oral three times a day with meals    MEDICATIONS  (PRN):  dextrose Oral Gel 15 Gram(s) Oral once PRN Blood Glucose LESS THAN 70 milliGRAM(s)/deciliter      Vital Signs Last 24 Hrs  T(C): 36.9 (22 Jan 2025 06:49), Max: 37.1 (21 Jan 2025 21:17)  T(F): 98.4 (22 Jan 2025 06:49), Max: 98.8 (21 Jan 2025 21:17)  HR: 75 (22 Jan 2025 06:49) (67 - 87)  BP: 170/77 (22 Jan 2025 06:49) (122/69 - 170/77)  BP(mean): --  RR: 18 (22 Jan 2025 06:49) (16 - 19)  SpO2: 98% (22 Jan 2025 06:49) (92% - 98%)    Parameters below as of 22 Jan 2025 06:49  Patient On (Oxygen Delivery Method): room air      CAPILLARY BLOOD GLUCOSE      POCT Blood Glucose.: 111 mg/dL (22 Jan 2025 07:26)  POCT Blood Glucose.: 140 mg/dL (21 Jan 2025 21:38)  POCT Blood Glucose.: 109 mg/dL (21 Jan 2025 17:47)  POCT Blood Glucose.: 146 mg/dL (21 Jan 2025 11:53)    I&O's Summary        Appearance: Normal	  HEENT:   Normal oral mucosa, PERRL, EOMI	  Lymphatic: No lymphadenopathy  Cardiovascular: Normal S1 S2, No JVD  Respiratory: Lungs clear to auscultation	  Gastrointestinal:  Soft, Non-tender, + BS	  Skin: No rash, No ecchymoses	  Extremities:     LABS:                        9.9    7.96  )-----------( 145      ( 21 Jan 2025 02:43 )             31.4     01-21    135  |  91[L]  |  24[H]  ----------------------------<  141[H]  4.4   |  30  |  4.76[H]    Ca    9.7      21 Jan 2025 02:43    TPro  8.0  /  Alb  4.4  /  TBili  0.4  /  DBili  x   /  AST  18  /  ALT  10  /  AlkPhos  444[H]  01-21    PT/INR - ( 21 Jan 2025 02:43 )   PT: 11.6 sec;   INR: 1.02 ratio         PTT - ( 21 Jan 2025 02:43 )  PTT:27.1 sec      Urinalysis Basic - ( 21 Jan 2025 02:43 )    Color: x / Appearance: x / SG: x / pH: x  Gluc: 141 mg/dL / Ketone: x  / Bili: x / Urobili: x   Blood: x / Protein: x / Nitrite: x   Leuk Esterase: x / RBC: x / WBC x   Sq Epi: x / Non Sq Epi: x / Bacteria: x              Thyroid Stimulating Hormone, Serum: 1.600 uU/mL (01-14 @ 09:40)          Consultant(s) Notes Reviewed:      Care Discussed with Consultants/Other Providers:

## 2025-01-23 LAB
ANION GAP SERPL CALC-SCNC: 17 MMOL/L — SIGNIFICANT CHANGE UP (ref 5–17)
APTT BLD: 30.1 SEC — SIGNIFICANT CHANGE UP (ref 24.5–35.6)
BUN SERPL-MCNC: 24 MG/DL — HIGH (ref 7–23)
CALCIUM SERPL-MCNC: 9.5 MG/DL — SIGNIFICANT CHANGE UP (ref 8.4–10.5)
CHLORIDE SERPL-SCNC: 89 MMOL/L — LOW (ref 96–108)
CK MB CFR SERPL CALC: 1.3 NG/ML — SIGNIFICANT CHANGE UP (ref 0–3.8)
CK SERPL-CCNC: 99 U/L — SIGNIFICANT CHANGE UP (ref 25–170)
CO2 SERPL-SCNC: 24 MMOL/L — SIGNIFICANT CHANGE UP (ref 22–31)
CREAT SERPL-MCNC: 5.16 MG/DL — HIGH (ref 0.5–1.3)
EGFR: 8 ML/MIN/1.73M2 — LOW
GLUCOSE BLDC GLUCOMTR-MCNC: 241 MG/DL — HIGH (ref 70–99)
GLUCOSE BLDC GLUCOMTR-MCNC: 260 MG/DL — HIGH (ref 70–99)
GLUCOSE BLDC GLUCOMTR-MCNC: 288 MG/DL — HIGH (ref 70–99)
GLUCOSE BLDC GLUCOMTR-MCNC: 321 MG/DL — HIGH (ref 70–99)
GLUCOSE BLDC GLUCOMTR-MCNC: 323 MG/DL — HIGH (ref 70–99)
GLUCOSE SERPL-MCNC: 212 MG/DL — HIGH (ref 70–99)
HCT VFR BLD CALC: 30.8 % — LOW (ref 34.5–45)
HGB BLD-MCNC: 9.8 G/DL — LOW (ref 11.5–15.5)
INR BLD: 1.01 RATIO — SIGNIFICANT CHANGE UP (ref 0.85–1.16)
MCHC RBC-ENTMCNC: 29.3 PG — SIGNIFICANT CHANGE UP (ref 27–34)
MCHC RBC-ENTMCNC: 31.8 G/DL — LOW (ref 32–36)
MCV RBC AUTO: 92.2 FL — SIGNIFICANT CHANGE UP (ref 80–100)
NRBC # BLD: 0 /100 WBCS — SIGNIFICANT CHANGE UP (ref 0–0)
NRBC BLD-RTO: 0 /100 WBCS — SIGNIFICANT CHANGE UP (ref 0–0)
PLATELET # BLD AUTO: 178 K/UL — SIGNIFICANT CHANGE UP (ref 150–400)
POTASSIUM SERPL-MCNC: 4.9 MMOL/L — SIGNIFICANT CHANGE UP (ref 3.5–5.3)
POTASSIUM SERPL-SCNC: 4.9 MMOL/L — SIGNIFICANT CHANGE UP (ref 3.5–5.3)
PROTHROM AB SERPL-ACNC: 11.6 SEC — SIGNIFICANT CHANGE UP (ref 9.9–13.4)
RBC # BLD: 3.34 M/UL — LOW (ref 3.8–5.2)
RBC # FLD: 18.3 % — HIGH (ref 10.3–14.5)
SODIUM SERPL-SCNC: 130 MMOL/L — LOW (ref 135–145)
TROPONIN T, HIGH SENSITIVITY RESULT: 103 NG/L — HIGH (ref 0–51)
WBC # BLD: 8.49 K/UL — SIGNIFICANT CHANGE UP (ref 3.8–10.5)
WBC # FLD AUTO: 8.49 K/UL — SIGNIFICANT CHANGE UP (ref 3.8–10.5)

## 2025-01-23 PROCEDURE — 93454 CORONARY ARTERY ANGIO S&I: CPT | Mod: 26,59

## 2025-01-23 PROCEDURE — 93010 ELECTROCARDIOGRAM REPORT: CPT

## 2025-01-23 PROCEDURE — 92928 PRQ TCAT PLMT NTRAC ST 1 LES: CPT | Mod: LD

## 2025-01-23 PROCEDURE — 99152 MOD SED SAME PHYS/QHP 5/>YRS: CPT

## 2025-01-23 RX ORDER — BACTERIOSTATIC SODIUM CHLORIDE 0.9 %
1000 VIAL (ML) INJECTION
Refills: 0 | Status: DISCONTINUED | OUTPATIENT
Start: 2025-01-23 | End: 2025-01-23

## 2025-01-23 RX ORDER — EPOETIN ALFA 2000 [IU]/ML
10000 SOLUTION INTRAVENOUS; SUBCUTANEOUS
Refills: 0 | Status: DISCONTINUED | OUTPATIENT
Start: 2025-01-23 | End: 2025-01-24

## 2025-01-23 RX ADMIN — Medication 2: at 17:17

## 2025-01-23 RX ADMIN — Medication 3: at 13:58

## 2025-01-23 RX ADMIN — PREDNISONE 50 MILLIGRAM(S): 5 TABLET ORAL at 06:33

## 2025-01-23 RX ADMIN — ATORVASTATIN CALCIUM 40 MILLIGRAM(S): 80 TABLET, FILM COATED ORAL at 21:42

## 2025-01-23 RX ADMIN — PREDNISONE 50 MILLIGRAM(S): 5 TABLET ORAL at 10:38

## 2025-01-23 RX ADMIN — PANTOPRAZOLE 40 MILLIGRAM(S): 20 TABLET, DELAYED RELEASE ORAL at 06:33

## 2025-01-23 RX ADMIN — Medication 5000 UNIT(S): at 08:52

## 2025-01-23 RX ADMIN — SEVELAMER CARBONATE 800 MILLIGRAM(S): 800 TABLET, FILM COATED ORAL at 08:51

## 2025-01-23 RX ADMIN — MIDODRINE HYDROCHLORIDE 10 MILLIGRAM(S): 5 TABLET ORAL at 14:00

## 2025-01-23 RX ADMIN — Medication 4: at 08:51

## 2025-01-23 RX ADMIN — Medication 5000 UNIT(S): at 21:43

## 2025-01-23 RX ADMIN — ASPIRIN 81 MILLIGRAM(S): 81 TABLET, COATED ORAL at 14:00

## 2025-01-23 RX ADMIN — MIDODRINE HYDROCHLORIDE 10 MILLIGRAM(S): 5 TABLET ORAL at 17:17

## 2025-01-23 NOTE — PATIENT PROFILE ADULT - NSTRANSFERBELONGINGSRESP_GEN_A_NUR
26w0d pregnant female.   C/o headaches X 2 weeks.  Headache off and on.  approx 3 times per week over the last 2 weeks..  Lasting 2-3 hours.   Taking tylenol with onset of headache.  Headache whole head, achy/ throbs.  Sensitive to light but no floaters/ blurry vision.  Has never had issues with headaches in past.   Has cut out caffeine    4/4/24 CBC : hgb 10.9, ferritin 6.  Started oral iron 1 week ago      Current Outpatient Medications:     Prenatal Vit-Fe Fumarate-FA (Prenatal Vitamin) 27-0.8 MG TABS, Take by mouth, Disp: , Rfl:     Pregravid Weight/BMI: 57.2 kg (126 lb) (BMI 22.33)  Current Weight:     Total Weight Gain: 15 kg (33 lb)      Vitals:    04/16/24 1101   BP: 126/80   Pulse: 103   SpO2: 90%       Fundal height: 26  Fetal Heart Rate: 156      Problem List          Behavioral Health    Marijuana use    Overview     Urine drug screen ordered: + THC (1/25/24)            Obstetrics/Gynecology    26 weeks gestation of pregnancy    Supervision of normal first pregnancy, antepartum    Overview       CF/ SMA testing: declines  Flu vaccine: not completed-  declines  Covid vaccine:  completed X 2  Aneuploidy screening declined. msAFP low risk           Rubella non-immune status, antepartum    Overview     Rubella titre: borderline, consider postpartum vaccine            Other    History of atrial septal defect    Overview     Resolved by age2, no surgery required         Elevated blood pressure reading in office without diagnosis of hypertension    Overview     2/22/24:  /82,  repeat 130/ 82             Headaches   Trial of 8 oz caffeine beverage daily.   -discussed magnesium 400 mg daily, riboflavin 400 mg daily .  Reviewed more limited data on these supplements.  Rx :  metoclopramide (Reglan) 10 mg tablet 30 tablet 0     Take 1 tablet (10 mg total) by mouth every 6 (six) hours as needed (nausea or headache) - Oral    To ER if persisting / worsening pain    Ob visit in 1 wks  US scheduled 4/30/24     yes

## 2025-01-23 NOTE — PATIENT PROFILE ADULT - FALL HARM RISK - RISK INTERVENTIONS

## 2025-01-23 NOTE — PROGRESS NOTE ADULT - ASSESSMENT
Assessment  DMT2: 79y Female with DM T2 with hyperglycemia admitted with bleeding, palpitations, patient was on insulin at home, now on low dose basal insulin, blood sugars are improving, no hypoglycemic episode.  Tight sugar control is not recommended for this patient.  Palpitations: on medications, monitored, no acute events.  HTN: On antihypertensive medications, monitored, stable.  ESRD CKD: On hemodialysis, Labs, chart reviewed.      Discussed plan and management with Dr Claudia Cr NP - TEAMS  Perla Taylor MD  Cell: [phone]  Office: [phone]                  Assessment  DMT2: 79y Female with DM T2 with hyperglycemia admitted with bleeding, palpitations, patient was on insulin at home, now on low dose basal insulin, blood sugars are improving, no hypoglycemic episode.  Tight sugar control is not recommended for this patient.  Palpitations: on medications, monitored, no acute events.  HTN: On antihypertensive medications, monitored, stable.  ESRD CKD: On hemodialysis, Labs, chart reviewed.      Discussed plan and management with Dr Claudia Cr NP - TEAMS  Perla Taylor MD  Cell: [phone]  Office: [phone]

## 2025-01-23 NOTE — PROGRESS NOTE ADULT - SUBJECTIVE AND OBJECTIVE BOX
Patient seen and examined  feels well  sob better      shellfish (Hives; Rash)  smoke; coughing (Other)  IV Contrast (Anaphylaxis)  Shrimp (Hives)    Hospital Medications:   MEDICATIONS  (STANDING):  aspirin enteric coated 81 milliGRAM(s) Oral daily  atorvastatin 40 milliGRAM(s) Oral at bedtime  dextrose 5%. 1000 milliLiter(s) (100 mL/Hr) IV Continuous <Continuous>  dextrose 5%. 1000 milliLiter(s) (50 mL/Hr) IV Continuous <Continuous>  dextrose 50% Injectable 25 Gram(s) IV Push once  dextrose 50% Injectable 12.5 Gram(s) IV Push once  dextrose 50% Injectable 25 Gram(s) IV Push once  glucagon  Injectable 1 milliGRAM(s) IntraMuscular once  heparin   Injectable 5000 Unit(s) SubCutaneous every 12 hours  influenza  Vaccine (HIGH DOSE) 0.5 milliLiter(s) IntraMuscular once  insulin lispro (ADMELOG) corrective regimen sliding scale   SubCutaneous three times a day before meals  midodrine. 10 milliGRAM(s) Oral three times a day  oseltamivir 30 milliGRAM(s) Oral <User Schedule>  pantoprazole    Tablet 40 milliGRAM(s) Oral before breakfast  sevelamer carbonate 800 milliGRAM(s) Oral three times a day with meals        VITALS:  T(F): 97.5 (01-23-25 @ 12:23), Max: 98.8 (01-23-25 @ 04:53)  HR: 69 (01-23-25 @ 12:23)  BP: 141/77 (01-23-25 @ 12:23)  RR: 18 (01-23-25 @ 12:23)  SpO2: 98% (01-23-25 @ 12:23)  Wt(kg): --    01-22 @ 07:01  -  01-23 @ 07:00  --------------------------------------------------------  IN: 800 mL / OUT: 2800 mL / NET: -2000 mL        Physical Exam :-  Constitutional: NAD  Respiratory: Bilateral equal breath sounds, no Crackles present.  Cardiovascular: S1, S2 normal  Gastrointestinal: Bowel Sounds present, soft  Extremities: no Edema Feet  Neurological: Alert and Oriented x 3  Psychiatric: Normal mood, normal affect    LABS:  01-23    130[L]  |  89[L]  |  24[H]  ----------------------------<  212[H]  4.9   |  24  |  5.16[H]    Ca    9.5      23 Jan 2025 07:08      Creatinine Trend: 5.16 <--, 4.76 <--                        9.8    8.49  )-----------( 178      ( 23 Jan 2025 07:10 )             30.8     Urine Studies:  Urinalysis Basic - ( 23 Jan 2025 07:08 )    Color:  / Appearance:  / SG:  / pH:   Gluc: 212 mg/dL / Ketone:   / Bili:  / Urobili:    Blood:  / Protein:  / Nitrite:    Leuk Esterase:  / RBC:  / WBC    Sq Epi:  / Non Sq Epi:  / Bacteria:         RADIOLOGY & ADDITIONAL STUDIES:

## 2025-01-23 NOTE — PROVIDER CONTACT NOTE (OTHER) - REASON
Pt complaining of palpitations s/p dialysis
pt had 6 beat WCT on tele monitor
Pt c/o  chest tightness and congestion

## 2025-01-23 NOTE — PROGRESS NOTE ADULT - ASSESSMENT
79 year old female with PMH of ESRD on HD MWF, HTN, Anemia of chronic disease, HLD, DM, and JARVIS whop presented to the hospital with complaints of chest discomfort following HD yesterday. The nephrology team was consulted for management of dialysis.       ESRD on HD   Center: [facility]  Nephrologist: Jhonatan Smith   Schedule: MWF   Access; LUE AVF   last outpatient HD on 1/20/25    plan   s/p HD yesterday- tolerated well  Plan for next HD tomm  HD consent obtained and placed in chart  UF as tolerated by BP   please dose medications as per ESRD     Anemia   in setting of kidney disease  hbg below goal  start epo 10 k tiw with hd  trend hgb     If any questions, please feel free to contact me     Dr Taylor  [phone]

## 2025-01-23 NOTE — PROGRESS NOTE ADULT - SUBJECTIVE AND OBJECTIVE BOX
Chief complaint  Patient is a 79y old  Female who presents with a chief complaint of cp/ palps (23 Jan 2025 13:01)         Labs and Fingersticks  CAPILLARY BLOOD GLUCOSE      POCT Blood Glucose.: 241 mg/dL (23 Jan 2025 17:05)  POCT Blood Glucose.: 260 mg/dL (23 Jan 2025 13:58)  POCT Blood Glucose.: 288 mg/dL (23 Jan 2025 11:58)  POCT Blood Glucose.: 321 mg/dL (23 Jan 2025 08:17)      Anion Gap: 17 (01-23 @ 07:08)      Calcium: 9.5 (01-23 @ 07:08)          01-23    130[L]  |  89[L]  |  24[H]  ----------------------------<  212[H]  4.9   |  24  |  5.16[H]    Ca    9.5      23 Jan 2025 07:08                          9.8    8.49  )-----------( 178      ( 23 Jan 2025 07:10 )             30.8     Medications  MEDICATIONS  (STANDING):  aspirin enteric coated 81 milliGRAM(s) Oral daily  atorvastatin 40 milliGRAM(s) Oral at bedtime  dextrose 5%. 1000 milliLiter(s) (100 mL/Hr) IV Continuous <Continuous>  dextrose 5%. 1000 milliLiter(s) (50 mL/Hr) IV Continuous <Continuous>  dextrose 50% Injectable 25 Gram(s) IV Push once  dextrose 50% Injectable 12.5 Gram(s) IV Push once  dextrose 50% Injectable 25 Gram(s) IV Push once  epoetin shannon (EPOGEN) Injectable 32051 Unit(s) IV Push <User Schedule>  glucagon  Injectable 1 milliGRAM(s) IntraMuscular once  heparin   Injectable 5000 Unit(s) SubCutaneous every 12 hours  influenza  Vaccine (HIGH DOSE) 0.5 milliLiter(s) IntraMuscular once  insulin lispro (ADMELOG) corrective regimen sliding scale   SubCutaneous three times a day before meals  midodrine. 10 milliGRAM(s) Oral three times a day  oseltamivir 30 milliGRAM(s) Oral <User Schedule>  pantoprazole    Tablet 40 milliGRAM(s) Oral before breakfast  sevelamer carbonate 800 milliGRAM(s) Oral three times a day with meals      Physical Exam  General: Patient comfortable in bed   Vital Signs Last 12 Hrs  T(F): 97.5 (01-23-25 @ 12:23), Max: 97.5 (01-23-25 @ 12:23)  HR: 69 (01-23-25 @ 12:23) (69 - 69)  BP: 177/112 (01-23-25 @ 17:00) (141/77 - 177/112)  BP(mean): --  RR: 18 (01-23-25 @ 12:23) (18 - 18)  SpO2: 98% (01-23-25 @ 12:23) (98% - 98%)    CVS: S1S2   Respiratory: No wheezing, no crepitations  GI: Abdomen soft, bowel sounds positive  Musculoskeletal:  moves all extremities       Chief complaint  Patient is a 79y old  Female who presents with a chief complaint of cp/ palps (23 Jan 2025 13:01)         Labs and Fingersticks  CAPILLARY BLOOD GLUCOSE      POCT Blood Glucose.: 241 mg/dL (23 Jan 2025 17:05)  POCT Blood Glucose.: 260 mg/dL (23 Jan 2025 13:58)  POCT Blood Glucose.: 288 mg/dL (23 Jan 2025 11:58)  POCT Blood Glucose.: 321 mg/dL (23 Jan 2025 08:17)      Anion Gap: 17 (01-23 @ 07:08)      Calcium: 9.5 (01-23 @ 07:08)          01-23    130[L]  |  89[L]  |  24[H]  ----------------------------<  212[H]  4.9   |  24  |  5.16[H]    Ca    9.5      23 Jan 2025 07:08                          9.8    8.49  )-----------( 178      ( 23 Jan 2025 07:10 )             30.8     Medications  MEDICATIONS  (STANDING):  aspirin enteric coated 81 milliGRAM(s) Oral daily  atorvastatin 40 milliGRAM(s) Oral at bedtime  dextrose 5%. 1000 milliLiter(s) (100 mL/Hr) IV Continuous <Continuous>  dextrose 5%. 1000 milliLiter(s) (50 mL/Hr) IV Continuous <Continuous>  dextrose 50% Injectable 25 Gram(s) IV Push once  dextrose 50% Injectable 12.5 Gram(s) IV Push once  dextrose 50% Injectable 25 Gram(s) IV Push once  epoetin shannon (EPOGEN) Injectable 27403 Unit(s) IV Push <User Schedule>  glucagon  Injectable 1 milliGRAM(s) IntraMuscular once  heparin   Injectable 5000 Unit(s) SubCutaneous every 12 hours  influenza  Vaccine (HIGH DOSE) 0.5 milliLiter(s) IntraMuscular once  insulin lispro (ADMELOG) corrective regimen sliding scale   SubCutaneous three times a day before meals  midodrine. 10 milliGRAM(s) Oral three times a day  oseltamivir 30 milliGRAM(s) Oral <User Schedule>  pantoprazole    Tablet 40 milliGRAM(s) Oral before breakfast  sevelamer carbonate 800 milliGRAM(s) Oral three times a day with meals      Physical Exam  General: Patient comfortable in bed   Vital Signs Last 12 Hrs  T(F): 97.5 (01-23-25 @ 12:23), Max: 97.5 (01-23-25 @ 12:23)  HR: 69 (01-23-25 @ 12:23) (69 - 69)  BP: 177/112 (01-23-25 @ 17:00) (141/77 - 177/112)  BP(mean): --  RR: 18 (01-23-25 @ 12:23) (18 - 18)  SpO2: 98% (01-23-25 @ 12:23) (98% - 98%)    CVS: S1S2   Respiratory: No wheezing, no crepitations  GI: Abdomen soft, bowel sounds positive  Musculoskeletal:  moves all extremities

## 2025-01-23 NOTE — PROGRESS NOTE ADULT - SUBJECTIVE AND OBJECTIVE BOX
date of service: 01-23-25 @ 07:30  afebrile  REVIEW OF SYSTEMS:  CONSTITUTIONAL: No fever,  no  weight loss  ENT:  No  tinnitus,   no   vertigo  NECK: No pain or stiffness  RESPIRATORY: No cough, wheezing, chills or hemoptysis;    No Shortness of Breath  CARDIOVASCULAR: No chest pain, palpitations, dizziness  GASTROINTESTINAL: No abdominal or epigastric pain. No nausea, vomiting, or hematemesis; No diarrhea  No melena or hematochezia.  GENITOURINARY: No dysuria, frequency, hematuria, or incontinence  NEUROLOGICAL: No headaches  SKIN: No itching,  no   rash  LYMPH Nodes: No enlarged glands  ENDOCRINE: No heat or cold intolerance  MUSCULOSKELETAL: No joint pain or swelling  PSYCHIATRIC: No depression, anxiety  HEME/LYMPH: No easy bruising, or bleeding gums  ALLERGY AND IMMUNOLOGIC: No hives or eczema	    MEDICATIONS  (STANDING):  aspirin enteric coated 81 milliGRAM(s) Oral daily  atorvastatin 40 milliGRAM(s) Oral at bedtime  dextrose 5%. 1000 milliLiter(s) (100 mL/Hr) IV Continuous <Continuous>  dextrose 5%. 1000 milliLiter(s) (50 mL/Hr) IV Continuous <Continuous>  dextrose 50% Injectable 25 Gram(s) IV Push once  dextrose 50% Injectable 12.5 Gram(s) IV Push once  dextrose 50% Injectable 25 Gram(s) IV Push once  glucagon  Injectable 1 milliGRAM(s) IntraMuscular once  heparin   Injectable 5000 Unit(s) SubCutaneous every 12 hours  influenza  Vaccine (HIGH DOSE) 0.5 milliLiter(s) IntraMuscular once  insulin lispro (ADMELOG) corrective regimen sliding scale   SubCutaneous three times a day before meals  midodrine. 10 milliGRAM(s) Oral three times a day  oseltamivir 30 milliGRAM(s) Oral <User Schedule>  pantoprazole    Tablet 40 milliGRAM(s) Oral before breakfast  predniSONE   Tablet 50 milliGRAM(s) Oral once  sevelamer carbonate 800 milliGRAM(s) Oral three times a day with meals    MEDICATIONS  (PRN):  dextrose Oral Gel 15 Gram(s) Oral once PRN Blood Glucose LESS THAN 70 milliGRAM(s)/deciliter      Vital Signs Last 24 Hrs  T(C): 37.1 (23 Jan 2025 04:53), Max: 37.1 (23 Jan 2025 04:53)  T(F): 98.8 (23 Jan 2025 04:53), Max: 98.8 (23 Jan 2025 04:53)  HR: 90 (23 Jan 2025 04:53) (80 - 90)  BP: 145/75 (23 Jan 2025 04:53) (128/74 - 161/71)  BP(mean): 94 (22 Jan 2025 21:12) (94 - 94)  RR: 18 (23 Jan 2025 04:53) (17 - 18)  SpO2: 97% (23 Jan 2025 04:53) (96% - 100%)    Parameters below as of 23 Jan 2025 04:53  Patient On (Oxygen Delivery Method): room air      CAPILLARY BLOOD GLUCOSE      POCT Blood Glucose.: 105 mg/dL (22 Jan 2025 17:23)  POCT Blood Glucose.: 121 mg/dL (22 Jan 2025 11:04)    I&O's Summary    22 Jan 2025 07:01  -  23 Jan 2025 07:00  --------------------------------------------------------  IN: 800 mL / OUT: 2800 mL / NET: -2000 mL          Appearance: Normal	  HEENT:   Normal oral mucosa, PERRL, EOMI	  Lymphatic: No lymphadenopathy  Cardiovascular: Normal S1 S2, No JVD  Respiratory: Lungs clear to auscultation	  Gastrointestinal:  Soft, Non-tender, + BS	  Skin: No rash, No ecchymoses	  Extremities:     LABS:            CARDIAC MARKERS ( 23 Jan 2025 01:01 )  x     / x     / x     / x     / 1.3 ng/mL                Thyroid Stimulating Hormone, Serum: 1.600 uU/mL (01-14 @ 09:40)          Consultant(s) Notes Reviewed:      Care Discussed with Consultants/Other Providers:

## 2025-01-23 NOTE — PROGRESS NOTE ADULT - ASSESSMENT
79yF           h/o   HLD, DM, CAD, ESRD on MWF dialysis with recent admission for chest palpitations w plan to perform outpt cath for chest pain         had / bleeding from fistula on left after dialysis.   and cp      was at outpatient HD when she developed chest palpitations approx 1 hr after HD completed.       EMS  reported , bleeding   from the AVF.  ems   placed pressure bandage/  er   staff held pressure with surgicel and placed tight kerlix dressing. Vascular surgery consulted.     per  daughter,   fistula but believes it was about 5 years ago.   and  care is  via  care at [facility].     states that her mom has to "get the fistula cleaned out" every 3-6 months. Last "cleaning" was approx 3 months ago.         patient gets hives with IV contrast and usually is pre-medicated with prednisone prior to these "cleanings".     cp/  palps,  none   now      elevated troponin from ckd  and demand ischemia.  card f/p/ needs    cath with pre  meds      asa/  lipitor    CKD      on HD      renal     vasc  following  /  for  access   malfunction    DM           follow   fs   orthostasis,  on  midodrine   pre  med s prednisone /  awiat cath  byfranc horta     dvt ppx/       rad< from: TTE W or WO Ultrasound Enhancing Agent (12.23.24 @ 10:09) >     1. Left ventricular cavity is normal in size. Left ventricular wall thickness is normal. Left ventricular systolic function is normal with an ejection fraction of 65 % by Cooper's method of disks. There are no regional wall motion abnormalities seen.   2. Normal left ventricular diastolic function, with normal left ventricular filling pressure.   3. Normal right ventricular cavity size, with normal wall thickness, and normal right ventricular systolic function.   4. Mild aortic stenosis.   5. No pericardial effusion seen.   6. Technically difficult image quality.  _______________    < end of copied text >

## 2025-01-23 NOTE — PROVIDER CONTACT NOTE (OTHER) - ACTION/TREATMENT ORDERED:
Provider Jesse Sainz made aware. Ordered EKG & labs. EKG done and sent. Ezring aware of EKG & lab results. Said to continue to monitor.
MONICO Deluca made aware. Pending further Lab order. Pt remained stable.
ACP made aware. As per ACP, continue to monitor patient. PT to go to cath tomorrow.

## 2025-01-23 NOTE — PROVIDER CONTACT NOTE (OTHER) - BACKGROUND
78 yo F w/ PMHx of DM, HTN, HLD, CHF, ESRD on dialysis (MWF, last HD 1/20) presents for her palpitations and SOB 1 hour after dialysis
Pt admitted Palpitations.
PT admitted for palpitations with SOB after dialysis

## 2025-01-23 NOTE — PROVIDER CONTACT NOTE (OTHER) - SITUATION
Pt complaining of palpitations s/p dialysis
Pt c/o  chest tightness and congestion
pt had 6 beat WCT on tele monitor

## 2025-01-24 ENCOUNTER — TRANSCRIPTION ENCOUNTER (OUTPATIENT)
Age: 80
End: 2025-01-24

## 2025-01-24 VITALS
OXYGEN SATURATION: 99 % | HEART RATE: 94 BPM | DIASTOLIC BLOOD PRESSURE: 72 MMHG | RESPIRATION RATE: 19 BRPM | TEMPERATURE: 98 F | SYSTOLIC BLOOD PRESSURE: 133 MMHG

## 2025-01-24 DIAGNOSIS — G47.33 OBSTRUCTIVE SLEEP APNEA (ADULT) (PEDIATRIC): ICD-10-CM

## 2025-01-24 DIAGNOSIS — N18.6 END STAGE RENAL DISEASE: ICD-10-CM

## 2025-01-24 DIAGNOSIS — E11.22 TYPE 2 DIABETES MELLITUS WITH DIABETIC CHRONIC KIDNEY DISEASE: ICD-10-CM

## 2025-01-24 DIAGNOSIS — I13.2 HYPERTENSIVE HEART AND CHRONIC KIDNEY DISEASE WITH HEART FAILURE AND WITH STAGE 5 CHRONIC KIDNEY DISEASE, OR END STAGE RENAL DISEASE: ICD-10-CM

## 2025-01-24 DIAGNOSIS — Z91.013 ALLERGY TO SEAFOOD: ICD-10-CM

## 2025-01-24 DIAGNOSIS — Z91.041 RADIOGRAPHIC DYE ALLERGY STATUS: ICD-10-CM

## 2025-01-24 DIAGNOSIS — I25.10 ATHEROSCLEROTIC HEART DISEASE OF NATIVE CORONARY ARTERY WITHOUT ANGINA PECTORIS: ICD-10-CM

## 2025-01-24 DIAGNOSIS — Z99.2 DEPENDENCE ON RENAL DIALYSIS: ICD-10-CM

## 2025-01-24 DIAGNOSIS — Z91.09 OTHER ALLERGY STATUS, OTHER THAN TO DRUGS AND BIOLOGICAL SUBSTANCES: ICD-10-CM

## 2025-01-24 DIAGNOSIS — Z96.653 PRESENCE OF ARTIFICIAL KNEE JOINT, BILATERAL: ICD-10-CM

## 2025-01-24 DIAGNOSIS — Z11.52 ENCOUNTER FOR SCREENING FOR COVID-19: ICD-10-CM

## 2025-01-24 DIAGNOSIS — E78.5 HYPERLIPIDEMIA, UNSPECIFIED: ICD-10-CM

## 2025-01-24 DIAGNOSIS — R07.9 CHEST PAIN, UNSPECIFIED: ICD-10-CM

## 2025-01-24 DIAGNOSIS — D63.1 ANEMIA IN CHRONIC KIDNEY DISEASE: ICD-10-CM

## 2025-01-24 DIAGNOSIS — Z79.82 LONG TERM (CURRENT) USE OF ASPIRIN: ICD-10-CM

## 2025-01-24 DIAGNOSIS — Z79.4 LONG TERM (CURRENT) USE OF INSULIN: ICD-10-CM

## 2025-01-24 DIAGNOSIS — E66.9 OBESITY, UNSPECIFIED: ICD-10-CM

## 2025-01-24 DIAGNOSIS — I45.10 UNSPECIFIED RIGHT BUNDLE-BRANCH BLOCK: ICD-10-CM

## 2025-01-24 DIAGNOSIS — I24.89 OTHER FORMS OF ACUTE ISCHEMIC HEART DISEASE: ICD-10-CM

## 2025-01-24 DIAGNOSIS — I50.33 ACUTE ON CHRONIC DIASTOLIC (CONGESTIVE) HEART FAILURE: ICD-10-CM

## 2025-01-24 DIAGNOSIS — Z99.89 DEPENDENCE ON OTHER ENABLING MACHINES AND DEVICES: ICD-10-CM

## 2025-01-24 LAB
ANION GAP SERPL CALC-SCNC: 19 MMOL/L — HIGH (ref 5–17)
ANION GAP SERPL CALC-SCNC: 19 MMOL/L — HIGH (ref 5–17)
BUN SERPL-MCNC: 52 MG/DL — HIGH (ref 7–23)
BUN SERPL-MCNC: 52 MG/DL — HIGH (ref 7–23)
CALCIUM SERPL-MCNC: 8.6 MG/DL — SIGNIFICANT CHANGE UP (ref 8.4–10.5)
CALCIUM SERPL-MCNC: 8.7 MG/DL — SIGNIFICANT CHANGE UP (ref 8.4–10.5)
CHLORIDE SERPL-SCNC: 87 MMOL/L — LOW (ref 96–108)
CHLORIDE SERPL-SCNC: 87 MMOL/L — LOW (ref 96–108)
CO2 SERPL-SCNC: 21 MMOL/L — LOW (ref 22–31)
CO2 SERPL-SCNC: 23 MMOL/L — SIGNIFICANT CHANGE UP (ref 22–31)
CREAT SERPL-MCNC: 7.32 MG/DL — HIGH (ref 0.5–1.3)
CREAT SERPL-MCNC: 7.33 MG/DL — HIGH (ref 0.5–1.3)
EGFR: 5 ML/MIN/1.73M2 — LOW
EGFR: 5 ML/MIN/1.73M2 — LOW
GLUCOSE BLDC GLUCOMTR-MCNC: 158 MG/DL — HIGH (ref 70–99)
GLUCOSE BLDC GLUCOMTR-MCNC: 85 MG/DL — SIGNIFICANT CHANGE UP (ref 70–99)
GLUCOSE SERPL-MCNC: 137 MG/DL — HIGH (ref 70–99)
GLUCOSE SERPL-MCNC: 165 MG/DL — HIGH (ref 70–99)
HCT VFR BLD CALC: 28.2 % — LOW (ref 34.5–45)
HCT VFR BLD CALC: 29.3 % — LOW (ref 34.5–45)
HGB BLD-MCNC: 9 G/DL — LOW (ref 11.5–15.5)
HGB BLD-MCNC: 9.3 G/DL — LOW (ref 11.5–15.5)
MCHC RBC-ENTMCNC: 28.7 PG — SIGNIFICANT CHANGE UP (ref 27–34)
MCHC RBC-ENTMCNC: 28.8 PG — SIGNIFICANT CHANGE UP (ref 27–34)
MCHC RBC-ENTMCNC: 31.7 G/DL — LOW (ref 32–36)
MCHC RBC-ENTMCNC: 31.9 G/DL — LOW (ref 32–36)
MCV RBC AUTO: 90.1 FL — SIGNIFICANT CHANGE UP (ref 80–100)
MCV RBC AUTO: 90.4 FL — SIGNIFICANT CHANGE UP (ref 80–100)
NRBC # BLD: 0 /100 WBCS — SIGNIFICANT CHANGE UP (ref 0–0)
NRBC # BLD: 0 /100 WBCS — SIGNIFICANT CHANGE UP (ref 0–0)
NRBC BLD-RTO: 0 /100 WBCS — SIGNIFICANT CHANGE UP (ref 0–0)
NRBC BLD-RTO: 0 /100 WBCS — SIGNIFICANT CHANGE UP (ref 0–0)
PLATELET # BLD AUTO: 203 K/UL — SIGNIFICANT CHANGE UP (ref 150–400)
PLATELET # BLD AUTO: 215 K/UL — SIGNIFICANT CHANGE UP (ref 150–400)
POTASSIUM SERPL-MCNC: 4.4 MMOL/L — SIGNIFICANT CHANGE UP (ref 3.5–5.3)
POTASSIUM SERPL-MCNC: 4.5 MMOL/L — SIGNIFICANT CHANGE UP (ref 3.5–5.3)
POTASSIUM SERPL-SCNC: 4.4 MMOL/L — SIGNIFICANT CHANGE UP (ref 3.5–5.3)
POTASSIUM SERPL-SCNC: 4.5 MMOL/L — SIGNIFICANT CHANGE UP (ref 3.5–5.3)
RBC # BLD: 3.13 M/UL — LOW (ref 3.8–5.2)
RBC # BLD: 3.24 M/UL — LOW (ref 3.8–5.2)
RBC # FLD: 18 % — HIGH (ref 10.3–14.5)
RBC # FLD: 18 % — HIGH (ref 10.3–14.5)
SODIUM SERPL-SCNC: 127 MMOL/L — LOW (ref 135–145)
SODIUM SERPL-SCNC: 129 MMOL/L — LOW (ref 135–145)
WBC # BLD: 8.94 K/UL — SIGNIFICANT CHANGE UP (ref 3.8–10.5)
WBC # BLD: 9.1 K/UL — SIGNIFICANT CHANGE UP (ref 3.8–10.5)
WBC # FLD AUTO: 8.94 K/UL — SIGNIFICANT CHANGE UP (ref 3.8–10.5)
WBC # FLD AUTO: 9.1 K/UL — SIGNIFICANT CHANGE UP (ref 3.8–10.5)

## 2025-01-24 PROCEDURE — 80053 COMPREHEN METABOLIC PANEL: CPT

## 2025-01-24 PROCEDURE — 71045 X-RAY EXAM CHEST 1 VIEW: CPT

## 2025-01-24 PROCEDURE — C1887: CPT

## 2025-01-24 PROCEDURE — 82553 CREATINE MB FRACTION: CPT

## 2025-01-24 PROCEDURE — 93005 ELECTROCARDIOGRAM TRACING: CPT

## 2025-01-24 PROCEDURE — 99261: CPT

## 2025-01-24 PROCEDURE — 87637 SARSCOV2&INF A&B&RSV AMP PRB: CPT

## 2025-01-24 PROCEDURE — 36415 COLL VENOUS BLD VENIPUNCTURE: CPT

## 2025-01-24 PROCEDURE — 85610 PROTHROMBIN TIME: CPT

## 2025-01-24 PROCEDURE — 82550 ASSAY OF CK (CPK): CPT

## 2025-01-24 PROCEDURE — C1874: CPT

## 2025-01-24 PROCEDURE — 86900 BLOOD TYPING SEROLOGIC ABO: CPT

## 2025-01-24 PROCEDURE — C1769: CPT

## 2025-01-24 PROCEDURE — 82962 GLUCOSE BLOOD TEST: CPT

## 2025-01-24 PROCEDURE — 85027 COMPLETE CBC AUTOMATED: CPT

## 2025-01-24 PROCEDURE — C1894: CPT

## 2025-01-24 PROCEDURE — 99232 SBSQ HOSP IP/OBS MODERATE 35: CPT

## 2025-01-24 PROCEDURE — 93454 CORONARY ARTERY ANGIO S&I: CPT | Mod: 59

## 2025-01-24 PROCEDURE — 99285 EMERGENCY DEPT VISIT HI MDM: CPT

## 2025-01-24 PROCEDURE — 80048 BASIC METABOLIC PNL TOTAL CA: CPT

## 2025-01-24 PROCEDURE — 85025 COMPLETE CBC W/AUTO DIFF WBC: CPT

## 2025-01-24 PROCEDURE — 86901 BLOOD TYPING SEROLOGIC RH(D): CPT

## 2025-01-24 PROCEDURE — 93990 DOPPLER FLOW TESTING: CPT

## 2025-01-24 PROCEDURE — 86850 RBC ANTIBODY SCREEN: CPT

## 2025-01-24 PROCEDURE — 85730 THROMBOPLASTIN TIME PARTIAL: CPT

## 2025-01-24 PROCEDURE — 84484 ASSAY OF TROPONIN QUANT: CPT

## 2025-01-24 PROCEDURE — C9600: CPT | Mod: LD

## 2025-01-24 RX ORDER — ASPIRIN 81 MG/1
1 TABLET, COATED ORAL
Qty: 0 | Refills: 0 | DISCHARGE
Start: 2025-01-24

## 2025-01-24 RX ORDER — OSELTAMIVIR PHOSPHATE 75 MG/1
1 CAPSULE ORAL
Qty: 1 | Refills: 0 | DISCHARGE
Start: 2025-01-24 | End: 2025-01-26

## 2025-01-24 RX ORDER — ATORVASTATIN CALCIUM 80 MG/1
1 TABLET, FILM COATED ORAL
Qty: 0 | Refills: 0 | DISCHARGE
Start: 2025-01-24

## 2025-01-24 RX ORDER — ASPIRIN 325 MG
1 TABLET ORAL
Refills: 0 | DISCHARGE

## 2025-01-24 RX ORDER — MIDODRINE HYDROCHLORIDE 5 MG/1
1 TABLET ORAL
Qty: 0 | Refills: 0 | DISCHARGE
Start: 2025-01-24

## 2025-01-24 RX ORDER — SEVELAMER CARBONATE 800 MG/1
1 TABLET, FILM COATED ORAL
Qty: 0 | Refills: 0 | DISCHARGE
Start: 2025-01-24

## 2025-01-24 RX ORDER — ASPIRIN 81 MG/1
1 TABLET, COATED ORAL
Qty: 90 | Refills: 2
Start: 2025-01-24 | End: 2025-10-20

## 2025-01-24 RX ORDER — MIDODRINE HYDROCHLORIDE 5 MG/1
1 TABLET ORAL
Qty: 42 | Refills: 1
Start: 2025-01-24 | End: 2025-02-20

## 2025-01-24 RX ORDER — PANTOPRAZOLE 20 MG/1
1 TABLET, DELAYED RELEASE ORAL
Qty: 0 | Refills: 0 | DISCHARGE
Start: 2025-01-24

## 2025-01-24 RX ADMIN — SEVELAMER CARBONATE 800 MILLIGRAM(S): 800 TABLET, FILM COATED ORAL at 08:27

## 2025-01-24 RX ADMIN — Medication 1: at 08:26

## 2025-01-24 RX ADMIN — Medication 5000 UNIT(S): at 08:27

## 2025-01-24 RX ADMIN — EPOETIN ALFA 10000 UNIT(S): 2000 SOLUTION INTRAVENOUS; SUBCUTANEOUS at 13:09

## 2025-01-24 RX ADMIN — MIDODRINE HYDROCHLORIDE 10 MILLIGRAM(S): 5 TABLET ORAL at 05:51

## 2025-01-24 RX ADMIN — PANTOPRAZOLE 40 MILLIGRAM(S): 20 TABLET, DELAYED RELEASE ORAL at 05:51

## 2025-01-24 NOTE — DISCHARGE NOTE NURSING/CASE MANAGEMENT/SOCIAL WORK - NSDCPEFALRISK_GEN_ALL_CORE
For information on Fall & Injury Prevention, visit: [email or web address] OR  [email or web address] OR  https://www.cdc.gov/steadi/patient.html

## 2025-01-24 NOTE — PROGRESS NOTE ADULT - SUBJECTIVE AND OBJECTIVE BOX
date of service: 01-24-25 @ 08:42  afebrile  REVIEW OF SYSTEMS:  CONSTITUTIONAL: No fever,  no  weight loss  ENT:  No  tinnitus,   no   vertigo  NECK: No pain or stiffness  RESPIRATORY: No cough, wheezing, chills or hemoptysis;    No Shortness of Breath  CARDIOVASCULAR: No chest pain, palpitations, dizziness  GASTROINTESTINAL: No abdominal or epigastric pain. No nausea, vomiting, or hematemesis; No diarrhea  No melena or hematochezia.  GENITOURINARY: No dysuria, frequency, hematuria, or incontinence  NEUROLOGICAL: No headaches  SKIN: No itching,  no   rash  LYMPH Nodes: No enlarged glands  ENDOCRINE: No heat or cold intolerance  MUSCULOSKELETAL: No joint pain or swelling  PSYCHIATRIC: No depression, anxiety  HEME/LYMPH: No easy bruising, or bleeding gums  ALLERGY AND IMMUNOLOGIC: No hives or eczema	    MEDICATIONS  (STANDING):  aspirin enteric coated 81 milliGRAM(s) Oral daily  atorvastatin 40 milliGRAM(s) Oral at bedtime  clopidogrel Tablet 75 milliGRAM(s) Oral daily  dextrose 5%. 1000 milliLiter(s) (100 mL/Hr) IV Continuous <Continuous>  dextrose 5%. 1000 milliLiter(s) (50 mL/Hr) IV Continuous <Continuous>  dextrose 50% Injectable 25 Gram(s) IV Push once  dextrose 50% Injectable 12.5 Gram(s) IV Push once  dextrose 50% Injectable 25 Gram(s) IV Push once  epoetin shannon (EPOGEN) Injectable 06290 Unit(s) IV Push <User Schedule>  glucagon  Injectable 1 milliGRAM(s) IntraMuscular once  heparin   Injectable 5000 Unit(s) SubCutaneous every 12 hours  influenza  Vaccine (HIGH DOSE) 0.5 milliLiter(s) IntraMuscular once  insulin lispro (ADMELOG) corrective regimen sliding scale   SubCutaneous three times a day before meals  midodrine. 10 milliGRAM(s) Oral three times a day  oseltamivir 30 milliGRAM(s) Oral <User Schedule>  pantoprazole    Tablet 40 milliGRAM(s) Oral before breakfast  sevelamer carbonate 800 milliGRAM(s) Oral three times a day with meals    MEDICATIONS  (PRN):  dextrose Oral Gel 15 Gram(s) Oral once PRN Blood Glucose LESS THAN 70 milliGRAM(s)/deciliter      Vital Signs Last 24 Hrs  T(C): 36.3 (24 Jan 2025 05:00), Max: 36.7 (23 Jan 2025 19:37)  T(F): 97.3 (24 Jan 2025 05:00), Max: 98.1 (23 Jan 2025 19:37)  HR: 71 (24 Jan 2025 05:00) (69 - 86)  BP: 135/72 (24 Jan 2025 05:00) (135/72 - 180/77)  BP(mean): --  RR: 18 (24 Jan 2025 05:00) (15 - 18)  SpO2: 98% (24 Jan 2025 05:00) (97% - 99%)    Parameters below as of 24 Jan 2025 05:00  Patient On (Oxygen Delivery Method): room air      CAPILLARY BLOOD GLUCOSE      POCT Blood Glucose.: 158 mg/dL (24 Jan 2025 08:09)  POCT Blood Glucose.: 323 mg/dL (23 Jan 2025 21:24)  POCT Blood Glucose.: 241 mg/dL (23 Jan 2025 17:05)  POCT Blood Glucose.: 260 mg/dL (23 Jan 2025 13:58)  POCT Blood Glucose.: 288 mg/dL (23 Jan 2025 11:58)    I&O's Summary        Appearance: Normal	  HEENT:   Normal oral mucosa, PERRL, EOMI	  Lymphatic: No lymphadenopathy  Cardiovascular: Normal S1 S2, No JVD  Respiratory: Lungs clear to auscultation	  Gastrointestinal:  Soft, Non-tender, + BS	  Skin: No rash, No ecchymoses	  Extremities:     LABS:                        9.8    8.49  )-----------( 178      ( 23 Jan 2025 07:10 )             30.8     01-23    130[L]  |  89[L]  |  24[H]  ----------------------------<  212[H]  4.9   |  24  |  5.16[H]    Ca    9.5      23 Jan 2025 07:08      PT/INR - ( 23 Jan 2025 12:34 )   PT: 11.6 sec;   INR: 1.01 ratio         PTT - ( 23 Jan 2025 12:34 )  PTT:30.1 sec  CARDIAC MARKERS ( 23 Jan 2025 01:01 )  x     / x     / x     / x     / 1.3 ng/mL      Urinalysis Basic - ( 23 Jan 2025 07:08 )    Color: x / Appearance: x / SG: x / pH: x  Gluc: 212 mg/dL / Ketone: x  / Bili: x / Urobili: x   Blood: x / Protein: x / Nitrite: x   Leuk Esterase: x / RBC: x / WBC x   Sq Epi: x / Non Sq Epi: x / Bacteria: x              Thyroid Stimulating Hormone, Serum: 1.600 uU/mL (01-14 @ 09:40)          Consultant(s) Notes Reviewed:      Care Discussed with Consultants/Other Providers:

## 2025-01-24 NOTE — DISCHARGE NOTE NURSING/CASE MANAGEMENT/SOCIAL WORK - NSDCVIVACCINE_GEN_ALL_CORE_FT
influenza, injectable, quadrivalent, preservative free; 11-Mar-2016 15:41; Yi Baker (RN); [employer]; [id number]; IntraMuscular; Dorsogluteal Right.; 0.5 milliLiter(s); VIS (VIS Published: 07-Aug-2015, VIS Presented: 11-Mar-2016);

## 2025-01-24 NOTE — PROGRESS NOTE ADULT - SUBJECTIVE AND OBJECTIVE BOX
Interventional Cardiology Post Cath Progress Note: s/p pLAD NICOL x1 via RRA with Dr. Jimenez on 1/23                Subjective:   Patient feels well- no current complaints- Denies chest pain, shortness of breath. Denies pain, numbness, tingling or swelling around radial access site    Tele 24hrs: SR 70s    MEDICATIONS  (STANDING):  aspirin enteric coated 81 milliGRAM(s) Oral daily  atorvastatin 40 milliGRAM(s) Oral at bedtime  clopidogrel Tablet 75 milliGRAM(s) Oral daily  dextrose 5%. 1000 milliLiter(s) (100 mL/Hr) IV Continuous <Continuous>  dextrose 5%. 1000 milliLiter(s) (50 mL/Hr) IV Continuous <Continuous>  dextrose 50% Injectable 25 Gram(s) IV Push once  dextrose 50% Injectable 12.5 Gram(s) IV Push once  dextrose 50% Injectable 25 Gram(s) IV Push once  epoetin shannon (EPOGEN) Injectable 52597 Unit(s) IV Push <User Schedule>  glucagon  Injectable 1 milliGRAM(s) IntraMuscular once  heparin   Injectable 5000 Unit(s) SubCutaneous every 12 hours  influenza  Vaccine (HIGH DOSE) 0.5 milliLiter(s) IntraMuscular once  insulin lispro (ADMELOG) corrective regimen sliding scale   SubCutaneous three times a day before meals  midodrine. 10 milliGRAM(s) Oral three times a day  oseltamivir 30 milliGRAM(s) Oral <User Schedule>  pantoprazole    Tablet 40 milliGRAM(s) Oral before breakfast  sevelamer carbonate 800 milliGRAM(s) Oral three times a day with meals    MEDICATIONS  (PRN):  dextrose Oral Gel 15 Gram(s) Oral once PRN Blood Glucose LESS THAN 70 milliGRAM(s)/deciliter      Objective:  Vital Signs Last 24 Hrs  T(C): 36.3 (24 Jan 2025 05:00), Max: 36.7 (23 Jan 2025 19:37)  T(F): 97.3 (24 Jan 2025 05:00), Max: 98.1 (23 Jan 2025 19:37)  HR: 71 (24 Jan 2025 05:00) (69 - 86)  BP: 135/72 (24 Jan 2025 05:00) (135/72 - 180/77)  BP(mean): --  RR: 18 (24 Jan 2025 05:00) (15 - 18)  SpO2: 98% (24 Jan 2025 05:00) (97% - 99%)    Parameters below as of 24 Jan 2025 05:00  Patient On (Oxygen Delivery Method): room air                                9.3    8.94  )-----------( 203      ( 24 Jan 2025 11:09 )             29.3     01-23    130[L]  |  89[L]  |  24[H]  ----------------------------<  212[H]  4.9   |  24  |  5.16[H]    Ca    9.5      23 Jan 2025 07:08      PT/INR - ( 23 Jan 2025 12:34 )   PT: 11.6 sec;   INR: 1.01 ratio         PTT - ( 23 Jan 2025 12:34 )  PTT:30.1 sec  Urinalysis Basic - ( 23 Jan 2025 07:08 )    Color: x / Appearance: x / SG: x / pH: x  Gluc: 212 mg/dL / Ketone: x  / Bili: x / Urobili: x   Blood: x / Protein: x / Nitrite: x   Leuk Esterase: x / RBC: x / WBC x   Sq Epi: x / Non Sq Epi: x / Bacteria: x        < from: TTE W or WO Ultrasound Enhancing Agent (12.23.24 @ 10:09) >  CONCLUSIONS:      1. Left ventricular cavity is normal in size. Left ventricular wall thickness is normal. Left ventricular systolic function is normal with an ejection fraction of 65 % by Cooper's method of disks. There are no regional wall motion abnormalities seen.   2. Normal left ventricular diastolic function, with normal left ventricular filling pressure.   3. Normal right ventricular cavity size, with normal wall thickness, and normal right ventricular systolic function.   4. Mild aortic stenosis.   5. No pericardial effusion seen.   6. Technically difficult image quality.    < end of copied text >      Physical Exam:  No apparent distress, alert and oriented times three, appropriate affect  JVD is not elevated, supple  Clear to auscultation with no wheezing, ronchi or crackles  Regular rate and rhythm, II/VI systolic murmur  Soft, non-tender, non-distended  Right Upper Extremity: Radial site soft, non tender, no bleeding or hematoma; +2 palpable radial pulse  LUE AV fistula  No clubbing, cyanosis or edema          Assessment/Plan:  HPI:    79yF           h/o   HLD, DM, CAD, ESRD on MWF dialysis with recent admission for chest palpitations w plan to perform outpt cath for chest pain        , presents w/ bleeding from fistula on left after dialysis.   and cp      was at outpatient HD when she developed chest palpitations approx 1 hr after HD completed.      EMS was called and saw reported half liter blood loss near the LUE coming from the AVF.     EM placed pressure bandage and patient was brought to  er/  staff held pressure with surgicel and placed tight kerlix dressing. Vascular surgery consulted.     per  daughter,   fistula but believes it was about 5 years ago.   and  care is  via  care at [facility].     States that her mom has to "get the fistula cleaned out" every 3-6 months. Last "cleaning" was approx 3 months ago.         patient gets hives with IV contrast and usually is pre-medicated with prednisone prior to these "cleanings".      (21 Jan 2025 08:58)    - Procedure site stable.   - Continue DAPT (aspirin 81mg and clopidogrel 75mg)  - Continue atorvastatin  - Avoid using NSAIDs  (Aleve, Motrin, ibuprofen, naproxen) while on DAPT, please utilize Tylenol for pain control (not to exceed 4gm in 24 hours)  -Follow up with primary cardiologist in 1-2 weeks  -Please make sure DAPT is prescribed to pt's preferred pharmacy on discharge  -Keep K>4 Mg>2. HD as scheduled.  -For all general cardiology questions please contact patient's primary cards team   - Care per primary team    Please check [email or web address] password [email or web address] for cardiology service schedule and contact information via TEAMS.    MARYLIN Avila

## 2025-01-24 NOTE — DISCHARGE NOTE PROVIDER - HOSPITAL COURSE
· Assessment	  79yF           h/o   HLD, DM, CAD, ESRD on MWF dialysis with recent admission for chest palpitations w plan to perform outpt cath for chest pain         had / bleeding from fistula on left after dialysis.   and cp      was at outpatient HD when she developed chest palpitations approx 1 hr after HD completed.       EMS  reported , bleeding   from the AVF.  ems   placed pressure bandage/  er   staff held pressure with surgicel and placed tight kerlix dressing. Vascular surgery consulted.     per  daughter,   fistula but believes it was about 5 years ago.   and  care is  via  care at [facility].     states that her mom has to "get the fistula cleaned out" every 3-6 months. Last "cleaning" was approx 3 months ago.         patient gets hives with IV contrast and usually is pre-medicated with prednisone prior to these "cleanings".     cp/  palps,  none   now      elevated troponin from ckd  and demand ischemia.  card f/p/ needs    cath with pre  meds      asa/  lipitor    CKD      on HD      renal     vasc  following  /  for  access   malfunction    DM           follow   fs   orthostasis,  on  midodrine     cath repor t pending/  called  dr jurado,  pt with NICOL  to LAD.  on asa/ plavix     awiat   renal  f/p    m · Assessment	  79yF           h/o   HLD, DM, CAD, ESRD on MWF dialysis with recent admission for chest palpitations w plan to perform outpt cath for chest pain         had / bleeding from fistula on left after dialysis.   and cp      was at outpatient HD when she developed chest palpitations approx 1 hr after HD completed.       EMS  reported , bleeding   from the AVF.  ems   placed pressure bandage/  er   staff held pressure with surgicel and placed tight kerlix dressing. Vascular surgery consulted.     per  daughter,   fistula but believes it was about 5 years ago.   and  care is  via  care at [facility].     states that her mom has to "get the fistula cleaned out" every 3-6 months. Last "cleaning" was approx 3 months ago.         patient gets hives with IV contrast and usually is pre-medicated with prednisone prior to these "cleanings".     cp/  palps,  none   now      elevated troponin from ckd  and demand ischemia.  card f/p/ needs    cath with pre  meds      asa/  lipitor    CKD      on HD      renal     vasc  following  /  for  access   malfunction    DM           follow   fs   orthostasis,  on  midodrine     cath repor t pending/  called  dr jurado,  pt with NICOL  to LAD.  on asa/ plavix     awiat   renal  f/p    m        ****** Reasons for NO Cardiac rehab referral rx: Patient Lacks Transportation or no cardiac rehab within 60 minutes driving range

## 2025-01-24 NOTE — PROGRESS NOTE ADULT - ASSESSMENT
79 year old female with PMH of ESRD on HD MWF, HTN, Anemia of chronic disease, HLD, DM, and JARVIS whop presented to the hospital with complaints of chest discomfort following HD yesterday. The nephrology team was consulted for management of dialysis.       ESRD on HD   Center: [facility]  Nephrologist: Jhonatan Smith   Schedule: MWF   Access; LUE AVF   last outpatient HD on 1/20/25    plan   Plan for HD today--> okay to discharge after HD today from renal stand point  HD consent obtained and placed in chart  UF as tolerated by BP   please dose medications as per ESRD     Anemia   in setting of kidney disease  hbg below goal   epo 10 k tiw with hd  trend hgb     If any questions, please feel free to contact me     Dr Taylor  [phone]

## 2025-01-24 NOTE — PROGRESS NOTE ADULT - ASSESSMENT
Assessment  DMT2: 79y Female with DM T2 with hyperglycemia admitted with bleeding, palpitations, patient was on insulin at home, now on low dose basal insulin, blood sugars are improving, no hypoglycemic episode.  Tight sugar control is not recommended for this patient.  Palpitations: on medications, monitored, no acute events.  HTN: On antihypertensive medications, monitored, stable.  ESRD CKD: On hemodialysis, Labs, chart reviewed.      Discussed plan and management with Dr Claudia Cr NP - TEAMS  Perla Taylor MD  Cell: [phone]  Office: [phone]                    Assessment  DMT2: 79y Female with DM T2 with hyperglycemia admitted with bleeding, palpitations, patient was on insulin at home, now on low dose basal insulin, blood sugars are improving, no hypoglycemic episode.  Tight sugar control is not recommended for this patient.  Palpitations: on medications, monitored, no acute events.  HTN: On antihypertensive medications, monitored, stable.  ESRD CKD: On hemodialysis, Labs, chart reviewed.      Discussed plan and management with Dr Claudia Cr NP - TEAMS  Perla Taylor MD  Cell: [phone]  Office: [phone]

## 2025-01-24 NOTE — DISCHARGE NOTE PROVIDER - PROVIDER TOKENS
PROVIDER:[TOKEN:[[id number]],FOLLOWUP:[1-3 days],ESTABLISHEDPATIENT:[T]],PROVIDER:[TOKEN:[[id number]],FOLLOWUP:[1-3 days],ESTABLISHEDPATIENT:[T]]

## 2025-01-24 NOTE — DISCHARGE NOTE NURSING/CASE MANAGEMENT/SOCIAL WORK - PATIENT PORTAL LINK FT
You can access the FollowMyHealth Patient Portal offered by [facility] by registering at the following website: [email or web address]. By joining [facility]’s FollowMyHealth portal, you will also be able to view your health information using other applications (apps) compatible with our system.

## 2025-01-24 NOTE — DISCHARGE NOTE PROVIDER - NSDCFUADDAPPT_GEN_ALL_CORE_FT
APPTS ARE READY TO BE MADE: [X] YES    Best Family or Patient Contact (if needed):    Additional Information about above appointments (if needed):    1: Nephrology  2: Vascular Surgery  3: Primary Care    Other comments or requests:    APPTS ARE READY TO BE MADE: [X] YES    Best Family or Patient Contact (if needed):    Additional Information about above appointments (if needed):    1: Nephrology  2: Vascular Surgery  3: Cardiology    Other comments or requests:

## 2025-01-24 NOTE — PROGRESS NOTE ADULT - SUBJECTIVE AND OBJECTIVE BOX
Chief complaint  Patient is a 79y old  Female who presents with a chief complaint of cp/ palps (24 Jan 2025 14:10)         Labs and Fingersticks  CAPILLARY BLOOD GLUCOSE      POCT Blood Glucose.: 85 mg/dL (24 Jan 2025 15:33)  POCT Blood Glucose.: 158 mg/dL (24 Jan 2025 08:09)  POCT Blood Glucose.: 323 mg/dL (23 Jan 2025 21:24)  POCT Blood Glucose.: 241 mg/dL (23 Jan 2025 17:05)      Anion Gap: 19 *H* (01-24 @ 13:32)  Anion Gap: 19 *H* (01-24 @ 11:09)  Anion Gap: 17 (01-23 @ 07:08)      Calcium: 8.6 (01-24 @ 13:32)  Calcium: 8.7 (01-24 @ 11:09)  Calcium: 9.5 (01-23 @ 07:08)          01-24    129[L]  |  87[L]  |  52[H]  ----------------------------<  137[H]  4.5   |  23  |  7.33[H]    Ca    8.6      24 Jan 2025 13:32                          9.0    9.10  )-----------( 215      ( 24 Jan 2025 13:32 )             28.2     Medications  MEDICATIONS  (STANDING):  aspirin enteric coated 81 milliGRAM(s) Oral daily  atorvastatin 40 milliGRAM(s) Oral at bedtime  clopidogrel Tablet 75 milliGRAM(s) Oral daily  dextrose 5%. 1000 milliLiter(s) (100 mL/Hr) IV Continuous <Continuous>  dextrose 5%. 1000 milliLiter(s) (50 mL/Hr) IV Continuous <Continuous>  dextrose 50% Injectable 25 Gram(s) IV Push once  dextrose 50% Injectable 12.5 Gram(s) IV Push once  dextrose 50% Injectable 25 Gram(s) IV Push once  epoetin shannon (EPOGEN) Injectable 96470 Unit(s) IV Push <User Schedule>  glucagon  Injectable 1 milliGRAM(s) IntraMuscular once  heparin   Injectable 5000 Unit(s) SubCutaneous every 12 hours  influenza  Vaccine (HIGH DOSE) 0.5 milliLiter(s) IntraMuscular once  insulin lispro (ADMELOG) corrective regimen sliding scale   SubCutaneous three times a day before meals  midodrine. 10 milliGRAM(s) Oral three times a day  oseltamivir 30 milliGRAM(s) Oral <User Schedule>  pantoprazole    Tablet 40 milliGRAM(s) Oral before breakfast  sevelamer carbonate 800 milliGRAM(s) Oral three times a day with meals      Physical Exam  General: Patient comfortable in bed   Vital Signs Last 12 Hrs  T(F): 97.4 (01-24-25 @ 15:32), Max: 97.8 (01-24-25 @ 11:50)  HR: 77 (01-24-25 @ 15:32) (69 - 77)  BP: 111/63 (01-24-25 @ 15:32) (111/63 - 159/80)  BP(mean): --  RR: 18 (01-24-25 @ 15:32) (17 - 18)  SpO2: 98% (01-24-25 @ 15:32) (97% - 99%)    CVS: S1S2   Respiratory: No wheezing, no crepitations  GI: Abdomen soft, bowel sounds positive  Musculoskeletal:  moves all extremities           Chief complaint  Patient is a 79y old  Female who presents with a chief complaint of cp/ palps (24 Jan 2025 14:10)         Labs and Fingersticks  CAPILLARY BLOOD GLUCOSE      POCT Blood Glucose.: 85 mg/dL (24 Jan 2025 15:33)  POCT Blood Glucose.: 158 mg/dL (24 Jan 2025 08:09)  POCT Blood Glucose.: 323 mg/dL (23 Jan 2025 21:24)  POCT Blood Glucose.: 241 mg/dL (23 Jan 2025 17:05)      Anion Gap: 19 *H* (01-24 @ 13:32)  Anion Gap: 19 *H* (01-24 @ 11:09)  Anion Gap: 17 (01-23 @ 07:08)      Calcium: 8.6 (01-24 @ 13:32)  Calcium: 8.7 (01-24 @ 11:09)  Calcium: 9.5 (01-23 @ 07:08)          01-24    129[L]  |  87[L]  |  52[H]  ----------------------------<  137[H]  4.5   |  23  |  7.33[H]    Ca    8.6      24 Jan 2025 13:32                          9.0    9.10  )-----------( 215      ( 24 Jan 2025 13:32 )             28.2     Medications  MEDICATIONS  (STANDING):  aspirin enteric coated 81 milliGRAM(s) Oral daily  atorvastatin 40 milliGRAM(s) Oral at bedtime  clopidogrel Tablet 75 milliGRAM(s) Oral daily  dextrose 5%. 1000 milliLiter(s) (100 mL/Hr) IV Continuous <Continuous>  dextrose 5%. 1000 milliLiter(s) (50 mL/Hr) IV Continuous <Continuous>  dextrose 50% Injectable 25 Gram(s) IV Push once  dextrose 50% Injectable 12.5 Gram(s) IV Push once  dextrose 50% Injectable 25 Gram(s) IV Push once  epoetin shannon (EPOGEN) Injectable 91042 Unit(s) IV Push <User Schedule>  glucagon  Injectable 1 milliGRAM(s) IntraMuscular once  heparin   Injectable 5000 Unit(s) SubCutaneous every 12 hours  influenza  Vaccine (HIGH DOSE) 0.5 milliLiter(s) IntraMuscular once  insulin lispro (ADMELOG) corrective regimen sliding scale   SubCutaneous three times a day before meals  midodrine. 10 milliGRAM(s) Oral three times a day  oseltamivir 30 milliGRAM(s) Oral <User Schedule>  pantoprazole    Tablet 40 milliGRAM(s) Oral before breakfast  sevelamer carbonate 800 milliGRAM(s) Oral three times a day with meals      Physical Exam  General: Patient comfortable in bed   Vital Signs Last 12 Hrs  T(F): 97.4 (01-24-25 @ 15:32), Max: 97.8 (01-24-25 @ 11:50)  HR: 77 (01-24-25 @ 15:32) (69 - 77)  BP: 111/63 (01-24-25 @ 15:32) (111/63 - 159/80)  BP(mean): --  RR: 18 (01-24-25 @ 15:32) (17 - 18)  SpO2: 98% (01-24-25 @ 15:32) (97% - 99%)    CVS: S1S2   Respiratory: No wheezing, no crepitations  GI: Abdomen soft, bowel sounds positive  Musculoskeletal:  moves all extremities

## 2025-01-24 NOTE — DISCHARGE NOTE NURSING/CASE MANAGEMENT/SOCIAL WORK - FINANCIAL ASSISTANCE
[facility] provides services at a reduced cost to those who are determined to be eligible through [facility]’s financial assistance program. Information regarding [facility]’s financial assistance program can be found by going to [email or web address] or by calling [phone].

## 2025-01-24 NOTE — PROGRESS NOTE ADULT - REASON FOR ADMISSION
cp/ palps

## 2025-01-24 NOTE — PROGRESS NOTE ADULT - SUBJECTIVE AND OBJECTIVE BOX
Patient seen and examined  no complaints    shellfish (Hives; Rash)  smoke; coughing (Other)  IV Contrast (Anaphylaxis)  Shrimp (Hives)    Hospital Medications:   MEDICATIONS  (STANDING):  aspirin enteric coated 81 milliGRAM(s) Oral daily  atorvastatin 40 milliGRAM(s) Oral at bedtime  clopidogrel Tablet 75 milliGRAM(s) Oral daily  dextrose 5%. 1000 milliLiter(s) (100 mL/Hr) IV Continuous <Continuous>  dextrose 5%. 1000 milliLiter(s) (50 mL/Hr) IV Continuous <Continuous>  dextrose 50% Injectable 25 Gram(s) IV Push once  dextrose 50% Injectable 12.5 Gram(s) IV Push once  dextrose 50% Injectable 25 Gram(s) IV Push once  epoetin shannon (EPOGEN) Injectable 81206 Unit(s) IV Push <User Schedule>  glucagon  Injectable 1 milliGRAM(s) IntraMuscular once  heparin   Injectable 5000 Unit(s) SubCutaneous every 12 hours  influenza  Vaccine (HIGH DOSE) 0.5 milliLiter(s) IntraMuscular once  insulin lispro (ADMELOG) corrective regimen sliding scale   SubCutaneous three times a day before meals  midodrine. 10 milliGRAM(s) Oral three times a day  oseltamivir 30 milliGRAM(s) Oral <User Schedule>  pantoprazole    Tablet 40 milliGRAM(s) Oral before breakfast  sevelamer carbonate 800 milliGRAM(s) Oral three times a day with meals        VITALS:  T(F): 97.8 (01-24-25 @ 11:50), Max: 98.1 (01-23-25 @ 19:37)  HR: 69 (01-24-25 @ 11:50)  BP: 159/80 (01-24-25 @ 11:50)  RR: 17 (01-24-25 @ 11:50)  SpO2: 99% (01-24-25 @ 11:50)  Wt(kg): --      Physical Exam :-  Constitutional: NAD  Respiratory: Bilateral equal breath sounds, no Crackles present.  Cardiovascular: S1, S2 normal  Gastrointestinal: Bowel Sounds present, soft  Extremities: no Edema Feet  Neurological: Alert and Oriented x 3  Psychiatric: Normal mood, normal affect:    LABS:  01-24    129[L]  |  87[L]  |  52[H]  ----------------------------<  137[H]  4.5   |  23  |  7.33[H]    Ca    8.6      24 Jan 2025 13:32      Creatinine Trend: 7.33 <--, 7.32 <--, 5.16 <--, 4.76 <--                        9.0    9.10  )-----------( 215      ( 24 Jan 2025 13:32 )             28.2     Urine Studies:  Urinalysis Basic - ( 24 Jan 2025 13:32 )    Color:  / Appearance:  / SG:  / pH:   Gluc: 137 mg/dL / Ketone:   / Bili:  / Urobili:    Blood:  / Protein:  / Nitrite:    Leuk Esterase:  / RBC:  / WBC    Sq Epi:  / Non Sq Epi:  / Bacteria:         RADIOLOGY & ADDITIONAL STUDIES:

## 2025-01-24 NOTE — DISCHARGE NOTE PROVIDER - NSDCMRMEDTOKEN_GEN_ALL_CORE_FT
aspirin 81 mg oral delayed release tablet: 1 tab(s) orally once a day  atorvastatin 40 mg oral tablet: 1 tab(s) orally once a day (at bedtime)  Lantus 100 units/mL subcutaneous solution: 20 unit(s) subcutaneous once a day (at bedtime)  midodrine 10 mg oral tablet: 1 tab(s) orally Monday, Wednesday, and Friday (30 minutes before dialysis)   sevelamer carbonate 800 mg oral tablet: 2 tab(s) orally 3 times a day (with meals)   aspirin 81 mg oral delayed release tablet: 1 tab(s) orally once a day  atorvastatin 40 mg oral tablet: 1 tab(s) orally once a day (at bedtime)  clopidogrel 75 mg oral tablet: 1 tab(s) orally once a day  midodrine 10 mg oral tablet: 1 tab(s) orally 3 times a day  oseltamivir 30 mg oral capsule: 1 cap(s) orally 3 times a week  pantoprazole 40 mg oral delayed release tablet: 1 tab(s) orally once a day (before a meal)  sevelamer carbonate 800 mg oral tablet: 1 tab(s) orally 3 times a day (with meals)   aspirin 81 mg oral delayed release tablet: 1 tab(s) orally once a day  atorvastatin 40 mg oral tablet: 1 tab(s) orally once a day (at bedtime)  clopidogrel 75 mg oral tablet: 1 tab(s) orally once a day  midodrine 10 mg oral tablet: 1 tab(s) orally 3 times a day  pantoprazole 40 mg oral delayed release tablet: 1 tab(s) orally once a day (before a meal)  sevelamer carbonate 800 mg oral tablet: 1 tab(s) orally 3 times a day (with meals)

## 2025-01-24 NOTE — DISCHARGE NOTE NURSING/CASE MANAGEMENT/SOCIAL WORK - NSDCFUADDAPPT_GEN_ALL_CORE_FT
APPTS ARE READY TO BE MADE: [X] YES    Best Family or Patient Contact (if needed):    Additional Information about above appointments (if needed):    1: Nephrology  2: Vascular Surgery  3: Primary Care    Other comments or requests:

## 2025-01-24 NOTE — PROGRESS NOTE ADULT - PROBLEM SELECTOR PLAN 2
Suggest to continue medications, monitoring, FU primary team recommendations.
Suggest to continue medications, monitoring, FU primary team recommendations.
Suggest to continue medications, monitoring, FU primary team recommendations. .

## 2025-01-24 NOTE — PROGRESS NOTE ADULT - PROVIDER SPECIALTY LIST ADULT
Cardiology
Cardiology
Internal Medicine
Internal Medicine
Nephrology
Endocrinology
Endocrinology
Internal Medicine
Intervent Cardiology
Nephrology
Nephrology
Endocrinology

## 2025-01-24 NOTE — PROGRESS NOTE ADULT - PROBLEM SELECTOR PLAN 1
Will continue current insulin regimen for now. Will continue monitoring  blood sugars, will Follow up.  Patient counseled for compliance with consistent low carb diet.
Will continue current insulin regimen for now. Will continue monitoring  blood sugars, will Follow up.  Patient counseled for compliance with consistent low carb diet.  .
Will continue current insulin regimen for now. Will continue monitoring  blood sugars, will Follow up.  Patient counseled for compliance with consistent low carb diet.

## 2025-01-24 NOTE — DISCHARGE NOTE PROVIDER - NSDCCPCAREPLAN_GEN_ALL_CORE_FT
PRINCIPAL DISCHARGE DIAGNOSIS  Diagnosis: Coronary artery disease with angina pectoris  Assessment and Plan of Treatment: You were evaluated by Cardiology and interventional cardiology. You underwent a cardiac catheterization on 01/23/25 and had a stent placed in one of your coronary arteries. It is very important that your take a baby aspirin (aspirin 81mg daily) and Plavix daily to keep your stent open and to prevent a clot from forming and blocking the stent.  Coronary artery disease is a condition where the arteries the supply the heart muscle get clogged with fatty deposits & puts you at risk for a heart attack.  Call your doctor if you have any new pain, pressure, or discomfort in the center of your chest, pain, tingling or discomfort in arms, back, neck, jaw, or stomach, shortness of breath, nausea, vomiting, burping or heartburn, sweating, cold and clammy skin, racing or abnormal heartbeat for more than 10 minutes or if they keep coming & going.  Call 911 and do not try to get to hospital by car.  You can help yourself with lifestyle changes (quitting smoking if you If you are on medication to help you quit smoking, be sure to take it as prescribed. Find healthy ways to deal with stress, such as exercise (check with your healthcare provider first), deep breathing, meditation, or enjoyable healthy hobbies.  Avoid situations that may cause you to smoke a cigarette.  Look for help with quitting; you are not alone. A resource to help you stop smoking is the [facility] – phone number 181-893-6235.), eat lots of fruits & vegetables & low fat dairy products, not a lot of meat & fatty foods, walk or some form of physical activity most days of the week, lose weight if you are overweight.  Take your cardiac medication as prescribed to lower cholesterol, to lower blood pressure, and control your blood sugar.      SECONDARY DISCHARGE DIAGNOSES  Diagnosis: ESRD on dialysis  Assessment and Plan of Treatment: -Follow up with your nephrologist Dr. Smith within 1 week of discharge. Continue your home phosphate binder. AVF duplex revealed "a small, nonobstructive mural thrombus in the left cephalic vein at its confluence with the subclavian vein" but that the AV fistula was patent and functioning. Follow up with Vascular Surgery Dr. Hardy for further care of your AVF  - Avoid using NSAIDs  (Aleve, Motrin, ibuprofen, naproxen) while on DAPT, please utilize Tylenol for pain control (not to exceed 4gm in 24 hours)  Avoid taking any nephrotoxic agents (can harm kidneys) - Intravenous contrast for diagnostic testing, combination cold medications.  Have all medications adjusted for your renal function by your Health Care Provider.  Blood pressure control is important.  Take all medication as prescribed.     PRINCIPAL DISCHARGE DIAGNOSIS  Diagnosis: Coronary artery disease with angina pectoris  Assessment and Plan of Treatment: You were evaluated by Cardiology and interventional cardiology. You underwent a cardiac catheterization on 01/23/25 and had a stent placed in one of your coronary arteries. It is very important that your take a baby aspirin (aspirin 81mg daily) and Plavix daily to keep your stent open and to prevent a clot from forming and blocking the stent. Follow up with your Cardiologist, Nephrologist, and Vascular Surgery Dr. Hardy after discharge.  Coronary artery disease is a condition where the arteries the supply the heart muscle get clogged with fatty deposits & puts you at risk for a heart attack.  Call your doctor if you have any new pain, pressure, or discomfort in the center of your chest, pain, tingling or discomfort in arms, back, neck, jaw, or stomach, shortness of breath, nausea, vomiting, burping or heartburn, sweating, cold and clammy skin, racing or abnormal heartbeat for more than 10 minutes or if they keep coming & going.  Call 911 and do not try to get to hospital by car.  You can help yourself with lifestyle changes (quitting smoking if you If you are on medication to help you quit smoking, be sure to take it as prescribed. Find healthy ways to deal with stress, such as exercise (check with your healthcare provider first), deep breathing, meditation, or enjoyable healthy hobbies.  Avoid situations that may cause you to smoke a cigarette.  Look for help with quitting; you are not alone. A resource to help you stop smoking is the [facility] – phone number 158-619-0333.), eat lots of fruits & vegetables & low fat dairy products, not a lot of meat & fatty foods, walk or some form of physical activity most days of the week, lose weight if you are overweight.  Take your cardiac medication as prescribed to lower cholesterol, to lower blood pressure, and control your blood sugar.      SECONDARY DISCHARGE DIAGNOSES  Diagnosis: ESRD on dialysis  Assessment and Plan of Treatment: -Follow up with your nephrologist Dr. Smith within 1 week of discharge. Continue your home phosphate binder. AVF duplex revealed "a small, nonobstructive mural thrombus in the left cephalic vein at its confluence with the subclavian vein" but that the AV fistula was patent and functioning. Follow up with Vascular Surgery Dr. Hardy for further care of your AVF  - Avoid using NSAIDs  (Aleve, Motrin, ibuprofen, naproxen) while on DAPT, please utilize Tylenol for pain control (not to exceed 4gm in 24 hours)  Avoid taking any nephrotoxic agents (can harm kidneys) - Intravenous contrast for diagnostic testing, combination cold medications.  Have all medications adjusted for your renal function by your Health Care Provider.  Blood pressure control is important.  Take all medication as prescribed.

## 2025-01-24 NOTE — PROGRESS NOTE ADULT - ASSESSMENT
79yF           h/o   HLD, DM, CAD, ESRD on MWF dialysis with recent admission for chest palpitations w plan to perform outpt cath for chest pain         had / bleeding from fistula on left after dialysis.   and cp      was at outpatient HD when she developed chest palpitations approx 1 hr after HD completed.       EMS  reported , bleeding   from the AVF.  ems   placed pressure bandage/  er   staff held pressure with surgicel and placed tight kerlix dressing. Vascular surgery consulted.     per  daughter,   fistula but believes it was about 5 years ago.   and  care is  via  care at [facility].     states that her mom has to "get the fistula cleaned out" every 3-6 months. Last "cleaning" was approx 3 months ago.         patient gets hives with IV contrast and usually is pre-medicated with prednisone prior to these "cleanings".     cp/  palps,  none   now      elevated troponin from ckd  and demand ischemia.  card f/p/ needs    cath with pre  meds      asa/  lipitor    CKD      on HD      renal     vasc  following  /  for  access   malfunction    DM           follow   fs   orthostasis,  on  midodrine     cath repor t pending/  called  dr jurado,  pt with NICOL  to LAD.  on asa/ plavix     awiat   renal  f/p    may start   d/c plans,  when  cleraed  by renal,  as  pt   had  bleeding HD   access    dvt ppx/       rad< from: TTE W or WO Ultrasound Enhancing Agent (12.23.24 @ 10:09) >     1. Left ventricular cavity is normal in size. Left ventricular wall thickness is normal. Left ventricular systolic function is normal with an ejection fraction of 65 % by Cooper's method of disks. There are no regional wall motion abnormalities seen.   2. Normal left ventricular diastolic function, with normal left ventricular filling pressure.   3. Normal right ventricular cavity size, with normal wall thickness, and normal right ventricular systolic function.   4. Mild aortic stenosis.   5. No pericardial effusion seen.   6. Technically difficult image quality.  _______________    < end of copied text >

## 2025-01-25 RX ORDER — PANTOPRAZOLE 20 MG/1
1 TABLET, DELAYED RELEASE ORAL
Qty: 30 | Refills: 0
Start: 2025-01-25 | End: 2025-02-23

## 2025-01-25 NOTE — CHART NOTE - NSCHARTNOTEFT_GEN_A_CORE
****** Reasons for NO Cardiac rehab referral rx:                Medical Reason: on dialysis            Patient Lacks Transportation or no cardiac rehab within 60 minutes driving range            Other: patient with bleeding fistula
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 Hardy outpatient for evaluation of thrombectomy   Medical attending, Dr. Finn made aware    Jasmin Pope PA-C  Dept of Medicine
Advanced Care Planning:  I have had goals of care discussion, advanced directive and code status with patient/family    and  in  coordinating   patient care.     all questions answered and expressed understanding of the plan.  pt is  full code
Notified by RN mngmt that daughter Elba called hospital to report questions/discrepancies on discharge medication reconciliation.     Chart accessed post-discharge at request of patient/daughter. Per Elba, patient was taking Carvedilol 6.25mg BID at home, not given inpatient. Elba also inquired about increased midodrine dose for orthostatic hypotension. Discussed with Dr. partick, explained to patient that BP labile/borderline hypotension so midodrine dose was increased this admission and tolerated. Coreg not given this admission for the same. Explained patient will follow up with their outpatient dialysis center for further joao-dialysis BP support medication adjustements as patient's diet and routines change from inpatient to outpatient, reiterated contact info for unit and hospital and informed Elba to bring patient to ED with acute concerns and if patient is unable to follow up outpatient a timely manner.      conversation and recommendations discussed with attending Dr. Patrick.    Everton Dimas PA-C  [facility] Department of Medicine  Reachable on MS teams
Removal of Radial Band    Pulses in the right  upper extremity are palpable. The patient was placed in the supine position. The insertion site was identified and the band deflated per protocol. The radial band was removed slowly. Direct pressure was applied for  10 minutes/not applicable.      Monitoring of the right  wrists and both upper extremities including neuro-vascular checks and vital signs every 15 minutes x 4.    Complications: None

## 2025-01-27 ENCOUNTER — INPATIENT (INPATIENT)
Facility: HOSPITAL | Age: 80
LOS: 2 days | Discharge: HOME HEALTH | DRG: 308 | End: 2025-01-30
Attending: INTERNAL MEDICINE | Admitting: INTERNAL MEDICINE
Payer: MEDICARE

## 2025-01-27 VITALS
OXYGEN SATURATION: 95 % | HEART RATE: 81 BPM | HEIGHT: 64 IN | RESPIRATION RATE: 20 BRPM | WEIGHT: 177.91 LBS | DIASTOLIC BLOOD PRESSURE: 78 MMHG | TEMPERATURE: 97 F | SYSTOLIC BLOOD PRESSURE: 177 MMHG

## 2025-01-27 DIAGNOSIS — Z98.890 OTHER SPECIFIED POSTPROCEDURAL STATES: Chronic | ICD-10-CM

## 2025-01-27 DIAGNOSIS — Z90.710 ACQUIRED ABSENCE OF BOTH CERVIX AND UTERUS: Chronic | ICD-10-CM

## 2025-01-27 DIAGNOSIS — Z98.49 CATARACT EXTRACTION STATUS, UNSPECIFIED EYE: Chronic | ICD-10-CM

## 2025-01-27 DIAGNOSIS — Z98.89 OTHER SPECIFIED POSTPROCEDURAL STATES: Chronic | ICD-10-CM

## 2025-01-27 DIAGNOSIS — Z96.659 PRESENCE OF UNSPECIFIED ARTIFICIAL KNEE JOINT: Chronic | ICD-10-CM

## 2025-01-27 DIAGNOSIS — R07.9 CHEST PAIN, UNSPECIFIED: ICD-10-CM

## 2025-01-27 LAB
ALBUMIN SERPL ELPH-MCNC: 3.7 G/DL — SIGNIFICANT CHANGE UP (ref 3.3–5)
ALP SERPL-CCNC: 293 U/L — HIGH (ref 40–120)
ALT FLD-CCNC: 16 U/L — SIGNIFICANT CHANGE UP (ref 10–45)
ANION GAP SERPL CALC-SCNC: 19 MMOL/L — HIGH (ref 5–17)
ANISOCYTOSIS BLD QL: SLIGHT — SIGNIFICANT CHANGE UP
AST SERPL-CCNC: 32 U/L — SIGNIFICANT CHANGE UP (ref 10–40)
BASOPHILS # BLD AUTO: 0 K/UL — SIGNIFICANT CHANGE UP (ref 0–0.2)
BASOPHILS NFR BLD AUTO: 0 % — SIGNIFICANT CHANGE UP (ref 0–2)
BILIRUB SERPL-MCNC: 0.3 MG/DL — SIGNIFICANT CHANGE UP (ref 0.2–1.2)
BUN SERPL-MCNC: 51 MG/DL — HIGH (ref 7–23)
CALCIUM SERPL-MCNC: 9.4 MG/DL — SIGNIFICANT CHANGE UP (ref 8.4–10.5)
CHLORIDE SERPL-SCNC: 94 MMOL/L — LOW (ref 96–108)
CO2 SERPL-SCNC: 23 MMOL/L — SIGNIFICANT CHANGE UP (ref 22–31)
CREAT SERPL-MCNC: 8.85 MG/DL — HIGH (ref 0.5–1.3)
DACRYOCYTES BLD QL SMEAR: SLIGHT — SIGNIFICANT CHANGE UP
EGFR: 4 ML/MIN/1.73M2 — LOW
EOSINOPHIL # BLD AUTO: 0 K/UL — SIGNIFICANT CHANGE UP (ref 0–0.5)
EOSINOPHIL NFR BLD AUTO: 0 % — SIGNIFICANT CHANGE UP (ref 0–6)
FLUAV AG NPH QL: DETECTED
FLUBV AG NPH QL: SIGNIFICANT CHANGE UP
GIANT PLATELETS BLD QL SMEAR: PRESENT — SIGNIFICANT CHANGE UP
GLUCOSE BLDC GLUCOMTR-MCNC: 126 MG/DL — HIGH (ref 70–99)
GLUCOSE SERPL-MCNC: 185 MG/DL — HIGH (ref 70–99)
HCT VFR BLD CALC: 30.6 % — LOW (ref 34.5–45)
HGB BLD-MCNC: 9.4 G/DL — LOW (ref 11.5–15.5)
LYMPHOCYTES # BLD AUTO: 1.58 K/UL — SIGNIFICANT CHANGE UP (ref 1–3.3)
LYMPHOCYTES # BLD AUTO: 21.9 % — SIGNIFICANT CHANGE UP (ref 13–44)
MACROCYTES BLD QL: SLIGHT — SIGNIFICANT CHANGE UP
MAGNESIUM SERPL-MCNC: 2.3 MG/DL — SIGNIFICANT CHANGE UP (ref 1.6–2.6)
MANUAL SMEAR VERIFICATION: SIGNIFICANT CHANGE UP
MCHC RBC-ENTMCNC: 28.4 PG — SIGNIFICANT CHANGE UP (ref 27–34)
MCHC RBC-ENTMCNC: 30.7 G/DL — LOW (ref 32–36)
MCV RBC AUTO: 92.4 FL — SIGNIFICANT CHANGE UP (ref 80–100)
MONOCYTES # BLD AUTO: 0.38 K/UL — SIGNIFICANT CHANGE UP (ref 0–0.9)
MONOCYTES NFR BLD AUTO: 5.3 % — SIGNIFICANT CHANGE UP (ref 2–14)
NEUTROPHILS # BLD AUTO: 5.18 K/UL — SIGNIFICANT CHANGE UP (ref 1.8–7.4)
NEUTROPHILS NFR BLD AUTO: 71.9 % — SIGNIFICANT CHANGE UP (ref 43–77)
NRBC # BLD: 1 /100 WBCS — HIGH (ref 0–0)
NRBC BLD-RTO: 1 /100 WBCS — HIGH (ref 0–0)
NT-PROBNP SERPL-SCNC: HIGH PG/ML (ref 0–300)
OVALOCYTES BLD QL SMEAR: SLIGHT — SIGNIFICANT CHANGE UP
PLAT MORPH BLD: ABNORMAL
PLATELET # BLD AUTO: 198 K/UL — SIGNIFICANT CHANGE UP (ref 150–400)
POIKILOCYTOSIS BLD QL AUTO: SLIGHT — SIGNIFICANT CHANGE UP
POLYCHROMASIA BLD QL SMEAR: SLIGHT — SIGNIFICANT CHANGE UP
POTASSIUM SERPL-MCNC: 5.2 MMOL/L — SIGNIFICANT CHANGE UP (ref 3.5–5.3)
POTASSIUM SERPL-SCNC: 5.2 MMOL/L — SIGNIFICANT CHANGE UP (ref 3.5–5.3)
PROMYELOCYTES # FLD: 0.9 % — HIGH (ref 0–0)
PROMYELOCYTES NFR BLD: 0.9 % — HIGH (ref 0–0)
PROT SERPL-MCNC: 7.1 G/DL — SIGNIFICANT CHANGE UP (ref 6–8.3)
RBC # BLD: 3.31 M/UL — LOW (ref 3.8–5.2)
RBC # FLD: 19 % — HIGH (ref 10.3–14.5)
RBC BLD AUTO: ABNORMAL
RSV RNA NPH QL NAA+NON-PROBE: SIGNIFICANT CHANGE UP
SARS-COV-2 RNA SPEC QL NAA+PROBE: SIGNIFICANT CHANGE UP
SODIUM SERPL-SCNC: 136 MMOL/L — SIGNIFICANT CHANGE UP (ref 135–145)
TARGETS BLD QL SMEAR: SLIGHT — SIGNIFICANT CHANGE UP
TROPONIN T, HIGH SENSITIVITY RESULT: 245 NG/L — HIGH (ref 0–51)
TROPONIN T, HIGH SENSITIVITY RESULT: 256 NG/L — HIGH (ref 0–51)
WBC # BLD: 7.21 K/UL — SIGNIFICANT CHANGE UP (ref 3.8–10.5)
WBC # FLD AUTO: 7.21 K/UL — SIGNIFICANT CHANGE UP (ref 3.8–10.5)

## 2025-01-27 PROCEDURE — 36410 VNPNXR 3YR/> PHY/QHP DX/THER: CPT

## 2025-01-27 PROCEDURE — 76937 US GUIDE VASCULAR ACCESS: CPT | Mod: 26

## 2025-01-27 PROCEDURE — 99285 EMERGENCY DEPT VISIT HI MDM: CPT

## 2025-01-27 PROCEDURE — 71045 X-RAY EXAM CHEST 1 VIEW: CPT | Mod: 26

## 2025-01-27 RX ORDER — INSULIN LISPRO 100/ML
VIAL (ML) SUBCUTANEOUS
Refills: 0 | Status: DISCONTINUED | OUTPATIENT
Start: 2025-01-27 | End: 2025-01-30

## 2025-01-27 RX ORDER — SODIUM CHLORIDE 9 G/ML
1000 INJECTION, SOLUTION INTRAVENOUS
Refills: 0 | Status: DISCONTINUED | OUTPATIENT
Start: 2025-01-27 | End: 2025-01-30

## 2025-01-27 RX ORDER — PANTOPRAZOLE 20 MG/1
40 TABLET, DELAYED RELEASE ORAL
Refills: 0 | Status: DISCONTINUED | OUTPATIENT
Start: 2025-01-27 | End: 2025-01-30

## 2025-01-27 RX ORDER — EPOETIN ALFA 2000 [IU]/ML
10000 SOLUTION INTRAVENOUS; SUBCUTANEOUS
Refills: 0 | Status: DISCONTINUED | OUTPATIENT
Start: 2025-01-27 | End: 2025-01-30

## 2025-01-27 RX ORDER — DM/PSEUDOEPHED/ACETAMINOPHEN 10-30-250
15 CAPSULE ORAL ONCE
Refills: 0 | Status: DISCONTINUED | OUTPATIENT
Start: 2025-01-27 | End: 2025-01-30

## 2025-01-27 RX ORDER — METOPROLOL SUCCINATE 25 MG
25 TABLET, EXTENDED RELEASE 24 HR ORAL DAILY
Refills: 0 | Status: DISCONTINUED | OUTPATIENT
Start: 2025-01-27 | End: 2025-01-28

## 2025-01-27 RX ORDER — ATORVASTATIN CALCIUM 80 MG/1
40 TABLET, FILM COATED ORAL AT BEDTIME
Refills: 0 | Status: DISCONTINUED | OUTPATIENT
Start: 2025-01-27 | End: 2025-01-30

## 2025-01-27 RX ORDER — HEPARIN SODIUM,PORCINE 10000/ML
5000 VIAL (ML) INJECTION EVERY 12 HOURS
Refills: 0 | Status: DISCONTINUED | OUTPATIENT
Start: 2025-01-27 | End: 2025-01-30

## 2025-01-27 RX ORDER — SEVELAMER CARBONATE 800 MG/1
800 TABLET, FILM COATED ORAL
Refills: 0 | Status: DISCONTINUED | OUTPATIENT
Start: 2025-01-27 | End: 2025-01-30

## 2025-01-27 RX ORDER — MIDODRINE HYDROCHLORIDE 5 MG/1
10 TABLET ORAL THREE TIMES A DAY
Refills: 0 | Status: DISCONTINUED | OUTPATIENT
Start: 2025-01-27 | End: 2025-01-29

## 2025-01-27 RX ORDER — DM/PSEUDOEPHED/ACETAMINOPHEN 10-30-250
25 CAPSULE ORAL ONCE
Refills: 0 | Status: DISCONTINUED | OUTPATIENT
Start: 2025-01-27 | End: 2025-01-30

## 2025-01-27 RX ORDER — GLUCAGON 3 MG/1
1 POWDER NASAL ONCE
Refills: 0 | Status: DISCONTINUED | OUTPATIENT
Start: 2025-01-27 | End: 2025-01-30

## 2025-01-27 RX ORDER — ASPIRIN 81 MG/1
81 TABLET, COATED ORAL DAILY
Refills: 0 | Status: DISCONTINUED | OUTPATIENT
Start: 2025-01-27 | End: 2025-01-30

## 2025-01-27 RX ORDER — DM/PSEUDOEPHED/ACETAMINOPHEN 10-30-250
12.5 CAPSULE ORAL ONCE
Refills: 0 | Status: DISCONTINUED | OUTPATIENT
Start: 2025-01-27 | End: 2025-01-30

## 2025-01-27 RX ADMIN — EPOETIN ALFA 10000 UNIT(S): 2000 SOLUTION INTRAVENOUS; SUBCUTANEOUS at 21:50

## 2025-01-27 RX ADMIN — Medication 25 MILLIGRAM(S): at 23:05

## 2025-01-27 NOTE — ED PROVIDER NOTE - OBJECTIVE STATEMENT
79yF  h/o  HLD, DM, CAD, ESRD on MWF dialysis with recent admission for chest pain where she had a cath and LAD stent placed. Pt states that since dc she has had persistent palpitations and intermittent chest pain. States that last night the palpitations were so severe that she could not sleep. Pt accompanied by family member- states that pt has been having these symptoms lately especially after HD sessions. States that the reason she came to the hospital today is that in addition to the palpitations, the was concerned that her HD today would exacerbate her symptoms. Denies sob, fever, abd pain, nvd.

## 2025-01-27 NOTE — ED PROVIDER NOTE - PROGRESS NOTE DETAILS
Attending MD Fried.  Pt alert, oriented per baseline in ED. Trop elevation c/w eelvated Cr. Pt accepted for admission to tele for trop trending/tele monitoring.

## 2025-01-27 NOTE — ED PROVIDER NOTE - ATTENDING CONTRIBUTION TO CARE
Attending MD Fried:  I performed a history and physical exam of the patient and discussed their management with the resident. I reviewed the resident's note and agree with the documented findings and plan of care. My medical decision making and observations are found above.    Attending MD Fried.  Agree with above.  Pt is an 78 yo fem with pmhx of JARVIS, DM, HLD, gout, anemia, ESRD, trigeminal neuralgia, vertigo, HTN who presents to ED with complaint today of palpitations since angiogram/stenting of high grade LAD blockage last week. Pt had had palpitations since that time but they have escalated today and kept her up all night.  Denies concomitant CP/SOB. Pt had last dialysis Fri as usual and is scheduled for dialysis today but came to ED 2/2 severity of palpitations.  In ED pt EKG c/w RBBB c/w previous EKG.  Planned ACS screening, dialysis prep and discussion with pt's interventional team.  Suspect poss card membrane irritability/PVC's or intermittent afib.  Pt is on plavix 2/2 recent intervention.

## 2025-01-27 NOTE — ED ADULT NURSE NOTE - NSFALLHARMRISKINTERV_ED_ALL_ED

## 2025-01-27 NOTE — H&P ADULT - NSHPPHYSICALEXAM_GEN_ALL_CORE
T(C): 36.4 (01-27-25 @ 16:45), Max: 36.6 (01-27-25 @ 12:25)  HR: 89 (01-27-25 @ 16:45) (76 - 89)  BP: 186/84 (01-27-25 @ 16:45) (158/76 - 186/84)  RR: 20 (01-27-25 @ 16:45) (20 - 20)  SpO2: 99% (01-27-25 @ 16:45) (95% - 99%)  GENERAL: NAD, lying in bed comfortably  HEAD:  Atraumatic, normocephalic  EYES: EOMI, PERRLA, conjunctiva and sclera clear  NECK: Supple, trachea midline, no JVD  HEART: Regular rate and rhythm, no murmurs, rubs, or gallops  LUNGS: Unlabored respirations.  Clear to auscultation bilaterally, no crackles, wheezing, or rhonchi  ABDOMEN: Soft, nontender, nondistended, +BS  EXTREMITIES: 2+ peripheral pulses bilaterally. No clubbing, cyanosis, or edema  NERVOUS SYSTEM:  A&Ox3, moving all extremities, no focal deficits   SKIN: No rashes or lesions

## 2025-01-27 NOTE — ED PROCEDURE NOTE - ATTENDING CONTRIBUTION TO CARE
I, Aram Ross, performed a history and physical exam of the patient and discussed their management with the resident provider. I reviewed the resident provider's note and agree with the documented findings and plan of care. I was present and available for all procedures.

## 2025-01-27 NOTE — H&P ADULT - ASSESSMENT
79yF           h/o   HLD, DM, CAD,   CKD  on  HD,         hives with IV contrast and usually is pre-medicated with prednisone     cp/  palps,  none   now      c/p  cp/  palps       elevated troponin from ckd  and demand ischemia.        CAD,,   recent stent    to LAD         asa/  plavix,    lipitor    CKD           on HD      renal    DM           follow   fs   orthostasis,  on  midodrine    elevated  bp now/  pt  had  low  bp  in  past    toprol /  card  follwoing    dvt  ppx    pt si  full code          rad< from: TTE W or WO Ultrasound Enhancing Agent (12.23.24 @ 10:09) >   1. Left ventricular cavity is normal in size. Left ventricular wall thickness is normal. Left ventricular systolic function is normal with an ejection fraction of 65 % by Cooper's method of disks. There are no regional wall motion abnormalities seen.   2. Normal left ventricular diastolic function, with normal left ventricular filling pressure.   3. Normal right ventricular cavity size, with normal wall thickness, and normal right ventricular systolic function.   4. Mild aortic stenosis.   5. No pericardial effusion seen.   6. Technically difficult image quality.  _______________    < end of copied text >         79yF           h/o   HLD, DM, CAD,   CKD  on  HD,        hives with IV contrast and usually is pre-medicated with prednisone       cp/  palps,  none   now      c/p  cp/  palps /  none  now        elevated troponin from ckd  and demand ischemia. ,  not  from  mi       CAD,,  recent stent    to LAD         asa/  plavix,    lipitor    CKD           on HD      renal    DM           follow   fs   orthostasis,  on  midodrine    elevated  bp now/  pt  had  low  bp  in  past    Toprol  /  card  following     dvt  ppx    pt si  full code          rad< from: TTE W or WO Ultrasound Enhancing Agent (12.23.24 @ 10:09) >   1. Left ventricular cavity is normal in size. Left ventricular wall thickness is normal. Left ventricular systolic function is normal with an ejection fraction of 65 % by Cooper's method of disks. There are no regional wall motion abnormalities seen.   2. Normal left ventricular diastolic function, with normal left ventricular filling pressure.   3. Normal right ventricular cavity size, with normal wall thickness, and normal right ventricular systolic function.   4. Mild aortic stenosis.   5. No pericardial effusion seen.   6. Technically difficult image quality.  _______________    < end of copied text >

## 2025-01-27 NOTE — H&P ADULT - NSHPLABSRESULTS_GEN_ALL_CORE
LABS:                        9.4    7.21  )-----------( 198      ( 27 Jan 2025 13:01 )             30.6     01-27    135  |  92[L]  |  52[H]  ----------------------------<  139[H]  5.0   |  25  |  9.06[H]    Ca    9.5      27 Jan 2025 14:51  Mg     2.3     01-27    TPro  7.1  /  Alb  3.7  /  TBili  0.3  /  DBili  x   /  AST  32  /  ALT  16  /  AlkPhos  293[H]  01-27          Urinalysis Basic - ( 27 Jan 2025 14:51 )    Color: x / Appearance: x / SG: x / pH: x  Gluc: 139 mg/dL / Ketone: x  / Bili: x / Urobili: x   Blood: x / Protein: x / Nitrite: x   Leuk Esterase: x / RBC: x / WBC x   Sq Epi: x / Non Sq Epi: x / Bacteria: x              Thyroid Stimulating Hormone, Serum: 1.600 uU/mL (01-14 @ 09:40)

## 2025-01-27 NOTE — ED PROVIDER NOTE - PHYSICAL EXAMINATION
PHYSICAL EXAM:  CONSTITUTIONAL: NAD  HEAD: Atraumatic  ENMT: Airway patent, Nasal mucosa clear. Mouth with normal mucosa. Uvula is midline.   CARDIAC: Normal rate, regular rhythm.   RESPIRATORY: Breathing unlabored. Breath sounds clear and equal bilaterally.  ABDOMEN:  Soft, nontender, nondistended. No rebound tenderness or guarding.  SKIN: Skin warm and dry. No evidence of rashes or lesions.

## 2025-01-27 NOTE — H&P ADULT - NSHPREVIEWOFSYSTEMS_GEN_ALL_CORE
REVIEW OF SYSTEMS:  CONSTITUTIONAL: No fever,  no  weight loss  ENT:  No  tinnitus,   no   vertigo  NECK: No pain or stiffness  RESPIRATORY: No cough, wheezing, chills or hemoptysis;    No Shortness of Breath  CARDIOVASCULAR: +   chest pain, palpitations,   no dizziness  GASTROINTESTINAL: No abdominal or epigastric pain. No nausea, vomiting, or hematemesis; No diarrhea  No melena or hematochezia.  GENITOURINARY: No dysuria, frequency, hematuria, or incontinence  NEUROLOGICAL: No headaches  SKIN: No itching,  no   rash  LYMPH Nodes: No enlarged glands  ENDOCRINE: No heat or cold intolerance  MUSCULOSKELETAL: No joint pain or swelling  PSYCHIATRIC: No depression, anxiety  HEME/LYMPH: No easy bruising, or bleeding gums  ALLERGY AND IMMUNOLOGIC: No hives or eczema

## 2025-01-27 NOTE — H&P ADULT - HISTORY OF PRESENT ILLNESS
79  yr  f      h/o  HLD, DM, CAD, ESRD on MWF dialysis       with recent admission for chest pain ,  had  LAD stent placed       pt  states that since dc she has had persistent palpitations and intermittent chest pain.     States that last night the palpitations were so severe that she could not sleep.      pt  accompanied by family member- states that pt has been having these symptoms lately especially after HD sessions.        also had  missed he r HD session

## 2025-01-27 NOTE — ED ADULT NURSE NOTE - OBJECTIVE STATEMENT
Pt c/o "palpitations x few hrs, no CP/sob/n/v. Suppose to have dialysis at 5:30pm today. Last dialysis was on Friday."

## 2025-01-28 LAB
ADD ON TEST-SPECIMEN IN LAB: SIGNIFICANT CHANGE UP
ANION GAP SERPL CALC-SCNC: 17 MMOL/L — SIGNIFICANT CHANGE UP (ref 5–17)
BUN SERPL-MCNC: 30 MG/DL — HIGH (ref 7–23)
CALCIUM SERPL-MCNC: 9.7 MG/DL — SIGNIFICANT CHANGE UP (ref 8.4–10.5)
CHLORIDE SERPL-SCNC: 93 MMOL/L — LOW (ref 96–108)
CK MB CFR SERPL CALC: 2.2 NG/ML — SIGNIFICANT CHANGE UP (ref 0–3.8)
CK SERPL-CCNC: 62 U/L — SIGNIFICANT CHANGE UP (ref 25–170)
CO2 SERPL-SCNC: 25 MMOL/L — SIGNIFICANT CHANGE UP (ref 22–31)
CREAT SERPL-MCNC: 6.54 MG/DL — HIGH (ref 0.5–1.3)
EGFR: 6 ML/MIN/1.73M2 — LOW
GLUCOSE BLDC GLUCOMTR-MCNC: 193 MG/DL — HIGH (ref 70–99)
GLUCOSE BLDC GLUCOMTR-MCNC: 232 MG/DL — HIGH (ref 70–99)
GLUCOSE BLDC GLUCOMTR-MCNC: 249 MG/DL — HIGH (ref 70–99)
GLUCOSE SERPL-MCNC: 206 MG/DL — HIGH (ref 70–99)
HCT VFR BLD CALC: 31.5 % — LOW (ref 34.5–45)
HGB BLD-MCNC: 9.7 G/DL — LOW (ref 11.5–15.5)
MCHC RBC-ENTMCNC: 28.7 PG — SIGNIFICANT CHANGE UP (ref 27–34)
MCHC RBC-ENTMCNC: 30.8 G/DL — LOW (ref 32–36)
MCV RBC AUTO: 93.2 FL — SIGNIFICANT CHANGE UP (ref 80–100)
NRBC # BLD: 0 /100 WBCS — SIGNIFICANT CHANGE UP (ref 0–0)
NRBC BLD-RTO: 0 /100 WBCS — SIGNIFICANT CHANGE UP (ref 0–0)
PLATELET # BLD AUTO: 219 K/UL — SIGNIFICANT CHANGE UP (ref 150–400)
POTASSIUM SERPL-MCNC: 4.2 MMOL/L — SIGNIFICANT CHANGE UP (ref 3.5–5.3)
POTASSIUM SERPL-SCNC: 4.2 MMOL/L — SIGNIFICANT CHANGE UP (ref 3.5–5.3)
RBC # BLD: 3.38 M/UL — LOW (ref 3.8–5.2)
RBC # FLD: 19.5 % — HIGH (ref 10.3–14.5)
SODIUM SERPL-SCNC: 135 MMOL/L — SIGNIFICANT CHANGE UP (ref 135–145)
WBC # BLD: 8.67 K/UL — SIGNIFICANT CHANGE UP (ref 3.8–10.5)
WBC # FLD AUTO: 8.67 K/UL — SIGNIFICANT CHANGE UP (ref 3.8–10.5)

## 2025-01-28 RX ORDER — CARVEDILOL 6.25 MG
6.25 TABLET ORAL EVERY 12 HOURS
Refills: 0 | Status: DISCONTINUED | OUTPATIENT
Start: 2025-01-28 | End: 2025-01-30

## 2025-01-28 RX ORDER — ALPRAZOLAM 2 MG
0.25 TABLET ORAL DAILY
Refills: 0 | Status: DISCONTINUED | OUTPATIENT
Start: 2025-01-28 | End: 2025-01-30

## 2025-01-28 RX ADMIN — Medication 1: at 08:14

## 2025-01-28 RX ADMIN — ATORVASTATIN CALCIUM 40 MILLIGRAM(S): 80 TABLET, FILM COATED ORAL at 23:00

## 2025-01-28 RX ADMIN — Medication 6.25 MILLIGRAM(S): at 17:48

## 2025-01-28 RX ADMIN — Medication 75 MILLIGRAM(S): at 11:45

## 2025-01-28 RX ADMIN — Medication 25 MILLIGRAM(S): at 06:45

## 2025-01-28 RX ADMIN — Medication 0.25 MILLIGRAM(S): at 11:45

## 2025-01-28 RX ADMIN — Medication 2: at 17:47

## 2025-01-28 RX ADMIN — SEVELAMER CARBONATE 800 MILLIGRAM(S): 800 TABLET, FILM COATED ORAL at 11:46

## 2025-01-28 RX ADMIN — MIDODRINE HYDROCHLORIDE 10 MILLIGRAM(S): 5 TABLET ORAL at 17:48

## 2025-01-28 RX ADMIN — SEVELAMER CARBONATE 800 MILLIGRAM(S): 800 TABLET, FILM COATED ORAL at 17:48

## 2025-01-28 RX ADMIN — Medication 2: at 11:44

## 2025-01-28 RX ADMIN — PANTOPRAZOLE 40 MILLIGRAM(S): 20 TABLET, DELAYED RELEASE ORAL at 06:45

## 2025-01-28 RX ADMIN — SEVELAMER CARBONATE 800 MILLIGRAM(S): 800 TABLET, FILM COATED ORAL at 08:15

## 2025-01-28 RX ADMIN — ASPIRIN 81 MILLIGRAM(S): 81 TABLET, COATED ORAL at 11:45

## 2025-01-28 NOTE — PROGRESS NOTE ADULT - ASSESSMENT
79yF  h/o  HLD, DM, CAD, ESRD on MWF dialysis with recent admission for chest pain where she had a cath and LAD stent placed. Pt states that since dc she has had persistent palpitations and intermittent chest pain    ESRD on HD   Center: [facility]  Nephrologist: Jhonatan Smith   Schedule: MWF   Access; LUE AVF     plan   s/p HD yesterday; next HD tomorrow  consent obtained and placed in chart  UF as tolerated by BP   please dose medications as per ESRD     Anemia   in setting of kidney disease  hbg below goal  epo 10 k tiw with hd  trend hgb       If any questions, please feel free to contact me     Jhonatan Smith  Nephrology Attending  Cell #[phone]

## 2025-01-28 NOTE — PATIENT PROFILE ADULT - FALL HARM RISK - RISK INTERVENTIONS

## 2025-01-28 NOTE — PROGRESS NOTE ADULT - SUBJECTIVE AND OBJECTIVE BOX
[facility] : Ans Serv [phone], Office [phone]  Dr. Smith/Dr Lockett/Dr Camacho  /Dr Garry davenport /Dr JACY Taylor/Dr Del Becker/Dr Stephon Colbert /Dr MALCOLM Kearns  _______________________________________________________________________________________________    Pt seen and examined this morning   no acute issues noted overnight     VITALS:  T(F): 98.6 (01-28 @ 04:41), Max: 98.6 (01-28 @ 04:41)  HR: 89 (01-28 @ 04:41)  BP: 147/75 (01-28 @ 04:41)  ABP: --  RR: 18 (01-28 @ 04:41)  SpO2: 96% (01-28 @ 04:41)    01-27 @ 07:01  -  01-28 @ 07:00  --------------------------------------------------------  IN: 0 mL / OUT: 1300 mL / NET: -1300 mL      Physical Exam :-  Constitutional: NAD  HEENT: anicteric sclera, oropharynx clear, MMM  Respiratory: CTAB, no wheezes, rales or rhonchi  Cardiovascular: S1, S2, RRR  Gastrointestinal: BS+, soft, NT/ND  Extremities: No peripheral edema  Neurological: A/O x 3, no focal deficits  Psychiatric: Normal mood, normal affect  Skin: No rashes  Vascular Access: left upper ext avf + thrill    Data:-  Allergies :   smoke; coughing (Other)  IV Contrast (Anaphylaxis)  Shrimp (Hives)  shellfish (Hives; Rash)    Hospital Medications:   MEDICATIONS  (STANDING):  aspirin enteric coated 81 milliGRAM(s) Oral daily  atorvastatin 40 milliGRAM(s) Oral at bedtime  clopidogrel Tablet 75 milliGRAM(s) Oral daily  dextrose 5%. 1000 milliLiter(s) (50 mL/Hr) IV Continuous <Continuous>  dextrose 5%. 1000 milliLiter(s) (100 mL/Hr) IV Continuous <Continuous>  dextrose 50% Injectable 25 Gram(s) IV Push once  dextrose 50% Injectable 12.5 Gram(s) IV Push once  dextrose 50% Injectable 25 Gram(s) IV Push once  epoetin shannon-epbx (RETACRIT) Injectable 95466 Unit(s) IV Push <User Schedule>  glucagon  Injectable 1 milliGRAM(s) IntraMuscular once  heparin   Injectable 5000 Unit(s) SubCutaneous every 12 hours  insulin lispro (ADMELOG) corrective regimen sliding scale   SubCutaneous three times a day before meals  metoprolol succinate ER 25 milliGRAM(s) Oral daily  midodrine. 10 milliGRAM(s) Oral three times a day  pantoprazole    Tablet 40 milliGRAM(s) Oral before breakfast  sevelamer carbonate 800 milliGRAM(s) Oral three times a day with meals    01-27    135  |  92[L]  |  52[H]  ----------------------------<  139[H]  5.0   |  25  |  9.06[H]    Ca    9.5      27 Jan 2025 14:51  Mg     2.3     01-27    TPro  7.1  /  Alb  3.7  /  TBili  0.3  /  DBili      /  AST  32  /  ALT  16  /  AlkPhos  293[H]  01-27    Creatinine Trend: 9.06 <--, 8.85 <--, 7.33 <--, 7.32 <--, 5.16 <--  egfr trend : 4 <--, 4 <--, 5 <--, 5 <--, 8 <--, 9 <--, 5 <--                        9.7    8.67  )-----------( 219      ( 28 Jan 2025 07:38 )             31.5

## 2025-01-28 NOTE — PROGRESS NOTE ADULT - SUBJECTIVE AND OBJECTIVE BOX
Date of Service: 01-28-25 @ 09:54           CARDIOLOGY     PROGRESS  NOTE   ________________________________________________    CHIEF COMPLAINT:Patient is a 79y old  Female who presents with a chief complaint of cp (28 Jan 2025 09:28)  no chest pain  	  REVIEW OF SYSTEMS:  CONSTITUTIONAL: No fever, weight loss, or fatigue  EYES: No eye pain, visual disturbances, or discharge  ENT:  No difficulty hearing, tinnitus, vertigo; No sinus or throat pain  NECK: No pain or stiffness  RESPIRATORY: No cough, wheezing, chills or hemoptysis; No Shortness of Breath  CARDIOVASCULAR: No chest pain, palpitations, passing out, dizziness, or leg swelling  GASTROINTESTINAL: No abdominal or epigastric pain. No nausea, vomiting, or hematemesis; No diarrhea or constipation. No melena or hematochezia.  GENITOURINARY: No dysuria, frequency, hematuria, or incontinence  NEUROLOGICAL: No headaches, memory loss, loss of strength, numbness, or tremors  SKIN: No itching, burning, rashes, or lesions   LYMPH Nodes: No enlarged glands  ENDOCRINE: No heat or cold intolerance; No hair loss  MUSCULOSKELETAL: No joint pain or swelling; No muscle, back, or extremity pain  PSYCHIATRIC: No depression, anxiety, mood swings, or difficulty sleeping  HEME/LYMPH: No easy bruising, or bleeding gums  ALLERGY AND IMMUNOLOGIC: No hives or eczema	    [x ] All others negative	  [ ] Unable to obtain    PHYSICAL EXAM:  T(C): 37 (01-28-25 @ 04:41), Max: 37 (01-28-25 @ 04:41)  HR: 89 (01-28-25 @ 04:41) (76 - 99)  BP: 147/75 (01-28-25 @ 04:41) (120/69 - 186/84)  RR: 18 (01-28-25 @ 04:41) (18 - 20)  SpO2: 96% (01-28-25 @ 04:41) (94% - 99%)  Wt(kg): --  I&O's Summary    27 Jan 2025 07:01  -  28 Jan 2025 07:00  --------------------------------------------------------  IN: 0 mL / OUT: 1300 mL / NET: -1300 mL        Appearance: Normal	  HEENT:   Normal oral mucosa, PERRL, EOMI	  Lymphatic: No lymphadenopathy  Cardiovascular: Normal S1 S2, No JVD, + murmurs, No edema  Respiratory: rhonchi  Psychiatry: A & O x 3, Mood & affect appropriate  Gastrointestinal:  Soft, Non-tender, + BS	  Skin: No rashes, No ecchymoses, No cyanosis	  Neurologic: Non-focal  Extremities: Normal range of motion, No clubbing, cyanosis or edema  Vascular: Peripheral pulses palpable 2+ bilaterally    MEDICATIONS  (STANDING):  ALPRAZolam 0.25 milliGRAM(s) Oral daily  aspirin enteric coated 81 milliGRAM(s) Oral daily  atorvastatin 40 milliGRAM(s) Oral at bedtime  carvedilol 6.25 milliGRAM(s) Oral every 12 hours  clopidogrel Tablet 75 milliGRAM(s) Oral daily  dextrose 5%. 1000 milliLiter(s) (100 mL/Hr) IV Continuous <Continuous>  dextrose 5%. 1000 milliLiter(s) (50 mL/Hr) IV Continuous <Continuous>  dextrose 50% Injectable 25 Gram(s) IV Push once  dextrose 50% Injectable 12.5 Gram(s) IV Push once  dextrose 50% Injectable 25 Gram(s) IV Push once  epoetin shannon-epbx (RETACRIT) Injectable 19343 Unit(s) IV Push <User Schedule>  glucagon  Injectable 1 milliGRAM(s) IntraMuscular once  heparin   Injectable 5000 Unit(s) SubCutaneous every 12 hours  insulin lispro (ADMELOG) corrective regimen sliding scale   SubCutaneous three times a day before meals  midodrine. 10 milliGRAM(s) Oral three times a day  pantoprazole    Tablet 40 milliGRAM(s) Oral before breakfast  sevelamer carbonate 800 milliGRAM(s) Oral three times a day with meals      TELEMETRY: 	    ECG:  	  RADIOLOGY:  OTHER: 	  	  LABS:	 	    CARDIAC MARKERS:                        9.7    8.67  )-----------( 219      ( 28 Jan 2025 07:38 )             31.5     01-28    135  |  93[L]  |  30[H]  ----------------------------<  206[H]  4.2   |  25  |  6.54[H]    Ca    9.7      28 Jan 2025 07:38  Mg     2.3     01-27    TPro  7.1  /  Alb  3.7  /  TBili  0.3  /  DBili  x   /  AST  32  /  ALT  16  /  AlkPhos  293[H]  01-27    proBNP:   Lipid Profile:   HgA1c:   TSH: Thyroid Stimulating Hormone, Serum: 1.600 uU/mL (01-14 @ 09:40)    Creatine Kinase (01.23.25 @ 01:01)    Creatine Kinase: 99 U/L          Assessment and plan  ---------------------------  79yF  h/o  HLD, DM, CAD, ESRD on MWF dialysis with recent admission for chest pain where she had a cath and LAD stent placed. Pt states that since dc she has had persistent palpitations and intermittent chest pain. States that last night the palpitations were so severe that she could not sleep. Pt accompanied by family member- states that pt has been having these symptoms lately especially after HD sessions. States that the reason she came to the hospital today is that in addition to the palpitations, the was concerned that her HD today would exacerbate her symptoms. Denies sob, fever, abd pain, nvd.  pt with sig cardiac hx s/p recent cardiac cath and NICOL of lad with chest pain and palpitation  tele  check cpk / mb   continue asa/ plavix  add beta blocker as tolerated  continue bp meds will adjust dose  dvt prophylaxis  cpk are all negative , no further chest pain  continue DAPT  increase ambulation  observe on tele r/o PAF/SVT

## 2025-01-28 NOTE — PROGRESS NOTE ADULT - ASSESSMENT
79yF           h/o   HLD, DM, CAD,   CKD  on  HD,        hives with IV contrast and usually is pre-medicated with prednisone       cp/  palps,  none   now      c/p  cp/  palps /  none  now        elevated troponin from ckd  and demand ischemia. ,  not  from  mi       CAD,,  recent stent    to LAD         asa/  plavix,    lipitor    CKD           on HD      renal    DM           follow   fs   orthostasis,  on  midodrine    elevated  bp now/  pt  had  low  bp  in  past    Toprol    HD  in am ,     d/c  plans in am     dvt  ppx    pt si  full code          rad< from: TTE W or WO Ultrasound Enhancing Agent (12.23.24 @ 10:09) >   1. Left ventricular cavity is normal in size. Left ventricular wall thickness is normal. Left ventricular systolic function is normal with an ejection fraction of 65 % by Cooper's method of disks. There are no regional wall motion abnormalities seen.   2. Normal left ventricular diastolic function, with normal left ventricular filling pressure.   3. Normal right ventricular cavity size, with normal wall thickness, and normal right ventricular systolic function.   4. Mild aortic stenosis.   5. No pericardial effusion seen.   6. Technically difficult image quality.  _______________    < end of copied text >

## 2025-01-28 NOTE — PHYSICAL THERAPY INITIAL EVALUATION ADULT - PLANNED THERAPY INTERVENTIONS, PT EVAL
GOAL: Patient will perform 3 steps independently using least restrictive device to enter home in 2 weeks./balance training/bed mobility training/gait training/transfer training

## 2025-01-28 NOTE — PROGRESS NOTE ADULT - SUBJECTIVE AND OBJECTIVE BOX
date of service: 01-28-25 @ 09:28  [facility]  REVIEW OF SYSTEMS:  CONSTITUTIONAL: No fever,  no  weight loss  ENT:  No  tinnitus,   no   vertigo  NECK: No pain or stiffness  RESPIRATORY: No cough, wheezing, chills or hemoptysis;    No Shortness of Breath  CARDIOVASCULAR: No chest pain, palpitations, dizziness  GASTROINTESTINAL: No abdominal or epigastric pain. No nausea, vomiting, or hematemesis; No diarrhea  No melena or hematochezia.  GENITOURINARY: No dysuria, frequency, hematuria, or incontinence  NEUROLOGICAL: No headaches  SKIN: No itching,  no   rash  LYMPH Nodes: No enlarged glands  ENDOCRINE: No heat or cold intolerance  MUSCULOSKELETAL: No joint pain or swelling  PSYCHIATRIC: No depression, anxiety  HEME/LYMPH: No easy bruising, or bleeding gums  ALLERGY AND IMMUNOLOGIC: No hives or eczema	    MEDICATIONS  (STANDING):  aspirin enteric coated 81 milliGRAM(s) Oral daily  atorvastatin 40 milliGRAM(s) Oral at bedtime  clopidogrel Tablet 75 milliGRAM(s) Oral daily  dextrose 5%. 1000 milliLiter(s) (50 mL/Hr) IV Continuous <Continuous>  dextrose 5%. 1000 milliLiter(s) (100 mL/Hr) IV Continuous <Continuous>  dextrose 50% Injectable 25 Gram(s) IV Push once  dextrose 50% Injectable 12.5 Gram(s) IV Push once  dextrose 50% Injectable 25 Gram(s) IV Push once  epoetin shannon-epbx (RETACRIT) Injectable 49676 Unit(s) IV Push <User Schedule>  glucagon  Injectable 1 milliGRAM(s) IntraMuscular once  heparin   Injectable 5000 Unit(s) SubCutaneous every 12 hours  insulin lispro (ADMELOG) corrective regimen sliding scale   SubCutaneous three times a day before meals  metoprolol succinate ER 25 milliGRAM(s) Oral daily  midodrine. 10 milliGRAM(s) Oral three times a day  pantoprazole    Tablet 40 milliGRAM(s) Oral before breakfast  sevelamer carbonate 800 milliGRAM(s) Oral three times a day with meals    MEDICATIONS  (PRN):  dextrose Oral Gel 15 Gram(s) Oral once PRN Blood Glucose LESS THAN 70 milliGRAM(s)/deciliter      Vital Signs Last 24 Hrs  T(C): 37 (28 Jan 2025 04:41), Max: 37 (28 Jan 2025 04:41)  T(F): 98.6 (28 Jan 2025 04:41), Max: 98.6 (28 Jan 2025 04:41)  HR: 89 (28 Jan 2025 04:41) (76 - 99)  BP: 147/75 (28 Jan 2025 04:41) (120/69 - 186/84)  BP(mean): --  RR: 18 (28 Jan 2025 04:41) (18 - 20)  SpO2: 96% (28 Jan 2025 04:41) (94% - 99%)    Parameters below as of 28 Jan 2025 04:41  Patient On (Oxygen Delivery Method): room air      CAPILLARY BLOOD GLUCOSE      POCT Blood Glucose.: 193 mg/dL (28 Jan 2025 07:49)  POCT Blood Glucose.: 126 mg/dL (27 Jan 2025 21:29)    I&O's Summary    27 Jan 2025 07:01  -  28 Jan 2025 07:00  --------------------------------------------------------  IN: 0 mL / OUT: 1300 mL / NET: -1300 mL          Appearance: Normal	  HEENT:   Normal oral mucosa, PERRL, EOMI	  Lymphatic: No lymphadenopathy  Cardiovascular: Normal S1 S2, No JVD  Respiratory: Lungs clear to auscultation	  Gastrointestinal:  Soft, Non-tender, + BS	  Skin: No rash, No ecchymoses	  Extremities:     LABS:                        9.7    8.67  )-----------( 219      ( 28 Jan 2025 07:38 )             31.5     01-28    135  |  93[L]  |  30[H]  ----------------------------<  206[H]  4.2   |  25  |  6.54[H]    Ca    9.7      28 Jan 2025 07:38  Mg     2.3     01-27    TPro  7.1  /  Alb  3.7  /  TBili  0.3  /  DBili  x   /  AST  32  /  ALT  16  /  AlkPhos  293[H]  01-27          Urinalysis Basic - ( 28 Jan 2025 07:38 )    Color: x / Appearance: x / SG: x / pH: x  Gluc: 206 mg/dL / Ketone: x  / Bili: x / Urobili: x   Blood: x / Protein: x / Nitrite: x   Leuk Esterase: x / RBC: x / WBC x   Sq Epi: x / Non Sq Epi: x / Bacteria: x              Thyroid Stimulating Hormone, Serum: 1.600 uU/mL (01-14 @ 09:40)          Consultant(s) Notes Reviewed:      Care Discussed with Consultants/Other Providers:

## 2025-01-28 NOTE — PATIENT PROFILE ADULT - FUNCTIONAL ASSESSMENT - DAILY ACTIVITY 4.
Agustín Lyle was referred for genetic counseling for genetic testing options by Ronnie Carranza MD.  She was accompanied to this counseling session by no one.  This note summarizes our 20 minute discussion.     The patient was seen via virtual health.  The patient was located at [facility] [place] and the genetic counselor was located at a remote office location in [place].     PREGNANCY HISTORY: Patient has an BELINDA of No BELINDA on file..   OB History    Para Term  AB Living   3 2 1 1 0 2   SAB TAB Ectopic Molar Multiple Live Births   0 0 0 0 0 2   Obstetric Comments   GYN HX: Chlamydia    Last Pap: 10/2019, Normal. Next due .     Denies history of abnormal.   Depo Provera in the past. Last injection: .    Planned pregnancy.         FAMILY HISTORY: A three-generation family history was obtained.      The patient reported a negative family history for cognitive impairment/developmental delay, recurrent miscarriages, stillbirths or infancy deaths, chromosomal or genetic disorders, and birth defects. Consanguinity was denied.     Agustín reports her ancestry as , and the FOB as . Ashkenazi Gnosticist and [place]ian-[place]an ancestry was denied.     GENETIC COUNSELING:   We began by discussing Agustín Lyle’s age-related risk for chromosome conditions.  Based on the fact that this patient will be 30 years of age at delivery, her overall age-related risk of having a baby with any chromosome condition is 1/385 (0.26%).  We discussed some of the more common chromosome abnormalities including Down syndrome, Trisomy 13, and Trisomy 18.  We talked about the etiologies, clinical characteristics, and medical management of these conditions.       We discussed the purpose of a nuchal translucency ultrasound at 11-14wks gestation. This ultrasound is to measure the nuchal translucency which is a form of aneuploidy screening and should be considered as such.   If a patient elects to have an NT only, this might not yield much clinically significant information or alter the age-related risk for aneuploidy. NT measurement is most useful when used in combination with serum screening (i.e. FTS or NIPS).     We talked about serum screening options. We discussed that one screening option is first trimester screening. The patient was informed that this screening option provides risk assessment numbers for Down Syndrome and Trisomy 13/18. It was explained that this screening tool uses a combination of ultrasound measurement of the NT, presence or absence of the nasal bone, and maternal serum markers (DRAKE-A, free beta hCG, and AFP) in addition to maternal age to provide a risk assessment. This test yields up to a 96% sensitivity for Trisomy 21 and 95% sensitivity for Trisomy 13/18 in a majano pregnancy.     According to updated ACOG and ACMG guidelines, we also explored the option of cell-free fetal DNA testing, also known as non-invasive prenatal screening (NIPS).  We reviewed that this blood test has >99% sensitivity and specificity in the detection of Down syndrome, and is also able to detect T13 and T18. Sex chromosome aneuploidies and select chromosome microdeletions are also available.      Both of these screening options can result in false positive or false negative results. We reviewed the limitations of the tests including the absence of coverage of all chromosomes and the variable positive predictive values. Confirmatory diagnostic testing should be considered before irreversible decisions are made.     We then reviewed the option of prenatal diagnostic testing (CVS and amniocentesis). We talked about the risks, benefits, and limitations of these procedures. The patient was not interested in diagnostic testing.     A detailed fetal anatomic survey is available between 18 and 22 weeks gestation to screen for birth defects or soft markers of a chromosome abnormality.   We explained ultrasound is not diagnostic for chromosome abnormalities and cannot detect all birth defects.     Per ACOG and ACMG recommendations, carrier screening for cystic fibrosis (CF) and spinal muscular atrophy (SMA) should be offered to all pregnant women and other diseases should be offered based on the patient’s ethnicity. A family history is not always necessary for a couple to be at risk of having a child with a recessive disorder. We reviewed phenotype, autosomal recessive inheritance and estimated carrier frequency for these diseases.  was also offered the option of screening for 175 recessive and X-linked conditions.     PLAN:   1. The patient accepted NIPT through [facility]. Results will be disclosed once available.    2. The patient declined carrier screening for CF & SMA. Prior hemoglobin electrophoresis was negative.     3. MSAFP ONLY should be offered at [id number] for ONTD risk assessment. A full quad screen is not necessary.   4. Targeted fetal anatomic survey should be offered at 18-22 wks gestation.     Thank you for referring this patient to genetic counseling. Please contact me with any additional concerns.     Kat Campbell MS, [facility]  Senior Genetic Counselor     3 = A little assistance

## 2025-01-28 NOTE — PHYSICAL THERAPY INITIAL EVALUATION ADULT - ADDITIONAL COMMENTS
Pt lives in a house with 3 steps to enter and first floor set up. Lives with daughter and grandchildren. Patient was independent with all ADLs and IADLs prior to admission. Amb with rolling walker independently.

## 2025-01-29 ENCOUNTER — TRANSCRIPTION ENCOUNTER (OUTPATIENT)
Age: 80
End: 2025-01-29

## 2025-01-29 LAB
GLUCOSE BLDC GLUCOMTR-MCNC: 106 MG/DL — HIGH (ref 70–99)
GLUCOSE BLDC GLUCOMTR-MCNC: 195 MG/DL — HIGH (ref 70–99)
GLUCOSE BLDC GLUCOMTR-MCNC: 240 MG/DL — HIGH (ref 70–99)

## 2025-01-29 RX ORDER — MIDODRINE HYDROCHLORIDE 5 MG/1
2.5 TABLET ORAL THREE TIMES A DAY
Refills: 0 | Status: DISCONTINUED | OUTPATIENT
Start: 2025-01-29 | End: 2025-01-30

## 2025-01-29 RX ORDER — MIDODRINE HYDROCHLORIDE 5 MG/1
1 TABLET ORAL
Qty: 90 | Refills: 0
Start: 2025-01-29 | End: 2025-02-27

## 2025-01-29 RX ORDER — ALPRAZOLAM 2 MG
1 TABLET ORAL
Qty: 5 | Refills: 0
Start: 2025-01-29 | End: 2025-02-02

## 2025-01-29 RX ADMIN — Medication 5000 UNIT(S): at 05:26

## 2025-01-29 RX ADMIN — Medication 0.25 MILLIGRAM(S): at 11:53

## 2025-01-29 RX ADMIN — Medication 6.25 MILLIGRAM(S): at 05:26

## 2025-01-29 RX ADMIN — EPOETIN ALFA 10000 UNIT(S): 2000 SOLUTION INTRAVENOUS; SUBCUTANEOUS at 08:47

## 2025-01-29 RX ADMIN — MIDODRINE HYDROCHLORIDE 2.5 MILLIGRAM(S): 5 TABLET ORAL at 17:49

## 2025-01-29 RX ADMIN — Medication 5000 UNIT(S): at 17:49

## 2025-01-29 RX ADMIN — SEVELAMER CARBONATE 800 MILLIGRAM(S): 800 TABLET, FILM COATED ORAL at 17:49

## 2025-01-29 RX ADMIN — ATORVASTATIN CALCIUM 40 MILLIGRAM(S): 80 TABLET, FILM COATED ORAL at 21:06

## 2025-01-29 RX ADMIN — PANTOPRAZOLE 40 MILLIGRAM(S): 20 TABLET, DELAYED RELEASE ORAL at 05:26

## 2025-01-29 RX ADMIN — Medication 2: at 17:48

## 2025-01-29 RX ADMIN — Medication 200 MILLIGRAM(S): at 21:45

## 2025-01-29 RX ADMIN — Medication 75 MILLIGRAM(S): at 11:56

## 2025-01-29 RX ADMIN — SEVELAMER CARBONATE 800 MILLIGRAM(S): 800 TABLET, FILM COATED ORAL at 11:55

## 2025-01-29 RX ADMIN — MIDODRINE HYDROCHLORIDE 2.5 MILLIGRAM(S): 5 TABLET ORAL at 11:59

## 2025-01-29 RX ADMIN — Medication 6.25 MILLIGRAM(S): at 17:49

## 2025-01-29 RX ADMIN — ASPIRIN 81 MILLIGRAM(S): 81 TABLET, COATED ORAL at 11:56

## 2025-01-29 RX ADMIN — Medication 1: at 12:08

## 2025-01-29 RX ADMIN — MIDODRINE HYDROCHLORIDE 10 MILLIGRAM(S): 5 TABLET ORAL at 07:06

## 2025-01-29 NOTE — DISCHARGE NOTE NURSING/CASE MANAGEMENT/SOCIAL WORK - PATIENT PORTAL LINK FT
You can access the FollowMyHealth Patient Portal offered by [facility] by registering at the following website: [email or web address]. By joining [facility]’s FollowMyHealth portal, you will also be able to view your health information using other applications (apps) compatible with our system.

## 2025-01-29 NOTE — DISCHARGE NOTE PROVIDER - NSDCCPCAREPLAN_GEN_ALL_CORE_FT
PRINCIPAL DISCHARGE DIAGNOSIS  Diagnosis: Chest pain  Assessment and Plan of Treatment:   HOME CARE INSTRUCTIONS  For the next few days, avoid physical activities that bring on chest pain. Continue physical activities as directed.  Do not smoke.  Avoid drinking alcohol.   Only take over-the-counter or prescription medicine for pain, discomfort, or fever as directed by your caregiver.  Follow your caregiver's suggestions for further testing if your chest pain does not go away.  Keep any follow-up appointments you made. If you do not go to an appointment, you could develop lasting (chronic) problems with pain. If there is any problem keeping an appointment, you must call to reschedule.   SEEK MEDICAL CARE IF:  You think you are having problems from the medicine you are taking. Read your medicine instructions carefully.  Your chest pain does not go away, even after treatment.  You develop a rash with blisters on your chest.  SEEK IMMEDIATE MEDICAL CARE IF:  You have increased chest pain or pain that spreads to your arm, neck, jaw, back, or abdomen.   You develop shortness of breath, an increasing cough, or you are coughing up blood.  You have severe back or abdominal pain, feel nauseous, or vomit.  You develop severe weakness, fainting, or chills.  You have a fever.  THIS IS AN EMERGENCY. Do not wait to see if the pain will go away. Get medical help at once. Call your local emergency services (_____________________). Do not drive yourself to the hospital.      SECONDARY DISCHARGE DIAGNOSES  Diagnosis: Influenza A  Assessment and Plan of Treatment: Supportive care    Diagnosis: ESRD (end stage renal disease)  Assessment and Plan of Treatment: Follow up with Renal in 1 week

## 2025-01-29 NOTE — DISCHARGE NOTE PROVIDER - NSDCFUADDAPPT_GEN_ALL_CORE_FT
APPTS ARE READY TO BE MADE: [X] YES    Best Family or Patient Contact (if needed):    Additional Information about above appointments (if needed):    1: Juli Eng  2: Bennie Urbano   3:     Other comments or requests:

## 2025-01-29 NOTE — DISCHARGE NOTE PROVIDER - NSDCMRMEDTOKEN_GEN_ALL_CORE_FT
aspirin 81 mg oral delayed release tablet: 1 tab(s) orally once a day  atorvastatin 40 mg oral tablet: 1 tab(s) orally once a day (at bedtime)  clopidogrel 75 mg oral tablet: 1 tab(s) orally once a day  midodrine 10 mg oral tablet: 1 tab(s) orally 3 times a day  pantoprazole 40 mg oral delayed release tablet: 1 tab(s) orally once a day (before a meal)  sevelamer carbonate 800 mg oral tablet: 1 tab(s) orally 3 times a day (with meals)   ALPRAZolam 0.25 mg oral tablet: 1 tab(s) orally once a day MDD: 1  aspirin 81 mg oral delayed release tablet: 1 tab(s) orally once a day  atorvastatin 40 mg oral tablet: 1 tab(s) orally once a day (at bedtime)  carvedilol 6.25 mg oral tablet: 1 tab(s) orally 2 times a day  clopidogrel 75 mg oral tablet: 1 tab(s) orally once a day  Lantus Solostar Pen 100 units/mL subcutaneous solution: 13 unit(s) subcutaneous once a day (at bedtime)  midodrine 2.5 mg oral tablet: 1 tab(s) orally 3 times a day  NovoLOG FlexTouch 100 units/mL subcutaneous solution: 5 unit(s) subcutaneous 3 times a day (before meals)  pantoprazole 40 mg oral delayed release tablet: 1 tab(s) orally once a day (before a meal)  sevelamer carbonate 800 mg oral tablet: 1 tab(s) orally 3 times a day (with meals)   ALPRAZolam 0.25 mg oral tablet: 1 tab(s) orally once a day MDD: 1  aspirin 81 mg oral delayed release tablet: 1 tab(s) orally once a day  atorvastatin 40 mg oral tablet: 1 tab(s) orally once a day (at bedtime)  clopidogrel 75 mg oral tablet: 1 tab(s) orally once a day  Lantus Solostar Pen 100 units/mL subcutaneous solution: 13 unit(s) subcutaneous once a day (at bedtime)  metoprolol succinate 50 mg oral tablet, extended release: 1 tab(s) orally once a day  midodrine 2.5 mg oral tablet: 1 tab(s) orally 3 times a day  NovoLOG FlexTouch 100 units/mL subcutaneous solution: 5 unit(s) subcutaneous 3 times a day (before meals)  pantoprazole 40 mg oral delayed release tablet: 1 tab(s) orally once a day (before a meal)  sevelamer carbonate 800 mg oral tablet: 1 tab(s) orally 3 times a day (with meals)   aspirin 81 mg oral delayed release tablet: 1 tab(s) orally once a day  atorvastatin 40 mg oral tablet: 1 tab(s) orally once a day (at bedtime)  clopidogrel 75 mg oral tablet: 1 tab(s) orally once a day  Lantus Solostar Pen 100 units/mL subcutaneous solution: 5 unit(s) subcutaneous once a day (at bedtime)  metoprolol succinate 50 mg oral tablet, extended release: 1 tab(s) orally once a day  pantoprazole 40 mg oral delayed release tablet: 1 tab(s) orally once a day (before a meal)  sevelamer carbonate 800 mg oral tablet: 1 tab(s) orally 3 times a day (with meals)

## 2025-01-29 NOTE — PROGRESS NOTE ADULT - SUBJECTIVE AND OBJECTIVE BOX
Date of Service: 01-29-25 @ 06:41           CARDIOLOGY     PROGRESS  NOTE   ________________________________________________    CHIEF COMPLAINT:Patient is a 79y old  Female who presents with a chief complaint of cp (28 Jan 2025 09:54)  no further chest pain, no complain  	  REVIEW OF SYSTEMS:  CONSTITUTIONAL: No fever, weight loss, or fatigue  EYES: No eye pain, visual disturbances, or discharge  ENT:  No difficulty hearing, tinnitus, vertigo; No sinus or throat pain  NECK: No pain or stiffness  RESPIRATORY: No cough, wheezing, chills or hemoptysis; No Shortness of Breath  CARDIOVASCULAR: No chest pain, palpitations, passing out, dizziness, or leg swelling  GASTROINTESTINAL: No abdominal or epigastric pain. No nausea, vomiting, or hematemesis; No diarrhea or constipation. No melena or hematochezia.  GENITOURINARY: No dysuria, frequency, hematuria, or incontinence  NEUROLOGICAL: No headaches, memory loss, loss of strength, numbness, or tremors  SKIN: No itching, burning, rashes, or lesions   LYMPH Nodes: No enlarged glands  ENDOCRINE: No heat or cold intolerance; No hair loss  MUSCULOSKELETAL: No joint pain or swelling; No muscle, back, or extremity pain  PSYCHIATRIC: No depression, anxiety, mood swings, or difficulty sleeping  HEME/LYMPH: No easy bruising, or bleeding gums  ALLERGY AND IMMUNOLOGIC: No hives or eczema	    [x ] All others negative	  [ ] Unable to obtain    PHYSICAL EXAM:  T(C): 36.6 (01-29-25 @ 05:03), Max: 36.9 (01-28-25 @ 11:39)  HR: 75 (01-29-25 @ 05:03) (75 - 88)  BP: 153/73 (01-29-25 @ 05:03) (122/65 - 169/76)  RR: 18 (01-29-25 @ 05:03) (17 - 18)  SpO2: 94% (01-29-25 @ 05:03) (94% - 100%)  Wt(kg): --  I&O's Summary    27 Jan 2025 07:01  -  28 Jan 2025 07:00  --------------------------------------------------------  IN: 0 mL / OUT: 1300 mL / NET: -1300 mL        Appearance: Normal	  HEENT:   Normal oral mucosa, PERRL, EOMI	  Lymphatic: No lymphadenopathy  Cardiovascular: Normal S1 S2, No JVD, + murmurs, No edema  Respiratory:rhonchi  Gastrointestinal:  Soft, Non-tender, + BS	  Skin: No rashes, No ecchymoses, No cyanosis	  Neurologic: Non-focal  Extremities: Normal range of motion, No clubbing, cyanosis or edema  Vascular: Peripheral pulses palpable 2+ bilaterally    MEDICATIONS  (STANDING):  ALPRAZolam 0.25 milliGRAM(s) Oral daily  aspirin enteric coated 81 milliGRAM(s) Oral daily  atorvastatin 40 milliGRAM(s) Oral at bedtime  carvedilol 6.25 milliGRAM(s) Oral every 12 hours  clopidogrel Tablet 75 milliGRAM(s) Oral daily  dextrose 5%. 1000 milliLiter(s) (100 mL/Hr) IV Continuous <Continuous>  dextrose 5%. 1000 milliLiter(s) (50 mL/Hr) IV Continuous <Continuous>  dextrose 50% Injectable 25 Gram(s) IV Push once  dextrose 50% Injectable 12.5 Gram(s) IV Push once  dextrose 50% Injectable 25 Gram(s) IV Push once  epoetin shannon-epbx (RETACRIT) Injectable 51406 Unit(s) IV Push <User Schedule>  glucagon  Injectable 1 milliGRAM(s) IntraMuscular once  heparin   Injectable 5000 Unit(s) SubCutaneous every 12 hours  influenza  Vaccine (HIGH DOSE) 0.5 milliLiter(s) IntraMuscular once  insulin lispro (ADMELOG) corrective regimen sliding scale   SubCutaneous three times a day before meals  midodrine. 10 milliGRAM(s) Oral three times a day  pantoprazole    Tablet 40 milliGRAM(s) Oral before breakfast  sevelamer carbonate 800 milliGRAM(s) Oral three times a day with meals      TELEMETRY: 	    ECG:  	  RADIOLOGY:  OTHER: 	  	  LABS:	 	    CARDIAC MARKERS:  CARDIAC MARKERS ( 28 Jan 2025 07:38 )  x     / x     / x     / x     / 2.2 ng/mL                              9.7    8.67  )-----------( 219      ( 28 Jan 2025 07:38 )             31.5     01-28    135  |  93[L]  |  30[H]  ----------------------------<  206[H]  4.2   |  25  |  6.54[H]    Ca    9.7      28 Jan 2025 07:38  Mg     2.3     01-27    TPro  7.1  /  Alb  3.7  /  TBili  0.3  /  DBili  x   /  AST  32  /  ALT  16  /  AlkPhos  293[H]  01-27    proBNP:   Lipid Profile:   HgA1c:   TSH: Thyroid Stimulating Hormone, Serum: 1.600 uU/mL (01-14 @ 09:40)      < from: 12 Lead ECG (01.27.25 @ 11:36) >  Ventricular Rate 77 BPM    Atrial Rate 77 BPM    P-R Interval 172 ms    QRS Duration 144 ms    Q-T Interval 414 ms    QTC Calculation(Bazett) 468 ms    P Axis 68 degrees    R Axis 20 degrees    T Axis 65 degrees    Diagnosis Line NORMAL SINUS RHYTHM  RIGHT BUNDLE BRANCH BLOCK  ABNORMAL ECG  WHEN COMPARED WITH ECG OF 23-JAN-2025 18:36,  NO SIGNIFICANT CHANGE WAS FOUND    < from: TTE W or WO Ultrasound Enhancing Agent (12.23.24 @ 10:09) >   1. Left ventricular cavity is normal in size. Left ventricular wall thickness is normal. Left ventricular systolic function is normal with an ejection fraction of 65 % by Cooper's method of disks. There are no regional wall motion abnormalities seen.   2. Normal left ventricular diastolic function, with normal left ventricular filling pressure.   3. Normal right ventricular cavity size, with normal wall thickness, and normal right ventricular systolic function.   4. Mild aortic stenosis.   5. No pericardial effusion seen.   6. Technically difficult image quality.      Assessment and plan  ---------------------------  79yF  h/o  HLD, DM, CAD, ESRD on MWF dialysis with recent admission for chest pain where she had a cath and LAD stent placed. Pt states that since dc she has had persistent palpitations and intermittent chest pain. States that last night the palpitations were so severe that she could not sleep. Pt accompanied by family member- states that pt has been having these symptoms lately especially after HD sessions. States that the reason she came to the hospital today is that in addition to the palpitations, the was concerned that her HD today would exacerbate her symptoms. Denies sob, fever, abd pain, nvd.  pt with sig cardiac hx s/p recent cardiac cath and NICOL of lad with chest pain and palpitation  tele  check cpk / mb   continue asa/ plavix  add beta blocker as tolerated  continue bp meds will adjust dose  dvt prophylaxis  cpk are all negative , no further chest pain  continue DAPT  increase ambulation  observe on tele r/o PAF/SVT, no arrythmia on tele  continue midodrine in order to tolerate the HD  add metoprolol er 25 mg daily  fu in 2 weeks    	         Date of Service: 01-29-25 @ 06:41           CARDIOLOGY     PROGRESS  NOTE   ________________________________________________    CHIEF COMPLAINT:Patient is a 79y old  Female who presents with a chief complaint of cp (28 Jan 2025 09:54)  no further chest pain, no complain  	  REVIEW OF SYSTEMS:  CONSTITUTIONAL: No fever, weight loss, or fatigue  EYES: No eye pain, visual disturbances, or discharge  ENT:  No difficulty hearing, tinnitus, vertigo; No sinus or throat pain  NECK: No pain or stiffness  RESPIRATORY: No cough, wheezing, chills or hemoptysis; No Shortness of Breath  CARDIOVASCULAR: No chest pain, palpitations, passing out, dizziness, or leg swelling  GASTROINTESTINAL: No abdominal or epigastric pain. No nausea, vomiting, or hematemesis; No diarrhea or constipation. No melena or hematochezia.  GENITOURINARY: No dysuria, frequency, hematuria, or incontinence  NEUROLOGICAL: No headaches, memory loss, loss of strength, numbness, or tremors  SKIN: No itching, burning, rashes, or lesions   LYMPH Nodes: No enlarged glands  ENDOCRINE: No heat or cold intolerance; No hair loss  MUSCULOSKELETAL: No joint pain or swelling; No muscle, back, or extremity pain  PSYCHIATRIC: No depression, anxiety, mood swings, or difficulty sleeping  HEME/LYMPH: No easy bruising, or bleeding gums  ALLERGY AND IMMUNOLOGIC: No hives or eczema	    [x ] All others negative	  [ ] Unable to obtain    PHYSICAL EXAM:  T(C): 36.6 (01-29-25 @ 05:03), Max: 36.9 (01-28-25 @ 11:39)  HR: 75 (01-29-25 @ 05:03) (75 - 88)  BP: 153/73 (01-29-25 @ 05:03) (122/65 - 169/76)  RR: 18 (01-29-25 @ 05:03) (17 - 18)  SpO2: 94% (01-29-25 @ 05:03) (94% - 100%)  Wt(kg): --  I&O's Summary    27 Jan 2025 07:01  -  28 Jan 2025 07:00  --------------------------------------------------------  IN: 0 mL / OUT: 1300 mL / NET: -1300 mL        Appearance: Normal	  HEENT:   Normal oral mucosa, PERRL, EOMI	  Lymphatic: No lymphadenopathy  Cardiovascular: Normal S1 S2, No JVD, + murmurs, No edema  Respiratory:rhonchi  Gastrointestinal:  Soft, Non-tender, + BS	  Skin: No rashes, No ecchymoses, No cyanosis	  Neurologic: Non-focal  Extremities: Normal range of motion, No clubbing, cyanosis or edema  Vascular: Peripheral pulses palpable 2+ bilaterally    MEDICATIONS  (STANDING):  ALPRAZolam 0.25 milliGRAM(s) Oral daily  aspirin enteric coated 81 milliGRAM(s) Oral daily  atorvastatin 40 milliGRAM(s) Oral at bedtime  carvedilol 6.25 milliGRAM(s) Oral every 12 hours  clopidogrel Tablet 75 milliGRAM(s) Oral daily  dextrose 5%. 1000 milliLiter(s) (100 mL/Hr) IV Continuous <Continuous>  dextrose 5%. 1000 milliLiter(s) (50 mL/Hr) IV Continuous <Continuous>  dextrose 50% Injectable 25 Gram(s) IV Push once  dextrose 50% Injectable 12.5 Gram(s) IV Push once  dextrose 50% Injectable 25 Gram(s) IV Push once  epoetin shannon-epbx (RETACRIT) Injectable 07524 Unit(s) IV Push <User Schedule>  glucagon  Injectable 1 milliGRAM(s) IntraMuscular once  heparin   Injectable 5000 Unit(s) SubCutaneous every 12 hours  influenza  Vaccine (HIGH DOSE) 0.5 milliLiter(s) IntraMuscular once  insulin lispro (ADMELOG) corrective regimen sliding scale   SubCutaneous three times a day before meals  midodrine. 10 milliGRAM(s) Oral three times a day  pantoprazole    Tablet 40 milliGRAM(s) Oral before breakfast  sevelamer carbonate 800 milliGRAM(s) Oral three times a day with meals      TELEMETRY: 	    ECG:  	  RADIOLOGY:  OTHER: 	  	  LABS:	 	    CARDIAC MARKERS:  CARDIAC MARKERS ( 28 Jan 2025 07:38 )  x     / x     / x     / x     / 2.2 ng/mL                              9.7    8.67  )-----------( 219      ( 28 Jan 2025 07:38 )             31.5     01-28    135  |  93[L]  |  30[H]  ----------------------------<  206[H]  4.2   |  25  |  6.54[H]    Ca    9.7      28 Jan 2025 07:38  Mg     2.3     01-27    TPro  7.1  /  Alb  3.7  /  TBili  0.3  /  DBili  x   /  AST  32  /  ALT  16  /  AlkPhos  293[H]  01-27    proBNP:   Lipid Profile:   HgA1c:   TSH: Thyroid Stimulating Hormone, Serum: 1.600 uU/mL (01-14 @ 09:40)      < from: 12 Lead ECG (01.27.25 @ 11:36) >  Ventricular Rate 77 BPM    Atrial Rate 77 BPM    P-R Interval 172 ms    QRS Duration 144 ms    Q-T Interval 414 ms    QTC Calculation(Bazett) 468 ms    P Axis 68 degrees    R Axis 20 degrees    T Axis 65 degrees    Diagnosis Line NORMAL SINUS RHYTHM  RIGHT BUNDLE BRANCH BLOCK  ABNORMAL ECG  WHEN COMPARED WITH ECG OF 23-JAN-2025 18:36,  NO SIGNIFICANT CHANGE WAS FOUND    < from: TTE W or WO Ultrasound Enhancing Agent (12.23.24 @ 10:09) >   1. Left ventricular cavity is normal in size. Left ventricular wall thickness is normal. Left ventricular systolic function is normal with an ejection fraction of 65 % by Cooper's method of disks. There are no regional wall motion abnormalities seen.   2. Normal left ventricular diastolic function, with normal left ventricular filling pressure.   3. Normal right ventricular cavity size, with normal wall thickness, and normal right ventricular systolic function.   4. Mild aortic stenosis.   5. No pericardial effusion seen.   6. Technically difficult image quality.      Assessment and plan  ---------------------------  79yF  h/o  HLD, DM, CAD, ESRD on MWF dialysis with recent admission for chest pain where she had a cath and LAD stent placed. Pt states that since dc she has had persistent palpitations and intermittent chest pain. States that last night the palpitations were so severe that she could not sleep. Pt accompanied by family member- states that pt has been having these symptoms lately especially after HD sessions. States that the reason she came to the hospital today is that in addition to the palpitations, the was concerned that her HD today would exacerbate her symptoms. Denies sob, fever, abd pain, nvd.  pt with sig cardiac hx s/p recent cardiac cath and NICOL of lad with chest pain and palpitation  tele  check cpk / mb   continue asa/ plavix  add beta blocker as tolerated  continue bp meds will adjust dose  dvt prophylaxis  cpk are all negative , no further chest pain  continue DAPT  increase ambulation  observe on tele r/o PAF/SVT, no arrythmia on tele  continue midodrine in order to tolerate the HD  continue coreg  fu in 2 weeks

## 2025-01-29 NOTE — DISCHARGE NOTE NURSING/CASE MANAGEMENT/SOCIAL WORK - NSDCVIVACCINE_GEN_ALL_CORE_FT
influenza, injectable, quadrivalent, preservative free; 11-Mar-2016 15:41; Yi Baker (RN); [employer]; UN472YM; IntraMuscular; Dorsogluteal Right.; 0.5 milliLiter(s); VIS (VIS Published: 07-Aug-2015, VIS Presented: 11-Mar-2016);

## 2025-01-29 NOTE — DISCHARGE NOTE PROVIDER - PROVIDER TOKENS
PROVIDER:[TOKEN:[[id number]],FOLLOWUP:[2 weeks],ESTABLISHEDPATIENT:[T]] PROVIDER:[TOKEN:[[id number]],FOLLOWUP:[2 weeks],ESTABLISHEDPATIENT:[T]],PROVIDER:[TOKEN:[[id number]],FOLLOWUP:[2 weeks]] PROVIDER:[TOKEN:[[id number]],FOLLOWUP:[2 weeks],ESTABLISHEDPATIENT:[T]],PROVIDER:[TOKEN:[[id number]],FOLLOWUP:[2 weeks]],PROVIDER:[TOKEN:[[id number]],FOLLOWUP:[1 week]]

## 2025-01-29 NOTE — PROGRESS NOTE ADULT - SUBJECTIVE AND OBJECTIVE BOX
[facility] : Ans Serv [phone], Office [phone]  Dr. Smith/Dr Lockett/Dr Camacho  /Dr Garry davenport /Dr JACY Taylor/Dr Del Becker/Dr Stephon Colbert /Dr FOWLER Njrhiannau  _______________________________________________________________________________________________    Pt seen and examined this morning undergoing HD   no acute issues noted overnight     VITALS:  T(F): 97.9 (01-29 @ 08:00), Max: 98.6 (01-28 @ 04:41)  HR: 74 (01-29 @ 08:00)  BP: 176/77 (01-29 @ 08:00)  ABP: --  RR: 20 (01-29 @ 08:00)  SpO2: 94% (01-29 @ 08:00)    Physical Exam :-  Constitutional: NAD  HEENT: anicteric sclera, oropharynx clear, MMM  Respiratory: CTAB, no wheezes, rales or rhonchi  Cardiovascular: S1, S2, RRR  Gastrointestinal: BS+, soft, NT/ND  Extremities: No peripheral edema  Neurological: A/O x 3, no focal deficits  Psychiatric: Normal mood, normal affect  Skin: No rashes  Vascular Access: left upper ext avf + thrill    Data:-  Allergies :   smoke; coughing (Other)  IV Contrast (Anaphylaxis)  Shrimp (Hives)  shellfish (Hives; Rash)    Hospital Medications:   MEDICATIONS  (STANDING):  ALPRAZolam 0.25 milliGRAM(s) Oral daily  aspirin enteric coated 81 milliGRAM(s) Oral daily  atorvastatin 40 milliGRAM(s) Oral at bedtime  carvedilol 6.25 milliGRAM(s) Oral every 12 hours  clopidogrel Tablet 75 milliGRAM(s) Oral daily  dextrose 5%. 1000 milliLiter(s) (100 mL/Hr) IV Continuous <Continuous>  dextrose 5%. 1000 milliLiter(s) (50 mL/Hr) IV Continuous <Continuous>  dextrose 50% Injectable 25 Gram(s) IV Push once  dextrose 50% Injectable 12.5 Gram(s) IV Push once  dextrose 50% Injectable 25 Gram(s) IV Push once  epoetin shannon-epbx (RETACRIT) Injectable 32131 Unit(s) IV Push <User Schedule>  glucagon  Injectable 1 milliGRAM(s) IntraMuscular once  heparin   Injectable 5000 Unit(s) SubCutaneous every 12 hours  influenza  Vaccine (HIGH DOSE) 0.5 milliLiter(s) IntraMuscular once  insulin lispro (ADMELOG) corrective regimen sliding scale   SubCutaneous three times a day before meals  midodrine. 10 milliGRAM(s) Oral three times a day  pantoprazole    Tablet 40 milliGRAM(s) Oral before breakfast  sevelamer carbonate 800 milliGRAM(s) Oral three times a day with meals    01-28    135  |  93[L]  |  30[H]  ----------------------------<  206[H]  4.2   |  25  |  6.54[H]    Ca    9.7      28 Jan 2025 07:38  Mg     2.3     01-27    TPro  7.1  /  Alb  3.7  /  TBili  0.3  /  DBili      /  AST  32  /  ALT  16  /  AlkPhos  293[H]  01-27    Creatinine Trend: 6.54 <--, 9.06 <--, 8.85 <--, 7.33 <--, 7.32 <--, 5.16 <--  egfr trend : 6 <--, 4 <--, 4 <--, 5 <--, 5 <--, 8 <--, 9 <--                        9.7    8.67  )-----------( 219      ( 28 Jan 2025 07:38 )             31.5

## 2025-01-29 NOTE — PHARMACOTHERAPY INTERVENTION NOTE - COMMENTS
Collected medication history from patient and pharmacy. Home medication list updated in prescription writer/ outpatient medication review.    Home medications:  aspirin 81 mg oral delayed release tablet: 1 tab(s) orally once a day  atorvastatin 40 mg oral tablet: 1 tab(s) orally once a day (at bedtime)  carvedilol 6.25 mg oral tablet: 1 tab(s) orally 2 times a day  clopidogrel 75 mg oral tablet: 1 tab(s) orally once a day  Lantus Solostar Pen 100 units/mL subcutaneous solution: 26 unit(s) subcutaneous once a day (at bedtime)  midodrine 10 mg oral tablet: 1 tab(s) orally 3 times a day  NovoLOG FlexTouch 100 units/mL subcutaneous solution: 10 unit(s) subcutaneous 3 times a day (before meals)  pantoprazole 40 mg oral delayed release tablet: 1 tab(s) orally once a day (before a meal)  sevelamer carbonate 800 mg oral tablet: 1 tab(s) orally 3 times a day (with meals)    Musa Ibanez, PharmD, BCPS  Clinical Pharmacy Specialist  Available on [facility]  Cell: [phone]

## 2025-01-29 NOTE — DISCHARGE NOTE PROVIDER - CARE PROVIDERS DIRECT ADDRESSES
,DirectAddress_Unknown ,DirectAddress_Unknown,[email or web address] ,DirectAddress_Unknown,[email or web address],DirectAddress_Unknown

## 2025-01-29 NOTE — DISCHARGE NOTE NURSING/CASE MANAGEMENT/SOCIAL WORK - FINANCIAL ASSISTANCE
[facility] provides services at a reduced cost to those who are determined to be eligible through [facility]’s financial assistance program. Information regarding [facility]’s financial assistance program can be found by going to [email or web address] or by calling [phone].

## 2025-01-29 NOTE — DISCHARGE NOTE PROVIDER - CARE PROVIDER_API CALL
Eri Avery  Cardiovascular Disease  [address] [place] [address]  Phone: [phone]  Fax: [phone]  Established Patient  Follow Up Time: 2 weeks   Eri Avery  Cardiovascular Disease  [address] [place] [address] [address]  [place] [address]  Phone: [phone]  Fax: [phone]  Established Patient  Follow Up Time: 2 weeks    OLINDA AGUILAR  Phone: [phone]  Fax: ()-  Follow Up Time: 2 weeks   Eri Avery  Cardiovascular Disease  [address] [facility] [address]  [place] [address]  Phone: [phone]  Fax: [phone]  Established Patient  Follow Up Time: 2 weeks    OLINDA AGUILAR  Phone: [phone]  Fax: ()-  Follow Up Time: 2 weeks    Perla Taylor)  Endocrinology/Metab/Diabetes  [address] [place] [address]  Phone: [phone]  Fax: [phone]  Follow Up Time: 1 week

## 2025-01-29 NOTE — PROGRESS NOTE ADULT - SUBJECTIVE AND OBJECTIVE BOX
date of service: 01-29-25 @ 09:04  [facility]  REVIEW OF SYSTEMS:  CONSTITUTIONAL: No fever,  no  weight loss  ENT:  No  tinnitus,   no   vertigo  NECK: No pain or stiffness  RESPIRATORY: No cough, wheezing, chills or hemoptysis;    No Shortness of Breath  CARDIOVASCULAR: No chest pain, palpitations, dizziness  GASTROINTESTINAL: No abdominal or epigastric pain. No nausea, vomiting, or hematemesis; No diarrhea  No melena or hematochezia.  GENITOURINARY: No dysuria, frequency, hematuria, or incontinence  NEUROLOGICAL: No headaches  SKIN: No itching,  no   rash  LYMPH Nodes: No enlarged glands  ENDOCRINE: No heat or cold intolerance  MUSCULOSKELETAL: No joint pain or swelling  PSYCHIATRIC: No depression, anxiety  HEME/LYMPH: No easy bruising, or bleeding gums  ALLERGY AND IMMUNOLOGIC: No hives or eczema	    MEDICATIONS  (STANDING):  ALPRAZolam 0.25 milliGRAM(s) Oral daily  aspirin enteric coated 81 milliGRAM(s) Oral daily  atorvastatin 40 milliGRAM(s) Oral at bedtime  carvedilol 6.25 milliGRAM(s) Oral every 12 hours  clopidogrel Tablet 75 milliGRAM(s) Oral daily  dextrose 5%. 1000 milliLiter(s) (100 mL/Hr) IV Continuous <Continuous>  dextrose 5%. 1000 milliLiter(s) (50 mL/Hr) IV Continuous <Continuous>  dextrose 50% Injectable 25 Gram(s) IV Push once  dextrose 50% Injectable 12.5 Gram(s) IV Push once  dextrose 50% Injectable 25 Gram(s) IV Push once  epoetin shannon-epbx (RETACRIT) Injectable 77083 Unit(s) IV Push <User Schedule>  glucagon  Injectable 1 milliGRAM(s) IntraMuscular once  heparin   Injectable 5000 Unit(s) SubCutaneous every 12 hours  influenza  Vaccine (HIGH DOSE) 0.5 milliLiter(s) IntraMuscular once  insulin lispro (ADMELOG) corrective regimen sliding scale   SubCutaneous three times a day before meals  midodrine. 10 milliGRAM(s) Oral three times a day  pantoprazole    Tablet 40 milliGRAM(s) Oral before breakfast  sevelamer carbonate 800 milliGRAM(s) Oral three times a day with meals    MEDICATIONS  (PRN):  dextrose Oral Gel 15 Gram(s) Oral once PRN Blood Glucose LESS THAN 70 milliGRAM(s)/deciliter      Vital Signs Last 24 Hrs  T(C): 36.6 (29 Jan 2025 08:00), Max: 36.9 (28 Jan 2025 11:39)  T(F): 97.9 (29 Jan 2025 08:00), Max: 98.4 (28 Jan 2025 11:39)  HR: 74 (29 Jan 2025 08:00) (74 - 88)  BP: 176/77 (29 Jan 2025 08:00) (122/65 - 176/77)  BP(mean): --  RR: 20 (29 Jan 2025 08:00) (17 - 20)  SpO2: 94% (29 Jan 2025 08:00) (94% - 100%)    Parameters below as of 29 Jan 2025 08:00  Patient On (Oxygen Delivery Method): room air      CAPILLARY BLOOD GLUCOSE      POCT Blood Glucose.: 232 mg/dL (28 Jan 2025 17:26)  POCT Blood Glucose.: 249 mg/dL (28 Jan 2025 11:35)    I&O's Summary        Appearance: Normal	  HEENT:   Normal oral mucosa, PERRL, EOMI	  Lymphatic: No lymphadenopathy  Cardiovascular: Normal S1 S2, No JVD  Respiratory: Lungs clear to auscultation	  Gastrointestinal:  Soft, Non-tender, + BS	  Skin: No rash, No ecchymoses	  Extremities:     LABS:                        9.7    8.67  )-----------( 219      ( 28 Jan 2025 07:38 )             31.5     01-28    135  |  93[L]  |  30[H]  ----------------------------<  206[H]  4.2   |  25  |  6.54[H]    Ca    9.7      28 Jan 2025 07:38  Mg     2.3     01-27    TPro  7.1  /  Alb  3.7  /  TBili  0.3  /  DBili  x   /  AST  32  /  ALT  16  /  AlkPhos  293[H]  01-27      CARDIAC MARKERS ( 28 Jan 2025 07:38 )  x     / x     / x     / x     / 2.2 ng/mL      Urinalysis Basic - ( 28 Jan 2025 07:38 )    Color: x / Appearance: x / SG: x / pH: x  Gluc: 206 mg/dL / Ketone: x  / Bili: x / Urobili: x   Blood: x / Protein: x / Nitrite: x   Leuk Esterase: x / RBC: x / WBC x   Sq Epi: x / Non Sq Epi: x / Bacteria: x              Thyroid Stimulating Hormone, Serum: 1.600 uU/mL (01-14 @ 09:40)          Consultant(s) Notes Reviewed:      Care Discussed with Consultants/Other Providers:

## 2025-01-29 NOTE — PROGRESS NOTE ADULT - ASSESSMENT
79yF  h/o  HLD, DM, CAD, ESRD on MWF dialysis with recent admission for chest pain where she had a cath and LAD stent placed. Pt states that since dc she has had persistent palpitations and intermittent chest pain    ESRD on HD   Center: [facility]  Nephrologist: Jhonatan Smith   Schedule: MWF   Access; LUE AVF     plan   undergoing HD today; tolerating well   consent obtained and placed in chart  UF as tolerated by BP   please dose medications as per ESRD     Anemia   in setting of kidney disease  hbg below goal  epo 10 k tiw with hd  trend hgb       If any questions, please feel free to contact me     Jhonatan Smith  Nephrology Attending  Cell #[phone]

## 2025-01-29 NOTE — PROGRESS NOTE ADULT - ASSESSMENT
79yF           h/o   HLD, DM, CAD,   CKD  on  HD,        hives with IV contrast and usually is pre-medicated with prednisone       cp/  palps,  none   now      c/p  cp/  palps /  none  now        elevated troponin from ckd  and demand ischemia. ,  not  from  mi       CAD,,  recent stent    to LAD         asa/  plavix,    lipitor    CKD           on HD      renal    DM           follow   fs   orthostasis,  on  midodrine    elevated  bp now/  pt  had  low  bp  in  past    Toprol    HD  in am ,     d/c  plans  after hd  today    dvt  ppx    pt si  full code          rad< from: TTE W or WO Ultrasound Enhancing Agent (12.23.24 @ 10:09) >   1. Left ventricular cavity is normal in size. Left ventricular wall thickness is normal. Left ventricular systolic function is normal with an ejection fraction of 65 % by Cooper's method of disks. There are no regional wall motion abnormalities seen.   2. Normal left ventricular diastolic function, with normal left ventricular filling pressure.   3. Normal right ventricular cavity size, with normal wall thickness, and normal right ventricular systolic function.   4. Mild aortic stenosis.   5. No pericardial effusion seen.   6. Technically difficult image quality.  _______________    < end of copied text >

## 2025-01-29 NOTE — DISCHARGE NOTE NURSING/CASE MANAGEMENT/SOCIAL WORK - NSDCPEFALRISK_GEN_ALL_CORE
For information on Fall & Injury Prevention, visit: [email or web address] OR  [email or web address] OR  https://www.cdc.gov/steadi/patient.html

## 2025-01-29 NOTE — DISCHARGE NOTE PROVIDER - HOSPITAL COURSE
79yF           h/o   HLD, DM, CAD,   CKD  on  HD,        hives with IV contrast and usually is pre-medicated with prednisone       cp/  palps,  none   now      c/p  cp/  palps /  none  now        elevated troponin from ckd  and demand ischemia. ,  not  from  mi       CAD,,  recent stent    to LAD         asa/  plavix,    lipitor    CKD           on HD      renal    DM           follow   fs   orthostasis,  on  midodrine    elevated  bp now/  pt  had  low  bp  in  past    Toprol    HD  in am ,     d/c  plans  after hd  today    dvt  ppx    pt si  full code

## 2025-01-30 VITALS
DIASTOLIC BLOOD PRESSURE: 71 MMHG | RESPIRATION RATE: 18 BRPM | HEART RATE: 74 BPM | OXYGEN SATURATION: 97 % | SYSTOLIC BLOOD PRESSURE: 141 MMHG | TEMPERATURE: 98 F

## 2025-01-30 DIAGNOSIS — N18.6 END STAGE RENAL DISEASE: ICD-10-CM

## 2025-01-30 DIAGNOSIS — E11.9 TYPE 2 DIABETES MELLITUS WITHOUT COMPLICATIONS: ICD-10-CM

## 2025-01-30 LAB
GLUCOSE BLDC GLUCOMTR-MCNC: 130 MG/DL — HIGH (ref 70–99)
GLUCOSE BLDC GLUCOMTR-MCNC: 178 MG/DL — HIGH (ref 70–99)
GLUCOSE BLDC GLUCOMTR-MCNC: 179 MG/DL — HIGH (ref 70–99)
TROPONIN T, HIGH SENSITIVITY RESULT: 198 NG/L — HIGH (ref 0–51)

## 2025-01-30 PROCEDURE — 82553 CREATINE MB FRACTION: CPT

## 2025-01-30 PROCEDURE — 99285 EMERGENCY DEPT VISIT HI MDM: CPT | Mod: 25

## 2025-01-30 PROCEDURE — 71045 X-RAY EXAM CHEST 1 VIEW: CPT

## 2025-01-30 PROCEDURE — 87637 SARSCOV2&INF A&B&RSV AMP PRB: CPT

## 2025-01-30 PROCEDURE — 97161 PT EVAL LOW COMPLEX 20 MIN: CPT

## 2025-01-30 PROCEDURE — 85025 COMPLETE CBC W/AUTO DIFF WBC: CPT

## 2025-01-30 PROCEDURE — 82962 GLUCOSE BLOOD TEST: CPT

## 2025-01-30 PROCEDURE — 97530 THERAPEUTIC ACTIVITIES: CPT

## 2025-01-30 PROCEDURE — 36415 COLL VENOUS BLD VENIPUNCTURE: CPT

## 2025-01-30 PROCEDURE — 82550 ASSAY OF CK (CPK): CPT

## 2025-01-30 PROCEDURE — 83880 ASSAY OF NATRIURETIC PEPTIDE: CPT

## 2025-01-30 PROCEDURE — 85027 COMPLETE CBC AUTOMATED: CPT

## 2025-01-30 PROCEDURE — 87640 STAPH A DNA AMP PROBE: CPT

## 2025-01-30 PROCEDURE — 84484 ASSAY OF TROPONIN QUANT: CPT

## 2025-01-30 PROCEDURE — 80053 COMPREHEN METABOLIC PANEL: CPT

## 2025-01-30 PROCEDURE — 97116 GAIT TRAINING THERAPY: CPT

## 2025-01-30 PROCEDURE — 99261: CPT

## 2025-01-30 PROCEDURE — 80048 BASIC METABOLIC PNL TOTAL CA: CPT

## 2025-01-30 PROCEDURE — 83735 ASSAY OF MAGNESIUM: CPT

## 2025-01-30 PROCEDURE — 87641 MR-STAPH DNA AMP PROBE: CPT

## 2025-01-30 PROCEDURE — 76937 US GUIDE VASCULAR ACCESS: CPT

## 2025-01-30 RX ORDER — METOPROLOL SUCCINATE 25 MG
50 TABLET, EXTENDED RELEASE 24 HR ORAL DAILY
Refills: 0 | Status: DISCONTINUED | OUTPATIENT
Start: 2025-01-30 | End: 2025-01-30

## 2025-01-30 RX ORDER — METOPROLOL SUCCINATE 25 MG
1 TABLET, EXTENDED RELEASE 24 HR ORAL
Qty: 30 | Refills: 0
Start: 2025-01-30 | End: 2025-02-28

## 2025-01-30 RX ORDER — INSULIN GLARGINE-YFGN 100 [IU]/ML
5 INJECTION, SOLUTION SUBCUTANEOUS
Qty: 0 | Refills: 0 | DISCHARGE

## 2025-01-30 RX ORDER — ANTISEPTIC SURGICAL SCRUB 0.04 MG/ML
1 SOLUTION TOPICAL
Refills: 0 | Status: DISCONTINUED | OUTPATIENT
Start: 2025-01-30 | End: 2025-01-30

## 2025-01-30 RX ORDER — CARVEDILOL 6.25 MG
1 TABLET ORAL
Refills: 0 | DISCHARGE

## 2025-01-30 RX ORDER — INSULIN ASPART 100 [IU]/ML
5 INJECTION, SOLUTION INTRAVENOUS; SUBCUTANEOUS
Qty: 0 | Refills: 0 | DISCHARGE

## 2025-01-30 RX ADMIN — SEVELAMER CARBONATE 800 MILLIGRAM(S): 800 TABLET, FILM COATED ORAL at 12:41

## 2025-01-30 RX ADMIN — SEVELAMER CARBONATE 800 MILLIGRAM(S): 800 TABLET, FILM COATED ORAL at 09:09

## 2025-01-30 RX ADMIN — Medication 5000 UNIT(S): at 17:11

## 2025-01-30 RX ADMIN — Medication 1: at 17:10

## 2025-01-30 RX ADMIN — Medication 50 MILLIGRAM(S): at 12:41

## 2025-01-30 RX ADMIN — Medication 5000 UNIT(S): at 05:40

## 2025-01-30 RX ADMIN — PANTOPRAZOLE 40 MILLIGRAM(S): 20 TABLET, DELAYED RELEASE ORAL at 05:39

## 2025-01-30 RX ADMIN — SEVELAMER CARBONATE 800 MILLIGRAM(S): 800 TABLET, FILM COATED ORAL at 17:11

## 2025-01-30 RX ADMIN — MIDODRINE HYDROCHLORIDE 2.5 MILLIGRAM(S): 5 TABLET ORAL at 05:40

## 2025-01-30 RX ADMIN — Medication 1: at 12:15

## 2025-01-30 RX ADMIN — Medication 6.25 MILLIGRAM(S): at 05:40

## 2025-01-30 RX ADMIN — Medication 0.25 MILLIGRAM(S): at 11:23

## 2025-01-30 RX ADMIN — ASPIRIN 81 MILLIGRAM(S): 81 TABLET, COATED ORAL at 11:24

## 2025-01-30 RX ADMIN — Medication 75 MILLIGRAM(S): at 11:23

## 2025-01-30 RX ADMIN — MIDODRINE HYDROCHLORIDE 2.5 MILLIGRAM(S): 5 TABLET ORAL at 11:24

## 2025-01-30 NOTE — PROGRESS NOTE ADULT - ASSESSMENT
79yF           h/o   HLD, DM, CAD,   CKD  on  HD,        hives with IV contrast and usually is pre-medicated with prednisone       cp/  palps,  none   now      c/p  cp/  palps /  none  now        elevated troponin from ckd  and demand ischemia. ,  not  from  mi       CAD,,  recent stent    to LAD         asa/  plavix,    lipitor    CKD           on HD      renal    DM           follow   fs   orthostasis,  on  midodrine    elevated  bp now/  pt  had  low  bp  in  past    Toprol    HD  in am ,     d/c  plans   was  for  yesterday   pt still here    to proceed  with   d/c    dvt  ppx    pt si  full code          rad< from: TTE W or WO Ultrasound Enhancing Agent (12.23.24 @ 10:09) >   1. Left ventricular cavity is normal in size. Left ventricular wall thickness is normal. Left ventricular systolic function is normal with an ejection fraction of 65 % by Cooper's method of disks. There are no regional wall motion abnormalities seen.   2. Normal left ventricular diastolic function, with normal left ventricular filling pressure.   3. Normal right ventricular cavity size, with normal wall thickness, and normal right ventricular systolic function.   4. Mild aortic stenosis.   5. No pericardial effusion seen.   6. Technically difficult image quality.  _______________    < end of copied text >         79yF           h/o   HLD, DM, CAD,   CKD  on  HD,        hives with IV contrast and usually is pre-medicated with prednisone       cp/  palps,  none   now      c/p  cp/  palps /  none  now        elevated troponin from ckd  and demand ischemia. ,  not  from  mi       CAD,,  recent stent    to LAD         asa/  plavix,    lipitor    CKD           on HD      renal    DM           follow   fs   orthostasis,  on  midodrine    elevated  bp now/  pt  had  low  bp  in  past    Toprol    HD  in am ,     d/c  plans   was  for  yesterday   pt still here    to proceed  with   d/c    dvt  ppx    called   charlotte  yesterday    an d  again this  am, phone  keeps  getting  disconnevted    pt si  full code          rad< from: TTE W or WO Ultrasound Enhancing Agent (12.23.24 @ 10:09) >   1. Left ventricular cavity is normal in size. Left ventricular wall thickness is normal. Left ventricular systolic function is normal with an ejection fraction of 65 % by Cooper's method of disks. There are no regional wall motion abnormalities seen.   2. Normal left ventricular diastolic function, with normal left ventricular filling pressure.   3. Normal right ventricular cavity size, with normal wall thickness, and normal right ventricular systolic function.   4. Mild aortic stenosis.   5. No pericardial effusion seen.   6. Technically difficult image quality.  _______________    < end of copied text >         79yF           h/o   HLD, DM, CAD,   CKD  on  HD,        hives with IV contrast and usually is pre-medicated with prednisone        cp/  palps,  none   now      c/p  cp/  palps /  none  now        elevated troponin from ckd  and demand ischemia. ,  not  from  mi       CAD,,  recent stent    to LAD         asa/  plavix,    lipitor    CKD           on HD      renal    DM           follow   fs   orthostasis,  on  midodrine    elevated  bp now/  pt  had  low  bp  in  past    Toprol    HD  in am ,     d/c  plans   was  for  yesterday   pt still here    to proceed  with   d/c    dvt  ppx    called   nakia  yesterday    and   again this  am, phone  keeps  getting  disconnected    pt si  full code 9  .52  am,   spoke    with Yomaira. this  am          rad< from: TTE W or WO Ultrasound Enhancing Agent (12.23.24 @ 10:09) >   1. Left ventricular cavity is normal in size. Left ventricular wall thickness is normal. Left ventricular systolic function is normal with an ejection fraction of 65 % by Cooper's method of disks. There are no regional wall motion abnormalities seen.   2. Normal left ventricular diastolic function, with normal left ventricular filling pressure.   3. Normal right ventricular cavity size, with normal wall thickness, and normal right ventricular systolic function.   4. Mild aortic stenosis.   5. No pericardial effusion seen.   6. Technically difficult image quality.  _______________    < end of copied text >         79yF           h/o   HLD, DM, CAD,   CKD  on  HD,        hives with IV contrast and usually is pre-medicated with prednisone        cp/  palps,  none   now      c/p  cp/  palps /  none  now        elevated troponin from ckd  and demand ischemia. ,  not  from  mi       CAD,,  recent stent    to LAD         asa/  plavix,    lipitor    CKD           on HD      renal    DM           follow   fs   orthostasis,  on  midodrine    elevated  bp now/  pt  had  low  bp  in  past    Toprol    HD  in am ,     d/c  plans   was  for  yesterday   pt still here    to proceed  with   d/c    dvt  ppx    called   daughter   yesterday    and   again this  am, phone  keeps  getting  disconnected    pt si  full code 9  .52  am,   spoke    with Yomaira. this  am/     explained  at  length,  pt 's  bp   meds ,  bp  tend s to  fluctuate   and  that  her  renal  dr  who  sees  pt  om hd,  will  follow   he r bp    per  daughter, , she  wishes   to speak with  consultants  aware     that  pt is  cleared  for  d/c/  explained   that  pt  is  on toprol  now  pe r card          rad< from: TTE W or WO Ultrasound Enhancing Agent (12.23.24 @ 10:09) >   1. Left ventricular cavity is normal in size. Left ventricular wall thickness is normal. Left ventricular systolic function is normal with an ejection fraction of 65 % by Cooper's method of disks. There are no regional wall motion abnormalities seen.   2. Normal left ventricular diastolic function, with normal left ventricular filling pressure.   3. Normal right ventricular cavity size, with normal wall thickness, and normal right ventricular systolic function.   4. Mild aortic stenosis.   5. No pericardial effusion seen.   6. Technically difficult image quality.  _______________    < end of copied text >

## 2025-01-30 NOTE — ED PROVIDER NOTE - DOMESTIC TRAVEL HIGH RISK QUESTION
Please schedule patient for virtual appointment tomorrow to discuss concerns. If symptoms worsening and unable to wait for virtual appointment recommend ER visit. Thanks!       No

## 2025-01-30 NOTE — PROVIDER CONTACT NOTE (CRITICAL VALUE NOTIFICATION) - ASSESSMENT
AOx4, pt able to verbalize needs.  Pt denies and headache, dizziness, shortness of breath, palpitations headache and chest pain. Pt verbalizes no pain, no acute distress noted. Troponin 198

## 2025-01-30 NOTE — PROGRESS NOTE ADULT - SUBJECTIVE AND OBJECTIVE BOX
Date of Service: 01-30-25 @ 06:04           CARDIOLOGY     PROGRESS  NOTE   ________________________________________________    CHIEF COMPLAINT:Patient is a 79y old  Female who presents with a chief complaint of cp (29 Jan 2025 09:04)  no complain, comforatble  	  REVIEW OF SYSTEMS:  CONSTITUTIONAL: No fever, weight loss, or fatigue  EYES: No eye pain, visual disturbances, or discharge  ENT:  No difficulty hearing, tinnitus, vertigo; No sinus or throat pain  NECK: No pain or stiffness  RESPIRATORY: No cough, wheezing, chills or hemoptysis; No Shortness of Breath  CARDIOVASCULAR: No chest pain, palpitations, passing out, dizziness, or leg swelling  GASTROINTESTINAL: No abdominal or epigastric pain. No nausea, vomiting, or hematemesis; No diarrhea or constipation. No melena or hematochezia.  GENITOURINARY: No dysuria, frequency, hematuria, or incontinence  NEUROLOGICAL: No headaches, memory loss, loss of strength, numbness, or tremors  SKIN: No itching, burning, rashes, or lesions   LYMPH Nodes: No enlarged glands  ENDOCRINE: No heat or cold intolerance; No hair loss  MUSCULOSKELETAL: No joint pain or swelling; No muscle, back, or extremity pain  PSYCHIATRIC: No depression, anxiety, mood swings, or difficulty sleeping  HEME/LYMPH: No easy bruising, or bleeding gums  ALLERGY AND IMMUNOLOGIC: No hives or eczema	    [ ] All others negative	  [x ] Unable to obtain    PHYSICAL EXAM:  T(C): 36.6 (01-30-25 @ 04:51), Max: 37 (01-29-25 @ 21:47)  HR: 75 (01-30-25 @ 04:51) (74 - 96)  BP: 96/61 (01-30-25 @ 04:51) (96/61 - 176/77)  RR: 18 (01-30-25 @ 04:51) (18 - 20)  SpO2: 97% (01-30-25 @ 04:51) (94% - 98%)  Wt(kg): --  I&O's Summary    29 Jan 2025 07:01  -  30 Jan 2025 06:04  --------------------------------------------------------  IN: 600 mL / OUT: 2000 mL / NET: -1400 mL        Appearance: Normal	  HEENT:   Normal oral mucosa, PERRL, EOMI	  Lymphatic: No lymphadenopathy  Cardiovascular: Normal S1 S2, No JVD, + murmurs, No edema  Respiratory:rhonchi  Gastrointestinal:  Soft, Non-tender, + BS	  Skin: No rashes, No ecchymoses, No cyanosis	  Neurologic: Non-focal  Extremities:  No clubbing, cyanosis or edema  Vascular: Peripheral pulses palpable 2+ bilaterally    MEDICATIONS  (STANDING):  ALPRAZolam 0.25 milliGRAM(s) Oral daily  aspirin enteric coated 81 milliGRAM(s) Oral daily  atorvastatin 40 milliGRAM(s) Oral at bedtime  carvedilol 6.25 milliGRAM(s) Oral every 12 hours  clopidogrel Tablet 75 milliGRAM(s) Oral daily  dextrose 5%. 1000 milliLiter(s) (50 mL/Hr) IV Continuous <Continuous>  dextrose 5%. 1000 milliLiter(s) (100 mL/Hr) IV Continuous <Continuous>  dextrose 50% Injectable 25 Gram(s) IV Push once  dextrose 50% Injectable 12.5 Gram(s) IV Push once  dextrose 50% Injectable 25 Gram(s) IV Push once  epoetin shannon-epbx (RETACRIT) Injectable 71072 Unit(s) IV Push <User Schedule>  glucagon  Injectable 1 milliGRAM(s) IntraMuscular once  heparin   Injectable 5000 Unit(s) SubCutaneous every 12 hours  influenza  Vaccine (HIGH DOSE) 0.5 milliLiter(s) IntraMuscular once  insulin lispro (ADMELOG) corrective regimen sliding scale   SubCutaneous three times a day before meals  midodrine. 2.5 milliGRAM(s) Oral three times a day  pantoprazole    Tablet 40 milliGRAM(s) Oral before breakfast  sevelamer carbonate 800 milliGRAM(s) Oral three times a day with meals      TELEMETRY: 	    ECG:  	  RADIOLOGY:  OTHER: 	  	  LABS:	 	    CARDIAC MARKERS:  CARDIAC MARKERS ( 28 Jan 2025 07:38 )  x     / x     / x     / x     / 2.2 ng/mL                          9.7    8.67  )-----------( 219      ( 28 Jan 2025 07:38 )             31.5     01-28    135  |  93[L]  |  30[H]  ----------------------------<  206[H]  4.2   |  25  |  6.54[H]    Ca    9.7      28 Jan 2025 07:38      proBNP:   Lipid Profile:   HgA1c:   TSH: Thyroid Stimulating Hormone, Serum: 1.600 uU/mL (01-14 @ 09:40)      < from: TTE W or WO Ultrasound Enhancing Agent (12.23.24 @ 10:09) >   1. Left ventricular cavity is normal in size. Left ventricular wall thickness is normal. Left ventricular systolic function is normal with an ejection fraction of 65 % by Cooper's method of disks. There are no regional wall motion abnormalities seen.   2. Normal left ventricular diastolic function, with normal left ventricular filling pressure.   3. Normal right ventricular cavity size, with normal wall thickness, and normal right ventricular systolic function.   4. Mild aortic stenosis.   5. No pericardial effusion seen.   6. Technically difficult image quality.      Assessment and plan  ---------------------------  79yF  h/o  HLD, DM, CAD, ESRD on MWF dialysis with recent admission for chest pain where she had a cath and LAD stent placed. Pt states that since dc she has had persistent palpitations and intermittent chest pain. States that last night the palpitations were so severe that she could not sleep. Pt accompanied by family member- states that pt has been having these symptoms lately especially after HD sessions. States that the reason she came to the hospital today is that in addition to the palpitations, the was concerned that her HD today would exacerbate her symptoms. Denies sob, fever, abd pain, nvd.  pt with sig cardiac hx s/p recent cardiac cath and NICOL of lad with chest pain and palpitation  tele  check cpk / mb   continue asa/ plavix  add beta blocker as tolerated  continue bp meds will adjust dose  dvt prophylaxis  cpk are all negative , no further chest pain  continue DAPT  increase ambulation  observe on tele r/o PAF/SVT, no arrythmia on tele  continue midodrine in order to tolerate the HD  decrease walter, dc coreg, start on metoprolol er 50 mg daily

## 2025-01-30 NOTE — PROGRESS NOTE ADULT - SUBJECTIVE AND OBJECTIVE BOX
date of service: 01-30-25 @ 09:07  [facility]  REVIEW OF SYSTEMS:  CONSTITUTIONAL: No fever,  no  weight loss  ENT:  No  tinnitus,   no   vertigo  NECK: No pain or stiffness  RESPIRATORY: No cough, wheezing, chills or hemoptysis;    No Shortness of Breath  CARDIOVASCULAR: No chest pain, palpitations, dizziness  GASTROINTESTINAL: No abdominal or epigastric pain. No nausea, vomiting, or hematemesis; No diarrhea  No melena or hematochezia.  GENITOURINARY: No dysuria, frequency, hematuria, or incontinence  NEUROLOGICAL: No headaches  SKIN: No itching,  no   rash  LYMPH Nodes: No enlarged glands  ENDOCRINE: No heat or cold intolerance  MUSCULOSKELETAL: No joint pain or swelling  PSYCHIATRIC: No depression, anxiety  HEME/LYMPH: No easy bruising, or bleeding gums  ALLERGY AND IMMUNOLOGIC: No hives or eczema	    MEDICATIONS  (STANDING):  ALPRAZolam 0.25 milliGRAM(s) Oral daily  aspirin enteric coated 81 milliGRAM(s) Oral daily  atorvastatin 40 milliGRAM(s) Oral at bedtime  clopidogrel Tablet 75 milliGRAM(s) Oral daily  dextrose 5%. 1000 milliLiter(s) (50 mL/Hr) IV Continuous <Continuous>  dextrose 5%. 1000 milliLiter(s) (100 mL/Hr) IV Continuous <Continuous>  dextrose 50% Injectable 25 Gram(s) IV Push once  dextrose 50% Injectable 12.5 Gram(s) IV Push once  dextrose 50% Injectable 25 Gram(s) IV Push once  epoetin shannon-epbx (RETACRIT) Injectable 52570 Unit(s) IV Push <User Schedule>  glucagon  Injectable 1 milliGRAM(s) IntraMuscular once  heparin   Injectable 5000 Unit(s) SubCutaneous every 12 hours  influenza  Vaccine (HIGH DOSE) 0.5 milliLiter(s) IntraMuscular once  insulin lispro (ADMELOG) corrective regimen sliding scale   SubCutaneous three times a day before meals  metoprolol succinate ER 50 milliGRAM(s) Oral daily  midodrine. 2.5 milliGRAM(s) Oral three times a day  pantoprazole    Tablet 40 milliGRAM(s) Oral before breakfast  sevelamer carbonate 800 milliGRAM(s) Oral three times a day with meals    MEDICATIONS  (PRN):  dextrose Oral Gel 15 Gram(s) Oral once PRN Blood Glucose LESS THAN 70 milliGRAM(s)/deciliter      Vital Signs Last 24 Hrs  T(C): 36.6 (30 Jan 2025 04:51), Max: 37 (29 Jan 2025 21:47)  T(F): 97.8 (30 Jan 2025 04:51), Max: 98.6 (29 Jan 2025 21:47)  HR: 75 (30 Jan 2025 04:51) (75 - 96)  BP: 96/61 (30 Jan 2025 04:51) (96/61 - 146/72)  BP(mean): --  RR: 18 (30 Jan 2025 04:51) (18 - 18)  SpO2: 97% (30 Jan 2025 04:51) (97% - 98%)    Parameters below as of 30 Jan 2025 04:51  Patient On (Oxygen Delivery Method): room air      CAPILLARY BLOOD GLUCOSE      POCT Blood Glucose.: 130 mg/dL (30 Jan 2025 08:16)  POCT Blood Glucose.: 240 mg/dL (29 Jan 2025 16:55)  POCT Blood Glucose.: 195 mg/dL (29 Jan 2025 11:59)  POCT Blood Glucose.: 106 mg/dL (29 Jan 2025 09:11)    I&O's Summary    29 Jan 2025 07:01  -  30 Jan 2025 07:00  --------------------------------------------------------  IN: 600 mL / OUT: 2000 mL / NET: -1400 mL          Appearance: Normal	  HEENT:   Normal oral mucosa, PERRL, EOMI	  Lymphatic: No lymphadenopathy  Cardiovascular: Normal S1 S2, No JVD  Respiratory: Lungs clear to auscultation	  Gastrointestinal:  Soft, Non-tender, + BS	  Skin: No rash, No ecchymoses	  Extremities:     LABS:                          Thyroid Stimulating Hormone, Serum: 1.600 uU/mL (01-14 @ 09:40)          Consultant(s) Notes Reviewed:      Care Discussed with Consultants/Other Providers:

## 2025-01-30 NOTE — CONSULT NOTE ADULT - PROBLEM SELECTOR RECOMMENDATION 9
Will continue current insulin regimen for now.   Will continue monitoring FS, log, and glucose trends, will Follow up.  Patient counseled for compliance with consistent low carb diet and exercise as tolerated outpatient.     Can be DC on home Lantus low dose 5 units at bedtime  FU outpt

## 2025-01-30 NOTE — PROGRESS NOTE ADULT - ASSESSMENT
79yF  h/o  HLD, DM, CAD, ESRD on MWF dialysis with recent admission for chest pain where she had a cath and LAD stent placed. Pt states that since dc she has had persistent palpitations and intermittent chest pain    ESRD on HD   Center: [facility]  Nephrologist: Jhonatan Smith   Schedule: [facility]   Access: LUE AVF     plan   s/p HD yesterday; next HD for tomorrow  consent obtained and placed in chart  UF as tolerated by BP   please dose medications as per ESRD     Anemia   in setting of kidney disease  hbg below goal  epo 10 k tiw with hd  trend hgb     If any questions, please feel free to contact me     Jhonatan Smith  Nephrology Attending  Cell #[phone]

## 2025-01-30 NOTE — PROGRESS NOTE ADULT - PROVIDER SPECIALTY LIST ADULT
Internal Medicine
Cardiology
Nephrology
Nephrology
Cardiology
Cardiology
Internal Medicine
Internal Medicine
Nephrology

## 2025-01-30 NOTE — PROGRESS NOTE ADULT - SUBJECTIVE AND OBJECTIVE BOX
[facility] : Ans Serv [phone], Office [phone]  Dr. Smith/Dr Lockett/Dr Camacho  /Dr Garry davenport /Dr JACY Taylor/Dr Del Becker/Dr Stephon Colbert /Dr MALCOLM Kearns  _______________________________________________________________________________________________    Pt seen and examined this morning   no acute issues noted overnight     VITALS:  T(F): 97.8 (01-30 @ 04:51), Max: 98.6 (01-29 @ 21:47)  HR: 75 (01-30 @ 04:51)  BP: 96/61 (01-30 @ 04:51)  ABP: --  RR: 18 (01-30 @ 04:51)  SpO2: 97% (01-30 @ 04:51)    01-29 @ 07:01  -  01-30 @ 07:00  --------------------------------------------------------  IN: 600 mL / OUT: 2000 mL / NET: -1400 mL      Physical Exam :-  Constitutional: NAD  HEENT: anicteric sclera, oropharynx clear, MMM  Respiratory: CTAB, no wheezes, rales or rhonchi  Cardiovascular: S1, S2, RRR  Gastrointestinal: BS+, soft, NT/ND  Extremities: No peripheral edema  Neurological: A/O x 3, no focal deficits  Psychiatric: Normal mood, normal affect  Skin: No rashes  Vascular Access: left upper ext avf + thrill    Data:-  Allergies :   smoke; coughing (Other)  IV Contrast (Anaphylaxis)  Shrimp (Hives)  shellfish (Hives; Rash)    Hospital Medications:   MEDICATIONS  (STANDING):  ALPRAZolam 0.25 milliGRAM(s) Oral daily  aspirin enteric coated 81 milliGRAM(s) Oral daily  atorvastatin 40 milliGRAM(s) Oral at bedtime  clopidogrel Tablet 75 milliGRAM(s) Oral daily  dextrose 5%. 1000 milliLiter(s) (50 mL/Hr) IV Continuous <Continuous>  dextrose 5%. 1000 milliLiter(s) (100 mL/Hr) IV Continuous <Continuous>  dextrose 50% Injectable 25 Gram(s) IV Push once  dextrose 50% Injectable 12.5 Gram(s) IV Push once  dextrose 50% Injectable 25 Gram(s) IV Push once  epoetin shannon-epbx (RETACRIT) Injectable 34479 Unit(s) IV Push <User Schedule>  glucagon  Injectable 1 milliGRAM(s) IntraMuscular once  heparin   Injectable 5000 Unit(s) SubCutaneous every 12 hours  influenza  Vaccine (HIGH DOSE) 0.5 milliLiter(s) IntraMuscular once  insulin lispro (ADMELOG) corrective regimen sliding scale   SubCutaneous three times a day before meals  metoprolol succinate ER 50 milliGRAM(s) Oral daily  midodrine. 2.5 milliGRAM(s) Oral three times a day  pantoprazole    Tablet 40 milliGRAM(s) Oral before breakfast  sevelamer carbonate 800 milliGRAM(s) Oral three times a day with meals          Creatinine Trend: 6.54 <--, 9.06 <--, 8.85 <--, 7.33 <--, 7.32 <--  egfr trend : 6 <--, 4 <--, 4 <--, 5 <--, 5 <--, 8 <--, 9 <--

## 2025-01-30 NOTE — CONSULT NOTE ADULT - SUBJECTIVE AND OBJECTIVE BOX
Date of Service, 25 @ 18:20  CHIEF COMPLAINT:Patient is a 79y old  Female who presents with a chief complaint of     HPI:  79yF  h/o  HLD, DM, CAD, ESRD on MWF dialysis with recent admission for chest pain where she had a cath and LAD stent placed. Pt states that since dc she has had persistent palpitations and intermittent chest pain. States that last night the palpitations were so severe that she could not sleep. Pt accompanied by family member- states that pt has been having these symptoms lately especially after HD sessions. States that the reason she came to the hospital today is that in addition to the palpitations, the was concerned that her HD today would exacerbate her symptoms. Denies sob, fever, abd pain, nvd.    PAST MEDICAL & SURGICAL HISTORY:  HTN (hypertension)  Vertigo  Trigeminal neuralgia  ESRD (end stage renal disease) on dialysis  MWF via left AVF  Anemia  Gout  HLD (hyperlipidemia)  DM (diabetes mellitus)  JARVIS (obstructive sleep apnea)  CPAP QHS  S/P rotator cuff surgery  Bilateral  S/P     S/P hysterectomy    S/P total knee replacement  Bilateral, left , right 2008  S/P cataract extraction  bilateral,   S/P arteriovenous (AV) fistula creation  left upper forearm cephalic vein brachial artery AV fistula creation on 2018, left arm basilic vein transposition on 2019.  S/P carpal tunnel release      MEDICATIONS  (STANDING):  epoetin shannon-epbx (RETACRIT) Injectable 90883 Unit(s) IV Push <User Schedule>    MEDICATIONS  (PRN):      FAMILY HISTORY:      SOCIAL HISTORY:    [ x] Non-smoker  [ ] Smoker  [ ] Alcohol    Allergies    smoke; coughing (Other)  IV Contrast (Anaphylaxis)  Shrimp (Hives)  shellfish (Hives; Rash)    Intolerances    	    REVIEW OF SYSTEMS:  CONSTITUTIONAL: No fever, weight loss, or fatigue  EYES: No eye pain, visual disturbances, or discharge  ENT:  No difficulty hearing, tinnitus, vertigo; No sinus or throat pain  NECK: No pain or stiffness  RESPIRATORY: No cough, wheezing, chills or hemoptysis; + Shortness of Breath  CARDIOVASCULAR: + chest pain, no  palpitations, passing out, dizziness, or leg swelling  GASTROINTESTINAL: No abdominal or epigastric pain. No nausea, vomiting, or hematemesis; No diarrhea or constipation. No melena or hematochezia.  GENITOURINARY: No dysuria, frequency, hematuria, or incontinence  NEUROLOGICAL: No headaches, memory loss, loss of strength, numbness, or tremors  SKIN: No itching, burning, rashes, or lesions   LYMPH Nodes: No enlarged glands  ENDOCRINE: No heat or cold intolerance; No hair loss  MUSCULOSKELETAL: No joint pain or swelling; No muscle, back, or extremity pain  PSYCHIATRIC: No depression, anxiety, mood swings, or difficulty sleeping  HEME/LYMPH: No easy bruising, or bleeding gums  ALLERGY AND IMMUNOLOGIC: No hives or eczema	    [x ] All others negative	  [ ] Unable to obtain    PHYSICAL EXAM:  T(C): 36.4 (25 @ 16:45), Max: 36.6 (25 @ 12:25)  HR: 89 (25 @ 16:45) (76 - 89)  BP: 186/84 (25 @ 16:45) (158/76 - 186/84)  RR: 20 (25 @ 16:45) (20 - 20)  SpO2: 99% (25 @ 16:45) (95% - 99%)  Wt(kg): --  I&O's Summary      Appearance: Normal	  HEENT:   Normal oral mucosa, PERRL, EOMI	  Lymphatic: No lymphadenopathy  Cardiovascular: Normal S1 S2, No JVD, + murmurs, No edema  Respiratory: rhonchi  Gastrointestinal:  Soft, Non-tender, + BS	  Skin: No rashes, No ecchymoses, No cyanosis	  Neurologic: Non-focal  Extremities: Normal range of motion, No clubbing, cyanosis or edema  Vascular: Peripheral pulses palpable 2+ bilaterally    TELEMETRY: 	    ECG:  	  RADIOLOGY:  OTHER: 	  	  LABS:	 	    CARDIAC MARKERS:                          9.4    7.21  )-----------( 198      ( 2025 13:01 )             30.6         135  |  92[L]  |  52[H]  ----------------------------<  139[H]  5.0   |  25  |  9.06[H]    Ca    9.5      2025 14:51  Mg     2.3         TPro  7.1  /  Alb  3.7  /  TBili  0.3  /  DBili  x   /  AST  32  /  ALT  16  /  AlkPhos  293[H]      proBNP:   Lipid Profile:   HgA1c:   TSH:       PREVIOUS DIAGNOSTIC TESTING:    < from: 12 Lead ECG (25 @ 18:36) >  Diagnosis Line NORMAL SINUS RHYTHM  LEFT AXIS DEVIATION  RIGHT BUNDLE BRANCH BLOCK  ABNORMAL ECG  WHEN COMPARED WITH ECG OF 2025 05:52, (UNCONFIRMED)  NO SIGNIFICANT CHANGE WAS FOUND    < from: TTE W or WO Ultrasound Enhancing Agent (24 @ 10:09) >   1. Left ventricular cavity is normal in size. Left ventricular wall thickness is normal. Left ventricular systolic function is normal with an ejection fraction of 65 % by Cooper's method of disks. There are no regional wall motion abnormalities seen.   2. Normal left ventricular diastolic function, with normal left ventricular filling pressure.   3. Normal right ventricular cavity size, with normal wall thickness, and normal right ventricular systolic function.   4. Mild aortic stenosis.   5. No pericardial effusion seen.   6. Technically difficult image quality.    < from: Cardiac Catheterization (22 @ 14:43) >  LM   Left main artery: Angiography shows minor irregularities.      LAD   Left anterior descending artery: IFR negative. There is a 50 %  stenosis.    CX   Circumflex: Angiography shows mild atherosclerosis.      RCA   Right coronary artery: Angiography shows mild atherosclerosis.        < from: Xray Chest 1 View- PORTABLE-Urgent (25 @ 12:27) >  The heart is magnified by technique.  The lungs are clear. No pleural effusion.  There is no pneumothorax.  No acute displaced bony abnormality.    IMPRESSION:  Clear lungs.                
[facility]  Consult Note Nephrology - CONSULTATION NOTE     79yF  h/o  HLD, DM, CAD, ESRD on MWF dialysis with recent admission for chest pain where she had a cath and LAD stent placed. Pt states that since dc she has had persistent palpitations and intermittent chest pain. States that last night the palpitations were so severe that she could not sleep. Pt accompanied by family member- states that pt has been having these symptoms lately especially after HD sessions. States that the reason she came to the hospital today is that in addition to the palpitations, the was concerned that her HD today would exacerbate her symptoms. Denies sob, fever, abd pain, nvd.    Renal consult for esrd on hd  hd mwf at [facility]  due for HD today  has palpitations  denies any chest pain or sob at the moment  was recently here for flu A      PAST MEDICAL & SURGICAL HISTORY:  HTN (hypertension)      Vertigo      Trigeminal neuralgia      ESRD (end stage renal disease) on dialysis  MWF via left AVF      Anemia      Gout      HLD (hyperlipidemia)      DM (diabetes mellitus)      JARVIS (obstructive sleep apnea)  CPAP QHS      S/P rotator cuff surgery  Bilateral      S/P         S/P hysterectomy        S/P total knee replacement  Bilateral, left 2006, right 2008      S/P cataract extraction  bilateral, 2005      S/P arteriovenous (AV) fistula creation  left upper forearm cephalic vein brachial artery AV fistula creation on 2018, left arm basilic vein transposition on 2019.        S/P carpal tunnel release  Left        smoke; coughing (Other)  IV Contrast (Anaphylaxis)  Shrimp (Hives)  shellfish (Hives; Rash)    Home Medications Reviewed  Hospital Medications:   MEDICATIONS  (STANDING):    SOCIAL HISTORY:  Denies ETOh,Smoking,   FAMILY HISTORY:    REVIEW OF SYSTEMS:  CONSTITUTIONAL: No weakness, fevers or chills  EYES/ENT: No visual changes;  No vertigo or throat pain   NECK: No pain or stiffness  RESPIRATORY: No cough, wheezing, hemoptysis; No shortness of breath  CARDIOVASCULAR: +palpitations.  GASTROINTESTINAL: No abdominal or epigastric pain. No nausea, vomiting, or hematemesis; No diarrhea or constipation. No melena or hematochezia.  GENITOURINARY: No dysuria, frequency, foamy urine, urinary urgency, incontinence or hematuria  NEUROLOGICAL: No numbness or weakness  SKIN: No itching, burning, rashes, or lesions   VASCULAR: No bilateral lower extremity edema.   All other review of systems is negative unless indicated above.    VITALS:  T(F): 97.9 (25 @ 12:25), Max: 97.9 (25 @ 12:25)  HR: 76 (25 @ 12:25)  BP: 158/76 (25 @ 12:25)  RR: 20 (25 @ 12:25)  SpO2: 98% (25 @ 12:25)  Wt(kg): --    Height (cm): 162.6 ( @ :)  Weight (kg): 80.7 ( @ :)  BMI (kg/m2): 30.5 ( @ :)  BSA (m2): 1.86 (:)  PHYSICAL EXAM:  Constitutional: NAD  HEENT: anicteric sclera, oropharynx clear, MMM  Neck: No JVD  Respiratory: CTAB, no wheezes, rales or rhonchi  Cardiovascular: S1, S2, RRR  Gastrointestinal: BS+, soft, NT/ND  Extremities: No cyanosis or clubbing. No peripheral edema  Neurological: A/O x 3, no focal deficits  Psychiatric: Normal mood, normal affect  : No CVA tenderness. No cottrell.   Skin: No rashes  Vascular Access: left upper ext avf + thrill    LABS:      136  |  94[L]  |  51[H]  ----------------------------<  185[H]  5.2   |  23  |  8.85[H]    Ca    9.4      2025 13:01  Mg     2.3         TPro  7.1  /  Alb  3.7  /  TBili  0.3  /  DBili      /  AST  32  /  ALT  16  /  AlkPhos  293[H]      Creatinine Trend: 8.85 <--, 7.33 <--, 7.32 <--, 5.16 <--, 4.76 <--                        9.4    7.21  )-----------( 198      ( 2025 13:01 )             30.6     Urine Studies:  Urinalysis Basic - ( 2025 13:01 )    Color:  / Appearance:  / SG:  / pH:   Gluc: 185 mg/dL / Ketone:   / Bili:  / Urobili:    Blood:  / Protein:  / Nitrite:    Leuk Esterase:  / RBC:  / WBC    Sq Epi:  / Non Sq Epi:  / Bacteria:         RADIOLOGY & ADDITIONAL STUDIES:                
      HPI:    79  yr  f      h/o  HLD, DM, CAD, ESRD on MWF dialysis       with recent admission for chest pain ,  had  LAD stent placed       pt  states that since dc she has had persistent palpitations and intermittent chest pain.     States that last night the palpitations were so severe that she could not sleep.      pt  accompanied by family member- states that pt has been having these symptoms lately especially after HD sessions.        also had  missed he r HD session (2025 18:54)      Patient has history of diabetes, A1C A1C with Estimated Average Glucose Result: 5.4 %   Endo was consulted for glycemic control.      PAST MEDICAL & SURGICAL HISTORY:  HTN (hypertension)      Vertigo      Trigeminal neuralgia      ESRD (end stage renal disease) on dialysis  MWF via left AVF      Anemia      Gout      HLD (hyperlipidemia)      DM (diabetes mellitus)      JARVIS (obstructive sleep apnea)  CPAP QHS      S/P rotator cuff surgery  Bilateral      S/P         S/P hysterectomy        S/P total knee replacement  Bilateral, left , right       S/P cataract extraction  bilateral,       S/P arteriovenous (AV) fistula creation  left upper forearm cephalic vein brachial artery AV fistula creation on 2018, left arm basilic vein transposition on 2019.        S/P carpal tunnel release  Left          FAMILY HISTORY:      Social History:            HOME MEDICATIONS:  Home Medications:  atorvastatin 40 mg oral tablet: 1 tab(s) orally once a day (at bedtime) (2025 11:56)  Lantus Solostar Pen 100 units/mL subcutaneous solution: 13 unit(s) subcutaneous once a day (at bedtime) (2025 13:44)  NovoLOG FlexTouch 100 units/mL subcutaneous solution: 5 unit(s) subcutaneous 3 times a day (before meals) (2025 13:44)  sevelamer carbonate 800 mg oral tablet: 1 tab(s) orally 3 times a day (with meals) (2025 11:56)            MEDICATIONS  (STANDING):  ALPRAZolam 0.25 milliGRAM(s) Oral daily  aspirin enteric coated 81 milliGRAM(s) Oral daily  atorvastatin 40 milliGRAM(s) Oral at bedtime  chlorhexidine 2% Cloths 1 Application(s) Topical <User Schedule>  clopidogrel Tablet 75 milliGRAM(s) Oral daily  dextrose 5%. 1000 milliLiter(s) (50 mL/Hr) IV Continuous <Continuous>  dextrose 5%. 1000 milliLiter(s) (100 mL/Hr) IV Continuous <Continuous>  dextrose 50% Injectable 25 Gram(s) IV Push once  dextrose 50% Injectable 12.5 Gram(s) IV Push once  dextrose 50% Injectable 25 Gram(s) IV Push once  epoetin shannon-epbx (RETACRIT) Injectable 26059 Unit(s) IV Push <User Schedule>  glucagon  Injectable 1 milliGRAM(s) IntraMuscular once  heparin   Injectable 5000 Unit(s) SubCutaneous every 12 hours  influenza  Vaccine (HIGH DOSE) 0.5 milliLiter(s) IntraMuscular once  insulin lispro (ADMELOG) corrective regimen sliding scale   SubCutaneous three times a day before meals  metoprolol succinate ER 50 milliGRAM(s) Oral daily  pantoprazole    Tablet 40 milliGRAM(s) Oral before breakfast  sevelamer carbonate 800 milliGRAM(s) Oral three times a day with meals    MEDICATIONS  (PRN):  dextrose Oral Gel 15 Gram(s) Oral once PRN Blood Glucose LESS THAN 70 milliGRAM(s)/deciliter      Allergies    smoke; coughing (Other)  IV Contrast (Anaphylaxis)  Shrimp (Hives)  shellfish (Hives; Rash)    Intolerances        Review of Systems:  Neuro: No HA, no dizziness  Cardiovascular: No chest pain, no palpitations  Respiratory: no SOB, no cough  GI: No nausea, vomiting, abdominal pain  MSK: Denies joint/muscle pain      ALL OTHER SYSTEMS REVIEWED AND NEGATIVE      PHYSICAL EXAM:  VITALS: T(C): 36.7 (25 @ 10:15)  T(F): 98 (25 @ 10:15), Max: 98.6 (25 @ 21:47)  HR: 71 (25 @ 10:15) (71 - 96)  BP: 130/74 (25 @ 10:15) (96/61 - 130/74)  RR:  (17 - 18)  SpO2:  (97% - 98%)  Wt(kg): --  GENERAL: NAD, well-groomed, well-developed  NEURO:  alert and oriented  RESPIRATORY: Clear to auscultation bilaterally; No rales, rhonchi, wheezing  CARDIOVASCULAR: Si S2  GI: Soft, non distended, normal bowel sounds  MUSCULOSKELETAL: Moves all extremities equally       POCT Blood Glucose.: 178 mg/dL (01-30-25 @ 11:56)  POCT Blood Glucose.: 130 mg/dL (25 @ 08:16)  POCT Blood Glucose.: 240 mg/dL (25 @ 16:55)  POCT Blood Glucose.: 195 mg/dL (25 @ 11:59)  POCT Blood Glucose.: 106 mg/dL (25 @ 09:11)  POCT Blood Glucose.: 232 mg/dL (25 @ 17:26)  POCT Blood Glucose.: 249 mg/dL (25 @ 11:35)  POCT Blood Glucose.: 193 mg/dL (25 @ 07:49)  POCT Blood Glucose.: 126 mg/dL (25 @ 21:29)                            9.7    8.67  )-----------( 219      ( 2025 07:38 )             31.5           135  |  93[L]  |  30[H]  ----------------------------<  206[H]  4.2   |  25  |  6.54[H]    eGFR: 6[L]    Ca    9.7              Thyroid Function Tests:   @ 09:50 TSH -- FreeT4 1.1 T3 -- Anti TPO -- Anti Thyroglobulin Ab -- TSI --   @ 09:40 TSH 1.600 FreeT4 -- T3 -- Anti TPO -- Anti Thyroglobulin Ab -- TSI --    Diet, Renal Restrictions:   For patients receiving Renal Replacement - No Protein Restr, No Conc K, No Conc Phos, Low Sodium  Consistent Carbohydrate Evening Snack (CSTCHOSN) (25 @ 19:06) [Active]        12-21 Chol 131 Direct LDL -- LDL calculated 69 HDL 46[L] Trig 84  A1C with Estimated Average Glucose Result: 5.4 % (24 @ 07:32)

## 2025-01-30 NOTE — CONSULT NOTE ADULT - ASSESSMENT
79yF  h/o  HLD, DM, CAD, ESRD on MWF dialysis with recent admission for chest pain where she had a cath and LAD stent placed. Pt states that since dc she has had persistent palpitations and intermittent chest pain    ESRD on HD   Center: [facility]  Nephrologist: Jhonatan Smith   Schedule: MWF   Access; LUE AVF     plan   Plan for HD today  consent obtained and placed in chart  UF as tolerated by BP   please dose medications as per ESRD     Anemia   in setting of kidney disease  hbg below goal   epo 10 k tiw with hd  trend hgb     If any questions, please feel free to contact me     Dr Taylor  [phone]
79yF  h/o  HLD, DM, CAD, ESRD on MWF dialysis with recent admission for chest pain where she had a cath and LAD stent placed. Pt states that since dc she has had persistent palpitations and intermittent chest pain. States that last night the palpitations were so severe that she could not sleep. Pt accompanied by family member- states that pt has been having these symptoms lately especially after HD sessions. States that the reason she came to the hospital today is that in addition to the palpitations, the was concerned that her HD today would exacerbate her symptoms. Denies sob, fever, abd pain, nvd.  pt with sig cardiac hx s/p recent cardiac cath and NICOL of lad with chest pain and palpitation  tele  check cpk / mb   continue asa/ plavix  add beta blocker as tolerated  continue bp meds will adjust dose  dvt prophylaxis
Assessment  DMT2: 79y Female with DM T2 with hyperglycemia admitted with palpitations, patient was on insulin at home, now on low dose insulins, blood sugars are fluctuating .   Tight sugar control is not recommended for this patient.  Palpitations: on medications, monitored, no acute events.  HTN: On antihypertensive medications, monitored, stable.  ESRD CKD: On hemodialysis, Labs, chart reviewed.      Discussed plan and management with Dr Claudia Cr NP - TEAMS  Perla Taylor MD  Cell: [phone]  Office: [phone]

## 2025-01-30 NOTE — CHART NOTE - NSCHARTNOTEFT_GEN_A_CORE
Per Dr. Smith , per discussion with family Discontinued Midodrine ( per daughter request)   - Dr. Finn made aware

## 2025-01-31 LAB
MRSA PCR RESULT.: SIGNIFICANT CHANGE UP
S AUREUS DNA NOSE QL NAA+PROBE: SIGNIFICANT CHANGE UP

## 2025-02-15 NOTE — H&P PST ADULT - VASCULAR
11/26/2024  Kirk Villar is a 60 y.o., female.      Pre-op Assessment    I have reviewed the Patient Summary Reports.    I have reviewed the NPO Status.   I have reviewed the Medications.     Review of Systems         Anesthesia Plan  Type of Anesthesia, risks & benefits discussed:    Anesthesia Type: Gen ETT, Regional  Intra-op Monitoring Plan: Standard ASA Monitors  Post Op Pain Control Plan: multimodal analgesia  Informed Consent: Informed consent signed with the Patient and all parties understand the risks and agree with anesthesia plan.  All questions answered.   ASA Score: 3    Ready For Surgery From Anesthesia Perspective.     .  NPO greater than 8 hours  No anesthetic complications  Shellfish    CAD  H/o MI  CHF  HTN  IDDM  Asthma  Factor V Leiden deficiency    MP II; adequate ROM at neck    Plan is GETA plus interscalene block     ED Gold Equal and normal pulses (carotid, femoral, dorsalis pedis)

## 2025-03-04 ENCOUNTER — APPOINTMENT (OUTPATIENT)
Dept: CARDIOLOGY | Facility: CLINIC | Age: 80
End: 2025-03-04
Payer: MEDICARE

## 2025-03-04 ENCOUNTER — NON-APPOINTMENT (OUTPATIENT)
Age: 80
End: 2025-03-04

## 2025-03-04 VITALS
OXYGEN SATURATION: 97 % | HEART RATE: 86 BPM | DIASTOLIC BLOOD PRESSURE: 78 MMHG | WEIGHT: 164 LBS | BODY MASS INDEX: 28.15 KG/M2 | SYSTOLIC BLOOD PRESSURE: 160 MMHG

## 2025-03-04 DIAGNOSIS — N18.6 END STAGE RENAL DISEASE: ICD-10-CM

## 2025-03-04 DIAGNOSIS — R00.2 PALPITATIONS: ICD-10-CM

## 2025-03-04 DIAGNOSIS — N18.5 CHRONIC KIDNEY DISEASE, STAGE 5: ICD-10-CM

## 2025-03-04 DIAGNOSIS — Z99.2 END STAGE RENAL DISEASE: ICD-10-CM

## 2025-03-04 DIAGNOSIS — I10 ESSENTIAL (PRIMARY) HYPERTENSION: ICD-10-CM

## 2025-03-04 DIAGNOSIS — Z95.5 PRESENCE OF CORONARY ANGIOPLASTY IMPLANT AND GRAFT: ICD-10-CM

## 2025-03-04 PROCEDURE — 99214 OFFICE O/P EST MOD 30 MIN: CPT

## 2025-03-04 PROCEDURE — G2211 COMPLEX E/M VISIT ADD ON: CPT

## 2025-03-04 PROCEDURE — 93000 ELECTROCARDIOGRAM COMPLETE: CPT

## 2025-03-04 RX ORDER — ASPIRIN 81 MG/1
81 TABLET ORAL
Refills: 0 | Status: ACTIVE | COMMUNITY

## 2025-03-04 RX ORDER — OMEPRAZOLE 20 MG/1
20 CAPSULE, DELAYED RELEASE ORAL
Refills: 0 | Status: ACTIVE | COMMUNITY

## 2025-03-04 RX ORDER — ATORVASTATIN CALCIUM 40 MG/1
40 TABLET, FILM COATED ORAL
Refills: 0 | Status: ACTIVE | COMMUNITY

## 2025-03-04 RX ORDER — METOPROLOL SUCCINATE 50 MG/1
50 TABLET, EXTENDED RELEASE ORAL
Qty: 180 | Refills: 2 | Status: ACTIVE | COMMUNITY
Start: 2025-03-04 | End: 1900-01-01

## 2025-03-04 RX ORDER — CLOPIDOGREL 75 MG/1
75 TABLET, FILM COATED ORAL
Refills: 0 | Status: ACTIVE | COMMUNITY

## 2025-03-04 RX ORDER — FOLIC ACID/VIT B COMPLEX AND C 0.8 MG
TABLET ORAL
Refills: 0 | Status: ACTIVE | COMMUNITY

## 2025-03-04 RX ORDER — SEVELAMER HYDROCHLORIDE 800 MG/1
800 TABLET, FILM COATED ORAL
Refills: 0 | Status: ACTIVE | COMMUNITY

## 2025-03-04 RX ORDER — INSULIN GLARGINE 100 [IU]/ML
100 INJECTION, SOLUTION SUBCUTANEOUS
Refills: 0 | Status: ACTIVE | COMMUNITY

## 2025-03-04 RX ORDER — METOPROLOL TARTRATE 50 MG/1
50 TABLET ORAL
Refills: 0 | Status: DISCONTINUED | COMMUNITY
End: 2025-03-04

## 2025-04-29 NOTE — H&P PST ADULT - GUM GEN PE MLT EXAM PC
Chief Complaint   Patient presents with    Discuss Labs     Elevated liver test on labs done at weight loss center     HISTORY OF PRESENT ILLNESS   HPI  Patient presents to discuss elevated liver enzyme test first detected on screening labs done 10/2024 through her weight management program. She started that [facility] weight loss program in 4/2024 and gets labs done about q3-4 months. The last 2 checks her ALT has been mildly elevated which is new for her. In 10/2024 it was 42 and last month it was 42 as well. I have reviewed her full lab panels from last month dated 3/21/25 from the weight loss center. She is otherwise feeling completely well and has remained asymptomatic.     She consumes etoh in moderation and only has about 1-2 drinks a week.  Not taking any rx or OTC analgesics.  She is prescribed ERT hormone patch per her Gyn but seldom wears it.  No recent foreign travel.  No GI or constitutional symptoms.  Her mother has h/o autoimmune hepatitis.     Diet/Weight: has struggled w/ her weight for several years; attended [facility] ~ 2017 x 6 months, took Metformin and Phentermine but only lost about 10-15 lbs and didn't really tolerate those well; 6-9-21 through 10-1-21 did [employer] Meal Plan (low calorie, more green veggies, lean protein) but that was not successful either; her weight had been 199-208 lbs from 2019 to 8/2021 but since then her weight had been going up and staying in the 220's; started Ozempic 9-21-22; got down to 197 lbs by 6-2023 but then had to stop after a while per insurance/availability; started a telehealth weight mgt program \"[facility]\" 4-2024 run by an NP, gets nutrition counseling, meal planning ciera of low carb/high protein diet, started Zepbound 4-2024 x 1 month then changed to Wegovy due to lack of efficacy and has been maintained on the Wegovy at varying doses since that time; weight down from ~ 202 lbs to 194 lbs so far; personal goal is 170 lbs       Exercise:  not examined

## 2025-05-06 ENCOUNTER — APPOINTMENT (OUTPATIENT)
Dept: CARDIOLOGY | Facility: CLINIC | Age: 80
End: 2025-05-06

## 2025-05-12 NOTE — PATIENT PROFILE ADULT. - NS PRO ABUSE SCREEN AFRAID ANYONE YN
no
Constitutional: awake and alert, in no acute distress  HEENT: head normocephalic and atraumatic. moist mucous membranes  Eyes: extraocular movements intact.  L eye: nml.  R eye: conjunctival injection with purulent discharge  Neck: supple, normal ROM  Cardiovascular: regular rate   Pulmonary: no respiratory distress  Gastrointestinal: abdomen flat and nondistended  Skin: warm, dry, normal for ethnicity  Musculoskeletal: no edema, no deformity  Neurological: oriented x4, no focal neurologic deficit.   Psychiatric: calm and cooperative

## 2025-06-13 NOTE — ED ADULT NURSE NOTE - CADM POA PRESS ULCER
Received ZORA for Randall Govea at [facility] and noted in the chart.  Received ZORA for Rand Lopez at [facility] and noted in the chart.  Faxed 6/9 progress notes to Rand Lopez at [phone].     No